# Patient Record
Sex: MALE | Race: WHITE | NOT HISPANIC OR LATINO | Employment: FULL TIME | RURAL
[De-identification: names, ages, dates, MRNs, and addresses within clinical notes are randomized per-mention and may not be internally consistent; named-entity substitution may affect disease eponyms.]

---

## 2018-10-03 NOTE — PROGRESS NOTES
[facility]  Consultation H&P  Kenneth Wen  1950  [phone]    VISIT DATE:  10/04/18    PCP: Susan Powers, APRN  [address]  [place] [facility] [address]    IDENTIFICATION: A 68 y.o. 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 CP  1. ~1990s [facility] Dr. Jhaveri [facility], reportedly normal, ibd  2. ~2011 MPS wnl, idb  3. 2017 GXT wnl, idb  2. Elevated BP  3. Prediabetes  4. GERD  5. Hiatal hernia   6. 3 melanoma  7. Seasonal allergic rhinitis  8. Surgical history:  1. Hemorrhoidectomy  2. Skin cancer excisions ×4  3. Appendectomy  4. Nasal polypectomy      Allergies  No Known Allergies    Current Medications    Current Outpatient Prescriptions:   •  aspirin 81 MG tablet, Take 81 mg by mouth Daily., Disp: , Rfl:   •  fexofenadine-pseudoephedrine (ALLEGRA-D)  MG per 12 hr tablet, Take 1 tablet by mouth 2 (Two) Times a Day., Disp: , Rfl:   •  QNASL 80 MCG/ACT aerosol solution, , Disp: , Rfl: 0  •  RA ALLERGY RELIEF 180 MG tablet, Take 180 mg by mouth Daily., Disp: , Rfl: 0     History of Present Illness   HPI  This is a 67-year-old male w the above-mentioned PMH who presents for consult from Susan Powers for evaluation of CP.    He reports having CP off and on throughout the years and actually had a [facility] w Dr. Jhaveri in the late 1990s that was wnl, data deficient, then most recently an MPS around 2011 that was also wnl, data deficient. He has had benign ED w/us a few time since then for CP. He describes 7/10 aching in his L sternal region that radiates around his back with occasional radiation down his L arm and L jaw. HE denies associated dyspnea, nausea, diaphoresis. This is non exertional, he states it seems to be more related tos stress.IT is not associated with certain foods or mealtimes.      Pt reports that about a year ago he was told he had prediabetes and pre hypertension, so  "he cut out a lot of sugar from his diet and lost about 25 lbs. HE does check BP at home w readings usually 120/70. BGs at home are usually low 100s or less. He is very active at work, moving around on his feet, but does no formal exercise. He quit smoking 30+ years ago.     Pt denies any  dyspnea at rest, dyspnea on exertion, orthopnea, PND, palpitations, lower extremity edema, or claudication. Pt denies history of CHF, DVT, PE, MI, CVA, TIA, or rheumatic fever. Reports 2 half brothers have had MIs <50. He was told \"years ago\" he had a hiatal hernia, he has never been scoped. He takes prevacid prn for heartburn.     ROS  Review of Systems   HENT: Positive for hearing loss and tinnitus.    Cardiovascular: Positive for chest pain.   Gastrointestinal: Positive for heartburn.   All other systems reviewed and are negative.      SOCIAL HX  Social History     Social History   • Marital status:      Spouse name: N/A   • Number of children: N/A   • Years of education: N/A     Occupational History   • Not on file.     Social History Main Topics   • Smoking status: Former Smoker     Types: Cigarettes     Quit date: 10/4/1982   • Smokeless tobacco: Never Used   • Alcohol use No   • Drug use: No   • Sexual activity: Defer     Other Topics Concern   • Not on file     Social History Narrative   • No narrative on file       FAMILY HX  Family History   Problem Relation Age of Onset   • No Known Problems Mother    • Heart attack Father    • No Known Problems Sister    • No Known Problems Brother    • No Known Problems Sister    • No Known Problems Brother        Vitals:    10/04/18 0956   BP: 131/85   BP Location: Left arm   Patient Position: Sitting   Pulse: 61   Weight: 78 kg (172 lb)   Height: 175.3 cm (69\")       PHYSICAL EXAMINATION:  Physical Exam   Constitutional: He is oriented to person, place, and time. He appears well-developed and well-nourished. No distress.   HENT:   Head: Normocephalic and atraumatic.   Right " Ear: External ear normal.   Left Ear: External ear normal.   Nose: Nose normal.   Eyes: Conjunctivae and EOM are normal.   Neck: Neck supple. No hepatojugular reflux and no JVD present. Carotid bruit is not present. No thyromegaly present.   Cardiovascular: Normal rate, regular rhythm, S1 normal, S2 normal, normal heart sounds, intact distal pulses and normal pulses.  Exam reveals no gallop, no distant heart sounds and no midsystolic click.    No murmur heard.  Pulses:       Radial pulses are 2+ on the right side, and 2+ on the left side.        Dorsalis pedis pulses are 2+ on the right side, and 2+ on the left side.        Posterior tibial pulses are 2+ on the right side, and 2+ on the left side.   Pulmonary/Chest: Effort normal and breath sounds normal. No respiratory distress. He has no decreased breath sounds. He has no wheezes. He has no rhonchi. He has no rales.   Abdominal: Soft. Bowel sounds are normal. There is no hepatosplenomegaly. There is no tenderness.   Musculoskeletal: Normal range of motion. He exhibits no edema.   Neurological: He is alert and oriented to person, place, and time.   No focal deficits.   Skin: Skin is warm and dry. No erythema.   Psychiatric: He has a normal mood and affect. Thought content normal.   Nursing note and vitals reviewed.      Diagnostic Data:    ECG 12 Lead  Date/Time: 10/4/2018 10:17 AM  Performed by: ELSIE ACOSTA  Authorized by: ELSIE ACOSTA   Rhythm: sinus rhythm  BPM: 86  Clinical impression: normal ECG        ASSESSMENT:   Diagnosis Plan   1. Precordial pain  Stress Test With Myocardial Perfusion (1 Day)   2. Elevated BP without diagnosis of hypertension     3. Dyslipidemia  Lipid Panel   4. Hyperglycemia       PLAN:  1. Chest pain with risk factors and a 68-year-old male with several years since last ischemic evaluation, we will treat this as an anginal equivalent and risk stratify for ischemia with noninvasive ischemic evaluation.  Nuclear stress test to be  set up in [place] at patient's convenience.  2. BP well controlled today.  Patient counseled to stay off of decongestants.  3. We will check lipids that if stress  4. Patient with control of blood glucose with diet    RTC 9 months    Scribed for Mendoza Piedra MD by Nell Flanagan PA-C. 10/4/2018  10:58 AM  I, Mendoza Piedra MD, personally performed the services described in this documentation as scribed by the above named individual in my presence, and it is both accurate and complete.  10/4/2018  11:12 AM    Mendoza Piedra MD, FACC

## 2018-10-04 ENCOUNTER — CONSULT (OUTPATIENT)
Dept: CARDIOLOGY | Facility: CLINIC | Age: 68
End: 2018-10-04

## 2018-10-04 VITALS
SYSTOLIC BLOOD PRESSURE: 131 MMHG | WEIGHT: 172 LBS | HEART RATE: 61 BPM | DIASTOLIC BLOOD PRESSURE: 85 MMHG | BODY MASS INDEX: 25.48 KG/M2 | HEIGHT: 69 IN

## 2018-10-04 DIAGNOSIS — E78.5 DYSLIPIDEMIA: ICD-10-CM

## 2018-10-04 DIAGNOSIS — R07.2 PRECORDIAL PAIN: Primary | ICD-10-CM

## 2018-10-04 DIAGNOSIS — R73.9 HYPERGLYCEMIA: ICD-10-CM

## 2018-10-04 DIAGNOSIS — R03.0 ELEVATED BP WITHOUT DIAGNOSIS OF HYPERTENSION: ICD-10-CM

## 2018-10-04 PROCEDURE — 93000 ELECTROCARDIOGRAM COMPLETE: CPT | Performed by: INTERNAL MEDICINE

## 2018-10-04 PROCEDURE — 99244 OFF/OP CNSLTJ NEW/EST MOD 40: CPT | Performed by: INTERNAL MEDICINE

## 2018-10-04 RX ORDER — FEXOFENADINE HCL AND PSEUDOEPHEDRINE HCI 60; 120 MG/1; MG/1
1 TABLET, EXTENDED RELEASE ORAL 2 TIMES DAILY
COMMUNITY

## 2018-10-04 RX ORDER — BECLOMETHASONE DIPROPIONATE 80 UG/1
AEROSOL, METERED NASAL
Refills: 0 | COMMUNITY
Start: 2018-10-02

## 2018-10-04 RX ORDER — FEXOFENADINE HCL 180 MG
180 TABLET ORAL DAILY
Refills: 0 | COMMUNITY
Start: 2018-10-01

## 2018-10-24 ENCOUNTER — HOSPITAL ENCOUNTER (OUTPATIENT)
Dept: CARDIOLOGY | Facility: HOSPITAL | Age: 68
Discharge: HOME | End: 2018-10-24

## 2018-10-24 DIAGNOSIS — R07.2 PRECORDIAL PAIN: ICD-10-CM

## 2018-10-24 PROCEDURE — 93017 CV STRESS TEST TRACING ONLY: CPT

## 2018-10-24 PROCEDURE — 78452 HT MUSCLE IMAGE SPECT MULT: CPT

## 2018-10-24 PROCEDURE — A9500 TC99M SESTAMIBI: HCPCS | Performed by: PHYSICIAN ASSISTANT

## 2018-10-24 PROCEDURE — 0 TECHNETIUM SESTAMIBI: Performed by: PHYSICIAN ASSISTANT

## 2018-10-24 PROCEDURE — 78452 HT MUSCLE IMAGE SPECT MULT: CPT | Performed by: INTERNAL MEDICINE

## 2018-10-24 PROCEDURE — 93018 CV STRESS TEST I&R ONLY: CPT | Performed by: INTERNAL MEDICINE

## 2018-10-24 RX ORDER — LANSOPRAZOLE 15 MG/1
15 CAPSULE, DELAYED RELEASE ORAL DAILY
COMMUNITY

## 2018-10-24 RX ADMIN — TECHNETIUM TC 99M SESTAMIBI 1 DOSE: 1 INJECTION INTRAVENOUS at 11:55

## 2018-10-24 RX ADMIN — TECHNETIUM TC 99M SESTAMIBI 1 DOSE: 1 INJECTION INTRAVENOUS at 09:40

## 2018-10-26 LAB
BH CV NUCLEAR PRIOR STUDY: 2
BH CV STRESS BP STAGE 1: NORMAL
BH CV STRESS BP STAGE 2: NORMAL
BH CV STRESS BP STAGE 3: NORMAL
BH CV STRESS DURATION MIN STAGE 1: 3
BH CV STRESS DURATION MIN STAGE 2: 3
BH CV STRESS DURATION MIN STAGE 3: 3
BH CV STRESS DURATION SEC STAGE 1: 0
BH CV STRESS DURATION SEC STAGE 2: 0
BH CV STRESS DURATION SEC STAGE 3: 0
BH CV STRESS GRADE STAGE 1: 10
BH CV STRESS GRADE STAGE 2: 12
BH CV STRESS GRADE STAGE 3: 14
BH CV STRESS HR STAGE 1: 104
BH CV STRESS HR STAGE 2: 129
BH CV STRESS HR STAGE 3: 141
BH CV STRESS METS STAGE 1: 5
BH CV STRESS METS STAGE 2: 7.5
BH CV STRESS METS STAGE 3: 10
BH CV STRESS PROTOCOL 1: NORMAL
BH CV STRESS RECOVERY BP: NORMAL MMHG
BH CV STRESS RECOVERY HR: 107 BPM
BH CV STRESS SPEED STAGE 1: 1.7
BH CV STRESS SPEED STAGE 2: 2.5
BH CV STRESS SPEED STAGE 3: 3.4
BH CV STRESS STAGE 1: 1
BH CV STRESS STAGE 2: 2
BH CV STRESS STAGE 3: 3
LV EF NUC BP: 55 %
MAXIMAL PREDICTED HEART RATE: 153 BPM
PERCENT MAX PREDICTED HR: 92.81 %
STRESS BASELINE BP: NORMAL MMHG
STRESS BASELINE HR: 61 BPM
STRESS PERCENT HR: 109 %
STRESS POST ESTIMATED WORKLOAD: 10.1 METS
STRESS POST EXERCISE DUR MIN: 9 MIN
STRESS POST EXERCISE DUR SEC: 0 SEC
STRESS POST PEAK BP: NORMAL MMHG
STRESS POST PEAK HR: 142 BPM
STRESS TARGET HR: 130 BPM

## 2023-01-01 ENCOUNTER — HOSPITAL ENCOUNTER (INPATIENT)
Facility: HOSPITAL | Age: 73
LOS: 7 days | End: 2023-11-23
Attending: INTERNAL MEDICINE | Admitting: INTERNAL MEDICINE
Payer: COMMERCIAL

## 2023-01-01 PROCEDURE — 25010000002 PHENOBARBITAL PER 120 MG: Performed by: FAMILY MEDICINE

## 2023-01-01 PROCEDURE — 25010000002 HALOPERIDOL LACTATE PER 5 MG: Performed by: NURSE PRACTITIONER

## 2023-01-01 PROCEDURE — 25010000002 PHENOBARBITAL PER 120 MG: Performed by: NURSE PRACTITIONER

## 2023-01-01 PROCEDURE — 25010000002 KETOROLAC TROMETHAMINE PER 15 MG: Performed by: NURSE PRACTITIONER

## 2023-01-01 PROCEDURE — 25010000002 MORPHINE PER 10 MG: Performed by: NURSE PRACTITIONER

## 2023-01-01 PROCEDURE — 25010000002 FUROSEMIDE PER 20 MG: Performed by: NURSE PRACTITIONER

## 2023-01-01 PROCEDURE — 25010000002 MIDAZOLAM PER 1 MG: Performed by: NURSE PRACTITIONER

## 2023-01-01 RX ORDER — GLYCOPYRROLATE 0.2 MG/ML
0.4 INJECTION INTRAMUSCULAR; INTRAVENOUS EVERY 4 HOURS PRN
Status: DISCONTINUED | OUTPATIENT
Start: 2023-01-01 | End: 2023-01-01 | Stop reason: HOSPADM

## 2023-01-01 RX ORDER — MORPHINE SULFATE 2 MG/ML
2 INJECTION, SOLUTION INTRAMUSCULAR; INTRAVENOUS
Status: DISCONTINUED | OUTPATIENT
Start: 2023-01-01 | End: 2023-01-01

## 2023-01-01 RX ORDER — LIDOCAINE 4 G/G
1 PATCH TOPICAL
Status: DISCONTINUED | OUTPATIENT
Start: 2023-01-01 | End: 2023-01-01

## 2023-01-01 RX ORDER — PHENOBARBITAL SODIUM 65 MG/ML
65 INJECTION INTRAMUSCULAR 2 TIMES DAILY
Status: DISCONTINUED | OUTPATIENT
Start: 2023-01-01 | End: 2023-01-01

## 2023-01-01 RX ORDER — ONDANSETRON 2 MG/ML
4 INJECTION INTRAMUSCULAR; INTRAVENOUS EVERY 6 HOURS PRN
Status: DISCONTINUED | OUTPATIENT
Start: 2023-01-01 | End: 2023-01-01 | Stop reason: HOSPADM

## 2023-01-01 RX ORDER — PHENOBARBITAL SODIUM 65 MG/ML
130 INJECTION INTRAMUSCULAR EVERY 6 HOURS PRN
Status: DISCONTINUED | OUTPATIENT
Start: 2023-01-01 | End: 2023-01-01 | Stop reason: HOSPADM

## 2023-01-01 RX ORDER — POLYVINYL ALCOHOL 14 MG/ML
2 SOLUTION/ DROPS OPHTHALMIC EVERY 6 HOURS
Status: DISCONTINUED | OUTPATIENT
Start: 2023-01-01 | End: 2023-01-01 | Stop reason: HOSPADM

## 2023-01-01 RX ORDER — KETOROLAC TROMETHAMINE 15 MG/ML
15 INJECTION, SOLUTION INTRAMUSCULAR; INTRAVENOUS EVERY 6 HOURS PRN
Status: DISCONTINUED | OUTPATIENT
Start: 2023-01-01 | End: 2023-01-01 | Stop reason: HOSPADM

## 2023-01-01 RX ORDER — FUROSEMIDE 10 MG/ML
20 INJECTION INTRAMUSCULAR; INTRAVENOUS EVERY 6 HOURS
Status: DISCONTINUED | OUTPATIENT
Start: 2023-01-01 | End: 2023-01-01

## 2023-01-01 RX ORDER — BISACODYL 10 MG
10 SUPPOSITORY, RECTAL RECTAL DAILY PRN
Status: DISCONTINUED | OUTPATIENT
Start: 2023-01-01 | End: 2023-01-01 | Stop reason: HOSPADM

## 2023-01-01 RX ORDER — FUROSEMIDE 10 MG/ML
20 INJECTION INTRAMUSCULAR; INTRAVENOUS EVERY 6 HOURS PRN
Status: DISCONTINUED | OUTPATIENT
Start: 2023-01-01 | End: 2023-01-01 | Stop reason: HOSPADM

## 2023-01-01 RX ORDER — SCOLOPAMINE TRANSDERMAL SYSTEM 1 MG/1
1 PATCH, EXTENDED RELEASE TRANSDERMAL
Status: DISCONTINUED | OUTPATIENT
Start: 2023-01-01 | End: 2023-01-01 | Stop reason: HOSPADM

## 2023-01-01 RX ORDER — ACETAMINOPHEN 650 MG/1
650 SUPPOSITORY RECTAL EVERY 4 HOURS PRN
Status: DISCONTINUED | OUTPATIENT
Start: 2023-01-01 | End: 2023-01-01 | Stop reason: HOSPADM

## 2023-01-01 RX ORDER — PHENOBARBITAL SODIUM 65 MG/ML
65 INJECTION INTRAMUSCULAR 3 TIMES DAILY
Status: DISCONTINUED | OUTPATIENT
Start: 2023-01-01 | End: 2023-01-01

## 2023-01-01 RX ORDER — GLYCOPYRROLATE 0.2 MG/ML
0.2 INJECTION INTRAMUSCULAR; INTRAVENOUS EVERY 4 HOURS PRN
Status: DISCONTINUED | OUTPATIENT
Start: 2023-01-01 | End: 2023-01-01

## 2023-01-01 RX ORDER — MORPHINE SULFATE 2 MG/ML
2 INJECTION, SOLUTION INTRAMUSCULAR; INTRAVENOUS EVERY 6 HOURS
Status: DISCONTINUED | OUTPATIENT
Start: 2023-01-01 | End: 2023-01-01

## 2023-01-01 RX ORDER — ACETAMINOPHEN 160 MG/5ML
650 SOLUTION ORAL EVERY 4 HOURS PRN
Status: DISCONTINUED | OUTPATIENT
Start: 2023-01-01 | End: 2023-01-01

## 2023-01-01 RX ORDER — POLYVINYL ALCOHOL 14 MG/ML
1 SOLUTION/ DROPS OPHTHALMIC
Status: DISCONTINUED | OUTPATIENT
Start: 2023-01-01 | End: 2023-01-01 | Stop reason: HOSPADM

## 2023-01-01 RX ORDER — SCOLOPAMINE TRANSDERMAL SYSTEM 1 MG/1
1 PATCH, EXTENDED RELEASE TRANSDERMAL ONCE
Status: DISCONTINUED | OUTPATIENT
Start: 2023-01-01 | End: 2023-01-01 | Stop reason: HOSPADM

## 2023-01-01 RX ORDER — MIDAZOLAM HYDROCHLORIDE 1 MG/ML
0.5 INJECTION INTRAMUSCULAR; INTRAVENOUS ONCE
Status: COMPLETED | OUTPATIENT
Start: 2023-01-01 | End: 2023-01-01

## 2023-01-01 RX ORDER — PHENOBARBITAL SODIUM 65 MG/ML
130 INJECTION INTRAMUSCULAR 3 TIMES DAILY
Status: DISCONTINUED | OUTPATIENT
Start: 2023-01-01 | End: 2023-01-01 | Stop reason: HOSPADM

## 2023-01-01 RX ORDER — ACETAMINOPHEN 325 MG/1
650 TABLET ORAL EVERY 4 HOURS PRN
Status: DISCONTINUED | OUTPATIENT
Start: 2023-01-01 | End: 2023-01-01

## 2023-01-01 RX ORDER — HALOPERIDOL 5 MG/ML
2 INJECTION INTRAMUSCULAR EVERY 4 HOURS PRN
Status: DISCONTINUED | OUTPATIENT
Start: 2023-01-01 | End: 2023-01-01 | Stop reason: HOSPADM

## 2023-01-01 RX ORDER — KETOROLAC TROMETHAMINE 15 MG/ML
15 INJECTION, SOLUTION INTRAMUSCULAR; INTRAVENOUS EVERY 6 HOURS PRN
Status: DISPENSED | OUTPATIENT
Start: 2023-01-01 | End: 2023-01-01

## 2023-01-01 RX ADMIN — MORPHINE SULFATE 2 MG: 2 INJECTION, SOLUTION INTRAMUSCULAR; INTRAVENOUS at 20:26

## 2023-01-01 RX ADMIN — SCOPOLAMINE 1 PATCH: 1.5 PATCH, EXTENDED RELEASE TRANSDERMAL at 08:34

## 2023-01-01 RX ADMIN — MORPHINE SULFATE 4 MG: 4 INJECTION, SOLUTION INTRAMUSCULAR; INTRAVENOUS at 15:57

## 2023-01-01 RX ADMIN — KETOROLAC TROMETHAMINE 15 MG: 15 INJECTION, SOLUTION INTRAMUSCULAR; INTRAVENOUS at 10:01

## 2023-01-01 RX ADMIN — MORPHINE SULFATE 2 MG: 2 INJECTION, SOLUTION INTRAMUSCULAR; INTRAVENOUS at 21:12

## 2023-01-01 RX ADMIN — PHENOBARBITAL SODIUM 130 MG: 65 INJECTION INTRAMUSCULAR; INTRAVENOUS at 15:46

## 2023-01-01 RX ADMIN — POLYVINYL ALCOHOL 2 DROP: 14 SOLUTION/ DROPS OPHTHALMIC at 18:00

## 2023-01-01 RX ADMIN — PHENOBARBITAL SODIUM 130 MG: 65 INJECTION INTRAMUSCULAR; INTRAVENOUS at 16:05

## 2023-01-01 RX ADMIN — MINERAL OIL: 1000 LIQUID ORAL at 09:48

## 2023-01-01 RX ADMIN — MORPHINE SULFATE 2 MG: 2 INJECTION, SOLUTION INTRAMUSCULAR; INTRAVENOUS at 16:23

## 2023-01-01 RX ADMIN — MORPHINE SULFATE 2 MG: 2 INJECTION, SOLUTION INTRAMUSCULAR; INTRAVENOUS at 03:58

## 2023-01-01 RX ADMIN — PHENOBARBITAL SODIUM 65 MG: 65 INJECTION INTRAMUSCULAR; INTRAVENOUS at 19:00

## 2023-01-01 RX ADMIN — MORPHINE SULFATE 2 MG: 2 INJECTION, SOLUTION INTRAMUSCULAR; INTRAVENOUS at 22:44

## 2023-01-01 RX ADMIN — MORPHINE SULFATE 2 MG: 2 INJECTION, SOLUTION INTRAMUSCULAR; INTRAVENOUS at 01:45

## 2023-01-01 RX ADMIN — MICONAZOLE NITRATE 1 APPLICATION: 20 CREAM TOPICAL at 22:16

## 2023-01-01 RX ADMIN — HALOPERIDOL LACTATE 2 MG: 5 INJECTION, SOLUTION INTRAMUSCULAR at 01:24

## 2023-01-01 RX ADMIN — PHENOBARBITAL SODIUM 65 MG: 65 INJECTION INTRAMUSCULAR at 08:00

## 2023-01-01 RX ADMIN — DICLOFENAC SODIUM 2 G: 9.3 GEL TOPICAL at 22:16

## 2023-01-01 RX ADMIN — MORPHINE SULFATE 2 MG: 2 INJECTION, SOLUTION INTRAMUSCULAR; INTRAVENOUS at 04:19

## 2023-01-01 RX ADMIN — PHENOBARBITAL SODIUM 65 MG: 65 INJECTION INTRAMUSCULAR; INTRAVENOUS at 22:11

## 2023-01-01 RX ADMIN — MICONAZOLE NITRATE 1 APPLICATION: 20 CREAM TOPICAL at 10:29

## 2023-01-01 RX ADMIN — DICLOFENAC SODIUM 2 G: 9.3 GEL TOPICAL at 22:44

## 2023-01-01 RX ADMIN — MORPHINE SULFATE 2 MG: 2 INJECTION, SOLUTION INTRAMUSCULAR; INTRAVENOUS at 15:46

## 2023-01-01 RX ADMIN — POLYVINYL ALCOHOL 2 DROP: 14 SOLUTION/ DROPS OPHTHALMIC at 07:59

## 2023-01-01 RX ADMIN — MIDAZOLAM HYDROCHLORIDE 0.5 MG: 1 INJECTION, SOLUTION INTRAMUSCULAR; INTRAVENOUS at 14:57

## 2023-01-01 RX ADMIN — PHENOBARBITAL SODIUM 65 MG: 65 INJECTION INTRAMUSCULAR; INTRAVENOUS at 08:00

## 2023-01-01 RX ADMIN — MORPHINE SULFATE 2 MG: 2 INJECTION, SOLUTION INTRAMUSCULAR; INTRAVENOUS at 09:13

## 2023-01-01 RX ADMIN — GLYCOPYRROLATE 0.4 MG: 0.2 INJECTION INTRAMUSCULAR; INTRAVENOUS at 01:24

## 2023-01-01 RX ADMIN — MORPHINE SULFATE 2 MG: 2 INJECTION, SOLUTION INTRAMUSCULAR; INTRAVENOUS at 10:47

## 2023-01-01 RX ADMIN — POLYVINYL ALCOHOL 2 DROP: 14 SOLUTION/ DROPS OPHTHALMIC at 17:42

## 2023-01-01 RX ADMIN — MORPHINE SULFATE 2 MG: 2 INJECTION, SOLUTION INTRAMUSCULAR; INTRAVENOUS at 21:00

## 2023-01-01 RX ADMIN — LIDOCAINE 1 PATCH: 4 PATCH TOPICAL at 08:00

## 2023-01-01 RX ADMIN — PHENOBARBITAL SODIUM 65 MG: 65 INJECTION INTRAMUSCULAR at 22:44

## 2023-01-01 RX ADMIN — MORPHINE SULFATE 2 MG: 2 INJECTION, SOLUTION INTRAMUSCULAR; INTRAVENOUS at 14:11

## 2023-01-01 RX ADMIN — MORPHINE SULFATE 2 MG: 2 INJECTION, SOLUTION INTRAMUSCULAR; INTRAVENOUS at 05:22

## 2023-01-01 RX ADMIN — MORPHINE SULFATE 2 MG: 2 INJECTION, SOLUTION INTRAMUSCULAR; INTRAVENOUS at 16:34

## 2023-01-01 RX ADMIN — MORPHINE SULFATE 2 MG: 2 INJECTION, SOLUTION INTRAMUSCULAR; INTRAVENOUS at 13:42

## 2023-01-01 RX ADMIN — DICLOFENAC SODIUM 2 G: 9.3 GEL TOPICAL at 10:21

## 2023-01-01 RX ADMIN — MORPHINE SULFATE 2 MG: 2 INJECTION, SOLUTION INTRAMUSCULAR; INTRAVENOUS at 16:44

## 2023-01-01 RX ADMIN — MICONAZOLE NITRATE 1 APPLICATION: 20 CREAM TOPICAL at 21:00

## 2023-01-01 RX ADMIN — HALOPERIDOL LACTATE 2 MG: 5 INJECTION, SOLUTION INTRAMUSCULAR at 09:14

## 2023-01-01 RX ADMIN — GLYCOPYRROLATE 0.4 MG: 0.2 INJECTION INTRAMUSCULAR; INTRAVENOUS at 06:12

## 2023-01-01 RX ADMIN — MORPHINE SULFATE 4 MG: 4 INJECTION, SOLUTION INTRAMUSCULAR; INTRAVENOUS at 00:44

## 2023-01-01 RX ADMIN — PHENOBARBITAL SODIUM 130 MG: 65 INJECTION INTRAMUSCULAR; INTRAVENOUS at 15:57

## 2023-01-01 RX ADMIN — MINERAL OIL: 1000 LIQUID ORAL at 00:44

## 2023-01-01 RX ADMIN — KETOROLAC TROMETHAMINE 15 MG: 15 INJECTION, SOLUTION INTRAMUSCULAR; INTRAVENOUS at 09:13

## 2023-01-01 RX ADMIN — GLYCOPYRROLATE 0.4 MG: 0.2 INJECTION INTRAMUSCULAR; INTRAVENOUS at 07:57

## 2023-01-01 RX ADMIN — PHENOBARBITAL SODIUM 130 MG: 65 INJECTION INTRAMUSCULAR; INTRAVENOUS at 20:01

## 2023-01-01 RX ADMIN — GLYCOPYRROLATE 0.4 MG: 0.2 INJECTION INTRAMUSCULAR; INTRAVENOUS at 15:02

## 2023-01-01 RX ADMIN — MICONAZOLE NITRATE 1 APPLICATION: 20 CREAM TOPICAL at 22:45

## 2023-01-01 RX ADMIN — MICONAZOLE NITRATE 1 APPLICATION: 20 CREAM TOPICAL at 08:02

## 2023-01-01 RX ADMIN — HALOPERIDOL LACTATE 2 MG: 5 INJECTION, SOLUTION INTRAMUSCULAR at 07:23

## 2023-01-01 RX ADMIN — PHENOBARBITAL SODIUM 130 MG: 65 INJECTION INTRAMUSCULAR; INTRAVENOUS at 21:43

## 2023-01-01 RX ADMIN — PHENOBARBITAL SODIUM 130 MG: 65 INJECTION INTRAMUSCULAR; INTRAVENOUS at 21:12

## 2023-01-01 RX ADMIN — FUROSEMIDE 20 MG: 10 INJECTION, SOLUTION INTRAMUSCULAR; INTRAVENOUS at 03:08

## 2023-01-01 RX ADMIN — DICLOFENAC SODIUM 2 G: 9.3 GEL TOPICAL at 21:01

## 2023-01-01 RX ADMIN — PHENOBARBITAL SODIUM 130 MG: 65 INJECTION INTRAMUSCULAR; INTRAVENOUS at 07:56

## 2023-01-01 RX ADMIN — MORPHINE SULFATE 4 MG: 4 INJECTION, SOLUTION INTRAMUSCULAR; INTRAVENOUS at 19:59

## 2023-01-01 RX ADMIN — PHENOBARBITAL SODIUM 65 MG: 65 INJECTION INTRAMUSCULAR at 21:26

## 2023-01-01 RX ADMIN — MINERAL OIL: 1000 LIQUID ORAL at 21:13

## 2023-01-01 RX ADMIN — HALOPERIDOL LACTATE 2 MG: 5 INJECTION, SOLUTION INTRAMUSCULAR at 09:49

## 2023-01-01 RX ADMIN — MORPHINE SULFATE 2 MG: 2 INJECTION, SOLUTION INTRAMUSCULAR; INTRAVENOUS at 10:27

## 2023-01-01 RX ADMIN — MINERAL OIL: 1000 LIQUID ORAL at 17:42

## 2023-01-01 RX ADMIN — MICONAZOLE NITRATE 1 APPLICATION: 20 CREAM TOPICAL at 08:35

## 2023-01-01 RX ADMIN — MORPHINE SULFATE 2 MG: 2 INJECTION, SOLUTION INTRAMUSCULAR; INTRAVENOUS at 08:25

## 2023-01-01 RX ADMIN — DICLOFENAC SODIUM 2 G: 9.3 GEL TOPICAL at 21:13

## 2023-01-01 RX ADMIN — MORPHINE SULFATE 2 MG: 2 INJECTION, SOLUTION INTRAMUSCULAR; INTRAVENOUS at 23:59

## 2023-01-01 RX ADMIN — MORPHINE SULFATE 2 MG: 2 INJECTION, SOLUTION INTRAMUSCULAR; INTRAVENOUS at 04:52

## 2023-01-01 RX ADMIN — HALOPERIDOL LACTATE 2 MG: 5 INJECTION, SOLUTION INTRAMUSCULAR at 20:00

## 2023-01-01 RX ADMIN — POLYVINYL ALCOHOL 2 DROP: 14 SOLUTION/ DROPS OPHTHALMIC at 00:47

## 2023-01-01 RX ADMIN — MORPHINE SULFATE 2 MG: 2 INJECTION, SOLUTION INTRAMUSCULAR; INTRAVENOUS at 09:25

## 2023-01-01 RX ADMIN — MORPHINE SULFATE 2 MG: 2 INJECTION, SOLUTION INTRAMUSCULAR; INTRAVENOUS at 07:56

## 2023-01-01 RX ADMIN — MINERAL OIL: 1000 LIQUID ORAL at 12:09

## 2023-01-01 RX ADMIN — MORPHINE SULFATE 2 MG: 2 INJECTION, SOLUTION INTRAMUSCULAR; INTRAVENOUS at 01:24

## 2023-01-01 RX ADMIN — MORPHINE SULFATE 2 MG: 2 INJECTION, SOLUTION INTRAMUSCULAR; INTRAVENOUS at 09:49

## 2023-01-01 RX ADMIN — PHENOBARBITAL SODIUM 130 MG: 65 INJECTION INTRAMUSCULAR; INTRAVENOUS at 14:21

## 2023-01-01 RX ADMIN — PHENOBARBITAL SODIUM 65 MG: 65 INJECTION INTRAMUSCULAR at 09:25

## 2023-01-01 RX ADMIN — PHENOBARBITAL SODIUM 130 MG: 65 INJECTION INTRAMUSCULAR; INTRAVENOUS at 02:34

## 2023-01-01 RX ADMIN — MORPHINE SULFATE 2 MG: 2 INJECTION, SOLUTION INTRAMUSCULAR; INTRAVENOUS at 04:26

## 2023-01-01 RX ADMIN — SCOPOLAMINE 1 PATCH: 1.5 PATCH, EXTENDED RELEASE TRANSDERMAL at 02:23

## 2023-01-01 RX ADMIN — KETOROLAC TROMETHAMINE 15 MG: 15 INJECTION, SOLUTION INTRAMUSCULAR; INTRAVENOUS at 12:59

## 2023-01-01 RX ADMIN — DICLOFENAC SODIUM 2 G: 9.3 GEL TOPICAL at 08:00

## 2023-01-01 RX ADMIN — MORPHINE SULFATE 2 MG: 2 INJECTION, SOLUTION INTRAMUSCULAR; INTRAVENOUS at 03:25

## 2023-01-01 RX ADMIN — MORPHINE SULFATE 2 MG: 2 INJECTION, SOLUTION INTRAMUSCULAR; INTRAVENOUS at 02:19

## 2023-01-01 RX ADMIN — BISACODYL 10 MG: 10 SUPPOSITORY RECTAL at 17:50

## 2023-01-01 RX ADMIN — PHENOBARBITAL SODIUM 130 MG: 65 INJECTION INTRAMUSCULAR; INTRAVENOUS at 01:06

## 2023-01-01 RX ADMIN — MICONAZOLE NITRATE 1 APPLICATION: 20 CREAM TOPICAL at 20:05

## 2023-01-01 RX ADMIN — PHENOBARBITAL SODIUM 130 MG: 65 INJECTION INTRAMUSCULAR; INTRAVENOUS at 08:18

## 2023-01-01 RX ADMIN — DICLOFENAC SODIUM 2 G: 9.3 GEL TOPICAL at 21:00

## 2023-01-01 RX ADMIN — DICLOFENAC SODIUM 2 G: 9.3 GEL TOPICAL at 08:01

## 2023-01-01 RX ADMIN — HALOPERIDOL LACTATE 2 MG: 5 INJECTION, SOLUTION INTRAMUSCULAR at 20:26

## 2023-01-01 RX ADMIN — DICLOFENAC SODIUM 2 G: 9.3 GEL TOPICAL at 08:35

## 2023-01-01 RX ADMIN — MORPHINE SULFATE 2 MG: 2 INJECTION, SOLUTION INTRAMUSCULAR; INTRAVENOUS at 21:27

## 2023-01-01 RX ADMIN — MICONAZOLE NITRATE 1 APPLICATION: 20 CREAM TOPICAL at 08:00

## 2023-01-01 RX ADMIN — MICONAZOLE NITRATE 1 APPLICATION: 20 CREAM TOPICAL at 08:19

## 2023-01-01 RX ADMIN — MORPHINE SULFATE 2 MG: 2 INJECTION, SOLUTION INTRAMUSCULAR; INTRAVENOUS at 22:11

## 2023-01-01 RX ADMIN — POLYVINYL ALCOHOL 2 DROP: 14 SOLUTION/ DROPS OPHTHALMIC at 00:44

## 2023-01-01 RX ADMIN — MORPHINE SULFATE 2 MG: 2 INJECTION, SOLUTION INTRAMUSCULAR; INTRAVENOUS at 10:42

## 2023-01-01 RX ADMIN — MORPHINE SULFATE 2 MG: 2 INJECTION, SOLUTION INTRAMUSCULAR; INTRAVENOUS at 03:14

## 2023-01-01 RX ADMIN — MORPHINE SULFATE 2 MG: 2 INJECTION, SOLUTION INTRAMUSCULAR; INTRAVENOUS at 12:59

## 2023-01-01 RX ADMIN — MORPHINE SULFATE 2 MG: 2 INJECTION, SOLUTION INTRAMUSCULAR; INTRAVENOUS at 19:01

## 2023-01-01 RX ADMIN — PHENOBARBITAL SODIUM 130 MG: 65 INJECTION INTRAMUSCULAR; INTRAVENOUS at 03:25

## 2023-01-01 RX ADMIN — MICONAZOLE NITRATE 1 APPLICATION: 20 CREAM TOPICAL at 20:27

## 2023-01-01 RX ADMIN — KETOROLAC TROMETHAMINE 15 MG: 15 INJECTION, SOLUTION INTRAMUSCULAR; INTRAVENOUS at 05:19

## 2023-01-01 RX ADMIN — MICONAZOLE NITRATE 1 APPLICATION: 20 CREAM TOPICAL at 21:01

## 2023-01-01 RX ADMIN — PHENOBARBITAL SODIUM 130 MG: 65 INJECTION INTRAMUSCULAR; INTRAVENOUS at 08:00

## 2023-01-01 RX ADMIN — DICLOFENAC SODIUM 2 G: 9.3 GEL TOPICAL at 08:18

## 2023-01-01 RX ADMIN — PHENOBARBITAL SODIUM 130 MG: 65 INJECTION INTRAMUSCULAR; INTRAVENOUS at 21:00

## 2023-01-01 RX ADMIN — DICLOFENAC SODIUM 2 G: 9.3 GEL TOPICAL at 20:26

## 2023-01-01 RX ADMIN — MORPHINE SULFATE 4 MG: 4 INJECTION, SOLUTION INTRAMUSCULAR; INTRAVENOUS at 12:09

## 2023-01-01 RX ADMIN — MICONAZOLE NITRATE 1 APPLICATION: 20 CREAM TOPICAL at 21:13

## 2023-01-01 RX ADMIN — PHENOBARBITAL SODIUM 130 MG: 65 INJECTION INTRAMUSCULAR; INTRAVENOUS at 11:09

## 2023-01-01 RX ADMIN — MORPHINE SULFATE 2 MG: 2 INJECTION, SOLUTION INTRAMUSCULAR; INTRAVENOUS at 14:57

## 2023-01-01 RX ADMIN — HALOPERIDOL LACTATE 2 MG: 5 INJECTION, SOLUTION INTRAMUSCULAR at 01:45

## 2023-01-01 RX ADMIN — MINERAL OIL: 1000 LIQUID ORAL at 21:06

## 2023-01-01 RX ADMIN — PHENOBARBITAL SODIUM 65 MG: 65 INJECTION INTRAMUSCULAR; INTRAVENOUS at 10:29

## 2023-01-01 RX ADMIN — HALOPERIDOL LACTATE 2 MG: 5 INJECTION, SOLUTION INTRAMUSCULAR at 13:43

## 2023-01-01 RX ADMIN — PHENOBARBITAL SODIUM 65 MG: 65 INJECTION INTRAMUSCULAR; INTRAVENOUS at 16:33

## 2023-01-01 RX ADMIN — MORPHINE SULFATE 2 MG: 2 INJECTION, SOLUTION INTRAMUSCULAR; INTRAVENOUS at 21:43

## 2023-01-01 RX ADMIN — MORPHINE SULFATE 2 MG: 2 INJECTION, SOLUTION INTRAMUSCULAR; INTRAVENOUS at 06:12

## 2023-01-01 RX ADMIN — MORPHINE SULFATE 2 MG: 2 INJECTION, SOLUTION INTRAMUSCULAR; INTRAVENOUS at 16:05

## 2023-01-01 RX ADMIN — PHENOBARBITAL SODIUM 65 MG: 65 INJECTION INTRAMUSCULAR; INTRAVENOUS at 20:26

## 2023-01-01 RX ADMIN — MORPHINE SULFATE 2 MG: 2 INJECTION, SOLUTION INTRAMUSCULAR; INTRAVENOUS at 08:18

## 2023-01-01 RX ADMIN — KETOROLAC TROMETHAMINE 15 MG: 15 INJECTION, SOLUTION INTRAMUSCULAR; INTRAVENOUS at 17:42

## 2023-01-01 RX ADMIN — KETOROLAC TROMETHAMINE 15 MG: 15 INJECTION, SOLUTION INTRAMUSCULAR; INTRAVENOUS at 20:00

## 2023-01-01 RX ADMIN — MORPHINE SULFATE 2 MG: 2 INJECTION, SOLUTION INTRAMUSCULAR; INTRAVENOUS at 11:09

## 2023-09-15 ENCOUNTER — ANCILLARY PROCEDURE (OUTPATIENT)
Dept: SPEECH THERAPY | Facility: HOSPITAL | Age: 73
End: 2023-09-15
Payer: MEDICARE

## 2023-09-15 ENCOUNTER — APPOINTMENT (OUTPATIENT)
Dept: MRI IMAGING | Facility: HOSPITAL | Age: 73
End: 2023-09-15
Payer: MEDICARE

## 2023-09-15 ENCOUNTER — APPOINTMENT (OUTPATIENT)
Dept: GENERAL RADIOLOGY | Facility: HOSPITAL | Age: 73
End: 2023-09-15
Payer: MEDICARE

## 2023-09-15 ENCOUNTER — APPOINTMENT (OUTPATIENT)
Dept: CT IMAGING | Facility: HOSPITAL | Age: 73
End: 2023-09-15
Payer: MEDICARE

## 2023-09-15 ENCOUNTER — HOSPITAL ENCOUNTER (INPATIENT)
Facility: HOSPITAL | Age: 73
LOS: 62 days | End: 2023-11-16
Attending: INTERNAL MEDICINE | Admitting: INTERNAL MEDICINE
Payer: COMMERCIAL

## 2023-09-15 DIAGNOSIS — R47.01 APHASIA: ICD-10-CM

## 2023-09-15 DIAGNOSIS — I61.9 HEMORRHAGIC CEREBROVASCULAR ACCIDENT (CVA): Primary | ICD-10-CM

## 2023-09-15 DIAGNOSIS — R13.12 OROPHARYNGEAL DYSPHAGIA: ICD-10-CM

## 2023-09-15 DIAGNOSIS — R47.1 DYSARTHRIA: ICD-10-CM

## 2023-09-15 PROBLEM — K21.9 GERD (GASTROESOPHAGEAL REFLUX DISEASE): Chronic | Status: ACTIVE | Noted: 2023-01-01

## 2023-09-15 PROBLEM — I50.30 (HFPEF) HEART FAILURE WITH PRESERVED EJECTION FRACTION: Status: ACTIVE | Noted: 2023-01-01

## 2023-09-15 PROBLEM — E11.9 T2DM (TYPE 2 DIABETES MELLITUS): Chronic | Status: ACTIVE | Noted: 2023-01-01

## 2023-09-15 PROBLEM — I63.9 CVA (CEREBRAL VASCULAR ACCIDENT): Status: ACTIVE | Noted: 2023-01-01

## 2023-09-15 PROBLEM — I10 HTN (HYPERTENSION): Chronic | Status: ACTIVE | Noted: 2023-01-01

## 2023-09-15 PROBLEM — E78.5 DYSLIPIDEMIA: Chronic | Status: ACTIVE | Noted: 2018-10-04

## 2023-09-15 LAB
ALBUMIN SERPL-MCNC: 4.2 G/DL (ref 3.5–5.2)
ALBUMIN/GLOB SERPL: 1.4 G/DL
ALP SERPL-CCNC: 75 U/L (ref 39–117)
ALT SERPL W P-5'-P-CCNC: 27 U/L (ref 1–41)
ANION GAP SERPL CALCULATED.3IONS-SCNC: 14 MMOL/L (ref 5–15)
AST SERPL-CCNC: 34 U/L (ref 1–40)
BACTERIA UR QL AUTO: ABNORMAL /HPF
BILIRUB SERPL-MCNC: 1.2 MG/DL (ref 0–1.2)
BILIRUB UR QL STRIP: NEGATIVE
BUN SERPL-MCNC: 21 MG/DL (ref 8–23)
BUN/CREAT SERPL: 27.6 (ref 7–25)
CALCIUM SPEC-SCNC: 9.1 MG/DL (ref 8.6–10.5)
CHLORIDE SERPL-SCNC: 104 MMOL/L (ref 98–107)
CHOLEST SERPL-MCNC: 150 MG/DL (ref 0–200)
CLARITY UR: ABNORMAL
CO2 SERPL-SCNC: 23 MMOL/L (ref 22–29)
COLOR UR: YELLOW
CREAT SERPL-MCNC: 0.76 MG/DL (ref 0.76–1.27)
D-LACTATE SERPL-SCNC: 1.2 MMOL/L (ref 0.5–2)
EGFRCR SERPLBLD CKD-EPI 2021: 96.1 ML/MIN/1.73
GLOBULIN UR ELPH-MCNC: 3 GM/DL
GLUCOSE BLDC GLUCOMTR-MCNC: 114 MG/DL (ref 70–130)
GLUCOSE BLDC GLUCOMTR-MCNC: 122 MG/DL (ref 70–130)
GLUCOSE BLDC GLUCOMTR-MCNC: 128 MG/DL (ref 70–130)
GLUCOSE BLDC GLUCOMTR-MCNC: 139 MG/DL (ref 70–130)
GLUCOSE BLDC GLUCOMTR-MCNC: 147 MG/DL (ref 70–130)
GLUCOSE SERPL-MCNC: 138 MG/DL (ref 65–99)
GLUCOSE UR STRIP-MCNC: NEGATIVE MG/DL
HBA1C MFR BLD: 6.4 % (ref 4.8–5.6)
HDLC SERPL-MCNC: 36 MG/DL (ref 40–60)
HGB UR QL STRIP.AUTO: ABNORMAL
HYALINE CASTS UR QL AUTO: ABNORMAL /LPF
INR PPP: 1.09 (ref 0.89–1.12)
KETONES UR QL STRIP: ABNORMAL
LDLC SERPL CALC-MCNC: 104 MG/DL (ref 0–100)
LDLC/HDLC SERPL: 2.89 {RATIO}
LEUKOCYTE ESTERASE UR QL STRIP.AUTO: NEGATIVE
MAGNESIUM SERPL-MCNC: 2 MG/DL (ref 1.6–2.4)
NITRITE UR QL STRIP: NEGATIVE
PH UR STRIP.AUTO: <=5 [PH] (ref 5–8)
PHOSPHATE SERPL-MCNC: 2.2 MG/DL (ref 2.5–4.5)
PHOSPHATE SERPL-MCNC: 2.5 MG/DL (ref 2.5–4.5)
POTASSIUM SERPL-SCNC: 3.2 MMOL/L (ref 3.5–5.2)
POTASSIUM SERPL-SCNC: 4.4 MMOL/L (ref 3.5–5.2)
PROCALCITONIN SERPL-MCNC: 0.07 NG/ML (ref 0–0.25)
PROT SERPL-MCNC: 7.2 G/DL (ref 6–8.5)
PROT UR QL STRIP: NEGATIVE
PROTHROMBIN TIME: 14.2 SECONDS (ref 12.2–14.5)
QT INTERVAL: 372 MS
QTC INTERVAL: 455 MS
RBC # UR STRIP: ABNORMAL /HPF
REF LAB TEST METHOD: ABNORMAL
SODIUM SERPL-SCNC: 141 MMOL/L (ref 136–145)
SODIUM SERPL-SCNC: 142 MMOL/L (ref 136–145)
SP GR UR STRIP: 1.02 (ref 1–1.03)
SQUAMOUS #/AREA URNS HPF: ABNORMAL /HPF
T4 FREE SERPL-MCNC: 1.32 NG/DL (ref 0.93–1.7)
TRIGL SERPL-MCNC: 49 MG/DL (ref 0–150)
TSH SERPL DL<=0.05 MIU/L-ACNC: 2 UIU/ML (ref 0.27–4.2)
UROBILINOGEN UR QL STRIP: ABNORMAL
VLDLC SERPL-MCNC: 10 MG/DL (ref 5–40)
WBC # UR STRIP: ABNORMAL /HPF

## 2023-09-15 PROCEDURE — 81001 URINALYSIS AUTO W/SCOPE: CPT

## 2023-09-15 PROCEDURE — 99223 1ST HOSP IP/OBS HIGH 75: CPT

## 2023-09-15 PROCEDURE — 83036 HEMOGLOBIN GLYCOSYLATED A1C: CPT

## 2023-09-15 PROCEDURE — 97162 PT EVAL MOD COMPLEX 30 MIN: CPT

## 2023-09-15 PROCEDURE — 92612 ENDOSCOPY SWALLOW (FEES) VID: CPT | Performed by: SPEECH-LANGUAGE PATHOLOGIST

## 2023-09-15 PROCEDURE — 92523 SPEECH SOUND LANG COMPREHEN: CPT

## 2023-09-15 PROCEDURE — 70450 CT HEAD/BRAIN W/O DYE: CPT

## 2023-09-15 PROCEDURE — 82948 REAGENT STRIP/BLOOD GLUCOSE: CPT

## 2023-09-15 PROCEDURE — 93010 ELECTROCARDIOGRAM REPORT: CPT | Performed by: INTERNAL MEDICINE

## 2023-09-15 PROCEDURE — 99223 1ST HOSP IP/OBS HIGH 75: CPT | Performed by: INTERNAL MEDICINE

## 2023-09-15 PROCEDURE — 92610 EVALUATE SWALLOWING FUNCTION: CPT

## 2023-09-15 PROCEDURE — 74018 RADEX ABDOMEN 1 VIEW: CPT

## 2023-09-15 PROCEDURE — 97110 THERAPEUTIC EXERCISES: CPT

## 2023-09-15 PROCEDURE — 84443 ASSAY THYROID STIM HORMONE: CPT | Performed by: NURSE PRACTITIONER

## 2023-09-15 PROCEDURE — 70551 MRI BRAIN STEM W/O DYE: CPT

## 2023-09-15 PROCEDURE — 84100 ASSAY OF PHOSPHORUS: CPT | Performed by: NURSE PRACTITIONER

## 2023-09-15 PROCEDURE — 83735 ASSAY OF MAGNESIUM: CPT | Performed by: NURSE PRACTITIONER

## 2023-09-15 PROCEDURE — 84439 ASSAY OF FREE THYROXINE: CPT | Performed by: NURSE PRACTITIONER

## 2023-09-15 PROCEDURE — 97535 SELF CARE MNGMENT TRAINING: CPT

## 2023-09-15 PROCEDURE — 84295 ASSAY OF SERUM SODIUM: CPT | Performed by: STUDENT IN AN ORGANIZED HEALTH CARE EDUCATION/TRAINING PROGRAM

## 2023-09-15 PROCEDURE — 71045 X-RAY EXAM CHEST 1 VIEW: CPT

## 2023-09-15 PROCEDURE — 85610 PROTHROMBIN TIME: CPT | Performed by: NURSE PRACTITIONER

## 2023-09-15 PROCEDURE — 80061 LIPID PANEL: CPT

## 2023-09-15 PROCEDURE — 83605 ASSAY OF LACTIC ACID: CPT | Performed by: NURSE PRACTITIONER

## 2023-09-15 PROCEDURE — 80053 COMPREHEN METABOLIC PANEL: CPT | Performed by: NURSE PRACTITIONER

## 2023-09-15 PROCEDURE — 97166 OT EVAL MOD COMPLEX 45 MIN: CPT

## 2023-09-15 PROCEDURE — 84145 PROCALCITONIN (PCT): CPT | Performed by: NURSE PRACTITIONER

## 2023-09-15 PROCEDURE — 93005 ELECTROCARDIOGRAM TRACING: CPT

## 2023-09-15 PROCEDURE — 84132 ASSAY OF SERUM POTASSIUM: CPT | Performed by: NURSE PRACTITIONER

## 2023-09-15 PROCEDURE — 25010000002 ONDANSETRON PER 1 MG

## 2023-09-15 PROCEDURE — 87040 BLOOD CULTURE FOR BACTERIA: CPT | Performed by: NURSE PRACTITIONER

## 2023-09-15 RX ORDER — SODIUM CHLORIDE 9 MG/ML
40 INJECTION, SOLUTION INTRAVENOUS AS NEEDED
Status: DISCONTINUED | OUTPATIENT
Start: 2023-09-15 | End: 2023-09-15

## 2023-09-15 RX ORDER — NICOTINE POLACRILEX 4 MG
15 LOZENGE BUCCAL
Status: DISCONTINUED | OUTPATIENT
Start: 2023-09-15 | End: 2023-09-20

## 2023-09-15 RX ORDER — IBUPROFEN 600 MG/1
1 TABLET ORAL
Status: DISCONTINUED | OUTPATIENT
Start: 2023-09-15 | End: 2023-09-20

## 2023-09-15 RX ORDER — PANTOPRAZOLE SODIUM 40 MG/10ML
40 INJECTION, POWDER, LYOPHILIZED, FOR SOLUTION INTRAVENOUS
Status: DISCONTINUED | OUTPATIENT
Start: 2023-09-15 | End: 2023-09-19

## 2023-09-15 RX ORDER — SODIUM CHLORIDE 0.9 % (FLUSH) 0.9 %
10 SYRINGE (ML) INJECTION EVERY 12 HOURS SCHEDULED
Status: DISCONTINUED | OUTPATIENT
Start: 2023-09-15 | End: 2023-09-19

## 2023-09-15 RX ORDER — POTASSIUM CHLORIDE 1.5 G/1.58G
40 POWDER, FOR SOLUTION ORAL EVERY 4 HOURS
Status: DISCONTINUED | OUTPATIENT
Start: 2023-09-15 | End: 2023-09-15

## 2023-09-15 RX ORDER — SODIUM CHLORIDE 0.9 % (FLUSH) 0.9 %
10 SYRINGE (ML) INJECTION AS NEEDED
Status: DISCONTINUED | OUTPATIENT
Start: 2023-09-15 | End: 2023-10-16

## 2023-09-15 RX ORDER — SODIUM CHLORIDE 3 G/100ML
200 INJECTION, SOLUTION INTRAVENOUS ONCE
Status: COMPLETED | OUTPATIENT
Start: 2023-09-15 | End: 2023-09-15

## 2023-09-15 RX ORDER — ONDANSETRON 2 MG/ML
INJECTION INTRAMUSCULAR; INTRAVENOUS
Status: DISPENSED
Start: 2023-09-15 | End: 2023-09-15

## 2023-09-15 RX ORDER — ACETAMINOPHEN 325 MG/1
650 TABLET ORAL EVERY 6 HOURS PRN
Status: DISCONTINUED | OUTPATIENT
Start: 2023-09-15 | End: 2023-09-17

## 2023-09-15 RX ORDER — SODIUM CHLORIDE 3 G/100ML
150 INJECTION, SOLUTION INTRAVENOUS ONCE
Status: COMPLETED | OUTPATIENT
Start: 2023-09-15 | End: 2023-09-15

## 2023-09-15 RX ORDER — POTASSIUM CHLORIDE 1.5 G/1.58G
40 POWDER, FOR SOLUTION ORAL EVERY 4 HOURS
Status: COMPLETED | OUTPATIENT
Start: 2023-09-15 | End: 2023-09-15

## 2023-09-15 RX ORDER — ONDANSETRON 2 MG/ML
4 INJECTION INTRAMUSCULAR; INTRAVENOUS EVERY 6 HOURS PRN
Status: DISCONTINUED | OUTPATIENT
Start: 2023-09-15 | End: 2023-11-16 | Stop reason: HOSPADM

## 2023-09-15 RX ORDER — ATORVASTATIN CALCIUM 40 MG/1
80 TABLET, FILM COATED ORAL NIGHTLY
Status: DISCONTINUED | OUTPATIENT
Start: 2023-09-15 | End: 2023-09-20

## 2023-09-15 RX ORDER — ACETAMINOPHEN 650 MG/1
650 SUPPOSITORY RECTAL EVERY 4 HOURS PRN
Status: DISCONTINUED | OUTPATIENT
Start: 2023-09-15 | End: 2023-09-17

## 2023-09-15 RX ORDER — DEXTROSE MONOHYDRATE 25 G/50ML
25 INJECTION, SOLUTION INTRAVENOUS
Status: DISCONTINUED | OUTPATIENT
Start: 2023-09-15 | End: 2023-09-20

## 2023-09-15 RX ORDER — ATORVASTATIN CALCIUM 40 MG/1
80 TABLET, FILM COATED ORAL NIGHTLY
Status: DISCONTINUED | OUTPATIENT
Start: 2023-09-15 | End: 2023-09-15

## 2023-09-15 RX ADMIN — ATORVASTATIN CALCIUM 80 MG: 40 TABLET, FILM COATED ORAL at 20:48

## 2023-09-15 RX ADMIN — Medication 10 ML: at 01:25

## 2023-09-15 RX ADMIN — NICARDIPINE HYDROCHLORIDE 5 MG/HR: 25 INJECTION, SOLUTION INTRAVENOUS at 06:28

## 2023-09-15 RX ADMIN — NICARDIPINE HYDROCHLORIDE 12.5 MG/HR: 25 INJECTION, SOLUTION INTRAVENOUS at 20:13

## 2023-09-15 RX ADMIN — METOPROLOL TARTRATE 25 MG: 25 TABLET, FILM COATED ORAL at 20:49

## 2023-09-15 RX ADMIN — NICARDIPINE HYDROCHLORIDE 7.5 MG/HR: 25 INJECTION, SOLUTION INTRAVENOUS at 11:03

## 2023-09-15 RX ADMIN — ACETAMINOPHEN 650 MG: 325 TABLET ORAL at 15:55

## 2023-09-15 RX ADMIN — NICARDIPINE HYDROCHLORIDE 5 MG/HR: 25 INJECTION, SOLUTION INTRAVENOUS at 01:22

## 2023-09-15 RX ADMIN — NICARDIPINE HYDROCHLORIDE 12.5 MG/HR: 25 INJECTION, SOLUTION INTRAVENOUS at 17:43

## 2023-09-15 RX ADMIN — Medication 10 ML: at 20:49

## 2023-09-15 RX ADMIN — NICARDIPINE HYDROCHLORIDE 15 MG/HR: 25 INJECTION, SOLUTION INTRAVENOUS at 14:11

## 2023-09-15 RX ADMIN — POTASSIUM CHLORIDE 40 MEQ: 1.5 POWDER, FOR SOLUTION ORAL at 10:16

## 2023-09-15 RX ADMIN — POTASSIUM & SODIUM PHOSPHATES POWDER PACK 280-160-250 MG 2 PACKET: 280-160-250 PACK at 05:01

## 2023-09-15 RX ADMIN — POTASSIUM CHLORIDE 40 MEQ: 1.5 POWDER, FOR SOLUTION ORAL at 05:02

## 2023-09-15 RX ADMIN — ONDANSETRON 4 MG: 2 INJECTION INTRAMUSCULAR; INTRAVENOUS at 01:14

## 2023-09-15 RX ADMIN — SODIUM CHLORIDE 200 ML: 3 INJECTION, SOLUTION INTRAVENOUS at 19:32

## 2023-09-15 RX ADMIN — PANTOPRAZOLE SODIUM 40 MG: 40 INJECTION, POWDER, LYOPHILIZED, FOR SOLUTION INTRAVENOUS at 05:02

## 2023-09-15 RX ADMIN — NICARDIPINE HYDROCHLORIDE 15 MG/HR: 25 INJECTION, SOLUTION INTRAVENOUS at 16:00

## 2023-09-15 RX ADMIN — SODIUM CHLORIDE 150 ML: 3 INJECTION, SOLUTION INTRAVENOUS at 12:21

## 2023-09-15 RX ADMIN — NICARDIPINE HYDROCHLORIDE 12.5 MG/HR: 25 INJECTION, SOLUTION INTRAVENOUS at 22:14

## 2023-09-15 RX ADMIN — Medication 10 ML: at 10:17

## 2023-09-15 NOTE — CASE MANAGEMENT/SOCIAL WORK
Discharge Planning Assessment  [facility]     Patient Name: Kenneth Wen  MRN: [id number]  Today's Date: 9/15/2023    Admit Date: 9/15/2023    Plan: IRF at [facility]   Discharge Needs Assessment       Row Name 09/15/23 1311       Living Environment    People in Home spouse    Name(s) of People in Home Reny, wife    Current Living Arrangements home    Potentially Unsafe Housing Conditions unable to assess    Primary Care Provided by self    Provides Primary Care For no one    Family Caregiver if Needed spouse    Family Caregiver Names Reny, spouse    Quality of Family Relationships helpful;involved;supportive       Resource/Environmental Concerns    Resource/Environmental Concerns none    Transportation Concerns none       Food Insecurity    Within the past 12 months, you worried that your food would run out before you got the money to buy more. Never true    Within the past 12 months, the food you bought just didn't last and you didn't have money to get more. Never true       Transition Planning    Patient/Family Anticipates Transition to inpatient rehabilitation facility    Patient/Family Anticipated Services at Transition        Discharge Needs Assessment    Equipment Currently Used at Home none    Concerns to be Addressed discharge planning    Discharge Facility/Level of Care Needs rehabilitation facility                   Discharge Plan       Row Name 09/15/23 1312       Plan    Plan IRF at [facility]    Patient/Family in Agreement with Plan yes    Plan Comments Spoke with Mrs. Wen by phone to initiate discharge planning.  Mrs. Wen confirms residence in [facility]; PCP is Susan Powers; primary insurance is [facility] and secondary is Medicare A.  Mrs. Wen states patient was independent with ADLs and mobility and working full-time prior to this admission; no DME or HH.  Mrs. Wen requests referral to [facility] for IRF.  Mrs. Wen states their son lives in [place] and she will stay with  him while Mr. Wen is at rehab.  Referral called to April with [facility].   will continue to follow and assist with discharge needs.    Final Discharge Disposition Code 62 - inpatient rehab facility                  Continued Care and Services - Admitted Since 9/15/2023       Destination       Service Provider Request Status Selected Services Address Phone Fax Patient Preferred    [facility] Pending - No Request Sent N/A [address] [facility] [id number] [phone]                          Expected Discharge Date and Time       Expected Discharge Date Expected Discharge Time    Sep 20, 2023            Demographic Summary       Row Name 09/15/23 1309       General Information    Arrived From hospital    Referral Source admission list    Reason for Consult discharge planning    Preferred Language English       Contact Information    Permission Granted to Share Info With                    Functional Status       Row Name 09/15/23 1310       Functional Status    Usual Activity Tolerance good    Current Activity Tolerance other (see comments)  see therapy eval       Functional Status, IADL    Medications independent    Meal Preparation independent    Housekeeping independent    Laundry independent    Shopping independent                   Psychosocial    No documentation.                  Abuse/Neglect    No documentation.                  Legal    No documentation.                  Substance Abuse    No documentation.                  Patient Forms    No documentation.                     Di Mc RN     Statement Selected

## 2023-09-15 NOTE — CONSULTS
Reviewed chart for diabetes education consult.  Noted history of type 2 diabetes.  Noted A1c of 6.4%.  Noted not taking any medication for diabetes at home.  Spoke to Mr. Wen at the bedside this morning. He was drowsy and not interested in talking with me at this time.  I explained that as part of the stroke pathway, he is eligible for a stroke and diabetes review class that is offered over the phone when he gets home from the hospital.  Explained that I had added him to the schedule for October 4, 2023 at 2:30 pm.  He stated that was fine.  I spoke with his nurse and let her know that I signed him up for class and left the reminder sheet at the bedside.  I called the spouse listed in the chart to see if there were any diabetes education needs at home.  She stated that he has not been checking his blood sugar at home, but he does see general practitioner about 3 times a year.  She states that he has not had to be on any diabetes medication, and they have been trying to care for blood sugar by watching his diet.  She states that he has been really healthy up until now, and he has been working as manager at [employer].  She states that she is coming to hospital today.  She is driving from [place], but she had a heart attack the same day he had stroke, and has not been able to drive until today.  She stated she is meeting family in [place], so she can be closer to him. Encouraged her to be careful on her trip and explained that I would mail our What is Diabetes handout to her and our contact information so that she can call if they have any diabetes education needs.  Thank you for this referral.

## 2023-09-15 NOTE — PLAN OF CARE
Goal Outcome Evaluation:  Plan of Care Reviewed With: patient        Progress: no change  Outcome Evaluation: PT initial eval completed. Pt limited by vision deficit, R weakness, balance deficit, and decreased functional endurance compared to baseline. Pt amb 2ft laterally towards HOB with max Ax2 and manual assist required for RLE advancement. Rec continued skilled PT to increase indep with mobility. d/c rec for IRF.      Anticipated Discharge Disposition (PT): inpatient rehabilitation facility

## 2023-09-15 NOTE — LETTER
[facility] CASE MAN  [address]  [facility] [address]  [phone]        October 23, 2023      Patient: Kenneth Wen  YOB: 1950  Date of Visit: 9/14/2023      Inpatient referral at [facility].    Attending: Lou MARSHALL  Certifying: Dr. Judy Mcmullen  Referring: Dr. Toni Rausch      Thank you,        Liset Manjarrez, RN

## 2023-09-15 NOTE — H&P
INTENSIVIST   INITIAL HOSPITAL VISIT NOTE     Hospital:  LOS: 0 days     Mr. Kenneth Wen, 72 y.o. male is seen for a Chief Complaint of:      Hemorrhagic CVA    Dyslipidemia    Multiple bilateral CVAs    HFpEF    T2DM (type 2 diabetes mellitus)    HTN (hypertension)    GERD (gastroesophageal reflux disease)    ICH (intracerebral hemorrhage)    Subjective   S     Kenneth Wen is a 72 y.o. male who is admitted to [facility] as a transfer from [facility] 09/15/23 for higher level of care and management of hemorrhagic CVA.     Patient has a PMH of former smoker, HTN, T2DM, dyslipidemia, and GERD.     He was initially admitted to OSH on 9/10/23 after presenting to the ED for evaluation of AMS and slurred speech ultimately found to have multiple acute ischemic infarcts of the bilateral cerebral and cerebellar hemispheres as well as the left side of the isaac. 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 He was evaluated by Neurology was not a candidate for thrombolytics (2* being outside the timing window) nor endovascular intervention (no LVO).     Further workup at OSH included TTE with subsequent JOSIAS which were negative for thrombus or PFO, with preserved LVEF and grade I diastolic dysfunction noted. EEG was negative for seizures and LP was benign.     Due to further alteration in mental status with new inability to move right leg CTH was repeated on 9/13 demonstrating evolution of existing CVA with new development of petechial hemorrhage. DAPT was held for 24 hrs per Neurology recommendations and repeat CTH obtained the following evening demonstrated worsening effacement of the right quadrigeminal cistern with upward supratentorial shift. Due to his further worsening with need for Neurosurgery evaluation our Stroke Navigator was contacted & patient was airlifted to [facility] for  further management.     Time spent: 10 minutes  Electronically signed by Karyn Peters DNP, ROLANDO, 09/15/23, 1:31 AM EDT.      PMH: He  has a past medical history of Acid reflux, Cancer, Diabetes mellitus, GERD (gastroesophageal reflux disease) (09/15/2023), HTN (hypertension) (09/15/2023), Kidney disease, and T2DM (type 2 diabetes mellitus) (09/15/2023).   PSxH: He  has a past surgical history that includes Appendectomy; Nasal polyp surgery; and Hemorrhoid surgery.      Medications:  No current facility-administered medications on file prior to encounter.     Current Outpatient Medications on File Prior to Encounter   Medication Sig   • aspirin 81 MG tablet Take 81 mg by mouth Daily.   • fexofenadine-pseudoephedrine (ALLEGRA-D)  MG per 12 hr tablet Take 1 tablet by mouth 2 (Two) Times a Day.   • lansoprazole (PREVACID) 15 MG capsule Take 15 mg by mouth Daily.   • QNASL 80 MCG/ACT aerosol solution    • RA ALLERGY RELIEF 180 MG tablet Take 180 mg by mouth Daily.       Allergies: He is allergic to cefdinir.   FH: His family history includes Heart attack in his father; No Known Problems in his brother, brother, mother, sister, and sister.   SH: He  reports that he quit smoking about 40 years ago. His smoking use included cigarettes. He has never used smokeless tobacco. He reports that he does not drink alcohol and does not use drugs.     The patient's relevant past medical, surgical and social history were reviewed and updated in Epic as appropriate.     ROS (14):   Review of Systems   Unable to perform ROS: Mental status change     Objective   O     Vitals    No data recorded  BP  Min: 139/72  Max: 152/77  Pulse  Min: 89  Max: 97  No data recorded  SpO2  Min: 94 %  Max: 94 % No data recorded     I/O 24 hrs (7:00AM - 6:59 AM)  Intake/Output         09/14/23 0700 - 09/15/23 0659    Intake (ml) 340.3    Output (ml) 150    Net (ml) 190.3    Last Weight 79.2 kg (174 lb 9.7 oz)            Medications  (drips):      Physical Examination    Telemetry: Normal sinus rhythm.   Constitutional:  No acute distress.  Sitting up in bed. NGT in place.    HEENT: Normocephalic and atraumatic.   Neck:  Normal range of motion. Neck supple.   No JVD present.   No thyromegaly.    Cardiovascular: Regular rate and rhythm.  No murmurs, rub or gallop.  No peripheral edema.   Respiratory: Normal respiratory effort.  Diminished.    Abdominal:  Soft. No masses.   Non-tender. No distension.   No hepatosplenomegaly.   MSK: Normal muscle strength and tone.   Extremities: No digital cyanosis or clubbing.   Skin: Skin is warm and dry.  No rashes, lesions or ulcers noted.    Neurological:   Arouses.    Dysarthria. Right sided weakness.    Psychiatric:  Unable to assess.      Interval: baseline  1a. Level of Consciousness: 1-->Not alert, but arousable by minor stimulation to obey, answer, or respond  1b. LOC Questions: 2-->Answers neither question correctly  1c. LOC Commands: 0-->Performs both tasks correctly  2. Best Gaze: 1-->Partial gaze palsy, gaze is abnormal in one or both eyes, but forced deviation or total gaze paresis is not present  3. Visual: 3-->Bilateral hemianopia (blind including cortical blindness)  4. Facial Palsy: 2-->Partial paralysis (total or near-total paralysis of lower face)  5a. Motor Arm, Left: 0-->No drift, limb holds 90 (or 45) degrees for full 10 secs  5b. Motor Arm, Right: 2-->Some effort against gravity, limb cannot get to or maintain (if cued) 90 (or 45) degrees, drifts down to bed, but has some effort against gravity  6a. Motor Leg, Left: 0-->No drift, leg holds 30 degree position for full 5 secs  6b. Motor Leg, Right: 2-->Some effort against gravity, leg falls to bed by 5 secs, but has some effort against gravity  7. Limb Ataxia: 0-->Absent  8. Sensory: 1-->Mild-to-moderate sensory loss, patient feels pinprick is less sharp or is dull on the affected side, or there is a loss of superficial pain with pinprick,  but patient is aware of being touched  9. Best Language: 2-->Severe aphasia, all communication is through fragmentary expression, great need for inference, questioning, and guessing by the listener. Range of information that can be exchanged is limited, listener carries burden of. . . (see row details)  10. Dysarthria: 2-->Severe dysarthria, patients speech is so slurred as to be unintelligible in the absence of or out of proportion to any dysphasia, or is mute/anarthric  11. Extinction and Inattention (formerly Neglect): 0-->No abnormality    Total (NIH Stroke Scale): 18             Results from last 7 days   Lab Units 09/15/23  0212   SODIUM mmol/L 141   POTASSIUM mmol/L 3.2*   CO2 mmol/L 23.0   CREATININE mg/dL 0.76   MAGNESIUM mg/dL 2.0   PHOSPHORUS mg/dL 2.2*     Estimated Creatinine Clearance: 99.9 mL/min (by C-G formula based on SCr of 0.76 mg/dL).  Results from last 7 days   Lab Units 09/15/23  0212   ALK PHOS U/L 75   BILIRUBIN mg/dL 1.2   ALT (SGPT) U/L 27   AST (SGOT) U/L 34               Images:    Imaging Results (Last 24 Hours)       Procedure Component Value Units Date/Time    XR Abdomen KUB [[id number]] Collected: 09/15/23 0201     Updated: 09/15/23 0205    Narrative:      XR ABDOMEN KUB    Date of Exam: 9/15/2023 1:49 AM EDT    Indication: ng placement    Comparison: None available.    Findings:  There is a nasogastric tube just into the stomach with the sideport at the GE junction. The bowel gas pattern is nonspecific.      Impression:      Impression:  NG tube in the stomach with the sideport at the GE junction.      Electronically Signed: Sp Light MD    9/15/2023 2:02 AM EDT    Workstation ID: [id number]    XR Chest 1 View [[id number]] Collected: 09/15/23 0200     Updated: 09/15/23 0204    Narrative:      XR CHEST 1 VW    Date of Exam: 9/15/2023 1:49 AM EDT    Indication: R/O Aspiration    Comparison: Chest radiograph dated February 3, 2011    Findings:  There is a nasogastric tube in the stomach  with the sideport near the GE junction. There are no airspace consolidations. No pleural fluid. No pneumothorax. The pulmonary vasculature appears within normal limits. The cardiac and mediastinal silhouette   appear unremarkable. No acute osseous abnormality identified.      Impression:      Impression:  No active disease      Electronically Signed: Sp Light MD    9/15/2023 2:01 AM EDT    Workstation ID: [id number]    CT Head Without Contrast Stroke Protocol [[id number]] Collected: 09/15/23 0049     Updated: 09/15/23 0055    Narrative:      CT HEAD WO CONTRAST STROKE PROTOCOL    Date of Exam: 9/15/2023 12:43 AM EDT    Indication: Stroke, follow up.    Comparison: None available.    Technique: Axial CT images were obtained of the head without contrast administration.  Reconstructed coronal images were also obtained. Automated exposure control and iterative construction methods were used.    Scan Time: 12:46 a.m.    Findings:  There is low-attenuation in the left basal ganglia, the left aspect of the isaac, the bilateral occipital lobes and throughout the right greater than left aspect of the cerebellum extending into the right cerebellar peduncle. These findings are consistent   with extensive vasogenic edema/infarct.    There is no mass or mass effect. There are no abnormal extra-axial fluid collections or areas of acute hemorrhage. There is bilateral ethmoid sinus disease. The mastoid air cells are clear.      Impression:      Impression:  Multiple areas of low-attenuation as described. Primarily this involves the posterior circulation including the occipital lobes bilaterally and the cerebellum greater on the right than the left as well as the left aspect of the isaac. Findings may be   secondary to infarction from the basilar artery given the multiple left and right vascular territories.        Electronically Signed: Sp Light MD    9/15/2023 12:52 AM EDT    Workstation ID: [id number]          ECG/EMG Results  (last 24 hours)       ** No results found for the last 24 hours. **            I reviewed the patient's new laboratory and imaging results.  I independently reviewed the patient's new images.    Assessment & Plan   A / P     Mr. Wen is a 71yo M with a history of previous tobacco use, HTN, T2DM, HLD and GERD who presented to [facility] on 9/10/23 and was found to have multiple acute ischemic infarcts of the bilateral cerebral and cerebellar hemispheres as well as the left side of the isaac. He was outside the window for thrombolytics.     At the OSH, he underwent both a TTE and JOSIAS which were negative for PFO.     On 9/13/23, he had further decline in his mental status with a new inability to move his right leg and CT head was repeated and revealed evolution of the existing CVA with new development of petechial hemorrhage. DAPT was held for 24 hrs per Neurology recommendations and repeat CTH obtained the following evening demonstrated worsening effacement of the right quadrigeminal cistern with upward supratentorial shift. Due to his further worsening with need for Neurosurgery evaluation our Stroke Navigator was contacted & patient was airlifted to [facility] for further management.      He arrived to the Neuro ICU on the morning of 9/15/23. Current NIHSS is a 18.       Nutrition:   NPO Diet NPO Type: Strict NPO  Advance Directives:   Code Status and Medical Interventions:   Ordered at: 09/15/23 0133     Code Status (Patient has no pulse and is not breathing):    CPR (Attempt to Resuscitate)     Medical Interventions (Patient has pulse or is breathing):    Full Support       Active Hospital Problems    Diagnosis    • **Hemorrhagic CVA    • Multiple bilateral CVAs    • HFpEF    • T2DM (type 2 diabetes mellitus)    • HTN (hypertension)    • GERD (gastroesophageal reflux disease)    • ICH (intracerebral hemorrhage)    • Dyslipidemia        Assessment / Plan:    Admit to Neuro ICU  Stroke orders per  Neurology.   NPO. NGT in place.   Hold DAPT.  MRI Brain  Cardene for blood pressure control.  SSI for diabetes management  AM labs    High risk for decline secondary to CVA with resultant hemorrhage.     Plan of care and goals reviewed during interdisciplinary rounds.  I discussed the patient's findings and my recommendations with patient and nursing staff      Lauren Canada, DO  Pulmonary and Critical Care Medicine

## 2023-09-15 NOTE — SIGNIFICANT NOTE
1821: Patient's Na+ 142; discussed with Dr. Hagan who recommended giving an additional 200mL 3% NS bolus and rechecking Na+ in AM.    1930: Repeat CT head without contrast reveals slight increase in vasogenic edema with further effacement of the paramesencephalic cisterns.   Discussed with Dr. Cadena (neurology on call) who recommends rechecking Na+ in 6 hours and if not at goal will determine if patient needs additional bolus or if he needs a continuous drip.    Amanda Stallings MSN, APRN, AGAP-BC  Stroke Neurology

## 2023-09-15 NOTE — PROGRESS NOTES
Clinical Nutrition     Nutrition Support Assessment  Reason for Visit: Identified at risk by screening criteria      Patient Name: Kenneth Wen  YOB: 1950  MRN: [id number]  Date of Encounter: 09/15/23 19:20 EDT  Admission date: 9/15/2023    Comments: If need tube feeding suggest begin Impact Peptide 15 ml/hr advance w tolerance by 10 ml/hr ev 6 hr to goal 55 ml/hr Water at 30 ml/hr.    Evaluation of criteria r/t malnutrition pending further data.     Nutrition Assessment   Admission Diagnosis:  ICH (intracerebral hemorrhage) [I61.9]      Problem List:    Hemorrhagic CVA    Dyslipidemia    Multiple bilateral CVAs    HFpEF    T2DM (type 2 diabetes mellitus)    HTN (hypertension)    GERD (gastroesophageal reflux disease)    ICH (intracerebral hemorrhage)        PMH:   He  has a past medical history of Acid reflux, Cancer, Diabetes mellitus, GERD (gastroesophageal reflux disease) (09/15/2023), HTN (hypertension) (09/15/2023), Kidney disease, and T2DM (type 2 diabetes mellitus) (09/15/2023).    PSH:  He  has a past surgical history that includes Appendectomy; Nasal polyp surgery; and Hemorrhoid surgery.      Applicable Nutrition Concerns:   Skin:  Oral:  GI:      Applicable Interval History:   9/13 SLP rec NPO w ice chips ok      Reported/Observed/Food/Nutrition Related History:     9/15  Per RN unclear if will start EN or if pt may progress to caden diet at this time.  No wt hx available today.       Labs    Labs Reviewed: Yes     Results from last 7 days   Lab Units 09/15/23  1744 09/15/23  1138 09/15/23  0212   GLUCOSE mg/dL  --   --  138*   BUN mg/dL  --   --  21   CREATININE mg/dL  --   --  0.76   SODIUM mmol/L 142  --  141   CHLORIDE mmol/L  --   --  104   POTASSIUM mmol/L  --  4.4 3.2*   PHOSPHORUS mg/dL  --  2.5 2.2*   MAGNESIUM mg/dL  --   --  2.0   ALT (SGPT) U/L  --   --  27       Results from last 7 days   Lab Units 09/15/23  0212   ALBUMIN g/dL 4.2   CHOLESTEROL mg/dL 150  "  TRIGLYCERIDES mg/dL 49       Results from last 7 days   Lab Units 09/15/23  1744 09/15/23  1129 09/15/23  0523 09/15/23  0234   GLUCOSE mg/dL 147* 128 114 122     Lab Results   Lab Value Date/Time    HGBA1C 6.40 (H) 09/15/2023 0212                 Medications    Medications Reviewed: Yes  Pertinent: insulin, protonix  Infusion: cardene  PRN:       Intake/Ouptut 24 hrs (0701 - 0700)   I&O's Reviewed: Yes     Intake & Output (last day)         09/15 0701 09/16 0700    I.V. (mL/kg) 1151.6 (14.5)    NG/GT     Total Intake(mL/kg) 1151.6 (14.5)    Urine (mL/kg/hr) 600 (0.6)    Emesis/NG output     Stool 0    Total Output 600    Net +551.6         Stool Unmeasured Occurrence 1 x              Anthropometrics     Flowsheet Rows      Flowsheet Row First Filed Value   Admission Height 175.3 cm (69\") Documented at 09/15/2023 0122   Admission Weight 79.2 kg (174 lb 9.7 oz) Documented at 09/15/2023 0122       Height: Height: 175.3 cm (69\")  Last Filed Weight: Weight: 79.2 kg (174 lb 9.7 oz) (09/15/23 0122)  Method: Weight Method: Bed scale  BMI: BMI (Calculated): 25.8  BMI classification: Overweight: 25.0-29.9kg/m2     UBW: Per  lbs on 9/14/23  Note 172 lbs in 2018  Weight change: uncertain at this time    Nutrition Focused Physical Exam     Date: 9/13    Unable to perform exam due to: Pt unable to participate at time of visit      Needs Assessment   Date:    Height used:Height: 175.3 cm (69\")  Weights used: used 79.2 kg 174 lbs      Estimated Calorie needs: ~ 1600 Kcal/day  Method:  Kcals/KG x 20 = 1588  Method:  MSJ = 1544    Estimated Protein needs: ~ 103 g PRO/day  Method: g/Kg 1.3    Estimated Fluid needs: ~ ml/day   Per clinical status    Current Nutrition Prescription     PO: NPO Diet NPO Type: Strict NPO  Oral Nutrition Supplement:   Intake: N/A      Nutrition Diagnosis   Date: 9/15 Updated:   Problem Inadequate oral intake anticipated   Etiology SLP eval   Signs/Symptoms NPO w ice chips   Status:     Goal: "   General: Nutrition support treatment  PO: Advace diet as medically feasible/appropriate  EN/PN: Initiate EN    Nutrition Intervention      Follow treatment progress, Care plan reviewed, EN rec prepared    If need tube feeding suggest begin Impact Peptide 15 ml/hr advance w tolerance by 10 ml/hr ev 6 hr to goal 55 ml/hr Water at 30 ml/hr to supply:  1100 ml for 1650 Kcal 103% est need, 103 g % est need, 0 fiber, 847 ml Water in EN 1447 total ml Free Water.    Monitoring/Evaluation:   Per protocol, I&O, Pertinent labs, Weight, Symptoms, Swallow function      Bernarda Adhikari RD  Time Spent: 35 min

## 2023-09-15 NOTE — PLAN OF CARE
Problem: Restraint, Nonviolent  Goal: Absence of Harm or Injury  Intervention: Implement Least Restrictive Safety Strategies  Recent Flowsheet Documentation  Taken 9/15/2023 0600 by Monica Castillo, RN  Medical Device Protection:   IV pole/bag removed from visual field   tubing secured  Less Restrictive Alternative:   appropriate expression promoted   bed alarm in use   emotional support provided   environment adjusted   positive reinforcement provided   safety enhancements provided   security enhancements provided   sensory stimulation limited   surveillance provided  De-Escalation Techniques:   appropriate behavior reinforced   diversional activity encouraged   increased round frequency   reoriented   stimulation decreased   verbally redirected  Diversional Activities: television  Taken 9/15/2023 0400 by Monica Castillo, RN  Medical Device Protection:   IV pole/bag removed from visual field   tubing secured  Less Restrictive Alternative:   appropriate expression promoted   bed alarm in use   emotional support provided   environment adjusted   positive reinforcement provided   safety enhancements provided   security enhancements provided   sensory stimulation limited   surveillance provided  De-Escalation Techniques:   diversional activity encouraged   increased round frequency   appropriate behavior reinforced   reoriented   stimulation decreased   verbally redirected  Diversional Activities: television  Taken 9/15/2023 0200 by Monica Castillo, RN  Medical Device Protection:   IV pole/bag removed from visual field   tubing secured  Less Restrictive Alternative:   appropriate expression promoted   bed alarm in use   emotional support provided   environment adjusted   positive reinforcement provided   safety enhancements provided   security enhancements provided   sensory stimulation limited   surveillance provided  De-Escalation Techniques:   appropriate behavior reinforced   diversional activity encouraged    increased round frequency   reoriented   stimulation decreased   verbally redirected  Diversional Activities: television  Taken 9/15/2023 0117 by Monica Castillo RN  Medical Device Protection:   IV pole/bag removed from visual field   tubing secured  Less Restrictive Alternative:   appropriate expression promoted   bed alarm in use   emotional support provided   environment adjusted   positive reinforcement provided   safety enhancements provided   security enhancements provided   sensory stimulation limited   surveillance provided  De-Escalation Techniques:   appropriate behavior reinforced   diversional activity encouraged   increased round frequency   reoriented   stimulation decreased   verbally redirected  Diversional Activities: television  Taken 9/15/2023 0100 by Monica Castillo RN  Diversional Activities: television  Intervention: Protect Dignity, Rights, and Personal Wellbeing  Recent Flowsheet Documentation  Taken 9/15/2023 0100 by Monica Castillo RN  Trust Relationship/Rapport:   care explained   choices provided   emotional support provided   empathic listening provided   questions answered   questions encouraged   reassurance provided   thoughts/feelings acknowledged  Intervention: Protect Skin and Joint Integrity  Recent Flowsheet Documentation  Taken 9/15/2023 0600 by Monica Castillo RN  Body Position:   weight shifting   upper extremity elevated   neutral head position   neutral body alignment   legs elevated  Range of Motion: active ROM (range of motion) encouraged  Taken 9/15/2023 0400 by Monica Castillo RN  Body Position:   weight shifting   upper extremity elevated   neutral head position   neutral body alignment   legs elevated  Range of Motion: active ROM (range of motion) encouraged  Taken 9/15/2023 0200 by Monica Castillo RN  Body Position:   weight shifting   upper extremity elevated   neutral head position   neutral body alignment   legs elevated  Range of Motion: active ROM (range of  motion) encouraged  Taken 9/15/2023 0100 by Monica Castillo, RN  Body Position:   turned   weight shifting   upper extremity elevated   neutral head position   neutral body alignment   legs elevated  Range of Motion:   active ROM (range of motion) encouraged   ROM (range of motion) performed   Goal Outcome Evaluation:

## 2023-09-15 NOTE — THERAPY EVALUATION
Acute Care - Speech Language Pathology Initial Evaluation  [facility]  Cognitive-Communication Evaluation   Clinical Swallow Evaluation      Patient Name: Kenneth Wen  : 1950  MRN: [id number]  Today's Date: 9/15/2023               Admit Date: 9/15/2023     Visit Dx:    ICD-10-CM ICD-9-CM   1. Aphasia  R47.01 784.3   2. Dysarthria  R47.1 784.51   3. Dysphagia, unspecified type  R13.10 787.20     Patient Active Problem List   Diagnosis    Precordial pain    Elevated BP without diagnosis of hypertension    Dyslipidemia    Hyperglycemia    Multiple bilateral CVAs    Hemorrhagic CVA    HFpEF    T2DM (type 2 diabetes mellitus)    HTN (hypertension)    GERD (gastroesophageal reflux disease)    ICH (intracerebral hemorrhage)     Past Medical History:   Diagnosis Date    Acid reflux     Cancer     Skin    Diabetes mellitus     Prediabetes    GERD (gastroesophageal reflux disease) 09/15/2023    HTN (hypertension) 09/15/2023    Kidney disease     T2DM (type 2 diabetes mellitus) 09/15/2023     Past Surgical History:   Procedure Laterality Date    APPENDECTOMY      HEMORRHOIDECTOMY      NASAL POLYP SURGERY         SLP Recommendation and Plan  SLP Diagnosis: moderate, aphasia, dysarthria (@ least some mild cog deficits but u/a to fully assess) (09/15/23 1000)           Swallow Criteria for Skilled Therapeutic Interventions Met: demonstrates skilled criteria (09/15/23 1000)  SLC Criteria for Skilled Therapy Interventions Met: yes (09/15/23 1000)  Anticipated Discharge Disposition (SLP): inpatient rehabilitation facility (09/15/23 1000)        Predicted Duration Therapy Intervention (Days): until discharge (09/15/23 1000)  Oral Care Recommendations: Oral Care BID/PRN, Suction toothbrush (09/15/23 1000)                          Plan of Care Reviewed With: patient (09/15/23 1110)      SLP EVALUATION (last 72 hours)       SLP SLC Evaluation       Row Name 09/15/23 1000                   Communication  Assessment/Intervention    Document Type evaluation  -MP        Subjective Information no complaints  -MP        Patient Observations alert;cooperative  -MP        Patient/Family/Caregiver Comments/Observations No family present  -MP        Patient Effort good  -MP           General Information    Patient Profile Reviewed yes  -MP        Pertinent History Of Current Problem Adm 9/15 from [facility] w/ ICH. Initially adm to [facility] 9/10 w/ multi acute ischemic infarcts of B cerebral and cerebellar hemispheres as well as L isaac; had decline in mental status & inability o move R leg. CT w/ worsening effacement of R quadrigeminal cistern. Transferred to [facility] for higher level of care.  -MP        Precautions/Limitations, Vision vision impairment, bilaterally  -MP        Precautions/Limitations, Hearing WFL  -MP        Prior Level of Function-Communication unknown  -MP        Plans/Goals Discussed with patient  -MP        Barriers to Rehab medically complex  -MP        Patient's Goals for Discharge patient did not state  -MP           Pain    Additional Documentation Pain Scale: FACES Pre/Post-Treatment (Group)  -MP           Pain Scale: FACES Pre/Post-Treatment    Pain: FACES Scale, Pretreatment 0-->no hurt  -MP        Posttreatment Pain Rating 0-->no hurt  -MP           Comprehension Assessment/Intervention    Comprehension Assessment/Intervention Auditory Comprehension  -MP           Auditory Comprehension Assessment/Intervention    Auditory Comprehension (Communication) moderate impairment  -MP        Answers Questions (Communication) yes/no;simple;WFL;mulit-unit;moderate impairment  -MP        Able to Follow Commands (Communication) 1-step;WFL;other (see comments)  OM commands  -MP        Narrative Discourse conversational level;moderate impairment  -MP           Expression Assessment/Intervention    Expression Assessment/Intervention verbal expression  -MP           Verbal Expression Assessment/Intervention     Verbal Expression moderate impairment  -MP        Automatic Speech (Communication) response to greeting;WFL  -MP        Repetition phrases;WFL  -MP        Phrase Completion automatic/predictable;moderate impairment  -MP        Responsive Naming simple;moderate impairment  -MP           Oral Motor Structure and Function    Oral Motor Structure and Function WFL  -MP        Dentition Assessment natural, present and adequate  -MP           Oral Musculature and Cranial Nerve Assessment    Oral Motor General Assessment oral labial or buccal impairment;lingual impairment  -MP        Oral Labial or Buccal Impairment, Detail, Cranial Nerve VII (Facial): right labial droop  -MP        Lingual Impairment, Detail. Cranial Nerves IX, XII (Glossopharyngeal and Hypoglossal) reduced lingual ROM;other (see comments)  deviation to R  -MP           Motor Speech Assessment/Intervention    Motor Speech Function moderate impairment  -MP        Characteristics Consistent with Dysarthria decreased articulation;slow rate  -MP           Cognitive Assessment Intervention- SLP    Cognitive Function (Cognition) unable/difficult to assess;other (see comments)  2' speech/language deficits  -MP        Orientation Status (Cognition) awareness of basic personal information;person;WFL;place;time;situation;moderate impairment  -MP           SLP Evaluation Clinical Impressions    SLP Diagnosis moderate;aphasia;dysarthria  @ least some mild cog deficits but u/a to fully assess  -MP        Rehab Potential/Prognosis good  -MP        SLC Criteria for Skilled Therapy Interventions Met yes  -MP        Functional Impact difficulty communicating wants, needs;difficulty communicating in an emergency;difficulty in expressing complex messages;functional impact in ADLs  -MP           Recommendations    Therapy Frequency (SLP SLC) 5 days per week  -MP        Predicted Duration Therapy Intervention (Days) until discharge  -MP        Anticipated Discharge Disposition  (SLP) inpatient rehabilitation facility  -MP                  User Key  (r) = Recorded By, (t) = Taken By, (c) = Cosigned By      Initials Name Effective Dates    MP Aldo Harris, MS CCC-SLP 12/28/21 -                        EDUCATION  The patient has been educated in the following areas:     Cognitive Impairment Communication Impairment.           SLP GOALS       Row Name 09/15/23 1000             Patient will demonstrate functional communication skills for return to discharge environment     [place] with moderate cues  -MP      Time frame by discharge  -MP         Comprehend Questions Goal 1 (SLP)    Improve Ability to Comprehend Questions Goal 1 (SLP) complex yes/no questions;80%;with minimal cues (75-90%)  -MP      Time Frame (Comprehend Questions Goal 1, SLP) short term goal (STG)  -MP         Follow Directions Goal 2 (SLP)    Improve Ability to Follow Directions Goal 1 (SLP) 2 step commands;80%;with minimal cues (75-90%)  -MP      Time Frame (Follow Directions Goal 1, SLP) short term goal (STG)  -MP         Word Retrieval Skills Goal 1 (SLP)    Improve Word Retrieval Skills By Goal 1 (SLP) confrontational naming task;high frequency;responsive naming task;simple;80%;with minimal cues (75-90%)  -MP      Time Frame (Word Retrieval Goal 1, SLP) short term goal (STG)  -MP         Articulation Goal 1 (SLP)    Improve Articulation Goal 1 (SLP) by over-articulating at phrase level;80%;with moderate cues (50-74%)  -MP      Time Frame (Articulation Goal 1, SLP) short term goal (STG)  -MP         Orientation Goal 1 (SLP)    Improve Orientation Through Goal 1 (SLP) demonstrating orientation to day;demonstrating orientation to month;demonstrating orientation to year;demonstrating orientation to place;demonstrating orientation to disease/impairment;use environmental aids to assist with orientation;80%;with moderate cues (50-74%)  -MP      Time Frame (Orientation Goal 1, SLP) short term goal (STG)  -MP                 User Key  (r) = Recorded By, (t) = Taken By, (c) = Cosigned By      Initials Name Provider Type    Aldo Snowden MS CCC-SLP Speech and Language Pathologist                            Time Calculation:      Time Calculation- SLP       Row Name 09/15/23 1111             Time Calculation- SLP    SLP Start Time 1000  -MP      SLP Received On 09/15/23  -MP         Untimed Charges    38757-CA Eval Speech and Production w/ Language Minutes 25  -MP      39020-MM Eval Oral Pharyng Swallow Minutes 25  -MP         Total Minutes    Untimed Charges Total Minutes 50  -MP       Total Minutes 50  -MP                User Key  (r) = Recorded By, (t) = Taken By, (c) = Cosigned By      Initials Name Provider Type    Aldo Snowden MS CCC-SLP Speech and Language Pathologist                    Therapy Charges for Today       Code Description Service Date Service Provider Modifiers Qty    01753237624 HC ST EVAL ORAL PHARYNG SWALLOW 2 9/15/2023 Aldo Harris MS CCC-SLP GN 1    [id number] HC ST EVAL SPEECH AND PROD W LANG  2 9/15/2023 Aldo Harris MS CCC-SLP GN 1                       MS SRUTHI Hood  9/15/2023   and Acute Care - Speech Language Pathology   Swallow Initial Evaluation [facility]     Patient Name: Kenneth Wen  : 1950  MRN: [id number]  Today's Date: 9/15/2023               Admit Date: 9/15/2023    Visit Dx:     ICD-10-CM ICD-9-CM   1. Aphasia  R47.01 784.3   2. Dysarthria  R47.1 784.51   3. Dysphagia, unspecified type  R13.10 787.20     Patient Active Problem List   Diagnosis    Precordial pain    Elevated BP without diagnosis of hypertension    Dyslipidemia    Hyperglycemia    Multiple bilateral CVAs    Hemorrhagic CVA    HFpEF    T2DM (type 2 diabetes mellitus)    HTN (hypertension)    GERD (gastroesophageal reflux disease)    ICH (intracerebral hemorrhage)     Past Medical History:   Diagnosis Date    Acid reflux     Cancer     Skin     Diabetes mellitus     Prediabetes    GERD (gastroesophageal reflux disease) 09/15/2023    HTN (hypertension) 09/15/2023    Kidney disease     T2DM (type 2 diabetes mellitus) 09/15/2023     Past Surgical History:   Procedure Laterality Date    APPENDECTOMY      HEMORRHOIDECTOMY      NASAL POLYP SURGERY         SLP Recommendation and Plan  SLP Swallowing Diagnosis: oral dysphagia, suspected pharyngeal dysphagia (09/15/23 1000)  SLP Diet Recommendation: NPO, temporary alternate methods of nutrition/hydration (09/15/23 1000)  Recommended Precautions and Strategies: general aspiration precautions (09/15/23 1000)  SLP Rec. for Method of Medication Administration: meds via alternate route (09/15/23 1000)     Monitor for Signs of Aspiration: notify SLP if any concerns (09/15/23 1000)  Recommended Diagnostics: FEES (09/15/23 1000)  Swallow Criteria for Skilled Therapeutic Interventions Met: demonstrates skilled criteria (09/15/23 1000)  Anticipated Discharge Disposition (SLP): inpatient rehabilitation facility (09/15/23 1000)  Rehab Potential/Prognosis, Swallowing: good, to achieve stated therapy goals (09/15/23 1000)     Predicted Duration Therapy Intervention (Days): until discharge (09/15/23 1000)  Oral Care Recommendations: Oral Care BID/PRN, Suction toothbrush (09/15/23 1000)                                      Oral Care Recommendations: Oral Care BID/PRN, Suction toothbrush (09/15/23 1000)    Plan of Care Reviewed With: patient      SWALLOW EVALUATION (last 72 hours)       SLP Adult Swallow Evaluation       Row Name 09/15/23 1000                   General Information    Current Method of Nutrition NPO;large-bore;other (see comments)  NG placed but no feeding started yet  -MP        Prior Level of Function-Communication unknown  -MP        Prior Level of Function-Swallowing unknown  -MP        Patient's Goals for Discharge patient did not state  -MP           Oral Motor Structure and Function    Secretion Management  dried secretions in oral cavity  -MP        Mucosal Quality sticky  -MP           General Eating/Swallowing Observations    Respiratory Support Currently in Use nasal cannula  -MP        Eating/Swallowing Skills fed by SLP  -MP        Positioning During Eating upright in bed  -MP        Utensils Used spoon;cup;straw  -MP        Consistencies Trialed thin liquids;pureed;ice chips  -MP           Clinical Swallow Eval    Oral Prep Phase impaired  -MP        Pharyngeal Phase suspected pharyngeal impairment  -MP        Clinical Swallow Evaluation Summary Delayed throat clearing following ice/thin via cup, cough w/ thin via straw. No s/sxs w/ puree. Rec continue NPO/NG until FEES.  -MP           Oral Prep Concerns    Oral Prep Concerns incomplete or weak lip closure around spoon;poor rotary chew  -MP        Poor Rotary Chew other (see comments)  ice  -MP        Incomplete or Weak Lip Closure Around Spoon thin;pudding  -MP           Pharyngeal Phase Concerns    Pharyngeal Phase Concerns throat clear;cough  -MP        Cough thin  -MP        Throat Clear thin  -MP           SLP Evaluation Clinical Impression    SLP Swallowing Diagnosis oral dysphagia;suspected pharyngeal dysphagia  -MP        Functional Impact risk of aspiration/pneumonia  -MP        Rehab Potential/Prognosis, Swallowing good, to achieve stated therapy goals  -MP        Swallow Criteria for Skilled Therapeutic Interventions Met demonstrates skilled criteria  -MP           Recommendations    SLP Diet Recommendation NPO;temporary alternate methods of nutrition/hydration  -MP        Recommended Diagnostics FEES  -MP        Recommended Precautions and Strategies general aspiration precautions  -MP        Oral Care Recommendations Oral Care BID/PRN;Suction toothbrush  -MP        SLP Rec. for Method of Medication Administration meds via alternate route  -MP        Monitor for Signs of Aspiration notify SLP if any concerns  -                  User Key  (r) =  Recorded By, (t) = Taken By, (c) = Cosigned By      Initials Name Effective Dates    MP Aldo Harris, MS CCC-SLP 12/28/21 -                     EDUCATION  The patient has been educated in the following areas:   Dysphagia (Swallowing Impairment).        SLP GOALS       Row Name 09/15/23 1000             Patient will demonstrate functional communication skills for return to discharge environment     [place] with moderate cues  -MP      Time frame by discharge  -MP         Comprehend Questions Goal 1 (SLP)    Improve Ability to Comprehend Questions Goal 1 (SLP) complex yes/no questions;80%;with minimal cues (75-90%)  -MP      Time Frame (Comprehend Questions Goal 1, SLP) short term goal (STG)  -MP         Follow Directions Goal 2 (SLP)    Improve Ability to Follow Directions Goal 1 (SLP) 2 step commands;80%;with minimal cues (75-90%)  -MP      Time Frame (Follow Directions Goal 1, SLP) short term goal (STG)  -MP         Word Retrieval Skills Goal 1 (SLP)    Improve Word Retrieval Skills By Goal 1 (SLP) confrontational naming task;high frequency;responsive naming task;simple;80%;with minimal cues (75-90%)  -MP      Time Frame (Word Retrieval Goal 1, SLP) short term goal (STG)  -MP         Articulation Goal 1 (SLP)    Improve Articulation Goal 1 (SLP) by over-articulating at phrase level;80%;with moderate cues (50-74%)  -MP      Time Frame (Articulation Goal 1, SLP) short term goal (STG)  -MP         Orientation Goal 1 (SLP)    Improve Orientation Through Goal 1 (SLP) demonstrating orientation to day;demonstrating orientation to month;demonstrating orientation to year;demonstrating orientation to place;demonstrating orientation to disease/impairment;use environmental aids to assist with orientation;80%;with moderate cues (50-74%)  -MP      Time Frame (Orientation Goal 1, SLP) short term goal (STG)  -MP                User Key  (r) = Recorded By, (t) = Taken By, (c) = Cosigned By      Initials Name Provider  Type    MP Aldo Harris MS CCC-SLP Speech and Language Pathologist                       Time Calculation:    Time Calculation- SLP       Row Name 09/15/23 1111             Time Calculation- SLP    SLP Start Time 1000  -MP      SLP Received On 09/15/23  -MP         Untimed Charges    87449-ZD Eval Speech and Production w/ Language Minutes 25  -MP      12005-FR Eval Oral Pharyng Swallow Minutes 25  -MP         Total Minutes    Untimed Charges Total Minutes 50  -MP       Total Minutes 50  -MP                User Key  (r) = Recorded By, (t) = Taken By, (c) = Cosigned By      Initials Name Provider Type    Aldo Snowden MS CCC-SLP Speech and Language Pathologist                    Therapy Charges for Today       Code Description Service Date Service Provider Modifiers Qty    64444651555 HC ST EVAL ORAL PHARYNG SWALLOW 2 9/15/2023 Aldo Harris MS CCC-SLP GN 1    [id number] HC ST EVAL SPEECH AND PROD W LANG  2 9/15/2023 Aldo Harris MS CCC-SLP GN 1                 Aldo Wong MS CCC-NIELS  9/15/2023

## 2023-09-15 NOTE — THERAPY EVALUATION
Patient Name: Kenneth Wen  : 1950    MRN: [id number]                              Today's Date: 9/15/2023       Admit Date: 9/15/2023    Visit Dx:     ICD-10-CM ICD-9-CM   1. Aphasia  R47.01 784.3   2. Dysarthria  R47.1 784.51   3. Dysphagia, unspecified type  R13.10 787.20     Patient Active Problem List   Diagnosis    Precordial pain    Elevated BP without diagnosis of hypertension    Dyslipidemia    Hyperglycemia    Multiple bilateral CVAs    Hemorrhagic CVA    HFpEF    T2DM (type 2 diabetes mellitus)    HTN (hypertension)    GERD (gastroesophageal reflux disease)    ICH (intracerebral hemorrhage)     Past Medical History:   Diagnosis Date    Acid reflux     Cancer     Skin    Diabetes mellitus     Prediabetes    GERD (gastroesophageal reflux disease) 09/15/2023    HTN (hypertension) 09/15/2023    Kidney disease     T2DM (type 2 diabetes mellitus) 09/15/2023     Past Surgical History:   Procedure Laterality Date    APPENDECTOMY      HEMORRHOIDECTOMY      NASAL POLYP SURGERY        General Information       Row Name 09/15/23 1239          Physical Therapy Time and Intention    Document Type evaluation  -AY     Mode of Treatment physical therapy  -AY       Row Name 09/15/23 1239          General Information    Patient Profile Reviewed yes  -AY     Prior Level of Function independent:;all household mobility;community mobility;gait;transfer;ADL's;driving;using stairs  -AY     Existing Precautions/Restrictions fall;oxygen therapy device and L/min;other (see comments)  NG; R weakness; vision deficit  -AY     Barriers to Rehab medically complex;cognitive status;visual deficit  -AY       Row Name 09/15/23 1239          Living Environment    People in Home sibling(s)  -AY       Row Name 09/15/23 1239          Stairs Within Home, Primary    Stairs, Within Home, Primary pt unable to report at this time  -AY       Row Name 09/15/23 1239          Cognition    Orientation Status (Cognition) oriented  to;person;disoriented to;place;time;verbal cues/prompts needed for orientation  -AY       Row Name 09/15/23 1239          Safety Issues, Functional Mobility    Safety Issues Affecting Function (Mobility) insight into deficits/self-awareness;sequencing abilities;awareness of need for assistance;safety precaution awareness  -AY     Impairments Affecting Function (Mobility) balance;endurance/activity tolerance;cognition;coordination;postural/trunk control;sensation/sensory awareness;shortness of breath;strength;visual/perceptual;muscle tone abnormal  -AY               User Key  (r) = Recorded By, (t) = Taken By, (c) = Cosigned By      Initials Name Provider Type    AY Aleshia Armstrong, PT Physical Therapist                   Mobility       Row Name 09/15/23 1242          Bed Mobility    Bed Mobility sit-supine;scooting/bridging  -AY     Scooting/Bridging [place] (Bed Mobility) dependent (less than 25% patient effort);2 person assist;verbal cues;nonverbal cues (demo/gesture)  -AY     Sit-Supine [place] (Bed Mobility) maximum assist (25% patient effort);2 person assist;verbal cues;nonverbal cues (demo/gesture)  -AY     Assistive Device (Bed Mobility) bed rails;draw sheet;head of bed elevated  -AY     Comment, (Bed Mobility) cueing for sequencing and hand placement.  -AY       Row Name 09/15/23 1242          Sit-Stand Transfer    Sit-Stand [place] (Transfers) maximum assist (25% patient effort);2 person assist;verbal cues;nonverbal cues (demo/gesture)  -AY     Assistive Device (Sit-Stand Transfers) other (see comments)  BUE support  -AY     Comment, (Sit-Stand Transfer) performed x2 reps.  -AY       Row Name 09/15/23 1242          Gait/Stairs (Locomotion)    [place] Level (Gait) maximum assist (25% patient effort);2 person assist;verbal cues  -AY     Assistive Device (Gait) other (see comments)  BUE support  -AY     Distance in Feet (Gait) 2  -AY     Deviations/Abnormal Patterns (Gait) tor  decreased;festinating/shuffling;gait speed decreased;stride length decreased;weight shifting decreased;base of support, narrow;bilateral deviations  -AY     Bilateral Gait Deviations heel strike decreased;forward flexed posture  -AY     Comment, (Gait/Stairs) pt took lateral steps towards HOB with cueing for weight shifting, posture, and sequencing. manual assist required for RLE advancement and sequencing. Moderate R lateral lean noted. Gait distance limited by fatigue.  -AY               User Key  (r) = Recorded By, (t) = Taken By, (c) = Cosigned By      Initials Name Provider Type    AY Aleshia Armstrong, PT Physical Therapist                   Obj/Interventions       Row Name 09/15/23 1246          Range of Motion Comprehensive    General Range of Motion bilateral lower extremity ROM WFL  -AY       Row Name 09/15/23 1246          Strength Comprehensive (MMT)    General Manual Muscle Testing (MMT) Assessment lower extremity strength deficits identified  -AY     Comment, General Manual Muscle Testing (MMT) Assessment LLE grossly 4/5. RLE grossly 3-/5.  -AY       Row Name 09/15/23 1246          Balance    Balance Assessment sitting static balance;sitting dynamic balance;sit to stand dynamic balance;standing static balance;standing dynamic balance  -AY     Static Sitting Balance minimal assist  -AY     Dynamic Sitting Balance moderate assist  -AY     Position, Sitting Balance unsupported;sitting edge of bed  -AY     Sit to Stand Dynamic Balance maximum assist  -AY     Static Standing Balance maximum assist  -AY     Dynamic Standing Balance maximum assist  -AY     Position/Device Used, Standing Balance supported  -AY     Comment, Balance L pushing causing R lean. progresse to brief periods of min A.  -AY       Row Name 09/15/23 1246          Sensory Assessment (Somatosensory)    Sensory Assessment (Somatosensory) LE sensation intact  -AY               User Key  (r) = Recorded By, (t) = Taken By, (c) = Cosigned By       Initials Name Provider Type    Aleshia Mendes, PIPER Physical Therapist                   Goals/Plan       Row Name 09/15/23 1253          Bed Mobility Goal 1 (PT)    Activity/Assistive Device (Bed Mobility Goal 1, PT) sit to supine/supine to sit  -AY     [place] Level/Cues Needed (Bed Mobility Goal 1, PT) minimum assist (75% or more patient effort)  -AY     Time Frame (Bed Mobility Goal 1, PT) long term goal (LTG);10 days  -AY     Progress/Outcomes (Bed Mobility Goal 1, PT) goal ongoing  -AY       Row Name 09/15/23 1253          Transfer Goal 1 (PT)    Activity/Assistive Device (Transfer Goal 1, PT) sit-to-stand/stand-to-sit;bed-to-chair/chair-to-bed;walker, rolling  -AY     [place] Level/Cues Needed (Transfer Goal 1, PT) minimum assist (75% or more patient effort)  -AY     Time Frame (Transfer Goal 1, PT) long term goal (LTG);10 days  -AY     Progress/Outcome (Transfer Goal 1, PT) goal ongoing  -AY       Row Name 09/15/23 1253          Gait Training Goal 1 (PT)    Activity/Assistive Device (Gait Training Goal 1, PT) gait (walking locomotion);assistive device use;walker, rolling  -AY     [place] Level (Gait Training Goal 1, PT) moderate assist (50-74% patient effort)  -AY     Distance (Gait Training Goal 1, PT) 20  -AY     Time Frame (Gait Training Goal 1, PT) long term goal (LTG);10 days  -AY     Progress/Outcome (Gait Training Goal 1, PT) goal ongoing  -AY       Row Name 09/15/23 1253          Therapy Assessment/Plan (PT)    Planned Therapy Interventions (PT) balance training;bed mobility training;home exercise program;gait training;postural re-education;transfer training;patient/family education;strengthening;neuromuscular re-education  -AY               User Key  (r) = Recorded By, (t) = Taken By, (c) = Cosigned By      Initials Name Provider Type    Aleshia Mendes PT Physical Therapist                   Clinical Impression       Row Name 09/15/23 1249          Pain    Additional Documentation  Pain Scale: FACES Pre/Post-Treatment (Group)  -AY       Row Name 09/15/23 1249          Pain Scale: FACES Pre/Post-Treatment    Pain: FACES Scale, Pretreatment 0-->no hurt  -AY     Posttreatment Pain Rating 0-->no hurt  -AY       Row Name 09/15/23 1249          Plan of Care Review    Plan of Care Reviewed With patient  -AY     Progress no change  -AY     Outcome Evaluation PT initial eval completed. Pt limited by vision deficit, R weakness, balance deficit, and decreased functional endurance compared to baseline. Pt amb 2ft laterally towards HOB with max Ax2 and manual assist required for RLE advancement. Rec continued skilled PT to increase indep with mobility. d/c rec for IRF.  -AY       Row Name 09/15/23 1249          Therapy Assessment/Plan (PT)    Rehab Potential (PT) fair, will monitor progress closely  -AY     Criteria for Skilled Interventions Met (PT) yes;meets criteria;skilled treatment is necessary  -AY     Therapy Frequency (PT) daily  -AY       Row Name 09/15/23 1249          Vital Signs    Pre Systolic BP Rehab 142  -AY     Pre Treatment Diastolic BP 77  -AY     Post Systolic BP Rehab 145  -AY     Post Treatment Diastolic BP 79  -AY     Pretreatment Heart Rate (beats/min) 85  -AY     Posttreatment Heart Rate (beats/min) 97  -AY     Pre SpO2 (%) 96  -AY     O2 Delivery Pre Treatment room air  -AY     O2 Delivery Intra Treatment room air  -AY     Post SpO2 (%) 95  -AY     O2 Delivery Post Treatment room air  -AY     Pre Patient Position Sitting  -AY     Intra Patient Position Standing  -AY     Post Patient Position Supine  -AY       Row Name 09/15/23 1249          Positioning and Restraints    Pre-Treatment Position in bed  -AY     Post Treatment Position bed  -AY     In Bed notified nsg;supine;call light within reach;encouraged to call for assist;exit alarm on;side rails up x2;patient within staff view;RUE elevated;LUE elevated;legs elevated  -AY               User Key  (r) = Recorded By, (t) = Taken  By, (c) = Cosigned By      Initials Name Provider Type    Aleshia Mendes PT Physical Therapist                   Outcome Measures       Row Name 09/15/23 1253          How much help from another person do you currently need...    Turning from your back to your side while in flat bed without using bedrails? 3  -AY     Moving from lying on back to sitting on the side of a flat bed without bedrails? 2  -AY     Moving to and from a bed to a chair (including a wheelchair)? 2  -AY     Standing up from a chair using your arms (e.g., wheelchair, bedside chair)? 2  -AY     Climbing 3-5 steps with a railing? 1  -AY     To walk in hospital room? 2  -AY     AM-PAC 6 Clicks Score (PT) 12  -AY     Highest level of mobility 4 --> Transferred to chair/commode  -AY       Row Name 09/15/23 1253          Modified Rae Scale    Pre-Stroke Modified [place] Scale 6 - Unable to determine (UTD) from the medical record documentation  -AY     Modified [place] Scale 4 - Moderately severe disability.  Unable to walk without assistance, and unable to attend to own bodily needs without assistance.  -AY       Row Name 09/15/23 1253          Functional Assessment    Outcome Measure Options AM-PAC 6 Clicks Basic Mobility (PT);Modified Rae  -AY               User Key  (r) = Recorded By, (t) = Taken By, (c) = Cosigned By      Initials Name Provider Type    Aleshia Mendes PT Physical Therapist                                 Physical Therapy Education       Title: PT OT SLP Therapies (In Progress)       Topic: Physical Therapy (In Progress)       Point: Mobility training (In Progress)       Learning Progress Summary             Patient Acceptance, E,TB, NR by AY at 9/15/2023 1254                         Point: Home exercise program (Not Started)       Learner Progress:  Not documented in this visit.              Point: Body mechanics (In Progress)       Learning Progress Summary             Patient Acceptance, E,TB, NR by AY at 9/15/2023 1251                          Point: Precautions (In Progress)       Learning Progress Summary             Patient Acceptance, E,TB, NR by AY at 9/15/2023 1254                                         User Key       Initials Effective Dates Name Provider Type Discipline    AY 11/10/20 -  Aleshia Armstrong PT Physical Therapist PT                  PT Recommendation and Plan  Planned Therapy Interventions (PT): balance training, bed mobility training, home exercise program, gait training, postural re-education, transfer training, patient/family education, strengthening, neuromuscular re-education  Plan of Care Reviewed With: patient  Progress: no change  Outcome Evaluation: PT initial eval completed. Pt limited by vision deficit, R weakness, balance deficit, and decreased functional endurance compared to baseline. Pt amb 2ft laterally towards HOB with max Ax2 and manual assist required for RLE advancement. Rec continued skilled PT to increase indep with mobility. d/c rec for IRF.     Time Calculation:   PT Evaluation Complexity  History, PT Evaluation Complexity: 1-2 personal factors and/or comorbidities  Examination of Body Systems (PT Eval Complexity): total of 3 or more elements  Clinical Presentation (PT Evaluation Complexity): evolving  Clinical Decision Making (PT Evaluation Complexity): moderate complexity  Overall Complexity (PT Evaluation Complexity): moderate complexity     PT Charges       Row Name 09/15/23 1255             Time Calculation    Start Time 1105  -AY      PT Received On 09/15/23  -AY      PT Goal Re-Cert Due Date 09/25/23  -AY         Untimed Charges    PT Eval/Re-eval Minutes 48  -AY         Total Minutes    Untimed Charges Total Minutes 48  -AY       Total Minutes 48  -AY                User Key  (r) = Recorded By, (t) = Taken By, (c) = Cosigned By      Initials Name Provider Type    AY Aleshia Armstrong PT Physical Therapist                  Therapy Charges for Today       Code Description Service Date  Service Provider Modifiers Qty    28040489108 HC PT EVAL MOD COMPLEXITY 4 9/15/2023 Aleshia Armstrong, PT GP 1    59895317255 HC PT EVAL MOD COMPLEXITY 4 9/15/2023 Aleshia Armstrong, PT GP 1            PT G-Codes  Outcome Measure Options: AM-PAC 6 Clicks Basic Mobility (PT), Modified [place]  AM-PAC 6 Clicks Score (PT): 12  Modified Rae Scale: 4 - Moderately severe disability.  Unable to walk without assistance, and unable to attend to own bodily needs without assistance.  PT Discharge Summary  Anticipated Discharge Disposition (PT): inpatient rehabilitation facility    Aleshia Armstrong PT  9/15/2023

## 2023-09-15 NOTE — CONSULTS
Stroke Consult Note    Patient Name: Kenneth Wen   MRN: [id number]  Age: 72 y.o.  Sex: male  : 1950    Primary Care Physician: Susan Powers APRN  Referring Physician: [facility] ER physician    TIME STROKE TEAM CALLED:  EST     TIME PATIENT SEEN: 29 EST    Handedness: Right  Race: White     Chief Complaint/Reason for Consultation: Right-sided weakness, speech difficulty    HPI:  is a 72-year-old male with PMH of GERD and diabetes.  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  Patient was initially brought to [facility] ED for altered mental status, shortness of breath and projectile vomiting.  Vital signs were stable and lactic acid was elevated at 3.49.  CTA showed left vertebral artery occlusion.  MRI showed numerous acute ischemic infarcts of the bilateral cerebral and cerebellar hemispheres and left side of the isaac.  Repeat CTh results evolving bilateral occipital lobe and left ophthalmic infarcts concerning for petechial hemorrhage.     Initial NIH 18.  Blood pressure 139/72.  On exam patient is unable to answer questions appropriately or follow one-step commands.  Decrease sensitivity to right side of body.  Patient does not blink to threat.  EOMs intact.  Right facial droop with expressive/receptive aphasia and severe dysarthria.  Neuro exam limited due to aphasia and acuity of condition.  Strength in right upper extremity and right lower extremity 3/5.  Former smoker and no EtOH use.    Last Known Normal Date/Time: 9/10/2023 EST     Review of Systems   Unable to perform ROS: Acuity of condition    Past Medical History:   Diagnosis Date    Acid reflux     Cancer     Skin    Diabetes mellitus     Prediabetes    Kidney disease      Past Surgical History:   Procedure Laterality Date    APPENDECTOMY      HEMORRHOIDECTOMY      NASAL POLYP SURGERY       Family History   Problem Relation Age of Onset    No Known Problems Mother      Heart attack Father     No Known Problems Sister     No Known Problems Brother     No Known Problems Sister     No Known Problems Brother      Social History     Socioeconomic History    Marital status:    Tobacco Use    Smoking status: Former     Types: Cigarettes     Quit date: 10/4/1982     Years since quittin.9    Smokeless tobacco: Never   Substance and Sexual Activity    Alcohol use: No    Drug use: No    Sexual activity: Defer     No Known Allergies  Prior to Admission medications    Medication Sig Start Date End Date Taking? Authorizing Provider   aspirin 81 MG tablet Take 81 mg by mouth Daily.    Jim Alvarado MD   fexofenadine-pseudoephedrine (ALLEGRA-D)  MG per 12 hr tablet Take 1 tablet by mouth 2 (Two) Times a Day.    Jim Alvarado MD   lansoprazole (PREVACID) 15 MG capsule Take 15 mg by mouth Daily.    Jim lAvarado MD   QNASL 80 MCG/ACT aerosol solution  10/2/18   Jim Alvarado MD   RA ALLERGY RELIEF 180 MG tablet Take 180 mg by mouth Daily. 10/1/18   Jim Alvarado MD            Neurological Exam  Mental Status  Alert. Oriented only to person. Severe dysarthria present. Expressive aphasia and receptive aphasia present.    Cranial Nerves  CN II: Does not blink to threat bilaterally.  He is able to see shadows..  CN III, IV, VI: Extraocular movements intact bilaterally. Pupils equal round and reactive to light bilaterally.  CN VII:  Right: There is central facial weakness.  CN VIII: Hearing intact.  CN XI:  Right: Sternocleidomastoid strength is weak. Trapezius strength is weak.  Left: Sternocleidomastoid strength is normal. Trapezius strength is normal.  Neuro exam limited due to to aphasia and acuity of condition..    Motor  Normal muscle bulk throughout. Normal muscle tone.  Strength in right upper extremity and right lower extremity 3/5..    Sensory  Light touch abnormality: Sensation: Decrease sensitivity right side of body.      Coordination    Unable to assess.    Gait    Unable to assess.    Physical Exam  Constitutional:       Appearance: He is ill-appearing.   HENT:      Head: Normocephalic and atraumatic.      Nose: Nose normal.      Mouth/Throat:      Mouth: Mucous membranes are dry.   Eyes:      Extraocular Movements: Extraocular movements intact.      Pupils: Pupils are equal, round, and reactive to light.   Cardiovascular:      Rate and Rhythm: Rhythm irregular.   Pulmonary:      Effort: Pulmonary effort is normal. No respiratory distress.   Abdominal:      General: Abdomen is flat.   Musculoskeletal:         General: Normal range of motion.      Cervical back: Normal range of motion.   Skin:     General: Skin is warm and dry.   Neurological:      Mental Status: He is alert. He is disoriented.      Cranial Nerves: Cranial nerve deficit and dysarthria present.      Sensory: Sensory deficit present.      Motor: Weakness present.   Psychiatric:         Attention and Perception: He is inattentive.         Speech: Speech is slurred.         Cognition and Memory: Cognition is impaired.       Acute Stroke Data    Thrombolytic Inclusion / Exclusion Criteria    Time: 01:08 EDT  Person Administering Scale: ROLANDO De La Rosa    Inclusion Criteria  [x]   18 years of age or greater   []   Onset of symptoms < 4.5 hours before beginning treatment (stroke onset = time patient was last seen well or without symptoms).   []   Diagnosis of acute ischemic stroke causing measurable disabling deficit (Complete Hemianopia, Any Aphasia, Visual or Sensory Extinction, Any weakness limiting sustained effort against gravity)   []   Any remaining deficit considered potentially disabling in view of patient and practitioner   Exclusion criteria (Do not proceed with Alteplase if any are checked under exclusion criteria)  [x]   Onset unknown or GREATER than 4.5 hours   [x]   ICH on CT/MRI   []   CT demonstrates hypodensity representing acute or subacute  infarct   []   Significant head trauma or prior stroke in the previous 3 months   []   Symptoms suggestive of subarachnoid hemorrhage   []   History of un-ruptured intracranial aneurysm GREATER than 10 mm   []   Recent intracranial or intraspinal surgery within the last 3 months   []   Arterial puncture at a non-compressible site in the previous 7 days   []   Active internal bleeding   []   Acute bleeding tendency   []   Platelet count LESS than 100,000 for known hematological diseases such as leukemia, thrombocytopenia or chronic cirrhosis   []   Current use of anticoagulant with INR GREATER than 1.7 or PT GREATER than 15 seconds, aPTT GREATER than 40 seconds   []   Heparin received within 48 hours, resulting in abnormally elevated aPTT GREATER than upper limit of normal   []   Current use of direct thrombin inhibitors or direct factor Xa inhibitors in the past 48 hours   []   Elevated blood pressure refractory to treatment (systolic GREATER than 185 mm/Hg or diastolic  GREATER than 110 mm/Hg   []   Suspected infective endocarditis and aortic arch dissection   []   Current use of therapeutic treatment dose of low-molecular-weight heparin (LMWH) within the previous 24 hours   []   Structural GI malignancy or bleed   Relative exclusion for all patients  []   Only minor non-disabling symptoms   []   Pregnancy   []   Seizure at onset with postictal residual neurological impairments   []   Major surgery or previous trauma within past 14 days   []   History of previous spontaneous ICH, intracranial neoplasm, or AV malformation   []   Postpartum (within previous 14 days)   []   Recent GI or urinary tract hemorrhage (within previous 21 days)   []   Recent acute MI (within previous 3 months)   []   History of un-ruptured intracranial aneurysm LESS than 10 mm   []   History of ruptured intracranial aneurysm   []   Blood glucose LESS than 50 mg/dL (2.7 mmol/L)   []   Dural puncture within the last 7 days   []   Known GREATER  than 10 cerebral microbleeds   Additional exclusions for patients with symptoms onset between 3 and 4.5 hours.  []   Age > 80.   []   On any anticoagulants regardless of INR  >>> Warfarin (Coumadin), Heparin, Enoxaparin (Lovenox), fondaparinux (Arixtra), bivalirudin (Angiomax), Argatroban, dabigatran (Pradaxa), rivaroxaban (Xarelto), or apixaban (Eliquis)   []   Severe stroke (NIHSS > 25).   []   History of BOTH diabetes and previous ischemic stroke.   []   The risks and benefits have been discussed with the patient or family related to the administration of IV thrombolytic therapy for stroke symptoms.   []   I have discussed and reviewed the patient's case and imaging with the attending prior to IV thrombolytic therapy.   NA Time IV thrombolytic administered       Hospital Meds:  Scheduled- ondansetron, , ,   sodium chloride, 10 mL, Intravenous, Q12H      Infusions- niCARdipine, 5-15 mg/hr       PRNs-   ondansetron    sodium chloride    sodium chloride    Functional Status Prior to Current Stroke/Calhoun Score:   MODIFIED EHSAN SCALE (to be assessed for each patient having history of stroke) []Stroke history but not assessed  [x]0: No symptoms at all  []1: No significant disability despite symptoms  []2: Slight disability  []3: Moderate disability  []4: Moderately severe disability  []5: Severe disability  []6: Death        NIH Stroke Scale  Time: 01:08 EDT  Person Administering Scale: ROLANDO De La Rosa  Interval: baseline  1a. Level of Consciousness: 1-->Not alert, but arousable by minor stimulation to obey, answer, or respond  1b. LOC Questions: 2-->Answers neither question correctly  1c. LOC Commands: 0-->Performs both tasks correctly  2. Best Gaze: 1-->Partial gaze palsy, gaze is abnormal in one or both eyes, but forced deviation or total gaze paresis is not present  3. Visual: 3-->Bilateral hemianopia (blind including cortical blindness)  4. Facial Palsy: 2-->Partial paralysis (total or near-total paralysis  of lower face)  5a. Motor Arm, Left: 0-->No drift, limb holds 90 (or 45) degrees for full 10 secs  5b. Motor Arm, Right: 2-->Some effort against gravity, limb cannot get to or maintain (if cued) 90 (or 45) degrees, drifts down to bed, but has some effort against gravity  6a. Motor Leg, Left: 0-->No drift, leg holds 30 degree position for full 5 secs  6b. Motor Leg, Right: 2-->Some effort against gravity, leg falls to bed by 5 secs, but has some effort against gravity  7. Limb Ataxia: 0-->Absent  8. Sensory: 1-->Mild-to-moderate sensory loss, patient feels pinprick is less sharp or is dull on the affected side, or there is a loss of superficial pain with pinprick, but patient is aware of being touched  9. Best Language: 2-->Severe aphasia, all communication is through fragmentary expression, great need for inference, questioning, and guessing by the listener. Range of information that can be exchanged is limited, listener carries burden of. . . (see row details)  10. Dysarthria: 2-->Severe dysarthria, patients speech is so slurred as to be unintelligible in the absence of or out of proportion to any dysphasia, or is mute/anarthric  11. Extinction and Inattention (formerly Neglect): 0-->No abnormality    Total (NIH Stroke Scale): 18       Results Reviewed:  I have personally reviewed current lab, radiology, and data and agree with results.  TTE-EF 60-65%, LA normal size    EEG-mild generalized slowing is consistent with mild diffuse cortical dysfunction and mild nonspecific encephalopathy.  There was no epileptiform discharge seen.     CT Head Without Contrast    Result Date: 9/14/2023       1.   Redemonstration of acute infarctions involving the left thalamus, bilateral PCA territories, right superior cerebellar artery, bilateral PICA territory distributions. Left bhavesh isaac infarction extending into the middle cerebellar peduncle, slightly more prominent compared to prior CT.      2.   Local mass effect in the  posterior fossa with mild effacement of the quadrigeminal cistern on the right side, mildly increased reflecting mild upward transtentorial pressure. No midline shift. Recommend continued follow-up imaging.  3. No new acute large territorial infarction, or hemorrhagic transformation.       Created by: Khris Myrick MD  Signed by: Khris Myrick MD  Signed on: 9/14/2023 4:02 PM  Location: [id number]            CT Head Without Contrast    Result Date: 9/13/2023       1.   Evolving, bilateral occipital lobe and left thalamic infarcts. Subtle increased attenuation within the occipital lobe infarcts concerning for petechial hemorrhage. No julia parenchymal hemorrhage.      2.   Moderate-sized, bilateral cerebellar infarcts, right greater than left.       Created by: Mac Lerma MD  Signed by: Mac Lerma MD  Signed on: 9/13/2023 9:57 AM  Location: [id number]            XR Chest 1 View    Result Date: 9/15/2023  Impression: No active disease Electronically Signed: Sp Light MD  9/15/2023 2:01 AM EDT  Workstation ID: [id number]    CT Head Without Contrast Stroke Protocol    Result Date: 9/15/2023  Impression: Multiple areas of low-attenuation as described. Primarily this involves the posterior circulation including the occipital lobes bilaterally and the cerebellum greater on the right than the left as well as the left aspect of the isaac. Findings may be secondary to infarction from the basilar artery given the multiple left and right vascular territories. Electronically Signed: Sp Light MD  9/15/2023 12:52 AM EDT  Workstation ID: [id number]      Assessment/Plan:   is a 72-year-old male with PMH of GERD and diabetes.  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  [facility] was [facility] brought to [facility] ED for altered mental status, shortness of breath and projectile vomiting.  Vital signs were stable and lactic acid was elevated at 3.49.  CTA  showed left vertebral artery occlusion.  MRI showed numerous acute ischemic infarcts of the bilateral cerebral and cerebellar hemispheres and left side of the isaac.  Repeat CTh results evolving bilateral occipital lobe and left ophthalmic infarcts concerning for petechial hemorrhage. Patient is not a candidate for IV thrombolytic therapy due to ICH on CT scans.  Patient is not a candidate for endovascular therapy due to risks outweighing benefits.    Antiplatelet PTA: None  Anticoagulant PTA: None        Bilateral occipital lobe and left thalamic infarcts  Initiate hemorrhagic order set  N.P.o. until bedside dysphagia screening complete by RN  Activity as tolerated  CT head stat  MRI brain without contrast  A1C, lipid panel routine  TTE routine  Blood pressure goals, SBP less than 140  Cardene drip as needed for blood pressure management  Diabetes educator to see if appropriate  PT/OT/SLP eval and treat  Case management to follow      Plan of care discussed with ICU team and bedside RN.  Stroke neurology will continue to follow.  Thank you for this consult.  Call with any questions or concerns.    Garry Little, APRN  September 15, 2023  01:08 EDT

## 2023-09-15 NOTE — PLAN OF CARE
Goal Outcome Evaluation:  Plan of Care Reviewed With: patient        Progress:  (initial eval)     SLP evaluation completed. Will continue to address dysphagia. FEES completed, recommend continue NPO w/ NG. Okay for 2-3 ice chips per hour. Please see note for further details and recommendations.

## 2023-09-15 NOTE — MBS/VFSS/FEES
Acute Care - Speech Language Pathology   Swallow Initial Evaluation [facility]  Fiberoptic Endoscopic Evaluation of Swallowing (FEES)       Patient Name: Kenneth Wen  : 1950  MRN: [id number]  Today's Date: 9/15/2023               Admit Date: 9/15/2023    Visit Dx:     ICD-10-CM ICD-9-CM   1. Aphasia  R47.01 784.3   2. Dysarthria  R47.1 784.51   3. Oropharyngeal dysphagia  R13.12 787.22     Patient Active Problem List   Diagnosis    Precordial pain    Elevated BP without diagnosis of hypertension    Dyslipidemia    Hyperglycemia    Multiple bilateral CVAs    Hemorrhagic CVA    HFpEF    T2DM (type 2 diabetes mellitus)    HTN (hypertension)    GERD (gastroesophageal reflux disease)    ICH (intracerebral hemorrhage)     Past Medical History:   Diagnosis Date    Acid reflux     Cancer     Skin    Diabetes mellitus     Prediabetes    GERD (gastroesophageal reflux disease) 09/15/2023    HTN (hypertension) 09/15/2023    Kidney disease     T2DM (type 2 diabetes mellitus) 09/15/2023     Past Surgical History:   Procedure Laterality Date    APPENDECTOMY      HEMORRHOIDECTOMY      NASAL POLYP SURGERY         SLP Recommendation and Plan  SLP Swallowing Diagnosis: mod-severe, oral dysphagia, severe, pharyngeal dysphagia (09/15/23 1331)  SLP Diet Recommendation: NPO, temporary alternate methods of nutrition/hydration, other (see comments) (okay for 2-3 ice chips per hour as tolerated) (09/15/23 1331)  Recommended Precautions and Strategies: general aspiration precautions (09/15/23 1331)  SLP Rec. for Method of Medication Administration: meds via alternate route (09/15/23 1331)     Monitor for Signs of Aspiration: notify SLP if any concerns (09/15/23 1331)     Swallow Criteria for Skilled Therapeutic Interventions Met: demonstrates skilled criteria (09/15/23 1331)  Anticipated Discharge Disposition (SLP): inpatient rehabilitation facility (09/15/23 1331)  Rehab Potential/Prognosis, Swallowing: good, to achieve stated  therapy goals (09/15/23 1331)  Therapy Frequency (Swallow): 5 days per week (09/15/23 1331)  Predicted Duration Therapy Intervention (Days): until discharge (09/15/23 1331)  Oral Care Recommendations: Oral Care BID/PRN, Suction toothbrush (09/15/23 1331)                    Oral Care Recommendations: Oral Care BID/PRN, Suction toothbrush (09/15/23 1331)    Plan of Care Reviewed With: patient  Progress:  (initial eval)      SWALLOW EVALUATION (last 72 hours)       SLP Adult Swallow Evaluation       Row Name 09/15/23 1331       Rehab Evaluation    Document Type evaluation  -CJ    Subjective Information no complaints  -CJ    Patient Observations alert;cooperative  -CJ    Patient/Family/Caregiver Comments/Observations no family present  -CJ    Patient Effort good  -CJ    Symptoms Noted During/After Treatment none  -CJ    Oral Care suction provided  -       General Information    Patient Profile Reviewed yes  -CJ    Pertinent History Of Current Problem see initial eval; referred for FEES  -CJ    Current Method of Nutrition NPO;large-bore;other (see comments)  -CJ    Precautions/Limitations, Vision vision impairment, bilaterally  -CJ    Precautions/Limitations, Hearing WFL  -CJ    Prior Level of Function-Communication unknown  -CJ    Prior Level of Function-Swallowing unknown  -    Plans/Goals Discussed with patient;agreed upon  -    Barriers to Rehab medically complex  -    Patient's Goals for Discharge patient did not state  -       Pain    Additional Documentation Pain Scale: FACES Pre/Post-Treatment (Group)  -       Pain Scale: FACES Pre/Post-Treatment    Pain: FACES Scale, Pretreatment 0-->no hurt  -CJ    Posttreatment Pain Rating 0-->no hurt  -CJ       Oral Motor Structure and Function    Secretion Management --    Mucosal Quality --       General Eating/Swallowing Observations    Respiratory Support Currently in Use --    Eating/Swallowing Skills --    Positioning During Eating --    Utensils Used --     Consistencies Trialed --       Clinical Swallow Eval    Oral Prep Phase --    Pharyngeal Phase --    Clinical Swallow Evaluation Summary --       Oral Prep Concerns    Oral Prep Concerns --    Poor Rotary Chew --    Incomplete or Weak Lip Closure Around Spoon --       Pharyngeal Phase Concerns    Pharyngeal Phase Concerns --    Cough --    Throat Clear --       Fiberoptic Endoscopic Evaluation of Swallowing (FEES)    Risks/Benefits Reviewed risks/benefits explained;patient;agreed to eval  -CJ    Nasal Entry right:  -CJ    Special Considerations NG in place  -CJ       Anatomy and Physiology    Anatomic Considerations no anatomic structural deviation  -CJ    Velopharyngeal WFL  -CJ    Base of Tongue symmetrical;range reduced  -CJ    Epiglottis WFL  -CJ    Laryngeal Function Breathing symmetrical  -CJ    Laryngeal Function Phonation symmetrical  -CJ    Laryngeal Function to Breath Hold CNA  -CJ    Secretion Rating Scale (Mitch et alSarah 1996) 3- secretions inside the laryngeal vestibule/aspiration, not cleared  -CJ    Secretion Description thin;clear;continuous  -CJ    Ice Chips elicited swallow;partially cleared secretions  -CJ    Spontaneous Swallow frequency reduced;did not clear secretions  -CJ    Sensory sensed scope  -CJ    Utensils Used Spoon  -CJ    Consistencies Trialed ice chips;thin liquids;honey-thick liquids;pudding/puree;spoon  -CJ       FEES Interpretation    Oral Phase solids not tested;anterior loss;reduced lingual control;prespill of liquids into pharynx;oral residue present  -CJ       Initiation of Pharyngeal Swallow    Initiation of Pharyngeal Swallow bolus in pyriform sinuses  -CJ    Pharyngeal Phase impaired pharyngeal phase of swallowing  -CJ    Penetration Before the Swallow thin liquids;secondary to reduced back of tongue control;secondary to delayed swallow initiation or mistiming  -CJ    Aspiration During the Swallow thin liquids;secondary to delayed swallow initiation or mistiming;secondary to  reduced laryngeal elevation;secondary to reduced vestibular closure  -CJ    Penetration After the Swallow thin liquids;honey-thick liquids;pudding/puree;secondary to residue;in pyriform sinuses  -CJ    Aspiration After the Swallow thin liquids;honey-thick liquids;pudding/puree;secondary to residue;in pyriform sinuses  -CJ    Depth of Penetration deep  -CJ    Response to Penetration No  -CJ    No spontaneous response to penetration and non-effective laryngeal clearance with cue (see comments)  -CJ    Response to Aspiration No  -CJ    No spontaneous response to aspiration with non-effective subglottic clearance with cue (see comments);other (see comments)  partially effective w/ max cues  -CJ    Rosenbek's Scale thin:;honey:;pudding/puree:;8-->Level 8  -CJ    Residue thin liquids;honey-thick liquids;pudding/puree;pyriform sinuses;secondary to reduced base of tongue retraction;secondary to reduced posterior pharyngeal wall stripping;secondary to reduced laryngeal elevation;secondary to reduced hyolaryngeal excursion  -CJ    Response to Residue unable to clear residue  -CJ    Attempted Compensatory Maneuvers bolus size;additional subsequent swallow;multiple swallows;throat clear after swallow  -CJ    Response to Attempted Compensatory Maneuvers did not prevent penetration;did not prevent aspiration;did not reduce residue  -CJ    Successful Compensatory Maneuver Competency patient unable to adequately demonstrate/teach back compensations  -CJ    FEES Summary Moderate-severe oral, severe pharyngeal dysphagia. Incomplete lip closure around utensil. Pt noted to bite spoon when presented. Delayed transit. Oral residue w/ all consistencies. Penetration w/ thin liquids before the swallow w/ subsequent silent aspiration during the swallow. Moderate residue w/ all consistencies that mixed w/ pooled secretions spilling over the posterior commissure. This resulted in silent aspiration of all consistencies after the swallow. Cued  cough not effective to fully clear aspirated material. Further po presentations deferred 2/2 severity of dysphagia/secretions. Recommend safest at this time is NPO w/ NG for alternative method of nutrition/hydration. Okay for 2-3 ice chips per hour as tolerated. Will f/u for dysphagia tx  -       SLP Evaluation Clinical Impression    SLP Swallowing Diagnosis mod-severe;oral dysphagia;severe;pharyngeal dysphagia  -    Functional Impact risk of malnutrition;risk of aspiration/pneumonia;risk of dehydration  -    Rehab Potential/Prognosis, Swallowing good, to achieve stated therapy goals  -    Swallow Criteria for Skilled Therapeutic Interventions Met demonstrates skilled criteria  -       Recommendations    Therapy Frequency (Swallow) 5 days per week  -    Predicted Duration Therapy Intervention (Days) until discharge  -    SLP Diet Recommendation NPO;temporary alternate methods of nutrition/hydration;other (see comments)  okay for 2-3 ice chips per hour as tolerated  -    Recommended Diagnostics --    Recommended Precautions and Strategies general aspiration precautions  -    Oral Care Recommendations Oral Care BID/PRN;Suction toothbrush  -    SLP Rec. for Method of Medication Administration meds via alternate route  -    Monitor for Signs of Aspiration notify SLP if any concerns  -    Anticipated Discharge Disposition (SLP) inpatient rehabilitation facility  -              User Key  (r) = Recorded By, (t) = Taken By, (c) = Cosigned By      Initials Name Effective Dates    Tonya Guzman, MS CCC-SLP 07/11/23 -     Aldo Snowden MS CCC-SLP 12/28/21 -                     EDUCATION  The patient has been educated in the following areas:   Dysphagia (Swallowing Impairment) Oral Care/Hydration Modified Diet Instruction.        SLP GOALS       Row Name 09/15/23 1331       (LTG) Patient will demonstrate functional swallow for    Diet Texture (Demonstrate functional swallow) soft to  chew (chopped) textures  -CJ    Liquid viscosity (Demonstrate functional swallow) nectar/ mildly thick liquids  -CJ    [place] (Demonstrate functional swallow) with minimal cues (75-90% accuracy)  -CJ    Time Frame (Demonstrate functional swallow) by discharge  -CJ    Progress/Outcomes (Demonstrate functional swallow) new goal  -CJ       (STG) Patient will tolerate therapeutic trials of    Consistencies Trialed (Tolerate therapeutic trials) pureed textures;thin liquids  -CJ    Desired Outcome (Tolerate therapeutic trials) without signs/symptoms of aspiration;without signs of distress  -CJ    [place] (Tolerate therapeutic trials) with minimal cues (75-90% accuracy)  -CJ    Time Frame (Tolerate therapeutic trials) by discharge  -CJ    Progress/Outcomes (Tolerate therapeutic trials) new goal  -CJ       (STG) Labial Strengthening Goal 1 (SLP)    Activity (Labial Strengthening Goal 1, SLP) increase labial tone  -CJ    Increase Labial Tone labial resistance exercises;swallow trials  -CJ    [place]/Accuracy (Labial Strengthening Goal 1, SLP) with moderate cues (50-74% accuracy)  -CJ    Time Frame (Labial Strengthening Goal 1, SLP) short term goal (STG)  -CJ    Progress/Outcomes (Labial Strengthening Goal 1, SLP) new goal  -CJ       (STG) Lingual Strengthening Goal 1 (SLP)    Activity (Lingual Strengthening Goal 1, SLP) increase lingual tone/sensation/control/coordination/movement;increase tongue back strength  -CJ    Increase Lingual Tone/Sensation/Control/Coordination/Movement swallow trials;lingual resistance exercises  -CJ    Increase Tongue Back Strength lingual resistance exercises  -CJ    [place]/Accuracy (Lingual Strengthening Goal 1, SLP) with moderate cues (50-74% accuracy)  -CJ    Time Frame (Lingual Strengthening Goal 1, SLP) short term goal (STG)  -CJ    Progress/Outcomes (Lingual Strengthening Goal 1, SLP) new goal  -CJ       (STG) Pharyngeal Strengthening Exercise Goal 1 (SLP)    Activity  (Pharyngeal Strengthening Goal 1, SLP) increase timing;increase superior movement of the hyolaryngeal complex;increase anterior movement of the hyolaryngeal complex;increase closure at entrance to airway/closure of airway at glottis;increase squeeze/positive pressure generation  -CJ    Increase Timing prepping - 3 second prep or suck swallow or 3-step swallow  -CJ    Increase Superior Movement of the Hyolaryngeal Complex falsetto  -CJ    Increase Anterior Movement of the Hyolaryngeal Complex chin tuck against resistance (CTAR)  -CJ    Increase Closure at Entrance to Airway/Closure of Airway at Glottis breath hold exercises;supraglottic swallow  -CJ    Increase Squeeze/Positive Pressure Generation hard effortful swallow  -CJ    [place]/Accuracy (Pharyngeal Strengthening Goal 1, SLP) with moderate cues (50-74% accuracy)  -CJ    Time Frame (Pharyngeal Strengthening Goal 1, SLP) short term goal (STG)  -CJ    Progress/Outcomes (Pharyngeal Strengthening Goal 1, SLP) new goal  -CJ       Patient will demonstrate functional communication skills for return to discharge environment     [place] --    Time frame --       Comprehend Questions Goal 1 (SLP)    Improve Ability to Comprehend Questions Goal 1 (SLP) --    Time Frame (Comprehend Questions Goal 1, SLP) --       Follow Directions Goal 2 (SLP)    Improve Ability to Follow Directions Goal 1 (SLP) --    Time Frame (Follow Directions Goal 1, SLP) --       Word Retrieval Skills Goal 1 (SLP)    Improve Word Retrieval Skills By Goal 1 (SLP) --    Time Frame (Word Retrieval Goal 1, SLP) --       Articulation Goal 1 (SLP)    Improve Articulation Goal 1 (SLP) --    Time Frame (Articulation Goal 1, SLP) --       Orientation Goal 1 (SLP)    Improve Orientation Through Goal 1 (SLP) --    Time Frame (Orientation Goal 1, SLP) --              User Key  (r) = Recorded By, (t) = Taken By, (c) = Cosigned By      Initials Name Provider Type    Tonya Guzman MS CCC-SLP Speech  and Language Pathologist    Aldo Snowden, MS CCC-SLP Speech and Language Pathologist                       Time Calculation:    Time Calculation- SLP       Row Name 09/15/23 1451 09/15/23 1111          Time Calculation- SLP    SLP Start Time 1331  - 1000  -MP     SLP Received On 09/15/23  -CJ 09/15/23  -MP        Untimed Charges    [id number] Eval Speech and Production w/ Language Minutes -- 25  -MP     53733-AO Eval Oral Pharyng Swallow Minutes -- 25  -MP     [id number] Fiberoptic Endo Eval Swallow Minutes 84  -CJ --        Total Minutes    Untimed Charges Total Minutes 84  -CJ 50  -MP      Total Minutes 84  -CJ 50  -MP               User Key  (r) = Recorded By, (t) = Taken By, (c) = Cosigned By      Initials Name Provider Type    Tonya Guzman, MS CCC-SLP Speech and Language Pathologist    Aldo Snowden, MS CCC-SLP Speech and Language Pathologist                    Therapy Charges for Today       Code Description Service Date Service Provider Modifiers Qty    [id number]  ST FIBEROPTIC ENDO EVAL SWALL 6 9/15/2023 Tonya Jhaveri MS CCC-SLP GN 1                 Tonya Jhaveri MS CCC-SLP  9/15/2023

## 2023-09-15 NOTE — THERAPY EVALUATION
Patient Name: Kenneth Wen  : 1950    MRN: [id number]                              Today's Date: 9/15/2023       Admit Date: 9/15/2023    Visit Dx:     ICD-10-CM ICD-9-CM   1. Aphasia  R47.01 784.3   2. Dysarthria  R47.1 784.51   3. Oropharyngeal dysphagia  R13.12 787.22     Patient Active Problem List   Diagnosis    Precordial pain    Elevated BP without diagnosis of hypertension    Dyslipidemia    Hyperglycemia    Multiple bilateral CVAs    Hemorrhagic CVA    HFpEF    T2DM (type 2 diabetes mellitus)    HTN (hypertension)    GERD (gastroesophageal reflux disease)    ICH (intracerebral hemorrhage)     Past Medical History:   Diagnosis Date    Acid reflux     Cancer     Skin    Diabetes mellitus     Prediabetes    GERD (gastroesophageal reflux disease) 09/15/2023    HTN (hypertension) 09/15/2023    Kidney disease     T2DM (type 2 diabetes mellitus) 09/15/2023     Past Surgical History:   Procedure Laterality Date    APPENDECTOMY      HEMORRHOIDECTOMY      NASAL POLYP SURGERY        General Information       Row Name 09/15/23 133          OT Time and Intention    Document Type evaluation  -     Mode of Treatment occupational therapy  -       Row Name 09/15/23 133          General Information    Patient Profile Reviewed yes  -     Prior Level of Function independent:;bed mobility;transfer;all household mobility;community mobility;ADL's;work;driving  Pt works as a manager at [employer]  -     Existing Precautions/Restrictions fall;oxygen therapy device and L/min;other (see comments)  NG, R side weakness/increased tone, visual deficits  -     Barriers to Rehab medically complex;cognitive status;visual deficit  -       Row Name 09/15/23 133          Living Environment    People in Home spouse  -       Row Name 09/15/23 133          Home Main Entrance    Number of Stairs, Main Entrance --  Pt limited historian, PETER further  -       Row Name 09/15/23 133          Cognition    Orientation Status  (Cognition) oriented to;person;unable/difficult to assess  -       Row Name 09/15/23 1330          Safety Issues, Functional Mobility    Safety Issues Affecting Function (Mobility) awareness of need for assistance;insight into deficits/self-awareness;safety precaution awareness;safety precautions follow-through/compliance;sequencing abilities  -     Impairments Affecting Function (Mobility) balance;endurance/activity tolerance;cognition;coordination;postural/trunk control;sensation/sensory awareness;shortness of breath;strength;visual/perceptual;muscle tone abnormal  -     Cognitive Impairments, Mobility Safety/Performance awareness, need for assistance;insight into deficits/self-awareness;safety precaution awareness;safety precaution follow-through;sequencing abilities;attention  -     Comment, Safety Issues/Impairments (Mobility) Pt provided w/ R pre-phi resting hand splint to prevent contractures of R wrist/hand d/t increased tone. Q2 skin check completed and noted red area on pt's thumb, thumb strap removed and RN notified.  -               User Key  (r) = Recorded By, (t) = Taken By, (c) = Cosigned By      Initials Name Provider Type     Jenny Rivera OT Occupational Therapist                     Mobility/ADL's       Row Name 09/15/23 1523          Bed Mobility    Bed Mobility supine-sit  -     Supine-Sit [place] (Bed Mobility) maximum assist (25% patient effort);2 person assist;verbal cues  -     Bed Mobility, Safety Issues cognitive deficits limit understanding;decreased use of arms for pushing/pulling;decreased use of legs for bridging/pushing;impaired trunk control for bed mobility  -     Assistive Device (Bed Mobility) bed rails;draw sheet;head of bed elevated  -     Comment, (Bed Mobility) Pt w/ significant R lateral lean once sitting EOB initially req max A to correct, progressed to short periods of min A  -       Row Name 09/15/23 1523          Transfers    Transfers  "sit-stand transfer;stand-sit transfer  -[facility] Name 09/15/23 1523          Sit-Stand Transfer    Sit-Stand [place] (Transfers) 2 person assist;maximum assist (25% patient effort);verbal cues  -     Assistive Device (Sit-Stand Transfers) other (see comments)  BUE support  -[facility] Name 09/15/23 1523          Stand-Sit Transfer    Stand-Sit [place] (Transfers) maximum assist (25% patient effort);2 person assist;verbal cues  -     Assistive Device (Stand-Sit Transfers) other (see comments)  -[facility] Name 09/15/23 1523          Activities of Daily Living    BADL Assessment/Intervention lower body dressing;upper body dressing  -[facility] Name 09/15/23 1523          Lower Body Dressing Assessment/Training    [place] Level (Lower Body Dressing) don;socks;dependent (less than 25% patient effort)  -     Position (Lower Body Dressing) supine  -MC       Row Name 09/15/23 1523          Upper Body Dressing Assessment/Training    [place] Level (Upper Body Dressing) don;doff;other (see comments);maximum assist (25% patient effort);verbal cues  hospital gown  -     Position (Upper Body Dressing) edge of bed sitting  -               User Key  (r) = Recorded By, (t) = Taken By, (c) = Cosigned By      Initials Name Provider Type     Jenny Rivera OT Occupational Therapist                   Obj/Interventions       [facility] Name 09/15/23 1524          Sensory Assessment (Somatosensory)    Sensory Assessment (Somatosensory) unable/difficult to assess  Pt reports no numbness or tingling, however, when touching pt with eyes closed pt responding \"no\" to all touch, ARI further  [facility] Name 09/15/23 1524          Vision Assessment/Intervention    Visual Impairment/Limitations other (see comments)  Pt keeping eyes closed most of session and formal assesment limited d/t cognitive status. [facility]     Vision Assessment Comment Pt reports ability to see OT hold up 2 fingers, and " able to identify that fingers were moving when asked, however, further assessment limited d/t pt cognitive status.  -[facility] Name 09/15/23 1524          Range of Motion Comprehensive    General Range of Motion bilateral upper extremity ROM WFL  -[facility] 09/15/23 1524          Strength Comprehensive (MMT)    General Manual Muscle Testing (MMT) Assessment upper extremity strength deficits identified  -     Comment, General Manual Muscle Testing (MMT) Assessment RUE grossly 2-/5, LUE grossly 4/5  -MC       Row Name 09/15/23 1524          Shoulder (Therapeutic Exercise)    Shoulder (Therapeutic Exercise) PROM (passive range of motion)  -     Shoulder PROM (Therapeutic Exercise) right;flexion;extension;aBduction;aDduction;10 repetitions;supine  -MC       Row Name 09/15/23 1524          Elbow/Forearm (Therapeutic Exercise)    Elbow/Forearm (Therapeutic Exercise) PROM (passive range of motion)  -     Elbow/Forearm PROM (Therapeutic Exercise) right;flexion;extension;10 repetitions;supine  -MC       Row Name 09/15/23 1524          Wrist (Therapeutic Exercise)    Wrist (Therapeutic Exercise) PROM (passive range of motion)  -     Wrist PROM (Therapeutic Exercise) right;flexion;extension;10 repetitions  -MC       Row Name 09/15/23 1524          Hand (Therapeutic Exercise)    Hand (Therapeutic Exercise) PROM (passive range of motion)  -     Hand PROM (Therapeutic Exercise) right;finger flexion;finger extension;10 repetitions  -MC       Row Name 09/15/23 1524          Motor Skills    Therapeutic Exercise shoulder;elbow/forearm;wrist;hand  -[facility] 09/15/23 1524          Balance    Balance Assessment sitting static balance;sit to stand dynamic balance;standing static balance  -     Static Sitting Balance moderate assist;verbal cues  -     Position, Sitting Balance supported;sitting edge of bed  -     Sit to Stand Dynamic Balance maximum assist;2-person assist;verbal cues  -     Static  Standing Balance maximum assist;2-person assist;verbal cues  -     Position/Device Used, Standing Balance supported  -     Balance Interventions sitting;sit to stand;occupation based/functional task  -               User Key  (r) = Recorded By, (t) = Taken By, (c) = Cosigned By      Initials Name Provider Type    Jenny Black OT Occupational Therapist                   Goals/Plan       Row Name 09/15/23 1529          Bed Mobility Goal 1 (OT)    Activity/Assistive Device (Bed Mobility Goal 1, OT) sit to supine;supine to sit  -     [place] Level/Cues Needed (Bed Mobility Goal 1, OT) moderate assist (50-74% patient effort);verbal cues required  -     Time Frame (Bed Mobility Goal 1, OT) long term goal (LTG);10 days  -     Progress/Outcomes (Bed Mobility Goal 1, OT) goal ongoing  -       Row Name 09/15/23 1529          Transfer Goal 1 (OT)    Activity/Assistive Device (Transfer Goal 1, OT) sit-to-stand/stand-to-sit;bed-to-chair/chair-to-bed;commode, bedside without drop arms  -     [place] Level/Cues Needed (Transfer Goal 1, OT) moderate assist (50-74% patient effort);verbal cues required  -     Time Frame (Transfer Goal 1, OT) long term goal (LTG);10 days  -     Progress/Outcome (Transfer Goal 1, OT) goal ongoing  -       Row Name 09/15/23 1529          Dressing Goal 1 (OT)    Activity/Device (Dressing Goal 1, OT) lower body dressing  don/doff socks w/ AAD  -     [place]/Cues Needed (Dressing Goal 1, OT) moderate assist (50-74% patient effort);verbal cues required  -     Time Frame (Dressing Goal 1, OT) long term goal (LTG);10 days  -     Progress/Outcome (Dressing Goal 1, OT) goal ongoing  -       Row Name 09/15/23 1529          Grooming Goal 1 (OT)    Activity/Device (Grooming Goal 1, OT) hair care;oral care;wash face, hands  unsupported sitting  -     [place] (Grooming Goal 1, OT) moderate assist (50-74% patient effort);verbal cues required  -     Time  Frame (Grooming Goal 1, OT) long term goal (LTG);10 days  -     Progress/Outcome (Grooming Goal 1, OT) goal ongoing  -[facility] Name 09/15/23 1529          Therapy Assessment/Plan (OT)    Planned Therapy Interventions (OT) activity tolerance training;adaptive equipment training;BADL retraining;cognitive/visual perception retraining;edema control/reduction;functional balance retraining;IADL retraining;neuromuscular control/coordination retraining;occupation/activity based interventions;passive ROM/stretching;patient/caregiver education/training;strengthening exercise;ROM/therapeutic exercise;transfer/mobility retraining;orthotic fabrication/fitting/training  -               User Key  (r) = Recorded By, (t) = Taken By, (c) = Cosigned By      Initials Name Provider Type     Jenny Rivera OT Occupational Therapist                   Clinical Impression       [facility] Name 09/15/23 1527          Pain Assessment    Pretreatment Pain Rating 0/10 - no pain  -     Posttreatment Pain Rating 0/10 - no pain  -MC       Row Name 09/15/23 1527          Plan of Care Review    Plan of Care Reviewed With patient  -     Outcome Evaluation Pt presents w/ R side weakness/increased tone, decreased functional endurance, balance and postural control deficits, and visual deficits limiting his ADL independence. Pt would benefit from continued skilled IPOT services to address current functional deficits. Rec IRF at d/c.  -[facility] Name 09/15/23 1527          Therapy Assessment/Plan (OT)    Rehab Potential (OT) good, to achieve stated therapy goals  -     Criteria for Skilled Therapeutic Interventions Met (OT) yes;skilled treatment is necessary  -     Therapy Frequency (OT) daily  -[facility] Name 09/15/23 1527          Therapy Plan Review/Discharge Plan (OT)    Anticipated Discharge Disposition (OT) inpatient rehabilitation facility  -[facility] Name 09/15/23 1527          Vital Signs    Pre Systolic BP Rehab 142  -      Pre Treatment Diastolic BP 77  -MC     Post Systolic BP Rehab 145  -MC     Post Treatment Diastolic BP 70  -MC     Pretreatment Heart Rate (beats/min) 85  -MC     Posttreatment Heart Rate (beats/min) 104  -MC     Pre SpO2 (%) 96  -MC     O2 Delivery Pre Treatment room air  -MC     O2 Delivery Intra Treatment room air  -MC     Post SpO2 (%) 94  -MC     O2 Delivery Post Treatment room air  -MC     Pre Patient Position Supine  -MC     Intra Patient Position Standing  -MC     Post Patient Position Sitting  -       Row Name 09/15/23 1527          Positioning and Restraints    Pre-Treatment Position in bed  -MC     Post Treatment Position bed  -MC     In Bed sitting EOB;with PT  -MC               User Key  (r) = Recorded By, (t) = Taken By, (c) = Cosigned By      Initials Name Provider Type    Jenny Black, LATIA Occupational Therapist                   Outcome Measures       Row Name 09/15/23 1530          How much help from another is currently needed...    Putting on and taking off regular lower body clothing? 1  -MC     Bathing (including washing, rinsing, and drying) 2  -MC     Toileting (which includes using toilet bed pan or urinal) 1  -MC     Putting on and taking off regular upper body clothing 2  -MC     Taking care of personal grooming (such as brushing teeth) 1  -MC     Eating meals 1  -MC     AM-PAC 6 Clicks Score (OT) 8  -MC       Row Name 09/15/23 1253          How much help from another person do you currently need...    Turning from your back to your side while in flat bed without using bedrails? 3  -AY     Moving from lying on back to sitting on the side of a flat bed without bedrails? 2  -AY     Moving to and from a bed to a chair (including a wheelchair)? 2  -AY     Standing up from a chair using your arms (e.g., wheelchair, bedside chair)? 2  -AY     Climbing 3-5 steps with a railing? 1  -AY     To walk in hospital room? 2  -AY     AM-PAC 6 Clicks Score (PT) 12  -AY     Highest level of  mobility 4 --> Transferred to chair/commode  -AY       Row Name 09/15/23 1530 09/15/23 1253       Modified [place] Scale    Pre-Stroke Modified [place] Scale 6 - Unable to determine (UTD) from the medical record documentation  - 6 - Unable to determine (UTD) from the medical record documentation  -AY    Modified [place] Scale 4 - Moderately severe disability.  Unable to walk without assistance, and unable to attend to own bodily needs without assistance.  - 4 - Moderately severe disability.  Unable to walk without assistance, and unable to attend to own bodily needs without assistance.  -AY      Row Name 09/15/23 1530 09/15/23 1253       Functional Assessment    Outcome Measure Options AM-PAC 6 Clicks Daily Activity (OT);Modified [place]lins  - AM-PAC 6 Clicks Basic Mobility (PT);Modified [place]  -              User Key  (r) = Recorded By, (t) = Taken By, (c) = Cosigned By      Initials Name Provider Type    Aleshia Mendes, PT Physical Therapist    Jenny Black, OT Occupational Therapist                    Occupational Therapy Education       Title: PT OT SLP Therapies (In Progress)       Topic: Occupational Therapy (In Progress)       Point: ADL training (In Progress)       Description:   Instruct learner(s) on proper safety adaptation and remediation techniques during self care or transfers.   Instruct in proper use of assistive devices.                  Learning Progress Summary             Patient Acceptance, E, NR by  at 9/15/2023 1531                         Point: Home exercise program (In Progress)       Description:   Instruct learner(s) on appropriate technique for monitoring, assisting and/or progressing therapeutic exercises/activities.                  Learning Progress Summary             Patient Acceptance, E, NR by  at 9/15/2023 1531                         Point: Precautions (In Progress)       Description:   Instruct learner(s) on prescribed precautions during self-care and  functional transfers.                  Learning Progress Summary             Patient Acceptance, E, NR by  at 9/15/2023 1531                         Point: Body mechanics (In Progress)       Description:   Instruct learner(s) on proper positioning and spine alignment during self-care, functional mobility activities and/or exercises.                  Learning Progress Summary             Patient Acceptance, E, NR by  at 9/15/2023 1531                                         User Key       Initials Effective Dates Name Provider Type Discipline     10/14/22 -  Jenny Rivera, LATIA Occupational Therapist OT                  OT Recommendation and Plan  Planned Therapy Interventions (OT): activity tolerance training, adaptive equipment training, BADL retraining, cognitive/visual perception retraining, edema control/reduction, functional balance retraining, IADL retraining, neuromuscular control/coordination retraining, occupation/activity based interventions, passive ROM/stretching, patient/caregiver education/training, strengthening exercise, ROM/therapeutic exercise, transfer/mobility retraining, orthotic fabrication/fitting/training  Therapy Frequency (OT): daily  Plan of Care Review  Plan of Care Reviewed With: patient  Outcome Evaluation: Pt presents w/ R side weakness/increased tone, decreased functional endurance, balance and postural control deficits, and visual deficits limiting his ADL independence. Pt would benefit from continued skilled IPOT services to address current functional deficits. Rec IRF at d/c.     Time Calculation:   Evaluation Complexity (OT)  Review Occupational Profile/Medical/Therapy History Complexity: expanded/moderate complexity  Assessment, Occupational Performance/Identification of Deficit Complexity: 3-5 performance deficits  Clinical Decision Making Complexity (OT): detailed assessment/moderate complexity  Overall Complexity of Evaluation (OT): moderate complexity     Time  Calculation- OT       Row Name 09/15/23 1533             Time Calculation- OT    OT Start Time 1049  -MC      OT Received On 09/15/23  -      OT Goal Re-Cert Due Date 09/25/23  -         Timed Charges    66655 - OT Therapeutic Exercise Minutes 12  -      15350 - OT Therapeutic Activity Minutes 5  -      74500 - OT Self Care/Mgmt Minutes 6  -         Untimed Charges    OT Eval/Re-eval Minutes 33  -         Total Minutes    Timed Charges Total Minutes 23  -      Untimed Charges Total Minutes 33  -       Total Minutes 56  -MC                User Key  (r) = Recorded By, (t) = Taken By, (c) = Cosigned By      Initials Name Provider Type     Jenny Rivera, OT Occupational Therapist                  Therapy Charges for Today       Code Description Service Date Service Provider Modifiers Qty    40355590952 HC OT THER PROC EA 15 MIN 9/15/2023 Jenny Rivera, OT GO 1    26597729702 HC OT SELF CARE/MGMT/TRAIN EA 15 MIN 9/15/2023 Jenny Rivera OT GO 1    71384958273 HC OT EVAL MOD COMPLEXITY 3 9/15/2023 Jenny Rivera OT GO 1                 Jenny Rivera OT  9/15/2023

## 2023-09-15 NOTE — PLAN OF CARE
Problem: Restraint, Nonviolent  Goal: Absence of Harm or Injury  Outcome: Ongoing, Not Progressing  Intervention: Implement Least Restrictive Safety Strategies  Recent Flowsheet Documentation  Taken 9/15/2023 0600 by Monica Castillo RN  Medical Device Protection:   IV pole/bag removed from visual field   tubing secured  Less Restrictive Alternative:   appropriate expression promoted   bed alarm in use   emotional support provided   environment adjusted   positive reinforcement provided   safety enhancements provided   security enhancements provided   sensory stimulation limited   surveillance provided  De-Escalation Techniques:   appropriate behavior reinforced   diversional activity encouraged   increased round frequency   reoriented   stimulation decreased   verbally redirected  Diversional Activities: television  Taken 9/15/2023 0400 by Monica Castillo RN  Medical Device Protection:   IV pole/bag removed from visual field   tubing secured  Less Restrictive Alternative:   appropriate expression promoted   bed alarm in use   emotional support provided   environment adjusted   positive reinforcement provided   safety enhancements provided   security enhancements provided   sensory stimulation limited   surveillance provided  De-Escalation Techniques:   diversional activity encouraged   increased round frequency   appropriate behavior reinforced   reoriented   stimulation decreased   verbally redirected  Diversional Activities: television  Taken 9/15/2023 0200 by Monica Castillo, RN  Medical Device Protection:   IV pole/bag removed from visual field   tubing secured  Less Restrictive Alternative:   appropriate expression promoted   bed alarm in use   emotional support provided   environment adjusted   positive reinforcement provided   safety enhancements provided   security enhancements provided   sensory stimulation limited   surveillance provided  De-Escalation Techniques:   appropriate behavior reinforced    diversional activity encouraged   increased round frequency   reoriented   stimulation decreased   verbally redirected  Diversional Activities: television  Taken 9/15/2023 0117 by Monica Castillo RN  Medical Device Protection:   IV pole/bag removed from visual field   tubing secured  Less Restrictive Alternative:   appropriate expression promoted   bed alarm in use   emotional support provided   environment adjusted   positive reinforcement provided   safety enhancements provided   security enhancements provided   sensory stimulation limited   surveillance provided  De-Escalation Techniques:   appropriate behavior reinforced   diversional activity encouraged   increased round frequency   reoriented   stimulation decreased   verbally redirected  Diversional Activities: television  Taken 9/15/2023 0100 by Monica Castillo RN  Diversional Activities: television  Intervention: Protect Dignity, Rights, and Personal Wellbeing  Recent Flowsheet Documentation  Taken 9/15/2023 0100 by Monica Castillo RN  Trust Relationship/Rapport:   care explained   choices provided   emotional support provided   empathic listening provided   questions answered   questions encouraged   reassurance provided   thoughts/feelings acknowledged  Intervention: Protect Skin and Joint Integrity  Recent Flowsheet Documentation  Taken 9/15/2023 0600 by Monica Castillo RN  Body Position:   weight shifting   upper extremity elevated   neutral head position   neutral body alignment   legs elevated  Range of Motion: active ROM (range of motion) encouraged  Taken 9/15/2023 0400 by Monica Castillo RN  Body Position:   weight shifting   upper extremity elevated   neutral head position   neutral body alignment   legs elevated  Range of Motion: active ROM (range of motion) encouraged  Taken 9/15/2023 0200 by Monica Castillo RN  Body Position:   weight shifting   upper extremity elevated   neutral head position   neutral body alignment   legs  elevated  Range of Motion: active ROM (range of motion) encouraged  Taken 9/15/2023 0100 by Monica Castillo, RN  Body Position:   turned   weight shifting   upper extremity elevated   neutral head position   neutral body alignment   legs elevated  Range of Motion:   active ROM (range of motion) encouraged   ROM (range of motion) performed   Goal Outcome Evaluation:  Plan of Care Reviewed With: patient        Progress: no change

## 2023-09-16 ENCOUNTER — APPOINTMENT (OUTPATIENT)
Dept: CT IMAGING | Facility: HOSPITAL | Age: 73
End: 2023-09-16
Payer: MEDICARE

## 2023-09-16 ENCOUNTER — APPOINTMENT (OUTPATIENT)
Dept: GENERAL RADIOLOGY | Facility: HOSPITAL | Age: 73
End: 2023-09-16
Payer: MEDICARE

## 2023-09-16 LAB
ANION GAP SERPL CALCULATED.3IONS-SCNC: 10 MMOL/L (ref 5–15)
BUN SERPL-MCNC: 27 MG/DL (ref 8–23)
BUN/CREAT SERPL: 38 (ref 7–25)
CALCIUM SPEC-SCNC: 8.5 MG/DL (ref 8.6–10.5)
CHLORIDE SERPL-SCNC: 112 MMOL/L (ref 98–107)
CO2 SERPL-SCNC: 19 MMOL/L (ref 22–29)
CREAT SERPL-MCNC: 0.71 MG/DL (ref 0.76–1.27)
DEPRECATED RDW RBC AUTO: 42.7 FL (ref 37–54)
EGFRCR SERPLBLD CKD-EPI 2021: 98.1 ML/MIN/1.73
ERYTHROCYTE [DISTWIDTH] IN BLOOD BY AUTOMATED COUNT: 12.7 % (ref 12.3–15.4)
GLUCOSE BLDC GLUCOMTR-MCNC: 108 MG/DL (ref 70–130)
GLUCOSE BLDC GLUCOMTR-MCNC: 111 MG/DL (ref 70–130)
GLUCOSE BLDC GLUCOMTR-MCNC: 120 MG/DL (ref 70–130)
GLUCOSE BLDC GLUCOMTR-MCNC: 135 MG/DL (ref 70–130)
GLUCOSE SERPL-MCNC: 141 MG/DL (ref 65–99)
HCT VFR BLD AUTO: 37.6 % (ref 37.5–51)
HGB BLD-MCNC: 12.9 G/DL (ref 13–17.7)
MAGNESIUM SERPL-MCNC: 2.3 MG/DL (ref 1.6–2.4)
MCH RBC QN AUTO: 31.6 PG (ref 26.6–33)
MCHC RBC AUTO-ENTMCNC: 34.3 G/DL (ref 31.5–35.7)
MCV RBC AUTO: 92.2 FL (ref 79–97)
PLATELET # BLD AUTO: 254 10*3/MM3 (ref 140–450)
PMV BLD AUTO: 10.4 FL (ref 6–12)
POTASSIUM SERPL-SCNC: 3.6 MMOL/L (ref 3.5–5.2)
POTASSIUM SERPL-SCNC: 3.7 MMOL/L (ref 3.5–5.2)
POTASSIUM SERPL-SCNC: 3.8 MMOL/L (ref 3.5–5.2)
RBC # BLD AUTO: 4.08 10*6/MM3 (ref 4.14–5.8)
SODIUM SERPL-SCNC: 141 MMOL/L (ref 136–145)
SODIUM SERPL-SCNC: 143 MMOL/L (ref 136–145)
SODIUM SERPL-SCNC: 143 MMOL/L (ref 136–145)
SODIUM SERPL-SCNC: 145 MMOL/L (ref 136–145)
WBC NRBC COR # BLD: 9.25 10*3/MM3 (ref 3.4–10.8)

## 2023-09-16 PROCEDURE — 85027 COMPLETE CBC AUTOMATED: CPT | Performed by: INTERNAL MEDICINE

## 2023-09-16 PROCEDURE — 25010000002 PIPERACILLIN SOD-TAZOBACTAM PER 1 G: Performed by: NURSE PRACTITIONER

## 2023-09-16 PROCEDURE — 83735 ASSAY OF MAGNESIUM: CPT | Performed by: INTERNAL MEDICINE

## 2023-09-16 PROCEDURE — C1894 INTRO/SHEATH, NON-LASER: HCPCS

## 2023-09-16 PROCEDURE — 82948 REAGENT STRIP/BLOOD GLUCOSE: CPT

## 2023-09-16 PROCEDURE — 80048 BASIC METABOLIC PNL TOTAL CA: CPT | Performed by: INTERNAL MEDICINE

## 2023-09-16 PROCEDURE — 02HV33Z INSERTION OF INFUSION DEVICE INTO SUPERIOR VENA CAVA, PERCUTANEOUS APPROACH: ICD-10-PCS | Performed by: NURSE PRACTITIONER

## 2023-09-16 PROCEDURE — 84132 ASSAY OF SERUM POTASSIUM: CPT | Performed by: NURSE PRACTITIONER

## 2023-09-16 PROCEDURE — C1751 CATH, INF, PER/CENT/MIDLINE: HCPCS

## 2023-09-16 PROCEDURE — 25010000002 HYDRALAZINE PER 20 MG

## 2023-09-16 PROCEDURE — 99291 CRITICAL CARE FIRST HOUR: CPT | Performed by: INTERNAL MEDICINE

## 2023-09-16 PROCEDURE — 84295 ASSAY OF SERUM SODIUM: CPT

## 2023-09-16 PROCEDURE — 70450 CT HEAD/BRAIN W/O DYE: CPT

## 2023-09-16 PROCEDURE — 94761 N-INVAS EAR/PLS OXIMETRY MLT: CPT

## 2023-09-16 PROCEDURE — 71045 X-RAY EXAM CHEST 1 VIEW: CPT

## 2023-09-16 PROCEDURE — 99232 SBSQ HOSP IP/OBS MODERATE 35: CPT | Performed by: STUDENT IN AN ORGANIZED HEALTH CARE EDUCATION/TRAINING PROGRAM

## 2023-09-16 RX ORDER — SODIUM CHLORIDE 0.9 % (FLUSH) 0.9 %
10 SYRINGE (ML) INJECTION EVERY 12 HOURS SCHEDULED
Status: DISCONTINUED | OUTPATIENT
Start: 2023-09-16 | End: 2023-10-19

## 2023-09-16 RX ORDER — METOPROLOL TARTRATE 50 MG/1
50 TABLET, FILM COATED ORAL EVERY 12 HOURS SCHEDULED
Status: DISCONTINUED | OUTPATIENT
Start: 2023-09-16 | End: 2023-09-20

## 2023-09-16 RX ORDER — 3% SODIUM CHLORIDE 3 G/100ML
40 INJECTION, SOLUTION INTRAVENOUS CONTINUOUS
Status: DISPENSED | OUTPATIENT
Start: 2023-09-16 | End: 2023-09-17

## 2023-09-16 RX ORDER — SODIUM CHLORIDE 0.9 % (FLUSH) 0.9 %
10 SYRINGE (ML) INJECTION AS NEEDED
Status: DISCONTINUED | OUTPATIENT
Start: 2023-09-16 | End: 2023-09-19

## 2023-09-16 RX ORDER — POTASSIUM CHLORIDE 1.5 G/1.58G
40 POWDER, FOR SOLUTION ORAL EVERY 4 HOURS
Status: COMPLETED | OUTPATIENT
Start: 2023-09-16 | End: 2023-09-16

## 2023-09-16 RX ORDER — ASPIRIN 81 MG/1
81 TABLET ORAL DAILY
Status: DISCONTINUED | OUTPATIENT
Start: 2023-09-16 | End: 2023-09-19

## 2023-09-16 RX ORDER — HYDRALAZINE HYDROCHLORIDE 20 MG/ML
INJECTION INTRAMUSCULAR; INTRAVENOUS
Status: COMPLETED
Start: 2023-09-16 | End: 2023-09-16

## 2023-09-16 RX ORDER — DEXTROSE MONOHYDRATE 50 MG/ML
6 INJECTION, SOLUTION INTRAVENOUS CONTINUOUS PRN
Status: DISCONTINUED | OUTPATIENT
Start: 2023-09-16 | End: 2023-09-20

## 2023-09-16 RX ORDER — IPRATROPIUM BROMIDE AND ALBUTEROL SULFATE 2.5; .5 MG/3ML; MG/3ML
3 SOLUTION RESPIRATORY (INHALATION)
Status: DISPENSED | OUTPATIENT
Start: 2023-09-16 | End: 2023-09-18

## 2023-09-16 RX ORDER — HYDRALAZINE HYDROCHLORIDE 20 MG/ML
10 INJECTION INTRAMUSCULAR; INTRAVENOUS ONCE
Status: COMPLETED | OUTPATIENT
Start: 2023-09-16 | End: 2023-09-16

## 2023-09-16 RX ADMIN — METOPROLOL TARTRATE 25 MG: 25 TABLET, FILM COATED ORAL at 09:41

## 2023-09-16 RX ADMIN — HYDRALAZINE HYDROCHLORIDE 10 MG: 20 INJECTION INTRAMUSCULAR; INTRAVENOUS at 17:45

## 2023-09-16 RX ADMIN — NICARDIPINE HYDROCHLORIDE 12.5 MG/HR: 25 INJECTION, SOLUTION INTRAVENOUS at 00:24

## 2023-09-16 RX ADMIN — ACETAMINOPHEN 650 MG: 325 TABLET ORAL at 23:52

## 2023-09-16 RX ADMIN — POTASSIUM CHLORIDE 40 MEQ: 1.5 POWDER, FOR SOLUTION ORAL at 23:52

## 2023-09-16 RX ADMIN — METOPROLOL TARTRATE 50 MG: 50 TABLET ORAL at 21:37

## 2023-09-16 RX ADMIN — METOPROLOL TARTRATE 25 MG: 25 TABLET, FILM COATED ORAL at 08:25

## 2023-09-16 RX ADMIN — PIPERACILLIN SODIUM AND TAZOBACTAM SODIUM 3.38 G: 3; .375 INJECTION, SOLUTION INTRAVENOUS at 23:52

## 2023-09-16 RX ADMIN — NICARDIPINE HYDROCHLORIDE 10 MG/HR: 25 INJECTION, SOLUTION INTRAVENOUS at 08:30

## 2023-09-16 RX ADMIN — PANTOPRAZOLE SODIUM 40 MG: 40 INJECTION, POWDER, LYOPHILIZED, FOR SOLUTION INTRAVENOUS at 06:17

## 2023-09-16 RX ADMIN — ASPIRIN 81 MG: 81 TABLET, COATED ORAL at 12:00

## 2023-09-16 RX ADMIN — NICARDIPINE HYDROCHLORIDE 12.5 MG/HR: 25 INJECTION, SOLUTION INTRAVENOUS at 06:42

## 2023-09-16 RX ADMIN — ACETAMINOPHEN 650 MG: 325 TABLET ORAL at 08:25

## 2023-09-16 RX ADMIN — SODIUM CHLORIDE 30 ML/HR: 3 INJECTION, SOLUTION INTRAVENOUS at 01:32

## 2023-09-16 RX ADMIN — Medication 10 ML: at 23:53

## 2023-09-16 RX ADMIN — POTASSIUM CHLORIDE 40 MEQ: 1.5 POWDER, FOR SOLUTION ORAL at 21:37

## 2023-09-16 RX ADMIN — NICARDIPINE HYDROCHLORIDE 12.5 MG/HR: 25 INJECTION, SOLUTION INTRAVENOUS at 02:17

## 2023-09-16 RX ADMIN — Medication 10 ML: at 23:52

## 2023-09-16 RX ADMIN — ATORVASTATIN CALCIUM 80 MG: 40 TABLET, FILM COATED ORAL at 21:37

## 2023-09-16 RX ADMIN — ACETAMINOPHEN 650 MG: 325 TABLET ORAL at 16:00

## 2023-09-16 RX ADMIN — NICARDIPINE HYDROCHLORIDE 12.5 MG/HR: 25 INJECTION, SOLUTION INTRAVENOUS at 04:24

## 2023-09-16 NOTE — PLAN OF CARE
Goal Outcome Evaluation:    NIHSS 19. Neuro status remains the same.     PICC line placed. IV's removed per protocol.     BP medications added to keep SBP < 140.     Temperature 102.0. Tylenol administered twice during shift.

## 2023-09-16 NOTE — PROGRESS NOTES
"INTENSIVIST   PROGRESS NOTE     Hospital:  LOS: 1 day     Chief Complaint: CVA    Subjective   S     Interval History: No acute issues overnight; however, patient intermittently febrile over the last 24 hours (Tmax 101.6).     The patient's relevant past medical, surgical and social history were reviewed and updated in Epic as appropriate.      ROS: Difficult to obtain due to encephalopathy    Objective   O     Intake/Ouptut 24 hrs (7:00AM - 6:59 AM)  Intake & Output (last 3 days)         09/13 0701 09/14 0700 09/14 0701  09/15 0700 09/15 0701 09/16 0700 09/16 0701 09/17 0700    P.O.   0     I.V. (mL/kg)  190.3 (2.4) 2959.8 (37.4) 326 (4.1)    NG/GT  150 150 100    Total Intake(mL/kg)  340.3 (4.3) 3109.8 (39.3) 426 (5.4)    Urine (mL/kg/hr)  0 1500 (0.8) 200 (1.2)    Emesis/NG output  150      Stool  0 0     Total Output  150 1500 200    Net  +190.3 +1609.8 +226            Urine Unmeasured Occurrence  1 x 2 x     Stool Unmeasured Occurrence  1 x 2 x     Emesis Unmeasured Occurrence  1 x            Medications (drips):  dextrose  niCARdipine, Last Rate: 10 mg/hr (09/16/23 0830)  sodium chloride, Last Rate: 40 mL/hr (09/16/23 0753)    Respiratory Support: Nasal cannula    Physical Examination:  Vital Signs: Blood pressure 115/61, pulse 73, temperature (!) 101.6 °F (38.7 °C), temperature source Axillary, resp. rate 18, height 175.3 cm (69\"), weight 79.2 kg (174 lb 9.7 oz), SpO2 93 %.    General: The patient appears in no acute distress. Alert, more interactive than prior assessment  ENT/Neck: Trachea midline, No masses. MMM.   Chest: Clear to auscultation bilaterally, No wheezing, rhonchi, or rales. Normal work of breathing. Equal chest rise.  On supplemental oxygen with appropriate saturations  Cardiac: Regular rhythm, normal rate, S1S2 auscultated. No murmurs, rubs or gallops.   Extremities: No lower extremity edema. No clubbing or cyanosis.  Skin: No rashes, open wounds, or bruising. Warm, dry, " well-perfused.  Neuro: Speech understandable, talking in broken sentences.  Follows commands.  Moves bilateral lower extremities with global weakness; worse on right side.  Strength 2+/5 in left upper extremity.  Does not move right upper extremity.    Lines, Drains & Airways       Active LDAs       Name Placement date Placement time Site Days    Peripheral IV 09/15/23 0040 Anterior;Distal;Right;Upper Arm 09/15/23  0040  Arm  1    Peripheral IV 09/15/23 1000 Posterior;Right Hand 09/15/23  1000  Hand  less than 1    NG/OG Tube Nasogastric Right nostril 09/15/23  0030  Right nostril  1    External Urinary Catheter 09/15/23  0100  --  1        Results from last 7 days   Lab Units 09/16/23  0607   WBC 10*3/mm3 9.25   HEMOGLOBIN g/dL 12.9*   MCV fL 92.2   PLATELETS 10*3/mm3 254     Results from last 7 days   Lab Units 09/16/23  0607 09/16/23  0007 09/15/23  1744 09/15/23  1138 09/15/23  0212   SODIUM mmol/L 141 143 142  --  141   POTASSIUM mmol/L 3.7  --   --  4.4 3.2*   CO2 mmol/L 19.0*  --   --   --  23.0   CREATININE mg/dL 0.71*  --   --   --  0.76   GLUCOSE mg/dL 141*  --   --   --  138*   MAGNESIUM mg/dL 2.3  --   --   --  2.0   PHOSPHORUS mg/dL  --   --   --  2.5 2.2*     Estimated Creatinine Clearance: 106.9 mL/min (A) (by C-G formula based on SCr of 0.71 mg/dL (L)).  Results from last 7 days   Lab Units 09/15/23  0212   ALK PHOS U/L 75   BILIRUBIN mg/dL 1.2   ALT (SGPT) U/L 27   AST (SGOT) U/L 34     Radiology (9/16/2023):   Radiology Impression:   1.Stable appearance of multifocal areas of evolving infarct in the posterior circulation with stable areas of petechial and parenchymal hemorrhage. No new hemorrhage.  2.Mild mass effect due to cerebral edema. No herniation or midline shift.    Results: Reviewed.  - I reviewed the patient's new laboratory and imaging results.  - I independently reviewed the patient's new images.    Medications: Reviewed.    Assessment & Plan    A / P     Active Hospital Problems     Diagnosis    • **Hemorrhagic CVA    • Multiple bilateral CVAs    • HFpEF    • T2DM (type 2 diabetes mellitus)    • HTN (hypertension)    • GERD (gastroesophageal reflux disease)    • ICH (intracerebral hemorrhage)    • Dyslipidemia      Patient Behzad is a 72M with a history of previous tobacco use, HTN, T2DM, HLD and GERD who presented to [facility] on 9/10/23 and was found to have multiple acute ischemic infarcts of the bilateral cerebral and cerebellar hemispheres as well as the left side of the isaac. He was outside the window for thrombolytics.      At the OSH, he underwent both a TTE and JOSIAS which were negative for PFO.      On 9/13/23, he had further decline in his mental status with a new inability to move his right leg and CT head was repeated and revealed evolution of the existing CVA with new development of petechial hemorrhage. DAPT was held for 24 hrs per Neurology recommendations and repeat CTH obtained the following evening demonstrated worsening effacement of the right quadrigeminal cistern with upward supratentorial shift. Due to his further worsening with need for Neurosurgery evaluation our Stroke Navigator was contacted & patient was airlifted to [facility] for further management.       He arrived to the Neuro ICU on the morning of 9/15/23.    - Patient with bilateral, occipital lobe and left thalamic infarcts upon arrival.  Most recent imaging (9/16) with stable appearing/evolving strokes mild mass effect due to cerebral edema but no herniation or midline shift.  We will keep patient in the ICU and continue neuro checks per stroke protocol  - Initiated hypertonic saline overnight (9/15-16) chiefly for ongoing cerebral edema; Goals per stroke/neurology  - Continue holding DAPT  - Currently requiring Cardene for blood pressure control; Goal systolic less than 140. Uptitrated metoprolol on 9/16 (50 mg twice daily)  - ECHO performed at OSH prior to arrival; No PFO as above  - Imaging per  neurology/stroke; pending CT on 9/17 AM  - PT/OT/speech following  - Patient has continued to be intermittently febrile over the last 24 hours (Tmax 101.6).  Favoring noninfectious etiology of fever at this time; however, cultures obtained and pending with low threshold to initiate antibiotics in the setting of suspected infection.  Blood cultures no growth to date.  UA performed on 9/15 not consistent with UTI.  No secretions, increased oxygen requirement, cough suggestive of pneumonia    F-Tube feeds per dietary recs  A-NA  S-NA  T-SCDs  H-Head of bed greater than 30 degrees  U-PPI  G-FSBS per unit protocol, correction dose insulin  S-SBT  B-Will monitor daily and provide regimen if indicated  I-PIV  D-NA    Advance Directives:   Code Status and Medical Interventions:   Ordered at: 09/15/23 0133     Code Status (Patient has no pulse and is not breathing):    CPR (Attempt to Resuscitate)     Medical Interventions (Patient has pulse or is breathing):    Full Support     High level of risk due to severe exacerbation of chronic illness and illness with threat to life or bodily function.    I conducted multidisciplinary rounds in the plan of care was discussed with the multidisciplinary team at that time. In attendance at multidisciplinary rounds was clinical pharmacist, dietitian, nursing staff and case management.    I discussed the patient's findings and my recommendations with patient, nursing staff, and consulting provider    Critical Care Time: Critical Care time spent in direct patient care: 30 minutes (excluding procedure time, if applicable) including high complexity decision making to assess, manipulate, and support vital organ system failure in this individual who has impairment of one or more vital organ systems such that there is a high probability of imminent or life threatening deterioration in the patient’s condition.     -- Durga Davis MD  Pulmonary/Critical Care

## 2023-09-16 NOTE — PROGRESS NOTES
Stroke Progress Note       Chief Complaint: Bilateral occipital and bilateral parietal infarct with hemorrhagic conversion    Subjective     Subjective:  Kenneth Wen was seen and examined at bedside alone.  The patient is alert and follows commands.  Oriented to person, place, and time today.  He tended to reach his wife yesterday but there was no answer.  Patient updated on plan of care and denied questions.      Objective      Temp:  [97.2 °F (36.2 °C)-101.6 °F (38.7 °C)] 101.6 °F (38.7 °C)  Heart Rate:  [] 72  Resp:  [16-20] 18  BP: (110-147)/(58-83) 110/61            Objective    Physical Exam:  General Appearance: Alert  Eyes: Anicteric sclera  HEENT: no scleral injection.  NG tube in place.  Lungs: respirations appear comfortable, no obvious increased work of breathing  Extremities: No cyanosis or fingernail clubbing   Skin: No rashes in exposed skin areas     Neurological Examination:   Mental status: Alert and oriented to person, place, and time.  Hypophonic speech with moderate dysarthria.  Able to name and repeat.   Cranial Nerves: Visual fields intact.  Left gaze deviation but able to cross midline.  No right blink to threat.  Facial sensation intact.  Right facial droop.  Hearing grossly intact. Palate movement is symmetric.  No right shoulder shrug.  Tongue protrudes midline.   Sensory: Sensory exam to noxious stimuli in right upper and lower extremity.  Motor: Normal tone throughout.   Left upper extremity: 5/5 deltoid, tricep, bicep, interosseous, hand .   Right upper extremity: 1/5 with noxious stimuli  Left lower extremity: 5/5 iliopsoas, knee extension/flexion, foot dorsi/plantarflexion.  Right lower extremity: 1/5 with noxious stimuli        Results Review:    CT Head Without Contrast    Result Date: 9/16/2023  Impression: 1.Stable appearance of multifocal areas of evolving infarct in the posterior circulation with stable areas of petechial and parenchymal hemorrhage. No new  hemorrhage. 2.Mild mass effect due to cerebral edema. No herniation or midline shift. Electronically Signed: Vincent Hinton MD  9/16/2023 4:49 AM EDT  Workstation ID: [id number]    CT Head Without Contrast    Result Date: 9/15/2023  Impression: 1. Further evolution of extensive bilateral posterior circulation ischemic changes with areas of hemorrhagic transformation. No new areas of hemorrhage identified. There has been a slight increase in local mass effect, with further effacement of the paramesencephalic cisterns. Electronically Signed: Sp Cordon MD  9/15/2023 6:18 PM EDT  Workstation ID: [id number]    CT Head Without Contrast    Result Date: 9/14/2023       1.   Redemonstration of acute infarctions involving the left thalamus, bilateral PCA territories, right superior cerebellar artery, bilateral PICA territory distributions. Left bhavesh isaac infarction extending into the middle cerebellar peduncle, slightly more prominent compared to prior CT.      2.   Local mass effect in the posterior fossa with mild effacement of the quadrigeminal cistern on the right side, mildly increased reflecting mild upward transtentorial pressure. No midline shift. Recommend continued follow-up imaging.  3. No new acute large territorial infarction, or hemorrhagic transformation.       Created by: Khris Myrick MD  Signed by: Khris Myrick MD  Signed on: 9/14/2023 4:02 PM  Location: [id number]            MRI Brain Without Contrast    Result Date: 9/15/2023  Impression: Extensive posterior circulation infarcts with hemorrhagic conversion as described. Electronically Signed: Sp Light MD  9/15/2023 6:29 AM EDT  Workstation ID: [id number]    XR Chest 1 View    Result Date: 9/15/2023  Impression: No active disease Electronically Signed: Sp Light MD  9/15/2023 2:01 AM EDT  Workstation ID: [id number]    CT Head Without Contrast Stroke Protocol    Result Date: 9/15/2023  Impression: Multiple areas of low-attenuation as described. Primarily  this involves the posterior circulation including the occipital lobes bilaterally and the cerebellum greater on the right than the left as well as the left aspect of the isaac. Findings may be secondary to infarction from the basilar artery given the multiple left and right vascular territories. Electronically Signed: Sp Light MD  9/15/2023 12:52 AM EDT  Workstation ID: [id number]    XR Abdomen KUB    Result Date: 9/15/2023  Impression: NG tube tip in the gastric fundus. Electronically Signed: Jackie Olson MD  9/15/2023 9:46 AM EDT  Workstation ID: [id number]    XR Abdomen KUB    Result Date: 9/15/2023  Impression: NG tube in the stomach with the sideport at the GE junction. Electronically Signed: Sp Light MD  9/15/2023 2:02 AM EDT  Workstation ID: [id number]       Labs:  Lab Results   Component Value Date    HGBA1C 6.40 (H) 09/15/2023      Lab Results   Component Value Date    CHOL 150 09/15/2023    TRIG 49 09/15/2023    HDL 36 (L) 09/15/2023     (H) 09/15/2023     LIVER FUNCTION TESTS:      Lab 09/15/23  0212   TOTAL PROTEIN 7.2   ALBUMIN 4.2   GLOBULIN 3.0   ALT (SGPT) 27   AST (SGOT) 34   BILIRUBIN 1.2   ALK PHOS 75                 Assessment/Plan     Assessment:    # Bilateral occipital and bilateral cerebellar infarct with hemorrhagic conversion  # Malignant cerebral edema     Plan:  -Repeat CT head 9/17/2023 at 1800 to monitor cerebral edema  -SBP goal 130-160  -Continue aspirin 81 mg daily  -Serum sodium 141  - 3% normal saline rate 40 cc per hour  -Goal serum sodium: 145-155  -Serum sodium twice daily  -Please call neurology with any change in neurologic status        Discharge Planning: Stroke neurology will follow    Jenny Hagan MD  [facility] STROKE NEURO  09/16/23  10:19 EDT

## 2023-09-17 ENCOUNTER — APPOINTMENT (OUTPATIENT)
Dept: CT IMAGING | Facility: HOSPITAL | Age: 73
End: 2023-09-17
Payer: MEDICARE

## 2023-09-17 ENCOUNTER — APPOINTMENT (OUTPATIENT)
Dept: GENERAL RADIOLOGY | Facility: HOSPITAL | Age: 73
End: 2023-09-17
Payer: MEDICARE

## 2023-09-17 LAB
ALBUMIN SERPL-MCNC: 3.8 G/DL (ref 3.5–5.2)
ALBUMIN/GLOB SERPL: 1.3 G/DL
ALP SERPL-CCNC: 71 U/L (ref 39–117)
ALT SERPL W P-5'-P-CCNC: 38 U/L (ref 1–41)
ANION GAP SERPL CALCULATED.3IONS-SCNC: 11 MMOL/L (ref 5–15)
ANION GAP SERPL CALCULATED.3IONS-SCNC: 13 MMOL/L (ref 5–15)
AST SERPL-CCNC: 58 U/L (ref 1–40)
BILIRUB SERPL-MCNC: 0.7 MG/DL (ref 0–1.2)
BUN SERPL-MCNC: 20 MG/DL (ref 8–23)
BUN SERPL-MCNC: 20 MG/DL (ref 8–23)
BUN/CREAT SERPL: 27.8 (ref 7–25)
BUN/CREAT SERPL: 32.3 (ref 7–25)
CALCIUM SPEC-SCNC: 8.8 MG/DL (ref 8.6–10.5)
CALCIUM SPEC-SCNC: 8.9 MG/DL (ref 8.6–10.5)
CHLORIDE SERPL-SCNC: 114 MMOL/L (ref 98–107)
CHLORIDE SERPL-SCNC: 116 MMOL/L (ref 98–107)
CHOLEST SERPL-MCNC: 102 MG/DL (ref 0–200)
CO2 SERPL-SCNC: 20 MMOL/L (ref 22–29)
CO2 SERPL-SCNC: 22 MMOL/L (ref 22–29)
CREAT SERPL-MCNC: 0.62 MG/DL (ref 0.76–1.27)
CREAT SERPL-MCNC: 0.72 MG/DL (ref 0.76–1.27)
DEPRECATED RDW RBC AUTO: 44.6 FL (ref 37–54)
EGFRCR SERPLBLD CKD-EPI 2021: 102.2 ML/MIN/1.73
EGFRCR SERPLBLD CKD-EPI 2021: 97.7 ML/MIN/1.73
ERYTHROCYTE [DISTWIDTH] IN BLOOD BY AUTOMATED COUNT: 13 % (ref 12.3–15.4)
GLOBULIN UR ELPH-MCNC: 3 GM/DL
GLUCOSE BLDC GLUCOMTR-MCNC: 115 MG/DL (ref 70–130)
GLUCOSE BLDC GLUCOMTR-MCNC: 120 MG/DL (ref 70–130)
GLUCOSE BLDC GLUCOMTR-MCNC: 131 MG/DL (ref 70–130)
GLUCOSE BLDC GLUCOMTR-MCNC: 144 MG/DL (ref 70–130)
GLUCOSE SERPL-MCNC: 129 MG/DL (ref 65–99)
GLUCOSE SERPL-MCNC: 145 MG/DL (ref 65–99)
HCT VFR BLD AUTO: 38.6 % (ref 37.5–51)
HGB BLD-MCNC: 13.2 G/DL (ref 13–17.7)
MAGNESIUM SERPL-MCNC: 2.2 MG/DL (ref 1.6–2.4)
MCH RBC QN AUTO: 32 PG (ref 26.6–33)
MCHC RBC AUTO-ENTMCNC: 34.2 G/DL (ref 31.5–35.7)
MCV RBC AUTO: 93.5 FL (ref 79–97)
PHOSPHATE SERPL-MCNC: 1.9 MG/DL (ref 2.5–4.5)
PHOSPHATE SERPL-MCNC: 2 MG/DL (ref 2.5–4.5)
PHOSPHATE SERPL-MCNC: 2.1 MG/DL (ref 2.5–4.5)
PLATELET # BLD AUTO: 263 10*3/MM3 (ref 140–450)
PMV BLD AUTO: 10.3 FL (ref 6–12)
POTASSIUM SERPL-SCNC: 3.5 MMOL/L (ref 3.5–5.2)
POTASSIUM SERPL-SCNC: 3.5 MMOL/L (ref 3.5–5.2)
POTASSIUM SERPL-SCNC: 3.7 MMOL/L (ref 3.5–5.2)
POTASSIUM SERPL-SCNC: 3.8 MMOL/L (ref 3.5–5.2)
POTASSIUM SERPL-SCNC: 4.3 MMOL/L (ref 3.5–5.2)
PROT SERPL-MCNC: 6.8 G/DL (ref 6–8.5)
RBC # BLD AUTO: 4.13 10*6/MM3 (ref 4.14–5.8)
SODIUM SERPL-SCNC: 146 MMOL/L (ref 136–145)
SODIUM SERPL-SCNC: 147 MMOL/L (ref 136–145)
SODIUM SERPL-SCNC: 148 MMOL/L (ref 136–145)
SODIUM SERPL-SCNC: 149 MMOL/L (ref 136–145)
SODIUM SERPL-SCNC: 149 MMOL/L (ref 136–145)
TRIGL SERPL-MCNC: 60 MG/DL (ref 0–150)
WBC NRBC COR # BLD: 10.1 10*3/MM3 (ref 3.4–10.8)

## 2023-09-17 PROCEDURE — 94799 UNLISTED PULMONARY SVC/PX: CPT

## 2023-09-17 PROCEDURE — 99232 SBSQ HOSP IP/OBS MODERATE 35: CPT | Performed by: STUDENT IN AN ORGANIZED HEALTH CARE EDUCATION/TRAINING PROGRAM

## 2023-09-17 PROCEDURE — 84100 ASSAY OF PHOSPHORUS: CPT

## 2023-09-17 PROCEDURE — 94640 AIRWAY INHALATION TREATMENT: CPT

## 2023-09-17 PROCEDURE — 25010000002 HYDRALAZINE PER 20 MG

## 2023-09-17 PROCEDURE — 82948 REAGENT STRIP/BLOOD GLUCOSE: CPT

## 2023-09-17 PROCEDURE — 83735 ASSAY OF MAGNESIUM: CPT

## 2023-09-17 PROCEDURE — 70450 CT HEAD/BRAIN W/O DYE: CPT

## 2023-09-17 PROCEDURE — 84132 ASSAY OF SERUM POTASSIUM: CPT | Performed by: NURSE PRACTITIONER

## 2023-09-17 PROCEDURE — 84295 ASSAY OF SERUM SODIUM: CPT

## 2023-09-17 PROCEDURE — 82465 ASSAY BLD/SERUM CHOLESTEROL: CPT

## 2023-09-17 PROCEDURE — 99291 CRITICAL CARE FIRST HOUR: CPT | Performed by: INTERNAL MEDICINE

## 2023-09-17 PROCEDURE — 25010000002 PIPERACILLIN SOD-TAZOBACTAM PER 1 G: Performed by: NURSE PRACTITIONER

## 2023-09-17 PROCEDURE — 84478 ASSAY OF TRIGLYCERIDES: CPT

## 2023-09-17 PROCEDURE — 84100 ASSAY OF PHOSPHORUS: CPT | Performed by: INTERNAL MEDICINE

## 2023-09-17 PROCEDURE — 25010000002 HYDRALAZINE PER 20 MG: Performed by: NURSE PRACTITIONER

## 2023-09-17 PROCEDURE — 80053 COMPREHEN METABOLIC PANEL: CPT

## 2023-09-17 PROCEDURE — 94664 DEMO&/EVAL PT USE INHALER: CPT

## 2023-09-17 PROCEDURE — 94761 N-INVAS EAR/PLS OXIMETRY MLT: CPT

## 2023-09-17 RX ORDER — HYDRALAZINE HYDROCHLORIDE 20 MG/ML
20 INJECTION INTRAMUSCULAR; INTRAVENOUS ONCE
Status: COMPLETED | OUTPATIENT
Start: 2023-09-17 | End: 2023-09-17

## 2023-09-17 RX ORDER — ACETAMINOPHEN 325 MG/1
650 TABLET ORAL EVERY 6 HOURS PRN
Status: DISCONTINUED | OUTPATIENT
Start: 2023-09-17 | End: 2023-09-20

## 2023-09-17 RX ORDER — POTASSIUM CHLORIDE 1.5 G/1.58G
40 POWDER, FOR SOLUTION ORAL EVERY 4 HOURS
Status: COMPLETED | OUTPATIENT
Start: 2023-09-17 | End: 2023-09-18

## 2023-09-17 RX ORDER — AMLODIPINE BESYLATE 5 MG/1
5 TABLET ORAL
Status: DISCONTINUED | OUTPATIENT
Start: 2023-09-17 | End: 2023-09-18

## 2023-09-17 RX ORDER — CLOPIDOGREL BISULFATE 75 MG/1
75 TABLET ORAL DAILY
Status: DISCONTINUED | OUTPATIENT
Start: 2023-09-17 | End: 2023-09-20

## 2023-09-17 RX ORDER — ACETAMINOPHEN 650 MG/1
650 SUPPOSITORY RECTAL EVERY 4 HOURS PRN
Status: DISCONTINUED | OUTPATIENT
Start: 2023-09-17 | End: 2023-09-20

## 2023-09-17 RX ORDER — HYDRALAZINE HYDROCHLORIDE 20 MG/ML
10 INJECTION INTRAMUSCULAR; INTRAVENOUS EVERY 6 HOURS PRN
Status: DISCONTINUED | OUTPATIENT
Start: 2023-09-17 | End: 2023-09-17

## 2023-09-17 RX ORDER — 3% SODIUM CHLORIDE 3 G/100ML
35 INJECTION, SOLUTION INTRAVENOUS CONTINUOUS
Status: DISPENSED | OUTPATIENT
Start: 2023-09-17 | End: 2023-09-18

## 2023-09-17 RX ADMIN — AMLODIPINE BESYLATE 5 MG: 5 TABLET ORAL at 14:55

## 2023-09-17 RX ADMIN — ACETAMINOPHEN 650 MG: 325 TABLET ORAL at 20:59

## 2023-09-17 RX ADMIN — METOPROLOL TARTRATE 50 MG: 50 TABLET ORAL at 20:59

## 2023-09-17 RX ADMIN — SODIUM CHLORIDE 40 ML/HR: 3 INJECTION, SOLUTION INTRAVENOUS at 03:02

## 2023-09-17 RX ADMIN — IPRATROPIUM BROMIDE AND ALBUTEROL SULFATE 3 ML: 2.5; .5 SOLUTION RESPIRATORY (INHALATION) at 07:36

## 2023-09-17 RX ADMIN — METOPROLOL TARTRATE 50 MG: 50 TABLET ORAL at 08:05

## 2023-09-17 RX ADMIN — PIPERACILLIN SODIUM AND TAZOBACTAM SODIUM 3.38 G: 3; .375 INJECTION, SOLUTION INTRAVENOUS at 13:35

## 2023-09-17 RX ADMIN — HYDRALAZINE HYDROCHLORIDE 10 MG: 20 INJECTION INTRAMUSCULAR; INTRAVENOUS at 10:07

## 2023-09-17 RX ADMIN — POTASSIUM & SODIUM PHOSPHATES POWDER PACK 280-160-250 MG 2 PACKET: 280-160-250 PACK at 19:16

## 2023-09-17 RX ADMIN — PIPERACILLIN SODIUM AND TAZOBACTAM SODIUM 3.38 G: 3; .375 INJECTION, SOLUTION INTRAVENOUS at 21:00

## 2023-09-17 RX ADMIN — IPRATROPIUM BROMIDE AND ALBUTEROL SULFATE 3 ML: 2.5; .5 SOLUTION RESPIRATORY (INHALATION) at 16:51

## 2023-09-17 RX ADMIN — HYDRALAZINE HYDROCHLORIDE 20 MG: 20 INJECTION INTRAMUSCULAR; INTRAVENOUS at 06:41

## 2023-09-17 RX ADMIN — POTASSIUM CHLORIDE 40 MEQ: 1.5 POWDER, FOR SOLUTION ORAL at 19:16

## 2023-09-17 RX ADMIN — NICARDIPINE HYDROCHLORIDE 5 MG/HR: 25 INJECTION, SOLUTION INTRAVENOUS at 21:38

## 2023-09-17 RX ADMIN — Medication 10 ML: at 08:05

## 2023-09-17 RX ADMIN — NICARDIPINE HYDROCHLORIDE 5 MG/HR: 25 INJECTION, SOLUTION INTRAVENOUS at 16:16

## 2023-09-17 RX ADMIN — IPRATROPIUM BROMIDE AND ALBUTEROL SULFATE 3 ML: 2.5; .5 SOLUTION RESPIRATORY (INHALATION) at 13:16

## 2023-09-17 RX ADMIN — CLOPIDOGREL BISULFATE 75 MG: 75 TABLET ORAL at 13:35

## 2023-09-17 RX ADMIN — IPRATROPIUM BROMIDE AND ALBUTEROL SULFATE 3 ML: 2.5; .5 SOLUTION RESPIRATORY (INHALATION) at 18:59

## 2023-09-17 RX ADMIN — POTASSIUM & SODIUM PHOSPHATES POWDER PACK 280-160-250 MG 2 PACKET: 280-160-250 PACK at 09:59

## 2023-09-17 RX ADMIN — ASPIRIN 81 MG: 81 TABLET, COATED ORAL at 08:05

## 2023-09-17 RX ADMIN — PIPERACILLIN SODIUM AND TAZOBACTAM SODIUM 3.38 G: 3; .375 INJECTION, SOLUTION INTRAVENOUS at 05:36

## 2023-09-17 RX ADMIN — PANTOPRAZOLE SODIUM 40 MG: 40 INJECTION, POWDER, LYOPHILIZED, FOR SOLUTION INTRAVENOUS at 05:35

## 2023-09-17 RX ADMIN — ATORVASTATIN CALCIUM 80 MG: 40 TABLET, FILM COATED ORAL at 20:59

## 2023-09-17 NOTE — PROGRESS NOTES
"INTENSIVIST   PROGRESS NOTE     Hospital:  LOS: 2 days     Chief Complaint: CVA    Subjective   S     Interval History: Patient intermittently febrile with increasing, productive sputum.  Have initiated Zosyn since prior documentation with improvement in temperature curve.  Mental status waxing/waning.  No family at bedside.  Bedside nursing notes that Cardene was successfully discontinued yesterday evening and he required a single dose of hydralazine to meet blood pressure parameters.    The patient's relevant past medical, surgical and social history were reviewed and updated in Epic as appropriate.      ROS: Difficult to obtain due to encephalopathy    Objective   O     Intake/Ouptut 24 hrs (7:00AM - 6:59 AM)  Intake & Output (last 3 days)         09/14 0701  09/15 0700 09/15 0701 09/16 0700 09/16 0701 09/17 0700 09/17 0701 09/18 0700    P.O.  0      I.V. (mL/kg) 190.3 (2.4) 2959.8 (37.4) 1509.4 (18.3)     NG/ 150 700     Total Intake(mL/kg) 340.3 (4.3) 3109.8 (39.3) 2209.4 (26.8)     Urine (mL/kg/hr) 0 1500 (0.8) 3775 (1.9)     Emesis/NG output 150  0     Stool 0 0 0     Total Output 150 1500 3775     Net +190.3 +1609.8 -1565.6             Urine Unmeasured Occurrence 1 x 2 x 2 x     Stool Unmeasured Occurrence 1 x 2 x 2 x     Emesis Unmeasured Occurrence 1 x             Medications (drips):  dextrose  niCARdipine, Last Rate: Stopped (09/16/23 1045)  sodium chloride, Last Rate: 40 mL/hr (09/17/23 0302)    Respiratory Support: Nasal cannula    Physical Examination:  Vital Signs: Blood pressure 150/84, pulse 113, temperature 97.8 °F (36.6 °C), temperature source Oral, resp. rate 24, height 175.3 cm (69\"), weight 82.5 kg (181 lb 14.1 oz), SpO2 98 %.    General: The patient appears in no acute distress. Alert, more interactive than prior assessment  ENT/Neck: Trachea midline, No masses. MMM.   Chest: Clear to auscultation bilaterally, No wheezing, rhonchi, or rales. Normal work of breathing. Equal chest " rise.  On supplemental oxygen with appropriate saturations  Cardiac: Tachycardic. S1S2 auscultated. No murmurs, rubs or gallops.   Extremities: No lower extremity edema. No clubbing or cyanosis.  Skin: No rashes, open wounds, or bruising. Warm, dry, well-perfused.  Neuro: Speech less understandable than prior exam though still answering questions with single words.  Continues to follow commands.  Moves left lower extremity with global weakness.  Does not move right lower extremity this morning on request.  Strength 2+/5 in left upper extremity.  Again, does not move right upper extremity.    Lines, Drains & Airways       Active LDAs       Name Placement date Placement time Site Days    Peripheral IV 09/15/23 0040 Anterior;Distal;Right;Upper Arm 09/15/23  0040  Arm  1    Peripheral IV 09/15/23 1000 Posterior;Right Hand 09/15/23  1000  Hand  less than 1    NG/OG Tube Nasogastric Right nostril 09/15/23  0030  Right nostril  1    External Urinary Catheter 09/15/23  0100  --  1        Results from last 7 days   Lab Units 09/16/23  2354 09/16/23  0607   WBC 10*3/mm3 10.10 9.25   HEMOGLOBIN g/dL 13.2 12.9*   MCV fL 93.5 92.2   PLATELETS 10*3/mm3 263 254     Results from last 7 days   Lab Units 09/17/23  0536 09/16/23  2354 09/16/23  1746 09/16/23  1157 09/16/23  0607 09/15/23  1744 09/15/23  1138 09/15/23  0212   SODIUM mmol/L 146* 147* 145   < > 141   < >  --  141   POTASSIUM mmol/L 3.8 4.3 3.6   < > 3.7  --  4.4 3.2*   CO2 mmol/L  --  20.0*  --   --  19.0*  --   --  23.0   CREATININE mg/dL  --  0.62*  --   --  0.71*  --   --  0.76   GLUCOSE mg/dL  --  129*  --   --  141*  --   --  138*   MAGNESIUM mg/dL  --  2.2  --   --  2.3  --   --  2.0   PHOSPHORUS mg/dL  --   --   --   --   --   --  2.5 2.2*    < > = values in this interval not displayed.     Estimated Creatinine Clearance: 127.5 mL/min (A) (by C-G formula based on SCr of 0.62 mg/dL (L)).  Results from last 7 days   Lab Units 09/15/23  0212   ALK PHOS U/L 75    BILIRUBIN mg/dL 1.2   ALT (SGPT) U/L 27   AST (SGOT) U/L 34     Radiology (9/16/2023):   Radiology Impression:   1.Stable appearance of multifocal areas of evolving infarct in the posterior circulation with stable areas of petechial and parenchymal hemorrhage. No new hemorrhage.  2.Mild mass effect due to cerebral edema. No herniation or midline shift.    Results: Reviewed.  - I reviewed the patient's new laboratory and imaging results.  - I independently reviewed the patient's new images.    Medications: Reviewed.    Assessment & Plan    A / P     Active Hospital Problems    Diagnosis     **Hemorrhagic CVA     Multiple bilateral CVAs     HFpEF     T2DM (type 2 diabetes mellitus)     HTN (hypertension)     GERD (gastroesophageal reflux disease)     ICH (intracerebral hemorrhage)     Dyslipidemia      Patient Behzad is a 72M with a history of previous tobacco use, HTN, T2DM, HLD and GERD who presented to [facility] on 9/10/23 and was found to have multiple acute ischemic infarcts of the bilateral cerebral and cerebellar hemispheres as well as the left side of the isaac. He was outside the window for thrombolytics.      At the OSH, he underwent both a TTE and JOSIAS which were negative for PFO.      On 9/13/23, he had further decline in his mental status with a new inability to move his right leg and CT head was repeated and revealed evolution of the existing CVA with new development of petechial hemorrhage. DAPT was held for 24 hrs per Neurology recommendations and repeat CTH obtained the following evening demonstrated worsening effacement of the right quadrigeminal cistern with upward supratentorial shift. Due to his further worsening with need for Neurosurgery evaluation our Stroke Navigator was contacted & patient was airlifted to [facility] for further management.       He arrived to the Neuro ICU on the morning of 9/15/23.    - Patient with bilateral, occipital lobe and left thalamic infarcts upon  arrival.  Most recent imaging (9/16) with stable appearing/evolving strokes mild mass effect due to cerebral edema but no herniation or midline shift.  We will keep patient in the ICU and continue neuro checks per stroke protocol  - Initiated hypertonic saline overnight (9/15-16) chiefly for ongoing cerebral edema; Goals per stroke/neurology  - Continue holding DAPT  - Off Cardene for blood pressure control as of 9/16 PM; Goal systolic less than 140. Uptitrated metoprolol on 9/16 (50 mg twice daily)  - ECHO performed at OSH prior to arrival; No PFO as above  - Imaging per neurology/stroke; CT on 9/17 AM performed but not read yet   - PT/OT/speech following  - Patient has continued to be intermittently febrile over the last 24 hours (Tmax 101.6).  Initially favored noninfectious etiology and blood cultures obtained and no growth to date. UA performed on 9/15 not consistent with UTI.  Increased, purulent secretions, labored breathing and in setting of ongoing fever initiated Zosyn overnight on 9/16 PM     F-Tube feeds per dietary recs  A-NA  S-NA  T-SCDs  H-Head of bed greater than 30 degrees  U-PPI  G-FSBS per unit protocol, correction dose insulin  S-NA  B-Will monitor daily and provide regimen if indicated  I-PIV  D-Zosyn     Advance Directives:   Code Status and Medical Interventions:   Ordered at: 09/15/23 0133     Code Status (Patient has no pulse and is not breathing):    CPR (Attempt to Resuscitate)     Medical Interventions (Patient has pulse or is breathing):    Full Support     High level of risk due to severe exacerbation of chronic illness and illness with threat to life or bodily function.    I conducted multidisciplinary rounds in the plan of care was discussed with the multidisciplinary team at that time. In attendance at multidisciplinary rounds was clinical pharmacist, dietitian, nursing staff and case management.    I discussed the patient's findings and my recommendations with patient, nursing  staff, and consulting provider    Critical Care Time: Critical Care time spent in direct patient care: 30 minutes (excluding procedure time, if applicable) including high complexity decision making to assess, manipulate, and support vital organ system failure in this individual who has impairment of one or more vital organ systems such that there is a high probability of imminent or life threatening deterioration in the patient’s condition.     -- Durga Davis MD  Pulmonary/Critical Care

## 2023-09-17 NOTE — PROGRESS NOTES
Brief EN Review Note    Patient Name: Kenneth Wen  Date of Encounter: 09/17/23 09:02 EDT  MRN: [id number]  Admission date: 9/15/2023    Reason for visit: EN review . RD to continue to follow per protocol.     EMR reviewed   Medication reviewed  Labs reviewed Sodium 146    Additional Information Obtained: RN called RD requesting EN orders per intensivist. RD placed order, recommendations below. Pt receiving hypertonic NS infusion. Electrolytes ordered.     Current diet: NPO Diet NPO Type: Strict NPO      Intervention:  Follow treatment plan  Care plan reviewed    Initiate continuous EN regimen of Impact Peptide 15 ml/hr advance w tolerance by 10 ml/hr ev 6 hr to goal 55 ml/hr Water at 15 ml/hr.     At goal regimen provides: 1100 ml for 1650 Kcal 103% est need, 103 g % est need, 0 fiber, 847 ml Water in EN 1147 total ml Free Water     Follow up:   Per protocol      Heidi Betancourt RD, [facility]  09:02 EDT  Time: 15min

## 2023-09-17 NOTE — PROGRESS NOTES
"Stroke Progress Note       Chief Complaint: Bilateral occipital and bilateral parietal infarct with hemorrhagic conversion    Subjective     Subjective:  Kenneth Wen was seen and examined at bedside alone.  The patient is alert and follows commands.  Oriented to person only today.  The patient denied headaches this morning.  He did not have any questions.  I updated the patient's wife, son, and brother in the neuro ICU conference room 9/16/2023 where we reviewed all images and discussed plan of care.  They reported that the patient's onset of symptoms was 9/10/2023 indicating that the patient is near the end of his swell window.  All questions and concerns were answered.      Objective      Temp:  [97.8 °F (36.6 °C)-102 °F (38.9 °C)] 99 °F (37.2 °C)  Heart Rate:  [] 102  Resp:  [18-28] 20  BP: (115-168)/() 143/78            Objective    Physical Exam:  General Appearance: Alert  Eyes: Anicteric sclera  HEENT: no scleral injection.  NG tube in place.  Lungs: respirations appear comfortable, no obvious increased work of breathing  Extremities: No cyanosis or fingernail clubbing   Skin: No rashes in exposed skin areas     Neurological Examination:   Mental status: Alert and oriented to person only.  Not oriented to place, time, or situation.  Hypophonic speech with moderate dysarthria.  Able to name and repeat.   Cranial Nerves: Visual fields intact.  Left gaze deviation but able to cross midline.  No right blink to threat.  Facial sensation intact.  Right facial droop.  Hearing grossly intact. Palate movement is symmetric.  No right shoulder shrug.  Tongue protrudes midline.   Sensory: Sensory exam grimaces and says \"ouch\" to noxious stimuli in right upper and lower extremity.  Motor: Normal tone throughout.   Left upper extremity: 5/5 deltoid, tricep, bicep, interosseous, hand .   Right upper extremity: 1/5 with noxious stimuli  Left lower extremity: 5/5 iliopsoas, knee extension/flexion, foot " dorsi/plantarflexion.  Right lower extremity: 1/5 with noxious stimuli        Results Review:      CT head 9/10/2023.  Impression: No CT evidence of acute intracranial process; specifically, no mass/mass effect, hemorrhage, or acute infarction by CT. Mild chronic small vessel ischemic changes noted. CT offers limited sensitivity to the detection of acute thrombotic infarct, and if clinically indicated, MRI may be beneficial.    CT angiogram head and neck 9/10/2023.  Impression: 1. No hemodynamically significant intracranial arterial stenosis. 2. 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 Please see above comments for full details.     MRI brain without contrast 9/11/2023.  Impression:  Numerous acute ischemic infarcts of the bilateral cerebral and cerebellar hemispheres and left side of the isaac. Mild chronic ischemic deep cerebral white matter disease.     CT head 9/13/2023.  Impression:   1. Evolving, bilateral occipital lobe and left thalamic infarcts. Subtle increased attenuation within the occipital lobe infarcts concerning for petechial hemorrhage. No julia parenchymal hemorrhage.  2. Moderate-sized, bilateral cerebellar infarcts, right greater than left.     EEG 9/13/2023.  Impression: This is an abnormal EEG.  The presence of mild generalized slowing is consistent with mild diffuse cortical dysfunction and mild nonspecific encephalopathy.  There were no epileptiform discharges seen.  No seizures were seen.      CT head without contrast 9/14/2023.  Impression: 1. Redemonstration of acute infarctions involving the left thalamus, bilateral PCA territories, right superior cerebellar artery, bilateral PICA territory distributions. Left bhavesh isaac infarction extending into the middle cerebellar peduncle, slightly more prominent compared to prior CT. 2. Local mass effect in the posterior fossa with mild effacement of the quadrigeminal cistern on the right side, mildly increased  reflecting mild upward transtentorial pressure. No midline shift. Recommend continued follow-up imaging. 3. No new acute large territorial infarction, or hemorrhagic transformation.     CT head 9/15/2023.  Impression: Multiple areas of low-attenuation as described. Primarily this involves the posterior circulation including the occipital lobes bilaterally and the cerebellum greater on the right than the left as well as the left aspect of the isaac. Findings may be secondary to infarction from the basilar artery given the multiple left and right vascular territories.    MRI brain 9/15/2023.  Impression: Extensive posterior circulation infarcts with hemorrhagic conversion as described.    CT head 9/16/2023.  Impression: 1. Stable appearance of multifocal areas of evolving infarct in the posterior circulation with stable areas of petechial and parenchymal hemorrhage. No new hemorrhage. 2. Mild mass effect due to cerebral edema. No herniation or midline shift.    CT head 9/17/2023.  Impression: Stable appearance of evolving multifocal areas of infarct in the posterior circulation distribution more accentuated on the right. Stable areas of evolving parenchymal hemorrhage. No new areas of hemorrhage. Stable mild diffuse mass effect due to cerebral edema. No midline shift or herniation.    Transthoracic echocardiogram 9/11/2023 left ventricular ejection fraction 60-65%, grade 1 diastolic dysfunction, normal left atrial size, no thrombus mentioned, no PFO mentioned.    EKG 9/15/2023 sinus rhythm with frequent premature ventricular complexes.      Labs:  Lab Results   Component Value Date    HGBA1C 6.40 (H) 09/15/2023      Lab Results   Component Value Date    CHOL 150 09/15/2023    TRIG 49 09/15/2023    HDL 36 (L) 09/15/2023     (H) 09/15/2023     LIVER FUNCTION TESTS:      Lab 09/15/23  0212   TOTAL PROTEIN 7.2   ALBUMIN 4.2   GLOBULIN 3.0   ALT (SGPT) 27   AST (SGOT) 34   BILIRUBIN 1.2   ALK PHOS 75                  Assessment/Plan     Assessment:    Kenneth Wen is a 72-year-old male with a past medical history of hypertension, prediabetes (hemoglobin A1c 6.4%), and hyperlipidemia who presented to [facility] on 9/10/2023 for encephalopathy and dysarthria.  CT head 9/10/2023 normal.  CT angiogram head and neck 9/10/2023 showed left vertebral artery occlusion.  MRI brain 9/11/2023 showed bilateral cerebral, bilateral cerebellar, and left pontine infarcts.  On 9/13/2023, the patient's encephalopathy worsened and repeat CT head 9/14/2023 showed local mass effect with mild effacement of the quadrangle cistern on the right side reflecting transtentorial pressure.  EEG 9/13/2023 was abnormal due to slowing but no seizure activity.  On 9/15/2023 the patient was transferred to [facility] for higher level of care.  Repeat MRI 9/15/2023 showed hemorrhagic conversion of bilateral occipital infarcts.  Dual antiplatelet therapy was held.  CT head 9/16/2023 showed stable petechial hemorrhage but mass effect due to cerebral edema.  The patient received 2 boluses of 3% normal saline but his sodium was not at goal.  Therefore, 3% normal saline drip was started 9/16/2023.  Aspirin 81 mg daily was restarted 9/16/2023 due to stable petechial hemorrhage.  CT head 9/17/2023 showed stable petechial hemorrhage and stable cerebral edema.  Clopidogrel 75 mg daily was restarted 9/17/2023.  Per report, at outside hospital TTE and JOSIAS negative for PFO (I was unable to personally see the results of the JOSIAS).    #Extensive posterior circulation strokes-bilateral occipital, bilateral cerebellum, and left isaac  Suspect atheroembolic etiology but cannot rule out cardioembolic in the setting of stroke in multiple vascular territories.  #Hemorrhagic conversion of bilateral occipital infarcts per 9/15/2023 MRI  #Malignant cerebral edema per 9/16/2023 CT head  #Intracranial atherosclerotic disease     -Speech therapy recommendation:  Medications via alternative route  -I suspect patient will need a PEG tube which per his family would be in line with his goals  -Continue aspirin 81 mg daily indefinitely.  Route: NG tube, restarted 9/16/2023  -Continue clopidogrel 75 mg daily for intracranial atherosclerotic disease x3 months (start 9/17/2023 end 12/17/2023).  Route: NG tube  -Continue atorvastatin 80 mg daily ()  -Monitor for increase petechial hemorrhage  -Please call stroke neurology for any change in neurologic status or severe headache  -3% normal saline for malignant cerebral edema  -Goal serum sodium: 145-155  -Serum sodium every 6 hours  -Repeat CT head 9/19/2023 determine duration of 3% normal saline  -Blood pressure parameters: -160 post petechial hemorrhage  -Neurochecks every 2 hours-monitor for atrial fibrillation on telemetry  -Holter monitor prior to discharge to monitor for atrial fibrillation  -Family updated 9/16/2023 (patient wife, son, and brother).  MRI and plan of care reviewed.  Patient would want full care and PEG tube in line with his wishes.        Discharge Planning: Stroke neurology will follow    Jenny Hagan MD  [facility] STROKE NEURO  09/17/23  12:07 EDT

## 2023-09-17 NOTE — PLAN OF CARE
Goal Outcome Evaluation:    NIHSS remains 19. No change on shift. Patient can be restless at times.    Tube feed initiated. Increase by 10 ml/hr every 6 hours starting at 0000. Goal 55.      Cardene currently infusing to keep SBP < 160.     Phosphorous 2.1. Replaced. Redraw pending.

## 2023-09-18 PROBLEM — R13.12 OROPHARYNGEAL DYSPHAGIA: Status: ACTIVE | Noted: 2023-01-01

## 2023-09-18 LAB
ANION GAP SERPL CALCULATED.3IONS-SCNC: 12 MMOL/L (ref 5–15)
BUN SERPL-MCNC: 24 MG/DL (ref 8–23)
BUN/CREAT SERPL: 31.6 (ref 7–25)
CALCIUM SPEC-SCNC: 8.7 MG/DL (ref 8.6–10.5)
CHLORIDE SERPL-SCNC: 120 MMOL/L (ref 98–107)
CO2 SERPL-SCNC: 19 MMOL/L (ref 22–29)
CREAT SERPL-MCNC: 0.76 MG/DL (ref 0.76–1.27)
DEPRECATED RDW RBC AUTO: 46 FL (ref 37–54)
EGFRCR SERPLBLD CKD-EPI 2021: 96.1 ML/MIN/1.73
ERYTHROCYTE [DISTWIDTH] IN BLOOD BY AUTOMATED COUNT: 13.3 % (ref 12.3–15.4)
GLUCOSE BLDC GLUCOMTR-MCNC: 134 MG/DL (ref 70–130)
GLUCOSE BLDC GLUCOMTR-MCNC: 134 MG/DL (ref 70–130)
GLUCOSE BLDC GLUCOMTR-MCNC: 135 MG/DL (ref 70–130)
GLUCOSE BLDC GLUCOMTR-MCNC: 140 MG/DL (ref 70–130)
GLUCOSE SERPL-MCNC: 157 MG/DL (ref 65–99)
HCT VFR BLD AUTO: 41.3 % (ref 37.5–51)
HGB BLD-MCNC: 13.9 G/DL (ref 13–17.7)
MAGNESIUM SERPL-MCNC: 2.4 MG/DL (ref 1.6–2.4)
MCH RBC QN AUTO: 31.4 PG (ref 26.6–33)
MCHC RBC AUTO-ENTMCNC: 33.7 G/DL (ref 31.5–35.7)
MCV RBC AUTO: 93.4 FL (ref 79–97)
PHOSPHATE SERPL-MCNC: 1.9 MG/DL (ref 2.5–4.5)
PHOSPHATE SERPL-MCNC: 1.9 MG/DL (ref 2.5–4.5)
PHOSPHATE SERPL-MCNC: 2.9 MG/DL (ref 2.5–4.5)
PLATELET # BLD AUTO: 277 10*3/MM3 (ref 140–450)
PMV BLD AUTO: 10.3 FL (ref 6–12)
POTASSIUM SERPL-SCNC: 3.4 MMOL/L (ref 3.5–5.2)
POTASSIUM SERPL-SCNC: 3.8 MMOL/L (ref 3.5–5.2)
POTASSIUM SERPL-SCNC: 3.8 MMOL/L (ref 3.5–5.2)
POTASSIUM SERPL-SCNC: 4.2 MMOL/L (ref 3.5–5.2)
POTASSIUM SERPL-SCNC: 4.3 MMOL/L (ref 3.5–5.2)
RBC # BLD AUTO: 4.42 10*6/MM3 (ref 4.14–5.8)
SODIUM SERPL-SCNC: 149 MMOL/L (ref 136–145)
SODIUM SERPL-SCNC: 151 MMOL/L (ref 136–145)
SODIUM SERPL-SCNC: 152 MMOL/L (ref 136–145)
SODIUM SERPL-SCNC: 152 MMOL/L (ref 136–145)
WBC NRBC COR # BLD: 11.07 10*3/MM3 (ref 3.4–10.8)

## 2023-09-18 PROCEDURE — 92507 TX SP LANG VOICE COMM INDIV: CPT

## 2023-09-18 PROCEDURE — 84132 ASSAY OF SERUM POTASSIUM: CPT | Performed by: NURSE PRACTITIONER

## 2023-09-18 PROCEDURE — 80048 BASIC METABOLIC PNL TOTAL CA: CPT | Performed by: INTERNAL MEDICINE

## 2023-09-18 PROCEDURE — 97535 SELF CARE MNGMENT TRAINING: CPT

## 2023-09-18 PROCEDURE — 94799 UNLISTED PULMONARY SVC/PX: CPT

## 2023-09-18 PROCEDURE — 85027 COMPLETE CBC AUTOMATED: CPT | Performed by: INTERNAL MEDICINE

## 2023-09-18 PROCEDURE — 82948 REAGENT STRIP/BLOOD GLUCOSE: CPT

## 2023-09-18 PROCEDURE — 97530 THERAPEUTIC ACTIVITIES: CPT

## 2023-09-18 PROCEDURE — 84295 ASSAY OF SERUM SODIUM: CPT

## 2023-09-18 PROCEDURE — 94761 N-INVAS EAR/PLS OXIMETRY MLT: CPT

## 2023-09-18 PROCEDURE — 84100 ASSAY OF PHOSPHORUS: CPT | Performed by: INTERNAL MEDICINE

## 2023-09-18 PROCEDURE — 97110 THERAPEUTIC EXERCISES: CPT

## 2023-09-18 PROCEDURE — 84100 ASSAY OF PHOSPHORUS: CPT | Performed by: NURSE PRACTITIONER

## 2023-09-18 PROCEDURE — 25010000002 PIPERACILLIN SOD-TAZOBACTAM PER 1 G: Performed by: NURSE PRACTITIONER

## 2023-09-18 PROCEDURE — 99291 CRITICAL CARE FIRST HOUR: CPT | Performed by: INTERNAL MEDICINE

## 2023-09-18 PROCEDURE — 99232 SBSQ HOSP IP/OBS MODERATE 35: CPT | Performed by: STUDENT IN AN ORGANIZED HEALTH CARE EDUCATION/TRAINING PROGRAM

## 2023-09-18 PROCEDURE — 84132 ASSAY OF SERUM POTASSIUM: CPT | Performed by: INTERNAL MEDICINE

## 2023-09-18 PROCEDURE — 92526 ORAL FUNCTION THERAPY: CPT

## 2023-09-18 PROCEDURE — 94664 DEMO&/EVAL PT USE INHALER: CPT

## 2023-09-18 PROCEDURE — 83735 ASSAY OF MAGNESIUM: CPT | Performed by: INTERNAL MEDICINE

## 2023-09-18 RX ORDER — AMLODIPINE BESYLATE 5 MG/1
5 TABLET ORAL
Status: DISCONTINUED | OUTPATIENT
Start: 2023-09-18 | End: 2023-09-20

## 2023-09-18 RX ORDER — 3% SODIUM CHLORIDE 3 G/100ML
15 INJECTION, SOLUTION INTRAVENOUS CONTINUOUS
Status: DISCONTINUED | OUTPATIENT
Start: 2023-09-18 | End: 2023-09-19 | Stop reason: SDUPTHER

## 2023-09-18 RX ORDER — POTASSIUM CHLORIDE 1.5 G/1.58G
40 POWDER, FOR SOLUTION ORAL EVERY 4 HOURS
Status: COMPLETED | OUTPATIENT
Start: 2023-09-18 | End: 2023-09-18

## 2023-09-18 RX ADMIN — NICARDIPINE HYDROCHLORIDE 5 MG/HR: 25 INJECTION, SOLUTION INTRAVENOUS at 02:38

## 2023-09-18 RX ADMIN — POTASSIUM CHLORIDE 40 MEQ: 1.5 POWDER, FOR SOLUTION ORAL at 12:10

## 2023-09-18 RX ADMIN — AMLODIPINE BESYLATE 5 MG: 5 TABLET ORAL at 09:27

## 2023-09-18 RX ADMIN — METOPROLOL TARTRATE 50 MG: 50 TABLET ORAL at 09:27

## 2023-09-18 RX ADMIN — Medication 10 ML: at 09:20

## 2023-09-18 RX ADMIN — ATORVASTATIN CALCIUM 80 MG: 40 TABLET, FILM COATED ORAL at 20:11

## 2023-09-18 RX ADMIN — Medication 10 ML: at 09:21

## 2023-09-18 RX ADMIN — CLOPIDOGREL BISULFATE 75 MG: 75 TABLET ORAL at 09:27

## 2023-09-18 RX ADMIN — PIPERACILLIN SODIUM AND TAZOBACTAM SODIUM 3.38 G: 3; .375 INJECTION, SOLUTION INTRAVENOUS at 22:03

## 2023-09-18 RX ADMIN — METOPROLOL TARTRATE 50 MG: 50 TABLET ORAL at 20:11

## 2023-09-18 RX ADMIN — POTASSIUM CHLORIDE 40 MEQ: 1.5 POWDER, FOR SOLUTION ORAL at 00:07

## 2023-09-18 RX ADMIN — Medication 10 ML: at 20:12

## 2023-09-18 RX ADMIN — IPRATROPIUM BROMIDE AND ALBUTEROL SULFATE 3 ML: 2.5; .5 SOLUTION RESPIRATORY (INHALATION) at 07:38

## 2023-09-18 RX ADMIN — IPRATROPIUM BROMIDE AND ALBUTEROL SULFATE 3 ML: 2.5; .5 SOLUTION RESPIRATORY (INHALATION) at 11:47

## 2023-09-18 RX ADMIN — Medication 10 ML: at 20:19

## 2023-09-18 RX ADMIN — PIPERACILLIN SODIUM AND TAZOBACTAM SODIUM 3.38 G: 3; .375 INJECTION, SOLUTION INTRAVENOUS at 05:22

## 2023-09-18 RX ADMIN — SODIUM CHLORIDE 30 ML/HR: 3 INJECTION, SOLUTION INTRAVENOUS at 09:18

## 2023-09-18 RX ADMIN — IPRATROPIUM BROMIDE AND ALBUTEROL SULFATE 3 ML: 2.5; .5 SOLUTION RESPIRATORY (INHALATION) at 16:06

## 2023-09-18 RX ADMIN — PANTOPRAZOLE SODIUM 40 MG: 40 INJECTION, POWDER, LYOPHILIZED, FOR SOLUTION INTRAVENOUS at 05:22

## 2023-09-18 RX ADMIN — POTASSIUM CHLORIDE 40 MEQ: 1.5 POWDER, FOR SOLUTION ORAL at 07:09

## 2023-09-18 RX ADMIN — ASPIRIN 81 MG: 81 TABLET, COATED ORAL at 09:27

## 2023-09-18 RX ADMIN — POTASSIUM PHOSPHATE, MONOBASIC POTASSIUM PHOSPHATE, DIBASIC 15 MMOL: 224; 236 INJECTION, SOLUTION, CONCENTRATE INTRAVENOUS at 05:19

## 2023-09-18 RX ADMIN — PIPERACILLIN SODIUM AND TAZOBACTAM SODIUM 3.38 G: 3; .375 INJECTION, SOLUTION INTRAVENOUS at 15:20

## 2023-09-18 NOTE — THERAPY TREATMENT NOTE
Patient Name: Kenneth Wen  : 1950    MRN: [id number]                              Today's Date: 2023       Admit Date: 9/15/2023    Visit Dx:     ICD-10-CM ICD-9-CM   1. Aphasia  R47.01 784.3   2. Dysarthria  R47.1 784.51   3. Oropharyngeal dysphagia  R13.12 787.22     Patient Active Problem List   Diagnosis    Precordial pain    Elevated BP without diagnosis of hypertension    Dyslipidemia    Hyperglycemia    Multiple bilateral CVAs    Hemorrhagic CVA    HFpEF    T2DM (type 2 diabetes mellitus)    HTN (hypertension)    GERD (gastroesophageal reflux disease)    ICH (intracerebral hemorrhage)    Oropharyngeal dysphagia     Past Medical History:   Diagnosis Date    Acid reflux     Cancer     Skin    Diabetes mellitus     Prediabetes    GERD (gastroesophageal reflux disease) 09/15/2023    HTN (hypertension) 09/15/2023    Kidney disease     T2DM (type 2 diabetes mellitus) 09/15/2023     Past Surgical History:   Procedure Laterality Date    APPENDECTOMY      HEMORRHOIDECTOMY      NASAL POLYP SURGERY        General Information       Row Name 23 1607          Physical Therapy Time and Intention    Document Type therapy note (daily note)  -KG     Mode of Treatment physical therapy  -KG       Row Name 23 1607          General Information    Existing Precautions/Restrictions fall;oxygen therapy device and L/min;other (see comments)  NG; R sided weakness/increased tone; visual deficits; confusion; poor command following  -KG     Barriers to Rehab medically complex;cognitive status;visual deficit  -KG       Row Name 23 1607          Cognition    Orientation Status (Cognition) oriented to;person  -KG       Row Name 23 1607          Safety Issues, Functional Mobility    Safety Issues Affecting Function (Mobility) awareness of need for assistance;insight into deficits/self-awareness;safety precaution awareness;safety precautions follow-through/compliance;sequencing abilities  -KG      Impairments Affecting Function (Mobility) balance;cognition;coordination;endurance/activity tolerance;muscle tone abnormal;postural/trunk control;range of motion (ROM);sensation/sensory awareness;strength;visual/perceptual  -KG     Cognitive Impairments, Mobility Safety/Performance attention;awareness, need for assistance;insight into deficits/self-awareness;safety precaution awareness;safety precaution follow-through;sequencing abilities  -KG               User Key  (r) = Recorded By, (t) = Taken By, (c) = Cosigned By      Initials Name Provider Type    Mackenzie Martinez PT Physical Therapist                   Mobility       Row Name 09/18/23 1608          Bed Mobility    Comment, (Bed Mobility) Pt seated EOB upon arrival; UIC at end of treatment  -KG       Row Name 09/18/23 1608          Transfers    Comment, (Transfers) STS x2 during treatment; 1x from EOB; 1x from chair. SPT from bed to chair with maxA x2 and blocking of malou knees. Upon standing from chair, pt required physical assistance to weight shift L to R to take steps forward. Pt unable to weight shift to the R to advance L LE.  -KG       Row Name 09/18/23 1608          Bed-Chair Transfer    Bed-Chair [place] (Transfers) maximum assist (25% patient effort);2 person assist;verbal cues  -KG     Assistive Device (Bed-Chair Transfers) other (see comments)  B UE support  -KG       Row Name 09/18/23 1608          Sit-Stand Transfer    Sit-Stand [place] (Transfers) maximum assist (25% patient effort);2 person assist;verbal cues  -KG     Assistive Device (Sit-Stand Transfers) other (see comments)  B UE support  -KG       Row Name 09/18/23 1608          Gait/Stairs (Locomotion)    [place] Level (Gait) unable to assess  -KG     Comment, (Gait/Stairs) Ambulation deferred. Not appropriate to assess.  -KG               User Key  (r) = Recorded By, (t) = Taken By, (c) = Cosigned By      Initials Name Provider Type    Mackenzie Martinez PT  Physical Therapist                   Obj/Interventions       Row Name 09/18/23 1610          Motor Skills    Therapeutic Exercise hip;knee;ankle  -KG       Row Name 09/18/23 1610          Hip (Therapeutic Exercise)    Hip (Therapeutic Exercise) strengthening exercise  -KG     Hip Strengthening (Therapeutic Exercise) bilateral;aBduction;aDduction;heel slides;sitting;5 repetitions  -KG       Row Name 09/18/23 1610          Knee (Therapeutic Exercise)    Knee (Therapeutic Exercise) strengthening exercise  -KG     Knee Strengthening (Therapeutic Exercise) bilateral;SLR (straight leg raise);SAQ (short arc quad);sitting;5 repetitions  -KG       Row Name 09/18/23 1610          Ankle (Therapeutic Exercise)    Ankle (Therapeutic Exercise) strengthening exercise  -KG     Ankle Strengthening (Therapeutic Exercise) bilateral;dorsiflexion;plantarflexion;sitting;5 repetitions  -KG       Row Name 09/18/23 1610          Balance    Balance Assessment sitting static balance;standing static balance;standing dynamic balance  -KG     Static Sitting Balance minimal assist  -KG     Position, Sitting Balance supported;sitting edge of bed  -KG     Static Standing Balance maximum assist;2-person assist  -KG     Dynamic Standing Balance maximum assist;2-person assist  -KG     Position/Device Used, Standing Balance supported  -KG               User Key  (r) = Recorded By, (t) = Taken By, (c) = Cosigned By      Initials Name Provider Type    KG Mackenzie Mckay, PT Physical Therapist                   Goals/Plan    No documentation.                  Clinical Impression       Row Name 09/18/23 1610          Pain    Pretreatment Pain Rating 0/10 - no pain  -KG     Posttreatment Pain Rating 0/10 - no pain  -KG       Row Name 09/18/23 1610          Plan of Care Review    Plan of Care Reviewed With patient  -KG     Progress no change  -KG     Outcome Evaluation Pt performed STS x2 and SPT from bed to chair with maxA x2 and B UE support. Pt  required physical assistance to weight shift L to R, unable to weight shift to advance L LE. Pt tolerated B LE ther ex with very minimal command following to participate. Continue to recommend PT skilled care and D/C to IP rehab facility.  -KG       Row Name 09/18/23 1610          Vital Signs    Pre Systolic BP Rehab 153  -KG     Pre Treatment Diastolic BP 83  -KG     Post Systolic BP Rehab 141  -KG     Post Treatment Diastolic BP 89  -KG     Pretreatment Heart Rate (beats/min) 81  -KG     Posttreatment Heart Rate (beats/min) 80  -KG     Pre SpO2 (%) 97  -KG     O2 Delivery Pre Treatment supplemental O2  -KG     Post SpO2 (%) 97  -KG     O2 Delivery Post Treatment supplemental O2  -KG     Pre Patient Position Sitting  -KG     Intra Patient Position Standing  -KG     Post Patient Position Sitting  -KG       Row Name 09/18/23 1610          Positioning and Restraints    Pre-Treatment Position in bed  -KG     Post Treatment Position chair  -KG     In Chair notified nsg;reclined;call light within reach;encouraged to call for assist;exit alarm on;with family/caregiver;RUE elevated;LUE elevated;waffle cushion;on mechanical lift sling;legs elevated  -KG               User Key  (r) = Recorded By, (t) = Taken By, (c) = Cosigned By      Initials Name Provider Type    KG Mackenzie Mckay, PT Physical Therapist                   Outcome Measures       Row Name 09/18/23 1612 09/18/23 0800       How much help from another person do you currently need...    Turning from your back to your side while in flat bed without using bedrails? 2  -KG 2  -WB    Moving from lying on back to sitting on the side of a flat bed without bedrails? 2  -KG 2  -WB    Moving to and from a bed to a chair (including a wheelchair)? 2  -KG 1  -WB    Standing up from a chair using your arms (e.g., wheelchair, bedside chair)? 2  -KG 1  -WB    Climbing 3-5 steps with a railing? 1  -KG 1  -WB    To walk in hospital room? 1  -KG 1  -WB    AM-PAC 6 Clicks  Score (PT) 10  -KG 8  -WB    Highest level of mobility 4 --> Transferred to chair/commode  -KG 3 --> Sat at edge of bed  -WB      Row Name 09/18/23 1612 09/18/23 1532       Functional Assessment    Outcome Measure Options AM-PAC 6 Clicks Basic Mobility (PT)  -KG AM-PAC 6 Clicks Daily Activity (OT)  -              User Key  (r) = Recorded By, (t) = Taken By, (c) = Cosigned By      Initials Name Provider Type    WB Cele Nieves RN Registered Nurse    KG Mackenzie Mckay, PT Physical Therapist    Jenny Black OT Occupational Therapist                                 Physical Therapy Education       Title: PT OT SLP Therapies (In Progress)       Topic: Physical Therapy (In Progress)       Point: Mobility training (In Progress)       Learning Progress Summary             Patient Acceptance, E, NR by KG at 9/18/2023 1403    Acceptance, E,TB, NR by AY at 9/15/2023 1254                         Point: Home exercise program (In Progress)       Learning Progress Summary             Patient Acceptance, E, NR by KG at 9/18/2023 1403                         Point: Body mechanics (In Progress)       Learning Progress Summary             Patient Acceptance, E, NR by KG at 9/18/2023 1403    Acceptance, E,TB, NR by AY at 9/15/2023 1254                         Point: Precautions (In Progress)       Learning Progress Summary             Patient Acceptance, E, NR by KG at 9/18/2023 1403    Acceptance, E,TB, NR by AY at 9/15/2023 1254                                         User Key       Initials Effective Dates Name Provider Type Discipline    KG 05/22/20 -  Mackenzie Mckay, PT Physical Therapist PT    SCOOBY 11/10/20 -  Aleshia Armstrong PT Physical Therapist PT                  PT Recommendation and Plan     Plan of Care Reviewed With: patient  Progress: no change  Outcome Evaluation: Pt performed STS x2 and SPT from bed to chair with maxA x2 and B UE support. Pt required physical assistance to weight shift L to R,  unable to weight shift to advance L LE. Pt tolerated B LE ther ex with very minimal command following to participate. Continue to recommend PT skilled care and D/C to IP rehab facility.     Time Calculation:         PT Charges       Row Name 09/18/23 1403             Time Calculation    Start Time 1403  -KG      PT Received On 09/18/23  -KG      PT Goal Re-Cert Due Date 09/25/23  -KG         Time Calculation- PT    Total Timed Code Minutes- PT 23 minute(s)  -KG         Timed Charges    75850 - PT Therapeutic Exercise Minutes 5  -KG      60164 - PT Therapeutic Activity Minutes 18  -KG         Total Minutes    Timed Charges Total Minutes 23  -KG       Total Minutes 23  -KG                User Key  (r) = Recorded By, (t) = Taken By, (c) = Cosigned By      Initials Name Provider Type    KG Mackenzie Mckay, PT Physical Therapist                  Therapy Charges for Today       Code Description Service Date Service Provider Modifiers Qty    74277549012 HC PT THER PROC EA 15 MIN 9/18/2023 Mackenzie Mckay, PT GP 1    62374820715 HC PT THERAPEUTIC ACT EA 15 MIN 9/18/2023 Mackenzie Mckay, PT GP 1            PT G-Codes  Outcome Measure Options: AM-PAC 6 Clicks Basic Mobility (PT)  AM-PAC 6 Clicks Score (PT): 10  AM-PAC 6 Clicks Score (OT): 8  Modified [place] Scale: 4 - Moderately severe disability.  Unable to walk without assistance, and unable to attend to own bodily needs without assistance.       Elizabeth Mckay PT  9/18/2023

## 2023-09-18 NOTE — PROGRESS NOTES
"Stroke Progress Note       Chief Complaint: Bilateral occipital and bilateral parietal infarct with hemorrhagic conversion    Subjective     Subjective:  Kenneth Wen was seen and examined at bedside alone.  The patient is alert and follows commands.  Oriented to person, place, and time today.  The patient denied headaches this morning.  He did not have any questions.  The patient denied questions this morning.    Objective      Temp:  [98.2 °F (36.8 °C)-99 °F (37.2 °C)] 98.2 °F (36.8 °C)  Heart Rate:  [] 92  Resp:  [18-24] 20  BP: ()/(60-97) 143/84            Objective    Physical Exam:  General Appearance: Alert  Eyes: Anicteric sclera  HEENT: no scleral injection.  NG tube in place.  Lungs: respirations appear comfortable, no obvious increased work of breathing  Extremities: No cyanosis or fingernail clubbing   Skin: No rashes in exposed skin areas     Neurological Examination:   Mental status: Alert and oriented to person, place, and time. Hypophonic speech with moderate dysarthria.  Able to name and repeat.   Cranial Nerves: Visual fields intact.  Left gaze deviation but able to cross midline.  No right blink to threat.  Facial sensation intact.  Right facial droop.  Hearing grossly intact. Palate movement is symmetric.  No right shoulder shrug.  Tongue protrudes midline.   Sensory: Sensory exam grimaces and says \"ouch\" to noxious stimuli in right upper and lower extremity.  Motor: Normal tone throughout.   Left upper extremity: 5/5 deltoid, tricep, bicep, interosseous, hand .   Right upper extremity: 1/5 with noxious stimuli  Left lower extremity: 5/5 iliopsoas, knee extension/flexion, foot dorsi/plantarflexion.  Right lower extremity: 1/5 with noxious stimuli        Results Review:      CT head 9/10/2023.  Impression: No CT evidence of acute intracranial process; specifically, no mass/mass effect, hemorrhage, or acute infarction by CT. Mild chronic small vessel ischemic changes noted. CT " offers limited sensitivity to the detection of acute thrombotic infarct, and if clinically indicated, MRI may be beneficial.    CT angiogram head and neck 9/10/2023.  Impression: 1. No hemodynamically significant intracranial arterial stenosis. 2. 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 Please see above comments for full details.     MRI brain without contrast 9/11/2023.  Impression:  Numerous acute ischemic infarcts of the bilateral cerebral and cerebellar hemispheres and left side of the isaac. Mild chronic ischemic deep cerebral white matter disease.     CT head 9/13/2023.  Impression:   1. Evolving, bilateral occipital lobe and left thalamic infarcts. Subtle increased attenuation within the occipital lobe infarcts concerning for petechial hemorrhage. No julia parenchymal hemorrhage.  2. Moderate-sized, bilateral cerebellar infarcts, right greater than left.     EEG 9/13/2023.  Impression: This is an abnormal EEG.  The presence of mild generalized slowing is consistent with mild diffuse cortical dysfunction and mild nonspecific encephalopathy.  There were no epileptiform discharges seen.  No seizures were seen.      CT head without contrast 9/14/2023.  Impression: 1. Redemonstration of acute infarctions involving the left thalamus, bilateral PCA territories, right superior cerebellar artery, bilateral PICA territory distributions. Left bhavesh isaac infarction extending into the middle cerebellar peduncle, slightly more prominent compared to prior CT. 2. Local mass effect in the posterior fossa with mild effacement of the quadrigeminal cistern on the right side, mildly increased reflecting mild upward transtentorial pressure. No midline shift. Recommend continued follow-up imaging. 3. No new acute large territorial infarction, or hemorrhagic transformation.     CT head 9/15/2023.  Impression: Multiple areas of low-attenuation as described. Primarily this involves the posterior  circulation including the occipital lobes bilaterally and the cerebellum greater on the right than the left as well as the left aspect of the isaac. Findings may be secondary to infarction from the basilar artery given the multiple left and right vascular territories.    MRI brain 9/15/2023.  Impression: Extensive posterior circulation infarcts with hemorrhagic conversion as described.    CT head 9/16/2023.  Impression: 1. Stable appearance of multifocal areas of evolving infarct in the posterior circulation with stable areas of petechial and parenchymal hemorrhage. No new hemorrhage. 2. Mild mass effect due to cerebral edema. No herniation or midline shift.    CT head 9/17/2023.  Impression: Stable appearance of evolving multifocal areas of infarct in the posterior circulation distribution more accentuated on the right. Stable areas of evolving parenchymal hemorrhage. No new areas of hemorrhage. Stable mild diffuse mass effect due to cerebral edema. No midline shift or herniation.    Transthoracic echocardiogram 9/11/2023 left ventricular ejection fraction 60-65%, grade 1 diastolic dysfunction, normal left atrial size, no thrombus mentioned, no PFO mentioned.    EKG 9/15/2023 sinus rhythm with frequent premature ventricular complexes.      Labs:  Lab Results   Component Value Date    HGBA1C 6.40 (H) 09/15/2023      Lab Results   Component Value Date    CHOL 102 09/17/2023    TRIG 60 09/17/2023    HDL 36 (L) 09/15/2023     (H) 09/15/2023     LIVER FUNCTION TESTS:      Lab 09/17/23  1759 09/15/23  0212   TOTAL PROTEIN 6.8 7.2   ALBUMIN 3.8 4.2   GLOBULIN 3.0 3.0   ALT (SGPT) 38 27   AST (SGOT) 58* 34   BILIRUBIN 0.7 1.2   ALK PHOS 71 75                 Assessment/Plan     Assessment:    Kenneth Wen is a 72-year-old male with a past medical history of hypertension, prediabetes (hemoglobin A1c 6.4%), and hyperlipidemia who presented to [facility] on 9/10/2023 for encephalopathy and dysarthria.  CT  head 9/10/2023 normal.  CT angiogram head and neck 9/10/2023 showed left vertebral artery occlusion.  MRI brain 9/11/2023 showed bilateral cerebral, bilateral cerebellar, and left pontine infarcts.  On 9/13/2023, the patient's encephalopathy worsened and repeat CT head 9/14/2023 showed local mass effect with mild effacement of the quadrangle cistern on the right side reflecting transtentorial pressure.  EEG 9/13/2023 was abnormal due to slowing but no seizure activity.  On 9/15/2023 the patient was transferred to [facility] for higher level of care.  Repeat MRI 9/15/2023 showed hemorrhagic conversion of bilateral occipital infarcts.  Dual antiplatelet therapy was held.  CT head 9/16/2023 showed stable petechial hemorrhage but mass effect due to cerebral edema.  The patient received 2 boluses of 3% normal saline but his sodium was not at goal.  Therefore, 3% normal saline drip was started 9/16/2023.  Aspirin 81 mg daily was restarted 9/16/2023 due to stable petechial hemorrhage.  CT head 9/17/2023 showed stable petechial hemorrhage and stable cerebral edema.  Clopidogrel 75 mg daily was restarted 9/17/2023.  Per report, at outside hospital TTE and JOSIAS negative for PFO (I was unable to personally see the results of the JOSIAS).    #Extensive posterior circulation strokes-bilateral occipital, bilateral cerebellum, and left isaac  Suspect atheroembolic etiology but cannot rule out cardioembolic in the setting of stroke in multiple vascular territories.  #Hemorrhagic conversion of bilateral occipital infarcts per 9/15/2023 MRI  #Malignant cerebral edema per 9/16/2023 CT head  #Intracranial atherosclerotic disease     -Speech therapy recommendation: Medications via alternative route  -I suspect patient will need a PEG tube which per his family would be in line with his goals  -Continue aspirin 81 mg daily indefinitely.  Route: NG tube, restarted 9/16/2023  -Continue clopidogrel 75 mg daily for intracranial  atherosclerotic disease x3 months (start 9/17/2023 end 12/17/2023).  Route: NG tube  -Continue atorvastatin 80 mg daily ()  -Repeat CT head stat for any change in mental status or neurologic change  -Please call stroke neurology for any change in neurologic status or severe headache  -3% normal saline for malignant cerebral edema (started 9/16/2023)  -Goal serum sodium: 145-155  -Serum sodium every 6 hours  -Repeat CT head 9/19/2023 determine duration of 3% normal saline  -Blood pressure parameters: -160 post petechial hemorrhage  -Neurochecks every 2 hours-monitor for atrial fibrillation on telemetry  -Holter monitor prior to discharge to monitor for atrial fibrillation        Discharge Planning: Stroke neurology will follow    Jenny Hagan MD  [facility] [place] [facility]  09/18/23  11:37 EDT

## 2023-09-18 NOTE — THERAPY TREATMENT NOTE
Patient Name: Kenneth Wen  : 1950    MRN: [id number]                              Today's Date: 2023       Admit Date: 9/15/2023    Visit Dx:     ICD-10-CM ICD-9-CM   1. Aphasia  R47.01 784.3   2. Dysarthria  R47.1 784.51   3. Oropharyngeal dysphagia  R13.12 787.22     Patient Active Problem List   Diagnosis    Precordial pain    Elevated BP without diagnosis of hypertension    Dyslipidemia    Hyperglycemia    Multiple bilateral CVAs    Hemorrhagic CVA    HFpEF    T2DM (type 2 diabetes mellitus)    HTN (hypertension)    GERD (gastroesophageal reflux disease)    ICH (intracerebral hemorrhage)    Oropharyngeal dysphagia     Past Medical History:   Diagnosis Date    Acid reflux     Cancer     Skin    Diabetes mellitus     Prediabetes    GERD (gastroesophageal reflux disease) 09/15/2023    HTN (hypertension) 09/15/2023    Kidney disease     T2DM (type 2 diabetes mellitus) 09/15/2023     Past Surgical History:   Procedure Laterality Date    APPENDECTOMY      HEMORRHOIDECTOMY      NASAL POLYP SURGERY        General Information       [facility] Name 23 1528          OT Time and Intention    Document Type therapy note (daily note)  -     Mode of Treatment occupational therapy  -       Row Name 23 1528          General Information    Patient Profile Reviewed yes  -     Existing Precautions/Restrictions fall;oxygen therapy device and L/min;other (see comments)  NG, R side weakness/increased tone, visual deficits, R resting hand splint in room  -     Barriers to Rehab medically complex;cognitive status;visual deficit  -       Row Name 23 1528          Cognition    Orientation Status (Cognition) oriented to;person;verbal cues/prompts needed for orientation;place;time  -       Row Name 23 1528          Safety Issues, Functional Mobility    Safety Issues Affecting Function (Mobility) awareness of need for assistance;insight into deficits/self-awareness;safety precaution  awareness;safety precautions follow-through/compliance;sequencing abilities  -     Impairments Affecting Function (Mobility) balance;endurance/activity tolerance;cognition;coordination;postural/trunk control;sensation/sensory awareness;shortness of breath;strength;visual/perceptual;muscle tone abnormal  -     Cognitive Impairments, Mobility Safety/Performance awareness, need for assistance;insight into deficits/self-awareness;safety precaution awareness;safety precaution follow-through;sequencing abilities;attention  -               User Key  (r) = Recorded By, (t) = Taken By, (c) = Cosigned By      Initials Name Provider Type     Jenny Rivera OT Occupational Therapist                     Mobility/ADL's       Row Name 09/18/23 1529          Bed Mobility    Bed Mobility supine-sit  -     Supine-Sit [place] (Bed Mobility) moderate assist (50% patient effort);2 person assist;verbal cues  -     Bed Mobility, Safety Issues cognitive deficits limit understanding;decreased use of arms for pushing/pulling;decreased use of legs for bridging/pushing;impaired trunk control for bed mobility  -     Assistive Device (Bed Mobility) bed rails;draw sheet;head of bed elevated  -       Row Name 09/18/23 1529          Transfers    Transfers sit-stand transfer;stand-sit transfer  -       Row Name 09/18/23 1529          Sit-Stand Transfer    Sit-Stand [place] (Transfers) maximum assist (25% patient effort);2 person assist;verbal cues  -     Assistive Device (Sit-Stand Transfers) other (see comments)  BUE support  -       Row Name 09/18/23 1529          Stand-Sit Transfer    Stand-Sit [place] (Transfers) maximum assist (25% patient effort);2 person assist;verbal cues  -     Assistive Device (Stand-Sit Transfers) other (see comments)  -       Row Name 09/18/23 1529          Activities of Daily Living    BADL Assessment/Intervention lower body dressing;toileting  -       Row Name 09/18/23  1529          Lower Body Dressing Assessment/Training    [place] Level (Lower Body Dressing) don;socks;dependent (less than 25% patient effort)  -     Position (Lower Body Dressing) supine  -[facility] Name 09/18/23 1529          Toileting Assessment/Training    [place] Level (Toileting) adjust/manage clothing;perform perineal hygiene;change pad/brief;dependent (less than 25% patient effort);verbal cues  -     Position (Toileting) supine  -               User Key  (r) = Recorded By, (t) = Taken By, (c) = Cosigned By      Initials Name Provider Type     Jenny Rivera OT Occupational Therapist                   Obj/Interventions       [facility] Name 09/18/23 1530          Shoulder (Therapeutic Exercise)    Shoulder (Therapeutic Exercise) PROM (passive range of motion)  -     Shoulder PROM (Therapeutic Exercise) right;flexion;extension;aBduction;aDduction;10 repetitions;supine  -[facility] Name 09/18/23 1530          Elbow/Forearm (Therapeutic Exercise)    Elbow/Forearm (Therapeutic Exercise) PROM (passive range of motion)  -     Elbow/Forearm PROM (Therapeutic Exercise) right;flexion;extension;10 repetitions;supine  -[facility] Name 09/18/23 1530          Wrist (Therapeutic Exercise)    Wrist (Therapeutic Exercise) PROM (passive range of motion)  -     Wrist PROM (Therapeutic Exercise) right;flexion;extension;10 repetitions  -[facility] Name 09/18/23 1530          Hand (Therapeutic Exercise)    Hand (Therapeutic Exercise) PROM (passive range of motion)  -     Hand PROM (Therapeutic Exercise) right;finger flexion;finger extension;10 repetitions  -[facility] Name 09/18/23 1530          Motor Skills    Therapeutic Exercise shoulder;elbow/forearm;wrist;hand  -[facility] Name 09/18/23 1530          Balance    Balance Assessment sitting static balance;sit to stand dynamic balance;standing static balance  -     Static Sitting Balance minimal assist;verbal cues  -     Position,  Sitting Balance supported;sitting edge of bed  -     Sit to Stand Dynamic Balance maximum assist;2-person assist;verbal cues  -     Static Standing Balance maximum assist;2-person assist;verbal cues  -     Position/Device Used, Standing Balance supported  -     Balance Interventions sitting;sit to stand;occupation based/functional task  -               User Key  (r) = Recorded By, (t) = Taken By, (c) = Cosigned By      Initials Name Provider Type     Jenny Rivera, LATIA Occupational Therapist                   Goals/Plan    No documentation.                  Clinical Impression       [facility] Name 09/18/23 1530          Pain Assessment    Pretreatment Pain Rating 0/10 - no pain  -     Posttreatment Pain Rating 0/10 - no pain  -MC       Row Name 09/18/23 1530          Plan of Care Review    Plan of Care Reviewed With patient;family  -     Progress no change  -     Outcome Evaluation Pt presents w/ R side weakness/increased tone, visual deficits and R inattention, balance and postural control deficits, and decreased functional endurance limiting his ADL independence. Will continue to progress pt as tolerated per OT POC. Rec IRF at d/c.  -[facility] Name 09/18/23 1530          Therapy Assessment/Plan (OT)    Rehab Potential (OT) good, to achieve stated therapy goals  -     Criteria for Skilled Therapeutic Interventions Met (OT) yes;skilled treatment is necessary  -     Therapy Frequency (OT) daily  -[facility] Name 09/18/23 1530          Therapy Plan Review/Discharge Plan (OT)    Anticipated Discharge Disposition (OT) inpatient rehabilitation facility  -[facility] Name 09/18/23 1530          Vital Signs    Pre Systolic BP Rehab 145  -MC     Pre Treatment Diastolic BP 82  -MC     Post Systolic BP Rehab 141  -MC     Post Treatment Diastolic BP 89  -MC     O2 Delivery Pre Treatment nasal cannula  -     O2 Delivery Intra Treatment nasal cannula  -     O2 Delivery Post Treatment nasal cannula   -     Pre Patient Position Supine  -     Intra Patient Position Standing  -     Post Patient Position Sitting  -       Row Name 09/18/23 1530          Positioning and Restraints    Pre-Treatment Position in bed  -     Post Treatment Position bed  -MC     In Bed sitting EOB;with PT  -     Restraints released:;reapplied:;notified nsg:;soft limb;mitten  -               User Key  (r) = Recorded By, (t) = Taken By, (c) = Cosigned By      Initials Name Provider Type    Jenny Black OT Occupational Therapist                   Outcome Measures       Row Name 09/18/23 1532          How much help from another is currently needed...    Putting on and taking off regular lower body clothing? 1  -MC     Bathing (including washing, rinsing, and drying) 2  -MC     Toileting (which includes using toilet bed pan or urinal) 1  -MC     Putting on and taking off regular upper body clothing 2  -MC     Taking care of personal grooming (such as brushing teeth) 1  -MC     Eating meals 1  -     AM-[facility] 6 Clicks Score (OT) 8  -       Row Name 09/18/23 0800          How much help from another person do you currently need...    Turning from your back to your side while in flat bed without using bedrails? 2  -WB     Moving from lying on back to sitting on the side of a flat bed without bedrails? 2  -WB     Moving to and from a bed to a chair (including a wheelchair)? 1  -WB     Standing up from a chair using your arms (e.g., wheelchair, bedside chair)? 1  -WB     Climbing 3-5 steps with a railing? 1  -WB     To walk in hospital room? 1  -WB     AM-[facility] 6 Clicks Score (PT) 8  -WB     Highest level of mobility 3 --> Sat at edge of bed  -       Row Name 09/18/23 1532          Functional Assessment    Outcome Measure Options AM-PAC 6 Clicks Daily Activity (OT)  -               User Key  (r) = Recorded By, (t) = Taken By, (c) = Cosigned By      Initials Name Provider Type    Cele Young RN Registered Nurse      Jenny Rivera OT Occupational Therapist                    Occupational Therapy Education       Title: PT OT SLP Therapies (In Progress)       Topic: Occupational Therapy (In Progress)       Point: ADL training (In Progress)       Description:   Instruct learner(s) on proper safety adaptation and remediation techniques during self care or transfers.   Instruct in proper use of assistive devices.                  Learning Progress Summary             Patient Acceptance, E, NR by  at 9/18/2023 1532    Acceptance, E, NR by  at 9/15/2023 1531                         Point: Home exercise program (In Progress)       Description:   Instruct learner(s) on appropriate technique for monitoring, assisting and/or progressing therapeutic exercises/activities.                  Learning Progress Summary             Patient Acceptance, E, NR by  at 9/18/2023 1532    Acceptance, E, NR by  at 9/15/2023 1531                         Point: Precautions (In Progress)       Description:   Instruct learner(s) on prescribed precautions during self-care and functional transfers.                  Learning Progress Summary             Patient Acceptance, E, NR by  at 9/18/2023 1532    Acceptance, E, NR by  at 9/15/2023 1531                         Point: Body mechanics (In Progress)       Description:   Instruct learner(s) on proper positioning and spine alignment during self-care, functional mobility activities and/or exercises.                  Learning Progress Summary             Patient Acceptance, E, NR by  at 9/18/2023 1532    Acceptance, E, NR by  at 9/15/2023 1531                                         User Key       Initials Effective Dates Name Provider Type Discipline     10/14/22 -  Jenny Rivera OT Occupational Therapist OT                  OT Recommendation and Plan  Planned Therapy Interventions (OT): activity tolerance training, adaptive equipment training, BADL retraining, cognitive/visual  perception retraining, edema control/reduction, functional balance retraining, IADL retraining, neuromuscular control/coordination retraining, occupation/activity based interventions, passive ROM/stretching, patient/caregiver education/training, strengthening exercise, ROM/therapeutic exercise, transfer/mobility retraining, orthotic fabrication/fitting/training  Therapy Frequency (OT): daily  Plan of Care Review  Plan of Care Reviewed With: patient, family  Progress: no change  Outcome Evaluation: Pt presents w/ R side weakness/increased tone, visual deficits and R inattention, balance and postural control deficits, and decreased functional endurance limiting his ADL independence. Will continue to progress pt as tolerated per OT POC. Rec IRF at d/c.     Time Calculation:   Evaluation Complexity (OT)  Review Occupational Profile/Medical/Therapy History Complexity: expanded/moderate complexity  Assessment, Occupational Performance/Identification of Deficit Complexity: 3-5 performance deficits  Clinical Decision Making Complexity (OT): detailed assessment/moderate complexity  Overall Complexity of Evaluation (OT): moderate complexity     Time Calculation- OT       Row Name 09/18/23 1533             Time Calculation- OT    OT Start Time 1339  -MC      OT Received On 09/18/23  -      OT Goal Re-Cert Due Date 09/25/23  -         Timed Charges    64909 - OT Therapeutic Exercise Minutes 7  -MC      02825 - OT Therapeutic Activity Minutes 6  -      11219 - OT Self Care/Mgmt Minutes 10  -MC         Total Minutes    Timed Charges Total Minutes 23  -MC       Total Minutes 23  -MC                User Key  (r) = Recorded By, (t) = Taken By, (c) = Cosigned By      Initials Name Provider Type     Jenny Rivera OT Occupational Therapist                  Therapy Charges for Today       Code Description Service Date Service Provider Modifiers Qty    [id number]  OT THER PROC EA 15 MIN 9/18/2023 Jenny Rivera OT  GO 1    [id number] HC OT SELF CARE/MGMT/TRAIN EA 15 MIN 9/18/2023 Jenny Rivera OT GO 1                 Jenny Rivera OT  9/18/2023

## 2023-09-18 NOTE — PLAN OF CARE
Goal Outcome Evaluation:  Plan of Care Reviewed With: patient, spouse        Progress: no change  Outcome Evaluation: AM NIHSS a 20. Oriented to self in AM- pt can answer questions but confused when having conversation. Up in chair in afternoon. 3% NS infusing in AM- held in late afternoon per Dr. Hagan due to increased Na level. Adequate UOP. TF at goal with pt tolerating. Spouse at bedside with questions answered and plan of care discussed. VSS- WCTM      Problem: Restraint, Nonviolent  Goal: Absence of Harm or Injury  Outcome: Ongoing, Progressing  Intervention: Implement Least Restrictive Safety Strategies  Intervention: Protect Dignity, Rights, and Personal Wellbeing  Intervention: Protect Skin and Joint Integrity     Problem: Skin Injury Risk Increased  Goal: Skin Health and Integrity  Outcome: Ongoing, Progressing  Intervention: Optimize Skin Protection     Problem: Adult Inpatient Plan of Care  Goal: Plan of Care Review  Outcome: Ongoing, Progressing  Goal: Patient-Specific Goal (Individualized)  Outcome: Ongoing, Progressing  Goal: Absence of Hospital-Acquired Illness or Injury  Outcome: Ongoing, Progressing  Intervention: Identify and Manage Fall Risk  Intervention: Prevent Skin Injury  Intervention: Prevent and Manage VTE (Venous Thromboembolism) Risk  Intervention: Prevent Infection  Goal: Optimal Comfort and Wellbeing  Outcome: Ongoing, Progressing  Intervention: Monitor Pain and Promote Comfort  Intervention: Provide Person-Centered Care  Goal: Readiness for Transition of Care  Outcome: Ongoing, Progressing     Problem: Pain Acute  Goal: Acceptable Pain Control and Functional Ability  Outcome: Ongoing, Progressing  Intervention: Prevent or Manage Pain  Intervention: Develop Pain Management Plan  Intervention: Optimize Psychosocial Wellbeing     Problem: Fall Injury Risk  Goal: Absence of Fall and Fall-Related Injury  Outcome: Ongoing, Progressing  Intervention: Identify and Manage  Contributors  Intervention: Promote Injury-Free Environment     Problem: Adjustment to Illness (Stroke, Hemorrhagic)  Goal: Optimal Coping  Outcome: Ongoing, Progressing  Intervention: Support Psychosocial Response to Stroke     Problem: Bowel Elimination Impaired (Stroke, Hemorrhagic)  Goal: Effective Bowel Elimination  Outcome: Ongoing, Progressing     Problem: Cerebral Tissue Perfusion (Stroke, Hemorrhagic)  Goal: Optimal Cerebral Tissue Perfusion  Outcome: Ongoing, Progressing  Intervention: Protect and Optimize Cerebral Perfusion     Problem: Cognitive Impairment (Stroke, Hemorrhagic)  Goal: Optimal Cognitive Function  Outcome: Ongoing, Progressing  Intervention: Optimize Cognitive Function     Problem: Communication Impairment (Stroke, Hemorrhagic)  Goal: Effective Communication Skills  Outcome: Ongoing, Progressing  Intervention: Optimize Communication Skills     Problem: Functional Ability Impaired (Stroke, Hemorrhagic)  Goal: Optimal Functional Ability  Outcome: Ongoing, Progressing  Intervention: Optimize Functional Ability     Problem: Pain (Stroke, Hemorrhagic)  Goal: Acceptable Pain Control  Outcome: Ongoing, Progressing  Intervention: Monitor and Manage Pain     Problem: Respiratory Compromise (Stroke, Hemorrhagic)  Goal: Effective Oxygenation and Ventilation  Outcome: Ongoing, Progressing  Intervention: Optimize Oxygenation and Ventilation     Problem: Sensorimotor Impairment (Stroke, Hemorrhagic)  Goal: Improved Sensorimotor Function  Outcome: Ongoing, Progressing  Intervention: Optimize Range of Motion, Motor Control and Function  Intervention: Optimize Sensory and Perceptual Ability     Problem: Swallowing Impairment (Stroke, Hemorrhagic)  Goal: Optimal Eating and Swallowing Without Aspiration  Outcome: Ongoing, Progressing  Intervention: Optimize Eating and Swallowing     Problem: Urinary Elimination Impaired (Stroke, Hemorrhagic)  Goal: Effective Urinary Elimination  Outcome: Ongoing,  Progressing  Intervention: Promote Effective Bladder Elimination     Problem: Diabetes Comorbidity  Goal: Blood Glucose Level Within Targeted Range  Outcome: Ongoing, Progressing  Intervention: Monitor and Manage Glycemia     Problem: Heart Failure Comorbidity  Goal: Maintenance of Heart Failure Symptom Control  Outcome: Ongoing, Progressing  Intervention: Maintain Heart Failure-Management     Problem: Hypertension Comorbidity  Goal: Blood Pressure in Desired Range  Outcome: Ongoing, Progressing  Intervention: Maintain Blood Pressure Management     Problem: Adjustment to Illness (Sepsis/Septic Shock)  Goal: Optimal Coping  Outcome: Ongoing, Progressing  Intervention: Optimize Psychosocial Adjustment to Illness     Problem: Bleeding (Sepsis/Septic Shock)  Goal: Absence of Bleeding  Outcome: Ongoing, Progressing  Intervention: Monitor and Manage Bleeding     Problem: Glycemic Control Impaired (Sepsis/Septic Shock)  Goal: Blood Glucose Level Within Desired Range  Outcome: Ongoing, Progressing  Intervention: Optimize Glycemic Control     Problem: Infection Progression (Sepsis/Septic Shock)  Goal: Absence of Infection Signs and Symptoms  Outcome: Ongoing, Progressing  Intervention: Initiate Sepsis Management  Intervention: Promote Recovery  Intervention: Promote Stabilization     Problem: Nutrition Impaired (Sepsis/Septic Shock)  Goal: Optimal Nutrition Intake  Outcome: Ongoing, Progressing

## 2023-09-18 NOTE — PLAN OF CARE
Goal Outcome Evaluation:  Plan of Care Reviewed With: patient, family        Progress: no change  Outcome Evaluation: Pt presents w/ R side weakness/increased tone, visual deficits and R inattention, balance and postural control deficits, and decreased functional endurance limiting his ADL independence. Will continue to progress pt as tolerated per OT POC. Rec IRF at d/c.      Anticipated Discharge Disposition (OT): inpatient rehabilitation facility

## 2023-09-18 NOTE — CASE MANAGEMENT/SOCIAL WORK
Continued Stay Note  [facility]     Patient Name: Kenneth Wen  MRN: [id number]  Today's Date: 9/18/2023    Admit Date: 9/15/2023    Plan: [facility] acute rehab   Discharge Plan       Row Name 09/18/23 1238       Plan    Plan [facility] acute rehab    Plan Comments Discussed patient in MDR.  Patient currently has NGT and tube feed infusing.  Patient's plan is acute rehab at [facility], April is following.   will continue to follow.                   Discharge Codes    No documentation.                       Di Mc RN     Mo Altman PGY2

## 2023-09-18 NOTE — THERAPY TREATMENT NOTE
Acute Care - Speech Language Pathology Treatment Note  [facility]     Patient Name: Kenneth Wen  : 1950  MRN: [id number]  Today's Date: 2023               Admit Date: 9/15/2023     Visit Dx:    ICD-10-CM ICD-9-CM   1. Aphasia  R47.01 784.3   2. Dysarthria  R47.1 784.51   3. Oropharyngeal dysphagia  R13.12 787.22     Patient Active Problem List   Diagnosis    Precordial pain    Elevated BP without diagnosis of hypertension    Dyslipidemia    Hyperglycemia    Multiple bilateral CVAs    Hemorrhagic CVA    HFpEF    T2DM (type 2 diabetes mellitus)    HTN (hypertension)    GERD (gastroesophageal reflux disease)    ICH (intracerebral hemorrhage)    Oropharyngeal dysphagia     Past Medical History:   Diagnosis Date    Acid reflux     Cancer     Skin    Diabetes mellitus     Prediabetes    GERD (gastroesophageal reflux disease) 09/15/2023    HTN (hypertension) 09/15/2023    Kidney disease     T2DM (type 2 diabetes mellitus) 09/15/2023     Past Surgical History:   Procedure Laterality Date    APPENDECTOMY      HEMORRHOIDECTOMY      NASAL POLYP SURGERY         SLP Recommendation and Plan                 Anticipated Discharge Disposition (SLP): inpatient rehabilitation facility (23 154)     Therapy Frequency (Swallow): 5 days per week (23 154)  Predicted Duration Therapy Intervention (Days): until discharge (23 154)  Oral Care Recommendations: Oral Care BID/PRN, Suction toothbrush (23 154)     Daily Summary of Progress (SLP): progress towards functional goals is fair (23 154)           Treatment Assessment (SLP): continued, oral dysphagia, pharyngeal dysphagia, dysarthria, aphasia (23 154)  Treatment Assessment Comments (SLP): Improved ability to complly with directions for overarticulation as well as directions for oral movement. Improved accuracy of response, however dysarthria impact intelligibility of speech.  Tearful with outbursts of crying throughout the  therapy session. (09/18/23 2099)  Plan for Continued Treatment (SLP): continue treatment per plan of care (09/18/23 0995)  Outcome Evaluation: SLP following for speech/communication/swallowing.  Tearful outbursts are interfering with treatment. (09/18/23 4976)      SLP EVALUATION (last 72 hours)       SLP SLC Evaluation       Row Name 09/18/23 0917                   Communication Assessment/Intervention    Document Type therapy note (daily note)  -ML        Subjective Information --  Tearful/emotional outbreaks intermittently during tx  -ML        Patient Observations cooperative;agree to therapy  -ML        Patient/Family/Caregiver Comments/Observations Wife present  -ML        Patient Effort good  -ML           General Information    Patient Profile Reviewed yes  -ML        Pertinent History Of Current Problem See previously documented SLP hx  -ML           Pain Scale: Numbers Pre/Post-Treatment    Pretreatment Pain Rating 0/10 - no pain  -ML        Posttreatment Pain Rating 0/10 - no pain  -ML           SLP Treatment Clinical Impressions    Treatment Assessment (SLP) continued;oral dysphagia;pharyngeal dysphagia;dysarthria;aphasia  -ML        Treatment Assessment Comments (SLP) Improved ability to complly with directions for overarticulation as well as directions for oral movement. Improved accuracy of response, however dysarthria impact intelligibility of speech.  Tearful with outbursts of crying throughout the therapy session.  -ML        Daily Summary of Progress (SLP) progress towards functional goals is fair  -ML        Barriers to Overall Progress (SLP) Medically complex;Other (see comments)  Emotional outbursts  -ML        Plan for Continued Treatment (SLP) continue treatment per plan of care  -ML        Care Plan Review care plan/treatment goals reviewed;patient/other agree to care plan  -ML        Care Plan Review, Other Participant(s) spouse  -ML           Recommendations    Therapy Frequency (SLP SLC)  5 days per week  -ML        Predicted Duration Therapy Intervention (Days) until discharge  -ML        Anticipated Discharge Disposition (SLP) inpatient rehabilitation facility  -ML                  User Key  (r) = Recorded By, (t) = Taken By, (c) = Cosigned By      Initials Name Effective Dates    ML Meera Abrams, CCC-SLP 06/16/21 -                        EDUCATION  The patient has been educated in the following areas:     Cognitive Impairment Communication Impairment.           SLP GOALS       Row Name 09/18/23 1545             (LTG) Patient will demonstrate functional swallow for    Diet Texture (Demonstrate functional swallow) soft to chew (chopped) textures  -ML      Liquid viscosity (Demonstrate functional swallow) nectar/ mildly thick liquids  -ML      [place] (Demonstrate functional swallow) with minimal cues (75-90% accuracy)  -ML      Time Frame (Demonstrate functional swallow) by discharge  -ML      Progress/Outcomes (Demonstrate functional swallow) goal ongoing  -ML      Comment (Demonstrate functional swallow) Today pt is able to accept, maintain, and manipulate ice chips.  Pharyngeal signs 1/10x.  -ML         (STG) Patient will tolerate therapeutic trials of    Consistencies Trialed (Tolerate therapeutic trials) pureed textures;thin liquids  -ML      Desired Outcome (Tolerate therapeutic trials) without signs/symptoms of aspiration;without signs of distress  -ML      [place] (Tolerate therapeutic trials) with minimal cues (75-90% accuracy)  -ML      Time Frame (Tolerate therapeutic trials) by discharge  -ML      Progress/Outcomes (Tolerate therapeutic trials) goal ongoing  -ML         (STG) Labial Strengthening Goal 1 (SLP)    Activity (Labial Strengthening Goal 1, SLP) increase labial tone  -ML      Increase Labial Tone labial resistance exercises;swallow trials  -ML      [place]/Accuracy (Labial Strengthening Goal 1, SLP) with moderate cues (50-74% accuracy)  -ML      Time  Frame (Labial Strengthening Goal 1, SLP) short term goal (STG)  -ML      Progress/Outcomes (Labial Strengthening Goal 1, SLP) progress slower than expected  -ML      Comment (Labial Strengthening Goal 1, SLP) Able to inconsistently achieve labial closure with seal.  Unable to smack lips.  Protrusion limited. Minimal labial retraction on right but good range on left.  No anterior loss wtih ice/water.  -ML         (STG) Lingual Strengthening Goal 1 (SLP)    Activity (Lingual Strengthening Goal 1, SLP) increase lingual tone/sensation/control/coordination/movement;increase tongue back strength  -ML      Increase Lingual Tone/Sensation/Control/Coordination/Movement swallow trials;lingual resistance exercises  -ML      Increase Tongue Back Strength lingual resistance exercises  -ML      [place]/Accuracy (Lingual Strengthening Goal 1, SLP) with moderate cues (50-74% accuracy)  -ML      Time Frame (Lingual Strengthening Goal 1, SLP) short term goal (STG)  -ML      Progress/Outcomes (Lingual Strengthening Goal 1, SLP) progress slower than expected  -ML      Comment (Lingual Strengthening Goal 1, SLP) Difficulty complying with directions for lingual movement/decreased coordination.  Improved lingual lateralization to labial corners with practice.  Lingual lateralization greater on left than right to cheek. Improved strength with tongue to cheek movement with practice.  -ML         (STG) Pharyngeal Strengthening Exercise Goal 1 (SLP)    Activity (Pharyngeal Strengthening Goal 1, SLP) increase timing;increase superior movement of the hyolaryngeal complex;increase anterior movement of the hyolaryngeal complex;increase closure at entrance to airway/closure of airway at glottis;increase squeeze/positive pressure generation  -ML      Increase Timing prepping - 3 second prep or suck swallow or 3-step swallow  -ML      Increase Superior Movement of the Hyolaryngeal Complex falsetto  -ML      Increase Anterior Movement of the  Hyolaryngeal Complex chin tuck against resistance (CTAR)  -ML      Increase Closure at Entrance to Airway/Closure of Airway at Glottis breath hold exercises;supraglottic swallow  -ML      Increase Squeeze/Positive Pressure Generation hard effortful swallow  -ML      [place]/Accuracy (Pharyngeal Strengthening Goal 1, SLP) with moderate cues (50-74% accuracy)  -ML      Time Frame (Pharyngeal Strengthening Goal 1, SLP) short term goal (STG)  -ML      Progress/Outcomes (Pharyngeal Strengthening Goal 1, SLP) progress slower than expected  -ML      Comment (Pharyngeal Strengthening Goal 1, SLP) Achieves a swallow with delay.  Unable to perform/falsetto, breath hold exercises, or effortful swallow.  -ML         Patient will demonstrate functional communication skills for return to discharge environment     [place] with moderate cues  -ML      Time frame by discharge  -ML      Progress/Outcomes continuing progress toward goal  -ML      Comments Motivated and tries to do what he can.  -ML         Comprehend Questions Goal 1 (SLP)    Improve Ability to Comprehend Questions Goal 1 (SLP) complex yes/no questions;80%;with minimal cues (75-90%)  -ML      Time Frame (Comprehend Questions Goal 1, SLP) short term goal (STG)  -ML      Progress/Outcomes (Comprehend Questions Goal 1, SLP) goal ongoing  -ML         Follow Directions Goal 2 (SLP)    Improve Ability to Follow Directions Goal 1 (SLP) 2 step commands;80%;with minimal cues (75-90%)  -ML      Time Frame (Follow Directions Goal 1, SLP) short term goal (STG)  -ML      Progress (Ability to Follow Directions Goal 1, SLP) 60%;with moderate cues (50-74%)  -ML      Progress/Outcomes (Follow Directions Goal 1, SLP) progress slower than expected  -ML         Word Retrieval Skills Goal 1 (SLP)    Improve Word Retrieval Skills By Goal 1 (SLP) confrontational naming task;high frequency;responsive naming task;simple;80%;with minimal cues (75-90%)  -ML      Time Frame (Word  Retrieval Goal 1, SLP) short term goal (STG)  -ML      Progress (Word Retrieval Skills Goal 1, SLP) 80%  -ML      Progress/Outcomes (Word Retrieval Goal 1, SLP) continuing progress toward goal  -ML      Comment (Word Retrieval Goal 1, SLP) 80% with responsive naming and 20% with wh questions with one word response.  -ML         Articulation Goal 1 (SLP)    Improve Articulation Goal 1 (SLP) by over-articulating at phrase level;80%;with moderate cues (50-74%)  -ML      Time Frame (Articulation Goal 1, SLP) short term goal (STG)  -ML      Progress (Articulation Goal 1, SLP) 60%;with moderate cues (50-74%)  -ML      Progress/Outcomes (Articulation Goal 1, SLP) continuing progress toward goal  -ML      Comment (Articulation Goal 1, SLP) Improves with model and cues, however no carryover to spontaneous speech/language tasks not targeting motor speech.  -ML         Orientation Goal 1 (SLP)    Improve Orientation Through Goal 1 (SLP) demonstrating orientation to day;demonstrating orientation to month;demonstrating orientation to year;demonstrating orientation to place;demonstrating orientation to disease/impairment;use environmental aids to assist with orientation;80%;with moderate cues (50-74%)  -ML      Time Frame (Orientation Goal 1, SLP) short term goal (STG)  -ML      Progress (Orientation Goal 1, SLP) 30%;with minimal cues (75-90%)  -ML      Progress/Outcomes (Orientation Goal 1, SLP) progress slower than expected  -ML      Comment (Orientation Goal 1, SLP) Oriented to self and wife/not location/time.  Reviewed with increased accuracy.  -ML                User Key  (r) = Recorded By, (t) = Taken By, (c) = Cosigned By      Initials Name Provider Type    ML Meera Abrams CCC-SLP Speech and Language Pathologist                            Time Calculation:      Time Calculation- SLP       Row Name 09/18/23 4339             Time Calculation- SLP    SLP Start Time 1545  -ML      SLP Received On 09/18/23  -ML                 User Key  (r) = Recorded By, (t) = Taken By, (c) = Cosigned By      Initials Name Provider Type     Meera Abrams CCC-SLP Speech and Language Pathologist                    Therapy Charges for Today       Code Description Service Date Service Provider Modifiers Qty    12133358225 HC ST TREATMENT SWALLOW 2 2023 Meera Abrams CCC-SLP GN 1    [id number] HC ST TREATMENT SPEECH 2 2023 Meera Abrams CCC-NIELS GN 1                       SRUTHI Shah  2023   and Acute [facility] - Speech Language Pathology   Swallow Treatment Note  [place]     Patient Name: Kenneth Wen  : 1950  MRN: [id number]  Today's Date: 2023               Admit Date: 9/15/2023    Visit Dx:     ICD-10-CM ICD-9-CM   1. Aphasia  R47.01 784.3   2. Dysarthria  R47.1 784.51   3. Oropharyngeal dysphagia  R13.12 787.22     Patient Active Problem List   Diagnosis    Precordial pain    Elevated BP without diagnosis of hypertension    Dyslipidemia    Hyperglycemia    Multiple bilateral CVAs    Hemorrhagic CVA    HFpEF    T2DM (type 2 diabetes mellitus)    HTN (hypertension)    GERD (gastroesophageal reflux disease)    ICH (intracerebral hemorrhage)    Oropharyngeal dysphagia     Past Medical History:   Diagnosis Date    Acid reflux     Cancer     Skin    Diabetes mellitus     Prediabetes    GERD (gastroesophageal reflux disease) 09/15/2023    HTN (hypertension) 09/15/2023    Kidney disease     T2DM (type 2 diabetes mellitus) 09/15/2023     Past Surgical History:   Procedure Laterality Date    APPENDECTOMY      HEMORRHOIDECTOMY      NASAL POLYP SURGERY         SLP Recommendation and Plan     SLP Diet Recommendation: NPO, temporary alternate methods of nutrition/hydration, other (see comments) (23 5838)  Recommended Precautions and Strategies: general aspiration precautions (23 7178)  SLP Rec. for Method of Medication Administration: meds via alternate route (23  1545)     Monitor for Signs of Aspiration: notify SLP if any concerns (09/18/23 1545)  Recommended Diagnostics: FEES (09/18/23 1545)     Anticipated Discharge Disposition (SLP): inpatient rehabilitation facility (09/18/23 1545)     Therapy Frequency (Swallow): 5 days per week (09/18/23 1545)  Predicted Duration Therapy Intervention (Days): until discharge (09/18/23 1545)  Oral Care Recommendations: Oral Care BID/PRN, Suction toothbrush (09/18/23 1545)        Daily Summary of Progress (SLP): progress towards functional goals is fair (09/18/23 1545)               Treatment Assessment (SLP): continued, oral dysphagia, pharyngeal dysphagia, dysarthria, aphasia (09/18/23 1545)  Treatment Assessment Comments (SLP): Improved ability to complly with directions for overarticulation as well as directions for oral movement. Improved accuracy of response, however dysarthria impact intelligibility of speech.  Tearful with outbursts of crying throughout the therapy session. (09/18/23 1545)  Plan for Continued Treatment (SLP): continue treatment per plan of care (09/18/23 1545)       Oral Care Recommendations: Oral Care BID/PRN, Suction toothbrush (09/18/23 1545)    Outcome Evaluation: SLP following for speech/communication/swallowing.  Tearful outbursts are interfering with treatment.      SWALLOW EVALUATION (last 72 hours)       SLP Adult Swallow Evaluation       Row Name 09/18/23 1545                   Recommendations    Therapy Frequency (Swallow) 5 days per week  -ML        SLP Diet Recommendation NPO;temporary alternate methods of nutrition/hydration;other (see comments)  -ML        Recommended Diagnostics FEES  -ML        Recommended Precautions and Strategies general aspiration precautions  -ML        Oral Care Recommendations Oral Care BID/PRN;Suction toothbrush  -ML        SLP Rec. for Method of Medication Administration meds via alternate route  -ML        Monitor for Signs of Aspiration notify SLP if any concerns  -ML                   User Key  (r) = Recorded By, (t) = Taken By, (c) = Cosigned By      Initials Name Effective Dates    ML Meera Abrams, CCC-SLP 06/16/21 -                     EDUCATION  The patient has been educated in the following areas:   Dysphagia (Swallowing Impairment).        SLP GOALS       Row Name 09/18/23 1545             (LTG) Patient will demonstrate functional swallow for    Diet Texture (Demonstrate functional swallow) soft to chew (chopped) textures  -ML      Liquid viscosity (Demonstrate functional swallow) nectar/ mildly thick liquids  -ML      [place] (Demonstrate functional swallow) with minimal cues (75-90% accuracy)  -ML      Time Frame (Demonstrate functional swallow) by discharge  -ML      Progress/Outcomes (Demonstrate functional swallow) goal ongoing  -ML      Comment (Demonstrate functional swallow) Today pt is able to accept, maintain, and manipulate ice chips.  Pharyngeal signs 1/10x.  -ML         (STG) Patient will tolerate therapeutic trials of    Consistencies Trialed (Tolerate therapeutic trials) pureed textures;thin liquids  -ML      Desired Outcome (Tolerate therapeutic trials) without signs/symptoms of aspiration;without signs of distress  -ML      [place] (Tolerate therapeutic trials) with minimal cues (75-90% accuracy)  -ML      Time Frame (Tolerate therapeutic trials) by discharge  -ML      Progress/Outcomes (Tolerate therapeutic trials) goal ongoing  -ML         (STG) Labial Strengthening Goal 1 (SLP)    Activity (Labial Strengthening Goal 1, SLP) increase labial tone  -ML      Increase Labial Tone labial resistance exercises;swallow trials  -ML      [place]/Accuracy (Labial Strengthening Goal 1, SLP) with moderate cues (50-74% accuracy)  -ML      Time Frame (Labial Strengthening Goal 1, SLP) short term goal (STG)  -ML      Progress/Outcomes (Labial Strengthening Goal 1, SLP) progress slower than expected  -ML      Comment (Labial Strengthening Goal 1,  SLP) Able to inconsistently achieve labial closure with seal.  Unable to smack lips.  Protrusion limited. Minimal labial retraction on right but good range on left.  No anterior loss wtih ice/water.  -ML         (STG) Lingual Strengthening Goal 1 (SLP)    Activity (Lingual Strengthening Goal 1, SLP) increase lingual tone/sensation/control/coordination/movement;increase tongue back strength  -ML      Increase Lingual Tone/Sensation/Control/Coordination/Movement swallow trials;lingual resistance exercises  -ML      Increase Tongue Back Strength lingual resistance exercises  -ML      [place]/Accuracy (Lingual Strengthening Goal 1, SLP) with moderate cues (50-74% accuracy)  -ML      Time Frame (Lingual Strengthening Goal 1, SLP) short term goal (STG)  -ML      Progress/Outcomes (Lingual Strengthening Goal 1, SLP) progress slower than expected  -ML      Comment (Lingual Strengthening Goal 1, SLP) Difficulty complying with directions for lingual movement/decreased coordination.  Improved lingual lateralization to labial corners with practice.  Lingual lateralization greater on left than right to cheek. Improved strength with tongue to cheek movement with practice.  -ML         (STG) Pharyngeal Strengthening Exercise Goal 1 (SLP)    Activity (Pharyngeal Strengthening Goal 1, SLP) increase timing;increase superior movement of the hyolaryngeal complex;increase anterior movement of the hyolaryngeal complex;increase closure at entrance to airway/closure of airway at glottis;increase squeeze/positive pressure generation  -ML      Increase Timing prepping - 3 second prep or suck swallow or 3-step swallow  -ML      Increase Superior Movement of the Hyolaryngeal Complex falsetto  -ML      Increase Anterior Movement of the Hyolaryngeal Complex chin tuck against resistance (CTAR)  -ML      Increase Closure at Entrance to Airway/Closure of Airway at Glottis breath hold exercises;supraglottic swallow  -ML      Increase  Squeeze/Positive Pressure Generation hard effortful swallow  -ML      [place]/Accuracy (Pharyngeal Strengthening Goal 1, SLP) with moderate cues (50-74% accuracy)  -ML      Time Frame (Pharyngeal Strengthening Goal 1, SLP) short term goal (STG)  -ML      Progress/Outcomes (Pharyngeal Strengthening Goal 1, SLP) progress slower than expected  -ML      Comment (Pharyngeal Strengthening Goal 1, SLP) Achieves a swallow with delay.  Unable to perform/falsetto, breath hold exercises, or effortful swallow.  -ML         Patient will demonstrate functional communication skills for return to discharge environment     [place] with moderate cues  -ML      Time frame by discharge  -ML      Progress/Outcomes continuing progress toward goal  -ML      Comments Motivated and tries to do what he can.  -ML         Comprehend Questions Goal 1 (SLP)    Improve Ability to Comprehend Questions Goal 1 (SLP) complex yes/no questions;80%;with minimal cues (75-90%)  -ML      Time Frame (Comprehend Questions Goal 1, SLP) short term goal (STG)  -ML      Progress/Outcomes (Comprehend Questions Goal 1, SLP) goal ongoing  -ML         Follow Directions Goal 2 (SLP)    Improve Ability to Follow Directions Goal 1 (SLP) 2 step commands;80%;with minimal cues (75-90%)  -ML      Time Frame (Follow Directions Goal 1, SLP) short term goal (STG)  -ML      Progress (Ability to Follow Directions Goal 1, SLP) 60%;with moderate cues (50-74%)  -ML      Progress/Outcomes (Follow Directions Goal 1, SLP) progress slower than expected  -ML         Word Retrieval Skills Goal 1 (SLP)    Improve Word Retrieval Skills By Goal 1 (SLP) confrontational naming task;high frequency;responsive naming task;simple;80%;with minimal cues (75-90%)  -ML      Time Frame (Word Retrieval Goal 1, SLP) short term goal (STG)  -ML      Progress (Word Retrieval Skills Goal 1, SLP) 80%  -ML      Progress/Outcomes (Word Retrieval Goal 1, SLP) continuing progress toward goal  -ML       Comment (Word Retrieval Goal 1, SLP) 80% with responsive naming and 20% with wh questions with one word response.  -ML         Articulation Goal 1 (SLP)    Improve Articulation Goal 1 (SLP) by over-articulating at phrase level;80%;with moderate cues (50-74%)  -ML      Time Frame (Articulation Goal 1, SLP) short term goal (STG)  -ML      Progress (Articulation Goal 1, SLP) 60%;with moderate cues (50-74%)  -ML      Progress/Outcomes (Articulation Goal 1, SLP) continuing progress toward goal  -ML      Comment (Articulation Goal 1, SLP) Improves with model and cues, however no carryover to spontaneous speech/language tasks not targeting motor speech.  -ML         Orientation Goal 1 (SLP)    Improve Orientation Through Goal 1 (SLP) demonstrating orientation to day;demonstrating orientation to month;demonstrating orientation to year;demonstrating orientation to place;demonstrating orientation to disease/impairment;use environmental aids to assist with orientation;80%;with moderate cues (50-74%)  -ML      Time Frame (Orientation Goal 1, SLP) short term goal (STG)  -ML      Progress (Orientation Goal 1, SLP) 30%;with minimal cues (75-90%)  -ML      Progress/Outcomes (Orientation Goal 1, SLP) progress slower than expected  -ML      Comment (Orientation Goal 1, SLP) Oriented to self and wife/not location/time.  Reviewed with increased accuracy.  -ML                User Key  (r) = Recorded By, (t) = Taken By, (c) = Cosigned By      Initials Name Provider Type    Meera Ricci CCC-SLP Speech and Language Pathologist                       Time Calculation:    Time Calculation- SLP       Row Name 09/18/23 1715             Time Calculation- SLP    SLP Start Time 1545  -ML      SLP Received On 09/18/23  -ML                User Key  (r) = Recorded By, (t) = Taken By, (c) = Cosigned By      Initials Name Provider Type    Meera Ricci CCC-SLP Speech and Language Pathologist                    Therapy Charges for  Today       Code Description Service Date Service Provider Modifiers Qty    20443733051 HC ST TREATMENT SWALLOW 2 9/18/2023 Meera Abrams, CCC-SLP GN 1    [id number] HC ST TREATMENT SPEECH 2 9/18/2023 Meera Abrams, KAMARI-SLP GN 1                 SRUTHI Shah  9/18/2023

## 2023-09-18 NOTE — PLAN OF CARE
Goal Outcome Evaluation:  Plan of Care Reviewed With: patient        Progress: no change  Outcome Evaluation: Pt performed STS x2 and SPT from bed to chair with maxA x2 and B UE support. Pt required physical assistance to weight shift L to R, unable to weight shift to advance L LE. Pt tolerated B LE ther ex with very minimal command following to participate. Continue to recommend PT skilled care and D/C to IP rehab facility.

## 2023-09-18 NOTE — PROGRESS NOTES
Pulmonary/Critical Care Follow-up     LOS: 3 days   Patient Care Team:  Susan Powers APRN as PCP - General (Family Medicine)    Chief Complaint: Stroke      Subjective     History reviewed and updated in EMR as indicated.    Interval History:     Patient is confused and dysarthric and unable to relay much history.  Right side is weak.  Family is at the bedside.  Patient is on 3% saline drip.  Phosphorus is getting replaced.    History taken from: Chart, staff    PMH/FH/Social History were reviewed and updated appropriately in the electronic medical record.     Review of Systems:    Review of 14 systems was completed with positives and pertinent negatives noted in the subjective section.  All other systems reviewed and are negative.   Exceptions are noted below:    Unable to obtain secondary to speech difficulty.      Objective     Vital Signs  Temp:  [98.2 °F (36.8 °C)-99.9 °F (37.7 °C)] 99.1 °F (37.3 °C)  Heart Rate:  [] 77  Resp:  [18-24] 20  BP: ()/(60-97) 146/85  09/17 0701 - 09/18 0700  In: 3197.8 [I.V.:1857.8]  Out: 3100 [Urine:3100]  Body mass index is 26.86 kg/m².     IV drips:  dextrose  niCARdipine, Last Rate: Stopped (09/18/23 0536)  sodium chloride, Last Rate: 30 mL/hr (09/18/23 0918)       Physical Exam:     Constitutional:   Awake and confused, mildly agitated at times.   Head:   Normocephalic, atraumatic   Eyes:           Lids and lashes normal, conjunctivae and sclerae normal.  PER   ENMT:  Ears appear intact with no abnormalities noted     Lips normal.     Neck:  Trachea midline, no JVD   Lungs/Resp:    Normal effort, symmetric chest rise, no crepitus, clear to      auscultation bilaterally.               Heart/CV:   Regular rhythm and normal rate, no murmur   Abdomen/GI:    Soft, nontender, nondistended   :    Deferred   Extremities/MSK:  No clubbing or cyanosis.  No edema.     Pulses:  Pulses palpable and equal bilaterally   Skin:  No bleeding, bruising or rash   Heme/Lymph:  No  cervical or supraclavicular adenopathy.   Neurologic:    Psychiatric:    Right hemiparetic.  Follows some commands.  Right facial droop.  Right neglect.  Dysarthria present.    Mildly agitated at times.    The above physical exam findings were reviewed and reflect my exam findings as of today's exam.   Electronically signed by:  Vivek Proctor MD  09/18/23  14:35 EDT      Results Review:     I reviewed the patient's new clinical results.   Results from last 7 days   Lab Units 09/18/23  1208 09/18/23  0525 09/18/23  0312 09/18/23  0018 09/17/23  1759 09/17/23  0536 09/16/23  2354 09/15/23  1138 09/15/23  0212   SODIUM mmol/L 152* 149*  --  151* 149*  149*   < > 147*   < > 141   POTASSIUM mmol/L 4.3 3.4* 3.8 3.8 3.5  3.5   < > 4.3   < > 3.2*   CHLORIDE mmol/L  --   --   --  120* 114*  --  116*   < > 104   CO2 mmol/L  --   --   --  19.0* 22.0  --  20.0*   < > 23.0   BUN mg/dL  --   --   --  24* 20  --  20   < > 21   CREATININE mg/dL  --   --   --  0.76 0.72*  --  0.62*   < > 0.76   CALCIUM mg/dL  --   --   --  8.7 8.9  --  8.8   < > 9.1   BILIRUBIN mg/dL  --   --   --   --  0.7  --   --   --  1.2   ALK PHOS U/L  --   --   --   --  71  --   --   --  75   ALT (SGPT) U/L  --   --   --   --  38  --   --   --  27   AST (SGOT) U/L  --   --   --   --  58*  --   --   --  34   GLUCOSE mg/dL  --   --   --  157* 145*  --  129*   < > 138*    < > = values in this interval not displayed.     Results from last 7 days   Lab Units 09/18/23  0018 09/16/23 2354 09/16/23  0607   WBC 10*3/mm3 11.07* 10.10 9.25   HEMOGLOBIN g/dL 13.9 13.2 12.9*   HEMATOCRIT % 41.3 38.6 37.6   PLATELETS 10*3/mm3 277 263 254         Results from last 7 days   Lab Units 09/18/23  1208 09/18/23  0525 09/18/23  0312 09/18/23  0018 09/17/23  1759 09/17/23  1153 09/17/23  0536   MAGNESIUM mg/dL  --   --   --  2.4 2.2  --  2.2   PHOSPHORUS mg/dL 2.9 1.9* 1.9*  --  1.9*   < > 2.1*    < > = values in this interval not displayed.       I reviewed the patient's  new imaging including images and reports.    CT head from 9/17/2023 was reviewed.  I reviewed both the images and the radiologist report.  Stable multifocal infarcts.  Radiologist impression follows:    Impression:  Stable appearance of evolving multifocal areas of infarct in the posterior circulation distribution more accentuated on the right. Stable areas of evolving parenchymal hemorrhage. No new areas of hemorrhage     Stable mild diffuse mass effect due to cerebral edema. No midline shift or herniation.    Medication Review:   amLODIPine, 5 mg, Nasogastric, Q24H  aspirin, 81 mg, Nasogastric, Daily  atorvastatin, 80 mg, Nasogastric, Nightly  clopidogrel, 75 mg, Nasogastric, Daily  insulin regular, 2-9 Units, Subcutaneous, Q6H  ipratropium-albuterol, 3 mL, Nebulization, 4x Daily - RT  metoprolol tartrate, 50 mg, Nasogastric, Q12H  pantoprazole, 40 mg, Intravenous, Q AM  piperacillin-tazobactam, 3.375 g, Intravenous, Q8H  sodium chloride, 10 mL, Intravenous, Q12H  sodium chloride, 10 mL, Intravenous, Q12H      dextrose, 6 mL/kg (Ideal)  niCARdipine, 5-15 mg/hr, Last Rate: Stopped (09/18/23 0536)  sodium chloride, 30 mL/hr, Last Rate: 30 mL/hr (09/18/23 0918)        Assessment & Plan         Hemorrhagic CVA    Dyslipidemia    Multiple bilateral CVAs    HFpEF    T2DM (type 2 diabetes mellitus)    HTN (hypertension)    GERD (gastroesophageal reflux disease)    ICH (intracerebral hemorrhage)    Oropharyngeal dysphagia    72 y.o. male former smoker with history of HTN, HLD, T2DM, GERD, who presented to [facility] on 9/10/2023 with multiple acute ischemic infarcts of the bilateral cerebral and cerebellar hemispheres as well as the left side of the isaac.  He was outside window for thrombolytics.    At OSH, he had both a TTE and JOSIAS which were negative for PFO.    On 9/13/2023 he had further decline in mental status with new inability to move his right leg and head CT showed evolution of the existing CVA  with new development of petechial hemorrhage.  DAPT was held for 24 hours per neurology recommendations and repeat CTh obtained the following evening showed worsening effacement of the right quadrigeminal cistern with upward supratentorial shift.  Due to further worsening need for neurosurgery evaluation he was transferred to [facility] for further evaluation and management.    Patient arrived to the neurological ICU on the morning of 9/15/2023.    Patient was started on hypertonic saline overnight on 9/15-16/2023 for cerebral edema.    Echocardiogram at OSH showed no evidence of PFO.    Patient has had some fevers with negative cultures to date and no evidence of UTI.  He did however develop worsening secretions and labored breathing and was started on Zosyn on 9/16/2023.  No evidence of infiltrate on chest x-ray.  Procalcitonin on 9/15/2023 was 0.07.    Plan:  1.  For multiple bilateral posterior circulation strokes/hemorrhagic conversion: Suspected by neurology to be atheroembolic but cannot rule out cardioembolic given multiple vascular territories: Neurology following.  Plavix 75 mg daily started 9/17/2023 for 3 months per neurology.  Aspirin 81 mg daily indefinitely per neurology started 9/16/2023.  Continue high-dose statin.  Neurology plans Holter monitor on discharge.  2.  For cerebral edema: Continue 3% saline (started 9/16/2023).  Goal sodium 145-155.  Neurology plans repeat CT scan 9/19/2023.  3.  For hypertension: Goal -160.  Currently metoprolol 50 mg every 12 hours.  4.  For GERD: Protonix.  5.  For dysphagia: N.p.o. per speech.  Start tube feeding.  Discussed with nutrition.  6.  DVT prophylaxis: Mechanical.    Patient is critically ill secondary to stroke with cerebral edema with risk of herniation and has high risk of life-threatening decompensation in condition.   As such, the patient requires continuous monitoring and frequent reassessment for consideration of adjustment in management to minimize  this risk.  I personally reassessed the patient multiple times today.    Critical care time : 45 minutes spent by me personally and independently.(This excludes time spent performing separately reportable procedures and services).  Critical care time includes high complexity decision making to assess, manipulate, and support vital organ system failure in this individual who has impairment of one or more vital organ systems such that there is a high probability of imminent or life threatening deterioration in the patient’s condition.    Electronically signed by:    Vivek Proctor MD  09/18/23  14:35 EDT      *. Please note that portions of this note were completed with [email or web address] - a voice recognition program.

## 2023-09-18 NOTE — PROGRESS NOTES
Clinical Nutrition     Nutrition Support Assessment  Reason for Visit: Identified at risk by screening criteria      Patient Name: Kenneth Wen  YOB: 1950  MRN: [id number]  Date of Encounter: 09/18/23 14:45 EDT  Admission date: 9/15/2023    Comments: Pt tolerating EN support of Impact Peptide 1.5 at 40 ml/hr, water flushes have been reduced to w/ medication only d/t presence of malignant cerebral edema and hyperosmolar therapy; pt is on 3% hypertonic saline at 15 ml/hr w/ current Na+ 152 mmol/L. Pt continues to advance to goal of 55 ml/hr, daily goal volume of 1100 mL.  Nutrition Assessment   Admission Diagnosis:  ICH (intracerebral hemorrhage) [I61.9]      Problem List:    Hemorrhagic CVA    Dyslipidemia    Multiple bilateral CVAs    HFpEF    T2DM (type 2 diabetes mellitus)    HTN (hypertension)    GERD (gastroesophageal reflux disease)    ICH (intracerebral hemorrhage)    Oropharyngeal dysphagia        PMH:  He  has a past medical history of Acid reflux, Cancer, Diabetes mellitus, GERD (gastroesophageal reflux disease) (09/15/2023), HTN (hypertension) (09/15/2023), Kidney disease, and T2DM (type 2 diabetes mellitus) (09/15/2023).    PSH: He  has a past surgical history that includes Appendectomy; Nasal polyp surgery; and Hemorrhoid surgery.      Applicable Nutrition Concerns:   Skin:  Oral:  GI:      Applicable Interval History:   9/16 3% Hypertonic saline initiated  9/15 FEES:  SLP Swallowing Diagnosis: mod-severe, oral dysphagia, severe, pharyngeal dysphagia (09/15/23 1331)  SLP Diet Recommendation: NPO, temporary alternate methods of nutrition/hydration, other (see comments) (okay for 2-3 ice chips per hour as tolerated)     Reported/Observed/Food/Nutrition Related History:   9/18 RN reports pt on 3% saline therapy Na+ goal 145-155, Na+ w/I range, pt from OSH sent here d/t hemorrhagic conversion continues to have R weakness, confusion, tolerating TF. Uncertain if pt should  have water flushes, these were dc'd after discussion w/ MD.       9/15  Per RN unclear if will start EN or if pt may progress to caden diet at this time.  No wt hx available today.       Labs    Labs Reviewed: Yes , Na+ goal 145-155 mmol/L    Results from last 7 days   Lab Units 09/18/23  1208 09/18/23  0525 09/18/23  0312 09/18/23  0018 09/17/23  1759 09/17/23  1153 09/17/23  0536 09/16/23  2354 09/15/23  1138 09/15/23  0212   GLUCOSE mg/dL  --   --   --  157* 145*  --   --  129*   < > 138*   BUN mg/dL  --   --   --  24* 20  --   --  20   < > 21   CREATININE mg/dL  --   --   --  0.76 0.72*  --   --  0.62*   < > 0.76   SODIUM mmol/L 152* 149*  --  151* 149*  149*   < > 146* 147*   < > 141   CHLORIDE mmol/L  --   --   --  120* 114*  --   --  116*   < > 104   POTASSIUM mmol/L 4.3 3.4* 3.8 3.8 3.5  3.5   < > 3.8 4.3   < > 3.2*   PHOSPHORUS mg/dL 2.9 1.9* 1.9*  --  1.9*   < > 2.1*  --    < > 2.2*   MAGNESIUM mg/dL  --   --   --  2.4 2.2  --  2.2 2.2   < > 2.0   ALT (SGPT) U/L  --   --   --   --  38  --   --   --   --  27    < > = values in this interval not displayed.         Results from last 7 days   Lab Units 09/17/23  1759 09/15/23  0212   ALBUMIN g/dL 3.8 4.2   CHOLESTEROL mg/dL 102 150   TRIGLYCERIDES mg/dL 60 49         Results from last 7 days   Lab Units 09/18/23  1135 09/18/23  0542 09/17/23  2352 09/17/23  1726 09/17/23  1141 09/17/23  0540   GLUCOSE mg/dL 134* 134* 144* 115 120 131*       Lab Results   Lab Value Date/Time    HGBA1C 6.40 (H) 09/15/2023 0212             Medications    Medications Reviewed: Yes  Pertinent: insulin, protonix, abx  Infusion: 3% hypertonic saline at 15 mL  PRN:       Intake/Ouptut 24 hrs (0701 - 0700)   I&O's Reviewed: Yes     Intake & Output (last day)         09/17 0701 09/18 0700 09/18 0701 09/19 0700    I.V. (mL/kg) 1857.8 (22.5) 207.4 (2.5)    Other 417 50    NG/ 302    IV Piggyback 100     Total Intake(mL/kg) 3197.8 (38.8) 559.4 (6.8)    Urine (mL/kg/hr) 3100 (1.6)  "850 (1.3)    Emesis/NG output      Stool 0 0    Total Output 3100 850    Net +97.8 -290.6          Urine Unmeasured Occurrence  1 x    Stool Unmeasured Occurrence 2 x 1 x          24 hr Net I&O: negative 1565 mL    Anthropometrics       Admission Height 175.3 cm (69\") Documented at 09/15/2023 0122   Admission Weight 79.2 kg (174 lb 9.7 oz) Documented at 09/15/2023 0122       Height: Height: 175.3 cm (69\")  Last Filed Weight: Weight: 82.5 kg (181 lb 14.1 oz) (09/17/23 0531)  Method: Weight Method: Bed scale  BMI: BMI (Calculated): 26.8  BMI classification: Overweight: 25.0-29.9kg/m2      9/15/2023 9/17/2023   Weight     Weight (kg) 79.2 kg  82.5 kg    Weight (lbs) 174 lb 9.7 oz  181 lb 14.1 oz    Weight Method Bed scale  Bed scale        UBW: Per  lbs on 9/14/23  Note 172 lbs in 2018  Weight change: uncertain at this time    Nutrition Focused Physical Exam     Date: 9/13    Unable to perform exam due to: Pt unable to participate at time of visit      Needs Assessment   Date:9/13    Height used: 175.3 cm  Weights used: 82.5 kg    Estimated Calorie needs: ~ 1650 Kcal/day  Method:  Kcals/KG 12-25 kcal/kg w/ critical illness = 990 - 2063 kcal  20 kcal/kg w/ R weakness = 1650 kcal  Method:  MSJ = 1571 * 1.25 for critical illness= 1963    Estimated Protein needs: ~ 107 g PRO/day  Method: 1.3 g/Kg     Estimated Fluid needs: ~ ml/day   To achieve Na+ goal of 145-155 mmol/L while on hypertonic saline regimen    Current Nutrition Prescription     PO: NPO Diet NPO Type: Strict NPO  Oral Nutrition Supplement:   Intake: N/A    EN: Impact Peptide 1.5  Goal Rate: 55 ml/hr  Water Flushes:  30 ml/hr  Modular: None  Route: NG  Tube: Large bore    At goal over: 20Hrs/day    Rx will supply:   Goal Volume 1100  mL/day     Flush Volume 600 mL/day     Energy 1650 Kcal/day 100 % Est Need   Protein 103 g/day 96 % Est Need   Fiber 0 g/day     Water in   mL     Total Water 1447 mL     Meet DRI Yes    "     --------------------------------------------------------------------------  Product/Rate verified at bedside: Yes  Infusing Rate at time of visit: 40 ml/hr    Average Delivery from [facility]:  Volume 417 mL/day 38  % Goal Vol.   Flush Volume 493 mL/day     Energy  Kcal/day  % Est Need   Protein  g/day  % Est Need   Fiber  g/day     Water in  EN  mL     Total Water  mL     Meet DRI No             Nutrition Diagnosis   Date: 9/15 Updated:   Problem Inadequate oral intake anticipated   Etiology stroke   Signs/Symptoms NPO w ice chips per SLP, confusion   Status: ongoing    Date:   Updated:  Problem Swallowing difficulty/chewing difficulty   Etiology Bilateral strokes   Signs/Symptoms Oral-pharyngeal dysphagia per SLP FEES eval   Status: ongoing  Goal:   General: Nutrition support treatment  PO: N/A  EN/PN: Establish EN tolerance, Tolerate EN at goal, Adjust EN, Deliver estimated needs    Nutrition Intervention      Follow treatment progress, Care plan reviewed, Nutrition support order placed    Water flushes dc'd w/ hypertonic saline administration, water to be provided w/ medication administration. Continue to advance EN to goal.  Monitoring/Evaluation:   Per protocol, I&O, Pertinent labs, Weight, Symptoms, Swallow function      Natalie López, MS,RD,LD  Time Spent: 40 min

## 2023-09-18 NOTE — PLAN OF CARE
Goal Outcome Evaluation:              Outcome Evaluation: SLP following for speech/communication/swallowing.  Tearful outbursts are interfering with treatment.

## 2023-09-19 ENCOUNTER — APPOINTMENT (OUTPATIENT)
Dept: GENERAL RADIOLOGY | Facility: HOSPITAL | Age: 73
End: 2023-09-19
Payer: MEDICARE

## 2023-09-19 ENCOUNTER — APPOINTMENT (OUTPATIENT)
Dept: CT IMAGING | Facility: HOSPITAL | Age: 73
End: 2023-09-19
Payer: MEDICARE

## 2023-09-19 LAB
ANION GAP SERPL CALCULATED.3IONS-SCNC: 10 MMOL/L (ref 5–15)
ANION GAP SERPL CALCULATED.3IONS-SCNC: 15 MMOL/L (ref 5–15)
BASOPHILS # BLD AUTO: 0.08 10*3/MM3 (ref 0–0.2)
BASOPHILS NFR BLD AUTO: 0.6 % (ref 0–1.5)
BUN SERPL-MCNC: 32 MG/DL (ref 8–23)
BUN SERPL-MCNC: 34 MG/DL (ref 8–23)
BUN/CREAT SERPL: 42.7 (ref 7–25)
BUN/CREAT SERPL: 50.7 (ref 7–25)
CALCIUM SPEC-SCNC: 9.2 MG/DL (ref 8.6–10.5)
CALCIUM SPEC-SCNC: 9.4 MG/DL (ref 8.6–10.5)
CHLORIDE SERPL-SCNC: 117 MMOL/L (ref 98–107)
CHLORIDE SERPL-SCNC: 120 MMOL/L (ref 98–107)
CO2 SERPL-SCNC: 19 MMOL/L (ref 22–29)
CO2 SERPL-SCNC: 23 MMOL/L (ref 22–29)
CREAT SERPL-MCNC: 0.67 MG/DL (ref 0.76–1.27)
CREAT SERPL-MCNC: 0.75 MG/DL (ref 0.76–1.27)
DEPRECATED RDW RBC AUTO: 45.6 FL (ref 37–54)
EGFRCR SERPLBLD CKD-EPI 2021: 96.5 ML/MIN/1.73
EGFRCR SERPLBLD CKD-EPI 2021: 99.8 ML/MIN/1.73
EOSINOPHIL # BLD AUTO: 0.22 10*3/MM3 (ref 0–0.4)
EOSINOPHIL NFR BLD AUTO: 1.6 % (ref 0.3–6.2)
ERYTHROCYTE [DISTWIDTH] IN BLOOD BY AUTOMATED COUNT: 13.3 % (ref 12.3–15.4)
GLUCOSE BLDC GLUCOMTR-MCNC: 116 MG/DL (ref 70–130)
GLUCOSE BLDC GLUCOMTR-MCNC: 138 MG/DL (ref 70–130)
GLUCOSE BLDC GLUCOMTR-MCNC: 139 MG/DL (ref 70–130)
GLUCOSE BLDC GLUCOMTR-MCNC: 141 MG/DL (ref 70–130)
GLUCOSE SERPL-MCNC: 161 MG/DL (ref 65–99)
GLUCOSE SERPL-MCNC: 166 MG/DL (ref 65–99)
HCT VFR BLD AUTO: 42.6 % (ref 37.5–51)
HGB BLD-MCNC: 14.3 G/DL (ref 13–17.7)
IMM GRANULOCYTES # BLD AUTO: 0.07 10*3/MM3 (ref 0–0.05)
IMM GRANULOCYTES NFR BLD AUTO: 0.5 % (ref 0–0.5)
LYMPHOCYTES # BLD AUTO: 1.39 10*3/MM3 (ref 0.7–3.1)
LYMPHOCYTES NFR BLD AUTO: 10.3 % (ref 19.6–45.3)
MCH RBC QN AUTO: 31.4 PG (ref 26.6–33)
MCHC RBC AUTO-ENTMCNC: 33.6 G/DL (ref 31.5–35.7)
MCV RBC AUTO: 93.6 FL (ref 79–97)
MONOCYTES # BLD AUTO: 1.04 10*3/MM3 (ref 0.1–0.9)
MONOCYTES NFR BLD AUTO: 7.7 % (ref 5–12)
NEUTROPHILS NFR BLD AUTO: 10.63 10*3/MM3 (ref 1.7–7)
NEUTROPHILS NFR BLD AUTO: 79.3 % (ref 42.7–76)
NRBC BLD AUTO-RTO: 0 /100 WBC (ref 0–0.2)
PLATELET # BLD AUTO: 336 10*3/MM3 (ref 140–450)
PMV BLD AUTO: 10.6 FL (ref 6–12)
POTASSIUM SERPL-SCNC: 3.7 MMOL/L (ref 3.5–5.2)
POTASSIUM SERPL-SCNC: 3.8 MMOL/L (ref 3.5–5.2)
POTASSIUM SERPL-SCNC: 4 MMOL/L (ref 3.5–5.2)
POTASSIUM SERPL-SCNC: 4 MMOL/L (ref 3.5–5.2)
POTASSIUM SERPL-SCNC: 4.1 MMOL/L (ref 3.5–5.2)
PROCALCITONIN SERPL-MCNC: 0.06 NG/ML (ref 0–0.25)
RBC # BLD AUTO: 4.55 10*6/MM3 (ref 4.14–5.8)
SODIUM SERPL-SCNC: 147 MMOL/L (ref 136–145)
SODIUM SERPL-SCNC: 150 MMOL/L (ref 136–145)
SODIUM SERPL-SCNC: 151 MMOL/L (ref 136–145)
SODIUM SERPL-SCNC: 152 MMOL/L (ref 136–145)
SODIUM SERPL-SCNC: 154 MMOL/L (ref 136–145)
WBC NRBC COR # BLD: 13.43 10*3/MM3 (ref 3.4–10.8)

## 2023-09-19 PROCEDURE — 85025 COMPLETE CBC W/AUTO DIFF WBC: CPT | Performed by: INTERNAL MEDICINE

## 2023-09-19 PROCEDURE — 84145 PROCALCITONIN (PCT): CPT

## 2023-09-19 PROCEDURE — 84295 ASSAY OF SERUM SODIUM: CPT

## 2023-09-19 PROCEDURE — 80048 BASIC METABOLIC PNL TOTAL CA: CPT | Performed by: INTERNAL MEDICINE

## 2023-09-19 PROCEDURE — 84132 ASSAY OF SERUM POTASSIUM: CPT | Performed by: NURSE PRACTITIONER

## 2023-09-19 PROCEDURE — 25010000002 PIPERACILLIN SOD-TAZOBACTAM PER 1 G: Performed by: NURSE PRACTITIONER

## 2023-09-19 PROCEDURE — 99232 SBSQ HOSP IP/OBS MODERATE 35: CPT | Performed by: STUDENT IN AN ORGANIZED HEALTH CARE EDUCATION/TRAINING PROGRAM

## 2023-09-19 PROCEDURE — 70450 CT HEAD/BRAIN W/O DYE: CPT

## 2023-09-19 PROCEDURE — 99291 CRITICAL CARE FIRST HOUR: CPT | Performed by: INTERNAL MEDICINE

## 2023-09-19 PROCEDURE — 71045 X-RAY EXAM CHEST 1 VIEW: CPT

## 2023-09-19 PROCEDURE — 82948 REAGENT STRIP/BLOOD GLUCOSE: CPT

## 2023-09-19 RX ORDER — ASPIRIN 81 MG/1
81 TABLET, CHEWABLE ORAL DAILY
Status: DISCONTINUED | OUTPATIENT
Start: 2023-09-19 | End: 2023-09-20

## 2023-09-19 RX ORDER — 3% SODIUM CHLORIDE 3 G/100ML
15 INJECTION, SOLUTION INTRAVENOUS CONTINUOUS
Status: DISCONTINUED | OUTPATIENT
Start: 2023-09-19 | End: 2023-09-20

## 2023-09-19 RX ORDER — 3% SODIUM CHLORIDE 3 G/100ML
15 INJECTION, SOLUTION INTRAVENOUS CONTINUOUS
Status: DISCONTINUED | OUTPATIENT
Start: 2023-09-19 | End: 2023-09-19

## 2023-09-19 RX ORDER — QUETIAPINE FUMARATE 25 MG/1
25 TABLET, FILM COATED ORAL EVERY 12 HOURS SCHEDULED
Status: DISCONTINUED | OUTPATIENT
Start: 2023-09-19 | End: 2023-09-20

## 2023-09-19 RX ORDER — QUETIAPINE FUMARATE 25 MG/1
25 TABLET, FILM COATED ORAL EVERY 12 HOURS SCHEDULED
Status: DISCONTINUED | OUTPATIENT
Start: 2023-09-19 | End: 2023-09-19

## 2023-09-19 RX ADMIN — METOPROLOL TARTRATE 50 MG: 50 TABLET ORAL at 20:04

## 2023-09-19 RX ADMIN — ASPIRIN 81 MG CHEWABLE TABLET 81 MG: 81 TABLET CHEWABLE at 09:04

## 2023-09-19 RX ADMIN — AMLODIPINE BESYLATE 5 MG: 5 TABLET ORAL at 09:04

## 2023-09-19 RX ADMIN — METOPROLOL TARTRATE 50 MG: 50 TABLET ORAL at 09:04

## 2023-09-19 RX ADMIN — ACETAMINOPHEN 650 MG: 325 TABLET ORAL at 09:04

## 2023-09-19 RX ADMIN — PIPERACILLIN SODIUM AND TAZOBACTAM SODIUM 3.38 G: 3; .375 INJECTION, SOLUTION INTRAVENOUS at 05:46

## 2023-09-19 RX ADMIN — PANTOPRAZOLE SODIUM 40 MG: 40 INJECTION, POWDER, LYOPHILIZED, FOR SOLUTION INTRAVENOUS at 05:46

## 2023-09-19 RX ADMIN — Medication 10 ML: at 09:04

## 2023-09-19 RX ADMIN — PIPERACILLIN SODIUM AND TAZOBACTAM SODIUM 3.38 G: 3; .375 INJECTION, SOLUTION INTRAVENOUS at 14:40

## 2023-09-19 RX ADMIN — ATORVASTATIN CALCIUM 80 MG: 40 TABLET, FILM COATED ORAL at 20:04

## 2023-09-19 RX ADMIN — CLOPIDOGREL BISULFATE 75 MG: 75 TABLET ORAL at 09:04

## 2023-09-19 RX ADMIN — PIPERACILLIN SODIUM AND TAZOBACTAM SODIUM 3.38 G: 3; .375 INJECTION, SOLUTION INTRAVENOUS at 22:02

## 2023-09-19 NOTE — NURSING NOTE
"Pt has been restless off and on throughout the day. He has been more restless when he has a bowel movement and will calm down afterwards. Neurological assessment has been the same throughout the day. Follows commands and sings off and on as well as talking  off and on. Several bowel movements throughout the day. Spoke with Dr. Hagan this morning about pt being more restless than normal. Dr. Hagan states \"pt looks the same as yesterday but she is ok with adding seroquel if Dr. Proctor is ok with it.\" Dr. Proctor states he is fine with this and put in an order for Seroquel 25 mg bid. Wife was at bedside when due and requested that we not give this dose due to the way it affected his mother when she was in the hospital. Dose was held.   "

## 2023-09-19 NOTE — PROGRESS NOTES
Pulmonary/Critical Care Follow-up     LOS: 4 days   Patient Care Team:  Susan Powers APRN as PCP - General (Family Medicine)    Chief Complaint: Stroke      Subjective     History reviewed and updated in EMR as indicated.    Interval History:     Patient is intermittently and moderately agitated this morning.  Remains weak on the right.  He was restless.  He complained of some left hip pain.  He had a large bowel movement.  Remains on 3% saline drip.      History taken from: Chart, staff    PMH/FH/Social History were reviewed and updated appropriately in the electronic medical record.     Review of Systems:    Review of 14 systems was completed with positives and pertinent negatives noted in the subjective section.  All other systems reviewed and are negative.   Exceptions are noted below:    Unable to obtain secondary to speech difficulty.      Objective     Vital Signs  Temp:  [98.5 °F (36.9 °C)-100.1 °F (37.8 °C)] 98.5 °F (36.9 °C)  Heart Rate:  [] 81  Resp:  [16-24] 20  BP: (128-161)/() 137/77  09/18 0701 - 09/19 0700  In: 1875.7 [I.V.:494.7]  Out: 2700 [Urine:2700]  Body mass index is 23.86 kg/m².     IV drips:  dextrose  niCARdipine, Last Rate: Stopped (09/18/23 0536)  sodium chloride, Last Rate: 15 mL/hr (09/19/23 0105)       Physical Exam:     Constitutional:   Awake and confused, mildly to moderately agitated at times.   Head:   Normocephalic, atraumatic   Eyes:           Lids and lashes normal, conjunctivae and sclerae normal.  PER   ENMT:  Ears appear intact with no abnormalities noted     Lips normal.     Neck:  Trachea midline, no JVD   Lungs/Resp:    Normal effort, symmetric chest rise, no crepitus, clear to      auscultation bilaterally.               Heart/CV:   Regular rhythm and normal rate, no murmur   Abdomen/GI:    Soft, nontender, nondistended   :    Deferred   Extremities/MSK:  No clubbing or cyanosis.  No edema.     Pulses:  Pulses palpable and equal bilaterally   Skin:  No  bleeding, bruising or rash   Heme/Lymph:  No cervical or supraclavicular adenopathy.   Neurologic:      Psychiatric:    Right hemiparetic but moves right a bit.  Follows some commands.  Right facial droop.  Right neglect.  Dysarthria present.    Mildly to moderately agitated at times.    The above physical exam findings were reviewed and reflect my exam findings as of today's exam.   Electronically signed by:  Vivek Proctor MD  09/19/23  11:01 EDT      Results Review:     I reviewed the patient's new clinical results.   Results from last 7 days   Lab Units 09/19/23  0614 09/18/23  2336 09/18/23  1851 09/18/23  0312 09/18/23  0018 09/17/23  1759 09/15/23  1138 09/15/23  0212   SODIUM mmol/L 150* 147* 152*   < > 151* 149*  149*   < > 141   POTASSIUM mmol/L 4.0 4.0 4.2   < > 3.8 3.5  3.5   < > 3.2*   CHLORIDE mmol/L 117*  --   --   --  120* 114*   < > 104   CO2 mmol/L 23.0  --   --   --  19.0* 22.0   < > 23.0   BUN mg/dL 32*  --   --   --  24* 20   < > 21   CREATININE mg/dL 0.75*  --   --   --  0.76 0.72*   < > 0.76   CALCIUM mg/dL 9.4  --   --   --  8.7 8.9   < > 9.1   BILIRUBIN mg/dL  --   --   --   --   --  0.7  --  1.2   ALK PHOS U/L  --   --   --   --   --  71  --  75   ALT (SGPT) U/L  --   --   --   --   --  38  --  27   AST (SGOT) U/L  --   --   --   --   --  58*  --  34   GLUCOSE mg/dL 161*  --   --   --  157* 145*   < > 138*    < > = values in this interval not displayed.     Results from last 7 days   Lab Units 09/19/23  0604 09/18/23  0018 09/16/23  2354   WBC 10*3/mm3 13.43* 11.07* 10.10   HEMOGLOBIN g/dL 14.3 13.9 13.2   HEMATOCRIT % 42.6 41.3 38.6   PLATELETS 10*3/mm3 336 277 263         Results from last 7 days   Lab Units 09/18/23  1208 09/18/23  0525 09/18/23  0312 09/18/23  0018 09/17/23  1759 09/17/23  1153 09/17/23  0536   MAGNESIUM mg/dL  --   --   --  2.4 2.2  --  2.2   PHOSPHORUS mg/dL 2.9 1.9* 1.9*  --  1.9*   < > 2.1*    < > = values in this interval not displayed.       I reviewed the  patient's new imaging including images and reports.    CT head f 9/19/2023: Appears stable.  Radiologist's impression follows:    Impression:  1.Stable subacute infarcts in the right greater than left cerebellum, left isaac and bilateral occipital lobes and right temporal lobe and right hippocampus.  2.Stable parenchymal hemorrhages in the occipital lobes.  3.Stable mild mass effect without herniation or midline shift.    Medication Review:   amLODIPine, 5 mg, Nasogastric, Q24H  aspirin, 81 mg, Nasogastric, Daily  atorvastatin, 80 mg, Nasogastric, Nightly  clopidogrel, 75 mg, Nasogastric, Daily  insulin regular, 2-9 Units, Subcutaneous, Q6H  metoprolol tartrate, 50 mg, Nasogastric, Q12H  pantoprazole, 40 mg, Intravenous, Q AM  piperacillin-tazobactam, 3.375 g, Intravenous, Q8H  sodium chloride, 10 mL, Intravenous, Q12H      dextrose, 6 mL/kg (Ideal)  niCARdipine, 5-15 mg/hr, Last Rate: Stopped (09/18/23 0536)  sodium chloride, 15 mL/hr, Last Rate: 15 mL/hr (09/19/23 0105)        Assessment & Plan         Hemorrhagic CVA    Dyslipidemia    Multiple bilateral CVAs    HFpEF    T2DM (type 2 diabetes mellitus)    HTN (hypertension)    GERD (gastroesophageal reflux disease)    ICH (intracerebral hemorrhage)    Oropharyngeal dysphagia    72 y.o. male former smoker with history of HTN, HLD, T2DM, GERD, who presented to [facility] on 9/10/2023 with multiple acute ischemic infarcts of the bilateral cerebral and cerebellar hemispheres as well as the left side of the isaac.  He was outside window for thrombolytics.    At OSH, he had both a TTE and JOSIAS which were negative for PFO.    On 9/13/2023 he had further decline in mental status with new inability to move his right leg and head CT showed evolution of the existing CVA with new development of petechial hemorrhage.  DAPT was held for 24 hours per neurology recommendations and repeat CTh obtained the following evening showed worsening effacement of the right  quadrigeminal cistern with upward supratentorial shift.  Due to further worsening need for neurosurgery evaluation he was transferred to [facility] for further evaluation and management.    Patient arrived to the neurological ICU on the morning of 9/15/2023.    Patient was started on hypertonic saline overnight on 9/15-16/2023 for cerebral edema.  Remains on 3% saline.  Neurology plans to discontinue this tomorrow if patient doing well.  Imaging 9/19/2023 showed stability.    Echocardiogram at OSH showed no evidence of PFO.    Patient has had some fevers with negative cultures to date and no evidence of UTI.  He did however develop worsening secretions and labored breathing and was started on Zosyn on 9/16/2023.  No evidence of infiltrate on chest x-ray.  Procalcitonin on 9/15/2023 was 0.07.  Repeat procalcitonin in 9/19/2023 was 0.06.  Repeat x-ray and if no significant infiltrate I will discontinue antibiotics.    Plan:  1.  For multiple bilateral posterior circulation strokes/hemorrhagic conversion: Suspected by neurology to be atheroembolic but cannot rule out cardioembolic given multiple vascular territories: Neurology following.  Plavix 75 mg daily started 9/17/2023 for 3 months per neurology.  Aspirin 81 mg daily indefinitely per neurology started 9/16/2023.  Continue high-dose statin.  Neurology plans Holter monitor on discharge.  2.  For cerebral edema: Continue 3% saline (started 9/16/2023).  Goal sodium 145-155.  Neurology plans to stop 3% saline tomorrow.  3.  For hypertension: Goal -160.  Currently metoprolol 50 mg every 12 hours.  4.  For GERD: Protonix.  5.  For dysphagia: N.p.o. per speech.  Start tube feeding.  Discussed with nutrition.  6.  For cough: Procalcitonin low x2.  Repeat chest x-ray and discontinue antibiotics if no infiltrate.  7.  DVT prophylaxis: Mechanical.  8.  For restlessness/agitation.  Start low-dose Seroquel.    Patient is critically ill secondary to stroke with cerebral edema  with risk of herniation and has high risk of life-threatening decompensation in condition.   As such, the patient requires continuous monitoring and frequent reassessment for consideration of adjustment in management to minimize this risk.  I personally reassessed the patient multiple times today.    Critical care time : 35 minutes spent by me personally and independently.(This excludes time spent performing separately reportable procedures and services).  Critical care time includes high complexity decision making to assess, manipulate, and support vital organ system failure in this individual who has impairment of one or more vital organ systems such that there is a high probability of imminent or life threatening deterioration in the patient’s condition.    Electronically signed by:    Vivek Proctor MD  09/19/23  11:01 EDT      *. Please note that portions of this note were completed with Mix[employer] - a voice recognition program.    19-Dec-2021 16:00

## 2023-09-19 NOTE — PROGRESS NOTES
"Clinical Nutrition Service      Patient Name: Kenneth Wen  YOB: 1950  MRN: [id number]  Admission date: 9/15/2023      Multidisciplinary Rounds/EN Support Monitor    Additional information obtained during MDR:  RN reports pt is restless, had a good BM, tolerating TFs, trying to move R side follows commands, Na+ 1 150 MD wants 1 more day of 3% saline ine infusion.    Current diet: NPO Diet NPO Type: Strict NPO    EN Support:  Product: Impact Peptide 1.5  Goal rate:  55 ml/hr  Goal volume (0701-0700): 1100 mL  Based on a feeding duration of 20 hrs/d  Modulars: None  Water flushes: none, w/ medication administration only while on hypertonic saline  Tube/Route:  NGT  Verified at bedside: yes    Pertinent medical data reviewed:  yes  Last filed wt: Weight: 73.3 kg (161 lb 9.6 oz) (09/19/23 0400)  Weight Method: Bed scale    BMI: BMI (Calculated): 23.9    Nutrition risk identified on nursing screen; MST score \"0\"    Labs Reviewed: yes  Pertinent: Na+ 150 w/i goal    Medications Reviewed: yes  Pertinent: 3% hypertonic saline at 15 ml/hr      24 hr I & O documentation (0701-0700):  1211 mL EN  ( 110 % daily goal volume) 50 ml water flush, stool occurrence x 4    Estimated/Assessed Needs (9/18/23 ):      Energy: 1650 kcal  Protein: 107 gm Pro  Fluids: to achieve Na+ goal    Intervention:  Continue current EN support  Plan of care and goals of care reviewed    Monitor:  Per protocol, I&O, Pertinent labs, EN delivery/tolerance, Weight, Symptoms, POC/GOC, Swallow function      Natalie López, MS,RD,LD  09/19/23 11:06 EDT  Time: 20  mins     "

## 2023-09-19 NOTE — PROGRESS NOTES
"Stroke Progress Note       Chief Complaint: Bilateral occipital and bilateral parietal infarct with hemorrhagic conversion    Subjective     Subjective:  Kenneth Wen was seen and examined at bedside alone.  The patient is alert and follows commands.  Oriented to person, place, and time today.  The patient denied headaches this morning.  He did not have any questions.      Objective      Temp:  [98.5 °F (36.9 °C)-100.1 °F (37.8 °C)] 98.5 °F (36.9 °C)  Heart Rate:  [] 81  Resp:  [16-24] 20  BP: (128-161)/() 137/77            Objective    Physical Exam:  General Appearance: Alert  Eyes: Anicteric sclera  HEENT: no scleral injection.  NG tube in place.  Lungs: respirations appear comfortable, no obvious increased work of breathing  Extremities: No cyanosis or fingernail clubbing   Skin: No rashes in exposed skin areas     Neurological Examination:   Mental status: Alert and oriented to person, place, and time. Hypophonic speech with moderate dysarthria.  Able to name and repeat.   Cranial Nerves: Visual fields intact.  Left gaze deviation but able to cross midline.  No right blink to threat.  Facial sensation intact.  Right facial droop.  Hearing grossly intact. Palate movement is symmetric.  No right shoulder shrug.  Tongue protrudes midline.   Sensory: Sensory exam grimaces and says \"ouch\" to noxious stimuli in right upper and lower extremity.  Motor: Normal tone throughout.   Left upper extremity: 5/5 deltoid, tricep, bicep, interosseous, hand .   Right upper extremity: 1/5 with noxious stimuli  Left lower extremity: 5/5 iliopsoas, knee extension/flexion, foot dorsi/plantarflexion.  Right lower extremity: 1/5 with noxious stimuli        Results Review:      CT head 9/10/2023.  Impression: No CT evidence of acute intracranial process; specifically, no mass/mass effect, hemorrhage, or acute infarction by CT. Mild chronic small vessel ischemic changes noted. CT offers limited sensitivity to the " detection of acute thrombotic infarct, and if clinically indicated, MRI may be beneficial.    CT angiogram head and neck 9/10/2023.  Impression: 1. No hemodynamically significant intracranial arterial stenosis. 2. 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 Please see above comments for full details.     MRI brain without contrast 9/11/2023.  Impression:  Numerous acute ischemic infarcts of the bilateral cerebral and cerebellar hemispheres and left side of the isaac. Mild chronic ischemic deep cerebral white matter disease.     CT head 9/13/2023.  Impression:   1. Evolving, bilateral occipital lobe and left thalamic infarcts. Subtle increased attenuation within the occipital lobe infarcts concerning for petechial hemorrhage. No julia parenchymal hemorrhage.  2. Moderate-sized, bilateral cerebellar infarcts, right greater than left.     EEG 9/13/2023.  Impression: This is an abnormal EEG.  The presence of mild generalized slowing is consistent with mild diffuse cortical dysfunction and mild nonspecific encephalopathy.  There were no epileptiform discharges seen.  No seizures were seen.      CT head without contrast 9/14/2023.  Impression: 1. Redemonstration of acute infarctions involving the left thalamus, bilateral PCA territories, right superior cerebellar artery, bilateral PICA territory distributions. Left bhavesh isaac infarction extending into the middle cerebellar peduncle, slightly more prominent compared to prior CT. 2. Local mass effect in the posterior fossa with mild effacement of the quadrigeminal cistern on the right side, mildly increased reflecting mild upward transtentorial pressure. No midline shift. Recommend continued follow-up imaging. 3. No new acute large territorial infarction, or hemorrhagic transformation.     CT head 9/15/2023.  Impression: Multiple areas of low-attenuation as described. Primarily this involves the posterior circulation including the occipital lobes  bilaterally and the cerebellum greater on the right than the left as well as the left aspect of the isaac. Findings may be secondary to infarction from the basilar artery given the multiple left and right vascular territories.    MRI brain 9/15/2023.  Impression: Extensive posterior circulation infarcts with hemorrhagic conversion as described.    CT head 9/16/2023.  Impression: 1. Stable appearance of multifocal areas of evolving infarct in the posterior circulation with stable areas of petechial and parenchymal hemorrhage. No new hemorrhage. 2. Mild mass effect due to cerebral edema. No herniation or midline shift.    CT head 9/17/2023.  Impression: Stable appearance of evolving multifocal areas of infarct in the posterior circulation distribution more accentuated on the right. Stable areas of evolving parenchymal hemorrhage. No new areas of hemorrhage. Stable mild diffuse mass effect due to cerebral edema. No midline shift or herniation.    Transthoracic echocardiogram 9/11/2023 left ventricular ejection fraction 60-65%, grade 1 diastolic dysfunction, normal left atrial size, no thrombus mentioned, no PFO mentioned.    EKG 9/15/2023 sinus rhythm with frequent premature ventricular complexes.    CT head 9/19/2023.  Impression: 1. Stable subacute infarcts in the right greater than left cerebellum, left isaac and bilateral occipital lobes and right temporal lobe and right hippocampus. 2. Stable parenchymal hemorrhages in the occipital lobes. 3. Stable mild mass effect without herniation or midline shift.    Labs:  Lab Results   Component Value Date    HGBA1C 6.40 (H) 09/15/2023      Lab Results   Component Value Date    CHOL 102 09/17/2023    TRIG 60 09/17/2023    HDL 36 (L) 09/15/2023     (H) 09/15/2023     LIVER FUNCTION TESTS:      Lab 09/17/23  1759 09/15/23  0212   TOTAL PROTEIN 6.8 7.2   ALBUMIN 3.8 4.2   GLOBULIN 3.0 3.0   ALT (SGPT) 38 27   AST (SGOT) 58* 34   BILIRUBIN 0.7 1.2   ALK PHOS 71 75                  Assessment/Plan     Assessment:    Kenneth Wen is a 72-year-old male with a past medical history of hypertension, prediabetes (hemoglobin A1c 6.4%), and hyperlipidemia who presented to [facility] on 9/10/2023 for encephalopathy and dysarthria.  CT head 9/10/2023 normal.  CT angiogram head and neck 9/10/2023 showed left vertebral artery occlusion.  MRI brain 9/11/2023 showed bilateral cerebral, bilateral cerebellar, and left pontine infarcts.  On 9/13/2023, the patient's encephalopathy worsened and repeat CT head 9/14/2023 showed local mass effect with mild effacement of the quadrangle cistern on the right side reflecting transtentorial pressure.  EEG 9/13/2023 was abnormal due to slowing but no seizure activity.  On 9/15/2023 the patient was transferred to [facility] for higher level of care.  Repeat MRI 9/15/2023 showed hemorrhagic conversion of bilateral occipital infarcts.  Dual antiplatelet therapy was held.  CT head 9/16/2023 showed stable petechial hemorrhage but mass effect due to cerebral edema.  The patient received 2 boluses of 3% normal saline but his sodium was not at goal.  Therefore, 3% normal saline drip was started 9/16/2023.  Aspirin 81 mg daily was restarted 9/16/2023 due to stable petechial hemorrhage.  CT head 9/17/2023 showed stable petechial hemorrhage and stable cerebral edema.  Clopidogrel 75 mg daily was restarted 9/17/2023.  Per report, at outside hospital TTE and JOSIAS negative for PFO (I was unable to personally see the results of the JOSIAS).  CT head 9/19/2023 stable.    #Extensive posterior circulation strokes-bilateral occipital, bilateral cerebellum, and left isaac  Suspect atheroembolic etiology but cannot rule out cardioembolic in the setting of stroke in multiple vascular territories.  #Hemorrhagic conversion of bilateral occipital infarcts per 9/15/2023 MRI  #Malignant cerebral edema per 9/16/2023 CT head  #Intracranial atherosclerotic disease      -Speech therapy recommendation: Medications via alternative route, FEES  -I suspect patient will need a PEG tube which per his family would be in line with his goals  -Continue aspirin 81 mg daily indefinitely.  Route: NG tube, restarted 9/16/2023  -Continue clopidogrel 75 mg daily for intracranial atherosclerotic disease x3 months (start 9/17/2023 end 12/17/2023).  Route: NG tube  -Continue atorvastatin 80 mg daily ()  -Repeat CT head stat for any change in mental status or neurologic change  -Please call stroke neurology for any change in neurologic status or severe headache  -3% normal saline for malignant cerebral edema (started 9/16/2023 end 9/20/2023)  -Goal serum sodium: 145-155  -Serum sodium every 6 hours  -Blood pressure parameters: -160 post petechial hemorrhage  -Neurochecks every 2 hours-monitor for atrial fibrillation on telemetry  -Holter monitor prior to discharge to monitor for atrial fibrillation        Discharge Planning: Stroke neurology will follow    Jenny Hagan MD  [facility] [place] STROKE NEURO  09/19/23  12:14 EDT

## 2023-09-20 ENCOUNTER — APPOINTMENT (OUTPATIENT)
Dept: MRI IMAGING | Facility: HOSPITAL | Age: 73
End: 2023-09-20
Payer: MEDICARE

## 2023-09-20 ENCOUNTER — ANCILLARY PROCEDURE (OUTPATIENT)
Dept: SPEECH THERAPY | Facility: HOSPITAL | Age: 73
End: 2023-09-20
Payer: MEDICARE

## 2023-09-20 LAB
ANION GAP SERPL CALCULATED.3IONS-SCNC: 11 MMOL/L (ref 5–15)
B PARAPERT DNA SPEC QL NAA+PROBE: NOT DETECTED
B PERT DNA SPEC QL NAA+PROBE: NOT DETECTED
BACTERIA SPEC AEROBE CULT: NORMAL
BACTERIA SPEC AEROBE CULT: NORMAL
BASOPHILS # BLD AUTO: 0.1 10*3/MM3 (ref 0–0.2)
BASOPHILS NFR BLD AUTO: 0.8 % (ref 0–1.5)
BUN SERPL-MCNC: 39 MG/DL (ref 8–23)
BUN/CREAT SERPL: 52 (ref 7–25)
C PNEUM DNA NPH QL NAA+NON-PROBE: NOT DETECTED
CALCIUM SPEC-SCNC: 9 MG/DL (ref 8.6–10.5)
CHLORIDE SERPL-SCNC: 121 MMOL/L (ref 98–107)
CO2 SERPL-SCNC: 20 MMOL/L (ref 22–29)
CREAT SERPL-MCNC: 0.75 MG/DL (ref 0.76–1.27)
DEPRECATED RDW RBC AUTO: 45.1 FL (ref 37–54)
EGFRCR SERPLBLD CKD-EPI 2021: 96.5 ML/MIN/1.73
EOSINOPHIL # BLD AUTO: 0.35 10*3/MM3 (ref 0–0.4)
EOSINOPHIL NFR BLD AUTO: 2.7 % (ref 0.3–6.2)
ERYTHROCYTE [DISTWIDTH] IN BLOOD BY AUTOMATED COUNT: 13.1 % (ref 12.3–15.4)
FLUAV SUBTYP SPEC NAA+PROBE: NOT DETECTED
FLUBV RNA ISLT QL NAA+PROBE: NOT DETECTED
GLUCOSE BLDC GLUCOMTR-MCNC: 111 MG/DL (ref 70–130)
GLUCOSE BLDC GLUCOMTR-MCNC: 123 MG/DL (ref 70–130)
GLUCOSE BLDC GLUCOMTR-MCNC: 132 MG/DL (ref 70–130)
GLUCOSE BLDC GLUCOMTR-MCNC: 150 MG/DL (ref 70–130)
GLUCOSE SERPL-MCNC: 161 MG/DL (ref 65–99)
HADV DNA SPEC NAA+PROBE: NOT DETECTED
HCOV 229E RNA SPEC QL NAA+PROBE: NOT DETECTED
HCOV HKU1 RNA SPEC QL NAA+PROBE: NOT DETECTED
HCOV NL63 RNA SPEC QL NAA+PROBE: NOT DETECTED
HCOV OC43 RNA SPEC QL NAA+PROBE: NOT DETECTED
HCT VFR BLD AUTO: 40.8 % (ref 37.5–51)
HGB BLD-MCNC: 13.5 G/DL (ref 13–17.7)
HMPV RNA NPH QL NAA+NON-PROBE: NOT DETECTED
HPIV1 RNA ISLT QL NAA+PROBE: NOT DETECTED
HPIV2 RNA SPEC QL NAA+PROBE: NOT DETECTED
HPIV3 RNA NPH QL NAA+PROBE: NOT DETECTED
HPIV4 P GENE NPH QL NAA+PROBE: NOT DETECTED
IMM GRANULOCYTES # BLD AUTO: 0.07 10*3/MM3 (ref 0–0.05)
IMM GRANULOCYTES NFR BLD AUTO: 0.5 % (ref 0–0.5)
LYMPHOCYTES # BLD AUTO: 1.74 10*3/MM3 (ref 0.7–3.1)
LYMPHOCYTES NFR BLD AUTO: 13.2 % (ref 19.6–45.3)
M PNEUMO IGG SER IA-ACNC: NOT DETECTED
MCH RBC QN AUTO: 31.3 PG (ref 26.6–33)
MCHC RBC AUTO-ENTMCNC: 33.1 G/DL (ref 31.5–35.7)
MCV RBC AUTO: 94.4 FL (ref 79–97)
MONOCYTES # BLD AUTO: 1.11 10*3/MM3 (ref 0.1–0.9)
MONOCYTES NFR BLD AUTO: 8.4 % (ref 5–12)
NEUTROPHILS NFR BLD AUTO: 74.4 % (ref 42.7–76)
NEUTROPHILS NFR BLD AUTO: 9.79 10*3/MM3 (ref 1.7–7)
NRBC BLD AUTO-RTO: 0 /100 WBC (ref 0–0.2)
PA ADP PRP-ACNC: 143 PRU
PLATELET # BLD AUTO: 335 10*3/MM3 (ref 140–450)
PMV BLD AUTO: 10.9 FL (ref 6–12)
POTASSIUM SERPL-SCNC: 3.8 MMOL/L (ref 3.5–5.2)
POTASSIUM SERPL-SCNC: 3.9 MMOL/L (ref 3.5–5.2)
POTASSIUM SERPL-SCNC: 3.9 MMOL/L (ref 3.5–5.2)
POTASSIUM SERPL-SCNC: 4.1 MMOL/L (ref 3.5–5.2)
RBC # BLD AUTO: 4.32 10*6/MM3 (ref 4.14–5.8)
RHINOVIRUS RNA SPEC NAA+PROBE: NOT DETECTED
RSV RNA NPH QL NAA+NON-PROBE: NOT DETECTED
SARS-COV-2 RNA NPH QL NAA+NON-PROBE: DETECTED
SODIUM SERPL-SCNC: 152 MMOL/L (ref 136–145)
SODIUM SERPL-SCNC: 157 MMOL/L (ref 136–145)
WBC NRBC COR # BLD: 13.16 10*3/MM3 (ref 3.4–10.8)

## 2023-09-20 PROCEDURE — 70547 MR ANGIOGRAPHY NECK W/O DYE: CPT

## 2023-09-20 PROCEDURE — 25010000002 LORAZEPAM PER 2 MG: Performed by: PSYCHIATRY & NEUROLOGY

## 2023-09-20 PROCEDURE — 84132 ASSAY OF SERUM POTASSIUM: CPT | Performed by: NURSE PRACTITIONER

## 2023-09-20 PROCEDURE — 0202U NFCT DS 22 TRGT SARS-COV-2: CPT

## 2023-09-20 PROCEDURE — 82948 REAGENT STRIP/BLOOD GLUCOSE: CPT

## 2023-09-20 PROCEDURE — 92526 ORAL FUNCTION THERAPY: CPT

## 2023-09-20 PROCEDURE — 84132 ASSAY OF SERUM POTASSIUM: CPT

## 2023-09-20 PROCEDURE — 92612 ENDOSCOPY SWALLOW (FEES) VID: CPT

## 2023-09-20 PROCEDURE — 70544 MR ANGIOGRAPHY HEAD W/O DYE: CPT

## 2023-09-20 PROCEDURE — 97530 THERAPEUTIC ACTIVITIES: CPT

## 2023-09-20 PROCEDURE — 85576 BLOOD PLATELET AGGREGATION: CPT

## 2023-09-20 PROCEDURE — 99232 SBSQ HOSP IP/OBS MODERATE 35: CPT

## 2023-09-20 PROCEDURE — 97110 THERAPEUTIC EXERCISES: CPT

## 2023-09-20 PROCEDURE — 80048 BASIC METABOLIC PNL TOTAL CA: CPT | Performed by: INTERNAL MEDICINE

## 2023-09-20 PROCEDURE — 63710000001 INSULIN LISPRO (HUMAN) PER 5 UNITS: Performed by: INTERNAL MEDICINE

## 2023-09-20 PROCEDURE — 99291 CRITICAL CARE FIRST HOUR: CPT | Performed by: INTERNAL MEDICINE

## 2023-09-20 PROCEDURE — 25010000002 PIPERACILLIN SOD-TAZOBACTAM PER 1 G: Performed by: NURSE PRACTITIONER

## 2023-09-20 PROCEDURE — 84295 ASSAY OF SERUM SODIUM: CPT

## 2023-09-20 PROCEDURE — 85025 COMPLETE CBC W/AUTO DIFF WBC: CPT | Performed by: INTERNAL MEDICINE

## 2023-09-20 RX ORDER — ESCITALOPRAM OXALATE 10 MG/1
10 TABLET ORAL DAILY
Status: DISCONTINUED | OUTPATIENT
Start: 2023-09-21 | End: 2023-09-20

## 2023-09-20 RX ORDER — NICOTINE POLACRILEX 4 MG
15 LOZENGE BUCCAL
Status: DISCONTINUED | OUTPATIENT
Start: 2023-09-20 | End: 2023-09-21

## 2023-09-20 RX ORDER — ATORVASTATIN CALCIUM 40 MG/1
80 TABLET, FILM COATED ORAL NIGHTLY
Status: DISCONTINUED | OUTPATIENT
Start: 2023-09-20 | End: 2023-09-21

## 2023-09-20 RX ORDER — PANTOPRAZOLE SODIUM 40 MG/1
40 TABLET, DELAYED RELEASE ORAL
Status: DISCONTINUED | OUTPATIENT
Start: 2023-09-21 | End: 2023-09-21 | Stop reason: ALTCHOICE

## 2023-09-20 RX ORDER — ACETAMINOPHEN 650 MG/1
650 SUPPOSITORY RECTAL EVERY 4 HOURS PRN
Status: DISCONTINUED | OUTPATIENT
Start: 2023-09-20 | End: 2023-09-21

## 2023-09-20 RX ORDER — INSULIN LISPRO 100 [IU]/ML
2-9 INJECTION, SOLUTION INTRAVENOUS; SUBCUTANEOUS
Status: DISCONTINUED | OUTPATIENT
Start: 2023-09-20 | End: 2023-09-21

## 2023-09-20 RX ORDER — ESCITALOPRAM OXALATE 10 MG/1
10 TABLET ORAL DAILY
Status: DISCONTINUED | OUTPATIENT
Start: 2023-09-20 | End: 2023-09-21

## 2023-09-20 RX ORDER — ASPIRIN 81 MG/1
81 TABLET, CHEWABLE ORAL DAILY
Status: DISCONTINUED | OUTPATIENT
Start: 2023-09-21 | End: 2023-09-21

## 2023-09-20 RX ORDER — ESCITALOPRAM OXALATE 10 MG/1
10 TABLET ORAL DAILY
Status: DISCONTINUED | OUTPATIENT
Start: 2023-09-20 | End: 2023-09-20

## 2023-09-20 RX ORDER — CLOPIDOGREL BISULFATE 75 MG/1
75 TABLET ORAL DAILY
Status: DISCONTINUED | OUTPATIENT
Start: 2023-09-21 | End: 2023-09-21

## 2023-09-20 RX ORDER — DEXTROSE MONOHYDRATE 25 G/50ML
25 INJECTION, SOLUTION INTRAVENOUS
Status: DISCONTINUED | OUTPATIENT
Start: 2023-09-20 | End: 2023-09-21

## 2023-09-20 RX ORDER — QUETIAPINE FUMARATE 25 MG/1
25 TABLET, FILM COATED ORAL EVERY 12 HOURS SCHEDULED
Status: DISCONTINUED | OUTPATIENT
Start: 2023-09-20 | End: 2023-09-21

## 2023-09-20 RX ORDER — ACETAMINOPHEN 325 MG/1
650 TABLET ORAL EVERY 6 HOURS PRN
Status: DISCONTINUED | OUTPATIENT
Start: 2023-09-20 | End: 2023-09-21

## 2023-09-20 RX ORDER — IBUPROFEN 600 MG/1
1 TABLET ORAL
Status: DISCONTINUED | OUTPATIENT
Start: 2023-09-20 | End: 2023-09-21

## 2023-09-20 RX ORDER — LORAZEPAM 2 MG/ML
1 INJECTION INTRAMUSCULAR ONCE
Status: COMPLETED | OUTPATIENT
Start: 2023-09-21 | End: 2023-09-20

## 2023-09-20 RX ADMIN — Medication 10 ML: at 08:33

## 2023-09-20 RX ADMIN — LORAZEPAM 1 MG: 2 INJECTION INTRAMUSCULAR; INTRAVENOUS at 23:25

## 2023-09-20 RX ADMIN — Medication 10 ML: at 20:19

## 2023-09-20 RX ADMIN — QUETIAPINE FUMARATE 25 MG: 25 TABLET ORAL at 00:00

## 2023-09-20 RX ADMIN — QUETIAPINE FUMARATE 25 MG: 25 TABLET ORAL at 08:12

## 2023-09-20 RX ADMIN — INSULIN LISPRO 2 UNITS: 100 INJECTION, SOLUTION INTRAVENOUS; SUBCUTANEOUS at 18:17

## 2023-09-20 RX ADMIN — SODIUM CHLORIDE 15 ML/HR: 3 INJECTION, SOLUTION INTRAVENOUS at 00:17

## 2023-09-20 RX ADMIN — ESCITALOPRAM OXALATE 10 MG: 10 TABLET ORAL at 13:10

## 2023-09-20 RX ADMIN — ACETAMINOPHEN 650 MG: 325 TABLET ORAL at 00:00

## 2023-09-20 RX ADMIN — LANSOPRAZOLE 15 MG: 15 TABLET, ORALLY DISINTEGRATING ORAL at 05:52

## 2023-09-20 RX ADMIN — PIPERACILLIN SODIUM AND TAZOBACTAM SODIUM 3.38 G: 3; .375 INJECTION, SOLUTION INTRAVENOUS at 05:52

## 2023-09-20 RX ADMIN — ASPIRIN 81 MG CHEWABLE TABLET 81 MG: 81 TABLET CHEWABLE at 08:13

## 2023-09-20 RX ADMIN — ATORVASTATIN CALCIUM 80 MG: 40 TABLET, FILM COATED ORAL at 20:19

## 2023-09-20 RX ADMIN — QUETIAPINE FUMARATE 25 MG: 25 TABLET ORAL at 20:19

## 2023-09-20 RX ADMIN — CLOPIDOGREL BISULFATE 75 MG: 75 TABLET ORAL at 08:12

## 2023-09-20 RX ADMIN — METOPROLOL TARTRATE 25 MG: 25 TABLET, FILM COATED ORAL at 20:19

## 2023-09-20 NOTE — PROGRESS NOTES
Stroke Progress Note       Chief Complaint: Bilateral strokes    Subjective    Subjective     Subjective:  Patient continues to have severe right sided weakness and dysarthria.  He is very unhappy this morning and reluctant to participate with parts of my exam.  He is currently in a soft wrist restraint and hand mitt restraint (left) for pulling at tubes/lines.  He is receiving enteral nutrition through his NG tube.        Objective    Objective      Temp:  [97.8 °F (36.6 °C)-98.9 °F (37.2 °C)] 97.8 °F (36.6 °C)  Heart Rate:  [] 92  Resp:  [18-20] 20  BP: (108-158)/() 123/72        Neurological Exam  Mental Status  Alert. Oriented only to person. Orientation: Patient reluctant to answer my orientation questions. Severe dysarthria present. Language is fluent with no aphasia.    Cranial Nerves  CN II: Vision test: ? Left homonymous hemianopia. Left homonymous hemianopsia.  CN III, IV, VI: Extraocular movements intact bilaterally. Pupils equal round and reactive to light bilaterally.  CN V: Facial sensation is normal.  CN VII:  Right: There is central facial weakness.  CN VIII: Hearing appears to be intact.    Motor  Normal muscle bulk throughout. No fasciculations present. Decreased muscle tone.  LUE with no drift, 5/5 strength  LLE with no drift, 5/5 strength  RUE with 0/5 strength  RLE with 1/5 strength.    Sensory  Sensation: Appears to be intact bilaterally; patient will not cooperate with exam.     Coordination  Right: Unable to assess secondary to hemiplegia. Unable to assess secondary to weakness.  No obvious dysmetria in LUE.    Gait    Not observed.    Physical Exam  Vitals and nursing note reviewed.   Constitutional:       Appearance: He is ill-appearing.   HENT:      Head: Normocephalic.      Nose:      Comments: R nare NGT in place     Mouth/Throat:      Mouth: Mucous membranes are dry.   Eyes:      Extraocular Movements: Extraocular movements intact.      Pupils: Pupils are equal, round, and  reactive to light.      Comments: ? Left homonymous hemianopia   Cardiovascular:      Rate and Rhythm: Normal rate and regular rhythm.   Pulmonary:      Effort: Pulmonary effort is normal. No respiratory distress.      Comments: On room air  Musculoskeletal:      Right lower leg: No edema.      Left lower leg: No edema.   Skin:     General: Skin is warm and dry.   Neurological:      Mental Status: He is alert. He is disoriented.      Cranial Nerves: Cranial nerve deficit and dysarthria present.      Sensory: No sensory deficit.      Motor: Weakness present.      Coordination: Coordination normal.   Psychiatric:         Attention and Perception: He is inattentive.         Mood and Affect: Affect is flat and angry.         Speech: Speech is slurred.         Behavior: Behavior is uncooperative and agitated.         Cognition and Memory: Cognition normal. Memory is impaired.         Judgment: Judgment is impulsive.       Results Review:    I reviewed the patient's new clinical results.    CT head without contrast (9/19) remains stable; subacute infarcts within bilateral cerebellum (right>left), left iasac, right temporal lobe, right hippocampus, and bilateral occipital lobes.  Stable appearance of hemorrhage in bilateral occipital lobes and right temporal lobe.  No evidence of new hemorrhage.    JOSIAS (OSH) concentric hypertrophy noted, EF 60-65%, no evidence of PFO, LA cavity normal size.    EEG (OSH) with slowing but no evidence of ongoing seizure activity.    Glucose 161  Creatinine 0.75, BUN 39  Sodium 152  WBC 13.16, improving  H/H13.5/40.8  Platelets 335  A1c 6.40            Assessment/Plan     Assessment/Plan:    This is a 72-year-old male with known medical diagnoses of essential hypertension, diabetes mellitus type 2, CKD, hyperlipidemia, and remote tobacco abuse who was a transfer from [facility] on 9/15/2023 for high-level care and further stroke work-up.  Patient initially presented to OSH ED  for altered mental status, shortness of breath and projectile vomiting.  Vital signs were stable however lactic acid was elevated at 3.49.  CTA was obtained and revealed left vertebral artery occlusion.  MRI showed numerous acute ischemic infarcts of the bilateral cerebral and cerebellar hemispheres and left side of the isaac.      Repeat CTH results evolving bilateral occipital lobe and left cerebellar infarcts with concern for petechial hemorrhage. Patient was  not a candidate for IV thrombolytic therapy due to ICH on CT scans and was not a candidate for endovascular therapy given no evidence of LVO on CTA head/neck.     Antiplatelet PTA: None  Anticoagulant PTA: None      Multifocal, multi-territorial acute ischemic strokes with asymptomatic hemorrhagic conversion, etiology cardioembolic verses athromboembolic        Malignant cerebral edema        Left vertebral artery occlusion  -TIA/CVA order set is currently in place  -Will discontinue 3% NS today; continue Q6 hour Na+ for the next 24 hours (Na+ this morning 152)  -Continue DAPT x 90 days (12/15/2023), then stop Plavix and continue with ASA monotherapy (325mg)  -PT/OT continue to work with patient, activity as tolerated  -Recommend to continue Seroquel 25mg BID for agitation; patient has received 2 doses, can increase if needed  -Will start low dose Lexapro 10mg for depression/agitation  -Will repeat MRA head/neck dissection protocol to evaluate for possible left vertebral artery dissection/thrombus  -Repeat CT in AM  -Stroke clinic follow-up in 8 weeks    Essential hypertension  -OK for normal blood pressure parameters, goal 120-140/80-90  -Avoid severe hypotension given intracranial atherosclerotic disease  -Management per hospitalist    Hyperlipidemia  -, goal <70  -Continue atorvastatin 80mg    Diabetes mellitus type 2  -A1c 6.40, goal <7  -Maintain euglycemia, goal <140  -Management per Intensivist    Dysphagia  -SLP continues to follow; patient  failed FEES earlier in week  -NG tube in place for medication administration and enteral feeding; will likely need PEG. Discussed with patient who states he is agreeable.  -Addendum 1218: Per SLP patient passed FEES today and is on a modified diet; mechanical soft, honey thick liquids    Plan of care was discussed with patient and primary team.  Stroke neurology will continue to follow.  Please call with any questions or concerns.    ROLANDO Lau  09/20/23  06:58 EDT

## 2023-09-20 NOTE — PROGRESS NOTES
"Clinical Nutrition Service      Patient Name: Kenneth Wen  YOB: 1950  MRN: [id number]  Admission date: 9/15/2023      Multidisciplinary Rounds/EN Support Monitor    Additional information obtained during MDR:  RN reports pt to have FEES today, MD notes pt will likely need PEG tube. Neurology has dc'd hypertonic therapy, water flushes added, MD wants very slow correction of sodium.  Later advised by RN pt passed FEES wiill need HTL, she also advises he must be agitated to wake to eat. Discussed maintain TF today until oral intake is established.    Current diet: NPO Diet NPO Type: Strict NPO    EN Support:  Product: Impact Peptide 1.5  Goal rate:  55 ml/hr  Goal volume (6857-1094): 1100 mL  Based on a feeding duration of 20 hrs/d  Modulars: None  Water flushes: none, w/ medication administration only while on hypertonic saline  Tube/Route:  NGT  Verified at bedside: yes, at goal    Pertinent medical data reviewed:  yes  Last filed wt: Weight: 73 kg (160 lb 15 oz) (09/20/23 0400)  Weight Method: Bed scale    BMI: BMI (Calculated): 23.8    Nutrition risk identified on nursing screen; MST score \"0\"    Labs Reviewed: yes  Pertinent: Na+ 152 w/i goal    Medications Reviewed: yes  Pertinent: 3% hypertonic saline discontinued      24 hr I & O documentation (0796-2043):  877 mL EN  (79% daily goal volume) , stool occurrence x 4    Estimated/Assessed Needs (9/18/23 ):      Energy: 1650 kcal  Protein: 107 gm Pro  Fluids: 1650 ml    Intervention:  Continue current EN support  Addition of water flushes at 10 ml/hr, slow correction of sodium  Await FEES results - establish oral intake prior to dc of EN  Plan of care and goals of care reviewed    Monitor:  Per protocol, I&O, Pertinent labs, EN delivery/tolerance, Weight, Symptoms, POC/GOC, Swallow function      Natalie López MS,RD,LD  09/20/23 11:06 EDT  Time: 20  mins     "

## 2023-09-20 NOTE — CASE MANAGEMENT/SOCIAL WORK
Continued Stay Note  [facility]     Patient Name: Kenneth Wen  MRN: [id number]  Today's Date: 9/20/2023    Admit Date: 9/15/2023    Plan: [facility]   Discharge Plan       Row Name 09/20/23 1129       Plan    Plan [facility]    Patient/Family in Agreement with Plan other (see comments)    Plan Comments Pt was discussed in [facility] today. Pt continues to required NT and Tube feed. Pt is scheduled for a FEES today with Speech. April/[facility] continues to follow for D/C plan.  will continue to follow.    Final Discharge Disposition Code 62 - inpatient rehab facility                   Discharge Codes    No documentation.                 Expected Discharge Date and Time       Expected Discharge Date Expected Discharge Time    Sep 25, 2023               Jenny Martinez RN

## 2023-09-20 NOTE — MBS/VFSS/FEES
Acute Care - Speech Language Pathology   Swallow Re-Evaluation [facility]  Fiberoptic Endoscopic Evaluation of Swallowing (FEES)       Patient Name: Kenneth Wen  : 1950  MRN: [id number]  Today's Date: 2023               Admit Date: 9/15/2023    Visit Dx:     ICD-10-CM ICD-9-CM   1. Aphasia  R47.01 784.3   2. Dysarthria  R47.1 784.51   3. Oropharyngeal dysphagia  R13.12 787.22     Patient Active Problem List   Diagnosis    Precordial pain    Elevated BP without diagnosis of hypertension    Dyslipidemia    Hyperglycemia    Multiple bilateral CVAs    Hemorrhagic CVA    HFpEF    T2DM (type 2 diabetes mellitus)    HTN (hypertension)    GERD (gastroesophageal reflux disease)    ICH (intracerebral hemorrhage)    Oropharyngeal dysphagia     Past Medical History:   Diagnosis Date    Acid reflux     Cancer     Skin    Diabetes mellitus     Prediabetes    GERD (gastroesophageal reflux disease) 09/15/2023    HTN (hypertension) 09/15/2023    Kidney disease     T2DM (type 2 diabetes mellitus) 09/15/2023     Past Surgical History:   Procedure Laterality Date    APPENDECTOMY      HEMORRHOIDECTOMY      NASAL POLYP SURGERY         SLP Recommendation and Plan  SLP Swallowing Diagnosis: moderate, oral dysphagia, pharyngeal dysphagia (23 1000)  SLP Diet Recommendation: mechanical ground textures, honey thick liquids (23 1000)  Recommended Precautions and Strategies: general aspiration precautions, small bites of food and sips of liquid, upright posture during/after eating (23 1000)  SLP Rec. for Method of Medication Administration: meds whole, as tolerated, with puree (23 1000)     Monitor for Signs of Aspiration: yes, notify SLP if any concerns (23 1000)  Recommended Diagnostics: FEES (23 1000)  Swallow Criteria for Skilled Therapeutic Interventions Met: demonstrates skilled criteria (23 1000)  Anticipated Discharge Disposition (SLP): inpatient rehabilitation facility  (09/20/23 1000)  Rehab Potential/Prognosis, Swallowing: good, to achieve stated therapy goals (09/20/23 1000)  Therapy Frequency (Swallow): 5 days per week (09/20/23 1000)  Predicted Duration Therapy Intervention (Days): until discharge (09/20/23 1000)  Oral Care Recommendations: Oral Care BID/PRN (09/20/23 1000)                                      Oral Care Recommendations: Oral Care BID/PRN (09/20/23 1000)    Plan of Care Reviewed With: patient  Progress: improving      SWALLOW EVALUATION (last 72 hours)       SLP Adult Swallow Evaluation       Row Name 09/20/23 1000 09/18/23 1541                Rehab Evaluation    Document Type re-evaluation  -AV --       Patient Observations alert;cooperative  -AV --       Patient/Family/Caregiver Comments/Observations none  -AV --       Patient Effort adequate  -AV --          Pain    Additional Documentation Pain Scale: FACES Pre/Post-Treatment (Group)  -AV --          Pain Scale: FACES Pre/Post-Treatment    Pain: FACES Scale, Pretreatment 0-->no hurt  -AV --       Posttreatment Pain Rating 0-->no hurt  -AV --          Fiberoptic Endoscopic Evaluation of Swallowing (FEES)    Risks/Benefits Reviewed risks/benefits explained;patient;agreed to eval  -AV --       Nasal Entry right:  -AV --       Scope serial number/identification [id number]  -AV --       Special Considerations NG in place  -AV --          Anatomy and Physiology    Anatomic Considerations erythema;vocal folds  -AV --       Velopharyngeal WFL  -AV --       Base of Tongue range reduced  -AV --       Epiglottis WFL  -AV --       Laryngeal Function Breathing symmetrical  -AV --       Laryngeal Function Phonation symmetrical  -AV --       Laryngeal Function to Breath Hold TVF contact  -AV --       Secretion Rating Scale (Mitch et al. 1996) 1- secretions present around the laryngeal vestibule  -AV --       Ice Chips DNA  -AV --       Spontaneous Swallow frequency reduced  partially cleared secretions  -AV --       Sensory  sensed scope  -AV --       Consistencies Trialed thin liquids;nectar-thick liquids;honey-thick liquids;pudding/puree;regular textures  -AV --          FEES Interpretation    Oral Phase --  tonic bite  -AV --          Initiation of Pharyngeal Swallow    Initiation of Pharyngeal Swallow bolus in pyriform sinuses  -AV --       Pharyngeal Phase impaired pharyngeal phase of swallowing  -AV --       Penetration Before the Swallow thin liquids;nectar-thick liquids  -AV --       Aspiration Before the Swallow thin liquids;nectar-thick liquids  -AV --       Aspiration After the Swallow thin liquids;nectar-thick liquids;secondary to residue  -AV --       Depth of Penetration deep  -AV --       Response to Penetration Yes  -AV --       Responsed to penetration with inconsistent  -AV --       No spontaneous response to penetration and non-effective laryngeal clearance with cue (see comments)  -AV --       Response to Aspiration Yes  -AV --       Responsed to aspiration with inconsistent  -AV --       No spontaneous response to aspiration with non-effective subglottic clearance with cue (see comments);other (see comments)  -AV --       Rosenbek's Scale thin:;nectar:;6-->Level 6  -AV --       Residue thin liquids;nectar-thick liquids  -AV --       Response to Residue partial residue clearance  -AV --       Attempted Compensatory Maneuvers bolus size;bolus presentation style  -AV --       FEES Summary Improved.  pen with aspiration before with thin and nectar thick liquids.  no pen/asp with honey thick pudding and solids. Rec mech soft and honey thick liquids. Patient with tonic bite.  -AV --          SLP Evaluation Clinical Impression    SLP Swallowing Diagnosis moderate;oral dysphagia;pharyngeal dysphagia  -AV --       Functional Impact risk of malnutrition;risk of aspiration/pneumonia;risk of dehydration  -AV --       Rehab Potential/Prognosis, Swallowing good, to achieve stated therapy goals  -AV --       Swallow Criteria for  Skilled Therapeutic Interventions Met demonstrates skilled criteria  -AV --          Recommendations    Therapy Frequency (Swallow) 5 days per week  -AV 5 days per week  -ML       Predicted Duration Therapy Intervention (Days) until discharge  -AV --       SLP Diet Recommendation mechanical ground textures;honey thick liquids  -AV NPO;temporary alternate methods of nutrition/hydration;other (see comments)  -ML       Recommended Diagnostics FEES  -AV FEES  -ML       Recommended Precautions and Strategies general aspiration precautions;small bites of food and sips of liquid;upright posture during/after eating  -AV general aspiration precautions  -ML       Oral Care Recommendations Oral Care BID/PRN  -AV Oral Care BID/PRN;Suction toothbrush  -ML       SLP Rec. for Method of Medication Administration meds whole;as tolerated;with puree  -AV meds via alternate route  -ML       Monitor for Signs of Aspiration yes;notify SLP if any concerns  -AV notify SLP if any concerns  -ML       Anticipated Discharge Disposition (SLP) inpatient rehabilitation facility  -AV --                 User Key  (r) = Recorded By, (t) = Taken By, (c) = Cosigned By      Initials Name Effective Dates    AV Gina Lopez MS CCC-SLP 06/16/21 -     ML Meera Abrams CCC-SLP 06/16/21 -                     EDUCATION  The patient has been educated in the following areas:   Dysphagia (Swallowing Impairment) Oral Care/Hydration.        SLP GOALS       Row Name 09/20/23 1000 09/18/23 1545          (LTG) Patient will demonstrate functional swallow for    Diet Texture (Demonstrate functional swallow) soft to chew (chopped) textures  -AV soft to chew (chopped) textures  -ML     Liquid viscosity (Demonstrate functional swallow) nectar/ mildly thick liquids  -AV nectar/ mildly thick liquids  -ML     [place] (Demonstrate functional swallow) with minimal cues (75-90% accuracy)  -AV with minimal cues (75-90% accuracy)  -ML     Time Frame  (Demonstrate functional swallow) by discharge  -AV by discharge  -ML     Progress/Outcomes (Demonstrate functional swallow) continuing progress toward goal  -AV goal ongoing  -ML     Comment (Demonstrate functional swallow) cough with thins, no sS with puree  -AV Today pt is able to accept, maintain, and manipulate ice chips.  Pharyngeal signs 1/10x.  -ML        (STG) Patient will tolerate therapeutic trials of    Consistencies Trialed (Tolerate therapeutic trials) pureed textures;thin liquids  -AV pureed textures;thin liquids  -ML     Desired Outcome (Tolerate therapeutic trials) without signs/symptoms of aspiration;without signs of distress  -AV without signs/symptoms of aspiration;without signs of distress  -ML     [place] (Tolerate therapeutic trials) with minimal cues (75-90% accuracy)  -AV with minimal cues (75-90% accuracy)  -ML     Time Frame (Tolerate therapeutic trials) by discharge  -AV by discharge  -ML     Progress/Outcomes (Tolerate therapeutic trials) continuing progress toward goal  -AV goal ongoing  -ML        (STG) Labial Strengthening Goal 1 (SLP)    Activity (Labial Strengthening Goal 1, SLP) -- increase labial tone  -ML     Increase Labial Tone -- labial resistance exercises;swallow trials  -ML     [place]/Accuracy (Labial Strengthening Goal 1, SLP) -- with moderate cues (50-74% accuracy)  -ML     Time Frame (Labial Strengthening Goal 1, SLP) -- short term goal (STG)  -ML     Progress/Outcomes (Labial Strengthening Goal 1, SLP) -- progress slower than expected  -ML     Comment (Labial Strengthening Goal 1, SLP) -- Able to inconsistently achieve labial closure with seal.  Unable to smack lips.  Protrusion limited. Minimal labial retraction on right but good range on left.  No anterior loss wtih ice/water.  -ML        (STG) Lingual Strengthening Goal 1 (SLP)    Activity (Lingual Strengthening Goal 1, SLP) -- increase lingual tone/sensation/control/coordination/movement;increase tongue  back strength  -ML     Increase Lingual Tone/Sensation/Control/Coordination/Movement -- swallow trials;lingual resistance exercises  -ML     Increase Tongue Back Strength -- lingual resistance exercises  -ML     [place]/Accuracy (Lingual Strengthening Goal 1, SLP) -- with moderate cues (50-74% accuracy)  -ML     Time Frame (Lingual Strengthening Goal 1, SLP) -- short term goal (STG)  -ML     Progress/Outcomes (Lingual Strengthening Goal 1, SLP) -- progress slower than expected  -ML     Comment (Lingual Strengthening Goal 1, SLP) -- Difficulty complying with directions for lingual movement/decreased coordination.  Improved lingual lateralization to labial corners with practice.  Lingual lateralization greater on left than right to cheek. Improved strength with tongue to cheek movement with practice.  -ML        (STG) Pharyngeal Strengthening Exercise Goal 1 (SLP)    Activity (Pharyngeal Strengthening Goal 1, SLP) -- increase timing;increase superior movement of the hyolaryngeal complex;increase anterior movement of the hyolaryngeal complex;increase closure at entrance to airway/closure of airway at glottis;increase squeeze/positive pressure generation  -ML     Increase Timing -- prepping - 3 second prep or suck swallow or 3-step swallow  -ML     Increase Superior Movement of the Hyolaryngeal Complex -- falsetto  -ML     Increase Anterior Movement of the Hyolaryngeal Complex -- chin tuck against resistance (CTAR)  -ML     Increase Closure at Entrance to Airway/Closure of Airway at Glottis -- breath hold exercises;supraglottic swallow  -ML     Increase Squeeze/Positive Pressure Generation -- hard effortful swallow  -ML     [place]/Accuracy (Pharyngeal Strengthening Goal 1, SLP) -- with moderate cues (50-74% accuracy)  -ML     Time Frame (Pharyngeal Strengthening Goal 1, SLP) -- short term goal (STG)  -ML     Progress/Outcomes (Pharyngeal Strengthening Goal 1, SLP) -- progress slower than expected  -ML      Comment (Pharyngeal Strengthening Goal 1, SLP) -- Achieves a swallow with delay.  Unable to perform/falsetto, breath hold exercises, or effortful swallow.  -ML        Patient will demonstrate functional communication skills for return to discharge environment     [place] -- with moderate cues  -ML     Time frame -- by discharge  -ML     Progress/Outcomes -- continuing progress toward goal  -ML     Comments -- Motivated and tries to do what he can.  -ML        Comprehend Questions Goal 1 (SLP)    Improve Ability to Comprehend Questions Goal 1 (SLP) -- complex yes/no questions;80%;with minimal cues (75-90%)  -ML     Time Frame (Comprehend Questions Goal 1, SLP) -- short term goal (STG)  -ML     Progress/Outcomes (Comprehend Questions Goal 1, SLP) -- goal ongoing  -ML        Follow Directions Goal 2 (SLP)    Improve Ability to Follow Directions Goal 1 (SLP) -- 2 step commands;80%;with minimal cues (75-90%)  -ML     Time Frame (Follow Directions Goal 1, SLP) -- short term goal (STG)  -ML     Progress (Ability to Follow Directions Goal 1, SLP) -- 60%;with moderate cues (50-74%)  -ML     Progress/Outcomes (Follow Directions Goal 1, SLP) -- progress slower than expected  -ML        Word Retrieval Skills Goal 1 (SLP)    Improve Word Retrieval Skills By Goal 1 (SLP) -- confrontational naming task;high frequency;responsive naming task;simple;80%;with minimal cues (75-90%)  -ML     Time Frame (Word Retrieval Goal 1, SLP) -- short term goal (STG)  -ML     Progress (Word Retrieval Skills Goal 1, SLP) -- 80%  -ML     Progress/Outcomes (Word Retrieval Goal 1, SLP) -- continuing progress toward goal  -ML     Comment (Word Retrieval Goal 1, SLP) -- 80% with responsive naming and 20% with wh questions with one word response.  -ML        Articulation Goal 1 (SLP)    Improve Articulation Goal 1 (SLP) -- by over-articulating at phrase level;80%;with moderate cues (50-74%)  -ML     Time Frame (Articulation Goal 1, SLP) -- short term  goal (STG)  -ML     Progress (Articulation Goal 1, SLP) -- 60%;with moderate cues (50-74%)  -ML     Progress/Outcomes (Articulation Goal 1, SLP) -- continuing progress toward goal  -ML     Comment (Articulation Goal 1, SLP) -- Improves with model and cues, however no carryover to spontaneous speech/language tasks not targeting motor speech.  -ML        Orientation Goal 1 (SLP)    Improve Orientation Through Goal 1 (SLP) -- demonstrating orientation to day;demonstrating orientation to month;demonstrating orientation to year;demonstrating orientation to place;demonstrating orientation to disease/impairment;use environmental aids to assist with orientation;80%;with moderate cues (50-74%)  -ML     Time Frame (Orientation Goal 1, SLP) -- short term goal (STG)  -ML     Progress (Orientation Goal 1, SLP) -- 30%;with minimal cues (75-90%)  -ML     Progress/Outcomes (Orientation Goal 1, SLP) -- progress slower than expected  -ML     Comment (Orientation Goal 1, SLP) -- Oriented to self and wife/not location/time.  Reviewed with increased accuracy.  -ML               User Key  (r) = Recorded By, (t) = Taken By, (c) = Cosigned By      Initials Name Provider Type    AV Gina Lopez MS CCC-SLP Speech and Language Pathologist    Meera Ricci, CCC-SLP Speech and Language Pathologist                       Time Calculation:    Time Calculation- SLP       Row Name 09/20/23 1139             Time Calculation- SLP    SLP Start Time 1000  -AV      SLP Received On 09/20/23  -AV         Untimed Charges    34777-PP Fiberoptic Endo Eval Swallow Minutes 60  -AV      63704-QR Treatment Swallow Minutes 30  -AV         Total Minutes    Untimed Charges Total Minutes 90  -AV       Total Minutes 90  -AV                User Key  (r) = Recorded By, (t) = Taken By, (c) = Cosigned By      Initials Name Provider Type    Gina Lagunas MS CCC-SLP Speech and Language Pathologist                    Therapy Charges for  Today       Code Description Service Date Service Provider Modifiers Qty    [id number] HC ST FIBEROPTIC ENDO EVAL SWALL 4 9/20/2023 Gina Lopez, MS CCC-SLP GN 1    [id number] HC ST TREATMENT SWALLOW 2 9/20/2023 Gina Lopez, MS CCC-SLP GN 1                 Gina Dias, MS KAMARI-SLP  9/20/2023

## 2023-09-20 NOTE — PROGRESS NOTES
Pulmonary/Critical Care Follow-up     LOS: 5 days   Patient Care Team:  Susan Powers APRN as PCP - General (Family Medicine)    Chief Complaint: Stroke      Subjective     History reviewed and updated in EMR as indicated.    Interval History:     Patient is somewhat confused this morning but overall more appropriate.  Past FEES this morning with recommendation of honey thick liquids and mechanical ground textures.  No fevers or chills.  No nausea or vomiting.     History taken from: Chart, staff    PMH/FH/Social History were reviewed and updated appropriately in the electronic medical record.     Review of Systems:    Review of 14 systems was completed with positives and pertinent negatives noted in the subjective section.  All other systems reviewed and are negative.   Exceptions are noted below:    Unable to obtain secondary to speech difficulty.      Objective     Vital Signs  Temp:  [97.8 °F (36.6 °C)-99.2 °F (37.3 °C)] 99.2 °F (37.3 °C)  Heart Rate:  [] 88  Resp:  [18-20] 20  BP: (108-158)/() 120/72  09/19 0701 - 09/20 0700  In: 1627 [I.V.:342]  Out: 1100 [Urine:1100]  Body mass index is 23.77 kg/m².     IV drips:        Physical Exam:     Constitutional:   Awake and mildly confused, not currently agitated.   Head:   Normocephalic, atraumatic   Eyes:           Lids and lashes normal, conjunctivae and sclerae normal.  PER   ENMT:  Ears appear intact with no abnormalities noted     Lips normal.     Neck:  Trachea midline, no JVD   Lungs/Resp:    Normal effort, symmetric chest rise, no crepitus, clear to      auscultation bilaterally.               Heart/CV:   Regular rhythm and normal rate, no murmur   Abdomen/GI:    Soft, nontender, nondistended   :    Deferred   Extremities/MSK:  No clubbing or cyanosis.  No edema.     Pulses:  Pulses palpable and equal bilaterally   Skin:  No bleeding, bruising or rash   Heme/Lymph:  No cervical or supraclavicular adenopathy.   Neurologic:      Psychiatric:     Right hemiparetic but moves right a bit.  Follows some commands.  Right facial droop.  Right neglect.  Dysarthria present.    Nonagitated.    The above physical exam findings were reviewed and reflect my exam findings as of today's exam.   Electronically signed by:  Vivek Proctor MD  09/20/23  14:24 EDT      Results Review:     I reviewed the patient's new clinical results.   Results from last 7 days   Lab Units 09/20/23  1253 09/20/23  0600 09/20/23  0012 09/19/23  1823 09/19/23  1251 09/19/23  0614 09/18/23  0018 09/17/23  1759 09/15/23  1138 09/15/23  0212   SODIUM mmol/L 152* 152* 152* 154*  151*   < > 150*   < > 149*  149*   < > 141   POTASSIUM mmol/L 3.8 3.9 4.1 3.8  3.7   < > 4.0   < > 3.5  3.5   < > 3.2*   CHLORIDE mmol/L  --  121*  --  120*  --  117*   < > 114*   < > 104   CO2 mmol/L  --  20.0*  --  19.0*  --  23.0   < > 22.0   < > 23.0   BUN mg/dL  --  39*  --  34*  --  32*   < > 20   < > 21   CREATININE mg/dL  --  0.75*  --  0.67*  --  0.75*   < > 0.72*   < > 0.76   CALCIUM mg/dL  --  9.0  --  9.2  --  9.4   < > 8.9   < > 9.1   BILIRUBIN mg/dL  --   --   --   --   --   --   --  0.7  --  1.2   ALK PHOS U/L  --   --   --   --   --   --   --  71  --  75   ALT (SGPT) U/L  --   --   --   --   --   --   --  38  --  27   AST (SGOT) U/L  --   --   --   --   --   --   --  58*  --  34   GLUCOSE mg/dL  --  161*  --  166*  --  161*   < > 145*   < > 138*    < > = values in this interval not displayed.     Results from last 7 days   Lab Units 09/20/23  0600 09/19/23  0604 09/18/23  0018   WBC 10*3/mm3 13.16* 13.43* 11.07*   HEMOGLOBIN g/dL 13.5 14.3 13.9   HEMATOCRIT % 40.8 42.6 41.3   PLATELETS 10*3/mm3 335 336 277         Results from last 7 days   Lab Units 09/18/23  1208 09/18/23  0525 09/18/23  0312 09/18/23  0018 09/17/23  1759 09/17/23  1153 09/17/23  0536   MAGNESIUM mg/dL  --   --   --  2.4 2.2  --  2.2   PHOSPHORUS mg/dL 2.9 1.9* 1.9*  --  1.9*   < > 2.1*    < > = values in this interval not  displayed.       I reviewed the patient's new imaging including images and reports.    Chest x-ray 9/19/2023 shows no infiltrate.    CT head f 9/19/2023: Appears stable.  Radiologist's impression follows:    Impression:  1.Stable subacute infarcts in the right greater than left cerebellum, left isaac and bilateral occipital lobes and right temporal lobe and right hippocampus.  2.Stable parenchymal hemorrhages in the occipital lobes.  3.Stable mild mass effect without herniation or midline shift.    Medication Review:   [START ON 9/21/2023] aspirin, 81 mg, Oral, Daily  atorvastatin, 80 mg, Oral, Nightly  [START ON 9/21/2023] clopidogrel, 75 mg, Oral, Daily  escitalopram, 10 mg, Oral, Daily  insulin lispro, 2-9 Units, Subcutaneous, 4x Daily AC & at Bedtime  metoprolol tartrate, 25 mg, Oral, Q12H  [START ON 9/21/2023] pantoprazole, 40 mg, Oral, Q AM  QUEtiapine, 25 mg, Oral, Q12H  sodium chloride, 10 mL, Intravenous, Q12H             Assessment & Plan         Hemorrhagic CVA    Dyslipidemia    Multiple bilateral CVAs    HFpEF    T2DM (type 2 diabetes mellitus)    HTN (hypertension)    GERD (gastroesophageal reflux disease)    ICH (intracerebral hemorrhage)    Oropharyngeal dysphagia    72 y.o. male former smoker with history of HTN, HLD, T2DM, GERD, who presented to [facility] on 9/10/2023 with multiple acute ischemic infarcts of the bilateral cerebral and cerebellar hemispheres as well as the left side of the isaac.  He was outside window for thrombolytics.    At OSH, he had both a TTE and JOSIAS which were negative for PFO.    On 9/13/2023 he had further decline in mental status with new inability to move his right leg and head CT showed evolution of the existing CVA with new development of petechial hemorrhage.  DAPT was held for 24 hours per neurology recommendations and repeat CTh obtained the following evening showed worsening effacement of the right quadrigeminal cistern with upward supratentorial  shift.  Due to further worsening need for neurosurgery evaluation he was transferred to [facility] for further evaluation and management.    Patient arrived to the neurological ICU on the morning of 9/15/2023.    Patient was started on hypertonic saline overnight on 9/15-16/2023 for cerebral edema.  3% saline discontinued 9/20/2023.  Imaging 9/19/2023 showed stability.    Echocardiogram at OSH showed no evidence of PFO.    Patient has had some fevers with negative cultures to date and no evidence of UTI.  He did however develop worsening secretions and labored breathing and was started on Zosyn on 9/16/2023.  No evidence of infiltrate on chest x-ray.  Procalcitonin on 9/15/2023 was 0.07.  Repeat procalcitonin in 9/19/2023 was 0.06.  Repeat x-ray again showed no infiltrate.  I will discontinue antibiotics.    Past FEES for honey thick mechanical soft.    Plan:  1.  For multiple bilateral posterior circulation strokes/hemorrhagic conversion: Suspected by neurology to be atheroembolic but cannot rule out cardioembolic given multiple vascular territories: Neurology following.  Plavix 75 mg daily started 9/17/2023 for 3 months per neurology.  Aspirin 81 mg daily indefinitely per neurology started 9/16/2023.  Continue high-dose statin.  Neurology plans Holter monitor on discharge.  MRA head and neck to evaluate for vertebral artery dissection per neurology.  2.  For cerebral edema: Continue 3% saline (started 9/16/2023, discontinued 9/20/2023).  Monitor symptomatically.  Imaging per neurology.  3.  For hypertension: Goal -160.  Currently metoprolol 25 mg every 12 hours.  4.  For GERD: Protonix.  5.  For dysphagia: N.p.o. per speech.  Start tube feeding.  Discussed with nutrition.  6.  For cough: Procalcitonin low x2.  Discontinue antibiotics as there is no infiltrate on follow-up chest x-ray.  7.  DVT prophylaxis: Mechanical.  8.  For restlessness/agitation.  Start low-dose Seroquel.    Patient is critically ill secondary  to stroke with cerebral edema with risk of herniation and has high risk of life-threatening decompensation in condition.   As such, the patient requires continuous monitoring and frequent reassessment for consideration of adjustment in management to minimize this risk.  I personally reassessed the patient multiple times today.    Critical care time : 33 minutes spent by me personally and independently.(This excludes time spent performing separately reportable procedures and services).  Critical care time includes high complexity decision making to assess, manipulate, and support vital organ system failure in this individual who has impairment of one or more vital organ systems such that there is a high probability of imminent or life threatening deterioration in the patient’s condition.    Electronically signed by:    Vivek Proctor MD  09/20/23  14:24 EDT      *. Please note that portions of this note were completed with [employer] - a voice recognition program.

## 2023-09-20 NOTE — PLAN OF CARE
Goal Outcome Evaluation:  Plan of Care Reviewed With: patient        Progress: no change  Outcome Evaluation: Pt limited this date by increased fatigue and required frequent cueing for attention to task. Pt required maxA x2 for functional mobility with R knee buckling noted in WB. Will continue to progress as able. PT rec IRF at d/c.      Anticipated Discharge Disposition (PT): inpatient rehabilitation facility

## 2023-09-20 NOTE — THERAPY TREATMENT NOTE
Patient Name: Kenneth Wen  : 1950    MRN: [id number]                              Today's Date: 2023       Admit Date: 9/15/2023    Visit Dx:     ICD-10-CM ICD-9-CM   1. Aphasia  R47.01 784.3   2. Dysarthria  R47.1 784.51   3. Oropharyngeal dysphagia  R13.12 787.22     Patient Active Problem List   Diagnosis    Precordial pain    Elevated BP without diagnosis of hypertension    Dyslipidemia    Hyperglycemia    Multiple bilateral CVAs    Hemorrhagic CVA    HFpEF    T2DM (type 2 diabetes mellitus)    HTN (hypertension)    GERD (gastroesophageal reflux disease)    ICH (intracerebral hemorrhage)    Oropharyngeal dysphagia     Past Medical History:   Diagnosis Date    Acid reflux     Cancer     Skin    Diabetes mellitus     Prediabetes    GERD (gastroesophageal reflux disease) 09/15/2023    HTN (hypertension) 09/15/2023    Kidney disease     T2DM (type 2 diabetes mellitus) 09/15/2023     Past Surgical History:   Procedure Laterality Date    APPENDECTOMY      HEMORRHOIDECTOMY      NASAL POLYP SURGERY        General Information       Row Name 23 1509          Physical Therapy Time and Intention    Document Type therapy note (daily note)  -ES     Mode of Treatment physical therapy  -ES       Row Name 23 1509          General Information    Patient Profile Reviewed yes  -ES     Existing Precautions/Restrictions fall;oxygen therapy device and L/min;other (see comments)  NG, R sided weakness/tone, no visual threat response bilaterally  -ES     Barriers to Rehab medically complex;cognitive status;visual deficit  -ES       Row Name 23 1509          Cognition    Orientation Status (Cognition) oriented to;person;verbal cues/prompts needed for orientation;place;time;situation  -ES       Row Name 23 1509          Safety Issues, Functional Mobility    Safety Issues Affecting Function (Mobility) ability to follow commands;awareness of need for assistance;insight into deficits/self-awareness   -ES     Impairments Affecting Function (Mobility) balance;cognition;coordination;endurance/activity tolerance;muscle tone abnormal;postural/trunk control;range of motion (ROM);sensation/sensory awareness;strength;visual/perceptual  -ES     Cognitive Impairments, Mobility Safety/Performance attention;awareness, need for assistance;insight into deficits/self-awareness;problem-solving/reasoning;safety precaution awareness;safety precaution follow-through;sequencing abilities  -ES     Comment, Safety Issues/Impairments (Mobility) Pt with increased fatigue this date, limiting all participation  -ES               User Key  (r) = Recorded By, (t) = Taken By, (c) = Cosigned By      Initials Name Provider Type    Lara Booker, PT Physical Therapist                   Mobility       Row Name 09/20/23 1510          Bed Mobility    Bed Mobility supine-sit;sit-supine  -ES     Supine-Sit [place] (Bed Mobility) maximum assist (25% patient effort);verbal cues  -ES     Sit-Supine [place] (Bed Mobility) maximum assist (25% patient effort);verbal cues;2 person assist  -ES     Assistive Device (Bed Mobility) bed rails;draw sheet;head of bed elevated  -ES     Comment, (Bed Mobility) v/c for sequencing, required frequent cueing for attention to task  -ES       Row Name 09/20/23 1510          Sit-Stand Transfer    Sit-Stand [place] (Transfers) maximum assist (25% patient effort);2 person assist;verbal cues  -ES     Assistive Device (Sit-Stand Transfers) other (see comments)  BUE support  -ES     Comment, (Sit-Stand Transfer) STS from EOB. Performed 10x B weight shifting with maxA x2. Pt with increased fatigue standing EOB, unable to safely attempt further mobility.  -ES       Row Name 09/20/23 1510          Gait/Stairs (Locomotion)    [place] Level (Gait) unable to assess  -ES               User Key  (r) = Recorded By, (t) = Taken By, (c) = Cosigned By      Initials Name Provider Type    Lara Booker, PT  Physical Therapist                   Obj/Interventions       Row Name 09/20/23 1514          Balance    Balance Assessment sitting static balance;sitting dynamic balance;sit to stand dynamic balance;standing static balance;standing dynamic balance  -ES     Static Sitting Balance moderate assist  -ES     Dynamic Sitting Balance maximum assist;verbal cues;non-verbal cues (demo/gesture)  -ES     Position, Sitting Balance supported;sitting edge of bed  -ES     Sit to Stand Dynamic Balance maximum assist;2-person assist;verbal cues;non-verbal cues (demo/gesture)  -ES     Static Standing Balance maximum assist;2-person assist  -ES     Dynamic Standing Balance maximum assist;2-person assist;verbal cues;non-verbal cues (demo/gesture)  -ES     Position/Device Used, Standing Balance supported;other (see comments)  BUE support  -ES     Balance Interventions sitting;standing;sit to stand;supported;static;dynamic;occupation based/functional task;weight shifting activity  -ES     Comment, Balance Demo'd R lateral lean sitting EOB and standing. No overt LOB  -ES               User Key  (r) = Recorded By, (t) = Taken By, (c) = Cosigned By      Initials Name Provider Type    ES Lara Tijerina, PT Physical Therapist                   Goals/Plan    No documentation.                  Clinical Impression       Row Name 09/20/23 1515          Pain    Pain Intervention(s) Repositioned;Ambulation/increased activity  -ES     Additional Documentation Pain Scale: FACES Pre/Post-Treatment (Group)  -ES       Row Name 09/20/23 1515          Pain Scale: FACES Pre/Post-Treatment    Pain: FACES Scale, Pretreatment 0-->no hurt  -ES     Posttreatment Pain Rating 0-->no hurt  -ES     Pre/Posttreatment Pain Comment tolerated  -ES       Row Name 09/20/23 1515          Plan of Care Review    Plan of Care Reviewed With patient  -ES     Progress no change  -ES     Outcome Evaluation Pt limited this date by increased fatigue and required frequent cueing  for attention to task. Pt required maxA x2 for functional mobility with R knee buckling noted in WB. Will continue to progress as able. PT rec IRF at d/c.  -ES       Row Name 09/20/23 1515          Therapy Assessment/Plan (PT)    Rehab Potential (PT) fair, will monitor progress closely  -ES     Criteria for Skilled Interventions Met (PT) yes;meets criteria;skilled treatment is necessary  -ES     Therapy Frequency (PT) daily  -ES       Row Name 09/20/23 1515          Vital Signs    Pre Systolic BP Rehab 127  -ES     Pre Treatment Diastolic BP 83  -ES     Post Systolic BP Rehab 129  -ES     Post Treatment Diastolic BP 79  -ES     Pretreatment Heart Rate (beats/min) 96  -ES     Posttreatment Heart Rate (beats/min) 96  -ES     Pre SpO2 (%) 95  -ES     O2 Delivery Pre Treatment nasal cannula  -ES     O2 Delivery Intra Treatment nasal cannula  -ES     Post SpO2 (%) 95  -ES     O2 Delivery Post Treatment nasal cannula  -ES     Pre Patient Position Supine  -ES     Intra Patient Position Standing  -ES     Post Patient Position Supine  -ES       Row Name 09/20/23 1515          Positioning and Restraints    Pre-Treatment Position in bed  -ES     Post Treatment Position bed  -ES     In Bed notified nsg;fowlers;call light within reach;encouraged to call for assist;exit alarm on;RUE elevated;LUE elevated  -ES     Restraints released:;reapplied:;notified nsg:;soft limb;mitten  -ES               User Key  (r) = Recorded By, (t) = Taken By, (c) = Cosigned By      Initials Name Provider Type    ES Lara Tijerina, PT Physical Therapist                   Outcome Measures       Row Name 09/20/23 1518 09/20/23 0800       How much help from another person do you currently need...    Turning from your back to your side while in flat bed without using bedrails? 2  -ES 2  -QH    Moving from lying on back to sitting on the side of a flat bed without bedrails? 2  -ES 2  -QH    Moving to and from a bed to a chair (including a wheelchair)? 2   -ES 2  -QH    Standing up from a chair using your arms (e.g., wheelchair, bedside chair)? 2  -ES 2  -QH    Climbing 3-5 steps with a railing? 1  -ES 1  -QH    To walk in hospital room? 1  -ES 1  -QH    AM-PAC 6 Clicks Score (PT) 10  -ES 10  -QH    Highest level of mobility 4 --> Transferred to chair/commode  -ES 4 --> Transferred to chair/commode  -QH      Row Name 09/20/23 1419          Modified Rae Scale    Modified Guy Scale 4 - Moderately severe disability.  Unable to walk without assistance, and unable to attend to own bodily needs without assistance.  -CS       Row Name 09/20/23 1518 09/20/23 1419       Functional Assessment    Outcome Measure Options AM-PAC 6 Clicks Basic Mobility (PT)  -ES AM-PAC 6 Clicks Daily Activity (OT)  -CS              User Key  (r) = Recorded By, (t) = Taken By, (c) = Cosigned By      Initials Name Provider Type    CS Carmen Carr OT Occupational Therapist    Lara Booker, PT Physical Therapist     Palma Kaiser, RN Registered Nurse                                 Physical Therapy Education       Title: PT OT SLP Therapies (In Progress)       Topic: Physical Therapy (In Progress)       Point: Mobility training (In Progress)       Learning Progress Summary             Patient Acceptance, E, NR by KG at 9/18/2023 1403    Acceptance, E,TB, NR by AY at 9/15/2023 1254                         Point: Home exercise program (In Progress)       Learning Progress Summary             Patient Acceptance, E, NR by KG at 9/18/2023 1403                         Point: Body mechanics (In Progress)       Learning Progress Summary             Patient Acceptance, E, NR by KG at 9/18/2023 1403    Acceptance, E,TB, NR by AY at 9/15/2023 1254                         Point: Precautions (In Progress)       Learning Progress Summary             Patient Acceptance, E, NR by KG at 9/18/2023 1403    Acceptance, E,TB, NR by AY at 9/15/2023 1254                                         User Key        Initials Effective Dates Name Provider Type Discipline    KG 05/22/20 -  Mackenzie Mckay PT Physical Therapist PT    AY 11/10/20 -  Aleshia Armstrong PT Physical Therapist PT                  PT Recommendation and Plan     Plan of Care Reviewed With: patient  Progress: no change  Outcome Evaluation: Pt limited this date by increased fatigue and required frequent cueing for attention to task. Pt required maxA x2 for functional mobility with R knee buckling noted in WB. Will continue to progress as able. PT rec IRF at d/c.     Time Calculation:         PT Charges       Row Name 09/20/23 1519             Time Calculation    Start Time 1442  -ES      PT Received On 09/20/23  -ES      PT Goal Re-Cert Due Date 09/25/23  -ES         Time Calculation- PT    Total Timed Code Minutes- PT 23 minute(s)  -ES         Timed Charges    66734 - PT Therapeutic Activity Minutes 23  -ES         Total Minutes    Timed Charges Total Minutes 23  -ES       Total Minutes 23  -ES                User Key  (r) = Recorded By, (t) = Taken By, (c) = Cosigned By      Initials Name Provider Type    ES Lara Tijerina PT Physical Therapist                  Therapy Charges for Today       Code Description Service Date Service Provider Modifiers Qty    [id number] HC PT THERAPEUTIC ACT EA 15 MIN 9/20/2023 Lara Tijerina PT GP 2            PT G-Codes  Outcome Measure Options: AM-PAC 6 Clicks Basic Mobility (PT)  AM-PAC 6 Clicks Score (PT): 10  AM-PAC 6 Clicks Score (OT): 9  Modified [place] Scale: 4 - Moderately severe disability.  Unable to walk without assistance, and unable to attend to own bodily needs without assistance.  PT Discharge Summary  Anticipated Discharge Disposition (PT): inpatient rehabilitation facility    Lara Tijerina PT  9/20/2023

## 2023-09-20 NOTE — THERAPY TREATMENT NOTE
Patient Name: Kenneth Wen  : 1950    MRN: [id number]                              Today's Date: 2023       Admit Date: 9/15/2023    Visit Dx:     ICD-10-CM ICD-9-CM   1. Aphasia  R47.01 784.3   2. Dysarthria  R47.1 784.51   3. Oropharyngeal dysphagia  R13.12 787.22     Patient Active Problem List   Diagnosis    Precordial pain    Elevated BP without diagnosis of hypertension    Dyslipidemia    Hyperglycemia    Multiple bilateral CVAs    Hemorrhagic CVA    HFpEF    T2DM (type 2 diabetes mellitus)    HTN (hypertension)    GERD (gastroesophageal reflux disease)    ICH (intracerebral hemorrhage)    Oropharyngeal dysphagia     Past Medical History:   Diagnosis Date    Acid reflux     Cancer     Skin    Diabetes mellitus     Prediabetes    GERD (gastroesophageal reflux disease) 09/15/2023    HTN (hypertension) 09/15/2023    Kidney disease     T2DM (type 2 diabetes mellitus) 09/15/2023     Past Surgical History:   Procedure Laterality Date    APPENDECTOMY      HEMORRHOIDECTOMY      NASAL POLYP SURGERY        General Information       Row Name 23 1414          OT Time and Intention    Document Type therapy note (daily note)  -CS     Mode of Treatment occupational therapy  -CS       Row Name 23 1414          General Information    Existing Precautions/Restrictions fall;oxygen therapy device and L/min;other (see comments)  NG, R sided weakness/tone, no visual threat response bilaterally  -CS     Barriers to Rehab medically complex;cognitive status;visual deficit  -CS       Row Name 23 1414          Cognition    Orientation Status (Cognition) oriented to;person;verbal cues/prompts needed for orientation;place;time;situation  -CS       Row Name 23 1414          Safety Issues, Functional Mobility    Safety Issues Affecting Function (Mobility) ability to follow commands  -CS     Impairments Affecting Function (Mobility) balance;cognition;coordination;endurance/activity tolerance;muscle  tone abnormal;postural/trunk control;range of motion (ROM);sensation/sensory awareness;strength;visual/perceptual  -CS     Cognitive Impairments, Mobility Safety/Performance attention;insight into deficits/self-awareness;sequencing abilities;impulsivity;awareness, need for assistance;safety precaution awareness;safety precaution follow-through;judgment;problem-solving/reasoning  -CS               User Key  (r) = Recorded By, (t) = Taken By, (c) = Cosigned By      Initials Name Provider Type    CS Carmen Carr OT Occupational Therapist                     Mobility/ADL's       Row Name 09/20/23 1416          Bed Mobility    Bed Mobility rolling left;rolling right;supine-sit;sit-supine  -CS     Rolling Left [place] (Bed Mobility) maximum assist (25% patient effort);verbal cues  -CS     Rolling Right [place] (Bed Mobility) moderate assist (50% patient effort);verbal cues  -CS     Scooting/Bridging [place] (Bed Mobility) maximum assist (25% patient effort);2 person assist;verbal cues  -CS     Supine-Sit [place] (Bed Mobility) maximum assist (25% patient effort);verbal cues  -CS     Sit-Supine [place] (Bed Mobility) maximum assist (25% patient effort);verbal cues  -CS     Assistive Device (Bed Mobility) bed rails;draw sheet;head of bed elevated  -CS       Row Name 09/20/23 1416          Activities of Daily Living    BADL Assessment/Intervention lower body dressing;toileting  -       Row Name 09/20/23 1416          Lower Body Dressing Assessment/Training    [place] Level (Lower Body Dressing) don;socks;dependent (less than 25% patient effort)  -CS     Position (Lower Body Dressing) supine  -       Row Name 09/20/23 1416          Toileting Assessment/Training    [place] Level (Toileting) adjust/manage clothing;change pad/brief;perform perineal hygiene;dependent (less than 25% patient effort)  -CS     Position (Toileting) supine  -CS               User Key  (r) = Recorded By, (t) =  Taken By, (c) = Cosigned By      Initials Name Provider Type    CS Carmen Carr OT Occupational Therapist                   Obj/Interventions       Row Name 09/20/23 1417          Shoulder (Therapeutic Exercise)    Shoulder PROM (Therapeutic Exercise) right;flexion;10 repetitions  -CS       Row Name 09/20/23 1417          Elbow/Forearm (Therapeutic Exercise)    Elbow/Forearm PROM (Therapeutic Exercise) right;flexion;extension;supination;pronation;10 repetitions  -CS       Row Name 09/20/23 1417          Wrist (Therapeutic Exercise)    Wrist PROM (Therapeutic Exercise) right;flexion;extension;10 repetitions  -CS       Row Name 09/20/23 1417          Hand (Therapeutic Exercise)    Hand (Therapeutic Exercise) PROM (passive range of motion)  -     Hand PROM (Therapeutic Exercise) right;finger extension;finger aBduction;10 repetitions  -CS       Row Name 09/20/23 1417          Balance    Balance Assessment sitting static balance;sitting dynamic balance  -CS     Static Sitting Balance moderate assist  -CS     Dynamic Sitting Balance moderate assist  -CS     Position, Sitting Balance sitting edge of bed  -CS     Balance Interventions sitting;static;dynamic;dynamic reaching;weight shifting activity;occupation based/functional task  -CS     Comment, Balance Initial posterior and R lateral lean, progressed to brief moments of CGA  -CS               User Key  (r) = Recorded By, (t) = Taken By, (c) = Cosigned By      Initials Name Provider Type    Carmen Godfrey OT Occupational Therapist                   Goals/Plan    No documentation.                  Clinical Impression       Row Name 09/20/23 1417          Pain Scale: FACES Pre/Post-Treatment    Pain: FACES Scale, Pretreatment 0-->no hurt  -CS     Posttreatment Pain Rating 0-->no hurt  -CS       Row Name 09/20/23 1417          Plan of Care Review    Plan of Care Reviewed With patient  -CS     Progress improving  -CS     Outcome Evaluation Pt progressed static  sitting balance to moments of CGA this date however conts to be limited d/t cognitive and visual deficits. Recommend cont skilled IPOT POC to promote return to baseline. Recommend pt DC to IP rehab.  -CS       Row Name 09/20/23 1417          Therapy Plan Review/Discharge Plan (OT)    Anticipated Discharge Disposition (OT) inpatient rehabilitation facility  -CS       Row Name 09/20/23 1417          Vital Signs    Pre Systolic BP Rehab 136  -CS     Pre Treatment Diastolic BP 82  -CS     Post Systolic BP Rehab 132  -CS     Post Treatment Diastolic BP 81  -CS     Pretreatment Heart Rate (beats/min) 91  -CS     Posttreatment Heart Rate (beats/min) 92  -CS     Pre SpO2 (%) 96  -CS     O2 Delivery Pre Treatment nasal cannula  -CS     Post SpO2 (%) 95  -CS     O2 Delivery Post Treatment nasal cannula  -CS     Pre Patient Position Supine  -CS     Intra Patient Position Sitting  -CS     Post Patient Position Supine  -CS       Row Name 09/20/23 1417          Positioning and Restraints    Pre-Treatment Position in bed  -CS     Post Treatment Position bed  -CS     In Bed notified nsg;fowlers;call light within reach;encouraged to call for assist;exit alarm on;RUE elevated;LUE elevated;legs elevated;heels elevated;SCD pump applied  -CS     Restraints released:;reapplied:;notified nsg:;soft limb;mitten  -CS               User Key  (r) = Recorded By, (t) = Taken By, (c) = Cosigned By      Initials Name Provider Type    CS Carmen Carr, OT Occupational Therapist                   Outcome Measures       Row Name 09/20/23 1419          How much help from another is currently needed...    Putting on and taking off regular lower body clothing? 1  -CS     Bathing (including washing, rinsing, and drying) 2  -CS     Toileting (which includes using toilet bed pan or urinal) 1  -CS     Putting on and taking off regular upper body clothing 2  -CS     Taking care of personal grooming (such as brushing teeth) 2  -CS     Eating meals 1  -CS      AM-[facility] 6 Clicks Score (OT) 9  -       Row Name 09/20/23 0800          How much help from another person do you currently need...    Turning from your back to your side while in flat bed without using bedrails? 2  -QH     Moving from lying on back to sitting on the side of a flat bed without bedrails? 2  -QH     Moving to and from a bed to a chair (including a wheelchair)? 2  -QH     Standing up from a chair using your arms (e.g., wheelchair, bedside chair)? 2  -QH     Climbing 3-5 steps with a railing? 1  -QH     To walk in hospital room? 1  -QH     AM-PAC 6 Clicks Score (PT) 10  -QH     Highest level of mobility 4 --> Transferred to chair/commode  -       Row Name 09/20/23 1419          Modified [place] Scale    Modified [place] Scale 4 - Moderately severe disability.  Unable to walk without assistance, and unable to attend to own bodily needs without assistance.  -       Row Name 09/20/23 1419          Functional Assessment    Outcome Measure Options AM-[facility] 6 Clicks Daily Activity (OT)  -               User Key  (r) = Recorded By, (t) = Taken By, (c) = Cosigned By      Initials Name Provider Type     Carmen Carr OT Occupational Therapist     Palma Kaiser, RN Registered Nurse                    Occupational Therapy Education       Title: PT OT SLP Therapies (In Progress)       Topic: Occupational Therapy (In Progress)       Point: ADL training (In Progress)       Description:   Instruct learner(s) on proper safety adaptation and remediation techniques during self care or transfers.   Instruct in proper use of assistive devices.                  Learning Progress Summary             Patient Acceptance, E, NR by  at 9/20/2023 1420    Acceptance, E, NR by  at 9/18/2023 1532    Acceptance, E, NR by  at 9/15/2023 1531                         Point: Home exercise program (In Progress)       Description:   Instruct learner(s) on appropriate technique for monitoring, assisting and/or progressing  therapeutic exercises/activities.                  Learning Progress Summary             Patient Acceptance, E, NR by  at 9/20/2023 1420    Acceptance, E, NR by  at 9/18/2023 1532    Acceptance, E, NR by  at 9/15/2023 1531                         Point: Precautions (In Progress)       Description:   Instruct learner(s) on prescribed precautions during self-care and functional transfers.                  Learning Progress Summary             Patient Acceptance, E, NR by  at 9/20/2023 1420    Acceptance, E, NR by  at 9/18/2023 1532    Acceptance, E, NR by  at 9/15/2023 1531                         Point: Body mechanics (In Progress)       Description:   Instruct learner(s) on proper positioning and spine alignment during self-care, functional mobility activities and/or exercises.                  Learning Progress Summary             Patient Acceptance, E, NR by  at 9/20/2023 1420    Acceptance, E, NR by  at 9/18/2023 1532    Acceptance, E, NR by  at 9/15/2023 1531                                         User Key       Initials Effective Dates Name Provider Type Discipline     09/02/21 -  Carmen Carr, OT Occupational Therapist OT     10/14/22 -  Jenny Rivera OT Occupational Therapist OT                  OT Recommendation and Plan     Plan of Care Review  Plan of Care Reviewed With: patient  Progress: improving  Outcome Evaluation: Pt progressed static sitting balance to moments of CGA this date however conts to be limited d/t cognitive and visual deficits. Recommend cont skilled IPOT POC to promote return to baseline. Recommend pt DC to IP rehab.     Time Calculation:         Time Calculation- OT       Row Name 09/20/23 1420             Time Calculation- OT    OT Start Time 0813  -      OT Received On 09/20/23  -      OT Goal Re-Cert Due Date 09/25/23  -         Timed Charges    47764 - OT Therapeutic Exercise Minutes 10  -      52673 - OT Therapeutic Activity Minutes 10  -CS       44887 - OT Self Care/Mgmt Minutes 10  -CS         Total Minutes    Timed Charges Total Minutes 30  -CS       Total Minutes 30  -CS                User Key  (r) = Recorded By, (t) = Taken By, (c) = Cosigned By      Initials Name Provider Type    CS Carmen Carr OT Occupational Therapist                  Therapy Charges for Today       Code Description Service Date Service Provider Modifiers Qty    00599449813  OT THER PROC EA 15 MIN 9/20/2023 Carmen Carr OT GO 1    [id number]  OT THERAPEUTIC ACT EA 15 MIN 9/20/2023 Carmen Carr OT GO 1                 Carmen Carr OT  9/20/2023

## 2023-09-20 NOTE — PLAN OF CARE
Goal Outcome Evaluation:  Plan of Care Reviewed With: patient        Progress: improving  Outcome Evaluation: Pt progressed static sitting balance to moments of CGA this date however conts to be limited d/t cognitive and visual deficits. Recommend cont skilled IPOT POC to promote return to baseline. Recommend pt DC to IP rehab.      Anticipated Discharge Disposition (OT): inpatient rehabilitation facility

## 2023-09-20 NOTE — PLAN OF CARE
Goal Outcome Evaluation:  Plan of Care Reviewed With: patient        Progress: improving          SLP re-evaluation completed. Will continue to address dysphagia. Please see note for further details and recommendations.

## 2023-09-21 ENCOUNTER — APPOINTMENT (OUTPATIENT)
Dept: CT IMAGING | Facility: HOSPITAL | Age: 73
End: 2023-09-21
Payer: MEDICARE

## 2023-09-21 LAB
ANION GAP SERPL CALCULATED.3IONS-SCNC: 9 MMOL/L (ref 5–15)
BASOPHILS # BLD AUTO: 0.09 10*3/MM3 (ref 0–0.2)
BASOPHILS NFR BLD AUTO: 0.6 % (ref 0–1.5)
BUN SERPL-MCNC: 41 MG/DL (ref 8–23)
BUN/CREAT SERPL: 50.6 (ref 7–25)
CALCIUM SPEC-SCNC: 9.3 MG/DL (ref 8.6–10.5)
CHLORIDE SERPL-SCNC: 121 MMOL/L (ref 98–107)
CO2 SERPL-SCNC: 27 MMOL/L (ref 22–29)
CREAT SERPL-MCNC: 0.81 MG/DL (ref 0.76–1.27)
DEPRECATED RDW RBC AUTO: 47.2 FL (ref 37–54)
EGFRCR SERPLBLD CKD-EPI 2021: 94.3 ML/MIN/1.73
EOSINOPHIL # BLD AUTO: 0.39 10*3/MM3 (ref 0–0.4)
EOSINOPHIL NFR BLD AUTO: 2.5 % (ref 0.3–6.2)
ERYTHROCYTE [DISTWIDTH] IN BLOOD BY AUTOMATED COUNT: 13.2 % (ref 12.3–15.4)
GLUCOSE BLDC GLUCOMTR-MCNC: 137 MG/DL (ref 70–130)
GLUCOSE BLDC GLUCOMTR-MCNC: 139 MG/DL (ref 70–130)
GLUCOSE BLDC GLUCOMTR-MCNC: 158 MG/DL (ref 70–130)
GLUCOSE BLDC GLUCOMTR-MCNC: 167 MG/DL (ref 70–130)
GLUCOSE SERPL-MCNC: 163 MG/DL (ref 65–99)
HCT VFR BLD AUTO: 41.4 % (ref 37.5–51)
HGB BLD-MCNC: 13.2 G/DL (ref 13–17.7)
IMM GRANULOCYTES # BLD AUTO: 0.06 10*3/MM3 (ref 0–0.05)
IMM GRANULOCYTES NFR BLD AUTO: 0.4 % (ref 0–0.5)
LYMPHOCYTES # BLD AUTO: 1.94 10*3/MM3 (ref 0.7–3.1)
LYMPHOCYTES NFR BLD AUTO: 12.6 % (ref 19.6–45.3)
MCH RBC QN AUTO: 30.8 PG (ref 26.6–33)
MCHC RBC AUTO-ENTMCNC: 31.9 G/DL (ref 31.5–35.7)
MCV RBC AUTO: 96.5 FL (ref 79–97)
MONOCYTES # BLD AUTO: 1.27 10*3/MM3 (ref 0.1–0.9)
MONOCYTES NFR BLD AUTO: 8.2 % (ref 5–12)
NEUTROPHILS NFR BLD AUTO: 11.65 10*3/MM3 (ref 1.7–7)
NEUTROPHILS NFR BLD AUTO: 75.7 % (ref 42.7–76)
NRBC BLD AUTO-RTO: 0 /100 WBC (ref 0–0.2)
PLATELET # BLD AUTO: 318 10*3/MM3 (ref 140–450)
PMV BLD AUTO: 10.9 FL (ref 6–12)
POTASSIUM SERPL-SCNC: 4.2 MMOL/L (ref 3.5–5.2)
RBC # BLD AUTO: 4.29 10*6/MM3 (ref 4.14–5.8)
SODIUM SERPL-SCNC: 153 MMOL/L (ref 136–145)
SODIUM SERPL-SCNC: 154 MMOL/L (ref 136–145)
SODIUM SERPL-SCNC: 155 MMOL/L (ref 136–145)
SODIUM SERPL-SCNC: 157 MMOL/L (ref 136–145)
WBC NRBC COR # BLD: 15.4 10*3/MM3 (ref 3.4–10.8)

## 2023-09-21 PROCEDURE — 63710000001 INSULIN LISPRO (HUMAN) PER 5 UNITS: Performed by: INTERNAL MEDICINE

## 2023-09-21 PROCEDURE — 85025 COMPLETE CBC W/AUTO DIFF WBC: CPT | Performed by: INTERNAL MEDICINE

## 2023-09-21 PROCEDURE — 99232 SBSQ HOSP IP/OBS MODERATE 35: CPT | Performed by: NURSE PRACTITIONER

## 2023-09-21 PROCEDURE — 84295 ASSAY OF SERUM SODIUM: CPT | Performed by: INTERNAL MEDICINE

## 2023-09-21 PROCEDURE — 82948 REAGENT STRIP/BLOOD GLUCOSE: CPT

## 2023-09-21 PROCEDURE — 63710000001 INSULIN REGULAR HUMAN PER 5 UNITS: Performed by: INTERNAL MEDICINE

## 2023-09-21 PROCEDURE — 94761 N-INVAS EAR/PLS OXIMETRY MLT: CPT

## 2023-09-21 PROCEDURE — 84295 ASSAY OF SERUM SODIUM: CPT

## 2023-09-21 PROCEDURE — 80048 BASIC METABOLIC PNL TOTAL CA: CPT | Performed by: INTERNAL MEDICINE

## 2023-09-21 PROCEDURE — 99232 SBSQ HOSP IP/OBS MODERATE 35: CPT | Performed by: INTERNAL MEDICINE

## 2023-09-21 PROCEDURE — 70450 CT HEAD/BRAIN W/O DYE: CPT

## 2023-09-21 RX ORDER — ESCITALOPRAM OXALATE 10 MG/1
10 TABLET ORAL DAILY
Status: DISCONTINUED | OUTPATIENT
Start: 2023-09-22 | End: 2023-09-26

## 2023-09-21 RX ORDER — ATORVASTATIN CALCIUM 40 MG/1
80 TABLET, FILM COATED ORAL NIGHTLY
Status: DISCONTINUED | OUTPATIENT
Start: 2023-09-21 | End: 2023-09-26

## 2023-09-21 RX ORDER — QUETIAPINE FUMARATE 25 MG/1
25 TABLET, FILM COATED ORAL EVERY 12 HOURS SCHEDULED
Status: DISCONTINUED | OUTPATIENT
Start: 2023-09-21 | End: 2023-09-22

## 2023-09-21 RX ORDER — PANTOPRAZOLE SODIUM 40 MG/10ML
40 INJECTION, POWDER, LYOPHILIZED, FOR SOLUTION INTRAVENOUS
Status: DISCONTINUED | OUTPATIENT
Start: 2023-09-21 | End: 2023-09-21

## 2023-09-21 RX ORDER — IBUPROFEN 600 MG/1
1 TABLET ORAL
Status: DISCONTINUED | OUTPATIENT
Start: 2023-09-21 | End: 2023-11-10

## 2023-09-21 RX ORDER — CLOPIDOGREL BISULFATE 75 MG/1
75 TABLET ORAL DAILY
Status: DISCONTINUED | OUTPATIENT
Start: 2023-09-22 | End: 2023-09-26

## 2023-09-21 RX ORDER — ACETAMINOPHEN 325 MG/1
650 TABLET ORAL EVERY 6 HOURS PRN
Status: DISCONTINUED | OUTPATIENT
Start: 2023-09-21 | End: 2023-09-26

## 2023-09-21 RX ORDER — ASPIRIN 81 MG/1
81 TABLET, CHEWABLE ORAL DAILY
Status: DISCONTINUED | OUTPATIENT
Start: 2023-09-22 | End: 2023-09-22

## 2023-09-21 RX ORDER — ACETAMINOPHEN 650 MG/1
650 SUPPOSITORY RECTAL EVERY 4 HOURS PRN
Status: DISCONTINUED | OUTPATIENT
Start: 2023-09-21 | End: 2023-09-26

## 2023-09-21 RX ORDER — DEXTROSE MONOHYDRATE 25 G/50ML
25 INJECTION, SOLUTION INTRAVENOUS
Status: DISCONTINUED | OUTPATIENT
Start: 2023-09-21 | End: 2023-10-21

## 2023-09-21 RX ADMIN — ATORVASTATIN CALCIUM 80 MG: 40 TABLET, FILM COATED ORAL at 21:13

## 2023-09-21 RX ADMIN — ACETAMINOPHEN 650 MG: 325 TABLET ORAL at 23:22

## 2023-09-21 RX ADMIN — ESCITALOPRAM OXALATE 10 MG: 10 TABLET ORAL at 09:18

## 2023-09-21 RX ADMIN — Medication 10 ML: at 21:14

## 2023-09-21 RX ADMIN — PANTOPRAZOLE SODIUM 40 MG: 40 INJECTION, POWDER, LYOPHILIZED, FOR SOLUTION INTRAVENOUS at 06:13

## 2023-09-21 RX ADMIN — CLOPIDOGREL BISULFATE 75 MG: 75 TABLET ORAL at 09:18

## 2023-09-21 RX ADMIN — ASPIRIN 81 MG: 81 TABLET, CHEWABLE ORAL at 09:18

## 2023-09-21 RX ADMIN — METOPROLOL TARTRATE 25 MG: 25 TABLET, FILM COATED ORAL at 09:18

## 2023-09-21 RX ADMIN — INSULIN LISPRO 2 UNITS: 100 INJECTION, SOLUTION INTRAVENOUS; SUBCUTANEOUS at 11:57

## 2023-09-21 RX ADMIN — METOPROLOL TARTRATE 25 MG: 25 TABLET, FILM COATED ORAL at 21:13

## 2023-09-21 RX ADMIN — INSULIN HUMAN 2 UNITS: 100 INJECTION, SOLUTION PARENTERAL at 23:22

## 2023-09-21 RX ADMIN — Medication 10 ML: at 09:19

## 2023-09-21 RX ADMIN — QUETIAPINE FUMARATE 25 MG: 25 TABLET ORAL at 09:18

## 2023-09-21 RX ADMIN — QUETIAPINE FUMARATE 25 MG: 25 TABLET ORAL at 21:13

## 2023-09-21 NOTE — CASE MANAGEMENT/SOCIAL WORK
Continued Stay Note  [facility]     Patient Name: Kenneth Wen  MRN: [id number]  Today's Date: 9/21/2023    Admit Date: 9/15/2023    Plan: [facility]   Discharge Plan       Row Name 09/21/23 1330       Plan    Plan [facility]    Patient/Family in Agreement with Plan yes    Plan Comments Patient tested positive for Covid on 9/20. Waxing/waning. Continues to have NG and tube feedings. CM left message to update April with [facility]. His plan is [facility]. CM will continue to follow and asssit.    Final Discharge Disposition Code 62 - inpatient rehab facility                   Discharge Codes    No documentation.                 Expected Discharge Date and Time       Expected Discharge Date Expected Discharge Time    Sep 25, 2023               Raissa Bryan RN

## 2023-09-21 NOTE — PROGRESS NOTES
Stroke Progress Note       Chief Complaint: Bilateral strokes    Subjective    Subjective     Subjective:  He is resting in bed. Arouses easily and will follow simple one step commands. He is currently in a soft wrist restraint and hand mitt restraint (left) for pulling at tubes/lines.  He is receiving enteral nutrition through his NG tube.       Objective    Objective      Temp:  [98.2 °F (36.8 °C)-99.3 °F (37.4 °C)] 98.2 °F (36.8 °C)  Heart Rate:  [] 98  Resp:  [20-22] 20  BP: (109-150)/() 144/72      Neurological Exam  Mental Status  Drowsy. Oriented only to person. Severe dysarthria present. Language is fluent with no aphasia.    Cranial Nerves  CN II: Vision test: ? Left homonymous hemianopia. Left homonymous hemianopsia.  CN III, IV, VI: Extraocular movements intact bilaterally. Pupils equal round and reactive to light bilaterally.  CN V: Facial sensation is normal.  CN VII:  Right: There is central facial weakness.  CN VIII: Hearing appears to be intact.    Motor  Normal muscle bulk throughout. No fasciculations present. Decreased muscle tone.  LUE with no drift, 5/5 strength  LLE with no drift, 5/5 strength  RUE with 0/5 strength  RLE with 1/5 strength.    Sensory  Sensation: Appears to be intact bilaterally; patient will not cooperate with exam.     Coordination  Right: Unable to assess secondary to hemiplegia. Unable to assess secondary to weakness.  No obvious dysmetria in LUE.    Gait    Not observed.    Physical Exam  Vitals and nursing note reviewed.   Constitutional:       Appearance: He is ill-appearing.   HENT:      Head: Normocephalic.      Nose:      Comments: R nare NGT in place     Mouth/Throat:      Mouth: Mucous membranes are dry.   Eyes:      Extraocular Movements: Extraocular movements intact.      Pupils: Pupils are equal, round, and reactive to light.      Comments: ? Left homonymous hemianopia   Cardiovascular:      Rate and Rhythm: Normal rate and regular rhythm.   Pulmonary:       Effort: Pulmonary effort is normal. No respiratory distress.      Comments: On room air  Musculoskeletal:      Right lower leg: No edema.      Left lower leg: No edema.   Skin:     General: Skin is warm and dry.   Neurological:      Mental Status: He is disoriented.      Cranial Nerves: Cranial nerve deficit and dysarthria present.      Sensory: No sensory deficit.      Motor: Weakness present.      Coordination: Coordination normal.   Psychiatric:         Attention and Perception: He is inattentive.         Mood and Affect: Affect is flat and angry.         Speech: Speech is slurred.         Behavior: Behavior is uncooperative and agitated.         Cognition and Memory: Cognition normal. Memory is impaired.         Judgment: Judgment is impulsive.       Results Review:    I reviewed the patient's new clinical results.    CT head without contrast (9/19) remains stable; subacute infarcts within bilateral cerebellum (right>left), left isaac, right temporal lobe, right hippocampus, and bilateral occipital lobes.  Stable appearance of hemorrhage in bilateral occipital lobes and right temporal lobe.  No evidence of new hemorrhage.    JOSIAS (OSH) concentric hypertrophy noted, EF 60-65%, no evidence of PFO, LA cavity normal size.    EEG (OSH) with slowing but no evidence of ongoing seizure activity.    Glucose 161  Creatinine 0.75, BUN 39  Sodium 152  WBC 13.16, improving  H/H13.5/40.8  Platelets 335  A1c 6.40      MRI ANGIOGRAM HEAD WO CONTRAST     Date of Exam: 9/20/2023 10:28 PM EDT     Indication: evaluate left vertebral artery for dissection/thrombus.     Comparison: Correlation with CT head 9/19/2023     Technique:  Routine 3-D time-of-flight gradient echo imaging was obtained of the head without contrast administration.        Findings:  Exam is limited by motion particularly at the upper aspect. Both distal cervical internal carotid arteries and intracranial ICA segments appear patent. There is a saccular  outpouching at the supraophthalmic right ICA measuring 3 mm consistent with a   small aneurysm. Ophthalmic arteries appear patent. The A1 and M1 segments and visualized MIKAL and MCA branches do appear patent. There is a patent A-Comm without clear definition of the P-comm on either side. The visualized V4 segments, basilar artery,   superior cerebellar and posterior cerebral arteries appear patent.     IMPRESSION:  Impression:  1.Exam is limited by motion. 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  2.No large vessel occlusion or hemodynamically significant stenosis.           Electronically Signed: Vincent Hinton MD    9/21/2023 2:43 AM EDT    Workstation ID: [id number]    MRI ANGIOGRAM NECK WO CONTRAST     Date of Exam: 9/20/2023 10:27 PM EDT     Indication: evaluate left vertebral artery for dissection/thrombus.     Comparison: None available.     Technique:  Routine 3-D time-of-flight gradient echo imaging was obtained of the neck without contrast administration.        Findings:  The exam is severely degraded by motion. The carotid and vertebral arteries appear grossly patent throughout the neck. The distal V3 and proximal V4 segments are not assessed on this exam or the MRI of the brain and vertebral dissection cannot be   excluded.     IMPRESSION:  Impression:  Motion limited study demonstrates no gross abnormality. A distal vertebral artery occlusion or dissection cannot be excluded.           Electronically Signed: Vincent Hinton MD    9/21/2023 2:44 AM EDT    Workstation ID: [id number]    CT HEAD WO CONTRAST     Date of Exam: 9/21/2023 4:19 AM EDT     Indication: eval stability of strokes.     Comparison: 9/19/2023     Technique: Axial CT images were obtained of the head without contrast administration.  Automated exposure control and iterative construction methods were used.        Findings:  There is continued evolution of bilateral hemorrhagic occipital lobe infarcts, right  larger than left. Stable appearance of bilateral cerebellar hemisphere and left pontine infarcts. There are faint hypodensities in the anterior inferior temporal lobes   consistent with evolving infarcts better seen on MRI. No new hypoattenuating lesions in the brain. No new hemorrhage. No abnormal extra-axial collection. There is mild mass effect on the occipital lobes and cerebellum without midline shift or herniation.   The calvarium remains intact. There is a small left mastoid effusion and there is residual paranasal sinus mucosal disease status post sinus surgery. Stable fluid levels in the right frontal and sphenoid sinuses.     IMPRESSION:  Impression:  1.Continued evolution of bilateral hemorrhagic occipital lobe infarcts, right larger than left.  2.Stable appearance of bilateral cerebellar hemisphere and left pontine infarcts.  3.No new hypoattenuating lesions in the brain. No new hemorrhage.           Electronically Signed: Vincent Hinton MD    9/21/2023 4:35 AM EDT    Workstation ID: [id number]            Assessment/Plan     Assessment/Plan:    This is a 72-year-old male with known medical diagnoses of essential hypertension, diabetes mellitus type 2, CKD, hyperlipidemia, and remote tobacco abuse who was a transfer from [facility] on 9/15/2023 for high-level care and further stroke work-up.  Patient initially presented to OSH ED for altered mental status, shortness of breath and projectile vomiting.  Vital signs were stable however lactic acid was elevated at 3.49.  CTA was obtained and revealed left vertebral artery occlusion.  MRI showed numerous acute ischemic infarcts of the bilateral cerebral and cerebellar hemispheres and left side of the isaac.      Repeat CTH results evolving bilateral occipital lobe and left cerebellar infarcts with concern for petechial hemorrhage. Patient was  not a candidate for IV thrombolytic therapy due to ICH on CT scans and was not a candidate for endovascular  therapy given no evidence of LVO on CTA head/neck.     Antiplatelet PTA: None  Anticoagulant PTA: None      Multifocal, multi-territorial acute ischemic strokes with asymptomatic hemorrhagic conversion, etiology cardioembolic verses athromboembolic        Malignant cerebral edema        Left vertebral artery occlusion    -TIA/CVA order set is currently in place  -NIH this morning 20 (stable)  -3% NS DC yesterday; Na+ this morning 154, we can DC serial checks   -Continue DAPT x 90 days (12/15/2023), then stop Plavix and continue with ASA monotherapy (325mg)  -PT/OT continue to work with patient, activity as tolerated; will need IPR  -Recommend to continue Seroquel 25mg BID for agitation; can increase as needed  -Lexapro 10mg for depression/agitation  -Repeat CT this morning reveals evolution of bilateral hemorrhagic occipital lobe infarcts, R>L  -NIH and neuro exam is stable at this time, continue current medication regimen   -Ok for transfer to telemetry from a neurologic standpoint  -Stroke clinic follow-up in 8 weeks    Essential hypertension  -OK for normal blood pressure parameters, goal 120-150/80-90  -Avoid hypotension given intracranial atherosclerotic disease  -Avoid hypertension given petechial hemorrhage  -Management per ICU medicine team     Hyperlipidemia  -, goal <70  -Continue atorvastatin 80mg    Diabetes mellitus type 2  -A1c 6.40, goal <7  -Maintain euglycemia, goal <140  -Management per Intensivist    Dysphagia  -SLP continues to follow; FEES passed with diet modifications   -NG in place  -SLP to continue to monitor for further needs    Plan of care was discussed with patient and primary team.  Stroke neurology will continue to follow.  Please call with any questions or concerns.    Tamiko Grullon, APRN  09/21/23  08:41 EDT

## 2023-09-21 NOTE — PROGRESS NOTES
Pulmonary/Critical Care Follow-up     LOS: 6 days   Patient Care Team:  Susan Powers APRN as PCP - General (Family Medicine)    Chief Complaint: Stroke      Subjective     History reviewed and updated in EMR as indicated.    Interval History:     COVID test sent yesterday and was positive.  Apparently the patient's sister had COVID.  No fevers or chills.  He remains a bit confused.  Agitated at times.  Somnolent at times.  Waxing/waning.  Did not eat much breakfast.  Still on tube feeding.      History taken from: Chart, staff    PMH/FH/Social History were reviewed and updated appropriately in the electronic medical record.     Review of Systems:    Review of 14 systems was completed with positives and pertinent negatives noted in the subjective section.  All other systems reviewed and are negative.   Exceptions are noted below:    Unable to obtain secondary to confusion      Objective     Vital Signs  Temp:  [98.2 °F (36.8 °C)-99.3 °F (37.4 °C)] 98.2 °F (36.8 °C)  Heart Rate:  [] 71  Resp:  [20-22] 20  BP: (106-150)/() 106/70  09/20 0701 - 09/21 0700  In: 1613 [P.O.:200; I.V.:132]  Out: 1150 [Urine:1150]  Body mass index is 23.7 kg/m².     IV drips:        Physical Exam:     Constitutional:   Awake and mildly confused, not currently agitated.   Head:   Normocephalic, atraumatic   Eyes:           Lids and lashes normal, conjunctivae and sclerae normal.  PER   ENMT:  Ears appear intact with no abnormalities noted     Lips normal.     Neck:  Trachea midline, no JVD   Lungs/Resp:    Normal effort, symmetric chest rise, no crepitus, clear to      auscultation bilaterally.               Heart/CV:   Regular rhythm and normal rate, no murmur   Abdomen/GI:    Soft, nontender, nondistended   :    Deferred   Extremities/MSK:  No clubbing or cyanosis.  No edema.     Pulses:  Pulses palpable and equal bilaterally   Skin:  No bleeding, bruising or rash   Heme/Lymph:  No cervical or supraclavicular adenopathy.    Neurologic:      Psychiatric:    Right hemiparetic but moves right a bit more today.  Follows commands bilaterally.  Right facial droop.  Right neglect.  Dysarthria present.    Nonagitated.    The above physical exam findings were reviewed and reflect my exam findings as of today's exam.   Electronically signed by:  Vivek Proctor MD  09/21/23  12:38 EDT      Results Review:     I reviewed the patient's new clinical results.   Results from last 7 days   Lab Units 09/21/23  1150 09/21/23  0543 09/21/23  0110 09/20/23  1948 09/20/23  1253 09/20/23  0600 09/20/23  0012 09/19/23  1823 09/18/23  0018 09/17/23  1759 09/15/23  1138 09/15/23  0212   SODIUM mmol/L 153* 154* 157* 157* 152* 152*   < > 154*  151*   < > 149*  149*   < > 141   POTASSIUM mmol/L  --   --  4.2 3.9 3.8 3.9   < > 3.8  3.7   < > 3.5  3.5   < > 3.2*   CHLORIDE mmol/L  --   --  121*  --   --  121*  --  120*   < > 114*   < > 104   CO2 mmol/L  --   --  27.0  --   --  20.0*  --  19.0*   < > 22.0   < > 23.0   BUN mg/dL  --   --  41*  --   --  39*  --  34*   < > 20   < > 21   CREATININE mg/dL  --   --  0.81  --   --  0.75*  --  0.67*   < > 0.72*   < > 0.76   CALCIUM mg/dL  --   --  9.3  --   --  9.0  --  9.2   < > 8.9   < > 9.1   BILIRUBIN mg/dL  --   --   --   --   --   --   --   --   --  0.7  --  1.2   ALK PHOS U/L  --   --   --   --   --   --   --   --   --  71  --  75   ALT (SGPT) U/L  --   --   --   --   --   --   --   --   --  38  --  27   AST (SGOT) U/L  --   --   --   --   --   --   --   --   --  58*  --  34   GLUCOSE mg/dL  --   --  163*  --   --  161*  --  166*   < > 145*   < > 138*    < > = values in this interval not displayed.     Results from last 7 days   Lab Units 09/21/23  0110 09/20/23  0600 09/19/23  0604   WBC 10*3/mm3 15.40* 13.16* 13.43*   HEMOGLOBIN g/dL 13.2 13.5 14.3   HEMATOCRIT % 41.4 40.8 42.6   PLATELETS 10*3/mm3 318 335 336         Results from last 7 days   Lab Units 09/18/23  1208 09/18/23  0525 09/18/23  0312  09/18/23  0018 09/17/23  1759 09/17/23  1153 09/17/23  0536   MAGNESIUM mg/dL  --   --   --  2.4 2.2  --  2.2   PHOSPHORUS mg/dL 2.9 1.9* 1.9*  --  1.9*   < > 2.1*    < > = values in this interval not displayed.       I reviewed the patient's new imaging including images and reports.    CT head 9/21/2023: Evolving hemorrhagic strokes.  Radiologist's impression follows:    Impression:  1.Continued evolution of bilateral hemorrhagic occipital lobe infarcts, right larger than left.  2.Stable appearance of bilateral cerebellar hemisphere and left pontine infarcts.  3.No new hypoattenuating lesions in the brain. No new hemorrhage.    MRA neck 9/21/2023:    Impression:  Motion limited study demonstrates no gross abnormality. A distal vertebral artery occlusion or dissection cannot be excluded.    MRA head 9/21/2023:  Impression:  1.Exam is limited by motion. 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  2.No large vessel occlusion or hemodynamically significant stenosis.    Chest x-ray 9/19/2023 shows no infiltrate.    CT head f 9/19/2023: Appears stable.  Radiologist's impression follows:    Impression:  1.Stable subacute infarcts in the right greater than left cerebellum, left isaac and bilateral occipital lobes and right temporal lobe and right hippocampus.  2.Stable parenchymal hemorrhages in the occipital lobes.  3.Stable mild mass effect without herniation or midline shift.    Medication Review:   aspirin, 81 mg, Oral, Daily  atorvastatin, 80 mg, Oral, Nightly  clopidogrel, 75 mg, Oral, Daily  escitalopram, 10 mg, Oral, Daily  insulin lispro, 2-9 Units, Subcutaneous, 4x Daily AC & at Bedtime  metoprolol tartrate, 25 mg, Oral, Q12H  pantoprazole, 40 mg, Intravenous, Q AM  QUEtiapine, 25 mg, Oral, Q12H  sodium chloride, 10 mL, Intravenous, Q12H             Assessment & Plan         Hemorrhagic CVA    Dyslipidemia    Multiple bilateral CVAs    HFpEF    T2DM (type 2 diabetes mellitus)     HTN (hypertension)    GERD (gastroesophageal reflux disease)    ICH (intracerebral hemorrhage)    Oropharyngeal dysphagia    72 y.o. male former smoker with history of HTN, HLD, T2DM, GERD, who presented to [facility] on 9/10/2023 with multiple acute ischemic infarcts of the bilateral cerebral and cerebellar hemispheres as well as the left side of the isaac.  He was outside window for thrombolytics.    At OSH, he had both a TTE and JOSIAS which were negative for PFO.    On 9/13/2023 he had further decline in mental status with new inability to move his right leg and head CT showed evolution of the existing CVA with new development of petechial hemorrhage.  DAPT was held for 24 hours per neurology recommendations and repeat CTh obtained the following evening showed worsening effacement of the right quadrigeminal cistern with upward supratentorial shift.  Due to further worsening need for neurosurgery evaluation he was transferred to [facility] for further evaluation and management.    Patient arrived to the neurological ICU on the morning of 9/15/2023.    Patient was started on hypertonic saline overnight on 9/15-16/2023 for cerebral edema.  3% saline discontinued 9/20/2023.  Imaging 9/19/2023 showed stability.    Echocardiogram at OSH showed no evidence of PFO.    Patient has had some fevers with negative cultures to date and no evidence of UTI.  He did however develop worsening secretions and labored breathing and was started on Zosyn on 9/16/2023.  No evidence of infiltrate on chest x-ray.  Procalcitonin on 9/15/2023 was 0.07.  Repeat procalcitonin in 9/19/2023 was 0.06.  Repeat x-ray again showed no infiltrate.  I will discontinue antibiotics.    Past FEES for honey thick mechanical soft.    Plan:  1.  For multiple bilateral posterior circulation strokes/hemorrhagic conversion: Suspected by neurology to be atheroembolic but cannot rule out cardioembolic given multiple vascular territories: Neurology  following.  Plavix 75 mg daily started 9/17/2023 for 3 months per neurology.  Aspirin 81 mg daily indefinitely per neurology started 9/16/2023.  Continue high-dose statin.  Neurology plans Holter monitor on discharge.  MRA head and neck showed small distal right ICA aneurysm.  Defer to the neurology team for further evaluation and recommendations.  Okay to telemetry per discussion with stroke team (ROLANDO Grullon).  2.  For cerebral edema: Status post 3% saline (started 9/16/2023, discontinued 9/20/2023).  Monitor symptomatically.  Imaging per neurology.  Monitor sodium which I expect will trend down.  3.  For hypertension: Goal -160.  Currently metoprolol 25 mg every 12 hours.  4.  For GERD: Protonix.  5.  For dysphagia: N.p.o. per speech.  Start tube feeding.  Discussed with nutrition.  6.  For cough/COVID-19 positivity: Procalcitonin was low x2.  Antibiotics previously discontinued as there is no infiltrate on follow-up chest x-ray.  Patient tested COVID-positive.  Given lack of pneumonia I will not treat with antivirals.  Patient tolerating room air.  7.  DVT prophylaxis: Mechanical.  8.  For restlessness/agitation.  Improved.  Continue low-dose Seroquel.    Patient's wife updated by phone.    Electronically signed by:    Vivek Proctor MD  09/21/23  12:38 EDT      *. Please note that portions of this note were completed with [employer] - a voice recognition program.

## 2023-09-21 NOTE — NURSING NOTE
"                             Wound, Ostomy and Continence (WOC) Note    Reason for WOC Consultation: \"Skin-see associated wound\"     Patient flowsheet reviewed.  Nursing documenting moisture associated skin damage (MASD) at the scrotum and anterior thighs.    Per [facility] protocol, MASD does not require WOC consult.      For management of MASD, recommend cleansing skin with pH foam cleanser and barrier wipes.  If moisture breakdown noted in skin folds, insert moisture wicking fabric.  Zinc oxide barrier cream should be applied to affected areas.  Keep skin clean and dry.  Pressure injury prevention protocol during hospital stay. If concern for yeast dermatitis, contact MD for topical antifungal. Utilize external catheter if there are issues with urinary incontinence.       Pressure Injury Prevention Measures (initiate for a Timur Scale Score of <18):     Most recent Timur Scale score:  Sensory Perception: 3-->slightly limited  Moisture: 3-->occasionally moist  Activity: 1-->bedfast  Mobility: 2-->very limited  Nutrition: 2-->probably inadequate  Friction and Shear: 2-->potential problem  Timur Score: 13 (09/20/23 2000)     -Turn q 2 hr. using an offloading foam wedge, keep heels elevated and offloaded with offloading heel boots.    -Raise knee-gatch before elevating HOB to reduce shearing   -Follow C.A.R.E protocol if medical devices (Bipap, coates, Ng tube, etc) are being used.  -Apply moisture barrier cream to bottom BID & PRN, if incontinent.  -Clean skin gently with no-rinse PH-balanced foam cleanser and a soft, disposable cloth (barrier wipes-blue pack).   -Reduce layers under patient (one sheet as drawsheet and two incontinence pads)    Thank you for consulting the WOC Nurse.  WOC Team will sign off.  Please re consult if the wound(s) worsens.     Heber Sol RN, BSN, CCRN, CWOCN  Wound, Ostomy and Continence (WOC) Department  [facility]          "

## 2023-09-21 NOTE — PROGRESS NOTES
"Clinical Nutrition Service      Patient Name: Kenneth Wen  YOB: 1950  MRN: [id number]  Admission date: 9/15/2023      Multidisciplinary Rounds/EN Support Monitor    Additional information obtained during MDR:  RN reports pt requires total fdg assistance, taking only a few bites. NAJosselyn Dasilva notes pt pocketed grd sausage eggs, pt was given only 1 bites of each.  He is tolerating TF, hypernatremia needing correct to sodium goal of 145-155 mmoL, hypertonic saline dc'd. RN reports water flushes were at 55 ml/hr,  now at 30 ml/hr. Plan to treat w/ free water, MD wants to vcontinue w/ current tube feeds.  NP advises pt's wife has tested positive for COVID, pt is also positive.    Current diet: Diet: Cardiac Diets, Diabetic Diets; Healthy Heart (2-3 Na+); Consistent Carbohydrate; Texture: Mechanical Ground (NDD 2); Fluid Consistency: Honey Thick    EN Support:  Product: Impact Peptide 1.5  Goal rate:  55 ml/hr  Goal volume (0701-0700): 1100 mL  Based on a feeding duration of 20 hrs/d  Modulars: None  Water flushes: none, w/ medication administration only while on hypertonic saline  Tube/Route:  NGT  Verified at bedside: yes, at goal now viewed via window    Pertinent medical data reviewed:  yes  Last filed wt: Weight: 72.8 kg (160 lb 7.9 oz) (09/21/23 0500)  Weight Method: Bed scale    BMI: BMI (Calculated): 23.7    Nutrition risk identified on nursing screen; MST score \"0\"    Labs Reviewed: yes  Pertinent: Na+ 154 w/i goal    Medications Reviewed: yes  Pertinent: seroquel, protonix, insulin      24 hr I & O documentation (0701-0700):  945 mL EN  (86 % daily goal volume) , stool occurrence x 5    Estimated/Assessed Needs (9/18/23 ):      Energy: 1650 kcal  Protein: 107 gm Pro  Fluids: 1650 ml    Intervention:  Continue current EN support, water flushes at 30 ml/hr  Monitor sodiums and adjust water flushes as indicated goal 145-155  Continue dysphagia diet; pt requiring total feeding assistance, minimal " intake reported  Plan of care and goals of care reviewed    Monitor:  Per protocol, I&O, Pertinent labs, EN delivery/tolerance, Weight, Symptoms, POC/GOC, Swallow function      Natalie López MS,RD,LD  09/21/23 11:06 EDT  Time: 20  mins

## 2023-09-21 NOTE — PROGRESS NOTES
Received a call from stroke navigator, Garry Little, about sodium treatment for 157.  Goal is 145-155.  Garry stated that Dr. Medina would like the Intensivist to manage the sodium.  Spoke with Dr. Conti.  Will increase FW via NGT.  Patient can have thickened liquids, but RN reported poor participation with oral intake.     Avoid D5W infusions.    Electronically signed by ROLANDO Hale, 09/21/23, 5:58 AM EDT.

## 2023-09-22 ENCOUNTER — APPOINTMENT (OUTPATIENT)
Dept: GENERAL RADIOLOGY | Facility: HOSPITAL | Age: 73
End: 2023-09-22
Payer: MEDICARE

## 2023-09-22 LAB
ANION GAP SERPL CALCULATED.3IONS-SCNC: 9 MMOL/L (ref 5–15)
BASOPHILS # BLD AUTO: 0.09 10*3/MM3 (ref 0–0.2)
BASOPHILS NFR BLD AUTO: 0.6 % (ref 0–1.5)
BUN SERPL-MCNC: 30 MG/DL (ref 8–23)
BUN/CREAT SERPL: 39 (ref 7–25)
CALCIUM SPEC-SCNC: 9.1 MG/DL (ref 8.6–10.5)
CHLORIDE SERPL-SCNC: 116 MMOL/L (ref 98–107)
CO2 SERPL-SCNC: 27 MMOL/L (ref 22–29)
CREAT SERPL-MCNC: 0.77 MG/DL (ref 0.76–1.27)
DEPRECATED RDW RBC AUTO: 45.7 FL (ref 37–54)
EGFRCR SERPLBLD CKD-EPI 2021: 95.7 ML/MIN/1.73
EOSINOPHIL # BLD AUTO: 0.58 10*3/MM3 (ref 0–0.4)
EOSINOPHIL NFR BLD AUTO: 4 % (ref 0.3–6.2)
ERYTHROCYTE [DISTWIDTH] IN BLOOD BY AUTOMATED COUNT: 12.8 % (ref 12.3–15.4)
GLUCOSE BLDC GLUCOMTR-MCNC: 125 MG/DL (ref 70–130)
GLUCOSE BLDC GLUCOMTR-MCNC: 135 MG/DL (ref 70–130)
GLUCOSE BLDC GLUCOMTR-MCNC: 161 MG/DL (ref 70–130)
GLUCOSE BLDC GLUCOMTR-MCNC: 164 MG/DL (ref 70–130)
GLUCOSE SERPL-MCNC: 159 MG/DL (ref 65–99)
HCT VFR BLD AUTO: 39.2 % (ref 37.5–51)
HGB BLD-MCNC: 12.8 G/DL (ref 13–17.7)
IMM GRANULOCYTES # BLD AUTO: 0.09 10*3/MM3 (ref 0–0.05)
IMM GRANULOCYTES NFR BLD AUTO: 0.6 % (ref 0–0.5)
LYMPHOCYTES # BLD AUTO: 1.79 10*3/MM3 (ref 0.7–3.1)
LYMPHOCYTES NFR BLD AUTO: 12.3 % (ref 19.6–45.3)
MCH RBC QN AUTO: 31.5 PG (ref 26.6–33)
MCHC RBC AUTO-ENTMCNC: 32.7 G/DL (ref 31.5–35.7)
MCV RBC AUTO: 96.6 FL (ref 79–97)
MONOCYTES # BLD AUTO: 1.12 10*3/MM3 (ref 0.1–0.9)
MONOCYTES NFR BLD AUTO: 7.7 % (ref 5–12)
NEUTROPHILS NFR BLD AUTO: 10.92 10*3/MM3 (ref 1.7–7)
NEUTROPHILS NFR BLD AUTO: 74.8 % (ref 42.7–76)
NRBC BLD AUTO-RTO: 0 /100 WBC (ref 0–0.2)
PLATELET # BLD AUTO: 336 10*3/MM3 (ref 140–450)
PMV BLD AUTO: 11.3 FL (ref 6–12)
POTASSIUM SERPL-SCNC: 3.9 MMOL/L (ref 3.5–5.2)
RBC # BLD AUTO: 4.06 10*6/MM3 (ref 4.14–5.8)
SODIUM SERPL-SCNC: 152 MMOL/L (ref 136–145)
SODIUM SERPL-SCNC: 156 MMOL/L (ref 136–145)
WBC NRBC COR # BLD: 14.59 10*3/MM3 (ref 3.4–10.8)

## 2023-09-22 PROCEDURE — 82948 REAGENT STRIP/BLOOD GLUCOSE: CPT

## 2023-09-22 PROCEDURE — 99233 SBSQ HOSP IP/OBS HIGH 50: CPT | Performed by: NURSE PRACTITIONER

## 2023-09-22 PROCEDURE — 63710000001 INSULIN REGULAR HUMAN PER 5 UNITS: Performed by: INTERNAL MEDICINE

## 2023-09-22 PROCEDURE — 85025 COMPLETE CBC W/AUTO DIFF WBC: CPT | Performed by: INTERNAL MEDICINE

## 2023-09-22 PROCEDURE — 99233 SBSQ HOSP IP/OBS HIGH 50: CPT | Performed by: INTERNAL MEDICINE

## 2023-09-22 PROCEDURE — 74018 RADEX ABDOMEN 1 VIEW: CPT

## 2023-09-22 PROCEDURE — 80048 BASIC METABOLIC PNL TOTAL CA: CPT | Performed by: INTERNAL MEDICINE

## 2023-09-22 RX ORDER — QUETIAPINE FUMARATE 25 MG/1
50 TABLET, FILM COATED ORAL NIGHTLY
Status: DISCONTINUED | OUTPATIENT
Start: 2023-09-22 | End: 2023-09-26

## 2023-09-22 RX ORDER — QUETIAPINE FUMARATE 25 MG/1
25 TABLET, FILM COATED ORAL EVERY MORNING
Status: DISCONTINUED | OUTPATIENT
Start: 2023-09-23 | End: 2023-09-26

## 2023-09-22 RX ADMIN — LANSOPRAZOLE 15 MG: 15 TABLET, ORALLY DISINTEGRATING ORAL at 05:11

## 2023-09-22 RX ADMIN — ESCITALOPRAM OXALATE 10 MG: 10 TABLET ORAL at 10:06

## 2023-09-22 RX ADMIN — INSULIN HUMAN 2 UNITS: 100 INJECTION, SOLUTION PARENTERAL at 07:00

## 2023-09-22 RX ADMIN — IBUPROFEN 400 MG: 100 SUSPENSION ORAL at 02:23

## 2023-09-22 RX ADMIN — INSULIN HUMAN 2 UNITS: 100 INJECTION, SOLUTION PARENTERAL at 13:16

## 2023-09-22 RX ADMIN — METOPROLOL TARTRATE 25 MG: 25 TABLET, FILM COATED ORAL at 22:41

## 2023-09-22 RX ADMIN — ATORVASTATIN CALCIUM 80 MG: 40 TABLET, FILM COATED ORAL at 22:41

## 2023-09-22 RX ADMIN — ASPIRIN 81 MG: 81 TABLET, CHEWABLE ORAL at 10:06

## 2023-09-22 RX ADMIN — CLOPIDOGREL BISULFATE 75 MG: 75 TABLET ORAL at 10:06

## 2023-09-22 RX ADMIN — Medication 10 ML: at 10:07

## 2023-09-22 RX ADMIN — QUETIAPINE FUMARATE 50 MG: 25 TABLET ORAL at 22:41

## 2023-09-22 RX ADMIN — METOPROLOL TARTRATE 25 MG: 25 TABLET, FILM COATED ORAL at 10:06

## 2023-09-22 RX ADMIN — QUETIAPINE FUMARATE 25 MG: 25 TABLET ORAL at 10:06

## 2023-09-22 NOTE — CASE MANAGEMENT/SOCIAL WORK
Continued Stay Note  [facility]     Patient Name: Kenneth Wen  MRN: [id number]  Today's Date: 9/22/2023    Admit Date: 9/15/2023    Plan: [facility]   Discharge Plan       Row Name 09/22/23 1506       Plan    Plan [facility]    Patient/Family in Agreement with Plan yes    Plan Comments April with [facility] is following for medical readiness for rehab.  will continue to follow.    Final Discharge Disposition Code 62 - inpatient rehab facility                   Discharge Codes    No documentation.                 Expected Discharge Date and Time       Expected Discharge Date Expected Discharge Time    Sep 25, 2023               Madelyn Beasley RN

## 2023-09-22 NOTE — PROGRESS NOTES
Stroke Progress Note       Chief Complaint: Bilateral strokes    Subjective    Subjective     Subjective:  He is resting in bed. Resting in bed comfortably. He is alert and following simple one step commands. He currently is out of a wrist restraint with only a mitt, however he has pulled his NG tube. He is receiving enteral nutrition through his NG tube intermittently to support nutrition.       Objective    Objective      Temp:  [97.8 °F (36.6 °C)-98.6 °F (37 °C)] 98.2 °F (36.8 °C)  Heart Rate:  [71-96] 82  Resp:  [18] 18  BP: (106-158)/(66-95) 158/95      Neurological Exam  Mental Status  Awake and alert. Oriented only to person. Moderate dysarthria present. Expressive aphasia present.    Cranial Nerves  CN II: Vision test: ? Left homonymous hemianopia. Left homonymous hemianopsia.  CN III, IV, VI: Extraocular movements intact bilaterally. Pupils equal round and reactive to light bilaterally. He is able to track me on both sides of the bed .  CN V: Facial sensation is normal.  CN VII:  Right: There is central facial weakness.  CN VIII: Hearing appears to be intact.    Motor  Normal muscle bulk throughout. No fasciculations present. Decreased muscle tone.  LUE with no drift, 5/5 strength  LLE with no drift, 5/5 strength  RUE with 1/5 strength  RLE with 2/5 strength.    Sensory  Sensation: Appears to be intact bilaterally; patient will not cooperate with exam  Pain response in all 4 extremities.     Coordination  Right: Unable to assess secondary to hemiplegia. Unable to assess secondary to weakness.  No obvious dysmetria in LUE.    Gait    Not observed.    Physical Exam  Vitals and nursing note reviewed.   Constitutional:       General: He is awake.   HENT:      Head: Normocephalic.      Nose:      Comments: R nare NGT in place     Mouth/Throat:      Mouth: Mucous membranes are dry.   Eyes:      Extraocular Movements: Extraocular movements intact.      Pupils: Pupils are equal, round, and reactive to light.       Comments: ? Left homonymous hemianopia   Cardiovascular:      Rate and Rhythm: Normal rate and regular rhythm.   Pulmonary:      Effort: Pulmonary effort is normal. No respiratory distress.      Comments: On room air  Musculoskeletal:      Right lower leg: No edema.      Left lower leg: No edema.   Skin:     General: Skin is warm and dry.   Neurological:      Mental Status: He is alert. He is disoriented.      Cranial Nerves: Cranial nerve deficit and dysarthria present.      Sensory: No sensory deficit.      Motor: Weakness present.      Coordination: Coordination normal.   Psychiatric:         Attention and Perception: He is inattentive.         Mood and Affect: Affect is flat.         Speech: Speech is slurred.         Behavior: Behavior is uncooperative and agitated.         Cognition and Memory: Cognition normal. Memory is impaired.         Judgment: Judgment is impulsive.       Results Review:    I reviewed the patient's new clinical results.    CT head without contrast (9/19) remains stable; subacute infarcts within bilateral cerebellum (right>left), left isaac, right temporal lobe, right hippocampus, and bilateral occipital lobes.  Stable appearance of hemorrhage in bilateral occipital lobes and right temporal lobe.  No evidence of new hemorrhage.    JOSIAS (OSH) concentric hypertrophy noted, EF 60-65%, no evidence of PFO, LA cavity normal size.    EEG (OSH) with slowing but no evidence of ongoing seizure activity.    Glucose 161  Creatinine 0.75, BUN 39  Sodium 152  WBC 13.16, improving  H/H13.5/40.8  Platelets 335  A1c 6.40      MRI ANGIOGRAM HEAD WO CONTRAST     Date of Exam: 9/20/2023 10:28 PM EDT     Indication: evaluate left vertebral artery for dissection/thrombus.     Comparison: Correlation with CT head 9/19/2023     Technique:  Routine 3-D time-of-flight gradient echo imaging was obtained of the head without contrast administration.        Findings:  Exam is limited by motion particularly at the  upper aspect. Both distal cervical internal carotid arteries and intracranial ICA segments appear patent. There is a saccular outpouching at the supraophthalmic right ICA measuring 3 mm consistent with a   small aneurysm. Ophthalmic arteries appear patent. The A1 and M1 segments and visualized MIKAL and MCA branches do appear patent. There is a patent A-Comm without clear definition of the P-comm on either side. The visualized V4 segments, basilar artery,   superior cerebellar and posterior cerebral arteries appear patent.     IMPRESSION:  Impression:  1.Exam is limited by motion. 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  2.No large vessel occlusion or hemodynamically significant stenosis.           Electronically Signed: Vincent Hinton MD    9/21/2023 2:43 AM EDT    Workstation ID: [id number]    MRI ANGIOGRAM NECK WO CONTRAST     Date of Exam: 9/20/2023 10:27 PM EDT     Indication: evaluate left vertebral artery for dissection/thrombus.     Comparison: None available.     Technique:  Routine 3-D time-of-flight gradient echo imaging was obtained of the neck without contrast administration.        Findings:  The exam is severely degraded by motion. The carotid and vertebral arteries appear grossly patent throughout the neck. The distal V3 and proximal V4 segments are not assessed on this exam or the MRI of the brain and vertebral dissection cannot be   excluded.     IMPRESSION:  Impression:  Motion limited study demonstrates no gross abnormality. A distal vertebral artery occlusion or dissection cannot be excluded.           Electronically Signed: Vincent Hinton MD    9/21/2023 2:44 AM EDT    Workstation ID: [id number]    CT HEAD WO CONTRAST     Date of Exam: 9/21/2023 4:19 AM EDT     Indication: eval stability of strokes.     Comparison: 9/19/2023     Technique: Axial CT images were obtained of the head without contrast administration.  Automated exposure control and iterative  construction methods were used.        Findings:  There is continued evolution of bilateral hemorrhagic occipital lobe infarcts, right larger than left. Stable appearance of bilateral cerebellar hemisphere and left pontine infarcts. There are faint hypodensities in the anterior inferior temporal lobes   consistent with evolving infarcts better seen on MRI. No new hypoattenuating lesions in the brain. No new hemorrhage. No abnormal extra-axial collection. There is mild mass effect on the occipital lobes and cerebellum without midline shift or herniation.   The calvarium remains intact. There is a small left mastoid effusion and there is residual paranasal sinus mucosal disease status post sinus surgery. Stable fluid levels in the right frontal and sphenoid sinuses.     IMPRESSION:  Impression:  1.Continued evolution of bilateral hemorrhagic occipital lobe infarcts, right larger than left.  2.Stable appearance of bilateral cerebellar hemisphere and left pontine infarcts.  3.No new hypoattenuating lesions in the brain. No new hemorrhage.           Electronically Signed: Vincent Hinton MD    9/21/2023 4:35 AM EDT    Workstation ID: [id number]            Assessment/Plan     Assessment/Plan:    This is a 72-year-old male with known medical diagnoses of essential hypertension, diabetes mellitus type 2, CKD, hyperlipidemia, and remote tobacco abuse who was a transfer from [facility] on 9/15/2023 for high-level care and further stroke work-up.  Patient initially presented to OSH ED for altered mental status, shortness of breath and projectile vomiting.  Vital signs were stable however lactic acid was elevated at 3.49.  CTA was obtained and revealed left vertebral artery occlusion.  MRI showed numerous acute ischemic infarcts of the bilateral cerebral and cerebellar hemispheres and left side of the isaac.      Repeat CTH results evolving bilateral occipital lobe and left cerebellar infarcts with concern for petechial  hemorrhage. Patient was  not a candidate for IV thrombolytic therapy due to ICH on CT scans and was not a candidate for endovascular therapy given no evidence of LVO on CTA head/neck.     Antiplatelet PTA: None  Anticoagulant PTA: None      Multifocal, multi-territorial acute ischemic strokes with asymptomatic hemorrhagic conversion, etiology cardioembolic verses athromboembolic        Malignant cerebral edema        Left vertebral artery occlusion  -TIA/CVA order set is currently in place  -NIH this morning 22 (stable)  -Repeat CTH, CTA head/neck today   -3% NS DC yesterday; Na+ this morning 154, we can DC serial checks   -Plavix monotherapy, D/C ASA  -PT/OT continue to work with patient, activity as tolerated; will need IPR  -Continue Seroquel 25mg in the morning and increase to 50mg at night for agitation  -Lexapro 10mg for depression/agitation  -Repeat CT yesterday, stable  -NIH and neuro exam is stable at this time, continue current medication regimen   -Stroke clinic follow-up in 8 weeks  -KUB for NG tube placement    Essential hypertension  -OK for normal blood pressure parameters, goal 120-150/80-90  -Avoid hypotension given intracranial atherosclerotic disease  -Avoid hypertension given petechial hemorrhage  -Management per Hospital Medicine Team     Hyperlipidemia  -, goal <70  -Continue atorvastatin 80mg    Diabetes mellitus type 2  -A1c 6.40, goal <7  -Maintain euglycemia, goal <140  -Management per Intensivist    Dysphagia  -SLP continues to follow; FEES passed with diet modifications   -NG in place for nutrition support  -SLP to continue to monitor for further needs    Stroke Neurology will continue to follow. Plan of care discussed with Dr. Lenz. Please call with any further questions or concerns.     Tamiko Grullon, APRN  09/22/23  16:24 EDT

## 2023-09-22 NOTE — SIGNIFICANT NOTE
09/22/23 5435   SLP Deferred Reason   SLP Deferred Reason   (Recieved message from dietitian re: nursing concern that pt pocketing food. Will plan to re-eval swallowing. Notified RN that pt may benefit from downgrade to pureed textures until SLP re-eval if continued concerns w/ current mechanical ground texture.)

## 2023-09-22 NOTE — PROGRESS NOTES
Clinical Nutrition     Nutrition Support Assessment  Reason for Visit: Follow-up protocol, EN      Patient Name: Kenneth Wen  YOB: 1950  MRN: [id number]  Date of Encounter: 09/22/23 11:44 EDT  Admission date: 9/15/2023    Comments:     Pt ate some of breakfast this am, RN notes pt pocketing food. Also discussed w/SLP.     Adjust EN for change in feeding window and pt w/po diet. Will trial nocturnal feeds to encourage po intake.   Nocturnal regimen x 14 hrs (6p-8a): Isosource 1.5 @ 25ml/hr advance by 15ml q 6 hrs to goal @ 70ml/hr. Water flush @ 20ml/hr. Please provide additional water bolus of 200ml BID.     =980ml, 1470kcals (80% est needs), 66g pro (78% est needs) 15g fiber, 745ml water from formula, +280ml water from flushes, 1425ml TFW including water bolus BID.     Nutrition Assessment   Admission Diagnosis:  ICH (intracerebral hemorrhage) [I61.9]      Problem List:    Hemorrhagic CVA    Dyslipidemia    Multiple bilateral CVAs    HFpEF    T2DM (type 2 diabetes mellitus)    HTN (hypertension)    GERD (gastroesophageal reflux disease)    ICH (intracerebral hemorrhage)    Oropharyngeal dysphagia        PMH:  He  has a past medical history of Acid reflux, Cancer, Diabetes mellitus, GERD (gastroesophageal reflux disease) (09/15/2023), HTN (hypertension) (09/15/2023), Kidney disease, and T2DM (type 2 diabetes mellitus) (09/15/2023).    PSH: He  has a past surgical history that includes Appendectomy; Nasal polyp surgery; and Hemorrhoid surgery.      Applicable Nutrition Concerns:   Skin:  Oral:  GI:      Applicable Interval History:   9/16 3% Hypertonic saline initiated  9/15 FEES:  SLP Swallowing Diagnosis: mod-severe, oral dysphagia, severe, pharyngeal dysphagia (09/15/23 1331)  SLP Diet Recommendation: NPO, temporary alternate methods of nutrition/hydration, other (see comments) (okay for 2-3 ice chips per hour as tolerated)     Reported/Observed/Food/Nutrition Related History:    9/22  Pt on diet though ate only sausage and some juice this am. RN states pt pocketing food still, had been happening in ICU per previous RD note. Discussed w/SLP.     9/18 RN reports pt on 3% saline therapy Na+ goal 145-155, Na+ w/I range, pt from OSH sent here d/t hemorrhagic conversion continues to have R weakness, confusion, tolerating TF. Uncertain if pt should have water flushes, these were dc'd after discussion w/ MD.       9/15  Per RN unclear if will start EN or if pt may progress to caden diet at this time.  No wt hx available today.       Labs    Labs Reviewed: Yes , Na+ goal 145-155 mmol/L    Results from last 7 days   Lab Units 09/22/23  0625 09/21/23  2353 09/21/23  1827 09/21/23  0543 09/21/23  0110 09/20/23  1948 09/20/23  1253 09/20/23  0600 09/18/23  1851 09/18/23  1208 09/18/23  0525 09/18/23  0312 09/18/23  0018 09/17/23  1759 09/17/23  1153 09/17/23  0536   GLUCOSE mg/dL 159*  --   --   --  163*  --   --  161*   < >  --   --   --  157* 145*  --   --    BUN mg/dL 30*  --   --   --  41*  --   --  39*   < >  --   --   --  24* 20  --   --    CREATININE mg/dL 0.77  --   --   --  0.81  --   --  0.75*   < >  --   --   --  0.76 0.72*  --   --    SODIUM mmol/L 152* 156* 155*   < > 157* 157*   < > 152*   < > 152* 149*  --  151* 149*  149*   < > 146*   CHLORIDE mmol/L 116*  --   --   --  121*  --   --  121*   < >  --   --   --  120* 114*  --   --    POTASSIUM mmol/L 3.9  --   --   --  4.2 3.9   < > 3.9   < > 4.3 3.4* 3.8 3.8 3.5  3.5   < > 3.8   PHOSPHORUS mg/dL  --   --   --   --   --   --   --   --   --  2.9 1.9* 1.9*  --  1.9*   < > 2.1*   MAGNESIUM mg/dL  --   --   --   --   --   --   --   --   --   --   --   --  2.4 2.2  --  2.2   ALT (SGPT) U/L  --   --   --   --   --   --   --   --   --   --   --   --   --  38  --   --     < > = values in this interval not displayed.         Results from last 7 days   Lab Units 09/17/23  1759   ALBUMIN g/dL 3.8   CHOLESTEROL mg/dL 102   TRIGLYCERIDES mg/dL 60  "        Results from last 7 days   Lab Units 09/22/23  0524 09/21/23  2307 09/21/23  1653 09/21/23  1144 09/21/23  0729 09/20/23 2004   GLUCOSE mg/dL 161* 167* 139* 158* 137* 111       Lab Results   Lab Value Date/Time    HGBA1C 6.40 (H) 09/15/2023 0212             Medications    Medications Reviewed: Yes  Pertinent: insulin, protonix, abx  Infusion:   PRN:       Intake/Ouptut 24 hrs (0701 - 0700)   I&O's Reviewed: Yes     Intake & Output (last day)         09/21 0701 09/22 0700 09/22 0701 09/23 0700    P.O. 40     I.V. (mL/kg)      Other 215     NG/     Total Intake(mL/kg) 697 (9.6)     Urine (mL/kg/hr) 400 (0.2)     Stool 0     Total Output 400     Net +297           Urine Unmeasured Occurrence 3 x     Stool Unmeasured Occurrence 2 x             Anthropometrics       Admission Height 175.3 cm (69\") Documented at 09/15/2023 0122   Admission Weight 79.2 kg (174 lb 9.7 oz) Documented at 09/15/2023 0122       Height: Height: 175.3 cm (69\")  Last Filed Weight: Weight: 72.8 kg (160 lb 7.9 oz) (09/21/23 0500)  Method: Weight Method: Bed scale  BMI: BMI (Calculated): 23.7  BMI classification: Overweight: 25.0-29.9kg/m2      9/15/2023 9/17/2023   Weight     Weight (kg) 79.2 kg  82.5 kg    Weight (lbs) 174 lb 9.7 oz  181 lb 14.1 oz    Weight Method Bed scale  Bed scale        UBW: Per  lbs on 9/14/23  Note 172 lbs in 2018  Weight change: uncertain at this time    Nutrition Focused Physical Exam     Date: 9/13    Unable to perform exam due to: COVID Isolation     Needs Assessment   Date:9/22    Height used: 175.3 cm  Weights used: 72.8 kg    Estimated Calorie needs: ~ 1800 Kcal/day  Method:  Kcals/KG 25 kcal/kg  Method:  MSJ = 1571 * 1.2    Estimated Protein needs: ~ 87 g PRO/day  Method: 1. g/Kg     Estimated Fluid needs: ~ ml/day   To achieve Na+ goal of 145-155 mmol/L while on hypertonic saline regimen    Current Nutrition Prescription     PO: Diet: Cardiac Diets, Diabetic Diets; Healthy Heart (2-3 Na+); " Consistent Carbohydrate; Texture: Mechanical Ground (NDD 2); Fluid Consistency: Honey Thick  Oral Nutrition Supplement:   Intake: N/A    EN: Impact Peptide 1.5  Goal Rate: 55 ml/hr  Water Flushes:  30 ml/hr  Modular: None  Route: NG  Tube: Large bore    At goal over: 22Hrs/day    Rx will supply:   Goal Volume 1210  mL/day     Flush Volume 660 mL/day     Energy 1815 Kcal/day 100 % Est Need   Protein 114 g/day 96 % Est Need   Fiber 0 g/day     Water in   mL     Total Water 1592 mL     Meet DRI Yes        --------------------------------------------------------------------------  Product/Rate verified at bedside: Yes  Infusing Rate at time of visit: 55 ml/hr, flush @ 30 per RN    Average Delivery from [facility]:  Volume 975 mL/day 81  % Goal Vol.   Flush Volume 306 mL/day     Energy  Kcal/day  % Est Need   Protein  g/day  % Est Need   Fiber  g/day     Water in  EN  mL     Total Water  mL     Meet DRI No             Nutrition Diagnosis   Date: 9/15 Updated:   Problem Inadequate oral intake    Etiology stroke   Signs/Symptoms NPO w ice chips per SLP, confusion, +EN   Status: ongoing    Date:   Updated:  Problem Swallowing difficulty/chewing difficulty   Etiology Bilateral strokes   Signs/Symptoms Oral-pharyngeal dysphagia per SLP FEES eval   Status: ongoing  Goal:   General: Nutrition support treatment  PO: Increase intake, Advace diet as medically feasible/appropriate  EN/PN: Establish EN tolerance, Tolerate EN at goal, Adjust EN, Deliver estimated needs    Nutrition Intervention      Follow treatment progress, Care plan reviewed, Nutrition support order placed    Adjust EN for change in feeding window and pt w/po diet. Will trial nocturnal feeds to encourage po intake.   Nocturnal regimen x 14 hrs (6p-8a): Isosource 1.5 @ 25ml/hr advance by 15ml q 6 hrs to goal @ 70ml/hr. Water flush @ 20ml/hr. Please provide additional water bolus of 200ml BID.     =980ml, 1470kcals (80% est needs), 66g pro (78%  est needs) 15g fiber, 745ml water from formula, +280ml water from flushes, 1425ml TFW including water bolus BID.    Monitoring/Evaluation:   Per protocol, I&O, Pertinent labs, Weight, Symptoms, Swallow function      Liset Webster, MS,RD,LD  Time Spent: 40 min

## 2023-09-22 NOTE — PROGRESS NOTES
[facility] Medicine Services  PROGRESS NOTE    Patient Name: Kenneth Wen  : 1950  MRN: [id number]    Date of Admission: 9/15/2023  Primary Care Physician: Susan Pwoers APRN    Subjective   Subjective     CC:  Multiple CVA's    HPI:  No events.  Transfer out of ICU.  Patient confused.  Nurse notes was able to take out of restraints.      Objective   Objective     Vital Signs:   Temp:  [97.8 °F (36.6 °C)-98.6 °F (37 °C)] 98 °F (36.7 °C)  Heart Rate:  [73-82] 81  Resp:  [18] 18  BP: (113-158)/(66-95) 148/81     Physical Exam:  Constitutional: No acute distress, awake, alert  HENT: NCAT, mucous membranes moist, DHT with TF's  Respiratory: Clear to auscultation bilaterally, respiratory effort normal   Cardiovascular: RRR, no murmurs, rubs, or gallops  Gastrointestinal: Positive bowel sounds, soft, nontender, nondistended  Musculoskeletal: No bilateral ankle edema  Psychiatric: Appropriate affect, cooperative  Neurologic: confused Oriented x 2,   Skin: No rashes      Results Reviewed:  LAB RESULTS:      Lab 23  0625 23  0110 23  0600 23  0614 23  0604 23  0018   WBC 14.59* 15.40* 13.16*  --  13.43* 11.07*   HEMOGLOBIN 12.8* 13.2 13.5  --  14.3 13.9   HEMATOCRIT 39.2 41.4 40.8  --  42.6 41.3   PLATELETS 336 318 335  --  336 277   NEUTROS ABS 10.92* 11.65* 9.79*  --  10.63*  --    IMMATURE GRANS (ABS) 0.09* 0.06* 0.07*  --  0.07*  --    LYMPHS ABS 1.79 1.94 1.74  --  1.39  --    MONOS ABS 1.12* 1.27* 1.11*  --  1.04*  --    EOS ABS 0.58* 0.39 0.35  --  0.22  --    MCV 96.6 96.5 94.4  --  93.6 93.4   PROCALCITONIN  --   --   --  0.06  --   --          Lab 23  0625 23  2353 23  1827 23  1150 23  0543 23  0110 23  1948 23  1253 23  0600 23  0012 23  1823 23  1251 23  0614 23  1851 23  1208 23  0525 23  0312 23  0018 23  1759  09/17/23  1153 09/17/23  0536 09/16/23  2354 09/16/23  2354 09/16/23  1157 09/16/23  0607   SODIUM 152* 156* 155* 153* 154* 157* 157* 152* 152*   < > 154*  151*   < > 150*   < > 152* 149*  --  151* 149*  149* 148* 146*  --  147*   < > 141   POTASSIUM 3.9  --   --   --   --  4.2 3.9 3.8 3.9   < > 3.8  3.7   < > 4.0   < > 4.3 3.4* 3.8 3.8 3.5  3.5 3.7 3.8  --  4.3   < > 3.7   CHLORIDE 116*  --   --   --   --  121*  --   --  121*  --  120*  --  117*  --   --   --   --  120* 114*  --   --   --  116*  --  112*   CO2 27.0  --   --   --   --  27.0  --   --  20.0*  --  19.0*  --  23.0  --   --   --   --  19.0* 22.0  --   --   --  20.0*  --  19.0*   ANION GAP 9.0  --   --   --   --  9.0  --   --  11.0  --  15.0  --  10.0  --   --   --   --  12.0 13.0  --   --   --  11.0  --  10.0   BUN 30*  --   --   --   --  41*  --   --  39*  --  34*  --  32*  --   --   --   --  24* 20  --   --   --  20  --  27*   CREATININE 0.77  --   --   --   --  0.81  --   --  0.75*  --  0.67*  --  0.75*  --   --   --   --  0.76 0.72*  --   --   --  0.62*  --  0.71*   EGFR 95.7  --   --   --   --  94.3  --   --  96.5  --  99.8  --  96.5  --   --   --   --  96.1 97.7  --   --   --  102.2  --  98.1   GLUCOSE 159*  --   --   --   --  163*  --   --  161*  --  166*  --  161*  --   --   --   --  157* 145*  --   --   --  129*  --  141*   CALCIUM 9.1  --   --   --   --  9.3  --   --  9.0  --  9.2  --  9.4  --   --   --   --  8.7 8.9  --   --   --  8.8  --  8.5*   MAGNESIUM  --   --   --   --   --   --   --   --   --   --   --   --   --   --   --   --   --  2.4 2.2  --  2.2  --  2.2  --  2.3   PHOSPHORUS  --   --   --   --   --   --   --   --   --   --   --   --   --   --  2.9 1.9* 1.9*  --  1.9* 2.0* 2.1*   < >  --   --   --     < > = values in this interval not displayed.         Lab 09/17/23 1759   TOTAL PROTEIN 6.8   ALBUMIN 3.8   GLOBULIN 3.0   ALT (SGPT) 38   AST (SGOT) 58*   BILIRUBIN 0.7   ALK PHOS 71             Lab 09/17/23 1759   CHOLESTEROL  102   TRIGLYCERIDES 60             Brief Urine Lab Results  (Last result in the past 365 days)        Color   Clarity   Blood   Leuk Est   Nitrite   Protein   CREAT   Urine HCG        09/15/23 1818 Yellow   Cloudy   Trace   Negative   Negative   Negative                   Microbiology Results Abnormal       Procedure Component Value - Date/Time    Blood Culture - Blood, Arm, Right [[id number]]  (Normal) Collected: 09/15/23 0212    Lab Status: Final result Specimen: Blood from Arm, Right Updated: 09/20/23 0230     Blood Culture No growth at 5 days    Blood Culture - Blood, Arm, Left [[id number]]  (Normal) Collected: 09/15/23 0212    Lab Status: Final result Specimen: Blood from Arm, Left Updated: 09/20/23 0230     Blood Culture No growth at 5 days            CT Head Without Contrast    Result Date: 9/21/2023  CT HEAD WO CONTRAST Date of Exam: 9/21/2023 4:19 AM EDT Indication: eval stability of strokes. Comparison: 9/19/2023 Technique: Axial CT images were obtained of the head without contrast administration.  Automated exposure control and iterative construction methods were used. Findings: There is continued evolution of bilateral hemorrhagic occipital lobe infarcts, right larger than left. Stable appearance of bilateral cerebellar hemisphere and left pontine infarcts. There are faint hypodensities in the anterior inferior temporal lobes consistent with evolving infarcts better seen on MRI. No new hypoattenuating lesions in the brain. No new hemorrhage. No abnormal extra-axial collection. There is mild mass effect on the occipital lobes and cerebellum without midline shift or herniation.  The calvarium remains intact. There is a small left mastoid effusion and there is residual paranasal sinus mucosal disease status post sinus surgery. Stable fluid levels in the right frontal and sphenoid sinuses.     Impression: Impression: 1.Continued evolution of bilateral hemorrhagic occipital lobe infarcts, right larger than  left. 2.Stable appearance of bilateral cerebellar hemisphere and left pontine infarcts. 3.No new hypoattenuating lesions in the brain. No new hemorrhage. Electronically Signed: Vincent Hinton MD  9/21/2023 4:35 AM EDT  Workstation ID: [id number]    MRI Angiogram Head Without Contrast    Result Date: 9/21/2023  MRI ANGIOGRAM HEAD WO CONTRAST Date of Exam: 9/20/2023 10:28 PM EDT Indication: evaluate left vertebral artery for dissection/thrombus.  Comparison: Correlation with CT head 9/19/2023 Technique:  Routine 3-D time-of-flight gradient echo imaging was obtained of the head without contrast administration. Findings: Exam is limited by motion particularly at the upper aspect. Both distal cervical internal carotid arteries and intracranial ICA segments appear patent. There is a saccular outpouching at the supraophthalmic right ICA measuring 3 mm consistent with a small aneurysm. Ophthalmic arteries appear patent. The A1 and M1 segments and visualized MIKAL and MCA branches do appear patent. There is a patent A-Comm without clear definition of the P-comm on either side. The visualized V4 segments, basilar artery, superior cerebellar and posterior cerebral arteries appear patent.     Impression: Impression: 1.Exam is limited by motion. 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 2.No large vessel occlusion or hemodynamically significant stenosis. Electronically Signed: Vincent Hinton MD  9/21/2023 2:43 AM EDT  Workstation ID: [id number]    MRI Angiogram Neck Without Contrast    Result Date: 9/21/2023  MRI ANGIOGRAM NECK WO CONTRAST Date of Exam: 9/20/2023 10:27 PM EDT Indication: evaluate left vertebral artery for dissection/thrombus.  Comparison: None available. Technique:  Routine 3-D time-of-flight gradient echo imaging was obtained of the neck without contrast administration. Findings: The exam is severely degraded by motion. The carotid and vertebral arteries appear grossly patent  throughout the neck. The distal V3 and proximal V4 segments are not assessed on this exam or the MRI of the brain and vertebral dissection cannot be excluded.     Impression: Impression: Motion limited study demonstrates no gross abnormality. A distal vertebral artery occlusion or dissection cannot be excluded. Electronically Signed: Vincent Hinton MD  9/21/2023 2:44 AM EDT  Workstation ID: [id number]         Current medications:  Scheduled Meds:aspirin, 81 mg, Nasogastric, Daily  atorvastatin, 80 mg, Nasogastric, Nightly  clopidogrel, 75 mg, Nasogastric, Daily  escitalopram, 10 mg, Nasogastric, Daily  insulin regular, 2-9 Units, Subcutaneous, Q6H  lansoprazole, 15 mg, Oral, Q AM  metoprolol tartrate, 25 mg, Nasogastric, Q12H  QUEtiapine, 25 mg, Nasogastric, Q12H  sodium chloride, 10 mL, Intravenous, Q12H      Continuous Infusions:   PRN Meds:.  acetaminophen **OR** acetaminophen    Calcium Replacement - Follow Nurse / BPA Driven Protocol    dextrose    glucagon (human recombinant)    ibuprofen    Magnesium Standard Dose Replacement - Follow Nurse / BPA Driven Protocol    ondansetron    Phosphorus Replacement - Follow Nurse / BPA Driven Protocol    Potassium Replacement - Follow Nurse / BPA Driven Protocol    sodium chloride    Assessment & Plan   Assessment & Plan     Active Hospital Problems    Diagnosis  POA    **Hemorrhagic CVA [I61.9]  Yes    Oropharyngeal dysphagia [R13.12]  Yes    Multiple bilateral CVAs [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        Brief Hospital Course to date:  Kenneth Wen is a 72 y.o. male with h/o HTN, HL, T2DM, GERD who presented to OSH with multiple acute ischemic infarcts of the bilateral cerebral and  cerebellar hemispheres as well as left isaac.  TTE/JOSIAS was negative PFO at OSH.  On 9/13/23 he had worsening in mental status and new inability to move RLE, head CT showed evolution of the existing CVA with new development of petechial hemorrhage.  DAPT was held for 24 hours per neuro recs and repeat CT head that evening showed worsening effacement of the right quadrigeminal cistern with upward supratentorial. He was then transferred to [facility] for NS eval on 9/15/23.  He was started on hypertonic saline 9/15/23  through 9/20/23 for cerebral edema.  He had fevers with negative cultures but had worsening secretions and labored breathing so was started on zosyn on 9/16/23.  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  Hopefully can encourage increase PO intake as patient improves and then may change to TF's qhs only to get to eat more PO to hopefully try to avoid PEG    HTN  --goal -160.  Continue metoprolol 25mg BID    GERD  --continue protonix    COVID +  --was on zosyn but no infiltrate and procal low so pulm stopped  --on RA.  No indication for antivirals    Agitation  --seroquel  --better, out of restraints today    Hypernatremia  --s/p hot salts as above.  Now that off should start trending down.  Increase FW to 225mL with TF's        Expected Discharge Location and Transportation:   Expected Discharge   Expected Discharge Date: 9/25/2023; Expected Discharge Time:      DVT prophylaxis:  Mechanical DVT  prophylaxis orders are present.     AM-PAC 6 Clicks Score (PT): 6 (09/21/23 2000)    CODE STATUS:   Code Status and Medical Interventions:   Ordered at: 09/15/23 0133     Code Status (Patient has no pulse and is not breathing):    CPR (Attempt to Resuscitate)     Medical Interventions (Patient has pulse or is breathing):    Full Support       Osmel Lee MD  09/22/23

## 2023-09-23 ENCOUNTER — APPOINTMENT (OUTPATIENT)
Dept: CT IMAGING | Facility: HOSPITAL | Age: 73
End: 2023-09-23
Payer: MEDICARE

## 2023-09-23 LAB
ANION GAP SERPL CALCULATED.3IONS-SCNC: 12 MMOL/L (ref 5–15)
BASOPHILS # BLD AUTO: 0.12 10*3/MM3 (ref 0–0.2)
BASOPHILS NFR BLD AUTO: 0.9 % (ref 0–1.5)
BUN SERPL-MCNC: 27 MG/DL (ref 8–23)
BUN/CREAT SERPL: 38.6 (ref 7–25)
CA-I SERPL ISE-MCNC: 1.33 MMOL/L (ref 1.12–1.32)
CALCIUM SPEC-SCNC: 9.1 MG/DL (ref 8.6–10.5)
CHLORIDE SERPL-SCNC: 112 MMOL/L (ref 98–107)
CO2 SERPL-SCNC: 20 MMOL/L (ref 22–29)
CREAT SERPL-MCNC: 0.7 MG/DL (ref 0.76–1.27)
DEPRECATED RDW RBC AUTO: 45.1 FL (ref 37–54)
EGFRCR SERPLBLD CKD-EPI 2021: 98.5 ML/MIN/1.73
EOSINOPHIL # BLD AUTO: 0.55 10*3/MM3 (ref 0–0.4)
EOSINOPHIL NFR BLD AUTO: 4.1 % (ref 0.3–6.2)
ERYTHROCYTE [DISTWIDTH] IN BLOOD BY AUTOMATED COUNT: 12.6 % (ref 12.3–15.4)
GLUCOSE BLDC GLUCOMTR-MCNC: 107 MG/DL (ref 70–130)
GLUCOSE BLDC GLUCOMTR-MCNC: 113 MG/DL (ref 70–130)
GLUCOSE BLDC GLUCOMTR-MCNC: 124 MG/DL (ref 70–130)
GLUCOSE BLDC GLUCOMTR-MCNC: 148 MG/DL (ref 70–130)
GLUCOSE SERPL-MCNC: 123 MG/DL (ref 65–99)
HCT VFR BLD AUTO: 44.8 % (ref 37.5–51)
HGB BLD-MCNC: 14.5 G/DL (ref 13–17.7)
IMM GRANULOCYTES # BLD AUTO: 0.09 10*3/MM3 (ref 0–0.05)
IMM GRANULOCYTES NFR BLD AUTO: 0.7 % (ref 0–0.5)
LYMPHOCYTES # BLD AUTO: 2.27 10*3/MM3 (ref 0.7–3.1)
LYMPHOCYTES NFR BLD AUTO: 16.9 % (ref 19.6–45.3)
MAGNESIUM SERPL-MCNC: 2.3 MG/DL (ref 1.6–2.4)
MCH RBC QN AUTO: 31.3 PG (ref 26.6–33)
MCHC RBC AUTO-ENTMCNC: 32.4 G/DL (ref 31.5–35.7)
MCV RBC AUTO: 96.6 FL (ref 79–97)
MONOCYTES # BLD AUTO: 1.24 10*3/MM3 (ref 0.1–0.9)
MONOCYTES NFR BLD AUTO: 9.2 % (ref 5–12)
NEUTROPHILS NFR BLD AUTO: 68.2 % (ref 42.7–76)
NEUTROPHILS NFR BLD AUTO: 9.16 10*3/MM3 (ref 1.7–7)
NRBC BLD AUTO-RTO: 0 /100 WBC (ref 0–0.2)
PHOSPHATE SERPL-MCNC: 3.6 MG/DL (ref 2.5–4.5)
PLATELET # BLD AUTO: 313 10*3/MM3 (ref 140–450)
PMV BLD AUTO: 11.3 FL (ref 6–12)
POTASSIUM SERPL-SCNC: 4.6 MMOL/L (ref 3.5–5.2)
RBC # BLD AUTO: 4.64 10*6/MM3 (ref 4.14–5.8)
SODIUM SERPL-SCNC: 144 MMOL/L (ref 136–145)
WBC NRBC COR # BLD: 13.43 10*3/MM3 (ref 3.4–10.8)

## 2023-09-23 PROCEDURE — 70450 CT HEAD/BRAIN W/O DYE: CPT

## 2023-09-23 PROCEDURE — 82330 ASSAY OF CALCIUM: CPT | Performed by: INTERNAL MEDICINE

## 2023-09-23 PROCEDURE — 25510000001 IOPAMIDOL PER 1 ML: Performed by: INTERNAL MEDICINE

## 2023-09-23 PROCEDURE — 83735 ASSAY OF MAGNESIUM: CPT | Performed by: INTERNAL MEDICINE

## 2023-09-23 PROCEDURE — 82948 REAGENT STRIP/BLOOD GLUCOSE: CPT

## 2023-09-23 PROCEDURE — 84100 ASSAY OF PHOSPHORUS: CPT | Performed by: INTERNAL MEDICINE

## 2023-09-23 PROCEDURE — 92507 TX SP LANG VOICE COMM INDIV: CPT

## 2023-09-23 PROCEDURE — 70498 CT ANGIOGRAPHY NECK: CPT

## 2023-09-23 PROCEDURE — 85025 COMPLETE CBC W/AUTO DIFF WBC: CPT | Performed by: INTERNAL MEDICINE

## 2023-09-23 PROCEDURE — 92610 EVALUATE SWALLOWING FUNCTION: CPT

## 2023-09-23 PROCEDURE — 99232 SBSQ HOSP IP/OBS MODERATE 35: CPT | Performed by: INTERNAL MEDICINE

## 2023-09-23 PROCEDURE — 80048 BASIC METABOLIC PNL TOTAL CA: CPT | Performed by: INTERNAL MEDICINE

## 2023-09-23 PROCEDURE — 70496 CT ANGIOGRAPHY HEAD: CPT

## 2023-09-23 PROCEDURE — 99232 SBSQ HOSP IP/OBS MODERATE 35: CPT | Performed by: NURSE PRACTITIONER

## 2023-09-23 RX ORDER — NICOTINE POLACRILEX 4 MG
15 LOZENGE BUCCAL
Status: DISCONTINUED | OUTPATIENT
Start: 2023-09-23 | End: 2023-10-02

## 2023-09-23 RX ORDER — DEXTROSE MONOHYDRATE 25 G/50ML
25 INJECTION, SOLUTION INTRAVENOUS
Status: DISCONTINUED | OUTPATIENT
Start: 2023-09-23 | End: 2023-11-10

## 2023-09-23 RX ORDER — INSULIN LISPRO 100 [IU]/ML
2-7 INJECTION, SOLUTION INTRAVENOUS; SUBCUTANEOUS
Status: DISCONTINUED | OUTPATIENT
Start: 2023-09-23 | End: 2023-09-29

## 2023-09-23 RX ADMIN — CLOPIDOGREL BISULFATE 75 MG: 75 TABLET ORAL at 09:10

## 2023-09-23 RX ADMIN — QUETIAPINE FUMARATE 25 MG: 25 TABLET ORAL at 09:10

## 2023-09-23 RX ADMIN — METOPROLOL TARTRATE 25 MG: 25 TABLET, FILM COATED ORAL at 09:10

## 2023-09-23 RX ADMIN — QUETIAPINE FUMARATE 50 MG: 25 TABLET ORAL at 20:32

## 2023-09-23 RX ADMIN — IOPAMIDOL 75 ML: 755 INJECTION, SOLUTION INTRAVENOUS at 01:31

## 2023-09-23 RX ADMIN — ACETAMINOPHEN 650 MG: 325 TABLET ORAL at 21:53

## 2023-09-23 RX ADMIN — Medication 10 ML: at 20:33

## 2023-09-23 RX ADMIN — LANSOPRAZOLE 15 MG: 15 TABLET, ORALLY DISINTEGRATING ORAL at 06:30

## 2023-09-23 RX ADMIN — ATORVASTATIN CALCIUM 80 MG: 40 TABLET, FILM COATED ORAL at 20:33

## 2023-09-23 RX ADMIN — Medication 10 ML: at 09:10

## 2023-09-23 RX ADMIN — ESCITALOPRAM OXALATE 10 MG: 10 TABLET ORAL at 09:10

## 2023-09-23 RX ADMIN — METOPROLOL TARTRATE 25 MG: 25 TABLET, FILM COATED ORAL at 20:33

## 2023-09-23 NOTE — PLAN OF CARE
Problem: Restraint, Nonviolent  Goal: Absence of Harm or Injury  Intervention: Implement Least Restrictive Safety Strategies  Recent Flowsheet Documentation  Taken 9/23/2023 0400 by Azalea Rodgers RN  Medical Device Protection:   torso covered   tubing secured  Less Restrictive Alternative:   bed alarm in use   calming techniques promoted  De-Escalation Techniques:   reoriented   increased round frequency  Taken 9/23/2023 0200 by Azalea Rodgers, RN  Less Restrictive Alternative: calming techniques promoted  De-Escalation Techniques:   reoriented   increased round frequency  Taken 9/23/2023 0000 by Azalea Rodgers, RN  Medical Device Protection: tubing secured  Less Restrictive Alternative: bed alarm in use  De-Escalation Techniques:   reoriented   increased round frequency  Taken 9/22/2023 2200 by Azalea Rodgers, RN  Medical Device Protection:   torso covered   tubing secured  Less Restrictive Alternative: bed alarm in use  De-Escalation Techniques:   reoriented   stimulation decreased   medication administered  Taken 9/22/2023 2000 by Azalea Rodgers RN  De-Escalation Techniques:   reoriented   increased round frequency  Diversional Activities: television   Goal Outcome Evaluation:

## 2023-09-23 NOTE — PROGRESS NOTES
[facility] Medicine Services  PROGRESS NOTE    Patient Name: Kenneth Wen  : 1950  MRN: [id number]    Date of Admission: 9/15/2023  Primary Care Physician: Susan Powers APRN    Subjective   Subjective     CC:  Multiple CVA's    HPI:  Still weak on right side.  Has been trying to eat.      Objective   Objective     Vital Signs:   Temp:  [97.9 °F (36.6 °C)-98.5 °F (36.9 °C)] 98.3 °F (36.8 °C)  Heart Rate:  [72-84] 75  Resp:  [16-18] 16  BP: (130-146)/(73-90) 133/84     Physical Exam:  Constitutional - non toxic, in bed  HEENT-NCAT, mucous membranes moist  CV-RRR  Resp-grossly clear bilaterally  Abd-soft, nontender  Ext-No lower extremity cyanosis, clubbing or edema bilaterally  Neuro-awake, some confusions, speech understandable, right sided weakness  Psych-slightly flat affect   Skin- No rash on exposed UE or LE bilaterally        Results Reviewed:  LAB RESULTS:      Lab 23  0647 23  0625 23  0110 23  0600 23  0614 23  0604   WBC 13.43* 14.59* 15.40* 13.16*  --  13.43*   HEMOGLOBIN 14.5 12.8* 13.2 13.5  --  14.3   HEMATOCRIT 44.8 39.2 41.4 40.8  --  42.6   PLATELETS 313 336 318 335  --  336   NEUTROS ABS 9.16* 10.92* 11.65* 9.79*  --  10.63*   IMMATURE GRANS (ABS) 0.09* 0.09* 0.06* 0.07*  --  0.07*   LYMPHS ABS 2.27 1.79 1.94 1.74  --  1.39   MONOS ABS 1.24* 1.12* 1.27* 1.11*  --  1.04*   EOS ABS 0.55* 0.58* 0.39 0.35  --  0.22   MCV 96.6 96.6 96.5 94.4  --  93.6   PROCALCITONIN  --   --   --   --  0.06  --          Lab 23  0647 23  0625 23  2353 23  1827 23  1150 23  0543 23  0110 23  1948 23  1253 23  0600 23  0012 23  1823 23  1851 23  1208 23  0525 23  0312 23  0018 23  1759 23  1153 23  0536 23  2354   SODIUM 144 152* 156* 155* 153*   < > 157* 157* 152* 152*   < > 154*  151*   < > 152* 149*  --  151* 149*  149*    < > 146* 147*   POTASSIUM 4.6 3.9  --   --   --   --  4.2 3.9 3.8 3.9   < > 3.8  3.7   < > 4.3 3.4* 3.8 3.8 3.5  3.5   < > 3.8 4.3   CHLORIDE 112* 116*  --   --   --   --  121*  --   --  121*  --  120*   < >  --   --   --  120* 114*  --   --  116*   CO2 20.0* 27.0  --   --   --   --  27.0  --   --  20.0*  --  19.0*   < >  --   --   --  19.0* 22.0  --   --  20.0*   ANION GAP 12.0 9.0  --   --   --   --  9.0  --   --  11.0  --  15.0   < >  --   --   --  12.0 13.0  --   --  11.0   BUN 27* 30*  --   --   --   --  41*  --   --  39*  --  34*   < >  --   --   --  24* 20  --   --  20   CREATININE 0.70* 0.77  --   --   --   --  0.81  --   --  0.75*  --  0.67*   < >  --   --   --  0.76 0.72*  --   --  0.62*   EGFR 98.5 95.7  --   --   --   --  94.3  --   --  96.5  --  99.8   < >  --   --   --  96.1 97.7  --   --  102.2   GLUCOSE 123* 159*  --   --   --   --  163*  --   --  161*  --  166*   < >  --   --   --  157* 145*  --   --  129*   CALCIUM 9.1 9.1  --   --   --   --  9.3  --   --  9.0  --  9.2   < >  --   --   --  8.7 8.9  --   --  8.8   IONIZED CALCIUM 1.33*  --   --   --   --   --   --   --   --   --   --   --   --   --   --   --   --   --   --   --   --    MAGNESIUM 2.3  --   --   --   --   --   --   --   --   --   --   --   --   --   --   --  2.4 2.2  --  2.2 2.2   PHOSPHORUS 3.6  --   --   --   --   --   --   --   --   --   --   --   --  2.9 1.9* 1.9*  --  1.9*   < > 2.1*  --     < > = values in this interval not displayed.         Lab 09/17/23  1759   TOTAL PROTEIN 6.8   ALBUMIN 3.8   GLOBULIN 3.0   ALT (SGPT) 38   AST (SGOT) 58*   BILIRUBIN 0.7   ALK PHOS 71             Lab 09/17/23  1759   CHOLESTEROL 102   TRIGLYCERIDES 60             Brief Urine Lab Results  (Last result in the past 365 days)        Color   Clarity   Blood   Leuk Est   Nitrite   Protein   CREAT   Urine HCG        09/15/23 1818 Yellow   Cloudy   Trace   Negative   Negative   Negative                   Microbiology Results Abnormal        Procedure Component Value - Date/Time    Blood Culture - Blood, Arm, Right [[id number]]  (Normal) Collected: 09/15/23 0212    Lab Status: Final result Specimen: Blood from Arm, Right Updated: 09/20/23 0230     Blood Culture No growth at 5 days    Blood Culture - Blood, Arm, Left [[id number]]  (Normal) Collected: 09/15/23 0212    Lab Status: Final result Specimen: Blood from Arm, Left Updated: 09/20/23 0230     Blood Culture No growth at 5 days            CT Head Without Contrast    Result Date: 9/23/2023  CT HEAD WO CONTRAST Date of Exam: 9/23/2023 1:24 AM EDT Indication: Stroke, follow up. Comparison: September 21, 2023 Technique: Axial CT images were obtained of the head without contrast administration.  Automated exposure control and iterative construction methods were used. Findings: No acute intracranial hemorrhage or extra-axial collection is identified. The ventricles are stable in caliber, with no midline shift. The basal cisterns appear patent. Evolving subacute infarcts centered within the bilateral occipital lobes, cerebellum,  and isaac are redemonstrated. The calvarium appears intact. There is mild paranasal sinus mucosal disease. The mastoid air cells are well-aerated.     Impression: Impression: 1.Evolving subacute infarcts centered within bilateral occipital lobes, cerebellum, and isaac. 2.No new acute intracranial process identified. Electronically Signed: Donaldo Seals MD  9/23/2023 8:16 AM EDT  Workstation ID: [id number]    CT Angiogram Neck    Result Date: 9/23/2023  CT ANGIOGRAM NECK, CT ANGIOGRAM HEAD Date of Exam: 9/23/2023 1:24 AM EDT Indication: Stroke, follow up. Comparison: MR angiography head and neck from September 20, 2023 Technique: CTA of the head and neck was performed before and after the uneventful intravenous administration of 75 mL Isovue-370. Reconstructed coronal and sagittal images were also obtained. In addition, a 3-D volume rendered image was created for interpretation.  Automated exposure control and iterative reconstruction methods were used. Findings: There is a three-vessel aortic arch. The right common carotid artery is widely patent. The right carotid bifurcation is widely patent. The right internal carotid artery is widely patent. A 3 mm saccular aneurysm along the supraophthalmic right ICA is noted. The left common carotid artery is widely patent. The left carotid bifurcation is widely patent. The left internal carotid artery is widely patent. The right vertebral artery is widely patent. There is moderate stenosis along the origin of the left  vertebral artery, and distally it is widely patent. The vertebral arteries are codominant. The bilateral middle cerebral, bilateral anterior cerebral, and anterior communicating arteries are widely patent. The basilar and bilateral posterior cerebral arteries are widely patent. No definite posterior communicating artery is identified on either  side. The visualized portions of the bilateral superior cerebellar, anteroinferior cerebral, and posterior inferior cerebellar arteries are unremarkable.     Impression: Impression: 1.Major intracranial arterial vasculature is widely patent. 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 3.Moderate stenosis along origin of left vertebral artery, and distally the left vertebral artery is widely patent. 4.Bilateral carotid bifurcations are widely patent. Electronically Signed: Donaldo Seals MD  9/23/2023 8:45 AM EDT  Workstation ID: [id number]    CT Angiogram Head    Result Date: 9/23/2023  CT ANGIOGRAM NECK, CT ANGIOGRAM HEAD Date of Exam: 9/23/2023 1:24 AM EDT Indication: Stroke, follow up. Comparison: MR angiography head and neck from September 20, 2023 Technique: CTA of the head and neck was performed before and after the uneventful intravenous administration of 75 mL Isovue-370. Reconstructed coronal and sagittal images were also obtained. In addition, a 3-D volume rendered image was  created for interpretation. Automated exposure control and iterative reconstruction methods were used. Findings: There is a three-vessel aortic arch. The right common carotid artery is widely patent. The right carotid bifurcation is widely patent. The right internal carotid artery is widely patent. A 3 mm saccular aneurysm along the supraophthalmic right ICA is noted. The left common carotid artery is widely patent. The left carotid bifurcation is widely patent. The left internal carotid artery is widely patent. The right vertebral artery is widely patent. There is moderate stenosis along the origin of the left  vertebral artery, and distally it is widely patent. The vertebral arteries are codominant. The bilateral middle cerebral, bilateral anterior cerebral, and anterior communicating arteries are widely patent. The basilar and bilateral posterior cerebral arteries are widely patent. No definite posterior communicating artery is identified on either  side. The visualized portions of the bilateral superior cerebellar, anteroinferior cerebral, and posterior inferior cerebellar arteries are unremarkable.     Impression: Impression: 1.Major intracranial arterial vasculature is widely patent. 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 3.Moderate stenosis along origin of left vertebral artery, and distally the left vertebral artery is widely patent. 4.Bilateral carotid bifurcations are widely patent. Electronically Signed: Donaldo Seals MD  9/23/2023 8:45 AM EDT  Workstation ID: [id number]    XR Abdomen KUB    Result Date: 9/22/2023  XR ABDOMEN KUB Date of Exam: 9/22/2023 4:34 PM EDT Indication: NG placement Comparison: 9/15/2023 Findings: There is a nasogastric tube in place the tip projecting over the left upper quadrant of the abdomen in the expected location of the fundus of the stomach. The abdominal bowel gas pattern is within normal limits. No abnormal calcifications overlie the abdomen.    Impression:  Impression: 1. Nasogastric tube in place the tip projecting over the expected location of the fundus of the stomach. Electronically Signed: Mike Seth MD  9/22/2023 4:51 PM EDT  Workstation ID: [id number]         Current medications:  Scheduled Meds:atorvastatin, 80 mg, Nasogastric, Nightly  clopidogrel, 75 mg, Nasogastric, Daily  escitalopram, 10 mg, Nasogastric, Daily  insulin regular, 2-9 Units, Subcutaneous, Q6H  lansoprazole, 15 mg, Oral, Q AM  metoprolol tartrate, 25 mg, Nasogastric, Q12H  QUEtiapine, 25 mg, Nasogastric, QAM  QUEtiapine, 50 mg, Nasogastric, Nightly  sodium chloride, 10 mL, Intravenous, Q12H      Continuous Infusions:   PRN Meds:.  acetaminophen **OR** acetaminophen    Calcium Replacement - Follow Nurse / BPA Driven Protocol    dextrose    glucagon (human recombinant)    ibuprofen    Magnesium Standard Dose Replacement - Follow Nurse / BPA Driven Protocol    ondansetron    Phosphorus Replacement - Follow Nurse / BPA Driven Protocol    Potassium Replacement - Follow Nurse / BPA Driven Protocol    sodium chloride    Assessment & Plan   Assessment & Plan     Active Hospital Problems    Diagnosis  POA    **Hemorrhagic CVA [I61.9]  Yes    Oropharyngeal dysphagia [R13.12]  Yes    Multiple bilateral CVAs [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        Brief Hospital Course to date:  Kenneth Wen is a 72 y.o. male with h/o HTN, HL, T2DM, GERD who presented to OSH with multiple acute ischemic infarcts of the bilateral cerebral and cerebellar hemispheres as well as left isaac.  TTE/JOSIAS was negative PFO at OSH.  On 9/13/23 he had worsening in mental status and new inability to move RLE, head CT showed evolution of the existing CVA  with new development of petechial hemorrhage.  DAPT was held for 24 hours per neuro recs and repeat CT head that evening showed worsening effacement of the right quadrigeminal cistern with upward supratentorial. He was then transferred to [facility] for NS eval on 9/15/23.  He was started on hypertonic saline 9/15/23  through 9/20/23 for cerebral edema.  He had fevers with negative cultures but had worsening secretions and labored breathing so was started on zosyn on 9/16/23.  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  Continue metoprolol 25mg BID    GERD  --continue protonix    COVID +  --was on zosyn but no infiltrate and procal low so pulm stopped  --on RA.  No indication for antivirals    Agitation  --seroquel  --better, out of restraints today    Hypernatremia  --sodium 144  - bmp am    Leukocytosis  - afebrile  - check cxr  - cbc and procalcitonin am            Expected Discharge Location and Transportation:   Expected Discharge   Expected Discharge Date: 9/25/2023; Expected Discharge Time:      DVT prophylaxis:  Mechanical DVT prophylaxis orders are present.     AM-PAC 6 Clicks Score (PT): 6 (09/22/23 2000)    CODE STATUS:   Code Status and Medical Interventions:   Ordered at: 09/15/23 0133     Code Status (Patient has no pulse and is not breathing):    CPR (Attempt to Resuscitate)     Medical Interventions (Patient has pulse or is breathing):    Full Support       Kee Lovelace MD  09/23/23

## 2023-09-23 NOTE — PROGRESS NOTES
Stroke Progress Note       Chief Complaint: Bilateral strokes    Subjective    Subjective     Subjective:  He is resting in bed. No complaints this morning. He is more interactive with conversation and following commands this morning. NG removed overnight. Eating breakfast with nursing assistance to feed this morning. He is asking about his wife this morning and tells me that he has not seen her in a few days. Assistance provided with bathing and repositioning this morning, he was appreciative of this.       Objective    Objective      Temp:  [97.9 °F (36.6 °C)-98.5 °F (36.9 °C)] 98.5 °F (36.9 °C)  Heart Rate:  [72-84] 76  Resp:  [16-18] 16  BP: (130-148)/(73-90) 146/89      Neurological Exam  Mental Status  Awake and alert. Oriented only to person. Moderate dysarthria present.    Cranial Nerves  CN II: Vision test: ? Left homonymous hemianopia. Left homonymous hemianopsia. Able to count fingers correctly this morning without covering eyes.  CN III, IV, VI: Extraocular movements intact bilaterally. Pupils equal round and reactive to light bilaterally. He is able to track me on both sides of the bed .  CN V: Facial sensation is normal.  CN VII:  Right: There is central facial weakness.  CN VIII: Hearing appears to be intact.    Motor  Normal muscle bulk throughout. No fasciculations present. Decreased muscle tone.  LUE with no drift, 5/5 strength  LLE with no drift, 5/5 strength  RUE with 2/5 strength  RLE with 2/5 strength    Stiff with PROM.    Sensory  Sensation: Reports feeling light touch in all 4 extremities.     Coordination  Right: Unable to assess secondary to hemiplegia. Unable to assess secondary to weakness.  No obvious dysmetria in LUE.    Gait    Not observed.    Physical Exam  Vitals and nursing note reviewed.   Constitutional:       General: He is awake. He is not in acute distress.     Appearance: He is normal weight.   HENT:      Head: Normocephalic.      Nose:      Comments: DIAMOND glez NGT in place      Mouth/Throat:      Pharynx: Oropharynx is clear.   Eyes:      Extraocular Movements: Extraocular movements intact.      Pupils: Pupils are equal, round, and reactive to light.      Comments: ? Left homonymous hemianopia   Cardiovascular:      Rate and Rhythm: Normal rate and regular rhythm.   Pulmonary:      Effort: Pulmonary effort is normal. No respiratory distress.      Comments: On room air  Musculoskeletal:      Right lower leg: No edema.      Left lower leg: No edema.   Skin:     General: Skin is warm and dry.   Neurological:      Mental Status: He is alert. He is disoriented.      Cranial Nerves: Cranial nerve deficit and dysarthria present.      Sensory: No sensory deficit.      Motor: Weakness present.      Coordination: Coordination normal.   Psychiatric:         Attention and Perception: He is inattentive.         Mood and Affect: Mood normal. Affect is flat.         Speech: Speech is slurred.         Behavior: Behavior is uncooperative and agitated.         Cognition and Memory: Cognition normal. Memory is impaired.         Judgment: Judgment is impulsive.       Results Review:    I reviewed the patient's new clinical results.    CT ANGIOGRAM NECK, CT ANGIOGRAM HEAD     Date of Exam: 9/23/2023 1:24 AM EDT     Indication: Stroke, follow up.     Comparison: MR angiography head and neck from September 20, 2023     Technique: CTA of the head and neck was performed before and after the uneventful intravenous administration of 75 mL Isovue-370. Reconstructed coronal and sagittal images were also obtained. In addition, a 3-D volume rendered image was created for   interpretation. Automated exposure control and iterative reconstruction methods were used.     Findings:  There is a three-vessel aortic arch. The right common carotid artery is widely patent. The right carotid bifurcation is widely patent. The right internal carotid artery is widely patent. A 3 mm saccular aneurysm along the supraophthalmic right ICA  is   noted. The left common carotid artery is widely patent. The left carotid bifurcation is widely patent. The left internal carotid artery is widely patent. The right vertebral artery is widely patent. There is moderate stenosis along the origin of the left   vertebral artery, and distally it is widely patent. The vertebral arteries are codominant.     The bilateral middle cerebral, bilateral anterior cerebral, and anterior communicating arteries are widely patent. The basilar and bilateral posterior cerebral arteries are widely patent. No definite posterior communicating artery is identified on either   side. The visualized portions of the bilateral superior cerebellar, anteroinferior cerebral, and posterior inferior cerebellar arteries are unremarkable.     IMPRESSION:  Impression:  1.Major intracranial arterial vasculature is widely patent.  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  3.Moderate stenosis along origin of left vertebral artery, and distally the left vertebral artery is widely patent.  4.Bilateral carotid bifurcations are widely patent.        Electronically Signed: Donaldo Seals MD    9/23/2023 8:45 AM EDT    Workstation ID: [id number]    CT HEAD WO CONTRAST     Date of Exam: 9/23/2023 1:24 AM EDT     Indication: Stroke, follow up.     Comparison: September 21, 2023     Technique: Axial CT images were obtained of the head without contrast administration.  Automated exposure control and iterative construction methods were used.        Findings:  No acute intracranial hemorrhage or extra-axial collection is identified. The ventricles are stable in caliber, with no midline shift. The basal cisterns appear patent. Evolving subacute infarcts centered within the bilateral occipital lobes, cerebellum,   and isaac are redemonstrated.     The calvarium appears intact. There is mild paranasal sinus mucosal disease. The mastoid air cells are well-aerated.     IMPRESSION:  Impression:  1.Evolving  subacute infarcts centered within bilateral occipital lobes, cerebellum, and isaac.  2.No new acute intracranial process identified.           Electronically Signed: Donaldo Seals MD    9/23/2023 8:16 AM EDT    Workstation ID: [id number]    CT head without contrast (9/19) remains stable; subacute infarcts within bilateral cerebellum (right>left), left isaac, right temporal lobe, right hippocampus, and bilateral occipital lobes.  Stable appearance of hemorrhage in bilateral occipital lobes and right temporal lobe.  No evidence of new hemorrhage.    JOSIAS (OSH) concentric hypertrophy noted, EF 60-65%, no evidence of PFO, LA cavity normal size.    EEG (OSH) with slowing but no evidence of ongoing seizure activity.    Glucose 161  Creatinine 0.75, BUN 39  Sodium 152  WBC 13.16, improving  H/H13.5/40.8  Platelets 335  A1c 6.40      MRI ANGIOGRAM HEAD WO CONTRAST     Date of Exam: 9/20/2023 10:28 PM EDT     Indication: evaluate left vertebral artery for dissection/thrombus.     Comparison: Correlation with CT head 9/19/2023     Technique:  Routine 3-D time-of-flight gradient echo imaging was obtained of the head without contrast administration.        Findings:  Exam is limited by motion particularly at the upper aspect. Both distal cervical internal carotid arteries and intracranial ICA segments appear patent. There is a saccular outpouching at the supraophthalmic right ICA measuring 3 mm consistent with a   small aneurysm. Ophthalmic arteries appear patent. The A1 and M1 segments and visualized MIKAL and MCA branches do appear patent. There is a patent A-Comm without clear definition of the P-comm on either side. The visualized V4 segments, basilar artery,   superior cerebellar and posterior cerebral arteries appear patent.     IMPRESSION:  Impression:  1.Exam is limited by motion. 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  2.No large vessel occlusion or hemodynamically significant  stenosis.           Electronically Signed: Vincent Hinton MD    9/21/2023 2:43 AM EDT    Workstation ID: [id number]    MRI ANGIOGRAM NECK WO CONTRAST     Date of Exam: 9/20/2023 10:27 PM EDT     Indication: evaluate left vertebral artery for dissection/thrombus.     Comparison: None available.     Technique:  Routine 3-D time-of-flight gradient echo imaging was obtained of the neck without contrast administration.        Findings:  The exam is severely degraded by motion. The carotid and vertebral arteries appear grossly patent throughout the neck. The distal V3 and proximal V4 segments are not assessed on this exam or the MRI of the brain and vertebral dissection cannot be   excluded.     IMPRESSION:  Impression:  Motion limited study demonstrates no gross abnormality. A distal vertebral artery occlusion or dissection cannot be excluded.           Electronically Signed: Vincent Hinton MD    9/21/2023 2:44 AM EDT    Workstation ID: [id number]    CT HEAD WO CONTRAST     Date of Exam: 9/21/2023 4:19 AM EDT     Indication: eval stability of strokes.     Comparison: 9/19/2023     Technique: Axial CT images were obtained of the head without contrast administration.  Automated exposure control and iterative construction methods were used.        Findings:  There is continued evolution of bilateral hemorrhagic occipital lobe infarcts, right larger than left. Stable appearance of bilateral cerebellar hemisphere and left pontine infarcts. There are faint hypodensities in the anterior inferior temporal lobes   consistent with evolving infarcts better seen on MRI. No new hypoattenuating lesions in the brain. No new hemorrhage. No abnormal extra-axial collection. There is mild mass effect on the occipital lobes and cerebellum without midline shift or herniation.   The calvarium remains intact. There is a small left mastoid effusion and there is residual paranasal sinus mucosal disease status post sinus surgery. Stable fluid levels  in the right frontal and sphenoid sinuses.     IMPRESSION:  Impression:  1.Continued evolution of bilateral hemorrhagic occipital lobe infarcts, right larger than left.  2.Stable appearance of bilateral cerebellar hemisphere and left pontine infarcts.  3.No new hypoattenuating lesions in the brain. No new hemorrhage.           Electronically Signed: Vincent Hinton MD    9/21/2023 4:35 AM EDT    Workstation ID: [id number]            Assessment/Plan     Assessment/Plan:    This is a 72-year-old male with known medical diagnoses of essential hypertension, diabetes mellitus type 2, CKD, hyperlipidemia, and remote tobacco abuse who was a transfer from [facility] on 9/15/2023 for high-level care and further stroke work-up.  Patient initially presented to [facility]SH ED for altered mental status, shortness of breath and projectile vomiting.  Vital signs were stable however lactic acid was elevated at 3.49.  CTA was obtained and revealed left vertebral artery occlusion.  MRI showed numerous acute ischemic infarcts of the bilateral cerebral and cerebellar hemispheres and left side of the isaac.      Repeat CTH results evolving bilateral occipital lobe and left cerebellar infarcts with concern for petechial hemorrhage. Patient was  not a candidate for IV thrombolytic therapy due to ICH on CT scans and was not a candidate for endovascular therapy given no evidence of LVO on CTA head/neck.     Antiplatelet PTA: None  Anticoagulant PTA: None      Multifocal, multi-territorial acute ischemic strokes with asymptomatic hemorrhagic conversion, etiology cardioembolic verses athromboembolic        Malignant cerebral edema        Left vertebral artery occlusion  -TIA/CVA order set is currently in place  -NIH appears improved this morning on my examination  -Repeat CTH, CTA head/neck stable  -Plavix monotherapy, D/C ASA  -PT/OT continue to work with patient, activity as tolerated; will need IPR at DC  -Continue Seroquel 25mg in the  morning and 50mg at night for agitation  -Lexapro 10mg for depression/agitation  -NIH and neuro exam is stable at this time, continue current medication regimen   -Stroke clinic follow-up in 8 weeks    Essential hypertension  -OK for normal blood pressure parameters  -Avoid hypotension given intracranial atherosclerotic disease  -Avoid hypertension given petechial hemorrhage  -Management per Hospital Medicine Team     Hyperlipidemia  -, goal <70  -Continue atorvastatin 80mg    Diabetes mellitus type 2  -A1c 6.40, goal <7  -Maintain euglycemia, goal <140  -Management per Intensivist    Dysphagia  -SLP continues to follow; FEES passed with diet modifications   -NG in place for nutrition support  -SLP to continue to monitor for further needs    Stroke Neurology will s/o at this time. Available as needed for questions. Plan discussed with Dr. Lenz. Please call with any questions.     Tamiko Grullon, APRN  09/23/23  07:56 EDT

## 2023-09-23 NOTE — NURSING NOTE
Pt oriented only to self, VSS, Room air, NSR. Pt ate a few bites of breakfast, did not eat lunch, but ate 100% of his dinner today. No further concerns at this time.

## 2023-09-23 NOTE — PLAN OF CARE
Problem: Adult Inpatient Plan of Care  Goal: Plan of Care Review  Outcome: Ongoing, Progressing  Flowsheets  Taken 9/23/2023 1153 by Aleshia Reid MS CCC-SLP  Progress: declining  Taken 9/20/2023 1515 by Lara Tijerina PT  Plan of Care Reviewed With: patient   Goal Outcome Evaluation:           Progress: declining          SLP re-evaluation and treatment completed. Will continue to address dysphagia and communication. Please see note for further details and recommendations.

## 2023-09-23 NOTE — THERAPY RE-EVALUATION
Acute Care - Speech Language Pathology Treatment Note  [facility]     Patient Name: Kenneth Wen  : 1950  MRN: [id number]  Today's Date: 2023               Admit Date: 9/15/2023     Visit Dx:    ICD-10-CM ICD-9-CM   1. Aphasia  R47.01 784.3   2. Dysarthria  R47.1 784.51   3. Oropharyngeal dysphagia  R13.12 787.22     Patient Active Problem List   Diagnosis    Precordial pain    Elevated BP without diagnosis of hypertension    Dyslipidemia    Hyperglycemia    Multiple bilateral CVAs    Hemorrhagic CVA    HFpEF    T2DM (type 2 diabetes mellitus)    HTN (hypertension)    GERD (gastroesophageal reflux disease)    ICH (intracerebral hemorrhage)    Oropharyngeal dysphagia     Past Medical History:   Diagnosis Date    Acid reflux     Cancer     Skin    Diabetes mellitus     Prediabetes    GERD (gastroesophageal reflux disease) 09/15/2023    HTN (hypertension) 09/15/2023    Kidney disease     T2DM (type 2 diabetes mellitus) 09/15/2023     Past Surgical History:   Procedure Laterality Date    APPENDECTOMY      HEMORRHOIDECTOMY      NASAL POLYP SURGERY         SLP Recommendation and Plan              Swallow Criteria for Skilled Therapeutic Interventions Met: demonstrates skilled criteria (23 1100)  Anticipated Discharge Disposition (SLP): skilled nursing facility (23 1100)     Therapy Frequency (Swallow): 5 days per week (23 1100)     Oral Care Recommendations: Oral Care BID/PRN (23 1100)                          Progress: declining (23 1153)      SLP EVALUATION (last 72 hours)       SLP SLC Evaluation       Row Name 23 1100                   Communication Assessment/Intervention    Subjective Information no complaints  -AW        Patient Observations lethargic;alert  -AW        Patient/Family/Caregiver Comments/Observations no family  -AW        Patient Effort poor  -AW        Symptoms Noted During/After Treatment none  -AW        Oral Care teeth brushed - regular  toothbrush  -AW           General Information    Patient Profile Reviewed yes  -AW        Pertinent History Of Current Problem see prior, re-eval 2' report of pocketing yesterday  -AW        Precautions/Limitations, Vision WFL with corrective lenses  -AW        Precautions/Limitations, Hearing WFL  -AW        Prior Level of Function-Communication unknown  -AW        Plans/Goals Discussed with patient  -AW        Barriers to Rehab medically complex;cognitive status  -AW        Patient's Goals for Discharge patient could not state  -AW           Pain    Additional Documentation Pain Scale: FACES Pre/Post-Treatment (Group)  -AW           Pain Scale: FACES Pre/Post-Treatment    Pain: FACES Scale, Pretreatment 0-->no hurt  -AW        Posttreatment Pain Rating 0-->no hurt  -AW                  User Key  (r) = Recorded By, (t) = Taken By, (c) = Cosigned By      Initials Name Effective Dates    Aleshia Stephens MS CCC-SLP 23 -                        EDUCATION  The patient has been educated in the following areas:     Communication Impairment.                      Time Calculation:      Time Calculation- SLP       Row Name 23 1154             Time Calculation- SLP    SLP Start Time 1100  -AW      SLP Stop Time 1154  -AW      SLP Time Calculation (min) 54 min  -AW      SLP Received On 23  -AW                User Key  (r) = Recorded By, (t) = Taken By, (c) = Cosigned By      Initials Name Provider Type    Aleshia Stephens MS CCC-SLP Speech and Language Pathologist                                   Aleshia Reid MS CCC-SLP  2023   and Acute Care - Speech Language Pathology   Swallow Re-Evaluation [facility]     Patient Name: Kenneth Wen  : 1950  MRN: [id number]  Today's Date: 2023               Admit Date: 9/15/2023    Visit Dx:     ICD-10-CM ICD-9-CM   1. Aphasia  R47.01 784.3   2. Dysarthria  R47.1 784.51   3. Oropharyngeal dysphagia  R13.12 787.22     Patient Active  Problem List   Diagnosis    Precordial pain    Elevated BP without diagnosis of hypertension    Dyslipidemia    Hyperglycemia    Multiple bilateral CVAs    Hemorrhagic CVA    HFpEF    T2DM (type 2 diabetes mellitus)    HTN (hypertension)    GERD (gastroesophageal reflux disease)    ICH (intracerebral hemorrhage)    Oropharyngeal dysphagia     Past Medical History:   Diagnosis Date    Acid reflux     Cancer     Skin    Diabetes mellitus     Prediabetes    GERD (gastroesophageal reflux disease) 09/15/2023    HTN (hypertension) 09/15/2023    Kidney disease     T2DM (type 2 diabetes mellitus) 09/15/2023     Past Surgical History:   Procedure Laterality Date    APPENDECTOMY      HEMORRHOIDECTOMY      NASAL POLYP SURGERY         SLP Recommendation and Plan  SLP Swallowing Diagnosis: mod-severe, oral dysphagia, pharyngeal dysphagia (09/23/23 1100)  SLP Diet Recommendation: puree, honey thick liquids (09/23/23 1100)  Recommended Precautions and Strategies: 1:1 supervision, upright posture during/after eating (09/23/23 1100)  SLP Rec. for Method of Medication Administration: meds crushed, with puree (09/23/23 1100)     Monitor for Signs of Aspiration: notify SLP if any concerns (09/23/23 1100)     Swallow Criteria for Skilled Therapeutic Interventions Met: demonstrates skilled criteria (09/23/23 1100)  Anticipated Discharge Disposition (SLP): skilled nursing facility (09/23/23 1100)  Rehab Potential/Prognosis, Swallowing: re-evaluate goals as necessary (09/23/23 1100)  Therapy Frequency (Swallow): 5 days per week (09/23/23 1100)     Oral Care Recommendations: Oral Care BID/PRN (09/23/23 1100)                                      Oral Care Recommendations: Oral Care BID/PRN (09/23/23 1100)    Progress: declining      SWALLOW EVALUATION (last 72 hours)       SLP Adult Swallow Evaluation       Row Name 09/23/23 1100                   Rehab Evaluation    Document Type therapy note (daily note);re-evaluation  -AW            General Information    Current Method of Nutrition honey-thick liquids;mechanical ground textures  -AW        Prior Level of Function-Communication unknown  -AW        Prior Level of Function-Swallowing unknown  -AW        Patient's Goals for Discharge patient could not state  -AW           Oral Motor Structure and Function    Dentition Assessment natural, present and adequate  -AW        Secretion Management WNL/WFL  -AW        Mucosal Quality moist, healthy  -AW        Volitional Swallow delayed  -AW        Volitional Cough unable to elicit  -AW           Oral Musculature and Cranial Nerve Assessment    Oral Motor General Assessment oral labial or buccal impairment  -AW           General Eating/Swallowing Observations    Respiratory Support Currently in Use room air  -AW        Eating/Swallowing Skills fed by SLP  -AW        Positioning During Eating upright in bed  -AW        Utensils Used spoon;straw  -AW        Consistencies Trialed pureed;honey-thick liquids  -AW           Respiratory    Respiratory Status room air  -AW           Clinical Swallow Eval    Oral Prep Phase impaired  -AW        Oral Transit impaired  -AW        Oral Residue impaired  -AW        Pharyngeal Phase suspected pharyngeal impairment  -AW        Esophageal Phase unremarkable  -AW        Clinical Swallow Evaluation Summary Re-evaluation given reports of pocking yesterday. Pt with majority of breakfast tray uneaten. Trialed tsp of puree and honey and pt noted with tonic bite on spoon. Did swallow honey and puree without s/s aspiration. No attempt to clear lips when food was on them. Trialed bite of soft solid but pt could/would not open mouth. Pt needs downgrade to puree and cont honey. Will cont to monitor.  -AW           Oral Prep Concerns    Oral Prep Concerns incomplete or weak lip closure around spoon;anterior loss  -AW        Poor Rotary Chew all consistencies  -AW        Incomplete or Weak Lip Closure Around Spoon all consistencies   -AW        Anterior Loss all consistencies  -AW           Oral Transit Concerns    Oral Transit Concerns delayed initiation of bolus transit  -AW        Delayed Intiation of Bolus Transit all consistencies  -AW           Oral Residue Concerns    Oral Residue Concerns other (see comments)  unable to tell given tonic bite  -AW        Oral Residue Concerns, Comment did swallow puree and honey  -AW           Pharyngeal Phase Concerns    Pharyngeal Phase Concerns, Comment unable to do further trials, pt not appropriate  -AW           SLP Evaluation Clinical Impression    SLP Swallowing Diagnosis mod-severe;oral dysphagia;pharyngeal dysphagia  -AW        Functional Impact risk of aspiration/pneumonia;risk of malnutrition;risk of dehydration  -AW        Rehab Potential/Prognosis, Swallowing re-evaluate goals as necessary  -AW        Swallow Criteria for Skilled Therapeutic Interventions Met demonstrates skilled criteria  -AW           Recommendations    Therapy Frequency (Swallow) 5 days per week  -AW        SLP Diet Recommendation puree;honey thick liquids  -AW        Recommended Precautions and Strategies 1:1 supervision;upright posture during/after eating  -AW        Oral Care Recommendations Oral Care BID/PRN  -AW        SLP Rec. for Method of Medication Administration meds crushed;with puree  -AW        Monitor for Signs of Aspiration notify SLP if any concerns  -AW        Anticipated Discharge Disposition (SLP) skilled nursing facility  -AW                  User Key  (r) = Recorded By, (t) = Taken By, (c) = Cosigned By      Initials Name Effective Dates    AW Aleshia Reid MS CCC-SLP 02/03/23 -                     EDUCATION  The patient has been educated in the following areas:   Dysphagia (Swallowing Impairment).              Time Calculation:    Time Calculation- SLP       Row Name 09/23/23 1154             Time Calculation- SLP    SLP Start Time 1100  -AW      SLP Stop Time 1154  -AW      SLP Time Calculation  (min) 54 min  -AW      SLP Received On 09/23/23  -VIRGIL                User Key  (r) = Recorded By, (t) = Taken By, (c) = Cosigned By      Initials Name Provider Type    Aleshia Stephens, MS CCC-SLP Speech and Language Pathologist                             Aleshia Reid, MS CCC-SLP  9/23/2023

## 2023-09-24 ENCOUNTER — APPOINTMENT (OUTPATIENT)
Dept: GENERAL RADIOLOGY | Facility: HOSPITAL | Age: 73
End: 2023-09-24
Payer: MEDICARE

## 2023-09-24 LAB
ANION GAP SERPL CALCULATED.3IONS-SCNC: 7 MMOL/L (ref 5–15)
BASOPHILS # BLD AUTO: 0.09 10*3/MM3 (ref 0–0.2)
BASOPHILS NFR BLD AUTO: 0.8 % (ref 0–1.5)
BUN SERPL-MCNC: 35 MG/DL (ref 8–23)
BUN/CREAT SERPL: 40.7 (ref 7–25)
CALCIUM SPEC-SCNC: 9.1 MG/DL (ref 8.6–10.5)
CHLORIDE SERPL-SCNC: 112 MMOL/L (ref 98–107)
CO2 SERPL-SCNC: 26 MMOL/L (ref 22–29)
CREAT SERPL-MCNC: 0.86 MG/DL (ref 0.76–1.27)
DEPRECATED RDW RBC AUTO: 43.4 FL (ref 37–54)
EGFRCR SERPLBLD CKD-EPI 2021: 92.6 ML/MIN/1.73
EOSINOPHIL # BLD AUTO: 0.28 10*3/MM3 (ref 0–0.4)
EOSINOPHIL NFR BLD AUTO: 2.4 % (ref 0.3–6.2)
ERYTHROCYTE [DISTWIDTH] IN BLOOD BY AUTOMATED COUNT: 12.5 % (ref 12.3–15.4)
GLUCOSE BLDC GLUCOMTR-MCNC: 101 MG/DL (ref 70–130)
GLUCOSE BLDC GLUCOMTR-MCNC: 134 MG/DL (ref 70–130)
GLUCOSE BLDC GLUCOMTR-MCNC: 138 MG/DL (ref 70–130)
GLUCOSE BLDC GLUCOMTR-MCNC: 141 MG/DL (ref 70–130)
GLUCOSE SERPL-MCNC: 153 MG/DL (ref 65–99)
HCT VFR BLD AUTO: 41.1 % (ref 37.5–51)
HGB BLD-MCNC: 13.5 G/DL (ref 13–17.7)
IMM GRANULOCYTES # BLD AUTO: 0.08 10*3/MM3 (ref 0–0.05)
IMM GRANULOCYTES NFR BLD AUTO: 0.7 % (ref 0–0.5)
LYMPHOCYTES # BLD AUTO: 1.43 10*3/MM3 (ref 0.7–3.1)
LYMPHOCYTES NFR BLD AUTO: 12.2 % (ref 19.6–45.3)
MCH RBC QN AUTO: 31 PG (ref 26.6–33)
MCHC RBC AUTO-ENTMCNC: 32.8 G/DL (ref 31.5–35.7)
MCV RBC AUTO: 94.3 FL (ref 79–97)
MONOCYTES # BLD AUTO: 0.86 10*3/MM3 (ref 0.1–0.9)
MONOCYTES NFR BLD AUTO: 7.3 % (ref 5–12)
NEUTROPHILS NFR BLD AUTO: 76.6 % (ref 42.7–76)
NEUTROPHILS NFR BLD AUTO: 9.02 10*3/MM3 (ref 1.7–7)
NRBC BLD AUTO-RTO: 0 /100 WBC (ref 0–0.2)
PLATELET # BLD AUTO: 365 10*3/MM3 (ref 140–450)
PMV BLD AUTO: 11.4 FL (ref 6–12)
POTASSIUM SERPL-SCNC: 4.3 MMOL/L (ref 3.5–5.2)
PROCALCITONIN SERPL-MCNC: 0.05 NG/ML (ref 0–0.25)
RBC # BLD AUTO: 4.36 10*6/MM3 (ref 4.14–5.8)
SODIUM SERPL-SCNC: 145 MMOL/L (ref 136–145)
WBC NRBC COR # BLD: 11.76 10*3/MM3 (ref 3.4–10.8)

## 2023-09-24 PROCEDURE — 85025 COMPLETE CBC W/AUTO DIFF WBC: CPT | Performed by: INTERNAL MEDICINE

## 2023-09-24 PROCEDURE — 82948 REAGENT STRIP/BLOOD GLUCOSE: CPT

## 2023-09-24 PROCEDURE — 84145 PROCALCITONIN (PCT): CPT | Performed by: INTERNAL MEDICINE

## 2023-09-24 PROCEDURE — 71045 X-RAY EXAM CHEST 1 VIEW: CPT

## 2023-09-24 PROCEDURE — 80048 BASIC METABOLIC PNL TOTAL CA: CPT | Performed by: INTERNAL MEDICINE

## 2023-09-24 PROCEDURE — 99232 SBSQ HOSP IP/OBS MODERATE 35: CPT | Performed by: INTERNAL MEDICINE

## 2023-09-24 RX ADMIN — Medication 10 ML: at 21:59

## 2023-09-24 RX ADMIN — CLOPIDOGREL BISULFATE 75 MG: 75 TABLET ORAL at 08:44

## 2023-09-24 RX ADMIN — Medication 10 ML: at 08:44

## 2023-09-24 RX ADMIN — QUETIAPINE FUMARATE 25 MG: 25 TABLET ORAL at 08:44

## 2023-09-24 RX ADMIN — LANSOPRAZOLE 15 MG: 15 TABLET, ORALLY DISINTEGRATING ORAL at 05:56

## 2023-09-24 RX ADMIN — METOPROLOL TARTRATE 25 MG: 25 TABLET, FILM COATED ORAL at 21:58

## 2023-09-24 RX ADMIN — ESCITALOPRAM OXALATE 10 MG: 10 TABLET ORAL at 08:44

## 2023-09-24 RX ADMIN — METOPROLOL TARTRATE 25 MG: 25 TABLET, FILM COATED ORAL at 08:44

## 2023-09-24 RX ADMIN — ATORVASTATIN CALCIUM 80 MG: 40 TABLET, FILM COATED ORAL at 21:58

## 2023-09-24 RX ADMIN — QUETIAPINE FUMARATE 50 MG: 25 TABLET ORAL at 21:59

## 2023-09-24 NOTE — PROGRESS NOTES
[facility] Medicine Services  PROGRESS NOTE    Patient Name: Kenneth Wen  : 1950  MRN: [id number]    Date of Admission: 9/15/2023  Primary Care Physician: Suasn Powers APRN    Subjective   Subjective     CC:  Multiple CVA's    HPI:  States he didn't eat much for breakfast. (Nursing says he ate all of his breakfast and 50% of his lunch tray).    ROS:  Difficult to obtain      Objective   Objective     Vital Signs:   Temp:  [98 °F (36.7 °C)-99.3 °F (37.4 °C)] 98.2 °F (36.8 °C)  Heart Rate:  [65-85] 65  Resp:  [16-18] 18  BP: (121-152)/() 143/81     Physical Exam:    LINES: PICC    Constitutional - no acute distress, in bed  HEENT-NCAT, mucous membranes moist  CV-RRR  Resp-grossly clear bilaterally  Abd-soft, nontender  Ext-No lower extremity cyanosis, clubbing or edema bilaterally  Neuro-awake with some confusion, right hemiparesis  Psych-flat affect   Skin- No rash on exposed UE or LE bilaterally          Results Reviewed:  LAB RESULTS:      Lab 23  0725 23  0647 23  0625 23  0110 23  0600 23  0614   WBC 11.76* 13.43* 14.59* 15.40* 13.16*  --    HEMOGLOBIN 13.5 14.5 12.8* 13.2 13.5  --    HEMATOCRIT 41.1 44.8 39.2 41.4 40.8  --    PLATELETS 365 313 336 318 335  --    NEUTROS ABS 9.02* 9.16* 10.92* 11.65* 9.79*  --    IMMATURE GRANS (ABS) 0.08* 0.09* 0.09* 0.06* 0.07*  --    LYMPHS ABS 1.43 2.27 1.79 1.94 1.74  --    MONOS ABS 0.86 1.24* 1.12* 1.27* 1.11*  --    EOS ABS 0.28 0.55* 0.58* 0.39 0.35  --    MCV 94.3 96.6 96.6 96.5 94.4  --    PROCALCITONIN 0.05  --   --   --   --  0.06         Lab 23  0725 23  0647 23  0625 23  2353 23  1827 23  0543 23  0110 23  1948 23  1253 23  0600 23  1851 23  1208 23  0525 23  0312 23  0018 23  1759   SODIUM 145 144 152* 156* 155*   < > 157* 157*   < > 152*   < > 152* 149*  --  151* 149*  149*   POTASSIUM  4.3 4.6 3.9  --   --   --  4.2 3.9   < > 3.9   < > 4.3 3.4* 3.8 3.8 3.5  3.5   CHLORIDE 112* 112* 116*  --   --   --  121*  --   --  121*   < >  --   --   --  120* 114*   CO2 26.0 20.0* 27.0  --   --   --  27.0  --   --  20.0*   < >  --   --   --  19.0* 22.0   ANION GAP 7.0 12.0 9.0  --   --   --  9.0  --   --  11.0   < >  --   --   --  12.0 13.0   BUN 35* 27* 30*  --   --   --  41*  --   --  39*   < >  --   --   --  24* 20   CREATININE 0.86 0.70* 0.77  --   --   --  0.81  --   --  0.75*   < >  --   --   --  0.76 0.72*   EGFR 92.6 98.5 95.7  --   --   --  94.3  --   --  96.5   < >  --   --   --  96.1 97.7   GLUCOSE 153* 123* 159*  --   --   --  163*  --   --  161*   < >  --   --   --  157* 145*   CALCIUM 9.1 9.1 9.1  --   --   --  9.3  --   --  9.0   < >  --   --   --  8.7 8.9   IONIZED CALCIUM  --  1.33*  --   --   --   --   --   --   --   --   --   --   --   --   --   --    MAGNESIUM  --  2.3  --   --   --   --   --   --   --   --   --   --   --   --  2.4 2.2   PHOSPHORUS  --  3.6  --   --   --   --   --   --   --   --   --  2.9 1.9* 1.9*  --  1.9*    < > = values in this interval not displayed.         Lab 09/17/23  1914   TOTAL PROTEIN 6.8   ALBUMIN 3.8   GLOBULIN 3.0   ALT (SGPT) 38   AST (SGOT) 58*   BILIRUBIN 0.7   ALK PHOS 71             Lab 09/17/23  1759   CHOLESTEROL 102   TRIGLYCERIDES 60             Brief Urine Lab Results  (Last result in the past 365 days)        Color   Clarity   Blood   Leuk Est   Nitrite   Protein   CREAT   Urine HCG        09/15/23 1818 Yellow   Cloudy   Trace   Negative   Negative   Negative                   Microbiology Results Abnormal       Procedure Component Value - Date/Time    Blood Culture - Blood, Arm, Right [[id number]]  (Normal) Collected: 09/15/23 0212    Lab Status: Final result Specimen: Blood from Arm, Right Updated: 09/20/23 0230     Blood Culture No growth at 5 days    Blood Culture - Blood, Arm, Left [[id number]]  (Normal) Collected: 09/15/23 0212    Lab Status:  Final result Specimen: Blood from Arm, Left Updated: 09/20/23 0230     Blood Culture No growth at 5 days            CT Head Without Contrast    Result Date: 9/23/2023  CT HEAD WO CONTRAST Date of Exam: 9/23/2023 1:24 AM EDT Indication: Stroke, follow up. Comparison: September 21, 2023 Technique: Axial CT images were obtained of the head without contrast administration.  Automated exposure control and iterative construction methods were used. Findings: No acute intracranial hemorrhage or extra-axial collection is identified. The ventricles are stable in caliber, with no midline shift. The basal cisterns appear patent. Evolving subacute infarcts centered within the bilateral occipital lobes, cerebellum,  and isaac are redemonstrated. The calvarium appears intact. There is mild paranasal sinus mucosal disease. The mastoid air cells are well-aerated.     Impression: Impression: 1.Evolving subacute infarcts centered within bilateral occipital lobes, cerebellum, and isaac. 2.No new acute intracranial process identified. Electronically Signed: Donaldo Seals MD  9/23/2023 8:16 AM EDT  Workstation ID: [id number]    CT Angiogram Neck    Result Date: 9/23/2023  CT ANGIOGRAM NECK, CT ANGIOGRAM HEAD Date of Exam: 9/23/2023 1:24 AM EDT Indication: Stroke, follow up. Comparison: MR angiography head and neck from September 20, 2023 Technique: CTA of the head and neck was performed before and after the uneventful intravenous administration of 75 mL Isovue-370. Reconstructed coronal and sagittal images were also obtained. In addition, a 3-D volume rendered image was created for interpretation. Automated exposure control and iterative reconstruction methods were used. Findings: There is a three-vessel aortic arch. The right common carotid artery is widely patent. The right carotid bifurcation is widely patent. The right internal carotid artery is widely patent. A 3 mm saccular aneurysm along the supraophthalmic right ICA is noted. The  left common carotid artery is widely patent. The left carotid bifurcation is widely patent. The left internal carotid artery is widely patent. The right vertebral artery is widely patent. There is moderate stenosis along the origin of the left  vertebral artery, and distally it is widely patent. The vertebral arteries are codominant. The bilateral middle cerebral, bilateral anterior cerebral, and anterior communicating arteries are widely patent. The basilar and bilateral posterior cerebral arteries are widely patent. No definite posterior communicating artery is identified on either  side. The visualized portions of the bilateral superior cerebellar, anteroinferior cerebral, and posterior inferior cerebellar arteries are unremarkable.     Impression: Impression: 1.Major intracranial arterial vasculature is widely patent. 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 3.Moderate stenosis along origin of left vertebral artery, and distally the left vertebral artery is widely patent. 4.Bilateral carotid bifurcations are widely patent. Electronically Signed: Donaldo Seals MD  9/23/2023 8:45 AM EDT  Workstation ID: [id number]    XR Chest 1 View    Result Date: 9/24/2023  XR CHEST 1 VW Date of Exam: 9/24/2023 3:34 AM EDT Indication: leukocytosis Comparison: 9/19/2023 Findings: There are no airspace consolidations. No pleural fluid. No pneumothorax. The pulmonary vasculature appears within normal limits. The cardiac and mediastinal silhouette appear unremarkable. No acute osseous abnormality identified. No change in position of  a right-sided PICC line with the tip in the atriocaval junction     Impression: Impression: No acute pulmonary process Electronically Signed: Faisal Peters MD  9/24/2023 8:35 AM EDT  Workstation ID: [id number]    CT Angiogram Head    Result Date: 9/23/2023  CT ANGIOGRAM NECK, CT ANGIOGRAM HEAD Date of Exam: 9/23/2023 1:24 AM EDT Indication: Stroke, follow up. Comparison: MR angiography head  and neck from September 20, 2023 Technique: CTA of the head and neck was performed before and after the uneventful intravenous administration of 75 mL Isovue-370. Reconstructed coronal and sagittal images were also obtained. In addition, a 3-D volume rendered image was created for interpretation. Automated exposure control and iterative reconstruction methods were used. Findings: There is a three-vessel aortic arch. The right common carotid artery is widely patent. The right carotid bifurcation is widely patent. The right internal carotid artery is widely patent. A 3 mm saccular aneurysm along the supraophthalmic right ICA is noted. The left common carotid artery is widely patent. The left carotid bifurcation is widely patent. The left internal carotid artery is widely patent. The right vertebral artery is widely patent. There is moderate stenosis along the origin of the left  vertebral artery, and distally it is widely patent. The vertebral arteries are codominant. The bilateral middle cerebral, bilateral anterior cerebral, and anterior communicating arteries are widely patent. The basilar and bilateral posterior cerebral arteries are widely patent. No definite posterior communicating artery is identified on either  side. The visualized portions of the bilateral superior cerebellar, anteroinferior cerebral, and posterior inferior cerebellar arteries are unremarkable.     Impression: Impression: 1.Major intracranial arterial vasculature is widely patent. 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 3.Moderate stenosis along origin of left vertebral artery, and distally the left vertebral artery is widely patent. 4.Bilateral carotid bifurcations are widely patent. Electronically Signed: Donaldo Seals MD  9/23/2023 8:45 AM EDT  Workstation ID: [id number]    XR Abdomen KUB    Result Date: 9/22/2023  XR ABDOMEN KUB Date of Exam: 9/22/2023 4:34 PM EDT Indication: NG placement Comparison: 9/15/2023 Findings: There  is a nasogastric tube in place the tip projecting over the left upper quadrant of the abdomen in the expected location of the fundus of the stomach. The abdominal bowel gas pattern is within normal limits. No abnormal calcifications overlie the abdomen.    Impression: Impression: 1. Nasogastric tube in place the tip projecting over the expected location of the fundus of the stomach. Electronically Signed: Mike Seth MD  9/22/2023 4:51 PM EDT  Workstation ID: [id number]         Current medications:  Scheduled Meds:atorvastatin, 80 mg, Nasogastric, Nightly  clopidogrel, 75 mg, Nasogastric, Daily  escitalopram, 10 mg, Nasogastric, Daily  insulin lispro, 2-7 Units, Subcutaneous, 4x Daily AC & at Bedtime  lansoprazole, 15 mg, Oral, Q AM  metoprolol tartrate, 25 mg, Nasogastric, Q12H  QUEtiapine, 25 mg, Nasogastric, QAM  QUEtiapine, 50 mg, Nasogastric, Nightly  sodium chloride, 10 mL, Intravenous, Q12H      Continuous Infusions:   PRN Meds:.  acetaminophen **OR** acetaminophen    Calcium Replacement - Follow Nurse / BPA Driven Protocol    dextrose    dextrose    dextrose    glucagon (human recombinant)    ibuprofen    Magnesium Standard Dose Replacement - Follow Nurse / BPA Driven Protocol    ondansetron    Phosphorus Replacement - Follow Nurse / BPA Driven Protocol    Potassium Replacement - Follow Nurse / BPA Driven Protocol    sodium chloride    Assessment & Plan   Assessment & Plan     Active Hospital Problems    Diagnosis  POA    **Hemorrhagic CVA [I61.9]  Yes    Oropharyngeal dysphagia [R13.12]  Yes    Multiple bilateral CVAs [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        Brief Hospital Course to date:  Kenneth Wen is a 72 y.o. male with  h/o HTN, HL, T2DM, GERD who presented to OSH with multiple acute ischemic infarcts of the bilateral cerebral and cerebellar hemispheres as well as left isaac.  TTE/JOSIAS was negative PFO at OSH.  On 9/13/23 he had worsening in mental status and new inability to move RLE, head CT showed evolution of the existing CVA with new development of petechial hemorrhage.  DAPT was held for 24 hours per neuro recs and repeat CT head that evening showed worsening effacement of the right quadrigeminal cistern with upward supratentorial. He was then transferred to [facility] for NS eval on 9/15/23.  He was started on hypertonic saline 9/15/23  through 9/20/23 for cerebral edema.  He had fevers with negative cultures but had worsening secretions and labored breathing so was started on zosyn on 9/16/23.  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  Continue metoprolol 25mg BID    GERD  --continue protonix    COVID +  --was on zosyn but no infiltrate and procal low so pulm stopped  --on RA.  No indication for antivirals    Agitation  --seroquel  --better, out of restraints today    Hypernatremia  --sodium 145  - bmp am    Leukocytosis  - afebrile  - cxr today no infiltrate  - WBC improved to 11.7  - procalcitionin low at 0.05    Called and updated spouse    Expected Discharge Location and Transportation:   Expected Discharge   Expected Discharge Date: 9/25/2023; Expected Discharge Time:      DVT prophylaxis:  Mechanical DVT prophylaxis orders are present.     AM-PAC 6 Clicks Score (PT): 6 (09/22/23  2000)    CODE STATUS:   Code Status and Medical Interventions:   Ordered at: 09/15/23 0133     Code Status (Patient has no pulse and is not breathing):    CPR (Attempt to Resuscitate)     Medical Interventions (Patient has pulse or is breathing):    Full Support       Kee Lovelace MD  09/24/23

## 2023-09-25 LAB
ALBUMIN SERPL-MCNC: 4.2 G/DL (ref 3.5–5.2)
ALBUMIN/GLOB SERPL: 1.4 G/DL
ALP SERPL-CCNC: 91 U/L (ref 39–117)
ALT SERPL W P-5'-P-CCNC: 59 U/L (ref 1–41)
ANION GAP SERPL CALCULATED.3IONS-SCNC: 12 MMOL/L (ref 5–15)
AST SERPL-CCNC: 46 U/L (ref 1–40)
BASOPHILS # BLD AUTO: 0.1 10*3/MM3 (ref 0–0.2)
BASOPHILS NFR BLD AUTO: 0.8 % (ref 0–1.5)
BILIRUB SERPL-MCNC: 0.8 MG/DL (ref 0–1.2)
BUN SERPL-MCNC: 32 MG/DL (ref 8–23)
BUN/CREAT SERPL: 39 (ref 7–25)
CALCIUM SPEC-SCNC: 9.7 MG/DL (ref 8.6–10.5)
CHLORIDE SERPL-SCNC: 111 MMOL/L (ref 98–107)
CHOLEST SERPL-MCNC: 109 MG/DL (ref 0–200)
CO2 SERPL-SCNC: 26 MMOL/L (ref 22–29)
CREAT SERPL-MCNC: 0.82 MG/DL (ref 0.76–1.27)
DEPRECATED RDW RBC AUTO: 42.6 FL (ref 37–54)
EGFRCR SERPLBLD CKD-EPI 2021: 93.9 ML/MIN/1.73
EOSINOPHIL # BLD AUTO: 0.22 10*3/MM3 (ref 0–0.4)
EOSINOPHIL NFR BLD AUTO: 1.9 % (ref 0.3–6.2)
ERYTHROCYTE [DISTWIDTH] IN BLOOD BY AUTOMATED COUNT: 12.4 % (ref 12.3–15.4)
GLOBULIN UR ELPH-MCNC: 2.9 GM/DL
GLUCOSE BLDC GLUCOMTR-MCNC: 118 MG/DL (ref 70–130)
GLUCOSE BLDC GLUCOMTR-MCNC: 118 MG/DL (ref 70–130)
GLUCOSE BLDC GLUCOMTR-MCNC: 146 MG/DL (ref 70–130)
GLUCOSE BLDC GLUCOMTR-MCNC: 242 MG/DL (ref 70–130)
GLUCOSE SERPL-MCNC: 149 MG/DL (ref 65–99)
HCT VFR BLD AUTO: 45.3 % (ref 37.5–51)
HGB BLD-MCNC: 15.1 G/DL (ref 13–17.7)
IMM GRANULOCYTES # BLD AUTO: 0.07 10*3/MM3 (ref 0–0.05)
IMM GRANULOCYTES NFR BLD AUTO: 0.6 % (ref 0–0.5)
LYMPHOCYTES # BLD AUTO: 1.85 10*3/MM3 (ref 0.7–3.1)
LYMPHOCYTES NFR BLD AUTO: 15.7 % (ref 19.6–45.3)
MAGNESIUM SERPL-MCNC: 2.6 MG/DL (ref 1.6–2.4)
MCH RBC QN AUTO: 31.2 PG (ref 26.6–33)
MCHC RBC AUTO-ENTMCNC: 33.3 G/DL (ref 31.5–35.7)
MCV RBC AUTO: 93.6 FL (ref 79–97)
MONOCYTES # BLD AUTO: 0.85 10*3/MM3 (ref 0.1–0.9)
MONOCYTES NFR BLD AUTO: 7.2 % (ref 5–12)
NEUTROPHILS NFR BLD AUTO: 73.8 % (ref 42.7–76)
NEUTROPHILS NFR BLD AUTO: 8.71 10*3/MM3 (ref 1.7–7)
NRBC BLD AUTO-RTO: 0 /100 WBC (ref 0–0.2)
PHOSPHATE SERPL-MCNC: 3.2 MG/DL (ref 2.5–4.5)
PLATELET # BLD AUTO: 428 10*3/MM3 (ref 140–450)
PMV BLD AUTO: 11.6 FL (ref 6–12)
POTASSIUM SERPL-SCNC: 4.7 MMOL/L (ref 3.5–5.2)
PROT SERPL-MCNC: 7.1 G/DL (ref 6–8.5)
RBC # BLD AUTO: 4.84 10*6/MM3 (ref 4.14–5.8)
SODIUM SERPL-SCNC: 149 MMOL/L (ref 136–145)
TRIGL SERPL-MCNC: 65 MG/DL (ref 0–150)
WBC NRBC COR # BLD: 11.8 10*3/MM3 (ref 3.4–10.8)

## 2023-09-25 PROCEDURE — 63710000001 INSULIN LISPRO (HUMAN) PER 5 UNITS: Performed by: INTERNAL MEDICINE

## 2023-09-25 PROCEDURE — 97530 THERAPEUTIC ACTIVITIES: CPT

## 2023-09-25 PROCEDURE — 84478 ASSAY OF TRIGLYCERIDES: CPT | Performed by: INTERNAL MEDICINE

## 2023-09-25 PROCEDURE — 92507 TX SP LANG VOICE COMM INDIV: CPT

## 2023-09-25 PROCEDURE — 83735 ASSAY OF MAGNESIUM: CPT | Performed by: INTERNAL MEDICINE

## 2023-09-25 PROCEDURE — 84100 ASSAY OF PHOSPHORUS: CPT | Performed by: INTERNAL MEDICINE

## 2023-09-25 PROCEDURE — 97110 THERAPEUTIC EXERCISES: CPT

## 2023-09-25 PROCEDURE — 80053 COMPREHEN METABOLIC PANEL: CPT | Performed by: INTERNAL MEDICINE

## 2023-09-25 PROCEDURE — 92526 ORAL FUNCTION THERAPY: CPT

## 2023-09-25 PROCEDURE — 82948 REAGENT STRIP/BLOOD GLUCOSE: CPT

## 2023-09-25 PROCEDURE — 85025 COMPLETE CBC W/AUTO DIFF WBC: CPT | Performed by: INTERNAL MEDICINE

## 2023-09-25 PROCEDURE — 99232 SBSQ HOSP IP/OBS MODERATE 35: CPT | Performed by: INTERNAL MEDICINE

## 2023-09-25 PROCEDURE — 82465 ASSAY BLD/SERUM CHOLESTEROL: CPT | Performed by: INTERNAL MEDICINE

## 2023-09-25 RX ADMIN — ATORVASTATIN CALCIUM 80 MG: 40 TABLET, FILM COATED ORAL at 22:33

## 2023-09-25 RX ADMIN — METOPROLOL TARTRATE 25 MG: 25 TABLET, FILM COATED ORAL at 08:42

## 2023-09-25 RX ADMIN — CLOPIDOGREL BISULFATE 75 MG: 75 TABLET ORAL at 08:42

## 2023-09-25 RX ADMIN — Medication 10 ML: at 08:42

## 2023-09-25 RX ADMIN — LANSOPRAZOLE 15 MG: 15 TABLET, ORALLY DISINTEGRATING ORAL at 05:58

## 2023-09-25 RX ADMIN — ESCITALOPRAM OXALATE 10 MG: 10 TABLET ORAL at 08:42

## 2023-09-25 RX ADMIN — QUETIAPINE FUMARATE 25 MG: 25 TABLET ORAL at 08:42

## 2023-09-25 RX ADMIN — INSULIN LISPRO 3 UNITS: 100 INJECTION, SOLUTION INTRAVENOUS; SUBCUTANEOUS at 22:40

## 2023-09-25 RX ADMIN — METOPROLOL TARTRATE 25 MG: 25 TABLET, FILM COATED ORAL at 22:34

## 2023-09-25 RX ADMIN — Medication 10 ML: at 22:34

## 2023-09-25 RX ADMIN — INSULIN LISPRO 3 UNITS: 100 INJECTION, SOLUTION INTRAVENOUS; SUBCUTANEOUS at 17:51

## 2023-09-25 RX ADMIN — QUETIAPINE FUMARATE 50 MG: 25 TABLET ORAL at 22:34

## 2023-09-25 NOTE — THERAPY PROGRESS REPORT/RE-CERT
Patient Name: Kenneth Wen  : 1950    MRN: [id number]                              Today's Date: 2023       Admit Date: 9/15/2023    Visit Dx:     ICD-10-CM ICD-9-CM   1. Aphasia  R47.01 784.3   2. Dysarthria  R47.1 784.51   3. Oropharyngeal dysphagia  R13.12 787.22     Patient Active Problem List   Diagnosis    Precordial pain    Elevated BP without diagnosis of hypertension    Dyslipidemia    Hyperglycemia    Multiple bilateral CVAs    Hemorrhagic CVA    HFpEF    T2DM (type 2 diabetes mellitus)    HTN (hypertension)    GERD (gastroesophageal reflux disease)    ICH (intracerebral hemorrhage)    Oropharyngeal dysphagia     Past Medical History:   Diagnosis Date    Acid reflux     Cancer     Skin    Diabetes mellitus     Prediabetes    GERD (gastroesophageal reflux disease) 09/15/2023    HTN (hypertension) 09/15/2023    Kidney disease     T2DM (type 2 diabetes mellitus) 09/15/2023     Past Surgical History:   Procedure Laterality Date    APPENDECTOMY      HEMORRHOIDECTOMY      NASAL POLYP SURGERY        General Information       Row Name 23 1037          Physical Therapy Time and Intention    Document Type progress note/recertification  -KR     Mode of Treatment individual therapy;physical therapy  -KR       Row Name 23 1037          General Information    Patient Profile Reviewed yes  -KR     Existing Precautions/Restrictions fall;other (see comments)  R sided weakness/tone, no response to visual threat bilaterally  -KR     Barriers to Rehab medically complex;cognitive status;visual deficit  -KR       Row Name 23 1037          Cognition    Orientation Status (Cognition) oriented to;person;other (see comments)  Pt able to state he was in a hospital, reported he was at [facility]. Not oriented to time this date  -KR       Row Name 23 1037          Safety Issues, Functional Mobility    Safety Issues Affecting Function (Mobility) awareness of need for assistance;insight into  deficits/self-awareness;judgment;problem-solving;safety precaution awareness;sequencing abilities;safety precautions follow-through/compliance  -KR     Impairments Affecting Function (Mobility) balance;cognition;coordination;endurance/activity tolerance;grasp;motor control;motor planning;muscle tone abnormal;visual/perceptual;strength;sensation/sensory awareness;range of motion (ROM);postural/trunk control  -KR     Cognitive Impairments, Mobility Safety/Performance attention;awareness, need for assistance;insight into deficits/self-awareness;judgment;problem-solving/reasoning;sequencing abilities;safety precaution follow-through;safety precaution awareness  -KR     Comment, Safety Issues/Impairments (Mobility) L inattention  -KR               User Key  (r) = Recorded By, (t) = Taken By, (c) = Cosigned By      Initials Name Provider Type    Raissa Viramontes, PT Physical Therapist                   Mobility       Row Name 09/25/23 1311          Bed Mobility    Bed Mobility supine-sit  -KR     Supine-Sit [place] (Bed Mobility) maximum assist (25% patient effort);verbal cues  -KR     Assistive Device (Bed Mobility) bed rails;draw sheet;head of bed elevated  -KR     Comment, (Bed Mobility) cues for sequencing. Pt with R lateral lean in sitting. Cues for LUE support on bedrail to maintain midline. Visual cues for midline orientation.  -KR       Row Name 09/25/23 1311          Bed-Chair Transfer    Bed-Chair [place] (Transfers) maximum assist (25% patient effort);2 person assist;verbal cues  -KR     Assistive Device (Bed-Chair Transfers) other (see comments)  LUE support  -KR     Comment, (Bed-Chair Transfer) stand pivot towards L  -KR       Row Name 09/25/23 1311          Sit-Stand Transfer    Sit-Stand [place] (Transfers) maximum assist (25% patient effort);2 person assist;verbal cues  -KR     Assistive Device (Sit-Stand Transfers) other (see comments)  BUE support  -KR     Comment, (Sit-Stand Transfer)  1x from bed. Cues for anterior weight shift. Pt able to maintain RLE in extension in standing.  -KR       Row Name 09/25/23 1311          Gait/Stairs (Locomotion)    [place] Level (Gait) unable to assess  -KR               User Key  (r) = Recorded By, (t) = Taken By, (c) = Cosigned By      Initials Name Provider Type    KR Raissa Soria, PIPER Physical Therapist                   Obj/Interventions       Row Name 09/25/23 1320          Motor Skills    Therapeutic Exercise hip;knee;ankle  -       Row Name 09/25/23 1320          Hip (Therapeutic Exercise)    Hip (Therapeutic Exercise) AAROM (active assistive range of motion)  -KR     Hip AAROM (Therapeutic Exercise) 10 repetitions;right;aBduction;aDduction  -       Row Name 09/25/23 1320          Knee (Therapeutic Exercise)    Knee (Therapeutic Exercise) strengthening exercise  -KR     Knee Strengthening (Therapeutic Exercise) 10 repetitions;right;LAQ (long arc quad)  -KR       Row Name 09/25/23 1320          Ankle (Therapeutic Exercise)    Ankle (Therapeutic Exercise) strengthening exercise;PROM (passive range of motion)  -KR     Ankle PROM (Therapeutic Exercise) 5 repetitions;right;dorsiflexion;5 second hold  -KR     Ankle Strengthening (Therapeutic Exercise) 10 repetitions;right;dorsiflexion;plantarflexion  -KR       Row Name 09/25/23 1320          Balance    Balance Assessment sitting static balance;standing static balance;standing dynamic balance  -KR     Static Sitting Balance moderate assist;verbal cues;non-verbal cues (demo/gesture)  -KR     Position, Sitting Balance supported;sitting edge of bed  -KR     Static Standing Balance maximum assist;2-person assist;verbal cues;non-verbal cues (demo/gesture)  -KR     Dynamic Standing Balance 2-person assist;verbal cues;non-verbal cues (demo/gesture);maximum assist  -KR     Position/Device Used, Standing Balance supported  -KR     Balance Interventions sitting;sit to stand;standing;supported;static;dynamic  -KR                User Key  (r) = Recorded By, (t) = Taken By, (c) = Cosigned By      Initials Name Provider Type    KR Raissa Soria, PT Physical Therapist                   Goals/Plan       Row Name 09/25/23 1323          Bed Mobility Goal 1 (PT)    Activity/Assistive Device (Bed Mobility Goal 1, PT) sit to supine/supine to sit  -KR     [place] Level/Cues Needed (Bed Mobility Goal 1, PT) minimum assist (75% or more patient effort)  -KR     Time Frame (Bed Mobility Goal 1, PT) long term goal (LTG);10 days  -KR     Progress/Outcomes (Bed Mobility Goal 1, PT) goal ongoing;progress slower than expected  -KR       Row Name 09/25/23 1323          Transfer Goal 1 (PT)    Activity/Assistive Device (Transfer Goal 1, PT) sit-to-stand/stand-to-sit;bed-to-chair/chair-to-bed;walker, rolling  -KR     [place] Level/Cues Needed (Transfer Goal 1, PT) moderate assist (50-74% patient effort)  -KR     Time Frame (Transfer Goal 1, PT) long term goal (LTG);10 days  -KR     Progress/Outcome (Transfer Goal 1, PT) goal revised this date  -KR       Row Name 09/25/23 1323          Gait Training Goal 1 (PT)    Activity/Assistive Device (Gait Training Goal 1, PT) gait (walking locomotion);assistive device use;walker, rolling  -KR     [place] Level (Gait Training Goal 1, PT) maximum assist (25-49% patient effort)  -KR     Distance (Gait Training Goal 1, PT) 10  -KR     Time Frame (Gait Training Goal 1, PT) long term goal (LTG);10 days  -KR     Progress/Outcome (Gait Training Goal 1, PT) goal revised this date  -KR       Row Name 09/25/23 1323          Problem Specific Goal 1 (PT)    Problem Specific Goal 1 (PT) Pt will sit unsupported at EOB/EOC for 5 minutes with SBA for improved trunk control to facilitate independence with transfers.  -KR     Time Frame (Problem Specific Goal 1, PT) long-term goal (LTG);2 weeks  -KR     Progress/Outcome (Problem Specific Goal 1, PT) new goal  -KR       Row Name 09/25/23 1323          Therapy  Assessment/Plan (PT)    Planned Therapy Interventions (PT) bed mobility training;gait training;home exercise program;postural re-education;manual therapy techniques;patient/family education;neuromuscular re-education;ROM (range of motion);stair training;strengthening;stretching;transfer training;motor coordination training;balance training  -KR               User Key  (r) = Recorded By, (t) = Taken By, (c) = Cosigned By      Initials Name Provider Type    KR Raissa Soria, PT Physical Therapist                   Clinical Impression       Row Name 09/25/23 1321          Pain    Pain Intervention(s) Repositioned;Ambulation/increased activity  -KR       Row Name 09/25/23 1321          Pain Scale: FACES Pre/Post-Treatment    Pain: FACES Scale, Pretreatment 2-->hurts little bit  -KR     Posttreatment Pain Rating 2-->hurts little bit  -KR     Pain Location generalized  -KR       Row Name 09/25/23 1321          Plan of Care Review    Plan of Care Reviewed With patient  -KR     Progress improving  -KR     Outcome Evaluation Pt with improvements in static sitting balance at EOB/EOC this date, as well as increased activation noted in R quad. No R knee buckling in standing noted. Pt will continue to benefit from PT to address ongoing strength, balance, and endurnace deficits and return to PLOF. PT rec IRF upon dc.  -KR       Row Name 09/25/23 1321          Therapy Assessment/Plan (PT)    Rehab Potential (PT) good, to achieve stated therapy goals  -KR     Criteria for Skilled Interventions Met (PT) yes;meets criteria;skilled treatment is necessary  -KR     Therapy Frequency (PT) daily  -KR       Row Name 09/25/23 1321          Vital Signs    Pre Systolic BP Rehab 145  -KR     Pre Treatment Diastolic BP 84  -KR     Post Systolic BP Rehab 132  -KR     Post Treatment Diastolic BP 81  -KR     Posttreatment Heart Rate (beats/min) 67  -KR     Post SpO2 (%) 95  -KR     O2 Delivery Post Treatment room air  -KR     Pre Patient  Position Supine  -KR     Intra Patient Position Standing  -KR     Post Patient Position Sitting  -KR       Row Name 09/25/23 1321          Positioning and Restraints    Pre-Treatment Position in bed  -KR     Post Treatment Position chair  -KR     In Chair notified nsg;reclined;call light within reach;encouraged to call for assist;exit alarm on;waffle cushion;on mechanical lift sling;legs elevated;with OT;RUE elevated;heels elevated  -KR               User Key  (r) = Recorded By, (t) = Taken By, (c) = Cosigned By      Initials Name Provider Type    Raissa Viramontes, PIPER Physical Therapist                   Outcome Measures       Row Name 09/25/23 1324          How much help from another person do you currently need...    Turning from your back to your side while in flat bed without using bedrails? 2  -KR     Moving from lying on back to sitting on the side of a flat bed without bedrails? 2  -KR     Moving to and from a bed to a chair (including a wheelchair)? 2  -KR     Standing up from a chair using your arms (e.g., wheelchair, bedside chair)? 2  -KR     Climbing 3-5 steps with a railing? 1  -KR     To walk in hospital room? 1  -KR     AM-PAC 6 Clicks Score (PT) 10  -KR     Highest level of mobility 4 --> Transferred to chair/commode  -KR       Row Name 09/25/23 1135          Modified [place] Scale    Modified [place] Scale 4 - Moderately severe disability.  Unable to walk without assistance, and unable to attend to own bodily needs without assistance.  -       Row Name 09/25/23 1135          Functional Assessment    Outcome Measure Options AM-PAC 6 Clicks Daily Activity (OT);Modified [place]  -JR               User Key  (r) = Recorded By, (t) = Taken By, (c) = Cosigned By      Initials Name Provider Type    Jess Medley OT Occupational Therapist    Raissa Viramontes PT Physical Therapist                                 Physical Therapy Education       Title: PT OT SLP Therapies (In Progress)       Topic:  Physical Therapy (In Progress)       Point: Mobility training (In Progress)       Learning Progress Summary             Patient Acceptance, E, NR by KR at 9/25/2023 1324    Acceptance, E, NR by KG at 9/18/2023 1403    Acceptance, E,TB, NR by AY at 9/15/2023 1254                         Point: Home exercise program (In Progress)       Learning Progress Summary             Patient Acceptance, E, NR by KG at 9/18/2023 1403                         Point: Body mechanics (In Progress)       Learning Progress Summary             Patient Acceptance, E, NR by KR at 9/25/2023 1324    Acceptance, E, NR by KG at 9/18/2023 1403    Acceptance, E,TB, NR by AY at 9/15/2023 1254                         Point: Precautions (In Progress)       Learning Progress Summary             Patient Acceptance, E, NR by KR at 9/25/2023 1324    Acceptance, E, NR by KG at 9/18/2023 1403    Acceptance, E,TB, NR by AY at 9/15/2023 1254                                         User Key       Initials Effective Dates Name Provider Type Discipline    KG 05/22/20 -  Mackenzie Mckay, PT Physical Therapist PT    AY 11/10/20 -  Aleshia Armstrong PT Physical Therapist PT    KR 12/30/22 -  Raissa Soria, PT Physical Therapist PT                  PT Recommendation and Plan  Planned Therapy Interventions (PT): bed mobility training, gait training, home exercise program, postural re-education, manual therapy techniques, patient/family education, neuromuscular re-education, ROM (range of motion), stair training, strengthening, stretching, transfer training, motor coordination training, balance training  Plan of Care Reviewed With: patient  Progress: improving  Outcome Evaluation: Pt with improvements in static sitting balance at EOB/EOC this date, as well as increased activation noted in R quad. No R knee buckling in standing noted. Pt will continue to benefit from PT to address ongoing strength, balance, and endurnace deficits and return to PLOF. PT rec IRF  upon dc.     Time Calculation:         PT Charges       Row Name 09/25/23 1324             Time Calculation    Start Time 1037  -KR      PT Received On 09/25/23  -KR      PT Goal Re-Cert Due Date 10/05/23  -KR         Timed Charges    52704 - PT Therapeutic Exercise Minutes 10  -KR      64769 - PT Therapeutic Activity Minutes 13  -KR         Total Minutes    Timed Charges Total Minutes 23  -KR       Total Minutes 23  -KR                User Key  (r) = Recorded By, (t) = Taken By, (c) = Cosigned By      Initials Name Provider Type    Raissa Viramontes PT Physical Therapist                  Therapy Charges for Today       Code Description Service Date Service Provider Modifiers Qty    [id number]  PT THER PROC EA 15 MIN 9/25/2023 Raissa Soria, PT GP 1    [id number] HC PT THERAPEUTIC ACT EA 15 MIN 9/25/2023 Raissa Soria, PT GP 1            PT G-Codes  Outcome Measure Options: AM-PAC 6 Clicks Daily Activity (OT), Modified Rae  AM-PAC 6 Clicks Score (PT): 10  AM-PAC 6 Clicks Score (OT): 8  Modified Rae Scale: 4 - Moderately severe disability.  Unable to walk without assistance, and unable to attend to own bodily needs without assistance.  PT Discharge Summary  Anticipated Discharge Disposition (PT): inpatient rehabilitation facility    Raissa Soria PT  9/25/2023

## 2023-09-25 NOTE — PROGRESS NOTES
Clinical Nutrition     Nutrition Support Assessment  Reason for Visit: Follow-up protocol      Patient Name: Kenneth Wen  YOB: 1950  MRN: [id number]  Date of Encounter: 09/25/23 09:02 EDT  Admission date: 9/15/2023    Comments:     Nsg reports pt eating >/=75% of trays. Will provide magic cup daily. Encourage fluids.     Nutrition Assessment   Admission Diagnosis:  ICH (intracerebral hemorrhage) [I61.9]      Problem List:    Hemorrhagic CVA    Dyslipidemia    Multiple bilateral CVAs    HFpEF    T2DM (type 2 diabetes mellitus)    HTN (hypertension)    GERD (gastroesophageal reflux disease)    ICH (intracerebral hemorrhage)    Oropharyngeal dysphagia        PMH:  He  has a past medical history of Acid reflux, Cancer, Diabetes mellitus, GERD (gastroesophageal reflux disease) (09/15/2023), HTN (hypertension) (09/15/2023), Kidney disease, and T2DM (type 2 diabetes mellitus) (09/15/2023).    PSH: He  has a past surgical history that includes Appendectomy; Nasal polyp surgery; and Hemorrhoid surgery.      Applicable Nutrition Concerns:   Skin:  Oral:  GI:      Applicable Interval History:   9/16 3% Hypertonic saline initiated  9/15 FEES:  SLP Swallowing Diagnosis: mod-severe, oral dysphagia, severe, pharyngeal dysphagia (09/15/23 1331)  SLP Diet Recommendation: NPO, temporary alternate methods of nutrition/hydration, other (see comments) (okay for 2-3 ice chips per hour as tolerated)   9/23-SLP Diet Recommendation: puree, honey thick liquids   Reported/Observed/Food/Nutrition Related History:     9/25  Spoke w/RN who reports pt pulled out NGT Friday evening. RN states pt eating >/=75% of trays. Textures downgraded 9/23 to pureed.     9/22  Pt on diet though ate only sausage and some juice this am. RN states pt pocketing food still, had been happening in ICU per previous RD note. Discussed w/SLP.     9/18 RN reports pt on 3% saline therapy Na+ goal 145-155, Na+ w/I range, pt from OSH  "sent here d/t hemorrhagic conversion continues to have R weakness, confusion, tolerating TF. Uncertain if pt should have water flushes, these were dc'd after discussion w/ MD.     9/15  Per RN unclear if will start EN or if pt may progress to caden diet at this time.  No wt hx available today.     Anthropometrics       Admission Height 175.3 cm (69\") Documented at 09/15/2023 0122   Admission Weight 79.2 kg (174 lb 9.7 oz) Documented at 09/15/2023 0122       Height: Height: 175.3 cm (69\")  Last Filed Weight: Weight: 72.8 kg (160 lb 7.9 oz) (09/21/23 0500)  Method: Weight Method: Bed scale  BMI: BMI (Calculated): 23.7  BMI classification: Overweight: 25.0-29.9kg/m2      9/15/2023 9/17/2023   Weight     Weight (kg) 79.2 kg  82.5 kg    Weight (lbs) 174 lb 9.7 oz  181 lb 14.1 oz    Weight Method Bed scale  Bed scale        UBW: Per  lbs on 9/14/23  Note 172 lbs in 2018  Weight change: uncertain at this time    Nutrition Focused Physical Exam     Date: 9/13    Unable to perform exam due to: COVID Isolation     Nutrition Diagnosis   Date: 9/15 Updated: 9/18  Problem Inadequate oral intake    Etiology stroke   Signs/Symptoms NPO w ice chips per SLP, confusion, +EN   Status: resolved    Date:  9/18 Updated:  Problem Swallowing difficulty/chewing difficulty   Etiology Bilateral strokes   Signs/Symptoms Oral-pharyngeal dysphagia per SLP FEES eval   Status: ongoing  Goal:   General: Nutrition support treatment  PO: Increase intake, Advace diet as medically feasible/appropriate  EN/PN: Establish EN tolerance, Tolerate EN at goal, Adjust EN, Deliver estimated needs    Nutrition Intervention      Follow treatment progress, Care plan reviewed, Nutrition support order placed    Magic Cup daily w/lunch, monitor for use/acceptance.     Monitoring/Evaluation:   Per protocol, I&O, Pertinent labs, Weight, Symptoms, Swallow function      Liset Webster, MS,RD,LD  Time Spent: 15 min    "

## 2023-09-25 NOTE — NURSING NOTE
Pt VSS, NSR, Room air, no complaints of pain. Pt sat in chair for 4 hrs today and tolerated well.  Pt ate 75% breakfast, 25% lunch, and 0% dinner. Dinner was offered to him multiple times, however pt declined. No further concerns at this time.    Name band;

## 2023-09-25 NOTE — PLAN OF CARE
Goal Outcome Evaluation:  Plan of Care Reviewed With: patient        Progress: improving  Outcome Evaluation: Pt with improvements in static sitting balance at EOB/EOC this date, as well as increased activation noted in R quad. No R knee buckling in standing noted. Pt will continue to benefit from PT to address ongoing strength, balance, and endurnace deficits and return to PLOF. PT rec IRF upon dc.      Anticipated Discharge Disposition (PT): inpatient rehabilitation facility

## 2023-09-25 NOTE — THERAPY TREATMENT NOTE
Acute Care - Speech Language Pathology   Treatment Note  [facility]     Patient Name: Kenneth Wen  : 1950  MRN: [id number]  Today's Date: 2023               Admit Date: 9/15/2023     Visit Dx:    ICD-10-CM ICD-9-CM   1. Aphasia  R47.01 784.3   2. Dysarthria  R47.1 784.51   3. Oropharyngeal dysphagia  R13.12 787.22     Patient Active Problem List   Diagnosis    Precordial pain    Elevated BP without diagnosis of hypertension    Dyslipidemia    Hyperglycemia    Multiple bilateral CVAs    Hemorrhagic CVA    HFpEF    T2DM (type 2 diabetes mellitus)    HTN (hypertension)    GERD (gastroesophageal reflux disease)    ICH (intracerebral hemorrhage)    Oropharyngeal dysphagia     Past Medical History:   Diagnosis Date    Acid reflux     Cancer     Skin    Diabetes mellitus     Prediabetes    GERD (gastroesophageal reflux disease) 09/15/2023    HTN (hypertension) 09/15/2023    Kidney disease     T2DM (type 2 diabetes mellitus) 09/15/2023     Past Surgical History:   Procedure Laterality Date    APPENDECTOMY      HEMORRHOIDECTOMY      NASAL POLYP SURGERY         SLP Recommendation and Plan  SLP Diagnosis: moderate, aphasia, dysarthria (@ least some mild cog deficits but u/a to fully assess) (23)           Swallow Criteria for Skilled Therapeutic Interventions Met: demonstrates skilled criteria (23)  SLC Criteria for Skilled Therapy Interventions Met: yes (23)  Anticipated Discharge Disposition (SLP): skilled nursing facility (23)     Therapy Frequency (Swallow): 5 days per week (23)  Predicted Duration Therapy Intervention (Days): until discharge (23)  Oral Care Recommendations: Oral Care BID/PRN (23 152)     Daily Summary of Progress (SLP): progress toward functional goals as expected (23)           Treatment Assessment (SLP): continued, dysarthria, aphasia, oral dysphagia, suspected, pharyngeal dysphagia (23)      Plan for Continued Treatment (SLP): continue treatment per plan of care (09/25/23 1525)         SLP EVALUATION (last 72 hours)       SLP SLC Evaluation       Row Name 09/25/23 1525 09/23/23 1100                Communication Assessment/Intervention    Document Type therapy note (daily note)  -CH --       Subjective Information no complaints  -CH no complaints  -AW       Patient Observations alert;cooperative;agree to therapy  -CH lethargic;alert  -AW       Patient/Family/Caregiver Comments/Observations none present  -CH no family  -AW       Patient Effort fair  -CH poor  -AW       Symptoms Noted During/After Treatment none  -CH none  -AW       Oral Care -- teeth brushed - regular toothbrush  -AW          General Information    Patient Profile Reviewed yes  -CH yes  -AW       Pertinent History Of Current Problem -- see prior, re-eval 2' report of pocketing yesterday  -AW       Precautions/Limitations, Vision -- WFL with corrective lenses  -AW       Precautions/Limitations, Hearing -- WFL  -AW       Prior Level of Function-Communication -- unknown  -AW       Plans/Goals Discussed with -- patient  -AW       Barriers to Rehab -- medically complex;cognitive status  -AW       Patient's Goals for Discharge -- patient could not state  -AW          Pain    Additional Documentation Pain Scale: Word Pre/Post-Treatment (Group)  -CH Pain Scale: FACES Pre/Post-Treatment (Group)  -AW          Pain Scale: Word Pre/Post-Treatment    Pain: Word Scale, Pretreatment 0 - no pain  -CH --       Posttreatment Pain Rating 0 - no pain  -CH --          Pain Scale: FACES Pre/Post-Treatment    Pain: FACES Scale, Pretreatment 0-->no hurt  -CH 0-->no hurt  -AW       Posttreatment Pain Rating 0-->no hurt  -CH 0-->no hurt  -AW          SLP Evaluation Clinical Impressions    SLP Diagnosis moderate;aphasia;dysarthria  @ least some mild cog deficits but u/a to fully assess  -CH --       Rehab Potential/Prognosis good  -CH --       SLC Criteria for  Skilled Therapy Interventions Met yes  - --       Functional Impact difficulty communicating wants, needs;difficulty communicating in an emergency;difficulty in expressing complex messages;functional impact in ADLs  -CH --          SLP Treatment Clinical Impressions    Treatment Assessment (SLP) continued;dysarthria;aphasia;oral dysphagia;suspected;pharyngeal dysphagia  -CH --       Daily Summary of Progress (SLP) progress toward functional goals as expected  -CH --       Barriers to Overall Progress (SLP) Medically complex  -CH --       Plan for Continued Treatment (SLP) continue treatment per plan of care  -CH --       Care Plan Review care plan/treatment goals reviewed;risks/benefits reviewed;current/potential barriers reviewed;patient/other agree to care plan  -CH --          Recommendations    Therapy Frequency (SLP SLC) 5 days per week  -CH --       Predicted Duration Therapy Intervention (Days) until discharge  -CH --       Anticipated Discharge Disposition (SLP) skilled nursing [facility]  - --                 User Key  (r) = Recorded By, (t) = Taken By, (c) = Cosigned By      Initials Name Effective Dates    AW Aleshia Reid MS CCC-SLP 02/03/23 -      Vi Diop MS CCC-SLP 06/16/21 -                        EDUCATION  The patient has been educated in the following areas:     Cognitive Impairment Communication Impairment.           SLP GOALS       Row Name 09/25/23 1525             (LTG) Patient will demonstrate functional swallow for    Diet Texture (Demonstrate functional swallow) soft to chew (chopped) textures  -CH      Liquid viscosity (Demonstrate functional swallow) nectar/ mildly thick liquids  -CH      [place] (Demonstrate functional swallow) with minimal cues (75-90% accuracy)  -CH      Time Frame (Demonstrate functional swallow) by discharge  -CH      Progress/Outcomes (Demonstrate functional swallow) continuing progress toward goal  -      Comment (Demonstrate functional  swallow) cough with thin and nectar thick by teaspoon  -CH         (STG) Patient will tolerate therapeutic trials of    Consistencies Trialed (Tolerate therapeutic trials) pureed textures;thin liquids  -CH      Desired Outcome (Tolerate therapeutic trials) without signs/symptoms of aspiration;without signs of distress  -CH      [place] (Tolerate therapeutic trials) with minimal cues (75-90% accuracy)  -CH      Time Frame (Tolerate therapeutic trials) by discharge  -CH      Progress/Outcomes (Tolerate therapeutic trials) continuing progress toward goal  -CH      Comment (Tolerate therapeutic trials) No s/s with pureed trials, cough with thin by teaspoon  -CH         (STG) Labial Strengthening Goal 1 (SLP)    Activity (Labial Strengthening Goal 1, SLP) increase labial tone  -CH      Increase Labial Tone labial resistance exercises;swallow trials  -CH      [place]/Accuracy (Labial Strengthening Goal 1, SLP) with moderate cues (50-74% accuracy)  -CH      Time Frame (Labial Strengthening Goal 1, SLP) short term goal (STG)  -CH      Progress/Outcomes (Labial Strengthening Goal 1, SLP) progress slower than expected  -CH      Comment (Labial Strengthening Goal 1, SLP) swallow trials w anterior loss with HT liquids. Attemtoed labial resistence exercise, pt u/a to complete.  -CH         (STG) Lingual Strengthening Goal 1 (SLP)    Activity (Lingual Strengthening Goal 1, SLP) increase lingual tone/sensation/control/coordination/movement;increase tongue back strength  -CH      Increase Lingual Tone/Sensation/Control/Coordination/Movement swallow trials;lingual resistance exercises  -      Increase Tongue Back Strength lingual resistance exercises  -      [place]/Accuracy (Lingual Strengthening Goal 1, SLP) with moderate cues (50-74% accuracy)  -CH      Time Frame (Lingual Strengthening Goal 1, SLP) short term goal (STG)  -CH      Progress/Outcomes (Lingual Strengthening Goal 1, SLP) progress slower than  expected  -CH      Comment (Lingual Strengthening Goal 1, SLP) completed lingual resistence with mod cues and models x 5 each  -CH         (STG) Pharyngeal Strengthening Exercise Goal 1 (SLP)    Activity (Pharyngeal Strengthening Goal 1, SLP) increase timing;increase superior movement of the hyolaryngeal complex;increase anterior movement of the hyolaryngeal complex;increase closure at entrance to airway/closure of airway at glottis;increase squeeze/positive pressure generation  -CH      Increase Timing prepping - 3 second prep or suck swallow or 3-step swallow  -CH      Increase Superior Movement of the Hyolaryngeal Complex falsetto  -CH      Increase Anterior Movement of the Hyolaryngeal Complex chin tuck against resistance (CTAR)  -CH      Increase Closure at Entrance to Airway/Closure of Airway at Glottis breath hold exercises;supraglottic swallow  -CH      Increase Squeeze/Positive Pressure Generation hard effortful swallow  -CH      [place]/Accuracy (Pharyngeal Strengthening Goal 1, SLP) with moderate cues (50-74% accuracy)  -CH      Time Frame (Pharyngeal Strengthening Goal 1, SLP) short term goal (STG)  -CH      Progress/Outcomes (Pharyngeal Strengthening Goal 1, SLP) progress slower than expected  -CH      Comment (Pharyngeal Strengthening Goal 1, SLP) completed CTAR and 123 prep exercise with mod cues. U/a to complete breath hold, SS, or hard swallow  -CH         Patient will demonstrate functional communication skills for return to discharge environment     [place] with moderate cues  -CH      Time frame by discharge  -CH      Progress/Outcomes continuing progress toward goal  -CH         Comprehend Questions Goal 1 (SLP)    Improve Ability to Comprehend Questions Goal 1 (SLP) complex yes/no questions;80%;with minimal cues (75-90%)  -CH      Time Frame (Comprehend Questions Goal 1, SLP) short term goal (STG)  -CH      Progress (Ability to Comprehend Questions Goal 1, SLP) 20%;with moderate cues  (50-74%)  -      Progress/Outcomes (Comprehend Questions Goal 1, SLP) continuing progress toward goal  -CH         Follow Directions Goal 2 (SLP)    Improve Ability to Follow Directions Goal 1 (SLP) 2 step commands;80%;with minimal cues (75-90%)  -      Time Frame (Follow Directions Goal 1, SLP) short term goal (STG)  -CH      Progress (Ability to Follow Directions Goal 1, SLP) 50%;with moderate cues (50-74%)  -CH      Progress/Outcomes (Follow Directions Goal 1, SLP) progress slower than expected  -         Word Retrieval Skills Goal 1 (SLP)    Improve Word Retrieval Skills By Goal 1 (SLP) confrontational naming task;high frequency;responsive naming task;simple;80%;with minimal cues (75-90%)  -      Time Frame (Word Retrieval Goal 1, SLP) short term goal (STG)  -CH      Progress (Word Retrieval Skills Goal 1, SLP) 40%;with moderate cues (50-74%)  -      Progress/Outcomes (Word Retrieval Goal 1, SLP) continuing progress toward goal  -CH      Comment (Word Retrieval Goal 1, SLP) responsive and confrontational naming  -         Articulation Goal 1 (SLP)    Improve Articulation Goal 1 (SLP) by over-articulating at phrase level;80%;with moderate cues (50-74%)  -      Time Frame (Articulation Goal 1, SLP) short term goal (STG)  -CH      Progress (Articulation Goal 1, SLP) 60%;with maximum cues (25-49%)  -      Progress/Outcomes (Articulation Goal 1, SLP) continuing progress toward goal  -CH         Orientation Goal 1 (SLP)    Improve Orientation Through Goal 1 (SLP) demonstrating orientation to day;demonstrating orientation to month;demonstrating orientation to year;demonstrating orientation to place;demonstrating orientation to disease/impairment;use environmental aids to assist with orientation;80%;with moderate cues (50-74%)  -      Time Frame (Orientation Goal 1, SLP) short term goal (STG)  -CH      Progress (Orientation Goal 1, SLP) 20%;with maximum cues (25-49%)  -      Progress/Outcomes  (Orientation Goal 1, SLP) progress slower than expected  -CH      Comment (Orientation Goal 1, SLP) orianted to name and grossly to place (hospital)  -                User Key  (r) = Recorded By, (t) = Taken By, (c) = Cosigned By      Initials Name Provider Type    Vi Mcdermott MS CCC-SLP Speech and Language Pathologist                            Time Calculation:      Time Calculation- SLP       Row Name 23 1601             Time Calculation- SLP    SLP Start Time 1535  -CH      SLP Received On 23  -         Untimed Charges    33674-FL Treatment/ST Modification Prosth Aug Alter  38  -CH      94576-EF Treatment Swallow Minutes 40  -CH         Total Minutes    Untimed Charges Total Minutes 78  -CH       Total Minutes 78  -CH                User Key  (r) = Recorded By, (t) = Taken By, (c) = Cosigned By      Initials Name Provider Type    Vi Mcdermott MS CCC-SLP Speech and Language Pathologist                    Therapy Charges for Today       Code Description Service Date Service Provider Modifiers Qty    51443590215 HC ST TREATMENT SWALLOW 3 2023 Vi Diop MS CCC-SLP GN 1    [id number] HC ST TREATMENT SPEECH 3 2023 Vi Diop MS CCC-SLP GN 1                       Vi Diop MS CCC-SLP  2023   and Acute Care - Speech Language Pathology   Swallow Treatment Note    [facility]     Patient Name: Kenneth Wen  : 1950  MRN: [id number]  Today's Date: 2023               Admit Date: 9/15/2023    Visit Dx:     ICD-10-CM ICD-9-CM   1. Aphasia  R47.01 784.3   2. Dysarthria  R47.1 784.51   3. Oropharyngeal dysphagia  R13.12 787.22     Patient Active Problem List   Diagnosis    Precordial pain    Elevated BP without diagnosis of hypertension    Dyslipidemia    Hyperglycemia    Multiple bilateral CVAs    Hemorrhagic CVA    HFpEF    T2DM (type 2 diabetes mellitus)    HTN (hypertension)    GERD (gastroesophageal reflux disease)    ICH  (intracerebral hemorrhage)    Oropharyngeal dysphagia     Past Medical History:   Diagnosis Date    Acid reflux     Cancer     Skin    Diabetes mellitus     Prediabetes    GERD (gastroesophageal reflux disease) 09/15/2023    HTN (hypertension) 09/15/2023    Kidney disease     T2DM (type 2 diabetes mellitus) 09/15/2023     Past Surgical History:   Procedure Laterality Date    APPENDECTOMY      HEMORRHOIDECTOMY      NASAL POLYP SURGERY         SLP Recommendation and Plan  SLP Swallowing Diagnosis: mod-severe, oral dysphagia, pharyngeal dysphagia (09/25/23 1525)  SLP Diet Recommendation: puree, honey thick liquids, other (see comments) (by hermelindo) (09/25/23 1525)  Recommended Precautions and Strategies: 1:1 supervision, upright posture during/after eating (09/25/23 1525)  SLP Rec. for Method of Medication Administration: meds crushed, with puree (09/25/23 1525)     Monitor for Signs of Aspiration: notify SLP if any concerns (09/25/23 1525)     Swallow Criteria for Skilled Therapeutic Interventions Met: demonstrates skilled criteria (09/25/23 1525)  Anticipated Discharge Disposition (SLP): skilled nursing facility (09/25/23 1525)  Rehab Potential/Prognosis, Swallowing: re-evaluate goals as necessary (09/25/23 1525)  Therapy Frequency (Swallow): 5 days per week (09/25/23 1525)  Predicted Duration Therapy Intervention (Days): until discharge (09/25/23 1525)  Oral Care Recommendations: Oral Care BID/PRN (09/25/23 1525)        Daily Summary of Progress (SLP): progress toward functional goals as expected (09/25/23 1525)               Treatment Assessment (SLP): continued, dysarthria, aphasia, oral dysphagia, suspected, pharyngeal dysphagia (09/25/23 1525)     Plan for Continued Treatment (SLP): continue treatment per plan of care (09/25/23 1525)       Oral Care Recommendations: Oral Care BID/PRN (09/25/23 1525)           SWALLOW EVALUATION (last 72 hours)       SLP Adult Swallow Evaluation       Row Name 09/25/23 1525  09/23/23 1100                Rehab Evaluation    Document Type -- therapy note (daily note);re-evaluation  -AW          General Information    Current Method of Nutrition -- honey-thick liquids;mechanical ground textures  -AW       Prior Level of Function-Communication -- unknown  -AW       Prior Level of Function-Swallowing -- unknown  -AW       Patient's Goals for Discharge -- patient could not state  -AW          Oral Motor Structure and Function    Dentition Assessment -- natural, present and adequate  -AW       Secretion Management -- WNL/WFL  -AW       Mucosal Quality -- moist, healthy  -AW       Volitional Swallow -- delayed  -AW       Volitional Cough -- unable to elicit  -AW          Oral Musculature and Cranial Nerve Assessment    Oral Motor General Assessment -- oral labial or buccal impairment  -AW          General Eating/Swallowing Observations    Respiratory Support Currently in Use -- room air  -AW       Eating/Swallowing Skills -- fed by SLP  -AW       Positioning During Eating -- upright in bed  -AW       Utensils Used -- spoon;straw  -AW       Consistencies Trialed -- pureed;honey-thick liquids  -AW          Respiratory    Respiratory Status -- room air  -AW          Clinical Swallow Eval    Oral Prep Phase -- impaired  -AW       Oral Transit -- impaired  -AW       Oral Residue -- impaired  -AW       Pharyngeal Phase -- suspected pharyngeal impairment  -AW       Esophageal Phase -- unremarkable  -AW       Clinical Swallow Evaluation Summary -- Re-evaluation given reports of pocking yesterday. Pt with majority of breakfast tray uneaten. Trialed tsp of puree and honey and pt noted with tonic bite on spoon. Did swallow honey and puree without s/s aspiration. No attempt to clear lips when food was on them. Trialed bite of soft solid but pt could/would not open mouth. Pt needs downgrade to puree and cont honey. Will cont to monitor.  -AW          Oral Prep Concerns    Oral Prep Concerns -- incomplete or  weak lip closure around spoon;anterior loss  -AW       Poor Rotary Chew -- all consistencies  -AW       Incomplete or Weak Lip Closure Around Spoon -- all consistencies  -AW       Anterior Loss -- all consistencies  -AW          Oral Transit Concerns    Oral Transit Concerns -- delayed initiation of bolus transit  -AW       Delayed Intiation of Bolus Transit -- all consistencies  -AW          Oral Residue Concerns    Oral Residue Concerns -- other (see comments)  unable to tell given tonic bite  -AW       Oral Residue Concerns, Comment -- did swallow puree and honey  -AW          Pharyngeal Phase Concerns    Pharyngeal Phase Concerns, Comment -- unable to do further trials, pt not appropriate  -AW          SLP Evaluation Clinical Impression    SLP Swallowing Diagnosis mod-severe;oral dysphagia;pharyngeal dysphagia  -CH mod-severe;oral dysphagia;pharyngeal dysphagia  -AW       Functional Impact risk of aspiration/pneumonia;risk of malnutrition;risk of dehydration  -CH risk of aspiration/pneumonia;risk of malnutrition;risk of dehydration  -AW       Rehab Potential/Prognosis, Swallowing re-evaluate goals as necessary  -CH re-evaluate goals as necessary  -AW       Swallow Criteria for Skilled Therapeutic Interventions Met demonstrates skilled criteria  -CH demonstrates skilled criteria  -AW          Recommendations    Therapy Frequency (Swallow) 5 days per week  -CH 5 days per week  -AW       SLP Diet Recommendation puree;honey thick liquids;other (see comments)  by teaspoon  -CH puree;honey thick liquids  -AW       Recommended Precautions and Strategies 1:1 supervision;upright posture during/after eating  -CH 1:1 supervision;upright posture during/after eating  -AW       Oral Care Recommendations Oral Care BID/PRN  -CH Oral Care BID/PRN  -AW       SLP Rec. for Method of Medication Administration meds crushed;with puree  -CH meds crushed;with puree  -AW       Monitor for Signs of Aspiration notify SLP if any concerns   -CH notify SLP if any concerns  -AW       Anticipated Discharge Disposition (SLP) -- skilled nursing facility  -AW                 User Key  (r) = Recorded By, (t) = Taken By, (c) = Cosigned By      Initials Name Effective Dates    AW Aleshia Reid, MS CCC-SLP 02/03/23 -     CH Vi Diop MS CCC-SLP 06/16/21 -                     EDUCATION  The patient has been educated in the following areas:   Home Exercise Program (HEP) Dysphagia (Swallowing Impairment) Oral Care/Hydration Modified Diet Instruction.        SLP GOALS       Row Name 09/25/23 1525             (LTG) Patient will demonstrate functional swallow for    Diet Texture (Demonstrate functional swallow) soft to chew (chopped) textures  -CH      Liquid viscosity (Demonstrate functional swallow) nectar/ mildly thick liquids  -CH      [place] (Demonstrate functional swallow) with minimal cues (75-90% accuracy)  -CH      Time Frame (Demonstrate functional swallow) by discharge  -CH      Progress/Outcomes (Demonstrate functional swallow) continuing progress toward goal  -CH      Comment (Demonstrate functional swallow) cough with thin and nectar thick by teaspoon  -CH         (STG) Patient will tolerate therapeutic trials of    Consistencies Trialed (Tolerate therapeutic trials) pureed textures;thin liquids  -CH      Desired Outcome (Tolerate therapeutic trials) without signs/symptoms of aspiration;without signs of distress  -CH      [place] (Tolerate therapeutic trials) with minimal cues (75-90% accuracy)  -CH      Time Frame (Tolerate therapeutic trials) by discharge  -CH      Progress/Outcomes (Tolerate therapeutic trials) continuing progress toward goal  -CH      Comment (Tolerate therapeutic trials) No s/s with pureed trials, cough with thin by teaspoon  -CH         (STG) Labial Strengthening Goal 1 (SLP)    Activity (Labial Strengthening Goal 1, SLP) increase labial tone  -CH      Increase Labial Tone labial resistance exercises;swallow  trials  -CH      [place]/Accuracy (Labial Strengthening Goal 1, SLP) with moderate cues (50-74% accuracy)  -CH      Time Frame (Labial Strengthening Goal 1, SLP) short term goal (STG)  -CH      Progress/Outcomes (Labial Strengthening Goal 1, SLP) progress slower than expected  -CH      Comment (Labial Strengthening Goal 1, SLP) swallow trials w anterior loss with HT liquids. Attemtoed labial resistence exercise, pt u/a to complete.  -CH         (STG) Lingual Strengthening Goal 1 (SLP)    Activity (Lingual Strengthening Goal 1, SLP) increase lingual tone/sensation/control/coordination/movement;increase tongue back strength  -CH      Increase Lingual Tone/Sensation/Control/Coordination/Movement swallow trials;lingual resistance exercises  -CH      Increase Tongue Back Strength lingual resistance exercises  -CH      [place]/Accuracy (Lingual Strengthening Goal 1, SLP) with moderate cues (50-74% accuracy)  -CH      Time Frame (Lingual Strengthening Goal 1, SLP) short term goal (STG)  -CH      Progress/Outcomes (Lingual Strengthening Goal 1, SLP) progress slower than expected  -CH      Comment (Lingual Strengthening Goal 1, SLP) completed lingual resistence with mod cues and models x 5 each  -CH         (STG) Pharyngeal Strengthening Exercise Goal 1 (SLP)    Activity (Pharyngeal Strengthening Goal 1, SLP) increase timing;increase superior movement of the hyolaryngeal complex;increase anterior movement of the hyolaryngeal complex;increase closure at entrance to airway/closure of airway at glottis;increase squeeze/positive pressure generation  -CH      Increase Timing prepping - 3 second prep or suck swallow or 3-step swallow  -CH      Increase Superior Movement of the Hyolaryngeal Complex falsetto  -CH      Increase Anterior Movement of the Hyolaryngeal Complex chin tuck against resistance (CTAR)  -CH      Increase Closure at Entrance to Airway/Closure of Airway at Glottis breath hold exercises;supraglottic  swallow  -CH      Increase Squeeze/Positive Pressure Generation hard effortful swallow  -CH      [place]/Accuracy (Pharyngeal Strengthening Goal 1, SLP) with moderate cues (50-74% accuracy)  -CH      Time Frame (Pharyngeal Strengthening Goal 1, SLP) short term goal (STG)  -CH      Progress/Outcomes (Pharyngeal Strengthening Goal 1, SLP) progress slower than expected  -CH      Comment (Pharyngeal Strengthening Goal 1, SLP) completed CTAR and 123 prep exercise with mod cues. U/a to complete breath hold, SS, or hard swallow  -CH         Patient will demonstrate functional communication skills for return to discharge environment     [place] with moderate cues  -CH      Time frame by discharge  -CH      Progress/Outcomes continuing progress toward goal  -CH         Comprehend Questions Goal 1 (SLP)    Improve Ability to Comprehend Questions Goal 1 (SLP) complex yes/no questions;80%;with minimal cues (75-90%)  -CH      Time Frame (Comprehend Questions Goal 1, SLP) short term goal (STG)  -CH      Progress (Ability to Comprehend Questions Goal 1, SLP) 20%;with moderate cues (50-74%)  -CH      Progress/Outcomes (Comprehend Questions Goal 1, SLP) continuing progress toward goal  -CH         Follow Directions Goal 2 (SLP)    Improve Ability to Follow Directions Goal 1 (SLP) 2 step commands;80%;with minimal cues (75-90%)  -CH      Time Frame (Follow Directions Goal 1, SLP) short term goal (STG)  -CH      Progress (Ability to Follow Directions Goal 1, SLP) 50%;with moderate cues (50-74%)  -CH      Progress/Outcomes (Follow Directions Goal 1, SLP) progress slower than expected  -CH         Word Retrieval Skills Goal 1 (SLP)    Improve Word Retrieval Skills By Goal 1 (SLP) confrontational naming task;high frequency;responsive naming task;simple;80%;with minimal cues (75-90%)  -CH      Time Frame (Word Retrieval Goal 1, SLP) short term goal (STG)  -CH      Progress (Word Retrieval Skills Goal 1, SLP) 40%;with moderate  cues (50-74%)  -CH      Progress/Outcomes (Word Retrieval Goal 1, SLP) continuing progress toward goal  -CH      Comment (Word Retrieval Goal 1, SLP) responsive and confrontational naming  -CH         Articulation Goal 1 (SLP)    Improve Articulation Goal 1 (SLP) by over-articulating at phrase level;80%;with moderate cues (50-74%)  -CH      Time Frame (Articulation Goal 1, SLP) short term goal (STG)  -CH      Progress (Articulation Goal 1, SLP) 60%;with maximum cues (25-49%)  -CH      Progress/Outcomes (Articulation Goal 1, SLP) continuing progress toward goal  -CH         Orientation Goal 1 (SLP)    Improve Orientation Through Goal 1 (SLP) demonstrating orientation to day;demonstrating orientation to month;demonstrating orientation to year;demonstrating orientation to place;demonstrating orientation to disease/impairment;use environmental aids to assist with orientation;80%;with moderate cues (50-74%)  -CH      Time Frame (Orientation Goal 1, SLP) short term goal (STG)  -CH      Progress (Orientation Goal 1, SLP) 20%;with maximum cues (25-49%)  -CH      Progress/Outcomes (Orientation Goal 1, SLP) progress slower than expected  -CH      Comment (Orientation Goal 1, SLP) orianted to name and grossly to place (hospital)  -CH                User Key  (r) = Recorded By, (t) = Taken By, (c) = Cosigned By      Initials Name Provider Type    Vi Mcdermott MS CCC-SLP Speech and Language Pathologist                       Time Calculation:    Time Calculation- SLP       Row Name 09/25/23 1601             Time Calculation- SLP    SLP Start Time 1535  -CH      SLP Received On 09/25/23  -         Untimed Charges    94002-OY Treatment/ST Modification Prosth Aug Alter  38  -CH      41849-GQ Treatment Swallow Minutes 40  -CH         Total Minutes    Untimed Charges Total Minutes 78  -CH       Total Minutes 78  -CH                User Key  (r) = Recorded By, (t) = Taken By, (c) = Cosigned By      Initials Name Provider  Type    CH Vi Diop, MS CCC-SLP Speech and Language Pathologist                    Therapy Charges for Today       Code Description Service Date Service Provider Modifiers Qty    14309827939 HC ST TREATMENT SWALLOW 3 9/25/2023 Vi Diop, MS CCC-SLP GN 1    [id number]  ST TREATMENT SPEECH 3 9/25/2023 Vi Diop, MS BENITES-SLP GN 1                 Vi Diop MS CCC-NIELS  9/25/2023

## 2023-09-25 NOTE — PROGRESS NOTES
[facility] Medicine Services  PROGRESS NOTE    Patient Name: Kenneth Wen  : 1950  MRN: [id number]    Date of Admission: 9/15/2023  Primary Care Physician: Susan Powers APRN    Subjective   Subjective     CC:  Multiple CVA's    HPI:  Eating well per staff.  Working with PT, up in chair today, extending right leg.    ROS:  Difficult to obtain due to some ongoing confusion      Objective   Objective     Vital Signs:   Temp:  [97.8 °F (36.6 °C)-98.9 °F (37.2 °C)] 98.9 °F (37.2 °C)  Heart Rate:  [63-80] 80  Resp:  [18] 18  BP: (136-151)/(74-89) 145/84     Physical Exam:    LINES: PICC RUE    Constitutional - no acute distress, up in chair  HEENT-NCAT, mucous membranes moist  CV-RRR  Resp-CTAB  Abd-soft, nontender  Ext-No lower extremity cyanosis, clubbing or edema bilaterally  Neuro-awake, confused, speech understandable, cooperative with therapists, right hemiparesis, able to extend right leg   Psych-slightly flat affect   Skin- No rash on exposed UE or LE bilaterally          Results Reviewed:  LAB RESULTS:      Lab 23  0853 23  0725 23  0647 23  0625 23  0110 23  0600 23  0614   WBC 11.80* 11.76* 13.43* 14.59* 15.40*   < >  --    HEMOGLOBIN 15.1 13.5 14.5 12.8* 13.2   < >  --    HEMATOCRIT 45.3 41.1 44.8 39.2 41.4   < >  --    PLATELETS 428 365 313 336 318   < >  --    NEUTROS ABS 8.71* 9.02* 9.16* 10.92* 11.65*   < >  --    IMMATURE GRANS (ABS) 0.07* 0.08* 0.09* 0.09* 0.06*   < >  --    LYMPHS ABS 1.85 1.43 2.27 1.79 1.94   < >  --    MONOS ABS 0.85 0.86 1.24* 1.12* 1.27*   < >  --    EOS ABS 0.22 0.28 0.55* 0.58* 0.39   < >  --    MCV 93.6 94.3 96.6 96.6 96.5   < >  --    PROCALCITONIN  --  0.05  --   --   --   --  0.06    < > = values in this interval not displayed.         Lab 23  0853 23  0725 23  0647 23  0625 23  2353 23  0543 23  0110 23  1851 23  1208   SODIUM 149* 145 144  152* 156*   < > 157*   < > 152*   POTASSIUM 4.7 4.3 4.6 3.9  --   --  4.2   < > 4.3   CHLORIDE 111* 112* 112* 116*  --   --  121*   < >  --    CO2 26.0 26.0 20.0* 27.0  --   --  27.0   < >  --    ANION GAP 12.0 7.0 12.0 9.0  --   --  9.0   < >  --    BUN 32* 35* 27* 30*  --   --  41*   < >  --    CREATININE 0.82 0.86 0.70* 0.77  --   --  0.81   < >  --    EGFR 93.9 92.6 98.5 95.7  --   --  94.3   < >  --    GLUCOSE 149* 153* 123* 159*  --   --  163*   < >  --    CALCIUM 9.7 9.1 9.1 9.1  --   --  9.3   < >  --    IONIZED CALCIUM  --   --  1.33*  --   --   --   --   --   --    MAGNESIUM 2.6*  --  2.3  --   --   --   --   --   --    PHOSPHORUS 3.2  --  3.6  --   --   --   --   --  2.9    < > = values in this interval not displayed.         Lab 09/25/23  0853   TOTAL PROTEIN 7.1   ALBUMIN 4.2   GLOBULIN 2.9   ALT (SGPT) 59*   AST (SGOT) 46*   BILIRUBIN 0.8   ALK PHOS 91             Lab 09/25/23  0853   CHOLESTEROL 109   TRIGLYCERIDES 65             Brief Urine Lab Results  (Last result in the past 365 days)        Color   Clarity   Blood   Leuk Est   Nitrite   Protein   CREAT   Urine HCG        09/15/23 1818 Yellow   Cloudy   Trace   Negative   Negative   Negative                   Microbiology Results Abnormal       Procedure Component Value - Date/Time    Blood Culture - Blood, Arm, Right [[id number]]  (Normal) Collected: 09/15/23 0212    Lab Status: Final result Specimen: Blood from Arm, Right Updated: 09/20/23 0230     Blood Culture No growth at 5 days    Blood Culture - Blood, Arm, Left [[id number]]  (Normal) Collected: 09/15/23 0212    Lab Status: Final result Specimen: Blood from Arm, Left Updated: 09/20/23 0230     Blood Culture No growth at 5 days            XR Chest 1 View    Result Date: 9/24/2023  XR CHEST 1 VW Date of Exam: 9/24/2023 3:34 AM EDT Indication: leukocytosis Comparison: 9/19/2023 Findings: There are no airspace consolidations. No pleural fluid. No pneumothorax. The pulmonary vasculature appears  within normal limits. The cardiac and mediastinal silhouette appear unremarkable. No acute osseous abnormality identified. No change in position of  a right-sided PICC line with the tip in the atriocaval junction     Impression: Impression: No acute pulmonary process Electronically Signed: Faisal Peters MD  9/24/2023 8:35 AM EDT  Workstation ID: [id number]         Current medications:  Scheduled Meds:atorvastatin, 80 mg, Nasogastric, Nightly  clopidogrel, 75 mg, Nasogastric, Daily  escitalopram, 10 mg, Nasogastric, Daily  insulin lispro, 2-7 Units, Subcutaneous, 4x Daily AC & at Bedtime  lansoprazole, 15 mg, Oral, Q AM  metoprolol tartrate, 25 mg, Nasogastric, Q12H  QUEtiapine, 25 mg, Nasogastric, QAM  QUEtiapine, 50 mg, Nasogastric, Nightly  sodium chloride, 10 mL, Intravenous, Q12H      Continuous Infusions:   PRN Meds:.  acetaminophen **OR** acetaminophen    Calcium Replacement - Follow Nurse / BPA Driven Protocol    dextrose    dextrose    dextrose    glucagon (human recombinant)    ibuprofen    Magnesium Standard Dose Replacement - Follow Nurse / BPA Driven Protocol    ondansetron    Phosphorus Replacement - Follow Nurse / BPA Driven Protocol    Potassium Replacement - Follow Nurse / BPA Driven Protocol    sodium chloride    Assessment & Plan   Assessment & Plan     Active Hospital Problems    Diagnosis  POA    **Hemorrhagic CVA [I61.9]  Yes    Oropharyngeal dysphagia [R13.12]  Yes    Multiple bilateral CVAs [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        Brief Hospital Course to date:  Kenneth Wen is a 72 y.o. male with h/o HTN, HL, T2DM, GERD who presented to OSH with multiple acute ischemic infarcts of the bilateral cerebral and  cerebellar hemispheres as well as left isaac.  TTE/JOSIAS was negative PFO at OSH.  On 9/13/23 he had worsening in mental status and new inability to move RLE, head CT showed evolution of the existing CVA with new development of petechial hemorrhage.  DAPT was held for 24 hours per neuro recs and repeat CT head that evening showed worsening effacement of the right quadrigeminal cistern with upward supratentorial. He was then transferred to [facility] for NS eval on 9/15/23.  He was started on hypertonic saline 9/15/23  through 9/20/23 for cerebral edema.  He had fevers with negative cultures but had worsening secretions and labored breathing so was started on zosyn on 9/16/23.  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  Asked staff to encourage PO fluids.  We can thicken any liquid he likes.  Check BMP am    HTN  --goal -160.  Continue metoprolol 25mg BID    GERD  --continue protonix    COVID +  --was on zosyn but no infiltrate and procal low so pulm stopped  --on RA.  No indication for antivirals    Agitation  --seroquel  --better, out of restraints today    Leukocytosis  - remains afebrile  - WBC improved to 11 and stable  - procalcitionin low at 0.05    Elevated LFTs  - possibly secondary to statin  - check hepatitis panel  - CMP am    Expected Discharge Location and Transportation:   Expected Discharge   Expected Discharge Date: 9/27/2023; Expected Discharge Time:      DVT prophylaxis:  Mechanical  DVT prophylaxis orders are present.     AM-PAC 6 Clicks Score (PT): 6 (09/22/23 2000)    CODE STATUS:   Code Status and Medical Interventions:   Ordered at: 09/15/23 0133     Code Status (Patient has no pulse and is not breathing):    CPR (Attempt to Resuscitate)     Medical Interventions (Patient has pulse or is breathing):    Full Support       Kee Lovelace MD  09/25/23

## 2023-09-25 NOTE — PLAN OF CARE
Goal Outcome Evaluation:  Plan of Care Reviewed With: patient        Progress: improving  Outcome Evaluation: Pt improved with static sitting balance with positioning and cues this date. Pt presents with increased R UE tone, decreased R UE strength, decreased vision and decreased balance limiting independence with ADL's and mobility. Pt would benefit from continued skilled OT services and transfer to IRF at d/c to assist in returning pt to his PLOF.      Anticipated Discharge Disposition (OT): inpatient rehabilitation facility

## 2023-09-25 NOTE — PLAN OF CARE
Goal Outcome Evaluation:    SLP treatment completed. Will continue to address cognitive communication and dysphagia. Please see note for further details and recommendations.

## 2023-09-25 NOTE — THERAPY PROGRESS REPORT/RE-CERT
Patient Name: Kenneth Wen  : 1950    MRN: [id number]                              Today's Date: 2023       Admit Date: 9/15/2023    Visit Dx:     ICD-10-CM ICD-9-CM   1. Aphasia  R47.01 784.3   2. Dysarthria  R47.1 784.51   3. Oropharyngeal dysphagia  R13.12 787.22     Patient Active Problem List   Diagnosis    Precordial pain    Elevated BP without diagnosis of hypertension    Dyslipidemia    Hyperglycemia    Multiple bilateral CVAs    Hemorrhagic CVA    HFpEF    T2DM (type 2 diabetes mellitus)    HTN (hypertension)    GERD (gastroesophageal reflux disease)    ICH (intracerebral hemorrhage)    Oropharyngeal dysphagia     Past Medical History:   Diagnosis Date    Acid reflux     Cancer     Skin    Diabetes mellitus     Prediabetes    GERD (gastroesophageal reflux disease) 09/15/2023    HTN (hypertension) 09/15/2023    Kidney disease     T2DM (type 2 diabetes mellitus) 09/15/2023     Past Surgical History:   Procedure Laterality Date    APPENDECTOMY      HEMORRHOIDECTOMY      NASAL POLYP SURGERY        General Information       Row Name 23 1120          OT Time and Intention    Document Type progress note/recertification  -JR     Mode of Treatment occupational therapy  -       Row Name 23 1120          General Information    Patient Profile Reviewed yes  -JR     Existing Precautions/Restrictions fall  R sided weakness/tone, no response to visual threat bilaterally  -JR     Barriers to Rehab medically complex;cognitive status;visual deficit  -       Row Name 23 1120          Cognition    Orientation Status (Cognition) oriented to;person  Pt able to state he was in a hospital, reported he was at [facility]. Not oriented to time this date  -       Row Name 23 1120          Safety Issues, Functional Mobility    Safety Issues Affecting Function (Mobility) ability to follow commands;awareness of need for assistance;insight into deficits/self-awareness;problem-solving;safety  precaution awareness;safety precautions follow-through/compliance;sequencing abilities  -     Impairments Affecting Function (Mobility) balance;cognition;coordination;endurance/activity tolerance;grasp;motor control;motor planning;muscle tone abnormal;visual/perceptual;strength;sensation/sensory awareness;range of motion (ROM);postural/trunk control  -     Cognitive Impairments, Mobility Safety/Performance attention;awareness, need for assistance;insight into deficits/self-awareness;judgment;problem-solving/reasoning;safety precaution awareness;safety precaution follow-through  -               User Key  (r) = Recorded By, (t) = Taken By, (c) = Cosigned By      Initials Name Provider Type     Jess Carrillo, OT Occupational Therapist                     Mobility/ADL's       Row Name 09/25/23 1122          Bed Mobility    Bed Mobility supine-sit  -     Sit-Supine [place] (Bed Mobility) maximum assist (25% patient effort);2 person assist;verbal cues  -     Bed Mobility, Safety Issues cognitive deficits limit understanding;decreased use of arms for pushing/pulling;decreased use of legs for bridging/pushing;impaired trunk control for bed mobility  -     Assistive Device (Bed Mobility) bed rails;draw sheet;head of bed elevated  -     Comment, (Bed Mobility) Pt required verbal cues and tactile cues for sequencing supine to sit on EOB. Pt required assist to bring LE's to EOB, as well as bring trunk into sitting and scoot to EOB. Pt leaning to the R in sitting. Required cues and positioning for sitting balance. Once EOB, pt incontinent of urine.  -       Row Name 09/25/23 1122          Transfers    Transfers bed-chair transfer  -       Row Name 09/25/23 1122          Bed-Chair Transfer    Bed-Chair [place] (Transfers) maximum assist (25% patient effort);2 person assist;verbal cues  -     Assistive Device (Bed-Chair Transfers) other (see comments)  B UE support  -       Row Name 09/25/23  1122          Activities of Daily Living    BADL Assessment/Intervention upper body dressing;toileting;lower body dressing  -       Row Name 09/25/23 1122          Lower Body Dressing Assessment/Training    [place] Level (Lower Body Dressing) don;socks;dependent (less than 25% patient effort)  -     Position (Lower Body Dressing) supine  -       Row Name 09/25/23 1122          Upper Body Dressing Assessment/Training    [place] Level (Upper Body Dressing) don;doff  -     Position (Upper Body Dressing) edge of bed sitting  -     Comment, (Upper Body Dressing) hospital gown after urinary incontinence  -       Row Name 09/25/23 1122          Toileting Assessment/Training    Position (Toileting) edge of bed sitting  -     Comment, (Toileting) Pt incontinent of urine this date, dependent for hygiene  -               User Key  (r) = Recorded By, (t) = Taken By, (c) = Cosigned By      Initials Name Provider Type    Jess Medley OT Occupational Therapist                   Obj/Interventions       Row Name 09/25/23 1125          Vision Assessment/Intervention    Vision Assessment Comment Pt unable to follow for tracking or ID # of fingers this date. Pt unable to grasp objects sitting on bedside tray this date.  -       Row Name 09/25/23 1125          Shoulder (Therapeutic Exercise)    Shoulder (Therapeutic Exercise) PROM (passive range of motion);AAROM (active assistive range of motion)  -     Shoulder AAROM (Therapeutic Exercise) right;flexion;extension;5 repetitions  -     Shoulder PROM (Therapeutic Exercise) right;flexion;extension;aBduction;aDduction;10 repetitions  -       Row Name 09/25/23 1125          Elbow/Forearm (Therapeutic Exercise)    Elbow/Forearm (Therapeutic Exercise) PROM (passive range of motion);AAROM (active assistive range of motion)  -     Elbow/Forearm AAROM (Therapeutic Exercise) flexion;extension;supination;pronation;5 repetitions;right  -      Elbow/Forearm PROM (Therapeutic Exercise) right;flexion;extension;supination;pronation;10 repetitions  -       Row Name 09/25/23 1125          Wrist (Therapeutic Exercise)    Wrist (Therapeutic Exercise) PROM (passive range of motion);AAROM (active assistive range of motion)  -     Wrist AAROM (Therapeutic Exercise) right;flexion;extension;5 repetitions  -     Wrist PROM (Therapeutic Exercise) right;flexion;extension;10 repetitions  -       Row Name 09/25/23 1125          Hand (Therapeutic Exercise)    Hand (Therapeutic Exercise) AROM (active range of motion);PROM (passive range of motion)  -     Hand AROM/AAROM (Therapeutic Exercise) right;AAROM (active assistive range of motion);finger flexion;5 repetitions  -     Hand PROM (Therapeutic Exercise) right;finger flexion;finger extension;10 repetitions  -       Row Name 09/25/23 1125          Motor Skills    Therapeutic Exercise shoulder;elbow/forearm;wrist;hand  -       Row Name 09/25/23 1125          Balance    Balance Assessment sitting static balance  -     Static Sitting Balance moderate assist  Pt leaning to the R in sitting. Required cues and positioning. Once positioned, pt able to maintain static sitting with CGA.  -JR     Dynamic Standing Balance maximum assist;2-person assist;verbal cues  -JR     Position/Device Used, Standing Balance supported  -               User Key  (r) = Recorded By, (t) = Taken By, (c) = Cosigned By      Initials Name Provider Type    Jess Medley, OT Occupational Therapist                   Goals/Plan       Row Name 09/25/23 1136          Bed Mobility Goal 1 (OT)    Activity/Assistive Device (Bed Mobility Goal 1, OT) sit to supine;supine to sit  -     [place] Level/Cues Needed (Bed Mobility Goal 1, OT) moderate assist (50-74% patient effort);verbal cues required  -     Time Frame (Bed Mobility Goal 1, OT) long term goal (LTG);10 days  -JR     Progress/Outcomes (Bed Mobility Goal 1, OT) goal  ongoing;continuing progress toward goal  -JR       Row Name 09/25/23 1134          Transfer Goal 1 (OT)    Activity/Assistive Device (Transfer Goal 1, OT) sit-to-stand/stand-to-sit;bed-to-chair/chair-to-bed;commode, bedside without drop arms  -JR     [place] Level/Cues Needed (Transfer Goal 1, OT) moderate assist (50-74% patient effort);verbal cues required  -JR     Time Frame (Transfer Goal 1, OT) long term goal (LTG);10 days  -JR     Progress/Outcome (Transfer Goal 1, OT) goal ongoing;continuing progress toward goal  -JR       Row Name 09/25/23 1134          Dressing Goal 1 (OT)    Activity/Device (Dressing Goal 1, OT) lower body dressing  don/doff socks w/ AAD  -JR     [place]/Cues Needed (Dressing Goal 1, OT) moderate assist (50-74% patient effort);verbal cues required  -JR     Time Frame (Dressing Goal 1, OT) long term goal (LTG);10 days  -JR     Progress/Outcome (Dressing Goal 1, OT) goal ongoing;continuing progress toward goal  -JR       Row Name 09/25/23 1134          Grooming Goal 1 (OT)    Activity/Device (Grooming Goal 1, OT) hair care;oral care;wash face, hands  unsupported sitting  -JR     [place] (Grooming Goal 1, OT) moderate assist (50-74% patient effort);verbal cues required  -JR     Time Frame (Grooming Goal 1, OT) long term goal (LTG);10 days  -JR     Progress/Outcome (Grooming Goal 1, OT) goal ongoing;continuing progress toward goal  -JR       Row Name 09/25/23 1134          Therapy Assessment/Plan (OT)    Planned Therapy Interventions (OT) activity tolerance training;adaptive equipment training;BADL retraining;functional balance retraining;occupation/activity based interventions;ROM/therapeutic exercise;strengthening exercise;transfer/mobility retraining;patient/caregiver education/training;neuromuscular control/coordination retraining;cognitive/visual perception retraining  -JR               User Key  (r) = Recorded By, (t) = Taken By, (c) = Cosigned By      Initials Name  Provider Type     Jess Carrillo, OT Occupational Therapist                   Clinical Impression       Row Name 09/25/23 1129          Pain Assessment    Additional Documentation Pain Scale: FACES Pre/Post-Treatment (Group)  -       Row Name 09/25/23 1129          Pain Scale: FACES Pre/Post-Treatment    Pain: FACES Scale, Pretreatment 0-->no hurt  -     Posttreatment Pain Rating 0-->no hurt  -JR       Row Name 09/25/23 1129          Plan of Care Review    Plan of Care Reviewed With patient  -     Progress improving  -     Outcome Evaluation Pt improved with static sitting balance with positioning and cues this date. Pt presents with increased R UE tone, decreased R UE strength, decreased vision and decreased balance limiting independence with ADL's and mobility. Pt would benefit from continued skilled OT services and transfer to IRF at d/c to assist in returning pt to his PLOF.  -       Row Name 09/25/23 1129          Therapy Assessment/Plan (OT)    Patient/Family Therapy Goal Statement (OT) pt unable to state goal this date.  -     Rehab Potential (OT) good, to achieve stated therapy goals  -     Criteria for Skilled Therapeutic Interventions Met (OT) yes;meets criteria;skilled treatment is necessary  -     Therapy Frequency (OT) daily  -       Row Name 09/25/23 1129          Therapy Plan Review/Discharge Plan (OT)    Anticipated Discharge Disposition (OT) inpatient rehabilitation facility  -       Row Name 09/25/23 1129          Vital Signs    Pre Systolic BP Rehab 145  -JR     Pre Treatment Diastolic BP 84  -JR     Post Systolic BP Rehab 132  -JR     Post Treatment Diastolic BP 81  -JR     Pretreatment Heart Rate (beats/min) 69  -JR     Posttreatment Heart Rate (beats/min) 66  -JR     Pre SpO2 (%) 98  -JR     O2 Delivery Pre Treatment room air  -JR     Post SpO2 (%) 98  -JR     O2 Delivery Post Treatment room air  -JR     Pre Patient Position Supine  -JR     Intra Patient Position  Standing  -     Post Patient Position Sitting  -JR       Row Name 09/25/23 1129          Positioning and Restraints    Pre-Treatment Position in bed  -JR     Post Treatment Position chair  -JR     In Chair notified nsg;reclined;call light within reach;encouraged to call for assist;exit alarm on;waffle cushion;on mechanical lift sling;RUE elevated  -               User Key  (r) = Recorded By, (t) = Taken By, (c) = Cosigned By      Initials Name Provider Type     Jess Carrillo OT Occupational Therapist                   Outcome Measures       Row Name 09/25/23 1135          How much help from another is currently needed...    Putting on and taking off regular lower body clothing? 1  -JR     Bathing (including washing, rinsing, and drying) 1  -JR     Toileting (which includes using toilet bed pan or urinal) 1  -JR     Putting on and taking off regular upper body clothing 1  -JR     Taking care of personal grooming (such as brushing teeth) 2  -JR     Eating meals 2  -JR     AM-PAC 6 Clicks Score (OT) 8  -       Row Name 09/25/23 1135          Modified [place] Scale    Modified [place] Scale 4 - Moderately severe disability.  Unable to walk without assistance, and unable to attend to own bodily needs without assistance.  -       Row Name 09/25/23 1135          Functional Assessment    Outcome Measure Options AM-PAC 6 Clicks Daily Activity (OT);Modified [place]  -               User Key  (r) = Recorded By, (t) = Taken By, (c) = Cosigned By      Initials Name Provider Type    Jess Medley OT Occupational Therapist                    Occupational Therapy Education       Title: PT OT SLP Therapies (In Progress)       Topic: Occupational Therapy (In Progress)       Point: ADL training (In Progress)       Description:   Instruct learner(s) on proper safety adaptation and remediation techniques during self care or transfers.   Instruct in proper use of assistive devices.                  Learning Progress  Summary             Patient Acceptance, E, NR by  at 9/25/2023 1045    Acceptance, E, NR by  at 9/20/2023 1420    Acceptance, E, NR by  at 9/18/2023 1532    Acceptance, E, NR by  at 9/15/2023 1531                         Point: Home exercise program (In Progress)       Description:   Instruct learner(s) on appropriate technique for monitoring, assisting and/or progressing therapeutic exercises/activities.                  Learning Progress Summary             Patient Acceptance, E, NR by  at 9/25/2023 1045    Acceptance, E, NR by  at 9/20/2023 1420    Acceptance, E, NR by  at 9/18/2023 1532    Acceptance, E, NR by  at 9/15/2023 1531                         Point: Precautions (In Progress)       Description:   Instruct learner(s) on prescribed precautions during self-care and functional transfers.                  Learning Progress Summary             Patient Acceptance, E, NR by  at 9/20/2023 1420    Acceptance, E, NR by  at 9/18/2023 1532    Acceptance, E, NR by  at 9/15/2023 1531                         Point: Body mechanics (In Progress)       Description:   Instruct learner(s) on proper positioning and spine alignment during self-care, functional mobility activities and/or exercises.                  Learning Progress Summary             Patient Acceptance, E, NR by  at 9/20/2023 1420    Acceptance, E, NR by  at 9/18/2023 1532    Acceptance, E, NR by  at 9/15/2023 1531                                         User Key       Initials Effective Dates Name Provider Type Discipline     02/03/23 -  Jess Carrillo OT Occupational Therapist OT     09/02/21 -  Carmen Carr OT Occupational Therapist OT     10/14/22 -  Jenny Rivera OT Occupational Therapist OT                  OT Recommendation and Plan  Planned Therapy Interventions (OT): activity tolerance training, adaptive equipment training, BADL retraining, functional balance retraining, occupation/activity based  interventions, ROM/therapeutic exercise, strengthening exercise, transfer/mobility retraining, patient/caregiver education/training, neuromuscular control/coordination retraining, cognitive/visual perception retraining  Therapy Frequency (OT): daily  Plan of Care Review  Plan of Care Reviewed With: patient  Progress: improving  Outcome Evaluation: Pt improved with static sitting balance with positioning and cues this date. Pt presents with increased R UE tone, decreased R UE strength, decreased vision and decreased balance limiting independence with ADL's and mobility. Pt would benefit from continued skilled OT services and transfer to IRF at d/c to assist in returning pt to his PLOF.     Time Calculation:         Time Calculation- OT       Row Name 09/25/23 1135             Time Calculation- OT    OT Start Time 1045  -JR      OT Received On 09/25/23  -JR      OT Goal Re-Cert Due Date 10/05/23  -JR         Timed Charges    08552 - OT Therapeutic Activity Minutes 15  -JR         Total Minutes    Timed Charges Total Minutes 15  -JR       Total Minutes 15  -JR                User Key  (r) = Recorded By, (t) = Taken By, (c) = Cosigned By      Initials Name Provider Type    Jess Medley OT Occupational Therapist                  Therapy Charges for Today       Code Description Service Date Service Provider Modifiers Qty    [id number]  OT THERAPEUTIC ACT EA 15 MIN 9/25/2023 Jess Carrillo OT GO 1                 Jess Carrillo OT  9/25/2023

## 2023-09-25 NOTE — CASE MANAGEMENT/SOCIAL WORK
Continued Stay Note  [facility]     Patient Name: Kenneth Wen  MRN: [id number]  Today's Date: 9/25/2023    Admit Date: 9/15/2023    Plan: [facility]   Discharge Plan       Row Name 09/25/23 1503       Plan    Plan [facility]    Patient/Family in Agreement with Plan yes    Plan Comments Per MDR, Mr. Wen is medically ready for discharge. Spoke to April with [facility] and gave referral for rehab.  will continue to follow for discharge needs.    Final Discharge Disposition Code 62 - inpatient rehab facility                   Discharge Codes    No documentation.                 Expected Discharge Date and Time       Expected Discharge Date Expected Discharge Time    Sep 27, 2023               Madelyn Beasley RN

## 2023-09-26 LAB
ANION GAP SERPL CALCULATED.3IONS-SCNC: 11 MMOL/L (ref 5–15)
BUN SERPL-MCNC: 34 MG/DL (ref 8–23)
BUN/CREAT SERPL: 42 (ref 7–25)
CALCIUM SPEC-SCNC: 9.4 MG/DL (ref 8.6–10.5)
CHLORIDE SERPL-SCNC: 111 MMOL/L (ref 98–107)
CO2 SERPL-SCNC: 26 MMOL/L (ref 22–29)
CREAT SERPL-MCNC: 0.81 MG/DL (ref 0.76–1.27)
DEPRECATED RDW RBC AUTO: 42.6 FL (ref 37–54)
EGFRCR SERPLBLD CKD-EPI 2021: 94.3 ML/MIN/1.73
ERYTHROCYTE [DISTWIDTH] IN BLOOD BY AUTOMATED COUNT: 12.7 % (ref 12.3–15.4)
GLUCOSE BLDC GLUCOMTR-MCNC: 105 MG/DL (ref 70–130)
GLUCOSE BLDC GLUCOMTR-MCNC: 119 MG/DL (ref 70–130)
GLUCOSE BLDC GLUCOMTR-MCNC: 129 MG/DL (ref 70–130)
GLUCOSE BLDC GLUCOMTR-MCNC: 166 MG/DL (ref 70–130)
GLUCOSE SERPL-MCNC: 128 MG/DL (ref 65–99)
HAV IGM SERPL QL IA: NORMAL
HBV CORE IGM SERPL QL IA: NORMAL
HBV SURFACE AG SERPL QL IA: NORMAL
HCT VFR BLD AUTO: 43 % (ref 37.5–51)
HCV AB SER DONR QL: NORMAL
HGB BLD-MCNC: 14.2 G/DL (ref 13–17.7)
MCH RBC QN AUTO: 30.6 PG (ref 26.6–33)
MCHC RBC AUTO-ENTMCNC: 33 G/DL (ref 31.5–35.7)
MCV RBC AUTO: 92.7 FL (ref 79–97)
PLATELET # BLD AUTO: 425 10*3/MM3 (ref 140–450)
PMV BLD AUTO: 11.4 FL (ref 6–12)
POTASSIUM SERPL-SCNC: 3.9 MMOL/L (ref 3.5–5.2)
RBC # BLD AUTO: 4.64 10*6/MM3 (ref 4.14–5.8)
SODIUM SERPL-SCNC: 148 MMOL/L (ref 136–145)
WBC NRBC COR # BLD: 11.75 10*3/MM3 (ref 3.4–10.8)

## 2023-09-26 PROCEDURE — 99232 SBSQ HOSP IP/OBS MODERATE 35: CPT | Performed by: HOSPITALIST

## 2023-09-26 PROCEDURE — 97110 THERAPEUTIC EXERCISES: CPT

## 2023-09-26 PROCEDURE — 85027 COMPLETE CBC AUTOMATED: CPT | Performed by: INTERNAL MEDICINE

## 2023-09-26 PROCEDURE — 82948 REAGENT STRIP/BLOOD GLUCOSE: CPT

## 2023-09-26 PROCEDURE — 80048 BASIC METABOLIC PNL TOTAL CA: CPT | Performed by: INTERNAL MEDICINE

## 2023-09-26 PROCEDURE — 97530 THERAPEUTIC ACTIVITIES: CPT

## 2023-09-26 PROCEDURE — 63710000001 INSULIN LISPRO (HUMAN) PER 5 UNITS: Performed by: INTERNAL MEDICINE

## 2023-09-26 PROCEDURE — 80074 ACUTE HEPATITIS PANEL: CPT | Performed by: INTERNAL MEDICINE

## 2023-09-26 RX ORDER — ESCITALOPRAM OXALATE 10 MG/1
10 TABLET ORAL DAILY
Status: DISCONTINUED | OUTPATIENT
Start: 2023-09-27 | End: 2023-10-02

## 2023-09-26 RX ORDER — ACETAMINOPHEN 650 MG/1
650 SUPPOSITORY RECTAL EVERY 4 HOURS PRN
Status: DISCONTINUED | OUTPATIENT
Start: 2023-09-26 | End: 2023-10-02

## 2023-09-26 RX ORDER — QUETIAPINE FUMARATE 25 MG/1
25 TABLET, FILM COATED ORAL EVERY MORNING
Status: DISCONTINUED | OUTPATIENT
Start: 2023-09-27 | End: 2023-09-28

## 2023-09-26 RX ORDER — CLOPIDOGREL BISULFATE 75 MG/1
75 TABLET ORAL DAILY
Status: DISCONTINUED | OUTPATIENT
Start: 2023-09-27 | End: 2023-10-02

## 2023-09-26 RX ORDER — QUETIAPINE FUMARATE 25 MG/1
50 TABLET, FILM COATED ORAL NIGHTLY
Status: DISCONTINUED | OUTPATIENT
Start: 2023-09-26 | End: 2023-09-28

## 2023-09-26 RX ORDER — ACETAMINOPHEN 325 MG/1
650 TABLET ORAL EVERY 6 HOURS PRN
Status: DISCONTINUED | OUTPATIENT
Start: 2023-09-26 | End: 2023-10-02

## 2023-09-26 RX ORDER — ATORVASTATIN CALCIUM 40 MG/1
80 TABLET, FILM COATED ORAL NIGHTLY
Status: DISCONTINUED | OUTPATIENT
Start: 2023-09-26 | End: 2023-10-02

## 2023-09-26 RX ADMIN — QUETIAPINE FUMARATE 25 MG: 25 TABLET ORAL at 08:12

## 2023-09-26 RX ADMIN — INSULIN LISPRO 2 UNITS: 100 INJECTION, SOLUTION INTRAVENOUS; SUBCUTANEOUS at 22:33

## 2023-09-26 RX ADMIN — METOPROLOL TARTRATE 25 MG: 25 TABLET, FILM COATED ORAL at 08:12

## 2023-09-26 RX ADMIN — ATORVASTATIN CALCIUM 80 MG: 40 TABLET, FILM COATED ORAL at 20:38

## 2023-09-26 RX ADMIN — ESCITALOPRAM OXALATE 10 MG: 10 TABLET ORAL at 08:12

## 2023-09-26 RX ADMIN — LANSOPRAZOLE 15 MG: 15 TABLET, ORALLY DISINTEGRATING ORAL at 06:33

## 2023-09-26 RX ADMIN — METOPROLOL TARTRATE 25 MG: 25 TABLET, FILM COATED ORAL at 20:38

## 2023-09-26 RX ADMIN — CLOPIDOGREL BISULFATE 75 MG: 75 TABLET ORAL at 08:12

## 2023-09-26 RX ADMIN — QUETIAPINE FUMARATE 50 MG: 25 TABLET ORAL at 20:39

## 2023-09-26 NOTE — PLAN OF CARE
Goal Outcome Evaluation:  Plan of Care Reviewed With: patient        Progress: improving  Outcome Evaluation: Max-A x2 for SPT to chair and max-A x2 for taking steps.  Pt able to take several steps with BUE support, cues for weight shifting to advance legs, only 1 episode of knee buckling on R.  Pt very motivated.      Anticipated Discharge Disposition (PT): inpatient rehabilitation facility

## 2023-09-26 NOTE — THERAPY EVALUATION
Patient Name: Kenneth Wen  : 1950    MRN: [id number]                              Today's Date: 2023       Admit Date: 9/15/2023    Visit Dx:     ICD-10-CM ICD-9-CM   1. Aphasia  R47.01 784.3   2. Dysarthria  R47.1 784.51   3. Oropharyngeal dysphagia  R13.12 787.22     Patient Active Problem List   Diagnosis    Precordial pain    Elevated BP without diagnosis of hypertension    Dyslipidemia    Hyperglycemia    Multiple bilateral CVAs    Hemorrhagic CVA    HFpEF    T2DM (type 2 diabetes mellitus)    HTN (hypertension)    GERD (gastroesophageal reflux disease)    ICH (intracerebral hemorrhage)    Oropharyngeal dysphagia     Past Medical History:   Diagnosis Date    Acid reflux     Cancer     Skin    Diabetes mellitus     Prediabetes    GERD (gastroesophageal reflux disease) 09/15/2023    HTN (hypertension) 09/15/2023    Kidney disease     T2DM (type 2 diabetes mellitus) 09/15/2023     Past Surgical History:   Procedure Laterality Date    APPENDECTOMY      HEMORRHOIDECTOMY      NASAL POLYP SURGERY        General Information       Row Name 23 1310          Physical Therapy Time and Intention    Document Type therapy note (daily note)  -KG     Mode of Treatment physical therapy  -KG       Row Name 23 1310          General Information    Patient Profile Reviewed yes  -KG     Existing Precautions/Restrictions fall  -KG       Row Name 23 1310          Cognition    Orientation Status (Cognition) oriented to;person;other (see comments)  difficulty verbalizing  -KG       Row Name 23 1310          Safety Issues, Functional Mobility    Impairments Affecting Function (Mobility) balance;cognition;coordination;endurance/activity tolerance;grasp;motor control;motor planning;muscle tone abnormal;visual/perceptual;strength;sensation/sensory awareness;range of motion (ROM);postural/trunk control  -KG               User Key  (r) = Recorded By, (t) = Taken By, (c) = Cosigned By      Initials Name  Provider Type    KG Monik Solorio Physical Therapist                   Mobility       Row Name 09/26/23 1316          Bed Mobility    Bed Mobility supine-sit  -KG     Rolling Left [place] (Bed Mobility) maximum assist (25% patient effort);verbal cues  -KG     Rolling Right [place] (Bed Mobility) moderate assist (50% patient effort);verbal cues  -KG     Scooting/Bridging [place] (Bed Mobility) maximum assist (25% patient effort);2 person assist;verbal cues  -KG     Supine-Sit [place] (Bed Mobility) maximum assist (25% patient effort);verbal cues  -KG     Sit-Supine [place] (Bed Mobility) maximum assist (25% patient effort);2 person assist;verbal cues  -KG     Assistive Device (Bed Mobility) bed rails;draw sheet;head of bed elevated  -KG       Row Name 09/26/23 1316          Transfers    Comment, (Transfers) SPT from bed to chair max-A x2 with R knee blocked, BUE support  -KG       Row Name 09/26/23 1316          Bed-Chair Transfer    Bed-Chair [place] (Transfers) maximum assist (25% patient effort);2 person assist;verbal cues  -KG     Assistive Device (Bed-Chair Transfers) other (see comments)  BUE support  -KG       Row Name 09/26/23 1316          Sit-Stand Transfer    Sit-Stand [place] (Transfers) maximum assist (25% patient effort);2 person assist;verbal cues  -KG     Assistive Device (Sit-Stand Transfers) other (see comments)  BUE support  -KG       Row Name 09/26/23 1316          Gait/Stairs (Locomotion)    [place] Level (Gait) maximum assist (25% patient effort);2 person assist  -KG     Assistive Device (Gait) other (see comments)  BUE support  -KG     Distance in Feet (Gait) 3  -KG     Deviations/Abnormal Patterns (Gait) tor decreased;festinating/shuffling;gait speed decreased;stride length decreased;weight shifting decreased;base of support, narrow;bilateral deviations  -KG     Right Sided Gait Deviations weight shift ability decreased  -KG     Comment,  (Gait/Stairs) Pt able to take several steps with BUE support, cues for weight shifting to advance legs, only 1 episode of knee buckling on R  -KG               User Key  (r) = Recorded By, (t) = Taken By, (c) = Cosigned By      Initials Name Provider Type    Monik Knapp Physical Therapist                   Obj/Interventions       Row Name 09/26/23 1321          Motor Skills    Therapeutic Exercise other (see comments)  quad sets, heel slides, ankle pumps x10  -KG       Row Name 09/26/23 1321          Balance    Dynamic Standing Balance maximum assist;2-person assist  -KG     Position/Device Used, Standing Balance supported  -KG               User Key  (r) = Recorded By, (t) = Taken By, (c) = Cosigned By      Initials Name Provider Type    Monik Knapp Physical Therapist                   Goals/Plan    No documentation.                  Clinical Impression       Row Name 09/26/23 1321          Pain    Pretreatment Pain Rating 0/10 - no pain  -KG     Posttreatment Pain Rating 0/10 - no pain  -KG       Row Name 09/26/23 1321          Plan of Care Review    Plan of Care Reviewed With patient  -KG     Progress improving  -KG     Outcome Evaluation Max-A x2 for SPT to chair and max-A x2 for taking steps.  Pt able to take several steps with BUE support, cues for weight shifting to advance legs, only 1 episode of knee buckling on R.  Pt very motivated.  -KG       Row Name 09/26/23 1321          Positioning and Restraints    Pre-Treatment Position in bed  -KG     Post Treatment Position chair  -KG     In Chair notified nsg;call light within reach;encouraged to call for assist;exit alarm on;waffle cushion;on mechanical lift sling;legs elevated  -KG               User Key  (r) = Recorded By, (t) = Taken By, (c) = Cosigned By      Initials Name Provider Type    Monik Knapp Physical Therapist                   Outcome Measures       Row Name 09/26/23 1323 09/26/23 0820       How much help from another  person do you currently need...    Turning from your back to your side while in flat bed without using bedrails? 2  -KG 2  -BB    Moving from lying on back to sitting on the side of a flat bed without bedrails? 2  -KG 2  -BB    Moving to and from a bed to a chair (including a wheelchair)? 2  -KG 1  -BB    Standing up from a chair using your arms (e.g., wheelchair, bedside chair)? 2  -KG 1  -BB    Climbing 3-5 steps with a railing? 1  -KG 1  -BB    To walk in hospital room? 2  -KG 1  -BB    AM-PAC 6 Clicks Score (PT) 11  -KG 8  -BB    Highest level of mobility 4 --> Transferred to chair/commode  -KG 3 --> Sat at edge of bed  -BB      Row Name 09/26/23 1323          Functional Assessment    Outcome Measure Options AM-PAC 6 Clicks Basic Mobility (PT)  -KG               User Key  (r) = Recorded By, (t) = Taken By, (c) = Cosigned By      Initials Name Provider Type    KG Monik Solorio Physical Therapist    BB Funmi Norris, RN Registered Nurse                                 Physical Therapy Education       Title: PT OT SLP Therapies (In Progress)       Topic: Physical Therapy (In Progress)       Point: Mobility training (In Progress)       Learning Progress Summary             Patient Acceptance, E, NR by KR at 9/25/2023 1324    Acceptance, E, NR by KG at 9/18/2023 1403    Acceptance, E,TB, NR by AY at 9/15/2023 1254                         Point: Home exercise program (In Progress)       Learning Progress Summary             Patient Acceptance, E, NR by KG at 9/18/2023 1403                         Point: Body mechanics (In Progress)       Learning Progress Summary             Patient Acceptance, E, NR by KR at 9/25/2023 1324    Acceptance, E, NR by KG at 9/18/2023 1403    Acceptance, E,TB, NR by AY at 9/15/2023 1254                         Point: Precautions (In Progress)       Learning Progress Summary             Patient Acceptance, E, NR by KR at 9/25/2023 1324    Acceptance, E, NR by KG at 9/18/2023 1403     Acceptance, E,TB, NR by AY at 9/15/2023 1254                                         User Key       Initials Effective Dates Name Provider Type Discipline    KG 05/22/20 -  Mackenzie Mckay, PT Physical Therapist PT    AY 11/10/20 -  Aleshia Armstrong, PT Physical Therapist PT    KR 12/30/22 -  Raissa Soria, PT Physical Therapist PT                  PT Recommendation and Plan     Plan of Care Reviewed With: patient  Progress: improving  Outcome Evaluation: Max-A x2 for SPT to chair and max-A x2 for taking steps.  Pt able to take several steps with BUE support, cues for weight shifting to advance legs, only 1 episode of knee buckling on R.  Pt very motivated.     Time Calculation:         PT Charges       Row Name 09/26/23 1324             Time Calculation    Start Time 1135  -KG      PT Received On 09/26/23  -KG      PT Goal Re-Cert Due Date 10/05/23  -KG         Timed Charges    67037 - PT Therapeutic Exercise Minutes 10  -KG      47973 - PT Therapeutic Activity Minutes 15  -KG         Total Minutes    Timed Charges Total Minutes 25  -KG       Total Minutes 25  -KG                User Key  (r) = Recorded By, (t) = Taken By, (c) = Cosigned By      Initials Name Provider Type    KG Osmin Monik Physical Therapist                  Therapy Charges for Today       Code Description Service Date Service Provider Modifiers Qty    24505913576 HC PT THER PROC EA 15 MIN 9/26/2023 Monik Solorio GP 1    01411889911 HC PT THERAPEUTIC ACT EA 15 MIN 9/26/2023 Monik Solorio GP 1            PT G-Codes  Outcome Measure Options: AM-PAC 6 Clicks Basic Mobility (PT)  AM-PAC 6 Clicks Score (PT): 11  AM-PAC 6 Clicks Score (OT): 8  Modified Rae Scale: 4 - Moderately severe disability.  Unable to walk without assistance, and unable to attend to own bodily needs without assistance.  PT Discharge Summary  Anticipated Discharge Disposition (PT): inpatient rehabilitation facility    Monikanabela Solorio  9/26/2023

## 2023-09-26 NOTE — PROGRESS NOTES
[facility] Medicine Services  PROGRESS NOTE    Patient Name: Kenneth Wen  : 1950  MRN: [id number]    Date of Admission: 9/15/2023  Primary Care Physician: Susan Powers APRN    Subjective   Subjective     CC:  F/U CVA    HPI:  Seen this morning. Laying in bed. No complaints. Did not eat much of his breakfast.       Objective   Objective     Vital Signs:   Temp:  [98.1 °F (36.7 °C)-99 °F (37.2 °C)] 98.4 °F (36.9 °C)  Heart Rate:  [68-90] 76  Resp:  [18] 18  BP: (119-152)/(80-88) 119/88     Physical Exam:  Gen-no acute distress  HENT-NCAT, mucous membranes moist  CV-RRR, S1 S2 normal, no m/r/g  Resp-CTAB, no wheezes or rales  Abd-soft, NT, ND, +BS  Ext-no edema  Neuro-awake but confused, right sided weakness  Skin-no rashes  Psych-appropriate mood      Results Reviewed:  LAB RESULTS:      Lab 23  0450 23  0853 23  0725 23  0647 23  0625 23  0110   WBC 11.75* 11.80* 11.76* 13.43* 14.59* 15.40*   HEMOGLOBIN 14.2 15.1 13.5 14.5 12.8* 13.2   HEMATOCRIT 43.0 45.3 41.1 44.8 39.2 41.4   PLATELETS 425 428 365 313 336 318   NEUTROS ABS  --  8.71* 9.02* 9.16* 10.92* 11.65*   IMMATURE GRANS (ABS)  --  0.07* 0.08* 0.09* 0.09* 0.06*   LYMPHS ABS  --  1.85 1.43 2.27 1.79 1.94   MONOS ABS  --  0.85 0.86 1.24* 1.12* 1.27*   EOS ABS  --  0.22 0.28 0.55* 0.58* 0.39   MCV 92.7 93.6 94.3 96.6 96.6 96.5   PROCALCITONIN  --   --  0.05  --   --   --          Lab 23  0450 23  0853 23  0725 23  0647 23  06   SODIUM 148* 149* 145 144 152*   POTASSIUM 3.9 4.7 4.3 4.6 3.9   CHLORIDE 111* 111* 112* 112* 116*   CO2 26.0 26.0 26.0 20.0* 27.0   ANION GAP 11.0 12.0 7.0 12.0 9.0   BUN 34* 32* 35* 27* 30*   CREATININE 0.81 0.82 0.86 0.70* 0.77   EGFR 94.3 93.9 92.6 98.5 95.7   GLUCOSE 128* 149* 153* 123* 159*   CALCIUM 9.4 9.7 9.1 9.1 9.1   IONIZED CALCIUM  --   --   --  1.33*  --    MAGNESIUM  --  2.6*  --  2.3  --    PHOSPHORUS  --  3.2  --   3.6  --          Lab 09/25/23  0853   TOTAL PROTEIN 7.1   ALBUMIN 4.2   GLOBULIN 2.9   ALT (SGPT) 59*   AST (SGOT) 46*   BILIRUBIN 0.8   ALK PHOS 91             Lab 09/25/23  0853   CHOLESTEROL 109   TRIGLYCERIDES 65             Brief Urine Lab Results  (Last result in the past 365 days)        Color   Clarity   Blood   Leuk Est   Nitrite   Protein   CREAT   Urine HCG        09/15/23 1818 Yellow   Cloudy   Trace   Negative   Negative   Negative                   Microbiology Results Abnormal       Procedure Component Value - Date/Time    Blood Culture - Blood, Arm, Right [[id number]]  (Normal) Collected: 09/15/23 0212    Lab Status: Final result Specimen: Blood from Arm, Right Updated: 09/20/23 0230     Blood Culture No growth at 5 days    Blood Culture - Blood, Arm, Left [[id number]]  (Normal) Collected: 09/15/23 0212    Lab Status: Final result Specimen: Blood from Arm, Left Updated: 09/20/23 0230     Blood Culture No growth at 5 days            No radiology results from the last 24 hrs        Current medications:  Scheduled Meds:atorvastatin, 80 mg, Oral, Nightly  [START ON 9/27/2023] clopidogrel, 75 mg, Oral, Daily  [START ON 9/27/2023] escitalopram, 10 mg, Oral, Daily  insulin lispro, 2-7 Units, Subcutaneous, 4x Daily AC & at Bedtime  lansoprazole, 15 mg, Oral, Q AM  metoprolol tartrate, 25 mg, Oral, Q12H  [START ON 9/27/2023] QUEtiapine, 25 mg, Oral, QAM  QUEtiapine, 50 mg, Oral, Nightly  sodium chloride, 10 mL, Intravenous, Q12H      Continuous Infusions:   PRN Meds:.  acetaminophen **OR** acetaminophen    Calcium Replacement - Follow Nurse / BPA Driven Protocol    dextrose    dextrose    dextrose    glucagon (human recombinant)    ibuprofen    Magnesium Standard Dose Replacement - Follow Nurse / BPA Driven Protocol    ondansetron    Phosphorus Replacement - Follow Nurse / BPA Driven Protocol    Potassium Replacement - Follow Nurse / BPA Driven Protocol    sodium chloride    Assessment & Plan   Assessment &  Plan     Active Hospital Problems    Diagnosis  POA    **Hemorrhagic CVA [I61.9]  Yes    Oropharyngeal dysphagia [R13.12]  Yes    Multiple bilateral CVAs [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        Brief Hospital Course to date:  Kenneth Wen is a 72 y.o. male with hx of HTN, HLD, T2DM, and GERD who presented to OSH with multiple acute ischemic infarcts of the bilateral cerebral and cerebellar hemispheres as well as left isaac. TTE/JOSIAS was negative PFO at OSH. On 9/13/23 he had worsening in mental status and new inability to move RLE, head CT showed evolution of the existing CVA with new development of petechial hemorrhage. DAPT was held for 24 hours per Neurology recommendations and repeat CT head that evening showed worsening effacement of the right quadrigeminal cistern with suspected increasing upward supratentorial pressure. He was then transferred to [facility] for Neurosurgery evaluation on 9/15/23. He was started on hypertonic saline 9/15/23 through 9/20/23 for cerebral edema. He had fevers with negative cultures but had worsening secretions and labored breathing so was started on Zosyn on 9/16/23. Transferred to the hospitalist service on 9/22/23.    This patient's problems and plans were partially entered by my partner and updated as appropriate by me 09/26/23. Copied text in this note has been reviewed and is accurate as of today's date.      Multiple bilateral posterior circulation strokes with hemorrhagic conversion  --Neurology has followed, suspect atheroembolic but cannot rule out cardioembolic given multiple vascular territories  --Plavix monotherapy  --High dose statin  --Needs Holter monitor at discharge  --Follow up in stroke  clinic in 8 weeks     Dysphagia  Hypernatremia  --SLP recommends mechanical ground with honey thick liquids   --Patient eating well, but sodium elevated indicating probable poor fluid intake, asked staff to encourage PO fluids  --Na slightly better today, repeat BMP in AM     HTN  --Goal -160  --Continue Metoprolol 25 mg BID     GERD  --Continue Protonix     COVID-19  --Overall asymptomatic and on room air  --No infiltrates seen and low procal  --Did not receive any treatment   --Isolate x 10 days per hospital protocol -- through 9/30/23     Agitation, improved  --Continue Seroquel 25 mg in morning, 50 mg in evening  --Now out of restraints     Leukocytosis  --Remains afebrile  --Procalcitionin low at 0.05  --CXR and UA negative  --Blood cultures negative  --WBC improved to 11 and stable     Elevated LFT's, mild  --Possibly secondary to statin?  --Negative acute hepatitis panel  --Repeat LFT's in AM     Expected Discharge Location and Transportation: rehab  Expected Discharge   Expected Discharge Date: 9/28/2023; Expected Discharge Time:      DVT prophylaxis:  Mechanical DVT prophylaxis orders are present.     AM-PAC 6 Clicks Score (PT): 11 (09/26/23 1323)    CODE STATUS:   Code Status and Medical Interventions:   Ordered at: 09/15/23 0133     Code Status (Patient has no pulse and is not breathing):    CPR (Attempt to Resuscitate)     Medical Interventions (Patient has pulse or is breathing):    Full Support       Falguni Freeman MD  09/26/23

## 2023-09-27 LAB
ALBUMIN SERPL-MCNC: 4 G/DL (ref 3.5–5.2)
ALBUMIN/GLOB SERPL: 1.1 G/DL
ALP SERPL-CCNC: 98 U/L (ref 39–117)
ALT SERPL W P-5'-P-CCNC: 55 U/L (ref 1–41)
ANION GAP SERPL CALCULATED.3IONS-SCNC: 10 MMOL/L (ref 5–15)
ANION GAP SERPL CALCULATED.3IONS-SCNC: 11 MMOL/L (ref 5–15)
ANION GAP SERPL CALCULATED.3IONS-SCNC: 12 MMOL/L (ref 5–15)
AST SERPL-CCNC: 42 U/L (ref 1–40)
BILIRUB SERPL-MCNC: 0.8 MG/DL (ref 0–1.2)
BUN SERPL-MCNC: 32 MG/DL (ref 8–23)
BUN SERPL-MCNC: 34 MG/DL (ref 8–23)
BUN SERPL-MCNC: 35 MG/DL (ref 8–23)
BUN/CREAT SERPL: 36.4 (ref 7–25)
BUN/CREAT SERPL: 41.5 (ref 7–25)
BUN/CREAT SERPL: 42.2 (ref 7–25)
CALCIUM SPEC-SCNC: 9.1 MG/DL (ref 8.6–10.5)
CALCIUM SPEC-SCNC: 9.5 MG/DL (ref 8.6–10.5)
CALCIUM SPEC-SCNC: 9.7 MG/DL (ref 8.6–10.5)
CHLORIDE SERPL-SCNC: 112 MMOL/L (ref 98–107)
CHLORIDE SERPL-SCNC: 115 MMOL/L (ref 98–107)
CHLORIDE SERPL-SCNC: 116 MMOL/L (ref 98–107)
CO2 SERPL-SCNC: 25 MMOL/L (ref 22–29)
CO2 SERPL-SCNC: 26 MMOL/L (ref 22–29)
CO2 SERPL-SCNC: 27 MMOL/L (ref 22–29)
CREAT SERPL-MCNC: 0.82 MG/DL (ref 0.76–1.27)
CREAT SERPL-MCNC: 0.83 MG/DL (ref 0.76–1.27)
CREAT SERPL-MCNC: 0.88 MG/DL (ref 0.76–1.27)
DEPRECATED RDW RBC AUTO: 43.1 FL (ref 37–54)
EGFRCR SERPLBLD CKD-EPI 2021: 91.9 ML/MIN/1.73
EGFRCR SERPLBLD CKD-EPI 2021: 93.6 ML/MIN/1.73
EGFRCR SERPLBLD CKD-EPI 2021: 93.9 ML/MIN/1.73
ERYTHROCYTE [DISTWIDTH] IN BLOOD BY AUTOMATED COUNT: 12.7 % (ref 12.3–15.4)
GLOBULIN UR ELPH-MCNC: 3.6 GM/DL
GLUCOSE BLDC GLUCOMTR-MCNC: 106 MG/DL (ref 70–130)
GLUCOSE BLDC GLUCOMTR-MCNC: 108 MG/DL (ref 70–130)
GLUCOSE BLDC GLUCOMTR-MCNC: 121 MG/DL (ref 70–130)
GLUCOSE BLDC GLUCOMTR-MCNC: 138 MG/DL (ref 70–130)
GLUCOSE SERPL-MCNC: 124 MG/DL (ref 65–99)
GLUCOSE SERPL-MCNC: 142 MG/DL (ref 65–99)
GLUCOSE SERPL-MCNC: 180 MG/DL (ref 65–99)
HCT VFR BLD AUTO: 45.5 % (ref 37.5–51)
HGB BLD-MCNC: 15.2 G/DL (ref 13–17.7)
MCH RBC QN AUTO: 31 PG (ref 26.6–33)
MCHC RBC AUTO-ENTMCNC: 33.4 G/DL (ref 31.5–35.7)
MCV RBC AUTO: 92.9 FL (ref 79–97)
PLATELET # BLD AUTO: 472 10*3/MM3 (ref 140–450)
PMV BLD AUTO: 11.3 FL (ref 6–12)
POTASSIUM SERPL-SCNC: 3.7 MMOL/L (ref 3.5–5.2)
POTASSIUM SERPL-SCNC: 3.9 MMOL/L (ref 3.5–5.2)
POTASSIUM SERPL-SCNC: 4.7 MMOL/L (ref 3.5–5.2)
PROT SERPL-MCNC: 7.6 G/DL (ref 6–8.5)
RBC # BLD AUTO: 4.9 10*6/MM3 (ref 4.14–5.8)
SODIUM SERPL-SCNC: 151 MMOL/L (ref 136–145)
SODIUM SERPL-SCNC: 151 MMOL/L (ref 136–145)
SODIUM SERPL-SCNC: 152 MMOL/L (ref 136–145)
WBC NRBC COR # BLD: 12.07 10*3/MM3 (ref 3.4–10.8)

## 2023-09-27 PROCEDURE — 92526 ORAL FUNCTION THERAPY: CPT

## 2023-09-27 PROCEDURE — 85027 COMPLETE CBC AUTOMATED: CPT | Performed by: HOSPITALIST

## 2023-09-27 PROCEDURE — 92507 TX SP LANG VOICE COMM INDIV: CPT

## 2023-09-27 PROCEDURE — 82948 REAGENT STRIP/BLOOD GLUCOSE: CPT

## 2023-09-27 PROCEDURE — 99232 SBSQ HOSP IP/OBS MODERATE 35: CPT | Performed by: NURSE PRACTITIONER

## 2023-09-27 PROCEDURE — 80053 COMPREHEN METABOLIC PANEL: CPT | Performed by: HOSPITALIST

## 2023-09-27 RX ORDER — DEXTROSE AND SODIUM CHLORIDE 5; .45 G/100ML; G/100ML
100 INJECTION, SOLUTION INTRAVENOUS CONTINUOUS
Status: ACTIVE | OUTPATIENT
Start: 2023-09-27 | End: 2023-09-28

## 2023-09-27 RX ORDER — SODIUM CHLORIDE 450 MG/100ML
50 INJECTION, SOLUTION INTRAVENOUS CONTINUOUS
Status: DISCONTINUED | OUTPATIENT
Start: 2023-09-27 | End: 2023-09-27

## 2023-09-27 RX ORDER — SODIUM CHLORIDE 0.45 G/100ML
50 SOLUTION INTRAVENOUS CONTINUOUS
Status: DISCONTINUED | OUTPATIENT
Start: 2023-09-27 | End: 2023-09-27

## 2023-09-27 RX ADMIN — METOPROLOL TARTRATE 25 MG: 25 TABLET, FILM COATED ORAL at 20:59

## 2023-09-27 RX ADMIN — QUETIAPINE FUMARATE 50 MG: 25 TABLET ORAL at 21:01

## 2023-09-27 RX ADMIN — CLOPIDOGREL BISULFATE 75 MG: 75 TABLET ORAL at 08:17

## 2023-09-27 RX ADMIN — LANSOPRAZOLE 15 MG: 15 TABLET, ORALLY DISINTEGRATING ORAL at 05:41

## 2023-09-27 RX ADMIN — SODIUM CHLORIDE 50 ML/HR: 4.5 INJECTION, SOLUTION INTRAVENOUS at 11:53

## 2023-09-27 RX ADMIN — ATORVASTATIN CALCIUM 80 MG: 40 TABLET, FILM COATED ORAL at 21:01

## 2023-09-27 RX ADMIN — QUETIAPINE FUMARATE 25 MG: 25 TABLET ORAL at 08:15

## 2023-09-27 RX ADMIN — METOPROLOL TARTRATE 25 MG: 25 TABLET, FILM COATED ORAL at 08:15

## 2023-09-27 RX ADMIN — ESCITALOPRAM OXALATE 10 MG: 10 TABLET ORAL at 08:15

## 2023-09-27 RX ADMIN — DEXTROSE AND SODIUM CHLORIDE 100 ML/HR: 5; 450 INJECTION, SOLUTION INTRAVENOUS at 15:55

## 2023-09-27 NOTE — PROGRESS NOTES
[facility] Medicine Services  PROGRESS NOTE    Patient Name: Kenneth Wen  : 1950  MRN: [id number]    Date of Admission: 9/15/2023  Primary Care Physician: Susan Powers APRN    Subjective   Subjective     CC:  F/U CVA    HPI:  Pt resting in bed, confused and mumbling. NA+ this morning was 151. Administered 1/2 NS with recheck still at 151. Will change fluids to D5 1/2 NS at 100ml/hr. Free water deficit estimated at 3L.       Objective   Objective     Vital Signs:   Temp:  [97.6 °F (36.4 °C)-99.1 °F (37.3 °C)] 97.6 °F (36.4 °C)  Heart Rate:  [64-92] 75  Resp:  [16-20] 18  BP: (122-147)/(76-97) 125/84     Physical Exam:  Constitutional: Awake, alert, chronically ill appearing.  HENT: NCAT, mucous membranes moist  Respiratory: Clear to auscultation bilaterally, nonlabored respirations   Cardiovascular: RRR, no murmurs, rubs, or gallops  Gastrointestinal: Positive bowel sounds, soft, nontender, nondistended  Musculoskeletal: No bilateral ankle edema  Psychiatric: Awake, confused, mumbling.   Neurologic: Confused, mumbling, right sided weakness.   Skin: No rashes      Results Reviewed:  LAB RESULTS:      Lab 23  0658 23  0450 23  0853 23  0725 23  0647 23  0625 23  0110   WBC 12.07* 11.75* 11.80* 11.76* 13.43* 14.59* 15.40*   HEMOGLOBIN 15.2 14.2 15.1 13.5 14.5 12.8* 13.2   HEMATOCRIT 45.5 43.0 45.3 41.1 44.8 39.2 41.4   PLATELETS 472* 425 428 365 313 336 318   NEUTROS ABS  --   --  8.71* 9.02* 9.16* 10.92* 11.65*   IMMATURE GRANS (ABS)  --   --  0.07* 0.08* 0.09* 0.09* 0.06*   LYMPHS ABS  --   --  1.85 1.43 2.27 1.79 1.94   MONOS ABS  --   --  0.85 0.86 1.24* 1.12* 1.27*   EOS ABS  --   --  0.22 0.28 0.55* 0.58* 0.39   MCV 92.9 92.7 93.6 94.3 96.6 96.6 96.5   PROCALCITONIN  --   --   --  0.05  --   --   --            Lab 23  1337 23  0658 23  0450 23  0853 23  0725 23  0647   SODIUM 151* 151* 148* 149*  145 144   POTASSIUM 4.7 3.7 3.9 4.7 4.3 4.6   CHLORIDE 116* 112* 111* 111* 112* 112*   CO2 25.0 27.0 26.0 26.0 26.0 20.0*   ANION GAP 10.0 12.0 11.0 12.0 7.0 12.0   BUN 35* 32* 34* 32* 35* 27*   CREATININE 0.83 0.88 0.81 0.82 0.86 0.70*   EGFR 93.6 91.9 94.3 93.9 92.6 98.5   GLUCOSE 124* 142* 128* 149* 153* 123*   CALCIUM 9.7 9.5 9.4 9.7 9.1 9.1   IONIZED CALCIUM  --   --   --   --   --  1.33*   MAGNESIUM  --   --   --  2.6*  --  2.3   PHOSPHORUS  --   --   --  3.2  --  3.6           Lab 09/27/23  0658 09/25/23  0853   TOTAL PROTEIN 7.6 7.1   ALBUMIN 4.0 4.2   GLOBULIN 3.6 2.9   ALT (SGPT) 55* 59*   AST (SGOT) 42* 46*   BILIRUBIN 0.8 0.8   ALK PHOS 98 91               Lab 09/25/23  0853   CHOLESTEROL 109   TRIGLYCERIDES 65               Brief Urine Lab Results  (Last result in the past 365 days)        Color   Clarity   Blood   Leuk Est   Nitrite   Protein   CREAT   Urine HCG        09/15/23 1818 Yellow   Cloudy   Trace   Negative   Negative   Negative                   Microbiology Results Abnormal       Procedure Component Value - Date/Time    Blood Culture - Blood, Arm, Right [[id number]]  (Normal) Collected: 09/15/23 0212    Lab Status: Final result Specimen: Blood from Arm, Right Updated: 09/20/23 0230     Blood Culture No growth at 5 days    Blood Culture - Blood, Arm, Left [[id number]]  (Normal) Collected: 09/15/23 0212    Lab Status: Final result Specimen: Blood from Arm, Left Updated: 09/20/23 0230     Blood Culture No growth at 5 days            No radiology results from the last 24 hrs        Current medications:  Scheduled Meds:atorvastatin, 80 mg, Oral, Nightly  clopidogrel, 75 mg, Oral, Daily  escitalopram, 10 mg, Oral, Daily  insulin lispro, 2-7 Units, Subcutaneous, 4x Daily AC & at Bedtime  lansoprazole, 15 mg, Oral, Q AM  metoprolol tartrate, 25 mg, Oral, Q12H  QUEtiapine, 25 mg, Oral, QAM  QUEtiapine, 50 mg, Oral, Nightly  sodium chloride, 10 mL, Intravenous, Q12H      Continuous Infusions:sodium  chloride, 50 mL/hr, Last Rate: 50 mL/hr (09/27/23 1153)    PRN Meds:.  acetaminophen **OR** acetaminophen    Calcium Replacement - Follow Nurse / BPA Driven Protocol    dextrose    dextrose    dextrose    glucagon (human recombinant)    ibuprofen    Magnesium Standard Dose Replacement - Follow Nurse / BPA Driven Protocol    ondansetron    Phosphorus Replacement - Follow Nurse / BPA Driven Protocol    Potassium Replacement - Follow Nurse / BPA Driven Protocol    sodium chloride    Assessment & Plan   Assessment & Plan     Active Hospital Problems    Diagnosis  POA    **Hemorrhagic CVA [I61.9]  Yes    Oropharyngeal dysphagia [R13.12]  Yes    Multiple bilateral CVAs [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        Brief Hospital Course to date:  Kenneth Wen is a 72 y.o. male with hx of HTN, HLD, T2DM, and GERD who presented to OSH with multiple acute ischemic infarcts of the bilateral cerebral and cerebellar hemispheres as well as left isaac. TTE/JOSIAS was negative PFO at OSH. On 9/13/23 he had worsening in mental status and new inability to move RLE, head CT showed evolution of the existing CVA with new development of petechial hemorrhage. DAPT was held for 24 hours per Neurology recommendations and repeat CT head that evening showed worsening effacement of the right quadrigeminal cistern with suspected increasing upward supratentorial pressure. He was then transferred to [facility] for Neurosurgery evaluation on 9/15/23. He was started on hypertonic saline 9/15/23 through 9/20/23 for cerebral edema. He had fevers with negative cultures but had worsening secretions and labored breathing so was started on Zosyn on 9/16/23. Transferred to the hospitalist  service on 9/22/23.    This patient's problems and plans were partially entered by my partner and updated as appropriate by me 09/27/23.     Copied text in this note has been reviewed and is accurate as of 09/27/23.        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 Started 1/2 NS but sodium stayed the same. Changed fluids to D51/2 NS at 100ml/hr for free fluid deficit of 3L.   --BMP in 6 hours and then in the am.      HTN  --Goal -160  --Continue Metoprolol 25 mg BID     GERD  --Continue Protonix     COVID-19  --Overall asymptomatic and on room air  --No infiltrates seen and low procal  --Did not receive any treatment   --Isolate x 10 days per hospital protocol -- through 9/30/23     Agitation, improved  --Continue Seroquel 25 mg in morning, 50 mg in evening  --Now out of restraints     Leukocytosis  --Remains afebrile  --Procalcitionin low at 0.05  --CXR and UA negative  --Blood cultures negative  --WBC improved and stable     Elevated LFT's, mild  --Possibly secondary to statin?  --Negative acute hepatitis panel  --repeat AST/ALT only slightly improved on 09/27     Expected Discharge Location and Transportation: rehab  Expected Discharge   Expected Discharge Date: 9/29/2023; Expected Discharge Time:      DVT prophylaxis:  Mechanical DVT prophylaxis orders are present.     AM-PAC 6 Clicks Score (PT): 11 (09/27/23 8000)    CODE STATUS:   Code Status and Medical Interventions:   Ordered at: 09/15/23 0133     Code Status (Patient has no  pulse and is not breathing):    CPR (Attempt to Resuscitate)     Medical Interventions (Patient has pulse or is breathing):    Full Support       Marisel Almonte, APRN  09/27/23

## 2023-09-27 NOTE — THERAPY TREATMENT NOTE
Acute Care - Speech Language Pathology Treatment Note  [facility]     Patient Name: Kenneth Wen  : 1950  MRN: [id number]  Today's Date: 2023               Admit Date: 9/15/2023     Visit Dx:    ICD-10-CM ICD-9-CM   1. Aphasia  R47.01 784.3   2. Dysarthria  R47.1 784.51   3. Oropharyngeal dysphagia  R13.12 787.22     Patient Active Problem List   Diagnosis    Precordial pain    Elevated BP without diagnosis of hypertension    Dyslipidemia    Hyperglycemia    Multiple bilateral CVAs    Hemorrhagic CVA    HFpEF    T2DM (type 2 diabetes mellitus)    HTN (hypertension)    GERD (gastroesophageal reflux disease)    ICH (intracerebral hemorrhage)    Oropharyngeal dysphagia     Past Medical History:   Diagnosis Date    Acid reflux     Cancer     Skin    Diabetes mellitus     Prediabetes    GERD (gastroesophageal reflux disease) 09/15/2023    HTN (hypertension) 09/15/2023    Kidney disease     T2DM (type 2 diabetes mellitus) 09/15/2023     Past Surgical History:   Procedure Laterality Date    APPENDECTOMY      HEMORRHOIDECTOMY      NASAL POLYP SURGERY         SLP Recommendation and Plan  SLP Diagnosis: moderate, aphasia, dysarthria (23)           Swallow Criteria for Skilled Therapeutic Interventions Met: demonstrates skilled criteria (23)  SLC Criteria for Skilled Therapy Interventions Met: yes (23)  Anticipated Discharge Disposition (SLP): skilled nursing facility (23)     Therapy Frequency (Swallow): 5 days per week (23)  Predicted Duration Therapy Intervention (Days): until discharge (23)  Oral Care Recommendations: Oral Care BID/PRN (23)     Daily Summary of Progress (SLP): progress toward functional goals as expected (23)           Treatment Assessment (SLP): continued, dysarthria, aphasia, oral dysphagia, suspected, pharyngeal dysphagia (23)     Plan for Continued Treatment (SLP): continue treatment  per plan of care (09/27/23 1315)         SLP EVALUATION (last 72 hours)       SLP SLC Evaluation       Row Name 09/27/23 1315 09/25/23 3189                Communication Assessment/Intervention    Document Type therapy note (daily note)  -CH therapy note (daily note)  -CH       Subjective Information no complaints  -CH no complaints  -CH       Patient Observations alert;cooperative;agree to therapy  -CH alert;cooperative;agree to therapy  -CH       Patient/Family/Caregiver Comments/Observations none present  -CH none present  -CH       Patient Effort fair  -CH fair  -CH       Symptoms Noted During/After Treatment none  -CH none  -CH          General Information    Patient Profile Reviewed yes  -CH yes  -CH          Pain    Additional Documentation Pain Scale: FACES Pre/Post-Treatment (Group)  -CH Pain Scale: Word Pre/Post-Treatment (Group)  -CH          Pain Scale: Word Pre/Post-Treatment    Pain: Word Scale, Pretreatment 0 - no pain  -CH 0 - no pain  -CH       Posttreatment Pain Rating 0 - no pain  -CH 0 - no pain  -CH          Pain Scale: FACES Pre/Post-Treatment    Pain: FACES Scale, Pretreatment 0-->no hurt  -CH 0-->no hurt  -CH       Posttreatment Pain Rating 0-->no hurt  -CH 0-->no hurt  -CH          SLP Evaluation Clinical Impressions    SLP Diagnosis moderate;aphasia;dysarthria  -CH moderate;aphasia;dysarthria  @ least some mild cog deficits but u/a to fully assess  -CH       Rehab Potential/Prognosis good  -CH good  -CH       SLC Criteria for Skilled Therapy Interventions Met yes  -CH yes  -CH       Functional Impact difficulty communicating wants, needs;difficulty communicating in an emergency;difficulty in expressing complex messages;functional impact in ADLs  -CH difficulty communicating wants, needs;difficulty communicating in an emergency;difficulty in expressing complex messages;functional impact in ADLs  -CH          SLP Treatment Clinical Impressions    Treatment Assessment (SLP)  continued;dysarthria;aphasia;oral dysphagia;suspected;pharyngeal dysphagia  -CH continued;dysarthria;aphasia;oral dysphagia;suspected;pharyngeal dysphagia  -       Daily Summary of Progress (SLP) progress toward functional goals as expected  -CH progress toward functional goals as expected  -CH       Barriers to Overall Progress (SLP) Cognitive status;Medically complex  -CH Medically complex  -CH       Plan for Continued Treatment (SLP) continue treatment per plan of care  -CH continue treatment per plan of care  -CH       Care Plan Review care plan/treatment goals reviewed;risks/benefits reviewed  -CH care plan/treatment goals reviewed;risks/benefits reviewed;current/potential barriers reviewed;patient/other agree to care plan  -CH          Recommendations    Therapy Frequency (SLP SLC) 5 days per week  -CH 5 days per week  -CH       Predicted Duration Therapy Intervention (Days) until discharge  -CH until discharge  -CH       Anticipated Discharge Disposition (SLP) skilled nursing facility  - skilled nursing facility  -                 User Key  (r) = Recorded By, (t) = Taken By, (c) = Cosigned By      Initials Name Effective Dates     Vi Diop MS CCC-SLP 06/16/21 -                        EDUCATION  The patient has been educated in the following areas:     Cognitive Impairment Communication Impairment.           SLP GOALS       Row Name 09/27/23 1315 09/25/23 1525          (LTG) Patient will demonstrate functional swallow for    Diet Texture (Demonstrate functional swallow) soft to chew (chopped) textures  -CH soft to chew (chopped) textures  -CH     Liquid viscosity (Demonstrate functional swallow) nectar/ mildly thick liquids  -CH nectar/ mildly thick liquids  -CH     [place] (Demonstrate functional swallow) with minimal cues (75-90% accuracy)  -CH with minimal cues (75-90% accuracy)  -CH     Time Frame (Demonstrate functional swallow) by discharge  -CH by discharge  -      Progress/Outcomes (Demonstrate functional swallow) continuing progress toward goal  -CH continuing progress toward goal  -CH     Comment (Demonstrate functional swallow) cough with thin and nectar thick by teaspoon  -CH cough with thin and nectar thick by teaspoon  -CH        (STG) Patient will tolerate therapeutic trials of    Consistencies Trialed (Tolerate therapeutic trials) pureed textures;thin liquids  -CH pureed textures;thin liquids  -CH     Desired Outcome (Tolerate therapeutic trials) without signs/symptoms of aspiration;without signs of distress  -CH without signs/symptoms of aspiration;without signs of distress  -CH     [place] (Tolerate therapeutic trials) with minimal cues (75-90% accuracy)  -CH with minimal cues (75-90% accuracy)  -CH     Time Frame (Tolerate therapeutic trials) by discharge  -CH by discharge  -CH     Progress/Outcomes (Tolerate therapeutic trials) continuing progress toward goal  -CH continuing progress toward goal  -CH     Comment (Tolerate therapeutic trials) No s/s with pureed trials, cough with thin by teaspoon  -CH No s/s with pureed trials, cough with thin by teaspoon  -CH        (STG) Labial Strengthening Goal 1 (SLP)    Activity (Labial Strengthening Goal 1, SLP) increase labial tone  -CH increase labial tone  -CH     Increase Labial Tone labial resistance exercises;swallow trials  -CH labial resistance exercises;swallow trials  -CH     [place]/Accuracy (Labial Strengthening Goal 1, SLP) with moderate cues (50-74% accuracy)  -CH with moderate cues (50-74% accuracy)  -CH     Time Frame (Labial Strengthening Goal 1, SLP) short term goal (STG)  -CH short term goal (STG)  -CH     Progress/Outcomes (Labial Strengthening Goal 1, SLP) progress slower than expected  -CH progress slower than expected  -CH     Comment (Labial Strengthening Goal 1, SLP) swallow trials w anterior loss with HT liquids.  -CH swallow trials w anterior loss with HT liquids. Attemtoed labial  resistence exercise, pt u/a to complete.  -CH        (STG) Lingual Strengthening Goal 1 (SLP)    Activity (Lingual Strengthening Goal 1, SLP) increase lingual tone/sensation/control/coordination/movement;increase tongue back strength  -CH increase lingual tone/sensation/control/coordination/movement;increase tongue back strength  -CH     Increase Lingual Tone/Sensation/Control/Coordination/Movement swallow trials;lingual resistance exercises  -CH swallow trials;lingual resistance exercises  -CH     Increase Tongue Back Strength lingual resistance exercises  -CH lingual resistance exercises  -CH     [place]/Accuracy (Lingual Strengthening Goal 1, SLP) with moderate cues (50-74% accuracy)  -CH with moderate cues (50-74% accuracy)  -CH     Time Frame (Lingual Strengthening Goal 1, SLP) short term goal (STG)  -CH short term goal (STG)  -CH     Progress/Outcomes (Lingual Strengthening Goal 1, SLP) progress slower than expected  -CH progress slower than expected  -CH     Comment (Lingual Strengthening Goal 1, SLP) completed lingual resistence with mod cues and models x 5 each  -CH completed lingual resistence with mod cues and models x 5 each  -CH        (STG) Pharyngeal Strengthening Exercise Goal 1 (SLP)    Activity (Pharyngeal Strengthening Goal 1, SLP) increase timing;increase superior movement of the hyolaryngeal complex;increase anterior movement of the hyolaryngeal complex;increase closure at entrance to airway/closure of airway at glottis;increase squeeze/positive pressure generation  -CH increase timing;increase superior movement of the hyolaryngeal complex;increase anterior movement of the hyolaryngeal complex;increase closure at entrance to airway/closure of airway at glottis;increase squeeze/positive pressure generation  -CH     Increase Timing prepping - 3 second prep or suck swallow or 3-step swallow  -CH prepping - 3 second prep or suck swallow or 3-step swallow  -CH     Increase Superior Movement of  the Hyolaryngeal Complex falsetto  -CH falsetto  -CH     Increase Anterior Movement of the Hyolaryngeal Complex chin tuck against resistance (CTAR)  -CH chin tuck against resistance (CTAR)  -CH     Increase Closure at Entrance to Airway/Closure of Airway at Glottis breath hold exercises;supraglottic swallow  -CH breath hold exercises;supraglottic swallow  -CH     Increase Squeeze/Positive Pressure Generation hard effortful swallow  -CH hard effortful swallow  -CH     [place]/Accuracy (Pharyngeal Strengthening Goal 1, SLP) with moderate cues (50-74% accuracy)  -CH with moderate cues (50-74% accuracy)  -CH     Time Frame (Pharyngeal Strengthening Goal 1, SLP) short term goal (STG)  -CH short term goal (STG)  -CH     Progress/Outcomes (Pharyngeal Strengthening Goal 1, SLP) progress slower than expected  -CH progress slower than expected  -CH     Comment (Pharyngeal Strengthening Goal 1, SLP) completed CTAR and 123 prep exercise with mod cues. U/a to complete breath hold, SS, or hard swallow  -CH completed CTAR and 123 prep exercise with mod cues. U/a to complete breath hold, SS, or hard swallow  -CH        Patient will demonstrate functional communication skills for return to discharge environment     [place] with moderate cues  -CH with moderate cues  -CH     Time frame by discharge  -CH by discharge  -CH     Progress/Outcomes continuing progress toward goal  -CH continuing progress toward goal  -CH        Comprehend Questions Goal 1 (SLP)    Improve Ability to Comprehend Questions Goal 1 (SLP) complex yes/no questions;80%;with minimal cues (75-90%)  -CH complex yes/no questions;80%;with minimal cues (75-90%)  -CH     Time Frame (Comprehend Questions Goal 1, SLP) short term goal (STG)  -CH short term goal (STG)  -CH     Progress (Ability to Comprehend Questions Goal 1, SLP) 20%;with moderate cues (50-74%)  -CH 20%;with moderate cues (50-74%)  -CH     Progress/Outcomes (Comprehend Questions Goal 1, SLP)  continuing progress toward goal  -CH continuing progress toward goal  -CH        Follow Directions Goal 2 (SLP)    Improve Ability to Follow Directions Goal 1 (SLP) 2 step commands;80%;with minimal cues (75-90%)  -CH 2 step commands;80%;with minimal cues (75-90%)  -CH     Time Frame (Follow Directions Goal 1, SLP) short term goal (STG)  -CH short term goal (STG)  -CH     Progress (Ability to Follow Directions Goal 1, SLP) 40%;with moderate cues (50-74%)  -CH 50%;with moderate cues (50-74%)  -CH     Progress/Outcomes (Follow Directions Goal 1, SLP) progress slower than expected  -CH progress slower than expected  -CH        Word Retrieval Skills Goal 1 (SLP)    Improve Word Retrieval Skills By Goal 1 (SLP) confrontational naming task;high frequency;responsive naming task;simple;80%;with minimal cues (75-90%)  -CH confrontational naming task;high frequency;responsive naming task;simple;80%;with minimal cues (75-90%)  -CH     Time Frame (Word Retrieval Goal 1, SLP) short term goal (STG)  -CH short term goal (STG)  -CH     Progress (Word Retrieval Skills Goal 1, SLP) 40%;with moderate cues (50-74%)  -CH 40%;with moderate cues (50-74%)  -CH     Progress/Outcomes (Word Retrieval Goal 1, SLP) continuing progress toward goal  -CH continuing progress toward goal  -CH     Comment (Word Retrieval Goal 1, SLP) responsive and confrontational naming  -CH responsive and confrontational naming  -CH        Articulation Goal 1 (SLP)    Improve Articulation Goal 1 (SLP) by over-articulating at phrase level;80%;with moderate cues (50-74%)  -CH by over-articulating at phrase level;80%;with moderate cues (50-74%)  -CH     Time Frame (Articulation Goal 1, SLP) short term goal (STG)  -CH short term goal (STG)  -CH     Progress (Articulation Goal 1, SLP) 60%;with maximum cues (25-49%)  -CH 60%;with maximum cues (25-49%)  -CH     Progress/Outcomes (Articulation Goal 1, SLP) goal ongoing  -CH continuing progress toward goal  -CH         Orientation Goal 1 (SLP)    Improve Orientation Through Goal 1 (SLP) demonstrating orientation to day;demonstrating orientation to month;demonstrating orientation to year;demonstrating orientation to place;demonstrating orientation to disease/impairment;use environmental aids to assist with orientation;80%;with moderate cues (50-74%)  -CH demonstrating orientation to day;demonstrating orientation to month;demonstrating orientation to year;demonstrating orientation to place;demonstrating orientation to disease/impairment;use environmental aids to assist with orientation;80%;with moderate cues (50-74%)  -CH     Time Frame (Orientation Goal 1, SLP) short term goal (STG)  -CH short term goal (STG)  -CH     Progress (Orientation Goal 1, SLP) 20%;with maximum cues (25-49%)  -CH 20%;with maximum cues (25-49%)  -CH     Progress/Outcomes (Orientation Goal 1, SLP) progress slower than expected  -CH progress slower than expected  -CH     Comment (Orientation Goal 1, SLP) orianted to name and grossly to place (hospital)  -CH orianted to name and grossly to place (hospital)  -CH               User Key  (r) = Recorded By, (t) = Taken By, (c) = Cosigned By      Initials Name Provider Type    Vi Mcdermott MS CCC-SLP Speech and Language Pathologist                            Time Calculation:      Time Calculation- SLP       Row Name 09/27/23 1559             Time Calculation- SLP    SLP Start Time 1315  -CH      SLP Received On 09/27/23  -CH         Untimed Charges    68140-AS Treatment/ST Modification Prosth Aug Alter  15  -CH      96165-BB Treatment Swallow Minutes 39  -CH         Total Minutes    Untimed Charges Total Minutes 54  -CH       Total Minutes 54  -CH                User Key  (r) = Recorded By, (t) = Taken By, (c) = Cosigned By      Initials Name Provider Type    Vi Mcdermott MS CCC-SLP Speech and Language Pathologist                    Therapy Charges for Today       Code Description Service Date  Service Provider Modifiers Qty    [id number]  ST TREATMENT SWALLOW 3 2023 Vi Diop, MS CCC-SLP GN 1    [id number]  ST TREATMENT SPEECH 1 2023 DiopVi espinoza, MS CCC-SLP GN 1                       Vi Diop, MS CCC-SLP  2023   and Acute Care - Speech Language Pathology   Swallow Treatment Note  [place]     Patient Name: Kenneth Wen  : 1950  MRN: [id number]  Today's Date: 2023               Admit Date: 9/15/2023    Visit Dx:     ICD-10-CM ICD-9-CM   1. Aphasia  R47.01 784.3   2. Dysarthria  R47.1 784.51   3. Oropharyngeal dysphagia  R13.12 787.22     Patient Active Problem List   Diagnosis    Precordial pain    Elevated BP without diagnosis of hypertension    Dyslipidemia    Hyperglycemia    Multiple bilateral CVAs    Hemorrhagic CVA    HFpEF    T2DM (type 2 diabetes mellitus)    HTN (hypertension)    GERD (gastroesophageal reflux disease)    ICH (intracerebral hemorrhage)    Oropharyngeal dysphagia     Past Medical History:   Diagnosis Date    Acid reflux     Cancer     Skin    Diabetes mellitus     Prediabetes    GERD (gastroesophageal reflux disease) 09/15/2023    HTN (hypertension) 09/15/2023    Kidney disease     T2DM (type 2 diabetes mellitus) 09/15/2023     Past Surgical History:   Procedure Laterality Date    APPENDECTOMY      HEMORRHOIDECTOMY      NASAL POLYP SURGERY         SLP Recommendation and Plan  SLP Swallowing Diagnosis: mod-severe, oral dysphagia, pharyngeal dysphagia (23)  SLP Diet Recommendation: puree, honey thick liquids, other (see comments) (by teacasper) (23)  Recommended Precautions and Strategies: 1:1 supervision, upright posture during/after eating, small bites of food and sips of liquid (23)  SLP Rec. for Method of Medication Administration: meds crushed, with puree (23)     Monitor for Signs of Aspiration: notify SLP if any concerns (23)     Swallow Criteria for Skilled  Therapeutic Interventions Met: demonstrates skilled criteria (09/27/23 1315)  Anticipated Discharge Disposition (SLP): skilled nursing facility (09/27/23 1315)  Rehab Potential/Prognosis, Swallowing: re-evaluate goals as necessary (09/27/23 1315)  Therapy Frequency (Swallow): 5 days per week (09/27/23 1315)  Predicted Duration Therapy Intervention (Days): until discharge (09/27/23 1315)  Oral Care Recommendations: Oral Care BID/PRN (09/27/23 1315)        Daily Summary of Progress (SLP): progress toward functional goals as expected (09/27/23 1315)               Treatment Assessment (SLP): continued, dysarthria, aphasia, oral dysphagia, suspected, pharyngeal dysphagia (09/27/23 1315)     Plan for Continued Treatment (SLP): continue treatment per plan of care (09/27/23 1315)       Oral Care Recommendations: Oral Care BID/PRN (09/27/23 1315)           SWALLOW EVALUATION (last 72 hours)       SLP Adult Swallow Evaluation       Row Name 09/27/23 1315 09/25/23 1525                SLP Evaluation Clinical Impression    SLP Swallowing Diagnosis mod-severe;oral dysphagia;pharyngeal dysphagia  - mod-severe;oral dysphagia;pharyngeal dysphagia  -CH       Functional Impact risk of aspiration/pneumonia;risk of malnutrition;risk of dehydration  -CH risk of aspiration/pneumonia;risk of malnutrition;risk of dehydration  -       Rehab Potential/Prognosis, Swallowing re-evaluate goals as necessary  - re-evaluate goals as necessary  -       Swallow Criteria for Skilled Therapeutic Interventions Met demonstrates skilled criteria  -CH demonstrates skilled criteria  -CH          Recommendations    Therapy Frequency (Swallow) 5 days per week  -CH 5 days per week  -CH       SLP Diet Recommendation puree;honey thick liquids;other (see comments)  by teaspoon  - puree;honey thick liquids;other (see comments)  by teaspoon  -       Recommended Precautions and Strategies 1:1 supervision;upright posture during/after eating;small bites of  food and sips of liquid  - 1:1 supervision;upright posture during/after eating  -CH       Oral Care Recommendations Oral Care BID/PRN  -CH Oral Care BID/PRN  -CH       SLP Rec. for Method of Medication Administration meds crushed;with puree  -CH meds crushed;with puree  -CH       Monitor for Signs of Aspiration notify SLP if any concerns  -CH notify SLP if any concerns  -CH                 User Key  (r) = Recorded By, (t) = Taken By, (c) = Cosigned By      Initials Name Effective Dates    CH Vi Diop MS CCC-SLP 06/16/21 -                     EDUCATION  The patient has been educated in the following areas:   Dysphagia (Swallowing Impairment) Oral Care/Hydration Modified Diet Instruction.        SLP GOALS       Row Name 09/27/23 1315 09/25/23 1525          (LTG) Patient will demonstrate functional swallow for    Diet Texture (Demonstrate functional swallow) soft to chew (chopped) textures  -CH soft to chew (chopped) textures  -CH     Liquid viscosity (Demonstrate functional swallow) nectar/ mildly thick liquids  -CH nectar/ mildly thick liquids  -CH     [place] (Demonstrate functional swallow) with minimal cues (75-90% accuracy)  -CH with minimal cues (75-90% accuracy)  -CH     Time Frame (Demonstrate functional swallow) by discharge  -CH by discharge  -CH     Progress/Outcomes (Demonstrate functional swallow) continuing progress toward goal  -CH continuing progress toward goal  -CH     Comment (Demonstrate functional swallow) cough with thin and nectar thick by teaspoon  -CH cough with thin and nectar thick by teaspoon  -CH        (STG) Patient will tolerate therapeutic trials of    Consistencies Trialed (Tolerate therapeutic trials) pureed textures;thin liquids  -CH pureed textures;thin liquids  -CH     Desired Outcome (Tolerate therapeutic trials) without signs/symptoms of aspiration;without signs of distress  -CH without signs/symptoms of aspiration;without signs of distress  -CH     [place]  (Tolerate therapeutic trials) with minimal cues (75-90% accuracy)  -CH with minimal cues (75-90% accuracy)  -CH     Time Frame (Tolerate therapeutic trials) by discharge  -CH by discharge  -CH     Progress/Outcomes (Tolerate therapeutic trials) continuing progress toward goal  -CH continuing progress toward goal  -CH     Comment (Tolerate therapeutic trials) No s/s with pureed trials, cough with thin by teaspoon  -CH No s/s with pureed trials, cough with thin by teaspoon  -CH        (STG) Labial Strengthening Goal 1 (SLP)    Activity (Labial Strengthening Goal 1, SLP) increase labial tone  -CH increase labial tone  -CH     Increase Labial Tone labial resistance exercises;swallow trials  - labial resistance exercises;swallow trials  -CH     [place]/Accuracy (Labial Strengthening Goal 1, SLP) with moderate cues (50-74% accuracy)  -CH with moderate cues (50-74% accuracy)  -CH     Time Frame (Labial Strengthening Goal 1, SLP) short term goal (STG)  -CH short term goal (STG)  -CH     Progress/Outcomes (Labial Strengthening Goal 1, SLP) progress slower than expected  -CH progress slower than expected  -CH     Comment (Labial Strengthening Goal 1, SLP) swallow trials w anterior loss with HT liquids.  -CH swallow trials w anterior loss with HT liquids. Attemtoed labial resistence exercise, pt u/a to complete.  -CH        (STG) Lingual Strengthening Goal 1 (SLP)    Activity (Lingual Strengthening Goal 1, SLP) increase lingual tone/sensation/control/coordination/movement;increase tongue back strength  -CH increase lingual tone/sensation/control/coordination/movement;increase tongue back strength  -CH     Increase Lingual Tone/Sensation/Control/Coordination/Movement swallow trials;lingual resistance exercises  -CH swallow trials;lingual resistance exercises  -     Increase Tongue Back Strength lingual resistance exercises  -CH lingual resistance exercises  -CH     [place]/Accuracy (Lingual Strengthening Goal 1,  SLP) with moderate cues (50-74% accuracy)  -CH with moderate cues (50-74% accuracy)  -CH     Time Frame (Lingual Strengthening Goal 1, SLP) short term goal (STG)  -CH short term goal (STG)  -CH     Progress/Outcomes (Lingual Strengthening Goal 1, SLP) progress slower than expected  -CH progress slower than expected  -CH     Comment (Lingual Strengthening Goal 1, SLP) completed lingual resistence with mod cues and models x 5 each  -CH completed lingual resistence with mod cues and models x 5 each  -CH        (STG) Pharyngeal Strengthening Exercise Goal 1 (SLP)    Activity (Pharyngeal Strengthening Goal 1, SLP) increase timing;increase superior movement of the hyolaryngeal complex;increase anterior movement of the hyolaryngeal complex;increase closure at entrance to airway/closure of airway at glottis;increase squeeze/positive pressure generation  -CH increase timing;increase superior movement of the hyolaryngeal complex;increase anterior movement of the hyolaryngeal complex;increase closure at entrance to airway/closure of airway at glottis;increase squeeze/positive pressure generation  -CH     Increase Timing prepping - 3 second prep or suck swallow or 3-step swallow  -CH prepping - 3 second prep or suck swallow or 3-step swallow  -CH     Increase Superior Movement of the Hyolaryngeal Complex falsetto  -CH falsetto  -CH     Increase Anterior Movement of the Hyolaryngeal Complex chin tuck against resistance (CTAR)  -CH chin tuck against resistance (CTAR)  -CH     Increase Closure at Entrance to Airway/Closure of Airway at Glottis breath hold exercises;supraglottic swallow  -CH breath hold exercises;supraglottic swallow  -CH     Increase Squeeze/Positive Pressure Generation hard effortful swallow  -CH hard effortful swallow  -CH     [place]/Accuracy (Pharyngeal Strengthening Goal 1, SLP) with moderate cues (50-74% accuracy)  -CH with moderate cues (50-74% accuracy)  -CH     Time Frame (Pharyngeal Strengthening  Goal 1, SLP) short term goal (STG)  -CH short term goal (STG)  -CH     Progress/Outcomes (Pharyngeal Strengthening Goal 1, SLP) progress slower than expected  -CH progress slower than expected  -CH     Comment (Pharyngeal Strengthening Goal 1, SLP) completed CTAR and 123 prep exercise with mod cues. U/a to complete breath hold, SS, or hard swallow  -CH completed CTAR and 123 prep exercise with mod cues. U/a to complete breath hold, SS, or hard swallow  -CH        Patient will demonstrate functional communication skills for return to discharge environment     [place] with moderate cues  -CH with moderate cues  -CH     Time frame by discharge  -CH by discharge  -CH     Progress/Outcomes continuing progress toward goal  -CH continuing progress toward goal  -CH        Comprehend Questions Goal 1 (SLP)    Improve Ability to Comprehend Questions Goal 1 (SLP) complex yes/no questions;80%;with minimal cues (75-90%)  -CH complex yes/no questions;80%;with minimal cues (75-90%)  -CH     Time Frame (Comprehend Questions Goal 1, SLP) short term goal (STG)  -CH short term goal (STG)  -CH     Progress (Ability to Comprehend Questions Goal 1, SLP) 20%;with moderate cues (50-74%)  -CH 20%;with moderate cues (50-74%)  -CH     Progress/Outcomes (Comprehend Questions Goal 1, SLP) continuing progress toward goal  -CH continuing progress toward goal  -CH        Follow Directions Goal 2 (SLP)    Improve Ability to Follow Directions Goal 1 (SLP) 2 step commands;80%;with minimal cues (75-90%)  -CH 2 step commands;80%;with minimal cues (75-90%)  -CH     Time Frame (Follow Directions Goal 1, SLP) short term goal (STG)  -CH short term goal (STG)  -CH     Progress (Ability to Follow Directions Goal 1, SLP) 40%;with moderate cues (50-74%)  -CH 50%;with moderate cues (50-74%)  -CH     Progress/Outcomes (Follow Directions Goal 1, SLP) progress slower than expected  -CH progress slower than expected  -CH        Word Retrieval Skills Goal 1  (SLP)    Improve Word Retrieval Skills By Goal 1 (SLP) confrontational naming task;high frequency;responsive naming task;simple;80%;with minimal cues (75-90%)  -CH confrontational naming task;high frequency;responsive naming task;simple;80%;with minimal cues (75-90%)  -CH     Time Frame (Word Retrieval Goal 1, SLP) short term goal (STG)  -CH short term goal (STG)  -CH     Progress (Word Retrieval Skills Goal 1, SLP) 40%;with moderate cues (50-74%)  -CH 40%;with moderate cues (50-74%)  -CH     Progress/Outcomes (Word Retrieval Goal 1, SLP) continuing progress toward goal  -CH continuing progress toward goal  -CH     Comment (Word Retrieval Goal 1, SLP) responsive and confrontational naming  -CH responsive and confrontational naming  -CH        Articulation Goal 1 (SLP)    Improve Articulation Goal 1 (SLP) by over-articulating at phrase level;80%;with moderate cues (50-74%)  -CH by over-articulating at phrase level;80%;with moderate cues (50-74%)  -CH     Time Frame (Articulation Goal 1, SLP) short term goal (STG)  -CH short term goal (STG)  -CH     Progress (Articulation Goal 1, SLP) 60%;with maximum cues (25-49%)  -CH 60%;with maximum cues (25-49%)  -CH     Progress/Outcomes (Articulation Goal 1, SLP) goal ongoing  -CH continuing progress toward goal  -CH        Orientation Goal 1 (SLP)    Improve Orientation Through Goal 1 (SLP) demonstrating orientation to day;demonstrating orientation to month;demonstrating orientation to year;demonstrating orientation to place;demonstrating orientation to disease/impairment;use environmental aids to assist with orientation;80%;with moderate cues (50-74%)  - demonstrating orientation to day;demonstrating orientation to month;demonstrating orientation to year;demonstrating orientation to place;demonstrating orientation to disease/impairment;use environmental aids to assist with orientation;80%;with moderate cues (50-74%)  -CH     Time Frame (Orientation Goal 1, SLP) short term  goal (STG)  -CH short term goal (STG)  -CH     Progress (Orientation Goal 1, SLP) 20%;with maximum cues (25-49%)  -CH 20%;with maximum cues (25-49%)  -CH     Progress/Outcomes (Orientation Goal 1, SLP) progress slower than expected  -CH progress slower than expected  -CH     Comment (Orientation Goal 1, SLP) orianted to name and grossly to place (hospital)  -CH orianted to name and grossly to place (hospital)  -CH               User Key  (r) = Recorded By, (t) = Taken By, (c) = Cosigned By      Initials Name Provider Type    Vi Mcdermott MS CCC-SLP Speech and Language Pathologist                       Time Calculation:    Time Calculation- SLP       Row Name 09/27/23 1559             Time Calculation- SLP    SLP Start Time 1315  -CH      SLP Received On 09/27/23  -CH         Untimed Charges    85889-VM Treatment/ST Modification Prosth Aug Alter  15  -CH      10384-EB Treatment Swallow Minutes 39  -CH         Total Minutes    Untimed Charges Total Minutes 54  -CH       Total Minutes 54  -CH                User Key  (r) = Recorded By, (t) = Taken By, (c) = Cosigned By      Initials Name Provider Type    Vi Mcdermott MS CCC-SLP Speech and Language Pathologist                    Therapy Charges for Today       Code Description Service Date Service Provider Modifiers Qty    72260441765 HC ST TREATMENT SWALLOW 3 9/27/2023 Vi Diop MS CCC-SLP GN 1    02675342819 HC ST TREATMENT SPEECH 1 9/27/2023 Vi Diop MS CCC-SLP GN 1                 Vi Diop MS CCC-NIELS  9/27/2023

## 2023-09-27 NOTE — CASE MANAGEMENT/SOCIAL WORK
Continued Stay Note  [facility]     Patient Name: Kenneth Wen  MRN: [id number]  Today's Date: 9/27/2023    Admit Date: 9/15/2023    Plan: [facility]   Discharge Plan       Row Name 09/27/23 1540       Plan    Plan [facility]    Patient/Family in Agreement with Plan yes    Plan Comments Awaiting insurance approval for [facility].  will continue to follow for discharge needs.    Final Discharge Disposition Code 62 - inpatient rehab facility                   Discharge Codes    No documentation.                 Expected Discharge Date and Time       Expected Discharge Date Expected Discharge Time    Sep 29, 2023               Madelyn Beasley RN

## 2023-09-28 LAB
ANION GAP SERPL CALCULATED.3IONS-SCNC: 10 MMOL/L (ref 5–15)
BUN SERPL-MCNC: 35 MG/DL (ref 8–23)
BUN/CREAT SERPL: 37.6 (ref 7–25)
CALCIUM SPEC-SCNC: 9.6 MG/DL (ref 8.6–10.5)
CHLORIDE SERPL-SCNC: 115 MMOL/L (ref 98–107)
CO2 SERPL-SCNC: 26 MMOL/L (ref 22–29)
CREAT SERPL-MCNC: 0.93 MG/DL (ref 0.76–1.27)
EGFRCR SERPLBLD CKD-EPI 2021: 87.8 ML/MIN/1.73
GLUCOSE BLDC GLUCOMTR-MCNC: 111 MG/DL (ref 70–130)
GLUCOSE BLDC GLUCOMTR-MCNC: 123 MG/DL (ref 70–130)
GLUCOSE BLDC GLUCOMTR-MCNC: 133 MG/DL (ref 70–130)
GLUCOSE BLDC GLUCOMTR-MCNC: 136 MG/DL (ref 70–130)
GLUCOSE SERPL-MCNC: 163 MG/DL (ref 65–99)
POTASSIUM SERPL-SCNC: 4.1 MMOL/L (ref 3.5–5.2)
SODIUM SERPL-SCNC: 151 MMOL/L (ref 136–145)

## 2023-09-28 PROCEDURE — 97530 THERAPEUTIC ACTIVITIES: CPT

## 2023-09-28 PROCEDURE — 92526 ORAL FUNCTION THERAPY: CPT

## 2023-09-28 PROCEDURE — 97535 SELF CARE MNGMENT TRAINING: CPT

## 2023-09-28 PROCEDURE — 99231 SBSQ HOSP IP/OBS SF/LOW 25: CPT | Performed by: NURSE PRACTITIONER

## 2023-09-28 PROCEDURE — 92507 TX SP LANG VOICE COMM INDIV: CPT

## 2023-09-28 PROCEDURE — 82948 REAGENT STRIP/BLOOD GLUCOSE: CPT

## 2023-09-28 PROCEDURE — 80048 BASIC METABOLIC PNL TOTAL CA: CPT | Performed by: HOSPITALIST

## 2023-09-28 RX ORDER — DEXTROSE AND SODIUM CHLORIDE 5; .45 G/100ML; G/100ML
50 INJECTION, SOLUTION INTRAVENOUS CONTINUOUS
Status: DISCONTINUED | OUTPATIENT
Start: 2023-09-28 | End: 2023-09-30

## 2023-09-28 RX ORDER — SODIUM CHLORIDE 0.9 % (FLUSH) 0.9 %
10 SYRINGE (ML) INJECTION AS NEEDED
Status: DISCONTINUED | OUTPATIENT
Start: 2023-09-28 | End: 2023-10-16

## 2023-09-28 RX ORDER — SODIUM CHLORIDE 0.9 % (FLUSH) 0.9 %
10 SYRINGE (ML) INJECTION EVERY 12 HOURS SCHEDULED
Status: DISCONTINUED | OUTPATIENT
Start: 2023-09-28 | End: 2023-10-19

## 2023-09-28 RX ORDER — SODIUM CHLORIDE 0.9 % (FLUSH) 0.9 %
10 SYRINGE (ML) INJECTION AS NEEDED
Status: DISCONTINUED | OUTPATIENT
Start: 2023-09-28 | End: 2023-10-08

## 2023-09-28 RX ORDER — SODIUM CHLORIDE 0.9 % (FLUSH) 0.9 %
10 SYRINGE (ML) INJECTION EVERY 12 HOURS SCHEDULED
Status: DISCONTINUED | OUTPATIENT
Start: 2023-09-28 | End: 2023-10-08

## 2023-09-28 RX ORDER — QUETIAPINE FUMARATE 25 MG/1
75 TABLET, FILM COATED ORAL NIGHTLY
Status: DISCONTINUED | OUTPATIENT
Start: 2023-09-28 | End: 2023-10-02

## 2023-09-28 RX ORDER — QUETIAPINE FUMARATE 25 MG/1
50 TABLET, FILM COATED ORAL DAILY
Status: DISCONTINUED | OUTPATIENT
Start: 2023-09-29 | End: 2023-10-01

## 2023-09-28 RX ADMIN — CLOPIDOGREL BISULFATE 75 MG: 75 TABLET ORAL at 09:15

## 2023-09-28 RX ADMIN — DEXTROSE AND SODIUM CHLORIDE 125 ML/HR: 5; 450 INJECTION, SOLUTION INTRAVENOUS at 20:44

## 2023-09-28 RX ADMIN — METOPROLOL TARTRATE 25 MG: 25 TABLET, FILM COATED ORAL at 20:44

## 2023-09-28 RX ADMIN — Medication 10 ML: at 20:45

## 2023-09-28 RX ADMIN — Medication 10 ML: at 20:46

## 2023-09-28 RX ADMIN — METOPROLOL TARTRATE 25 MG: 25 TABLET, FILM COATED ORAL at 09:15

## 2023-09-28 RX ADMIN — LANSOPRAZOLE 15 MG: 15 TABLET, ORALLY DISINTEGRATING ORAL at 05:18

## 2023-09-28 RX ADMIN — ATORVASTATIN CALCIUM 80 MG: 40 TABLET, FILM COATED ORAL at 20:45

## 2023-09-28 RX ADMIN — QUETIAPINE FUMARATE 25 MG: 25 TABLET ORAL at 09:15

## 2023-09-28 RX ADMIN — ESCITALOPRAM OXALATE 10 MG: 10 TABLET ORAL at 09:15

## 2023-09-28 RX ADMIN — QUETIAPINE FUMARATE 75 MG: 25 TABLET ORAL at 20:45

## 2023-09-28 NOTE — PLAN OF CARE
Goal Outcome Evaluation:    SLP treatment completed. Will continue to address dysphagia and cognitive communication. Please see note for further details and recommendations.

## 2023-09-28 NOTE — PROGRESS NOTES
[facility]  PROGRESS NOTE    Patient Name: Kenneth Wen  : 1950  MRN: [id number]    Date of Admission: 9/15/2023  Primary Care Physician: Susan Powers APRN    Subjective   Subjective     CC:  F/U CVA    HPI:  Pt resting in bed, confused with wife at the bedside. Pt has pulled out many IV lines and NG tube. Sodium remains elevated due to poor oral intake. Wife would like PEG tube placed for nutritional support. Will consult general surgery.       Objective   Objective     Vital Signs:   Temp:  [97.7 °F (36.5 °C)-99.6 °F (37.6 °C)] 98 °F (36.7 °C)  Heart Rate:  [50-81] 81  Resp:  [16-18] 18  BP: (119-144)/(76-92) 120/80     Physical Exam:  Constitutional: Awake, alert, chronically ill appearing  HENT: NCAT, mucous membranes dry  Respiratory: Clear to auscultation bilaterally, nonlabored respirations   Cardiovascular: RRR, no murmurs, rubs, or gallop  Gastrointestinal: Positive bowel sounds, soft, nontender, nondistended  Musculoskeletal: No bilateral ankle edema  Psychiatric: Awake, confused, mumbling, will answer question with yes.   Neurologic: confused, right sided weakness, mumbling.  Skin: No rashes        Results Reviewed:  LAB RESULTS:      Lab 23  0658 23  0450 23  0853 23  0725 23  0647 23  0625   WBC 12.07* 11.75* 11.80* 11.76* 13.43* 14.59*   HEMOGLOBIN 15.2 14.2 15.1 13.5 14.5 12.8*   HEMATOCRIT 45.5 43.0 45.3 41.1 44.8 39.2   PLATELETS 472* 425 428 365 313 336   NEUTROS ABS  --   --  8.71* 9.02* 9.16* 10.92*   IMMATURE GRANS (ABS)  --   --  0.07* 0.08* 0.09* 0.09*   LYMPHS ABS  --   --  1.85 1.43 2.27 1.79   MONOS ABS  --   --  0.85 0.86 1.24* 1.12*   EOS ABS  --   --  0.22 0.28 0.55* 0.58*   MCV 92.9 92.7 93.6 94.3 96.6 96.6   PROCALCITONIN  --   --   --  0.05  --   --            Lab 23  1052 23  2226 23  1337 23  0658 23  0450 23  0853 23  0725 23  0647   SODIUM  151* 152* 151* 151* 148* 149*   < > 144   POTASSIUM 4.1 3.9 4.7 3.7 3.9 4.7   < > 4.6   CHLORIDE 115* 115* 116* 112* 111* 111*   < > 112*   CO2 26.0 26.0 25.0 27.0 26.0 26.0   < > 20.0*   ANION GAP 10.0 11.0 10.0 12.0 11.0 12.0   < > 12.0   BUN 35* 34* 35* 32* 34* 32*   < > 27*   CREATININE 0.93 0.82 0.83 0.88 0.81 0.82   < > 0.70*   EGFR 87.8 93.9 93.6 91.9 94.3 93.9   < > 98.5   GLUCOSE 163* 180* 124* 142* 128* 149*   < > 123*   CALCIUM 9.6 9.1 9.7 9.5 9.4 9.7   < > 9.1   IONIZED CALCIUM  --   --   --   --   --   --   --  1.33*   MAGNESIUM  --   --   --   --   --  2.6*  --  2.3   PHOSPHORUS  --   --   --   --   --  3.2  --  3.6    < > = values in this interval not displayed.           Lab 09/27/23  0658 09/25/23  0853   TOTAL PROTEIN 7.6 7.1   ALBUMIN 4.0 4.2   GLOBULIN 3.6 2.9   ALT (SGPT) 55* 59*   AST (SGOT) 42* 46*   BILIRUBIN 0.8 0.8   ALK PHOS 98 91               Lab 09/25/23  0853   CHOLESTEROL 109   TRIGLYCERIDES 65               Brief Urine Lab Results  (Last result in the past 365 days)        Color   Clarity   Blood   Leuk Est   Nitrite   Protein   CREAT   Urine HCG        09/15/23 1818 Yellow   Cloudy   Trace   Negative   Negative   Negative                   Microbiology Results Abnormal       Procedure Component Value - Date/Time    Blood Culture - Blood, Arm, Right [[id number]]  (Normal) Collected: 09/15/23 0212    Lab Status: Final result Specimen: Blood from Arm, Right Updated: 09/20/23 0230     Blood Culture No growth at 5 days    Blood Culture - Blood, Arm, Left [[id number]]  (Normal) Collected: 09/15/23 0212    Lab Status: Final result Specimen: Blood from Arm, Left Updated: 09/20/23 0230     Blood Culture No growth at 5 days            No radiology results from the last 24 hrs        Current medications:  Scheduled Meds:atorvastatin, 80 mg, Oral, Nightly  clopidogrel, 75 mg, Oral, Daily  escitalopram, 10 mg, Oral, Daily  insulin lispro, 2-7 Units, Subcutaneous, 4x Daily AC & at  Bedtime  lansoprazole, 15 mg, Oral, Q AM  metoprolol tartrate, 25 mg, Oral, Q12H  QUEtiapine, 25 mg, Oral, QAM  QUEtiapine, 50 mg, Oral, Nightly  sodium chloride, 10 mL, Intravenous, Q12H      Continuous Infusions:dextrose 5 % and sodium chloride 0.45 %, 100 mL/hr, Last Rate: 100 mL/hr (09/27/23 1555)    PRN Meds:.  acetaminophen **OR** acetaminophen    Calcium Replacement - Follow Nurse / BPA Driven Protocol    dextrose    dextrose    dextrose    glucagon (human recombinant)    ibuprofen    Magnesium Standard Dose Replacement - Follow Nurse / BPA Driven Protocol    ondansetron    Phosphorus Replacement - Follow Nurse / BPA Driven Protocol    Potassium Replacement - Follow Nurse / BPA Driven Protocol    sodium chloride    Assessment & Plan   Assessment & Plan     Active Hospital Problems    Diagnosis  POA    **Hemorrhagic CVA [I61.9]  Yes    Oropharyngeal dysphagia [R13.12]  Yes    Multiple bilateral CVAs [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        Brief Hospital Course to date:  Kenneth Wen is a 72 y.o. male with hx of HTN, HLD, T2DM, and GERD who presented to OSH with multiple acute ischemic infarcts of the bilateral cerebral and cerebellar hemispheres as well as left isaac. TTE/JOSIAS was negative PFO at OSH. On 9/13/23 he had worsening in mental status and new inability to move RLE, head CT showed evolution of the existing CVA with new development of petechial hemorrhage. DAPT was held for 24 hours per Neurology recommendations and repeat CT head that evening showed worsening effacement of the right quadrigeminal cistern with suspected increasing upward supratentorial pressure. He was then transferred to [facility] for Neurosurgery evaluation on  9/15/23. He was started on hypertonic saline 9/15/23 through 9/20/23 for cerebral edema. He had fevers with negative cultures but had worsening secretions and labored breathing so was started on Zosyn on 9/16/23. Transferred to the hospitalist service on 9/22/23.    This patient's problems and plans were partially entered by my partner and updated as appropriate by me 09/28/23.     Copied text in this note has been reviewed and is accurate as of 09/28/23.        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 09/27 Started 1/2 NS but sodium stayed the same. Changed fluids to D51/2 NS at 100ml/hr for free fluid deficit of 3L. Pt pulled out the IV. Wife wishing for PEG tube for nutritional and fluid support. Consult GS.   --BMP in 6 hours and then in the am.      HTN  --Goal -160  --Continue Metoprolol 25 mg BID     GERD  --Continue Protonix     COVID-19  --Overall asymptomatic and on room air  --No infiltrates seen and low procal  --Did not receive any treatment   --Isolate x 10 days per hospital protocol -- through 9/30/23     Agitation, improved  --Continue Seroquel 25 mg in morning, 50 mg in evening  --Now out of restraints     Leukocytosis  --Remains afebrile  --Procalcitionin low at 0.05  --CXR and UA negative  --Blood cultures negative  --WBC improved and stable     Elevated LFT's, mild  --Possibly secondary to statin?  --Negative acute hepatitis panel  --repeat AST/ALT only slightly improved on 09/27     Expected Discharge Location and  Transportation: rehab  Expected Discharge   Expected Discharge Date: 9/30/2023; Expected Discharge Time:      DVT prophylaxis:  Mechanical DVT prophylaxis orders are present.     AM-PAC 6 Clicks Score (PT): 11 (09/28/23 1000)    CODE STATUS:   Code Status and Medical Interventions:   Ordered at: 09/15/23 0133     Code Status (Patient has no pulse and is not breathing):    CPR (Attempt to Resuscitate)     Medical Interventions (Patient has pulse or is breathing):    Full Support       ROLANDO Massey  09/28/23

## 2023-09-28 NOTE — THERAPY TREATMENT NOTE
Patient Name: Kenneth Wen  : 1950    MRN: [id number]                              Today's Date: 2023       Admit Date: 9/15/2023    Visit Dx:     ICD-10-CM ICD-9-CM   1. Aphasia  R47.01 784.3   2. Dysarthria  R47.1 784.51   3. Oropharyngeal dysphagia  R13.12 787.22     Patient Active Problem List   Diagnosis    Precordial pain    Elevated BP without diagnosis of hypertension    Dyslipidemia    Hyperglycemia    Multiple bilateral CVAs    Hemorrhagic CVA    HFpEF    T2DM (type 2 diabetes mellitus)    HTN (hypertension)    GERD (gastroesophageal reflux disease)    ICH (intracerebral hemorrhage)    Oropharyngeal dysphagia     Past Medical History:   Diagnosis Date    Acid reflux     Cancer     Skin    Diabetes mellitus     Prediabetes    GERD (gastroesophageal reflux disease) 09/15/2023    HTN (hypertension) 09/15/2023    Kidney disease     T2DM (type 2 diabetes mellitus) 09/15/2023     Past Surgical History:   Procedure Laterality Date    APPENDECTOMY      HEMORRHOIDECTOMY      NASAL POLYP SURGERY        General Information       Row Name 23 1543          OT Time and Intention    Document Type therapy note (daily note)  -MR     Mode of Treatment occupational therapy  -MR       Row Name 23 1543          General Information    Patient Profile Reviewed yes  -MR     Existing Precautions/Restrictions fall;other (see comments)  R sided weakness  -MR     Barriers to Rehab medically complex;cognitive status;visual deficit  -MR       Row Name 23 1543          Cognition    Orientation Status (Cognition) oriented to;person;other (see comments)  dysarthria  -MR       Row Name 23 1543          Safety Issues, Functional Mobility    Safety Issues Affecting Function (Mobility) ability to follow commands;awareness of need for assistance;insight into deficits/self-awareness;safety precaution awareness;safety precautions follow-through/compliance;sequencing abilities  -MR     Impairments  Affecting Function (Mobility) balance;cognition;coordination;endurance/activity tolerance;grasp;motor control;motor planning;muscle tone abnormal;visual/perceptual;strength;sensation/sensory awareness;range of motion (ROM);postural/trunk control  -MR     Cognitive Impairments, Mobility Safety/Performance attention;awareness, need for assistance;insight into deficits/self-awareness;judgment;problem-solving/reasoning;safety precaution awareness;safety precaution follow-through  -MR               User Key  (r) = Recorded By, (t) = Taken By, (c) = Cosigned By      Initials Name Provider Type    MR Meera Doyle, OT Occupational Therapist                     Mobility/ADL's       Row Name 09/28/23 1547          Bed Mobility    Bed Mobility rolling left;rolling right  -MR     Rolling Left [place] (Bed Mobility) maximum assist (25% patient effort);verbal cues  -MR     Rolling Right [place] (Bed Mobility) moderate assist (50% patient effort);verbal cues  -MR     Assistive Device (Bed Mobility) draw sheet;bed rails  -MR     Comment, (Bed Mobility) Pt requiring significant assist w/ rolling to the L d/t R sided weakness. Good effort noted w/ assisting to roll to the R.  -MR       Row Name 09/28/23 1547          Transfers    Transfers bed-chair transfer  -MR       Row Name 09/28/23 1547          Bed-Chair Transfer    Bed-Chair [place] (Transfers) verbal cues;dependent (less than 25% patient effort);2 person assist  -MR     Assistive Device (Bed-Chair Transfers) lift device  -MR       Row Name 09/28/23 1547          Activities of Daily Living    BADL Assessment/Intervention lower body dressing;grooming;toileting  -MR       Row Name 09/28/23 1547          Lower Body Dressing Assessment/Training    [place] Level (Lower Body Dressing) don;pants/bottoms;socks;dependent (less than 25% patient effort)  -MR     Position (Lower Body Dressing) supine  -MR       Row Name 09/28/23 1547          Toileting  Assessment/Training    [place] Level (Toileting) adjust/manage clothing;change pad/brief;perform perineal hygiene;dependent (less than 25% patient effort)  -MR     Position (Toileting) supine  -MR     Comment, (Toileting) Episode of incontience w/ bladder. Dep for brief change and hygiene  -MR       Row Name 09/28/23 1547          Grooming Assessment/Training    [place] Level (Grooming) wash face, hands;minimum assist (75% patient effort);oral care regimen;dependent (less than 25% patient effort)  -MR     Assistive Devices (Grooming) suction brush  -MR     Position (Grooming) supported sitting  -MR               User Key  (r) = Recorded By, (t) = Taken By, (c) = Cosigned By      Initials Name Provider Type    Meera Juarez, LATIA Occupational Therapist                   Obj/Interventions       Row Name 09/28/23 8969          Balance    Balance Assessment sitting static balance  -MR     Static Sitting Balance moderate assist;non-verbal cues (demo/gesture)  -MR     Position, Sitting Balance supported;sitting in chair  -MR               User Key  (r) = Recorded By, (t) = Taken By, (c) = Cosigned By      Initials Name Provider Type    Meera Juarez, OT Occupational Therapist                   Goals/Plan    No documentation.                  Clinical Impression       Row Name 09/28/23 7053          Pain Assessment    Pretreatment Pain Rating 0/10 - no pain  -MR     Posttreatment Pain Rating 0/10 - no pain  -MR     Pain Intervention(s) Ambulation/increased activity;Repositioned  -MR       Row Name 09/28/23 1400          Plan of Care Review    Plan of Care Reviewed With patient  -MR     Progress improving  -MR     Outcome Evaluation Pt demonstrating good effort w/ sequencing for bed mobility. Pt continues to require assist for ADLs warranting continuation of skilled IP OT services. Continue to progress per current POC as able.  -MR       Row Name 09/28/23 0322          Therapy Plan Review/Discharge Plan (OT)     Anticipated Discharge Disposition (OT) inpatient rehabilitation facility  -MR       Row Name 09/28/23 1553          Vital Signs    Pre SpO2 (%) 97  -MR     O2 Delivery Pre Treatment room air  -MR     O2 Delivery Intra Treatment room air  -MR     Post SpO2 (%) 97  -MR     O2 Delivery Post Treatment room air  -MR     Pre Patient Position Supine  -MR     Intra Patient Position Side Lying  -MR     Post Patient Position Sitting  -MR       Row Name 09/28/23 1553          Positioning and Restraints    Pre-Treatment Position in bed  -MR     Post Treatment Position chair  -MR     In Chair notified nsg;reclined;sitting;call light within reach;encouraged to call for assist;exit alarm on;waffle cushion;on mechanical lift sling;legs elevated;heels elevated;with family/caregiver  -MR               User Key  (r) = Recorded By, (t) = Taken By, (c) = Cosigned By      Initials Name Provider Type    MR DoyleMeera, OT Occupational Therapist                   Outcome Measures       Row Name 09/28/23 1555          How much help from another is currently needed...    Putting on and taking off regular lower body clothing? 1  -MR     Bathing (including washing, rinsing, and drying) 1  -MR     Toileting (which includes using toilet bed pan or urinal) 1  -MR     Putting on and taking off regular upper body clothing 1  -MR     Taking care of personal grooming (such as brushing teeth) 2  -MR     Eating meals 2  -MR     AM-PAC 6 Clicks Score (OT) 8  -MR       Row Name 09/28/23 1000 09/28/23 0800       How much help from another person do you currently need...    Turning from your back to your side while in flat bed without using bedrails? 2  -ME 2  -ME    Moving from lying on back to sitting on the side of a flat bed without bedrails? 2  -ME 2  -ME    Moving to and from a bed to a chair (including a wheelchair)? 2  -ME 2  -ME    Standing up from a chair using your arms (e.g., wheelchair, bedside chair)? 2  -ME 2  -ME    Climbing 3-5 steps  with a railing? 1  -ME 1  -ME    To walk in hospital room? 2  -ME 2  -ME    AM-PAC 6 Clicks Score (PT) 11  -ME 11  -ME    Highest level of mobility 4 --> Transferred to chair/commode  -ME 4 --> Transferred to chair/commode  -ME      Row Name 09/28/23 1555          Functional Assessment    Outcome Measure Options AM-PAC 6 Clicks Daily Activity (OT)  -MR               User Key  (r) = Recorded By, (t) = Taken By, (c) = Cosigned By      Initials Name Provider Type    Brenda Marcelino, RN Registered Nurse     Meera Doyle, OT Occupational Therapist                    Occupational Therapy Education       Title: PT OT SLP Therapies (In Progress)       Topic: Occupational Therapy (In Progress)       Point: ADL training (In Progress)       Description:   Instruct learner(s) on proper safety adaptation and remediation techniques during self care or transfers.   Instruct in proper use of assistive devices.                  Learning Progress Summary             Patient Acceptance, E, NR by  at 9/28/2023 1556    Acceptance, E, NR by  at 9/25/2023 1045    Acceptance, E, NR by  at 9/20/2023 1420    Acceptance, E, NR by  at 9/18/2023 1532    Acceptance, E, NR by  at 9/15/2023 1531   Family Acceptance, E, NR by  at 9/28/2023 1556                         Point: Home exercise program (In Progress)       Description:   Instruct learner(s) on appropriate technique for monitoring, assisting and/or progressing therapeutic exercises/activities.                  Learning Progress Summary             Patient Acceptance, E, NR by MR at 9/28/2023 1556    Acceptance, E, NR by  at 9/25/2023 1045    Acceptance, E, NR by  at 9/20/2023 1420    Acceptance, E, NR by  at 9/18/2023 1532    Acceptance, E, NR by  at 9/15/2023 1531   Family Acceptance, E, NR by  at 9/28/2023 1556                         Point: Precautions (In Progress)       Description:   Instruct learner(s) on prescribed precautions during self-care and  functional transfers.                  Learning Progress Summary             Patient Acceptance, E, NR by MR at 9/28/2023 1556    Acceptance, E, NR by  at 9/20/2023 1420    Acceptance, E, NR by  at 9/18/2023 1532    Acceptance, E, NR by  at 9/15/2023 1531   Family Acceptance, E, NR by MR at 9/28/2023 1556                         Point: Body mechanics (In Progress)       Description:   Instruct learner(s) on proper positioning and spine alignment during self-care, functional mobility activities and/or exercises.                  Learning Progress Summary             Patient Acceptance, E, NR by MR at 9/28/2023 1556    Acceptance, E, NR by  at 9/20/2023 1420    Acceptance, E, NR by  at 9/18/2023 1532    Acceptance, E, NR by  at 9/15/2023 1531   Family Acceptance, E, NR by MR at 9/28/2023 1556                                         User Key       Initials Effective Dates Name Provider Type Discipline     02/03/23 -  Jess Carrillo OT Occupational Therapist OT     09/02/21 -  Carmen Carr, OT Occupational Therapist OT     10/14/22 -  Jenny Rivera, OT Occupational Therapist OT     09/22/22 -  Meera Doyle, OT Occupational Therapist OT                  OT Recommendation and Plan     Plan of Care Review  Plan of Care Reviewed With: patient  Progress: improving  Outcome Evaluation: Pt demonstrating good effort w/ sequencing for bed mobility. Pt continues to require assist for ADLs warranting continuation of skilled IP OT services. Continue to progress per current POC as able.     Time Calculation:         Time Calculation- OT       Row Name 09/28/23 1556             Time Calculation- OT    OT Start Time 1415  -MR      OT Received On 09/28/23  -MR         Timed Charges    81141 - OT Therapeutic Activity Minutes 27  -MR      36627 - OT Self Care/Mgmt Minutes 20  -MR         Total Minutes    Timed Charges Total Minutes 47  -MR       Total Minutes 47  -MR                User Key  (r) =  Recorded By, (t) = Taken By, (c) = Cosigned By      Initials Name Provider Type     Meera Doyle, OT Occupational Therapist                  Therapy Charges for Today       Code Description Service Date Service Provider Modifiers Qty    09224522167  OT THERAPEUTIC ACT EA 15 MIN 9/28/2023 Meera Doyle OT GO 2    [id number]  OT SELF CARE/MGMT/TRAIN EA 15 MIN 9/28/2023 Meera Doyle OT GO 1                 Meera Doyle OT  9/28/2023

## 2023-09-28 NOTE — CONSULTS
General Surgery Consultation Note    Date of Service: 9/28/2023  Kenneth Wen  [id number]  1950      Referring Provider: Falguni Freeman MD    Location of Consult: Inpatient     Reason for Consultation: Malnutrition and decreased p.o. intake, request for PEG placement       History of Present Illness:  I am seeing, Kenneth Wen, in consultation at request of Falguni Freeman MD regarding request for PEG placement.  He is a 72-year-old gentleman with history of hypertension, hyperlipidemia, diabetes, and GERD who was admitted to [facility] with sequelae of stroke.  He was initially admitted to our facility as a transfer from an outside hospital for CVA and neurosurgery evaluation.  He has been followed by neurosurgery and currently is on Plavix as monotherapy for his CVA.  He has had some difficulty with dysphagia, but has been cleared by speech therapy for mechanical ground diet with honey thick liquids.  His p.o. intake has been very poor however and his sodium is remained quite elevated due to decreased oral intake.  Additionally there has been difficulty with him dislodging support hardware.  He has apparently dislodged an NG tube in the past and is also dislodged multiple IVs.  He also tested positive for COVID, but has been asymptomatic from this and comes out of isolation precautions on the 30th.  Prior abdominal surgical history includes only appendectomy.  Wife is present at the bedside and helps provide history    Problems Addressed this Visit          Gastrointestinal Abdominal     Oropharyngeal dysphagia     Other Visit Diagnoses       Aphasia    -  Primary    Dysarthria              Diagnoses         Codes Comments    Aphasia    -  Primary ICD-10-CM: R47.01  ICD-9-CM: 784.3     Dysarthria     ICD-10-CM: R47.1  ICD-9-CM: 784.51     Oropharyngeal dysphagia     ICD-10-CM: R13.12  ICD-9-CM: 787.22             PMHx:  Past Medical History:   Diagnosis Date    Acid reflux     Cancer      Skin    Diabetes mellitus     Prediabetes    GERD (gastroesophageal reflux disease) 09/15/2023    HTN (hypertension) 09/15/2023    Kidney disease     T2DM (type 2 diabetes mellitus) 09/15/2023       Past Surgical History:  Past Surgical History:    APPENDECTOMY    HEMORRHOIDECTOMY    NASAL POLYP SURGERY       Allergies:  Allergies   Allergen Reactions    Cefdinir Unknown - Low Severity       Medications:  No current facility-administered medications on file prior to encounter.     Current Outpatient Medications on File Prior to Encounter   Medication Sig Dispense Refill    aspirin 81 MG tablet Take 81 mg by mouth Daily.      fexofenadine-pseudoephedrine (ALLEGRA-D)  MG per 12 hr tablet Take 1 tablet by mouth 2 (Two) Times a Day.      lansoprazole (PREVACID) 15 MG capsule Take 15 mg by mouth Daily.      QNASL 80 MCG/ACT aerosol solution   0    RA ALLERGY RELIEF 180 MG tablet Take 180 mg by mouth Daily.  0         Current Facility-Administered Medications:     acetaminophen (TYLENOL) tablet 650 mg, 650 mg, Oral, Q6H PRN **OR** acetaminophen (TYLENOL) suppository 650 mg, 650 mg, Rectal, Q4H PRN, Falguni Freeman MD    atorvastatin (LIPITOR) tablet 80 mg, 80 mg, Oral, Nightly, Falguni Freeman MD, 80 mg at 09/27/23 2101    Calcium Replacement - Follow Nurse / BPA Driven Protocol, , Does not apply, PRN, Vivek Proctor MD    clopidogrel (PLAVIX) tablet 75 mg, 75 mg, Oral, Daily, Falguni Freeman MD, 75 mg at 09/28/23 0915    dextrose (D50W) (25 g/50 mL) IV injection 25 g, 25 g, Intravenous, Q15 Min PRN, Vivek Proctor MD    dextrose (D50W) (25 g/50 mL) IV injection 25 g, 25 g, Intravenous, Q15 Min PRN, Kee Lovelace MD    dextrose (GLUTOSE) oral gel 15 g, 15 g, Oral, Q15 Min PRN, Kee Lovelace MD    dextrose 5 % and sodium chloride 0.45 % infusion, 100 mL/hr, Intravenous, Continuous, Marisel Almonte APRN, Last Rate: 100 mL/hr at 09/27/23 1555, 100 mL/hr at 09/27/23 1555    escitalopram (LEXAPRO)  tablet 10 mg, 10 mg, Oral, Daily, Falguni Freeman MD, 10 mg at 09/28/23 0915    glucagon (GLUCAGEN) injection 1 mg, 1 mg, Intramuscular, Q15 Min PRN, Vivek Proctor MD    ibuprofen (ADVIL,MOTRIN) 100 MG/5ML suspension 400 mg, 400 mg, Oral, Q6H PRN, Falguni Freeman MD    Insulin Lispro (humaLOG) injection 2-7 Units, 2-7 Units, Subcutaneous, 4x Daily AC & at Bedtime, Kee Lovelace MD, 2 Units at 09/26/23 2233    lansoprazole (PREVACID SOLUTAB) disintegrating tablet Tablet Delayed Release Dispersible 15 mg, 15 mg, Oral, Q AM, Vivek Proctor MD, 15 mg at 09/28/23 0518    Magnesium Standard Dose Replacement - Follow Nurse / BPA Driven Protocol, , Does not apply, Esthela CISNEROS Joseph C, MD    metoprolol tartrate (LOPRESSOR) tablet 25 mg, 25 mg, Oral, Q12H, Falguni Freeman MD, 25 mg at 09/28/23 0915    ondansetron (ZOFRAN) injection 4 mg, 4 mg, Intravenous, Q6H PRN, Vivek Proctor MD, 4 mg at 09/15/23 0114    Phosphorus Replacement - Follow Nurse / BPA Driven Protocol, , Does not apply, Esthela CISNEROS Joseph C, MD    Potassium Replacement - Follow Nurse / BPA Driven Protocol, , Does not apply, Esthela CISNEROS Joseph C, MD    QUEtiapine (SEROquel) tablet 25 mg, 25 mg, Oral, QAM, Falguni Freeman MD, 25 mg at 09/28/23 0915    QUEtiapine (SEROquel) tablet 50 mg, 50 mg, Oral, Nightly, Falguni Freeman MD, 50 mg at 09/27/23 2101    sodium chloride 0.9 % flush 10 mL, 10 mL, Intravenous, PRN, Vivek Proctor MD    sodium chloride 0.9 % flush 10 mL, 10 mL, Intravenous, Q12H, Vivek Proctor MD, 10 mL at 09/25/23 2234      Family History:  Family History   Problem Relation Age of Onset    No Known Problems Mother     Heart attack Father     No Known Problems Sister     No Known Problems Brother     No Known Problems Sister     No Known Problems Brother        Social History: Unable to obtain due to the patient's mental status      Review of Systems:   Unable to obtain due to the patient's mental status    BP  "114/74 (BP Location: Left arm, Patient Position: Sitting)   Pulse 77   Temp 97.5 °F (36.4 °C) (Axillary)   Resp 18   Ht 175.3 cm (69\")   Wt 72.8 kg (160 lb 7.9 oz)   SpO2 96%   BMI 23.70 kg/m²   Body mass index is 23.7 kg/m².    Gen: Awake, sitting up in the bedside chair, no obvious distress, disoriented but awake and moving his left side  Head: Normocephalic, atraumatic.   Eyes: Pupils equal, round, react to light and accommodation.   Mouth: Oral mucosa without lesions,   Neck: No masses, lymphadenopathy or carotid bruits bilaterally   CV: Rhythm and rate regular, no murmurs, rubs or gallops  Lungs: Clear to auscultation bilaterally, not labored on room air   Abdomen: Soft, no distress to palpation, scars consistent with prior laparoscopic surgery, not distended  Groin : No obvious hernias bilaterally   Extremities:  No cyanosis, clubbing or edema bilaterally  Lymphatics: No abnormal lymphadenopathy appreciated   Neurologic: No gross deficits, features consistent with history of encephalopathy and recent stroke        CBC  Results from last 7 days   Lab Units 09/27/23  0658   WBC 10*3/mm3 12.07*   HEMOGLOBIN g/dL 15.2   HEMATOCRIT % 45.5   PLATELETS 10*3/mm3 472*       CMP  Results from last 7 days   Lab Units 09/28/23  1052 09/27/23  1337 09/27/23  0658   SODIUM mmol/L 151*   < > 151*   POTASSIUM mmol/L 4.1   < > 3.7   CHLORIDE mmol/L 115*   < > 112*   CO2 mmol/L 26.0   < > 27.0   BUN mg/dL 35*   < > 32*   CREATININE mg/dL 0.93   < > 0.88   CALCIUM mg/dL 9.6   < > 9.5   BILIRUBIN mg/dL  --   --  0.8   ALK PHOS U/L  --   --  98   ALT (SGPT) U/L  --   --  55*   AST (SGOT) U/L  --   --  42*   GLUCOSE mg/dL 163*   < > 142*    < > = values in this interval not displayed.       Radiology  Imaging Results (Last 72 Hours)       ** No results found for the last 72 hours. **                Results Review: I have personally reviewed all of the recent lab and imaging results available at this time.     He is afebrile " and vital signs are stable    Most recent chemistries demonstrate a sodium of 151.  Bicarb is 26.  Creatinine is 0.9    He has no abdominal imaging to review    Assessment:  Mr. Wen is a 72-year-old gentleman with history of hypertension, hyperlipidemia, diabetes, and GERD who was admitted to [facility] with sequelae of stroke who I been asked to evaluate for PEG placement due to malnutrition and limited p.o. intake.  I had a long discussion with his wife at the bedside regarding the risk, benefits, and alternatives to the procedure.  My main concern at this point is that he has had difficulty with encephalopathy and dislodging support equipment including nasogastric tube and IVs.  I discussed with her that dislodgment of PEG tube can potentially be catastrophic, and that all measures will have to be taken afterwards to ensure that he is not able to dislodge this including maintaining the patient in restraints or at least with a 24-hour sitter for possibly several weeks following the procedure.  For now and currently has no IV access and has had difficulty with hyponatremia.  I discussed with his primary team as well as with the bedside nurse that he needs to have an IV placed and started on IV fluids to ensure no worsening of his electrolytes tomorrow and to have access for a procedure tomorrow.  His wife is understanding of all this and wishes to proceed forward with PEG placement tomorrow    Plan:  -Keep n.p.o. and hold tube feeds after midnight  -He needs to have an IV placed and started on IV fluids this afternoon to ensure electrolytes are appropriate and not worsened for sedation for procedure tomorrow and also to have IV access for procedure tomorrow. I discussed with his bedside nurse and primary team. Use restraints if necessary  -High risk for PEG dislodgement and will have to continue with restraints vs 24 hr sitter following procedure  -Tentative plan for PEG placement in Endo tomorrow  morning       I discussed the patient's findings and my recommendations with the patient and/or family, as well as the primary team     Haseeb Carrillo MD  09/28/23  15:48 EDT      Part of this note may be an electronic transcription/translation of spoken language to printed text using the Dragon Dictation System.

## 2023-09-28 NOTE — THERAPY TREATMENT NOTE
Acute Care - Speech Language Pathology Treatment Note  [facility]     Patient Name: Kenneth Wen  : 1950  MRN: [id number]  Today's Date: 2023               Admit Date: 9/15/2023     Visit Dx:    ICD-10-CM ICD-9-CM   1. Aphasia  R47.01 784.3   2. Dysarthria  R47.1 784.51   3. Oropharyngeal dysphagia  R13.12 787.22     Patient Active Problem List   Diagnosis    Precordial pain    Elevated BP without diagnosis of hypertension    Dyslipidemia    Hyperglycemia    Multiple bilateral CVAs    Hemorrhagic CVA    HFpEF    T2DM (type 2 diabetes mellitus)    HTN (hypertension)    GERD (gastroesophageal reflux disease)    ICH (intracerebral hemorrhage)    Oropharyngeal dysphagia     Past Medical History:   Diagnosis Date    Acid reflux     Cancer     Skin    Diabetes mellitus     Prediabetes    GERD (gastroesophageal reflux disease) 09/15/2023    HTN (hypertension) 09/15/2023    Kidney disease     T2DM (type 2 diabetes mellitus) 09/15/2023     Past Surgical History:   Procedure Laterality Date    APPENDECTOMY      HEMORRHOIDECTOMY      NASAL POLYP SURGERY         SLP Recommendation and Plan  SLP Diagnosis: moderate, aphasia, dysarthria (23 1500)           Swallow Criteria for Skilled Therapeutic Interventions Met: demonstrates skilled criteria (23 1500)  SLC Criteria for Skilled Therapy Interventions Met: yes (23 1500)  Anticipated Discharge Disposition (SLP): skilled nursing facility (23 1500)     Therapy Frequency (Swallow): 5 days per week (23 1500)  Predicted Duration Therapy Intervention (Days): until discharge (23 1500)  Oral Care Recommendations: Oral Care BID/PRN (23 1500)     Daily Summary of Progress (SLP): progress toward functional goals as expected (23 1500)           Treatment Assessment (SLP): continued, dysarthria, aphasia, oral dysphagia, suspected, pharyngeal dysphagia (23 1500)     Plan for Continued Treatment (SLP): continue treatment  per plan of care (09/28/23 1500)         SLP EVALUATION (last 72 hours)       SLP SLC Evaluation       Row Name 09/28/23 1500 09/27/23 8278                Communication Assessment/Intervention    Document Type therapy note (daily note)  -CH therapy note (daily note)  -CH       Subjective Information no complaints  -CH no complaints  -CH       Patient Observations lethargic;cooperative;agree to therapy  easily roused and participated in treatment  -CH alert;cooperative;agree to therapy  -CH       Patient/Family/Caregiver Comments/Observations spouse present  -CH none present  -CH       Patient Effort fair  -CH fair  -CH       Symptoms Noted During/After Treatment none  -CH none  -CH          General Information    Patient Profile Reviewed yes  -CH yes  -CH          Pain    Additional Documentation Pain Scale: FACES Pre/Post-Treatment (Group)  -CH Pain Scale: FACES Pre/Post-Treatment (Group)  -CH          Pain Scale: Word Pre/Post-Treatment    Pain: Word Scale, Pretreatment 0 - no pain  -CH 0 - no pain  -CH       Posttreatment Pain Rating 0 - no pain  -CH 0 - no pain  -CH          Pain Scale: FACES Pre/Post-Treatment    Pain: FACES Scale, Pretreatment -- 0-->no hurt  -CH       Posttreatment Pain Rating -- 0-->no hurt  -CH          SLP Evaluation Clinical Impressions    SLP Diagnosis moderate;aphasia;dysarthria  -CH moderate;aphasia;dysarthria  -CH       Rehab Potential/Prognosis good  -CH good  -CH       SLC Criteria for Skilled Therapy Interventions Met yes  -CH yes  -CH       Functional Impact difficulty communicating wants, needs;difficulty communicating in an emergency;difficulty in expressing complex messages;functional impact in ADLs  -CH difficulty communicating wants, needs;difficulty communicating in an emergency;difficulty in expressing complex messages;functional impact in ADLs  -CH          SLP Treatment Clinical Impressions    Treatment Assessment (SLP) continued;dysarthria;aphasia;oral  dysphagia;suspected;pharyngeal dysphagia  -CH continued;dysarthria;aphasia;oral dysphagia;suspected;pharyngeal dysphagia  -CH       Daily Summary of Progress (SLP) progress toward functional goals as expected  -CH progress toward functional goals as expected  -CH       Barriers to Overall Progress (SLP) Cognitive status;Medically complex  -CH Cognitive status;Medically complex  -CH       Plan for Continued Treatment (SLP) continue treatment per plan of care  -CH continue treatment per plan of care  -CH       Care Plan Review care plan/treatment goals reviewed;evaluation/treatment results reviewed  -CH care plan/treatment goals reviewed;risks/benefits reviewed  -CH       Care Plan Review, Other Participant(s) spouse  - --          Recommendations    Therapy Frequency (SLP [facility] [place] [facility]) 5 days per week  -CH 5 days per week  -CH       Predicted Duration Therapy Intervention (Days) until discharge  -CH until discharge  -CH       Anticipated Discharge Disposition (SLP) skilled nursing facility  - skilled nursing facility  -                 User Key  (r) = Recorded By, (t) = Taken By, (c) = Cosigned By      Initials Name Effective Dates    CH Vi Diop MS CCC-SLP 06/16/21 -                        EDUCATION  The patient has been educated in the following areas:     Cognitive Impairment Communication Impairment.           SLP GOALS       Row Name 09/28/23 1500 09/27/23 1315          (LTG) Patient will demonstrate functional swallow for    Diet Texture (Demonstrate functional swallow) soft to chew (chopped) textures  -CH soft to chew (chopped) textures  -CH     Liquid viscosity (Demonstrate functional swallow) nectar/ mildly thick liquids  -CH nectar/ mildly thick liquids  -CH     [place] (Demonstrate functional swallow) with minimal cues (75-90% accuracy)  -CH with minimal cues (75-90% accuracy)  -CH     Time Frame (Demonstrate functional swallow) by discharge  -CH by discharge  -CH     Progress/Outcomes  (Demonstrate functional swallow) continuing progress toward goal  -CH continuing progress toward goal  -CH     Comment (Demonstrate functional swallow) cough with thin and nectar thick by teaspoon  -CH cough with thin and nectar thick by teaspoon  -CH        (STG) Patient will tolerate therapeutic trials of    Consistencies Trialed (Tolerate therapeutic trials) pureed textures;thin liquids  -CH pureed textures;thin liquids  -CH     Desired Outcome (Tolerate therapeutic trials) without signs/symptoms of aspiration;without signs of distress  -CH without signs/symptoms of aspiration;without signs of distress  -CH     [place] (Tolerate therapeutic trials) with minimal cues (75-90% accuracy)  -CH with minimal cues (75-90% accuracy)  -CH     Time Frame (Tolerate therapeutic trials) by discharge  -CH by discharge  -CH     Progress/Outcomes (Tolerate therapeutic trials) continuing progress toward goal  -CH continuing progress toward goal  -CH     Comment (Tolerate therapeutic trials) No s/s with pureed trials, cough with thin by teaspoon  -CH No s/s with pureed trials, cough with thin by teaspoon  -CH        (STG) Labial Strengthening Goal 1 (SLP)    Activity (Labial Strengthening Goal 1, SLP) increase labial tone  -CH increase labial tone  -CH     Increase Labial Tone labial resistance exercises;swallow trials  -CH labial resistance exercises;swallow trials  -CH     [place]/Accuracy (Labial Strengthening Goal 1, SLP) with moderate cues (50-74% accuracy)  -CH with moderate cues (50-74% accuracy)  -CH     Time Frame (Labial Strengthening Goal 1, SLP) short term goal (STG)  -CH short term goal (STG)  -CH     Progress/Outcomes (Labial Strengthening Goal 1, SLP) progress slower than expected  -CH progress slower than expected  -CH     Comment (Labial Strengthening Goal 1, SLP) swallow trials w anterior loss with HT liquids.  -CH swallow trials w anterior loss with HT liquids.  -CH        (STG) Lingual Strengthening  Goal 1 (SLP)    Activity (Lingual Strengthening Goal 1, SLP) increase lingual tone/sensation/control/coordination/movement;increase tongue back strength  -CH increase lingual tone/sensation/control/coordination/movement;increase tongue back strength  -CH     Increase Lingual Tone/Sensation/Control/Coordination/Movement swallow trials;lingual resistance exercises  -CH swallow trials;lingual resistance exercises  -CH     Increase Tongue Back Strength lingual resistance exercises  -CH lingual resistance exercises  -CH     [place]/Accuracy (Lingual Strengthening Goal 1, SLP) with moderate cues (50-74% accuracy)  -CH with moderate cues (50-74% accuracy)  -CH     Time Frame (Lingual Strengthening Goal 1, SLP) short term goal (STG)  -CH short term goal (STG)  -CH     Progress/Outcomes (Lingual Strengthening Goal 1, SLP) progress slower than expected  -CH progress slower than expected  -CH     Comment (Lingual Strengthening Goal 1, SLP) completed lingual resistence with mod cues and models x 5 each  -CH completed lingual resistence with mod cues and models x 5 each  -CH        (STG) Pharyngeal Strengthening Exercise Goal 1 (SLP)    Activity (Pharyngeal Strengthening Goal 1, SLP) increase timing;increase superior movement of the hyolaryngeal complex;increase anterior movement of the hyolaryngeal complex;increase closure at entrance to airway/closure of airway at glottis;increase squeeze/positive pressure generation  -CH increase timing;increase superior movement of the hyolaryngeal complex;increase anterior movement of the hyolaryngeal complex;increase closure at entrance to airway/closure of airway at glottis;increase squeeze/positive pressure generation  -CH     Increase Timing prepping - 3 second prep or suck swallow or 3-step swallow  -CH prepping - 3 second prep or suck swallow or 3-step swallow  -CH     Increase Superior Movement of the Hyolaryngeal Complex falsetto  -CH falsetto  -CH     Increase Anterior  Movement of the Hyolaryngeal Complex chin tuck against resistance (CTAR)  -CH chin tuck against resistance (CTAR)  -CH     Increase Closure at Entrance to Airway/Closure of Airway at Glottis breath hold exercises;supraglottic swallow  -CH breath hold exercises;supraglottic swallow  -CH     Increase Squeeze/Positive Pressure Generation hard effortful swallow  -CH hard effortful swallow  -CH     [place]/Accuracy (Pharyngeal Strengthening Goal 1, SLP) with moderate cues (50-74% accuracy)  -CH with moderate cues (50-74% accuracy)  -CH     Time Frame (Pharyngeal Strengthening Goal 1, SLP) short term goal (STG)  -CH short term goal (STG)  -CH     Progress/Outcomes (Pharyngeal Strengthening Goal 1, SLP) goal ongoing  -CH progress slower than expected  -CH     Comment (Pharyngeal Strengthening Goal 1, SLP) -- completed CTAR and 123 prep exercise with mod cues. U/a to complete breath hold, SS, or hard swallow  -CH        Patient will demonstrate functional communication skills for return to discharge environment     [place] with moderate cues  -CH with moderate cues  -CH     Time frame by discharge  -CH by discharge  -CH     Progress/Outcomes continuing progress toward goal  -CH continuing progress toward goal  -CH        Comprehend Questions Goal 1 (SLP)    Improve Ability to Comprehend Questions Goal 1 (SLP) complex yes/no questions;80%;with minimal cues (75-90%)  -CH complex yes/no questions;80%;with minimal cues (75-90%)  -CH     Time Frame (Comprehend Questions Goal 1, SLP) short term goal (STG)  -CH short term goal (STG)  -CH     Progress (Ability to Comprehend Questions Goal 1, SLP) 20%;with moderate cues (50-74%)  -CH 20%;with moderate cues (50-74%)  -CH     Progress/Outcomes (Comprehend Questions Goal 1, SLP) continuing progress toward goal  -CH continuing progress toward goal  -CH        Follow Directions Goal 2 (SLP)    Improve Ability to Follow Directions Goal 1 (SLP) 2 step commands;80%;with minimal  cues (75-90%)  -CH 2 step commands;80%;with minimal cues (75-90%)  -CH     Time Frame (Follow Directions Goal 1, SLP) short term goal (STG)  -CH short term goal (STG)  -CH     Progress (Ability to Follow Directions Goal 1, SLP) 20%;with maximum cues (25-49%)  -CH 40%;with moderate cues (50-74%)  -CH     Progress/Outcomes (Follow Directions Goal 1, SLP) progress slower than expected  -CH progress slower than expected  -CH        Word Retrieval Skills Goal 1 (SLP)    Improve Word Retrieval Skills By Goal 1 (SLP) confrontational naming task;high frequency;responsive naming task;simple;80%;with minimal cues (75-90%)  -CH confrontational naming task;high frequency;responsive naming task;simple;80%;with minimal cues (75-90%)  -CH     Time Frame (Word Retrieval Goal 1, SLP) short term goal (STG)  -CH short term goal (STG)  -CH     Progress (Word Retrieval Skills Goal 1, SLP) 40%;with moderate cues (50-74%)  -CH 40%;with moderate cues (50-74%)  -CH     Progress/Outcomes (Word Retrieval Goal 1, SLP) continuing progress toward goal  -CH continuing progress toward goal  -CH     Comment (Word Retrieval Goal 1, SLP) -- responsive and confrontational naming  -CH        Articulation Goal 1 (SLP)    Improve Articulation Goal 1 (SLP) by over-articulating at phrase level;80%;with moderate cues (50-74%)  -CH by over-articulating at phrase level;80%;with moderate cues (50-74%)  -CH     Time Frame (Articulation Goal 1, SLP) short term goal (STG)  -CH short term goal (STG)  -CH     Progress (Articulation Goal 1, SLP) 50%;with maximum cues (25-49%)  -CH 60%;with maximum cues (25-49%)  -CH     Progress/Outcomes (Articulation Goal 1, SLP) continuing progress toward goal  -CH goal ongoing  -CH        Orientation Goal 1 (SLP)    Improve Orientation Through Goal 1 (SLP) demonstrating orientation to day;demonstrating orientation to month;demonstrating orientation to year;demonstrating orientation to place;demonstrating orientation to  disease/impairment;use environmental aids to assist with orientation;80%;with moderate cues (50-74%)  -CH demonstrating orientation to day;demonstrating orientation to month;demonstrating orientation to year;demonstrating orientation to place;demonstrating orientation to disease/impairment;use environmental aids to assist with orientation;80%;with moderate cues (50-74%)  -CH     Time Frame (Orientation Goal 1, SLP) short term goal (STG)  -CH short term goal (STG)  -CH     Progress (Orientation Goal 1, SLP) 20%;with maximum cues (25-49%)  -CH 20%;with maximum cues (25-49%)  -CH     Progress/Outcomes (Orientation Goal 1, SLP) progress slower than expected  -CH progress slower than expected  -CH     Comment (Orientation Goal 1, SLP) oriented to name and grossly to place (hospital)  -CH orianted to name and grossly to place (hospital)  -CH               User Key  (r) = Recorded By, (t) = Taken By, (c) = Cosigned By      Initials Name Provider Type     Vi Diop MS CCC-SLP Speech and Language Pathologist                            Time Calculation:      Time Calculation- SLP       Row Name 09/28/23 1555             Time Calculation- SLP    SLP Start Time 1500  -CH      SLP Received On 09/28/23  -         Untimed Charges    98075-KR Treatment/ST Modification Prosth Aug Alter  25  -      50525-BV Treatment Swallow Minutes 40  -CH         Total Minutes    Untimed Charges Total Minutes 65  -CH       Total Minutes 65  -CH                User Key  (r) = Recorded By, (t) = Taken By, (c) = Cosigned By      Initials Name Provider Type     Vi Diop MS CCC-SLP Speech and Language Pathologist                    Therapy Charges for Today       Code Description Service Date Service Provider Modifiers Qty    96673915318 HC ST TREATMENT SWALLOW 3 9/27/2023 Vi Diop MS CCC-SLP GN 1    44677495487 HC ST TREATMENT SPEECH 1 9/27/2023 Vi Diop MS CCC-SLP GN 1    [id number] HC ST TREATMENT  SPEECH 2 2023 DiopVi espinoza, MS CCC-SLP GN 1    [id number]  ST TREATMENT SWALLOW 3 2023 DiopVi espinoza, MS CCC-SLP GN 1                       Vi Jadon, MS CCC-SLP  2023   and Acute Care - Speech Language Pathology   Swallow Treatment Note  [facility]     Patient Name: Kenneth Wen  : 1950  MRN: [id number]  Today's Date: 2023               Admit Date: 9/15/2023    Visit Dx:     ICD-10-CM ICD-9-CM   1. Aphasia  R47.01 784.3   2. Dysarthria  R47.1 784.51   3. Oropharyngeal dysphagia  R13.12 787.22     Patient Active Problem List   Diagnosis    Precordial pain    Elevated BP without diagnosis of hypertension    Dyslipidemia    Hyperglycemia    Multiple bilateral CVAs    Hemorrhagic CVA    HFpEF    T2DM (type 2 diabetes mellitus)    HTN (hypertension)    GERD (gastroesophageal reflux disease)    ICH (intracerebral hemorrhage)    Oropharyngeal dysphagia     Past Medical History:   Diagnosis Date    Acid reflux     Cancer     Skin    Diabetes mellitus     Prediabetes    GERD (gastroesophageal reflux disease) 09/15/2023    HTN (hypertension) 09/15/2023    Kidney disease     T2DM (type 2 diabetes mellitus) 09/15/2023     Past Surgical History:   Procedure Laterality Date    APPENDECTOMY      HEMORRHOIDECTOMY      NASAL POLYP SURGERY         SLP Recommendation and Plan  SLP Swallowing Diagnosis: mod-severe, oral dysphagia, pharyngeal dysphagia (23 1500)  SLP Diet Recommendation: puree, honey thick liquids, other (see comments) (by teaspoon) (23 1500)  Recommended Precautions and Strategies: 1:1 supervision, upright posture during/after eating, small bites of food and sips of liquid (23 1500)  SLP Rec. for Method of Medication Administration: meds crushed, with puree (23 1500)     Monitor for Signs of Aspiration: notify SLP if any concerns (23 1500)     Swallow Criteria for Skilled Therapeutic Interventions Met: demonstrates skilled criteria  (09/28/23 1500)  Anticipated Discharge Disposition (SLP): skilled nursing facility (09/28/23 1500)  Rehab Potential/Prognosis, Swallowing: re-evaluate goals as necessary (09/28/23 1500)  Therapy Frequency (Swallow): 5 days per week (09/28/23 1500)  Predicted Duration Therapy Intervention (Days): until discharge (09/28/23 1500)  Oral Care Recommendations: Oral Care BID/PRN (09/28/23 1500)        Daily Summary of Progress (SLP): progress toward functional goals as expected (09/28/23 1500)               Treatment Assessment (SLP): continued, dysarthria, aphasia, oral dysphagia, suspected, pharyngeal dysphagia (09/28/23 1500)     Plan for Continued Treatment (SLP): continue treatment per plan of care (09/28/23 1500)       Oral Care Recommendations: Oral Care BID/PRN (09/28/23 1500)           SWALLOW EVALUATION (last 72 hours)       SLP Adult Swallow Evaluation       Row Name 09/28/23 1500 09/27/23 7578                SLP Evaluation Clinical Impression    SLP Swallowing Diagnosis mod-severe;oral dysphagia;pharyngeal dysphagia  - mod-severe;oral dysphagia;pharyngeal dysphagia  -       Functional Impact risk of aspiration/pneumonia;risk of malnutrition;risk of dehydration  - risk of aspiration/pneumonia;risk of malnutrition;risk of dehydration  -       Rehab Potential/Prognosis, Swallowing re-evaluate goals as necessary  - re-evaluate goals as necessary  -       Swallow Criteria for Skilled Therapeutic Interventions Met demonstrates skilled criteria  - demonstrates skilled criteria  -          Recommendations    Therapy Frequency (Swallow) 5 days per week  -CH 5 days per week  -CH       SLP Diet Recommendation puree;honey thick liquids;other (see comments)  by hermelindo  - puree;honey thick liquids;other (see comments)  by hermelindo  -       Recommended Precautions and Strategies 1:1 supervision;upright posture during/after eating;small bites of food and sips of liquid  - 1:1 supervision;upright posture  during/after eating;small bites of food and sips of liquid  -CH       Oral Care Recommendations Oral Care BID/PRN  -CH Oral Care BID/PRN  -CH       SLP Rec. for Method of Medication Administration meds crushed;with puree  -CH meds crushed;with puree  -CH       Monitor for Signs of Aspiration notify SLP if any concerns  -CH notify SLP if any concerns  -CH                 User Key  (r) = Recorded By, (t) = Taken By, (c) = Cosigned By      Initials Name Effective Dates    CH Vi Diop MS CCC-SLP 06/16/21 -                     EDUCATION  The patient has been educated in the following areas:   Home Exercise Program (HEP) Dysphagia (Swallowing Impairment) Oral Care/Hydration Modified Diet Instruction.        SLP GOALS       Row Name 09/28/23 1500 09/27/23 1315          (LTG) Patient will demonstrate functional swallow for    Diet Texture (Demonstrate functional swallow) soft to chew (chopped) textures  -CH soft to chew (chopped) textures  -CH     Liquid viscosity (Demonstrate functional swallow) nectar/ mildly thick liquids  -CH nectar/ mildly thick liquids  -CH     [place] (Demonstrate functional swallow) with minimal cues (75-90% accuracy)  -CH with minimal cues (75-90% accuracy)  -CH     Time Frame (Demonstrate functional swallow) by discharge  -CH by discharge  -CH     Progress/Outcomes (Demonstrate functional swallow) continuing progress toward goal  -CH continuing progress toward goal  -CH     Comment (Demonstrate functional swallow) cough with thin and nectar thick by teaspoon  -CH cough with thin and nectar thick by teaspoon  -CH        (STG) Patient will tolerate therapeutic trials of    Consistencies Trialed (Tolerate therapeutic trials) pureed textures;thin liquids  -CH pureed textures;thin liquids  -CH     Desired Outcome (Tolerate therapeutic trials) without signs/symptoms of aspiration;without signs of distress  -CH without signs/symptoms of aspiration;without signs of distress  -CH      [place] (Tolerate therapeutic trials) with minimal cues (75-90% accuracy)  -CH with minimal cues (75-90% accuracy)  -CH     Time Frame (Tolerate therapeutic trials) by discharge  -CH by discharge  -CH     Progress/Outcomes (Tolerate therapeutic trials) continuing progress toward goal  -CH continuing progress toward goal  -CH     Comment (Tolerate therapeutic trials) No s/s with pureed trials, cough with thin by teaspoon  -CH No s/s with pureed trials, cough with thin by teaspoon  -CH        (STG) Labial Strengthening Goal 1 (SLP)    Activity (Labial Strengthening Goal 1, SLP) increase labial tone  -CH increase labial tone  -CH     Increase Labial Tone labial resistance exercises;swallow trials  - labial resistance exercises;swallow trials  -CH     [place]/Accuracy (Labial Strengthening Goal 1, SLP) with moderate cues (50-74% accuracy)  -CH with moderate cues (50-74% accuracy)  -CH     Time Frame (Labial Strengthening Goal 1, SLP) short term goal (STG)  -CH short term goal (STG)  -CH     Progress/Outcomes (Labial Strengthening Goal 1, SLP) progress slower than expected  -CH progress slower than expected  -CH     Comment (Labial Strengthening Goal 1, SLP) swallow trials w anterior loss with HT liquids.  -CH swallow trials w anterior loss with HT liquids.  -CH        (STG) Lingual Strengthening Goal 1 (SLP)    Activity (Lingual Strengthening Goal 1, SLP) increase lingual tone/sensation/control/coordination/movement;increase tongue back strength  -CH increase lingual tone/sensation/control/coordination/movement;increase tongue back strength  -CH     Increase Lingual Tone/Sensation/Control/Coordination/Movement swallow trials;lingual resistance exercises  - swallow trials;lingual resistance exercises  -     Increase Tongue Back Strength lingual resistance exercises  - lingual resistance exercises  -CH     [place]/Accuracy (Lingual Strengthening Goal 1, SLP) with moderate cues (50-74% accuracy)  -  with moderate cues (50-74% accuracy)  -CH     Time Frame (Lingual Strengthening Goal 1, SLP) short term goal (STG)  -CH short term goal (STG)  -CH     Progress/Outcomes (Lingual Strengthening Goal 1, SLP) progress slower than expected  -CH progress slower than expected  -CH     Comment (Lingual Strengthening Goal 1, SLP) completed lingual resistence with mod cues and models x 5 each  -CH completed lingual resistence with mod cues and models x 5 each  -CH        (STG) Pharyngeal Strengthening Exercise Goal 1 (SLP)    Activity (Pharyngeal Strengthening Goal 1, SLP) increase timing;increase superior movement of the hyolaryngeal complex;increase anterior movement of the hyolaryngeal complex;increase closure at entrance to airway/closure of airway at glottis;increase squeeze/positive pressure generation  -CH increase timing;increase superior movement of the hyolaryngeal complex;increase anterior movement of the hyolaryngeal complex;increase closure at entrance to airway/closure of airway at glottis;increase squeeze/positive pressure generation  -CH     Increase Timing prepping - 3 second prep or suck swallow or 3-step swallow  -CH prepping - 3 second prep or suck swallow or 3-step swallow  -CH     Increase Superior Movement of the Hyolaryngeal Complex falsetto  -CH falsetto  -CH     Increase Anterior Movement of the Hyolaryngeal Complex chin tuck against resistance (CTAR)  -CH chin tuck against resistance (CTAR)  -CH     Increase Closure at Entrance to Airway/Closure of Airway at Glottis breath hold exercises;supraglottic swallow  -CH breath hold exercises;supraglottic swallow  -CH     Increase Squeeze/Positive Pressure Generation hard effortful swallow  -CH hard effortful swallow  -CH     [place]/Accuracy (Pharyngeal Strengthening Goal 1, SLP) with moderate cues (50-74% accuracy)  -CH with moderate cues (50-74% accuracy)  -CH     Time Frame (Pharyngeal Strengthening Goal 1, SLP) short term goal (STG)  -CH short  term goal (STG)  -CH     Progress/Outcomes (Pharyngeal Strengthening Goal 1, SLP) goal ongoing  -CH progress slower than expected  -CH     Comment (Pharyngeal Strengthening Goal 1, SLP) -- completed CTAR and 123 prep exercise with mod cues. U/a to complete breath hold, SS, or hard swallow  -CH        Patient will demonstrate functional communication skills for return to discharge environment     Star with moderate cues  -CH with moderate cues  -CH     Time frame by discharge  -CH by discharge  -CH     Progress/Outcomes continuing progress toward goal  -CH continuing progress toward goal  -CH        Comprehend Questions Goal 1 (SLP)    Improve Ability to Comprehend Questions Goal 1 (SLP) complex yes/no questions;80%;with minimal cues (75-90%)  -CH complex yes/no questions;80%;with minimal cues (75-90%)  -CH     Time Frame (Comprehend Questions Goal 1, SLP) short term goal (STG)  -CH short term goal (STG)  -CH     Progress (Ability to Comprehend Questions Goal 1, SLP) 20%;with moderate cues (50-74%)  -CH 20%;with moderate cues (50-74%)  -CH     Progress/Outcomes (Comprehend Questions Goal 1, SLP) continuing progress toward goal  -CH continuing progress toward goal  -CH        Follow Directions Goal 2 (SLP)    Improve Ability to Follow Directions Goal 1 (SLP) 2 step commands;80%;with minimal cues (75-90%)  -CH 2 step commands;80%;with minimal cues (75-90%)  -CH     Time Frame (Follow Directions Goal 1, SLP) short term goal (STG)  -CH short term goal (STG)  -CH     Progress (Ability to Follow Directions Goal 1, SLP) 20%;with maximum cues (25-49%)  -CH 40%;with moderate cues (50-74%)  -CH     Progress/Outcomes (Follow Directions Goal 1, SLP) progress slower than expected  -CH progress slower than expected  -CH        Word Retrieval Skills Goal 1 (SLP)    Improve Word Retrieval Skills By Goal 1 (SLP) confrontational naming task;high frequency;responsive naming task;simple;80%;with minimal cues (75-90%)  -CH  confrontational naming task;high frequency;responsive naming task;simple;80%;with minimal cues (75-90%)  -CH     Time Frame (Word Retrieval Goal 1, SLP) short term goal (STG)  -CH short term goal (STG)  -CH     Progress (Word Retrieval Skills Goal 1, SLP) 40%;with moderate cues (50-74%)  -CH 40%;with moderate cues (50-74%)  -CH     Progress/Outcomes (Word Retrieval Goal 1, SLP) continuing progress toward goal  -CH continuing progress toward goal  -CH     Comment (Word Retrieval Goal 1, SLP) -- responsive and confrontational naming  -CH        Articulation Goal 1 (SLP)    Improve Articulation Goal 1 (SLP) by over-articulating at phrase level;80%;with moderate cues (50-74%)  -CH by over-articulating at phrase level;80%;with moderate cues (50-74%)  -CH     Time Frame (Articulation Goal 1, SLP) short term goal (STG)  -CH short term goal (STG)  -CH     Progress (Articulation Goal 1, SLP) 50%;with maximum cues (25-49%)  -CH 60%;with maximum cues (25-49%)  -CH     Progress/Outcomes (Articulation Goal 1, SLP) continuing progress toward goal  -CH goal ongoing  -CH        Orientation Goal 1 (SLP)    Improve Orientation Through Goal 1 (SLP) demonstrating orientation to day;demonstrating orientation to month;demonstrating orientation to year;demonstrating orientation to place;demonstrating orientation to disease/impairment;use environmental aids to assist with orientation;80%;with moderate cues (50-74%)  - demonstrating orientation to day;demonstrating orientation to month;demonstrating orientation to year;demonstrating orientation to place;demonstrating orientation to disease/impairment;use environmental aids to assist with orientation;80%;with moderate cues (50-74%)  -CH     Time Frame (Orientation Goal 1, SLP) short term goal (STG)  -CH short term goal (STG)  -CH     Progress (Orientation Goal 1, SLP) 20%;with maximum cues (25-49%)  -CH 20%;with maximum cues (25-49%)  -CH     Progress/Outcomes (Orientation Goal 1, SLP)  progress slower than expected  -CH progress slower than expected  -CH     Comment (Orientation Goal 1, SLP) oriented to name and grossly to place (hospital)  -CH orianted to name and grossly to place (hospital)  -CH               User Key  (r) = Recorded By, (t) = Taken By, (c) = Cosigned By      Initials Name Provider Type    Vi Mcdermott MS CCC-SLP Speech and Language Pathologist                       Time Calculation:    Time Calculation- SLP       Row Name 09/28/23 1555             Time Calculation- SLP    SLP Start Time 1500  -CH      SLP Received On 09/28/23  -CH         Untimed Charges    36459-ML Treatment/ST Modification Prosth Aug Alter  25  -CH      59195-IM Treatment Swallow Minutes 40  -CH         Total Minutes    Untimed Charges Total Minutes 65  -CH       Total Minutes 65  -CH                User Key  (r) = Recorded By, (t) = Taken By, (c) = Cosigned By      Initials Name Provider Type    Vi Mcdermott MS CCC-SLP Speech and Language Pathologist                    Therapy Charges for Today       Code Description Service Date Service Provider Modifiers Qty    17112002888 HC ST TREATMENT SWALLOW 3 9/27/2023 Vi Diop MS CCC-SLP GN 1    49672057691 HC ST TREATMENT SPEECH 1 9/27/2023 Vi Diop MS CCC-SLP GN 1    53747380534 HC ST TREATMENT SPEECH 2 9/28/2023 Vi Diop MS CCC-SLP GN 1    [id number] HC ST TREATMENT SWALLOW 3 9/28/2023 Vi Diop MS CCC-SLP GN 1                 Vi Diop MS CCC-NIELS  9/28/2023

## 2023-09-28 NOTE — PLAN OF CARE
Goal Outcome Evaluation:  Plan of Care Reviewed With: patient        Progress: improving  Outcome Evaluation: Pt demonstrating good effort w/ sequencing for bed mobility. Pt continues to require assist for ADLs warranting continuation of skilled IP OT services. Continue to progress per current POC as able.      Anticipated Discharge Disposition (OT): inpatient rehabilitation facility

## 2023-09-29 ENCOUNTER — ANESTHESIA (OUTPATIENT)
Dept: GASTROENTEROLOGY | Facility: HOSPITAL | Age: 73
End: 2023-09-29
Payer: MEDICARE

## 2023-09-29 ENCOUNTER — ANESTHESIA EVENT (OUTPATIENT)
Dept: GASTROENTEROLOGY | Facility: HOSPITAL | Age: 73
End: 2023-09-29
Payer: MEDICARE

## 2023-09-29 LAB
ANION GAP SERPL CALCULATED.3IONS-SCNC: 11 MMOL/L (ref 5–15)
ANION GAP SERPL CALCULATED.3IONS-SCNC: 12 MMOL/L (ref 5–15)
BASOPHILS # BLD AUTO: 0.13 10*3/MM3 (ref 0–0.2)
BASOPHILS NFR BLD AUTO: 1 % (ref 0–1.5)
BUN SERPL-MCNC: 25 MG/DL (ref 8–23)
BUN SERPL-MCNC: 30 MG/DL (ref 8–23)
BUN/CREAT SERPL: 32.1 (ref 7–25)
BUN/CREAT SERPL: 35.7 (ref 7–25)
CALCIUM SPEC-SCNC: 8.9 MG/DL (ref 8.6–10.5)
CALCIUM SPEC-SCNC: 9.2 MG/DL (ref 8.6–10.5)
CHLORIDE SERPL-SCNC: 114 MMOL/L (ref 98–107)
CHLORIDE SERPL-SCNC: 116 MMOL/L (ref 98–107)
CO2 SERPL-SCNC: 23 MMOL/L (ref 22–29)
CO2 SERPL-SCNC: 24 MMOL/L (ref 22–29)
CREAT SERPL-MCNC: 0.78 MG/DL (ref 0.76–1.27)
CREAT SERPL-MCNC: 0.84 MG/DL (ref 0.76–1.27)
DEPRECATED RDW RBC AUTO: 43.8 FL (ref 37–54)
EGFRCR SERPLBLD CKD-EPI 2021: 93.2 ML/MIN/1.73
EGFRCR SERPLBLD CKD-EPI 2021: 95.3 ML/MIN/1.73
EOSINOPHIL # BLD AUTO: 0.24 10*3/MM3 (ref 0–0.4)
EOSINOPHIL NFR BLD AUTO: 1.8 % (ref 0.3–6.2)
ERYTHROCYTE [DISTWIDTH] IN BLOOD BY AUTOMATED COUNT: 12.8 % (ref 12.3–15.4)
GLUCOSE BLDC GLUCOMTR-MCNC: 113 MG/DL (ref 70–130)
GLUCOSE BLDC GLUCOMTR-MCNC: 127 MG/DL (ref 70–130)
GLUCOSE BLDC GLUCOMTR-MCNC: 180 MG/DL (ref 70–130)
GLUCOSE BLDC GLUCOMTR-MCNC: 87 MG/DL (ref 70–130)
GLUCOSE SERPL-MCNC: 123 MG/DL (ref 65–99)
GLUCOSE SERPL-MCNC: 138 MG/DL (ref 65–99)
HCT VFR BLD AUTO: 41.3 % (ref 37.5–51)
HGB BLD-MCNC: 13.7 G/DL (ref 13–17.7)
IMM GRANULOCYTES # BLD AUTO: 0.09 10*3/MM3 (ref 0–0.05)
IMM GRANULOCYTES NFR BLD AUTO: 0.7 % (ref 0–0.5)
LYMPHOCYTES # BLD AUTO: 1.98 10*3/MM3 (ref 0.7–3.1)
LYMPHOCYTES NFR BLD AUTO: 14.9 % (ref 19.6–45.3)
MCH RBC QN AUTO: 31.4 PG (ref 26.6–33)
MCHC RBC AUTO-ENTMCNC: 33.2 G/DL (ref 31.5–35.7)
MCV RBC AUTO: 94.5 FL (ref 79–97)
MONOCYTES # BLD AUTO: 0.89 10*3/MM3 (ref 0.1–0.9)
MONOCYTES NFR BLD AUTO: 6.7 % (ref 5–12)
NEUTROPHILS NFR BLD AUTO: 74.9 % (ref 42.7–76)
NEUTROPHILS NFR BLD AUTO: 9.97 10*3/MM3 (ref 1.7–7)
NRBC BLD AUTO-RTO: 0 /100 WBC (ref 0–0.2)
PLATELET # BLD AUTO: 426 10*3/MM3 (ref 140–450)
PMV BLD AUTO: 11.2 FL (ref 6–12)
POTASSIUM SERPL-SCNC: 3.6 MMOL/L (ref 3.5–5.2)
POTASSIUM SERPL-SCNC: 4.3 MMOL/L (ref 3.5–5.2)
RBC # BLD AUTO: 4.37 10*6/MM3 (ref 4.14–5.8)
SODIUM SERPL-SCNC: 149 MMOL/L (ref 136–145)
SODIUM SERPL-SCNC: 151 MMOL/L (ref 136–145)
WBC NRBC COR # BLD: 13.3 10*3/MM3 (ref 3.4–10.8)

## 2023-09-29 PROCEDURE — 25810000003 LACTATED RINGERS PER 1000 ML: Performed by: NURSE ANESTHETIST, CERTIFIED REGISTERED

## 2023-09-29 PROCEDURE — 85025 COMPLETE CBC W/AUTO DIFF WBC: CPT | Performed by: NURSE PRACTITIONER

## 2023-09-29 PROCEDURE — 25010000002 CEFAZOLIN PER 500 MG: Performed by: NURSE ANESTHETIST, CERTIFIED REGISTERED

## 2023-09-29 PROCEDURE — 82948 REAGENT STRIP/BLOOD GLUCOSE: CPT

## 2023-09-29 PROCEDURE — 63710000001 INSULIN REGULAR HUMAN PER 5 UNITS: Performed by: INTERNAL MEDICINE

## 2023-09-29 PROCEDURE — 80048 BASIC METABOLIC PNL TOTAL CA: CPT | Performed by: NURSE PRACTITIONER

## 2023-09-29 PROCEDURE — 0DH68UZ INSERTION OF FEEDING DEVICE INTO STOMACH, VIA NATURAL OR ARTIFICIAL OPENING ENDOSCOPIC: ICD-10-PCS | Performed by: SURGERY

## 2023-09-29 PROCEDURE — 25010000002 PROPOFOL 10 MG/ML EMULSION: Performed by: NURSE ANESTHETIST, CERTIFIED REGISTERED

## 2023-09-29 PROCEDURE — 99231 SBSQ HOSP IP/OBS SF/LOW 25: CPT | Performed by: NURSE PRACTITIONER

## 2023-09-29 RX ORDER — SODIUM CHLORIDE, SODIUM LACTATE, POTASSIUM CHLORIDE, CALCIUM CHLORIDE 600; 310; 30; 20 MG/100ML; MG/100ML; MG/100ML; MG/100ML
9 INJECTION, SOLUTION INTRAVENOUS CONTINUOUS
Status: CANCELLED | OUTPATIENT
Start: 2023-09-29

## 2023-09-29 RX ORDER — IPRATROPIUM BROMIDE AND ALBUTEROL SULFATE 2.5; .5 MG/3ML; MG/3ML
3 SOLUTION RESPIRATORY (INHALATION) ONCE AS NEEDED
Status: DISCONTINUED | OUTPATIENT
Start: 2023-09-29 | End: 2023-09-29 | Stop reason: HOSPADM

## 2023-09-29 RX ORDER — FAMOTIDINE 10 MG/ML
20 INJECTION, SOLUTION INTRAVENOUS ONCE
Status: CANCELLED | OUTPATIENT
Start: 2023-09-29 | End: 2023-09-29

## 2023-09-29 RX ORDER — LIDOCAINE HYDROCHLORIDE 10 MG/ML
INJECTION, SOLUTION EPIDURAL; INFILTRATION; INTRACAUDAL; PERINEURAL AS NEEDED
Status: DISCONTINUED | OUTPATIENT
Start: 2023-09-29 | End: 2023-10-18

## 2023-09-29 RX ORDER — SODIUM CHLORIDE 0.9 % (FLUSH) 0.9 %
10 SYRINGE (ML) INJECTION EVERY 12 HOURS SCHEDULED
Status: CANCELLED | OUTPATIENT
Start: 2023-09-29

## 2023-09-29 RX ORDER — FAMOTIDINE 20 MG/1
20 TABLET, FILM COATED ORAL ONCE
Status: CANCELLED | OUTPATIENT
Start: 2023-09-29 | End: 2023-09-29

## 2023-09-29 RX ORDER — CEFAZOLIN SODIUM 1 G/3ML
INJECTION, POWDER, FOR SOLUTION INTRAMUSCULAR; INTRAVENOUS AS NEEDED
Status: DISCONTINUED | OUTPATIENT
Start: 2023-09-29 | End: 2023-10-17

## 2023-09-29 RX ORDER — MIDAZOLAM HYDROCHLORIDE 1 MG/ML
0.5 INJECTION INTRAMUSCULAR; INTRAVENOUS
Status: CANCELLED | OUTPATIENT
Start: 2023-09-29

## 2023-09-29 RX ORDER — ONDANSETRON 2 MG/ML
4 INJECTION INTRAMUSCULAR; INTRAVENOUS ONCE AS NEEDED
Status: DISCONTINUED | OUTPATIENT
Start: 2023-09-29 | End: 2023-09-29 | Stop reason: HOSPADM

## 2023-09-29 RX ORDER — SODIUM CHLORIDE, SODIUM LACTATE, POTASSIUM CHLORIDE, CALCIUM CHLORIDE 600; 310; 30; 20 MG/100ML; MG/100ML; MG/100ML; MG/100ML
INJECTION, SOLUTION INTRAVENOUS CONTINUOUS PRN
Status: SHIPPED | OUTPATIENT
Start: 2023-09-29

## 2023-09-29 RX ORDER — BUPIVACAINE HCL/0.9 % NACL/PF 0.125 %
PLASTIC BAG, INJECTION (ML) EPIDURAL AS NEEDED
Status: DISCONTINUED | OUTPATIENT
Start: 2023-09-29 | End: 2023-10-18

## 2023-09-29 RX ORDER — SODIUM CHLORIDE 0.9 % (FLUSH) 0.9 %
10 SYRINGE (ML) INJECTION AS NEEDED
Status: CANCELLED | OUTPATIENT
Start: 2023-09-29

## 2023-09-29 RX ORDER — LIDOCAINE HYDROCHLORIDE 10 MG/ML
0.5 INJECTION, SOLUTION EPIDURAL; INFILTRATION; INTRACAUDAL; PERINEURAL ONCE AS NEEDED
Status: CANCELLED | OUTPATIENT
Start: 2023-09-29

## 2023-09-29 RX ORDER — SODIUM CHLORIDE 9 MG/ML
40 INJECTION, SOLUTION INTRAVENOUS AS NEEDED
Status: CANCELLED | OUTPATIENT
Start: 2023-09-29

## 2023-09-29 RX ORDER — PROPOFOL 10 MG/ML
VIAL (ML) INTRAVENOUS AS NEEDED
Status: DISCONTINUED | OUTPATIENT
Start: 2023-09-29 | End: 2023-10-18

## 2023-09-29 RX ADMIN — Medication 100 MCG: at 14:40

## 2023-09-29 RX ADMIN — QUETIAPINE FUMARATE 50 MG: 25 TABLET ORAL at 08:39

## 2023-09-29 RX ADMIN — Medication 10 ML: at 20:31

## 2023-09-29 RX ADMIN — METOPROLOL TARTRATE 25 MG: 25 TABLET, FILM COATED ORAL at 20:30

## 2023-09-29 RX ADMIN — LANSOPRAZOLE 15 MG: 15 TABLET, ORALLY DISINTEGRATING ORAL at 05:28

## 2023-09-29 RX ADMIN — CEFAZOLIN SODIUM 2 G: 1 INJECTION, POWDER, FOR SOLUTION INTRAMUSCULAR; INTRAVENOUS at 14:39

## 2023-09-29 RX ADMIN — Medication 100 MG: at 14:36

## 2023-09-29 RX ADMIN — DEXTROSE AND SODIUM CHLORIDE 125 ML/HR: 5; 450 INJECTION, SOLUTION INTRAVENOUS at 05:28

## 2023-09-29 RX ADMIN — DEXTROSE AND SODIUM CHLORIDE 125 ML/HR: 5; 450 INJECTION, SOLUTION INTRAVENOUS at 12:33

## 2023-09-29 RX ADMIN — Medication 10 ML: at 08:40

## 2023-09-29 RX ADMIN — INSULIN HUMAN 2 UNITS: 100 INJECTION, SOLUTION PARENTERAL at 12:33

## 2023-09-29 RX ADMIN — Medication 10 ML: at 08:39

## 2023-09-29 RX ADMIN — QUETIAPINE FUMARATE 75 MG: 25 TABLET ORAL at 20:30

## 2023-09-29 RX ADMIN — SODIUM CHLORIDE, SODIUM LACTATE, POTASSIUM CHLORIDE, CALCIUM CHLORIDE: 600; 310; 30; 20 INJECTION, SOLUTION INTRAVENOUS at 14:24

## 2023-09-29 RX ADMIN — ESCITALOPRAM OXALATE 10 MG: 10 TABLET ORAL at 08:39

## 2023-09-29 RX ADMIN — LIDOCAINE HYDROCHLORIDE 50 MG: 10 INJECTION, SOLUTION EPIDURAL; INFILTRATION; INTRACAUDAL; PERINEURAL at 14:36

## 2023-09-29 RX ADMIN — ATORVASTATIN CALCIUM 80 MG: 40 TABLET, FILM COATED ORAL at 20:30

## 2023-09-29 RX ADMIN — METOPROLOL TARTRATE 25 MG: 25 TABLET, FILM COATED ORAL at 08:39

## 2023-09-29 RX ADMIN — CLOPIDOGREL BISULFATE 75 MG: 75 TABLET ORAL at 08:39

## 2023-09-29 NOTE — ANESTHESIA PREPROCEDURE EVALUATION
Anesthesia Evaluation                  Airway   Mallampati: I  TM distance: >3 FB  Neck ROM: full  No difficulty expected  Dental      Pulmonary    Cardiovascular     ECG reviewed    (+) hypertension      Neuro/Psych  (+) CVA  GI/Hepatic/Renal/Endo    (+) GERD, renal disease, diabetes mellitus    Musculoskeletal     Abdominal    Substance History      OB/GYN          Other      history of cancer                    Anesthesia Plan    ASA 3     general     intravenous induction     Anesthetic plan, risks, benefits, and alternatives have been provided, discussed and informed consent has been obtained with: patient.    Plan discussed with CRNA.    CODE STATUS:    Code Status (Patient has no pulse and is not breathing): CPR (Attempt to Resuscitate)  Medical Interventions (Patient has pulse or is breathing): Full Support

## 2023-09-29 NOTE — OP NOTE
"Operative Report    Patient Name:  Kenneth Wen  YOB: 1950  [id number]    9/29/2023        PREOPERATIVE DIAGNOSIS:  Feeding difficulty       POSTOPERATIVE DIAGNOSIS: Same      PROCEDURE PERFORMED: EGD with PEG placement       SURGEON: Haseeb Carrillo MD      ASSISTANT: None       ANESTHESIA: Monitored anesthesia care    ESTIMATED BLOOD LOSS: Less than 10 mL      SPECIMENS: None      FINDINGS: 20 Bahamian PEG tube placed at the 3 cm level      INDICATIONS:       The patient is a 72 y.o. year old male with a history of feeding difficulty who we have been asked to see for long term feeding access. The risks and benefits of EGD with PEG placement were discussed at length with the patient and the patient's family and they agreed to proceed.      DESCRIPTION OF PROCEDURE:     After obtaining informed consent, the patient was taken to the endoscopy suite. They were placed in supine position with the head of bed elevated, and after appropriate sedation as detailed above, a bite block was placed into the patient's mouth. The endoscope was advanced into the mouth and into the esophagus without difficulty. It was advanced into the stomach, and into the duodenum, which was normal . The scope was then returned to the stomach, and the stomach was maximally insufflated. An appropriate site for PEG placement was then determined by one-to-one palpation, transillumination, and the \"safe track\" needle technique. This area was prepped and draped in standard sterile fashion, and after infiltrating the skin with local anesthetic, a 7mm skin incision was made in this location. A needle was advanced through this incision and into the stomach under endoscopic guidance. A wire was then placed through the needle, grasped with the endoscope, and brought out through the mouth. At this point, using the pull-through technique, a 20 Bahamian PEG tube was brought through the abdominal wall in this location. The endoscope was " again advanced through the mouth and esophagus and into the stomach, where the PEG bumper could be seen abutting the anterior gastric wall in standard fashion without bleeding. The endoscope was then used to desufflate the stomach, and removed from the patient's mouth without difficulty. The PEG tube was secured with the bumper at the 3 cm level. It was dressed in the standard sterile fashion, and placed to gravity drainage. The patient tolerated the procedure well. They were then transported to the recovery room in stable condition. All needle, instrument, and sponge counts were correct at the completion of the case. There were no immediate complications. I was present for the entire procedure.       Haseeb Carrillo MD  9/29/2023  14:55 EDT

## 2023-09-29 NOTE — SIGNIFICANT NOTE
09/29/23 1251   SLP Deferred Reason   SLP Deferred Reason   (Pt NPO for possible PEG placement this afternoon. SLP will hold tx and resume as appropriate. Called and spoke with RN.)

## 2023-09-29 NOTE — CASE MANAGEMENT/SOCIAL WORK
Continued Stay Note  [facility]     Patient Name: Kenneth Wen  MRN: [id number]  Today's Date: 9/29/2023    Admit Date: 9/15/2023    Plan: [facility]   Discharge Plan       Row Name 09/29/23 1409       Plan    Plan [facility]    Patient/Family in Agreement with Plan yes    Plan Comments CM following up discharge planning. Spoke to April with [facility]. Awaiting approval for [facility]. CM will continue to follow.    Final Discharge Disposition Code 62 - inpatient rehab facility                   Discharge Codes    No documentation.                 Expected Discharge Date and Time       Expected Discharge Date Expected Discharge Time    Oct 2, 2023               Madelyn Beasley RN

## 2023-09-29 NOTE — PROGRESS NOTES
"Patient Name:  Kenneth Wen  YOB: 1950  [id number]    Surgery Progress Note    Date of visit: 9/29/2023      Subjective: No acute events overnight.  IV was placed.  IV fluids are infusing.  Patient remains disoriented in restraints          Objective:     /83 (BP Location: Right arm, Patient Position: Lying)   Pulse 69   Temp 98.6 °F (37 °C) (Oral)   Resp 16   Ht 175.3 cm (69\")   Wt 72.8 kg (160 lb 7.9 oz)   SpO2 94%   BMI 23.70 kg/m²     Intake/Output Summary (Last 24 hours) at 9/29/2023 0733  Last data filed at 9/28/2023 1100  Gross per 24 hour   Intake 120 ml   Output --   Net 120 ml       GEN:   Resting in bed, disoriented and intermittently writhing in the bed, in restraints  CV:   Regular rate and rhythm  L:  Symmetric expansion, not labored on room air  Abd:  Soft, not tender, not distended   Ext:  No cyanosis, clubbing, or edema    Recent labs that are back at this time have been reviewed.           Assessment/ Plan:    Mr. Wen is a 72-year-old gentleman with history of hypertension, hyperlipidemia, diabetes, and GERD who was admitted to [facility] with sequelae of stroke who I been asked to evaluate for PEG placement due to malnutrition and limited p.o. intake.     #Malnutrition, dysphagia  -IV was placed overnight.  IV fluids are infusing  -Please keep n.p.o.  -Tentative plan for PEG placement in Endo today depending on availability      Haseeb Carrillo MD  9/29/2023  07:33 EDT      "

## 2023-09-29 NOTE — PROGRESS NOTES
[facility] Medicine Services  PROGRESS NOTE    Patient Name: Kenneth Wen  : 1950  MRN: [id number]    Date of Admission: 9/15/2023  Primary Care Physician: Susan Powers APRN    Subjective   Subjective     CC:  F/U CVA    HPI:  Pt calmly resting in bed since increasing Seroquel. He is awake, confused. Spoke with the patient's wife this morning and she is agreeable for PEG tube placement. She is also agreeable to DNR status.     Objective   Objective     Vital Signs:   Temp:  [97.5 °F (36.4 °C)-98.6 °F (37 °C)] 98.2 °F (36.8 °C)  Heart Rate:  [59-93] 69  Resp:  [16-18] 17  BP: (114-132)/(73-83) 123/73     Physical Exam:  Constitutional: Awake, alert, chronically ill appearing  HENT: NCAT, mucous membranes dry  Respiratory: Clear to auscultation bilaterally, nonlabored  Cardiovascular: RRR, no murmurs, rubs, or gallop  Gastrointestinal: Positive bowel sounds, soft, nontender, nondistended  Musculoskeletal: No bilateral ankle edema  Psychiatric: Flat affect, calm today  Neurologic: Awake, confused, mumbles, right sided weakness  Skin: No rashes    Results Reviewed:  LAB RESULTS:      Lab 23  0646 23  0658 23  0450 23  0853 23  0725 23  0647   WBC 13.30* 12.07* 11.75* 11.80* 11.76* 13.43*   HEMOGLOBIN 13.7 15.2 14.2 15.1 13.5 14.5   HEMATOCRIT 41.3 45.5 43.0 45.3 41.1 44.8   PLATELETS 426 472* 425 428 365 313   NEUTROS ABS 9.97*  --   --  8.71* 9.02* 9.16*   IMMATURE GRANS (ABS) 0.09*  --   --  0.07* 0.08* 0.09*   LYMPHS ABS 1.98  --   --  1.85 1.43 2.27   MONOS ABS 0.89  --   --  0.85 0.86 1.24*   EOS ABS 0.24  --   --  0.22 0.28 0.55*   MCV 94.5 92.9 92.7 93.6 94.3 96.6   PROCALCITONIN  --   --   --   --  0.05  --            Lab 23  0646 23  1052 23  2226 23  1337 23  0658 23  0450 23  0853 23  0725 23  0647   SODIUM 151* 151* 152* 151* 151*   < > 149*   < > 144   POTASSIUM 4.3 4.1 3.9 4.7  3.7   < > 4.7   < > 4.6   CHLORIDE 116* 115* 115* 116* 112*   < > 111*   < > 112*   CO2 23.0 26.0 26.0 25.0 27.0   < > 26.0   < > 20.0*   ANION GAP 12.0 10.0 11.0 10.0 12.0   < > 12.0   < > 12.0   BUN 30* 35* 34* 35* 32*   < > 32*   < > 27*   CREATININE 0.84 0.93 0.82 0.83 0.88   < > 0.82   < > 0.70*   EGFR 93.2 87.8 93.9 93.6 91.9   < > 93.9   < > 98.5   GLUCOSE 138* 163* 180* 124* 142*   < > 149*   < > 123*   CALCIUM 9.2 9.6 9.1 9.7 9.5   < > 9.7   < > 9.1   IONIZED CALCIUM  --   --   --   --   --   --   --   --  1.33*   MAGNESIUM  --   --   --   --   --   --  2.6*  --  2.3   PHOSPHORUS  --   --   --   --   --   --  3.2  --  3.6    < > = values in this interval not displayed.           Lab 09/27/23  0658 09/25/23  0853   TOTAL PROTEIN 7.6 7.1   ALBUMIN 4.0 4.2   GLOBULIN 3.6 2.9   ALT (SGPT) 55* 59*   AST (SGOT) 42* 46*   BILIRUBIN 0.8 0.8   ALK PHOS 98 91               Lab 09/25/23  0853   CHOLESTEROL 109   TRIGLYCERIDES 65               Brief Urine Lab Results  (Last result in the past 365 days)        Color   Clarity   Blood   Leuk Est   Nitrite   Protein   CREAT   Urine HCG        09/15/23 1818 Yellow   Cloudy   Trace   Negative   Negative   Negative                   Microbiology Results Abnormal       Procedure Component Value - Date/Time    Blood Culture - Blood, Arm, Right [[id number]]  (Normal) Collected: 09/15/23 0212    Lab Status: Final result Specimen: Blood from Arm, Right Updated: 09/20/23 0230     Blood Culture No growth at 5 days    Blood Culture - Blood, Arm, Left [[id number]]  (Normal) Collected: 09/15/23 0212    Lab Status: Final result Specimen: Blood from Arm, Left Updated: 09/20/23 0230     Blood Culture No growth at 5 days            No radiology results from the last 24 hrs        Current medications:  Scheduled Meds:atorvastatin, 80 mg, Oral, Nightly  clopidogrel, 75 mg, Oral, Daily  escitalopram, 10 mg, Oral, Daily  insulin lispro, 2-7 Units, Subcutaneous, 4x Daily AC & at  Bedtime  lansoprazole, 15 mg, Oral, Q AM  metoprolol tartrate, 25 mg, Oral, Q12H  QUEtiapine, 50 mg, Oral, Daily  QUEtiapine, 75 mg, Oral, Nightly  sodium chloride, 10 mL, Intravenous, Q12H  sodium chloride, 10 mL, Intravenous, Q12H  sodium chloride, 10 mL, Intravenous, Q12H      Continuous Infusions:dextrose 5 % and sodium chloride 0.45 %, 125 mL/hr, Last Rate: 125 mL/hr (09/29/23 0528)    PRN Meds:.  acetaminophen **OR** acetaminophen    Calcium Replacement - Follow Nurse / BPA Driven Protocol    dextrose    dextrose    dextrose    glucagon (human recombinant)    ibuprofen    Magnesium Standard Dose Replacement - Follow Nurse / BPA Driven Protocol    ondansetron    Phosphorus Replacement - Follow Nurse / BPA Driven Protocol    Potassium Replacement - Follow Nurse / BPA Driven Protocol    sodium chloride    sodium chloride    sodium chloride    Assessment & Plan   Assessment & Plan     Active Hospital Problems    Diagnosis  POA    **Hemorrhagic CVA [I61.9]  Yes    Oropharyngeal dysphagia [R13.12]  Yes    Multiple bilateral CVAs [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        Brief Hospital Course to date:  Kenneth Wen is a 72 y.o. male with hx of HTN, HLD, T2DM, and GERD who presented to OSH with multiple acute ischemic infarcts of the bilateral cerebral and cerebellar hemispheres as well as left isaac. TTE/JOSIAS was negative PFO at OSH. On 9/13/23 he had worsening in mental status and new inability to move RLE, head CT showed evolution of the existing CVA with new development of petechial hemorrhage. DAPT was held for 24 hours per Neurology recommendations and repeat CT head that evening showed worsening effacement of the right quadrigeminal  cistern with suspected increasing upward supratentorial pressure. He was then transferred to [facility] for Neurosurgery evaluation on 9/15/23. He was started on hypertonic saline 9/15/23 through 9/20/23 for cerebral edema. He had fevers with negative cultures but had worsening secretions and labored breathing so was started on Zosyn on 9/16/23. Transferred to the hospitalist service on 9/22/23. Pt demonstrated poor oral intake with elevated sodium levels; therefore, PEG tube was recommended.     This patient's problems and plans were partially entered by my partner and updated as appropriate by me 09/29/23.     Copied text in this note has been reviewed and is accurate as of 09/29/23.        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 09/27 Started 1/2 NS but sodium stayed the same. Changed fluids to D51/2 NS at 100ml/hr for free fluid deficit of 3L. Pt pulled out the IV. Wife wishing for PEG tube for nutritional and fluid support. Consult GS, planning for PEG placement  --BMP in 8 hours      HTN  --Goal -160  --Continue Metoprolol 25 mg BID     GERD  --Continue Protonix     COVID-19  --Overall asymptomatic and on room air  --No infiltrates seen and low procal  --Did not receive any treatment   --Isolate x 10 days per hospital protocol -- through 9/30/23     Agitation, improved  --increased Seroquel to 50 mg in morning, 75 mg in evening     Leukocytosis  --Remains afebrile  --Procalcitionin low at 0.05  --CXR and UA negative  --Blood cultures negative  --WBC  improved and stable     Elevated LFT's, mild  --Possibly secondary to statin?  --Negative acute hepatitis panel  --repeat AST/ALT only slightly improved on 09/27     Expected Discharge Location and Transportation: rehab  Expected Discharge   Expected Discharge Date: 10/2/2023; Expected Discharge Time:      DVT prophylaxis:  Mechanical DVT prophylaxis orders are present.     AM-PAC 6 Clicks Score (PT): 11 (09/29/23 0839)    CODE STATUS:   Code Status and Medical Interventions:   Ordered at: 09/29/23 0854     Medical Intervention Limits:    NO intubation (DNI)     Code Status (Patient has no pulse and is not breathing):    No CPR (Do Not Attempt to Resuscitate)     Medical Interventions (Patient has pulse or is breathing):    Limited Support       Marisle Almonte, ROLANDO  09/29/23

## 2023-09-29 NOTE — ANESTHESIA POSTPROCEDURE EVALUATION
Patient: Kenneth Wen    Procedure Summary       Date: 09/29/23 Room / Location:  [facility] /  [facility]    Anesthesia Start: 1426 Anesthesia Stop:     Procedure: ESOPHAGOGASTRODUODENOSCOPY WITH PERCUTANEOUS ENDOSCOPIC GASTROSTOMY TUBE INSERTION (Esophagus) Diagnosis:     Surgeons: Haseeb Carrillo MD Provider: Donaldo Busch MD    Anesthesia Type: general ASA Status: 3            Anesthesia Type: general    Vitals  No vitals data found for the desired time range.          Post Anesthesia Care and Evaluation    Patient location during evaluation: PACU  Patient participation: complete - patient cannot participate  Level of consciousness: sleepy but conscious  Pain management: adequate    Airway patency: patent  Anesthetic complications: No anesthetic complications  PONV Status: none  Cardiovascular status: hemodynamically stable and acceptable  Respiratory status: nonlabored ventilation, acceptable, nasal cannula and oral airway  Hydration status: acceptable

## 2023-09-30 LAB
ANION GAP SERPL CALCULATED.3IONS-SCNC: 10 MMOL/L (ref 5–15)
ANION GAP SERPL CALCULATED.3IONS-SCNC: 7 MMOL/L (ref 5–15)
BUN SERPL-MCNC: 21 MG/DL (ref 8–23)
BUN SERPL-MCNC: 23 MG/DL (ref 8–23)
BUN/CREAT SERPL: 28.4 (ref 7–25)
BUN/CREAT SERPL: 32.3 (ref 7–25)
CALCIUM SPEC-SCNC: 8.6 MG/DL (ref 8.6–10.5)
CALCIUM SPEC-SCNC: 8.8 MG/DL (ref 8.6–10.5)
CHLORIDE SERPL-SCNC: 113 MMOL/L (ref 98–107)
CHLORIDE SERPL-SCNC: 116 MMOL/L (ref 98–107)
CO2 SERPL-SCNC: 25 MMOL/L (ref 22–29)
CO2 SERPL-SCNC: 26 MMOL/L (ref 22–29)
CREAT SERPL-MCNC: 0.65 MG/DL (ref 0.76–1.27)
CREAT SERPL-MCNC: 0.81 MG/DL (ref 0.76–1.27)
DEPRECATED RDW RBC AUTO: 41.6 FL (ref 37–54)
EGFRCR SERPLBLD CKD-EPI 2021: 100.7 ML/MIN/1.73
EGFRCR SERPLBLD CKD-EPI 2021: 94.3 ML/MIN/1.73
ERYTHROCYTE [DISTWIDTH] IN BLOOD BY AUTOMATED COUNT: 12.6 % (ref 12.3–15.4)
GLUCOSE BLDC GLUCOMTR-MCNC: 123 MG/DL (ref 70–130)
GLUCOSE BLDC GLUCOMTR-MCNC: 140 MG/DL (ref 70–130)
GLUCOSE BLDC GLUCOMTR-MCNC: 167 MG/DL (ref 70–130)
GLUCOSE SERPL-MCNC: 134 MG/DL (ref 65–99)
GLUCOSE SERPL-MCNC: 159 MG/DL (ref 65–99)
HCT VFR BLD AUTO: 36.3 % (ref 37.5–51)
HGB BLD-MCNC: 12.3 G/DL (ref 13–17.7)
MAGNESIUM SERPL-MCNC: 1.9 MG/DL (ref 1.6–2.4)
MCH RBC QN AUTO: 31.1 PG (ref 26.6–33)
MCHC RBC AUTO-ENTMCNC: 33.9 G/DL (ref 31.5–35.7)
MCV RBC AUTO: 91.9 FL (ref 79–97)
PHOSPHATE SERPL-MCNC: 2.4 MG/DL (ref 2.5–4.5)
PLATELET # BLD AUTO: 349 10*3/MM3 (ref 140–450)
PMV BLD AUTO: 11.6 FL (ref 6–12)
POTASSIUM SERPL-SCNC: 3.5 MMOL/L (ref 3.5–5.2)
POTASSIUM SERPL-SCNC: 3.8 MMOL/L (ref 3.5–5.2)
POTASSIUM SERPL-SCNC: 4.3 MMOL/L (ref 3.5–5.2)
RBC # BLD AUTO: 3.95 10*6/MM3 (ref 4.14–5.8)
SODIUM SERPL-SCNC: 144 MMOL/L (ref 136–145)
SODIUM SERPL-SCNC: 145 MMOL/L (ref 136–145)
SODIUM SERPL-SCNC: 147 MMOL/L (ref 136–145)
SODIUM SERPL-SCNC: 152 MMOL/L (ref 136–145)
WBC NRBC COR # BLD: 12.17 10*3/MM3 (ref 3.4–10.8)

## 2023-09-30 PROCEDURE — 99232 SBSQ HOSP IP/OBS MODERATE 35: CPT | Performed by: NURSE PRACTITIONER

## 2023-09-30 PROCEDURE — 80048 BASIC METABOLIC PNL TOTAL CA: CPT | Performed by: NURSE PRACTITIONER

## 2023-09-30 PROCEDURE — 83735 ASSAY OF MAGNESIUM: CPT | Performed by: NURSE PRACTITIONER

## 2023-09-30 PROCEDURE — 93005 ELECTROCARDIOGRAM TRACING: CPT | Performed by: NURSE PRACTITIONER

## 2023-09-30 PROCEDURE — 84132 ASSAY OF SERUM POTASSIUM: CPT | Performed by: INTERNAL MEDICINE

## 2023-09-30 PROCEDURE — 84100 ASSAY OF PHOSPHORUS: CPT | Performed by: NURSE PRACTITIONER

## 2023-09-30 PROCEDURE — 84295 ASSAY OF SERUM SODIUM: CPT | Performed by: NURSE PRACTITIONER

## 2023-09-30 PROCEDURE — 93010 ELECTROCARDIOGRAM REPORT: CPT | Performed by: INTERNAL MEDICINE

## 2023-09-30 PROCEDURE — 3E0G76Z INTRODUCTION OF NUTRITIONAL SUBSTANCE INTO UPPER GI, VIA NATURAL OR ARTIFICIAL OPENING: ICD-10-PCS | Performed by: NURSE PRACTITIONER

## 2023-09-30 PROCEDURE — 85027 COMPLETE CBC AUTOMATED: CPT | Performed by: INTERNAL MEDICINE

## 2023-09-30 PROCEDURE — 63710000001 INSULIN REGULAR HUMAN PER 5 UNITS: Performed by: INTERNAL MEDICINE

## 2023-09-30 PROCEDURE — 82948 REAGENT STRIP/BLOOD GLUCOSE: CPT

## 2023-09-30 RX ORDER — QUETIAPINE FUMARATE 25 MG/1
25 TABLET, FILM COATED ORAL NIGHTLY PRN
Status: DISCONTINUED | OUTPATIENT
Start: 2023-09-30 | End: 2023-10-02

## 2023-09-30 RX ORDER — AMINO ACIDS/PROTEIN HYDROLYS 11G-40/45
30 LIQUID IN PACKET (ML) ORAL DAILY
Status: DISCONTINUED | OUTPATIENT
Start: 2023-09-30 | End: 2023-10-03

## 2023-09-30 RX ORDER — SODIUM CHLORIDE 9 MG/ML
50 INJECTION, SOLUTION INTRAVENOUS CONTINUOUS
Status: DISCONTINUED | OUTPATIENT
Start: 2023-09-30 | End: 2023-10-16

## 2023-09-30 RX ORDER — POTASSIUM CHLORIDE 1.5 G/1.58G
40 POWDER, FOR SOLUTION ORAL EVERY 4 HOURS
Status: COMPLETED | OUTPATIENT
Start: 2023-09-30 | End: 2023-09-30

## 2023-09-30 RX ORDER — CASTOR OIL AND BALSAM, PERU 788; 87 MG/G; MG/G
1 OINTMENT TOPICAL EVERY 12 HOURS SCHEDULED
Status: DISCONTINUED | OUTPATIENT
Start: 2023-09-30 | End: 2023-10-06

## 2023-09-30 RX ADMIN — Medication 10 ML: at 10:11

## 2023-09-30 RX ADMIN — POTASSIUM CHLORIDE 40 MEQ: 1.5 POWDER, FOR SOLUTION ORAL at 17:47

## 2023-09-30 RX ADMIN — CLOPIDOGREL BISULFATE 75 MG: 75 TABLET ORAL at 10:09

## 2023-09-30 RX ADMIN — DEXTROSE AND SODIUM CHLORIDE 125 ML/HR: 5; 450 INJECTION, SOLUTION INTRAVENOUS at 10:36

## 2023-09-30 RX ADMIN — Medication 10 ML: at 10:12

## 2023-09-30 RX ADMIN — Medication 10 ML: at 19:54

## 2023-09-30 RX ADMIN — INSULIN HUMAN 2 UNITS: 100 INJECTION, SOLUTION PARENTERAL at 13:14

## 2023-09-30 RX ADMIN — ATORVASTATIN CALCIUM 80 MG: 40 TABLET, FILM COATED ORAL at 19:53

## 2023-09-30 RX ADMIN — QUETIAPINE FUMARATE 50 MG: 25 TABLET ORAL at 10:09

## 2023-09-30 RX ADMIN — METOPROLOL TARTRATE 25 MG: 25 TABLET, FILM COATED ORAL at 10:09

## 2023-09-30 RX ADMIN — CASTOR OIL AND BALSAM, PERU 1 APPLICATION: 788; 87 OINTMENT TOPICAL at 14:12

## 2023-09-30 RX ADMIN — METOPROLOL TARTRATE 25 MG: 25 TABLET, FILM COATED ORAL at 19:53

## 2023-09-30 RX ADMIN — ESCITALOPRAM OXALATE 10 MG: 10 TABLET ORAL at 10:09

## 2023-09-30 RX ADMIN — POTASSIUM CHLORIDE 40 MEQ: 1.5 POWDER, FOR SOLUTION ORAL at 14:12

## 2023-09-30 RX ADMIN — Medication 30 ML: at 17:38

## 2023-09-30 RX ADMIN — QUETIAPINE FUMARATE 75 MG: 25 TABLET ORAL at 19:53

## 2023-09-30 RX ADMIN — LANSOPRAZOLE 15 MG: 15 TABLET, ORALLY DISINTEGRATING ORAL at 05:49

## 2023-09-30 NOTE — PROGRESS NOTES
Clinical Nutrition     Nutrition Support Assessment  Reason for Visit: Follow-up protocol      Patient Name: Kenneth Wen  YOB: 1950  MRN: [id number]  Date of Encounter: 09/30/23 09:42 EDT  Admission date: 9/15/2023    Comments:   - Will start tube feeding via PEG tube.  Isosource 1.5 @ 25ml/hr, advance by 10ml Q6hrs to goal rate 55ml/hr. Prosource daily. Water flushes @ 30ml Q2hrs.    - Will transition to nocturnal once goal rate reached.    - Recommend D/C IVF once EN @ goal rate.        Nutrition Assessment   Admission Diagnosis:  ICH (intracerebral hemorrhage) [I61.9]      Problem List:    Hemorrhagic CVA    Dyslipidemia    Multiple bilateral CVAs    HFpEF    T2DM (type 2 diabetes mellitus)    HTN (hypertension)    GERD (gastroesophageal reflux disease)    ICH (intracerebral hemorrhage)    Oropharyngeal dysphagia        PMH:  He  has a past medical history of Acid reflux, Cancer, Diabetes mellitus, GERD (gastroesophageal reflux disease) (09/15/2023), HTN (hypertension) (09/15/2023), Kidney disease, and T2DM (type 2 diabetes mellitus) (09/15/2023).    PSH: He  has a past surgical history that includes Appendectomy; Nasal polyp surgery; and Hemorrhoid surgery.      Applicable Nutrition Concerns:   Skin:  Oral:  GI:      Applicable Interval History:   9/16 3% Hypertonic saline initiated  9/15 FEES:  SLP Swallowing Diagnosis: mod-severe, oral dysphagia, severe, pharyngeal dysphagia (09/15/23 1331)  SLP Diet Recommendation: NPO, temporary alternate methods of nutrition/hydration, other (see comments) (okay for 2-3 ice chips per hour as tolerated)   9/23-SLP Diet Recommendation: puree, honey thick liquids.  9/29- PEG tube placed. Consult for tube feeding assessment.     Reported/Observed/Food/Nutrition Related History:   9/30  Consult for tube feeding assessment.  Overall issues with sodium levels, fluid intake and confusion. Was getting IVF, however pulled out IV.  Wife asked for  "a PEG tube placement on 9/28.      Patient in COVID isolation, RD not able to visit in person.  No diet this morning (was made NPO for PEG tube placement). Discussed with RN re-order previous order. RD will start tube feeding.       9/25  Spoke w/RN who reports pt pulled out NGT Friday evening. RN states pt eating >/=75% of trays. Textures downgraded 9/23 to pureed.     9/22  Pt on diet though ate only sausage and some juice this am. RN states pt pocketing food still, had been happening in ICU per previous RD note. Discussed w/SLP.     9/18 RN reports pt on 3% saline therapy Na+ goal 145-155, Na+ w/I range, pt from OSH sent here d/t hemorrhagic conversion continues to have R weakness, confusion, tolerating TF. Uncertain if pt should have water flushes, these were dc'd after discussion w/ MD.     9/15  Per RN unclear if will start EN or if pt may progress to caden diet at this time.  No wt hx available today.     Anthropometrics     Admission Height 175.3 cm (69\") Documented at 09/15/2023 0122   Admission Weight 79.2 kg (174 lb 9.7 oz) Documented at 09/15/2023 0122     Height: Height: 175.3 cm (69\")  Last Filed Weight: Weight: 72.8 kg (160 lb 7.9 oz) (09/21/23 0500)  Method: Weight Method: Bed scale  BMI: BMI (Calculated): 23.7  BMI classification: Overweight: 25.0-29.9kg/m2      9/15/2023 9/17/2023   Weight     Weight (kg) 79.2 kg  82.5 kg    Weight (lbs) 174 lb 9.7 oz  181 lb 14.1 oz    Weight Method Bed scale  Bed scale        UBW: Per  lbs on 9/14/23  Note 172 lbs in 2018  Weight change: uncertain at this time    Labs    Labs Reviewed: Yes     Results from last 7 days   Lab Units 09/30/23  0808 09/30/23  0014 09/29/23  2054 09/27/23  1337 09/27/23  0658 09/26/23  0450 09/25/23  0853   GLUCOSE mg/dL 159* 134* 123*   < > 142*   < > 149*   BUN mg/dL 21 23 25*   < > 32*   < > 32*   CREATININE mg/dL 0.65* 0.81 0.78   < > 0.88   < > 0.82   SODIUM mmol/L 145 152* 149*   < > 151*   < > 149*   CHLORIDE mmol/L 113* " "116* 114*   < > 112*   < > 111*   POTASSIUM mmol/L 3.5 3.8 3.6   < > 3.7   < > 4.7   PHOSPHORUS mg/dL  --   --   --   --   --   --  3.2   MAGNESIUM mg/dL  --   --   --   --   --   --  2.6*   ALT (SGPT) U/L  --   --   --   --  55*  --  59*    < > = values in this interval not displayed.       Results from last 7 days   Lab Units 09/27/23  0658 09/25/23  0853   ALBUMIN g/dL 4.0 4.2   CHOLESTEROL mg/dL  --  109   TRIGLYCERIDES mg/dL  --  65       Results from last 7 days   Lab Units 09/30/23  0554 09/29/23  2334 09/29/23  1747 09/29/23  1143 09/29/23  0747 09/28/23 2009   GLUCOSE mg/dL 123 113 87 180* 127 133*     Lab Results   Lab Value Date/Time    HGBA1C 6.40 (H) 09/15/2023 0212               Medications    Medications Reviewed: Yes  Pertinent: insulin, prevacid, seroquel,   Infusion: D5 W, 1/2NS @ 125ml/hr   PRN:     Needs Assessment   Date: 9/30    Height used:Height: 175.3 cm (69\")  Weights used: 160lb/72.7kg      Estimated Calorie needs: ~ 1900 Kcal/day  Method:  Kcals/KG 25= 1818  Method:  MSJ 1479 x 1.3= 1923    Estimated Protein needs: ~ 95g PRO/day  Method: 1.3g/Kg= 95    Estimated Fluid needs: ~ ml/day   Per clinical status    Current Nutrition Prescription      PO: No diet order   Oral Nutrition Supplement: Magic cup daily   Intake: 43% x 78 meals      EN: None   Goal Rate:   Water Flushes:    Modular:   Route: PEG (9/29)  Tube:   At goal over: 22Hrs/day      Intake/Ouptut 24 hrs (0701 - 0700)   I&O's Reviewed: Yes     Last BM 9/30    Nutrition Diagnosis   Date: 9/15 Updated: 9/18, 9/30  Problem Inadequate oral intake    Etiology stroke   Signs/Symptoms NPO w ice chips per SLP, confusion, +EN   Status: PEG tube placed 9/29; consult for tube feeding     Date:  9/18 Updated:  Problem Swallowing difficulty/chewing difficulty   Etiology Bilateral strokes   Signs/Symptoms Oral-pharyngeal dysphagia per SLP FEES eval   Status: ongoing  Goal:   General: Nutrition support treatment  PO: Increase intake, Advace " diet as medically feasible/appropriate  EN/PN: Initiate EN  Post PEG tube placement.     Nutrition Intervention      Follow treatment progress, Care plan reviewed, Nutrition support order placed    - Will start tube feeding via PEG tube.  Isosource 1.5 @ 25ml/hr, advance by 10ml Q6hrs to goal rate 55ml/hr. Prosource daily. Water flushes @ 30ml Q2hrs.  This will provide: 1875kcals, 97g PRO, 18.4g fiber, 920ml water from EN + 330ml water flushes.    - Will transition to nocturnal once goal rate reached.    - Recommend D/C IVF once EN @ goal rate.      Monitoring/Evaluation:   Per protocol, I&O, Pertinent labs, Weight, Symptoms, Swallow function      Khushbu Castañeda, CORTEZ  Time Spent: 35 min

## 2023-09-30 NOTE — PROGRESS NOTES
"Patient Name:  Kenneth Wen  YOB: 1950  [id number]    Surgery Progress Note    Date of visit: 9/30/2023      Subjective: No issues overnight.  Remains in restraints.          Objective:     /81 (BP Location: Right arm, Patient Position: Lying)   Pulse 68   Temp 98 °F (36.7 °C) (Oral)   Resp 18   Ht 175.3 cm (69\")   Wt 72.8 kg (160 lb 7.9 oz)   SpO2 98%   BMI 23.70 kg/m²     Intake/Output Summary (Last 24 hours) at 9/30/2023 0841  Last data filed at 9/29/2023 1448  Gross per 24 hour   Intake 300 ml   Output --   Net 300 ml       GEN:   Awake, writhing in the bed, in restraints, disoriented  CV:   Regular rate and rhythm  L:  Symmetric expansion, not labored on room air  Abd:  Soft, nondistended, no obvious distress to palpation, PEG is in place in the left upper quadrant bolster at 3 cm at the skin  Ext:  No cyanosis, clubbing, or edema    Recent labs that are back at this time have been reviewed.           Assessment/ Plan:    Mr. Wen is a 72-year-old gentleman with history of hypertension, hyperlipidemia, diabetes, and GERD who was admitted to [facility] with sequelae of stroke who I been asked to evaluate for PEG placement due to malnutrition and limited p.o. intake.      #Malnutrition, dysphagia  -Postoperative day 1 after PEG placement. Bolster at 3 cm at the skin  -Ok to use PEG for medications and feeds   -Do not place gauze or pads between the bolster and the skin. Do not tighten the bolster  -Please ensure that PEG is flushed twice daily with 60 mL of water to maintain patency now and continue at discharge  -Ok to remove sutures around the PEG 14 days after the procedure   -I will sign off    ** Patient is high risk for PEG dislodgement due to his encephalopathy and the fact that he has dislodged support hardware on multiple occasions this admission. Please ensure patient has an abdominal binder X2 on at all times and frequent nursing checks to ensure that he " cannot access the PEG site. He should remain in restraints at all times or only have restraints off if he has a sitter for at least the next 2 weeks      Haseeb Carrillo MD  9/30/2023  08:41 EDT

## 2023-09-30 NOTE — PROGRESS NOTES
[facility] Medicine Services  PROGRESS NOTE    Patient Name: Kenneth Wen  : 1950  MRN: [id number]    Date of Admission: 9/15/2023  Primary Care Physician: Susan Powers APRN    Subjective   Subjective     CC:  F/u CVA    HPI:  Patient is resting in bed with family at bedside. He is very restless did not go to sleep until 3 am. Talked with wife by phone.       Objective   Objective     Vital Signs:   Temp:  [97.8 °F (36.6 °C)-98.9 °F (37.2 °C)] 98.9 °F (37.2 °C)  Heart Rate:  [61-82] 76  Resp:  [16-18] 18  BP: ()/(57-94) 122/82  Flow (L/min):  [2] 2     Physical Exam:  Constitutional: No acute distress, awake, alert  HENT: NCAT, mucous membranes dry   Respiratory: Clear to auscultation bilaterally, respiratory effort normal room air 100%  Cardiovascular: RRR, no murmurs, rubs, or gallops  Gastrointestinal: Positive bowel sounds, soft, nontender, nondistended, PEG with abd binder in place   Musculoskeletal: No bilateral ankle edema  Psychiatric: Appropriate affect, cooperative  Neurologic: Oriented x 1, moving ext, malou wrist restraints in place with mitts.  Skin: No rashes      Results Reviewed:  LAB RESULTS:      Lab 23  1023 23  0646 23  0658 23  0450 23  0853 23  0725   WBC 12.17* 13.30* 12.07* 11.75* 11.80* 11.76*   HEMOGLOBIN 12.3* 13.7 15.2 14.2 15.1 13.5   HEMATOCRIT 36.3* 41.3 45.5 43.0 45.3 41.1   PLATELETS 349 426 472* 425 428 365   NEUTROS ABS  --  9.97*  --   --  8.71* 9.02*   IMMATURE GRANS (ABS)  --  0.09*  --   --  0.07* 0.08*   LYMPHS ABS  --  1.98  --   --  1.85 1.43   MONOS ABS  --  0.89  --   --  0.85 0.86   EOS ABS  --  0.24  --   --  0.22 0.28   MCV 91.9 94.5 92.9 92.7 93.6 94.3   PROCALCITONIN  --   --   --   --   --  0.05         Lab 23  0808 23  0014 23  2054 23  0646 23  1052 23  0450 23  0853   SODIUM 145 152* 149* 151* 151*   < > 149*   POTASSIUM 3.5 3.8 3.6 4.3 4.1    < > 4.7   CHLORIDE 113* 116* 114* 116* 115*   < > 111*   CO2 25.0 26.0 24.0 23.0 26.0   < > 26.0   ANION GAP 7.0 10.0 11.0 12.0 10.0   < > 12.0   BUN 21 23 25* 30* 35*   < > 32*   CREATININE 0.65* 0.81 0.78 0.84 0.93   < > 0.82   EGFR 100.7 94.3 95.3 93.2 87.8   < > 93.9   GLUCOSE 159* 134* 123* 138* 163*   < > 149*   CALCIUM 8.6 8.8 8.9 9.2 9.6   < > 9.7   MAGNESIUM 1.9  --   --   --   --   --  2.6*   PHOSPHORUS 2.4*  --   --   --   --   --  3.2    < > = values in this interval not displayed.         Lab 09/27/23  0658 09/25/23  0853   TOTAL PROTEIN 7.6 7.1   ALBUMIN 4.0 4.2   GLOBULIN 3.6 2.9   ALT (SGPT) 55* 59*   AST (SGOT) 42* 46*   BILIRUBIN 0.8 0.8   ALK PHOS 98 91             Lab 09/25/23  0853   CHOLESTEROL 109   TRIGLYCERIDES 65             Brief Urine Lab Results  (Last result in the past 365 days)        Color   Clarity   Blood   Leuk Est   Nitrite   Protein   CREAT   Urine HCG        09/15/23 1818 Yellow   Cloudy   Trace   Negative   Negative   Negative                   Microbiology Results Abnormal       Procedure Component Value - Date/Time    Blood Culture - Blood, Arm, Right [[id number]]  (Normal) Collected: 09/15/23 0212    Lab Status: Final result Specimen: Blood from Arm, Right Updated: 09/20/23 0230     Blood Culture No growth at 5 days    Blood Culture - Blood, Arm, Left [[id number]]  (Normal) Collected: 09/15/23 0212    Lab Status: Final result Specimen: Blood from Arm, Left Updated: 09/20/23 0230     Blood Culture No growth at 5 days            No radiology results from the last 24 hrs        Current medications:  Scheduled Meds:atorvastatin, 80 mg, Oral, Nightly  clopidogrel, 75 mg, Oral, Daily  escitalopram, 10 mg, Oral, Daily  insulin regular, 2-7 Units, Subcutaneous, Q6H  lansoprazole, 15 mg, Oral, Q AM  metoprolol tartrate, 25 mg, Oral, Q12H  ProSource No Carb, 30 mL, Per G Tube, Daily  QUEtiapine, 50 mg, Oral, Daily  QUEtiapine, 75 mg, Oral, Nightly  sodium chloride, 10 mL, Intravenous,  Q12H  sodium chloride, 10 mL, Intravenous, Q12H  sodium chloride, 10 mL, Intravenous, Q12H      Continuous Infusions:dextrose 5 % and sodium chloride 0.45 %, 125 mL/hr, Last Rate: 125 mL/hr (09/30/23 1036)      PRN Meds:.  acetaminophen **OR** acetaminophen    Calcium Replacement - Follow Nurse / BPA Driven Protocol    dextrose    dextrose    dextrose    glucagon (human recombinant)    ibuprofen    Magnesium Standard Dose Replacement - Follow Nurse / BPA Driven Protocol    ondansetron    Phosphorus Replacement - Follow Nurse / BPA Driven Protocol    Potassium Replacement - Follow Nurse / BPA Driven Protocol    sodium chloride    sodium chloride    sodium chloride    Assessment & Plan   Assessment & Plan     Active Hospital Problems    Diagnosis  POA    **Hemorrhagic CVA [I61.9]  Yes    Oropharyngeal dysphagia [R13.12]  Yes    Multiple bilateral CVAs [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        Brief Hospital Course to date:  Kenneth Wen is a 72 y.o. male with hx of HTN, HLD, T2DM, and GERD who presented to OSH with multiple acute ischemic infarcts of the bilateral cerebral and cerebellar hemispheres as well as left isaac. TTE/JOSIAS was negative PFO at OSH. On 9/13/23 he had worsening in mental status and new inability to move RLE, head CT showed evolution of the existing CVA with new development of petechial hemorrhage. DAPT was held for 24 hours per Neurology recommendations and repeat CT head that evening showed worsening effacement of the right quadrigeminal cistern with suspected increasing upward supratentorial pressure. He was then transferred to [facility] for Neurosurgery evaluation on 9/15/23. He was started on hypertonic saline 9/15/23  through 9/20/23 for cerebral edema. He had fevers with negative cultures but had worsening secretions and labored breathing so was started on Zosyn on 9/16/23. Transferred to the hospitalist service on 9/22/23. Pt demonstrated poor oral intake with elevated sodium levels; therefore, PEG tube was recommended.      This patient's problems and plans were partially entered by my partner and updated as appropriate by me 09/30/23.       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 09/27 Started 1/2 NS but sodium stayed the same. Changed fluids to D51/2 NS at 100ml/hr for free fluid deficit of 3L. NA down to 145. Will change IVF's to NS and decrease rate to 50 ml to not drop his NA any further   --NA every 12 hours   -- risk of pulling out peg tube, abd binder to be in place at all times. Surgery recommends to remain in restraints at all times or if off has to have sitter for next 2 weeks      HTN  --Goal -160  --Continue Metoprolol 25 mg BID     GERD  --Continue Protonix     COVID-19  --Overall asymptomatic and on room air  --No infiltrates seen and low procal  --Did not receive any treatment   --Isolate x 10 days per hospital protocol -- through 9/30/23     Agitation, improved  --increased Seroquel to 50 mg in morning, 75 mg in evening  -- check Qtc and if stable will adjust seroquel dose     Leukocytosis  --Remains afebrile  --Procalcitionin low at 0.05  --CXR and UA negative  --Blood cultures negative  --WBC improved and stable     Elevated LFT's,  mild  --Possibly secondary to statin?  --Negative acute hepatitis panel  --repeat AST/ALT only slightly improved on 09/27       Expected Discharge Location and Transportation: rehab   Expected Discharge   Expected Discharge Date: 10/2/2023; Expected Discharge Time:      DVT prophylaxis:  Mechanical DVT prophylaxis orders are present.     AM-PAC 6 Clicks Score (PT): 11 (09/29/23 0839)    CODE STATUS:   Code Status and Medical Interventions:   Ordered at: 09/29/23 0854     Medical Intervention Limits:    NO intubation (DNI)     Code Status (Patient has no pulse and is not breathing):    No CPR (Do Not Attempt to Resuscitate)     Medical Interventions (Patient has pulse or is breathing):    Limited Support       Rabia Stallings, APRN  09/30/23

## 2023-10-01 LAB
GLUCOSE BLDC GLUCOMTR-MCNC: 126 MG/DL (ref 70–130)
GLUCOSE BLDC GLUCOMTR-MCNC: 150 MG/DL (ref 70–130)
GLUCOSE BLDC GLUCOMTR-MCNC: 170 MG/DL (ref 70–130)
GLUCOSE BLDC GLUCOMTR-MCNC: 188 MG/DL (ref 70–130)
GLUCOSE BLDC GLUCOMTR-MCNC: 198 MG/DL (ref 70–130)
QT INTERVAL: 410 MS
QTC INTERVAL: 463 MS

## 2023-10-01 PROCEDURE — 99232 SBSQ HOSP IP/OBS MODERATE 35: CPT | Performed by: NURSE PRACTITIONER

## 2023-10-01 PROCEDURE — 63710000001 INSULIN REGULAR HUMAN PER 5 UNITS: Performed by: SURGERY

## 2023-10-01 PROCEDURE — 82948 REAGENT STRIP/BLOOD GLUCOSE: CPT

## 2023-10-01 RX ORDER — QUETIAPINE FUMARATE 25 MG/1
25 TABLET, FILM COATED ORAL DAILY
Status: DISCONTINUED | OUTPATIENT
Start: 2023-10-02 | End: 2023-10-02

## 2023-10-01 RX ADMIN — INSULIN HUMAN 2 UNITS: 100 INJECTION, SOLUTION PARENTERAL at 18:15

## 2023-10-01 RX ADMIN — METOPROLOL TARTRATE 25 MG: 25 TABLET, FILM COATED ORAL at 22:10

## 2023-10-01 RX ADMIN — QUETIAPINE FUMARATE 50 MG: 25 TABLET ORAL at 09:44

## 2023-10-01 RX ADMIN — LANSOPRAZOLE 15 MG: 15 TABLET, ORALLY DISINTEGRATING ORAL at 06:31

## 2023-10-01 RX ADMIN — Medication 10 ML: at 09:45

## 2023-10-01 RX ADMIN — Medication 30 ML: at 09:43

## 2023-10-01 RX ADMIN — INSULIN HUMAN 2 UNITS: 100 INJECTION, SOLUTION PARENTERAL at 12:22

## 2023-10-01 RX ADMIN — INSULIN HUMAN 2 UNITS: 100 INJECTION, SOLUTION PARENTERAL at 00:36

## 2023-10-01 RX ADMIN — CASTOR OIL AND BALSAM, PERU 1 APPLICATION: 788; 87 OINTMENT TOPICAL at 09:45

## 2023-10-01 RX ADMIN — SODIUM CHLORIDE 50 ML/HR: 9 INJECTION, SOLUTION INTRAVENOUS at 01:57

## 2023-10-01 RX ADMIN — Medication 10 ML: at 22:10

## 2023-10-01 RX ADMIN — METOPROLOL TARTRATE 25 MG: 25 TABLET, FILM COATED ORAL at 09:44

## 2023-10-01 RX ADMIN — Medication 10 ML: at 22:00

## 2023-10-01 RX ADMIN — QUETIAPINE FUMARATE 75 MG: 25 TABLET ORAL at 22:09

## 2023-10-01 RX ADMIN — CLOPIDOGREL BISULFATE 75 MG: 75 TABLET ORAL at 09:44

## 2023-10-01 RX ADMIN — ATORVASTATIN CALCIUM 80 MG: 40 TABLET, FILM COATED ORAL at 22:10

## 2023-10-01 RX ADMIN — ESCITALOPRAM OXALATE 10 MG: 10 TABLET ORAL at 09:44

## 2023-10-01 NOTE — PROGRESS NOTES
[facility] Medicine Services  PROGRESS NOTE    Patient Name: Kenneth Wen  : 1950  MRN: [id number]    Date of Admission: 9/15/2023  Primary Care Physician: Susan Powers APRN    Subjective   Subjective     CC:  F/u CVA    HPI:  Patient is sleeping in bed in NAD. He is more relaxed and calm than yesterday. Not pulling at restraints. Per nurse slept well last pm    Objective   Objective     Vital Signs:   Temp:  [98.6 °F (37 °C)-99 °F (37.2 °C)] 98.9 °F (37.2 °C)  Heart Rate:  [68-81] 75  Resp:  [15-18] 18  BP: (108-132)/(62-78) 123/77     Physical Exam:  Constitutional: No acute distress, awake, alert  HENT: NCAT, mucous membranes dry   Respiratory: Clear to auscultation bilaterally, respiratory effort normal room air 100%  Cardiovascular: RRR, no murmurs, rubs, or gallops  Gastrointestinal: Positive bowel sounds, soft, nontender, nondistended, PEG with abd binder in place   Musculoskeletal: No bilateral ankle edema  Psychiatric: Appropriate affect, cooperative  Neurologic: Oriented x 1, moving ext, malou wrist restraints in place with mitts.  Skin: No rashes      Results Reviewed:  LAB RESULTS:      Lab 23  1023 23  0646 23  0658 23  0450 23  0853   WBC 12.17* 13.30* 12.07* 11.75* 11.80*   HEMOGLOBIN 12.3* 13.7 15.2 14.2 15.1   HEMATOCRIT 36.3* 41.3 45.5 43.0 45.3   PLATELETS 349 426 472* 425 428   NEUTROS ABS  --  9.97*  --   --  8.71*   IMMATURE GRANS (ABS)  --  0.09*  --   --  0.07*   LYMPHS ABS  --  1.98  --   --  1.85   MONOS ABS  --  0.89  --   --  0.85   EOS ABS  --  0.24  --   --  0.22   MCV 91.9 94.5 92.9 92.7 93.6         Lab 23  2112 23  1607 23  0808 23  0014 234 23  0646 23  1052 23  0450 23  0853   SODIUM 144 147* 145 152* 149* 151* 151*   < > 149*   POTASSIUM 4.3  --  3.5 3.8 3.6 4.3 4.1   < > 4.7   CHLORIDE  --   --  113* 116* 114* 116* 115*   < > 111*   CO2  --   --  25.0  26.0 24.0 23.0 26.0   < > 26.0   ANION GAP  --   --  7.0 10.0 11.0 12.0 10.0   < > 12.0   BUN  --   --  21 23 25* 30* 35*   < > 32*   CREATININE  --   --  0.65* 0.81 0.78 0.84 0.93   < > 0.82   EGFR  --   --  100.7 94.3 95.3 93.2 87.8   < > 93.9   GLUCOSE  --   --  159* 134* 123* 138* 163*   < > 149*   CALCIUM  --   --  8.6 8.8 8.9 9.2 9.6   < > 9.7   MAGNESIUM  --   --  1.9  --   --   --   --   --  2.6*   PHOSPHORUS  --   --  2.4*  --   --   --   --   --  3.2    < > = values in this interval not displayed.         Lab 09/27/23  0658 09/25/23  0853   TOTAL PROTEIN 7.6 7.1   ALBUMIN 4.0 4.2   GLOBULIN 3.6 2.9   ALT (SGPT) 55* 59*   AST (SGOT) 42* 46*   BILIRUBIN 0.8 0.8   ALK PHOS 98 91             Lab 09/25/23  0853   CHOLESTEROL 109   TRIGLYCERIDES 65             Brief Urine Lab Results  (Last result in the past 365 days)        Color   Clarity   Blood   Leuk Est   Nitrite   Protein   CREAT   Urine HCG        09/15/23 1818 Yellow   Cloudy   Trace   Negative   Negative   Negative                   Microbiology Results Abnormal       Procedure Component Value - Date/Time    Blood Culture - Blood, Arm, Right [[id number]]  (Normal) Collected: 09/15/23 0212    Lab Status: Final result Specimen: Blood from Arm, Right Updated: 09/20/23 0230     Blood Culture No growth at 5 days    Blood Culture - Blood, Arm, Left [[id number]]  (Normal) Collected: 09/15/23 0212    Lab Status: Final result Specimen: Blood from Arm, Left Updated: 09/20/23 0230     Blood Culture No growth at 5 days            No radiology results from the last 24 hrs        Current medications:  Scheduled Meds:atorvastatin, 80 mg, Oral, Nightly  castor oil-balsam peru, 1 application , Topical, Q12H  clopidogrel, 75 mg, Oral, Daily  escitalopram, 10 mg, Oral, Daily  insulin regular, 2-7 Units, Subcutaneous, Q6H  lansoprazole, 15 mg, Oral, Q AM  metoprolol tartrate, 25 mg, Oral, Q12H  ProSource No Carb, 30 mL, Per G Tube, Daily  QUEtiapine, 50 mg, Oral,  Daily  QUEtiapine, 75 mg, Oral, Nightly  sodium chloride, 10 mL, Intravenous, Q12H  sodium chloride, 10 mL, Intravenous, Q12H  sodium chloride, 10 mL, Intravenous, Q12H      Continuous Infusions:sodium chloride, 50 mL/hr, Last Rate: 50 mL/hr (10/01/23 0157)      PRN Meds:.  acetaminophen **OR** acetaminophen    Calcium Replacement - Follow Nurse / BPA Driven Protocol    dextrose    dextrose    dextrose    glucagon (human recombinant)    ibuprofen    Magnesium Standard Dose Replacement - Follow Nurse / BPA Driven Protocol    ondansetron    Phosphorus Replacement - Follow Nurse / BPA Driven Protocol    Potassium Replacement - Follow Nurse / BPA Driven Protocol    QUEtiapine    sodium chloride    sodium chloride    sodium chloride    Assessment & Plan   Assessment & Plan     Active Hospital Problems    Diagnosis  POA    **Hemorrhagic CVA [I61.9]  Yes    Oropharyngeal dysphagia [R13.12]  Yes    Multiple bilateral CVAs [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        Brief Hospital Course to date:  Kenneth Wen is a 72 y.o. male with hx of HTN, HLD, T2DM, and GERD who presented to OSH with multiple acute ischemic infarcts of the bilateral cerebral and cerebellar hemispheres as well as left isaac. TTE/JOSIAS was negative PFO at OSH. On 9/13/23 he had worsening in mental status and new inability to move RLE, head CT showed evolution of the existing CVA with new development of petechial hemorrhage. DAPT was held for 24 hours per Neurology recommendations and repeat CT head that evening showed worsening effacement of the right quadrigeminal cistern with suspected increasing upward supratentorial pressure. He was then transferred to [facility] for Neurosurgery  evaluation on 9/15/23. He was started on hypertonic saline 9/15/23 through 9/20/23 for cerebral edema. He had fevers with negative cultures but had worsening secretions and labored breathing so was started on Zosyn on 9/16/23. Transferred to the hospitalist service on 9/22/23. Pt demonstrated poor oral intake with elevated sodium levels; therefore, PEG tube was recommended.      This patient's problems and plans were partially entered by my partner and updated as appropriate by me 10/01/23.       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 Pt is s/p D 5 1/2NS and NA has now normalized. Fluids stopped since now received TF and free water    -- risk of pulling out peg tube, abd binder to be in place at all times. Surgery recommends to remain in restraints at all times or if off has to have sitter for next 2 weeks      HTN  --Goal -160  --Continue Metoprolol 25 mg BID     GERD  --Continue Protonix     COVID-19  --Overall asymptomatic and on room air  --No infiltrates seen and low procal  --Did not receive any treatment   --Isolate x 10 days per hospital protocol -- through 9/30/23     Agitation, improved  --Seroquel to 50 mg in morning, 75 mg in evening, prn seroquel 25 mg hs  -- pt seems more sedated during the day today, will decrease morning Seroquel to 25 mg   --  Qtc 463      Leukocytosis  --Remains afebrile  --Procalcitionin low at 0.05  --CXR and UA negative  --Blood cultures negative  --WBC improved and stable     Elevated LFT's, mild  --Possibly secondary to  statin?  --Negative acute hepatitis panel  --repeat AST/ALT only slightly improved on 09/27       Expected Discharge Location and Transportation: rehab   Expected Discharge   Expected Discharge Date: 10/2/2023; Expected Discharge Time:      DVT prophylaxis:  Mechanical DVT prophylaxis orders are present.     AM-PAC 6 Clicks Score (PT): 11 (09/30/23 1953)    CODE STATUS:   Code Status and Medical Interventions:   Ordered at: 09/29/23 0854     Medical Intervention Limits:    NO intubation (DNI)     Code Status (Patient has no pulse and is not breathing):    No CPR (Do Not Attempt to Resuscitate)     Medical Interventions (Patient has pulse or is breathing):    Limited Support       Rabia Stallings, APRN  10/01/23

## 2023-10-01 NOTE — PLAN OF CARE
Goal Outcome Evaluation:              Outcome Evaluation: Pt continues with Isosource 1.5 @55ml/hr with flush @30ml/hr, has tolerated feeding without any concerns noted. Family has been here at bedside during this shift. peg site was cleaned, has sutured in place no s/s of infection or redness around site. Pt has slept most of the day would wake up during care, able to answer questions when asked, denied pain or discomfort, he would be come fidgety with care. Continues with restraints, skin has been checked throughout the day with no redness or breakdown noted. Has alarms on and in place, call light within reach.

## 2023-10-01 NOTE — PROGRESS NOTES
Brief EN Review Note    Patient Name: Kenneth Wen  Date of Encounter: 10/01/23 11:04 EDT  MRN: [id number]  Admission date: 9/15/2023    Reason for visit: EN review . RD to continue to follow per protocol.     Information Obtained:  EN ordered yesterday, started via PEG tube.  @ goal rate this morning and being tolerated. Patient also with order for a diet.  Able to reach patient wife over the phone to get a better sense of patient intake. Patient wife reports she was present for dinner meal yesterday and patient only ate bites of applesauce.  Overall not much intake. Reports patient has been a little sleepy past couple of days and no showing that much interest in eating.  We discussed current EN Rx.  We felt it would be best to keep EN continuous vs nocturnal at this time.  Can switch to nocturnal if the events intake improves.     EMR reviewed:   Medication reviewed: yes   Labs reviewed:  yes     Current diet: Diet: Cardiac Diets, Diabetic Diets; Healthy Heart (2-3 Na+); Consistent Carbohydrate; Texture: Pureed (NDD 1); Fluid Consistency: Honey Thick  Supplement: Magic cup daily with lunch.   Intake: none     EN: IsoSource 1.5  Goal Rate: 55ml/hr  Water Flushes: 30ml Q2hrs   Modular: Prosource -no carb 1/day  Route: PEG  Tube: Unknown    At goal over: 20Hrs/day    Rx will supply:   Goal Volume 1210 mL/day     Flush Volume 330 mL/day     Energy 1875 Kcal/day  % Est Need   Protein 97 g/day  % Est Need   Fiber 18.4 g/day     Water in   mL     Total Water 1250 mL     Meet DRI Yes        --------------------------------------------------------------------------  Product/Rate verified at bedside: No  Infusing Rate at time of visit:    Average Delivery from Charting: Insufficient data    Intervention:  Follow treatment plan  Care plan reviewed  Continue with current EN regiment.       Follow up:   Per protocol      Khushbu Castañeda RD  11:04 EDT  Time: 20min

## 2023-10-01 NOTE — PLAN OF CARE
Goal Outcome Evaluation:              Outcome Evaluation: Pt was started on Isosource 1.5 via tube feeding today has tolerated feeding without any problems noted. Infusing has increased 10ml every hour till its current rate of 55ml/hr with 30ml/hr flush which he is tolerating feeding and flushes. Potassium has been replaced today without any concerns noted. Continues with IV fluids and restraints. Restraints have been checked and removed for skin inspection and ROM without any concerns. Family has been at bedside throughout the day. Call light within reach and alarms on and working.

## 2023-10-02 LAB
ALBUMIN SERPL-MCNC: 2.9 G/DL (ref 3.5–5.2)
ALBUMIN/GLOB SERPL: 1.5 G/DL
ALP SERPL-CCNC: 72 U/L (ref 39–117)
ALT SERPL W P-5'-P-CCNC: 36 U/L (ref 1–41)
ANION GAP SERPL CALCULATED.3IONS-SCNC: 14 MMOL/L (ref 5–15)
AST SERPL-CCNC: 34 U/L (ref 1–40)
BILIRUB SERPL-MCNC: 0.4 MG/DL (ref 0–1.2)
BUN SERPL-MCNC: 13 MG/DL (ref 8–23)
BUN/CREAT SERPL: 23.6 (ref 7–25)
CALCIUM SPEC-SCNC: 8 MG/DL (ref 8.6–10.5)
CHLORIDE SERPL-SCNC: 108 MMOL/L (ref 98–107)
CHOLEST SERPL-MCNC: 58 MG/DL (ref 0–200)
CO2 SERPL-SCNC: 18 MMOL/L (ref 22–29)
CREAT SERPL-MCNC: 0.55 MG/DL (ref 0.76–1.27)
CRP SERPL-MCNC: 1.03 MG/DL (ref 0–0.5)
EGFRCR SERPLBLD CKD-EPI 2021: 106 ML/MIN/1.73
GLOBULIN UR ELPH-MCNC: 1.9 GM/DL
GLUCOSE BLDC GLUCOMTR-MCNC: 105 MG/DL (ref 70–130)
GLUCOSE BLDC GLUCOMTR-MCNC: 110 MG/DL (ref 70–130)
GLUCOSE BLDC GLUCOMTR-MCNC: 137 MG/DL (ref 70–130)
GLUCOSE BLDC GLUCOMTR-MCNC: 149 MG/DL (ref 70–130)
GLUCOSE BLDC GLUCOMTR-MCNC: 159 MG/DL (ref 70–130)
GLUCOSE SERPL-MCNC: 131 MG/DL (ref 65–99)
MAGNESIUM SERPL-MCNC: 1.9 MG/DL (ref 1.6–2.4)
PHOSPHATE SERPL-MCNC: 2.3 MG/DL (ref 2.5–4.5)
POTASSIUM SERPL-SCNC: 4.4 MMOL/L (ref 3.5–5.2)
PREALB SERPL-MCNC: 14 MG/DL (ref 20–40)
PROT SERPL-MCNC: 4.8 G/DL (ref 6–8.5)
SODIUM SERPL-SCNC: 140 MMOL/L (ref 136–145)
TRIGL SERPL-MCNC: 43 MG/DL (ref 0–150)

## 2023-10-02 PROCEDURE — 84134 ASSAY OF PREALBUMIN: CPT | Performed by: NURSE PRACTITIONER

## 2023-10-02 PROCEDURE — 80053 COMPREHEN METABOLIC PANEL: CPT | Performed by: NURSE PRACTITIONER

## 2023-10-02 PROCEDURE — 84478 ASSAY OF TRIGLYCERIDES: CPT | Performed by: NURSE PRACTITIONER

## 2023-10-02 PROCEDURE — 86140 C-REACTIVE PROTEIN: CPT | Performed by: NURSE PRACTITIONER

## 2023-10-02 PROCEDURE — 25810000003 SODIUM CHLORIDE 0.9 % SOLUTION: Performed by: NURSE PRACTITIONER

## 2023-10-02 PROCEDURE — 99232 SBSQ HOSP IP/OBS MODERATE 35: CPT | Performed by: NURSE PRACTITIONER

## 2023-10-02 PROCEDURE — 82465 ASSAY BLD/SERUM CHOLESTEROL: CPT | Performed by: NURSE PRACTITIONER

## 2023-10-02 PROCEDURE — 97110 THERAPEUTIC EXERCISES: CPT

## 2023-10-02 PROCEDURE — 97112 NEUROMUSCULAR REEDUCATION: CPT

## 2023-10-02 PROCEDURE — 82948 REAGENT STRIP/BLOOD GLUCOSE: CPT

## 2023-10-02 PROCEDURE — 84100 ASSAY OF PHOSPHORUS: CPT | Performed by: NURSE PRACTITIONER

## 2023-10-02 PROCEDURE — 83735 ASSAY OF MAGNESIUM: CPT | Performed by: NURSE PRACTITIONER

## 2023-10-02 RX ORDER — QUETIAPINE FUMARATE 25 MG/1
25 TABLET, FILM COATED ORAL DAILY
Status: DISCONTINUED | OUTPATIENT
Start: 2023-10-03 | End: 2023-10-06

## 2023-10-02 RX ORDER — ACETAMINOPHEN 650 MG/1
650 SUPPOSITORY RECTAL EVERY 4 HOURS PRN
Status: DISCONTINUED | OUTPATIENT
Start: 2023-10-02 | End: 2023-10-05

## 2023-10-02 RX ORDER — ATORVASTATIN CALCIUM 40 MG/1
80 TABLET, FILM COATED ORAL NIGHTLY
Status: DISCONTINUED | OUTPATIENT
Start: 2023-10-02 | End: 2023-11-14

## 2023-10-02 RX ORDER — QUETIAPINE FUMARATE 25 MG/1
25 TABLET, FILM COATED ORAL NIGHTLY PRN
Status: DISCONTINUED | OUTPATIENT
Start: 2023-10-02 | End: 2023-10-02

## 2023-10-02 RX ORDER — QUETIAPINE FUMARATE 25 MG/1
25 TABLET, FILM COATED ORAL DAILY PRN
Status: DISCONTINUED | OUTPATIENT
Start: 2023-10-02 | End: 2023-10-13

## 2023-10-02 RX ORDER — CLOPIDOGREL BISULFATE 75 MG/1
75 TABLET ORAL DAILY
Status: DISCONTINUED | OUTPATIENT
Start: 2023-10-03 | End: 2023-11-16 | Stop reason: HOSPADM

## 2023-10-02 RX ORDER — QUETIAPINE FUMARATE 25 MG/1
75 TABLET, FILM COATED ORAL NIGHTLY
Status: DISCONTINUED | OUTPATIENT
Start: 2023-10-02 | End: 2023-10-10

## 2023-10-02 RX ORDER — ACETAMINOPHEN 325 MG/1
650 TABLET ORAL EVERY 6 HOURS PRN
Status: DISCONTINUED | OUTPATIENT
Start: 2023-10-02 | End: 2023-10-05

## 2023-10-02 RX ORDER — ESCITALOPRAM OXALATE 10 MG/1
10 TABLET ORAL DAILY
Status: DISCONTINUED | OUTPATIENT
Start: 2023-10-03 | End: 2023-10-09

## 2023-10-02 RX ORDER — PANTOPRAZOLE SODIUM 40 MG/10ML
40 INJECTION, POWDER, LYOPHILIZED, FOR SOLUTION INTRAVENOUS
Status: DISCONTINUED | OUTPATIENT
Start: 2023-10-03 | End: 2023-10-15

## 2023-10-02 RX ADMIN — ACETAMINOPHEN 650 MG: 325 TABLET ORAL at 00:30

## 2023-10-02 RX ADMIN — CASTOR OIL AND BALSAM, PERU 1 APPLICATION: 788; 87 OINTMENT TOPICAL at 21:26

## 2023-10-02 RX ADMIN — Medication 10 ML: at 16:50

## 2023-10-02 RX ADMIN — Medication 10 ML: at 09:14

## 2023-10-02 RX ADMIN — CASTOR OIL AND BALSAM, PERU 1 APPLICATION: 788; 87 OINTMENT TOPICAL at 11:38

## 2023-10-02 RX ADMIN — ACETAMINOPHEN 650 MG: 325 TABLET ORAL at 17:20

## 2023-10-02 RX ADMIN — QUETIAPINE FUMARATE 25 MG: 25 TABLET ORAL at 09:12

## 2023-10-02 RX ADMIN — QUETIAPINE FUMARATE 75 MG: 25 TABLET ORAL at 21:25

## 2023-10-02 RX ADMIN — SODIUM CHLORIDE 50 ML/HR: 9 INJECTION, SOLUTION INTRAVENOUS at 06:00

## 2023-10-02 RX ADMIN — ESCITALOPRAM OXALATE 10 MG: 10 TABLET ORAL at 09:12

## 2023-10-02 RX ADMIN — METOPROLOL TARTRATE 25 MG: 25 TABLET, FILM COATED ORAL at 21:24

## 2023-10-02 RX ADMIN — Medication 30 ML: at 09:15

## 2023-10-02 RX ADMIN — Medication 10 ML: at 21:26

## 2023-10-02 RX ADMIN — SODIUM CHLORIDE 50 ML/HR: 9 INJECTION, SOLUTION INTRAVENOUS at 20:38

## 2023-10-02 RX ADMIN — ATORVASTATIN CALCIUM 80 MG: 40 TABLET, FILM COATED ORAL at 21:24

## 2023-10-02 RX ADMIN — CLOPIDOGREL BISULFATE 75 MG: 75 TABLET ORAL at 09:15

## 2023-10-02 RX ADMIN — METOPROLOL TARTRATE 25 MG: 25 TABLET, FILM COATED ORAL at 09:12

## 2023-10-02 RX ADMIN — LANSOPRAZOLE 15 MG: 15 TABLET, ORALLY DISINTEGRATING ORAL at 09:12

## 2023-10-02 RX ADMIN — QUETIAPINE FUMARATE 25 MG: 25 TABLET ORAL at 17:20

## 2023-10-02 RX ADMIN — CASTOR OIL AND BALSAM, PERU 1 APPLICATION: 788; 87 OINTMENT TOPICAL at 17:45

## 2023-10-02 NOTE — THERAPY TREATMENT NOTE
Patient Name: Kenneth Wen  : 1950    MRN: [id number]                              Today's Date: 10/2/2023       Admit Date: 9/15/2023    Visit Dx:     ICD-10-CM ICD-9-CM   1. Aphasia  R47.01 784.3   2. Dysarthria  R47.1 784.51   3. Oropharyngeal dysphagia  R13.12 787.22     Patient Active Problem List   Diagnosis    Precordial pain    Elevated BP without diagnosis of hypertension    Dyslipidemia    Hyperglycemia    Multiple bilateral CVAs    Hemorrhagic CVA    HFpEF    T2DM (type 2 diabetes mellitus)    HTN (hypertension)    GERD (gastroesophageal reflux disease)    ICH (intracerebral hemorrhage)    Oropharyngeal dysphagia     Past Medical History:   Diagnosis Date    Acid reflux     Cancer     Skin    Diabetes mellitus     Prediabetes    GERD (gastroesophageal reflux disease) 09/15/2023    HTN (hypertension) 09/15/2023    Kidney disease     T2DM (type 2 diabetes mellitus) 09/15/2023     Past Surgical History:   Procedure Laterality Date    APPENDECTOMY      ENDOSCOPY W/ PEG TUBE PLACEMENT N/A 2023    Procedure: ESOPHAGOGASTRODUODENOSCOPY WITH PERCUTANEOUS ENDOSCOPIC GASTROSTOMY TUBE INSERTION;  Surgeon: Haseeb Carrillo MD;  Location: [facility] ENDOSCOPY;  Service: General;  Laterality: N/A;    HEMORRHOIDECTOMY      NASAL POLYP SURGERY        General Information       Row Name 10/02/23 143          Physical Therapy Time and Intention    Document Type therapy note (daily note)  -CD     Mode of Treatment physical therapy  -CD       Row Name 10/02/23 143          General Information    Patient Profile Reviewed yes  -CD     Existing Precautions/Restrictions fall  R SIDED WEAKNESS, PEG, R VISUAL GAZE PREFERENCE.  -CD     Barriers to Rehab medically complex;cognitive status;visual deficit  -CD       Row Name 10/02/23 143          Cognition    Orientation Status (Cognition) oriented to;person;disoriented to;place  DYSARTHRIA,  -CD       Row Name 10/02/23 1437          Safety Issues, Functional Mobility     Safety Issues Affecting Function (Mobility) ability to follow commands;awareness of need for assistance;insight into deficits/self-awareness;safety precaution awareness;safety precautions follow-through/compliance;sequencing abilities  -CD     Impairments Affecting Function (Mobility) balance;cognition;coordination;endurance/activity tolerance;grasp;motor control;motor planning;muscle tone abnormal;visual/perceptual;sensation/sensory awareness;postural/trunk control  -CD     Cognitive Impairments, Mobility Safety/Performance attention;awareness, need for assistance;insight into deficits/self-awareness;safety precaution awareness;safety precaution follow-through;sequencing abilities;problem-solving/reasoning  -CD     Comment, Safety Issues/Impairments (Mobility) CONTINUES WITH L INATTENTION.  -CD               User Key  (r) = Recorded By, (t) = Taken By, (c) = Cosigned By      Initials Name Provider Type    CD Claudia Duarte, PT Physical Therapist                   Mobility       Row Name 10/02/23 1443          Bed Mobility    Bed Mobility sidelying-sit  -CD     Rolling Left [place] (Bed Mobility) maximum assist (25% patient effort);2 person assist;verbal cues  -CD     Sidelying-Sit [place] (Bed Mobility) maximum assist (25% patient effort);2 person assist  -CD     Assistive Device (Bed Mobility) draw sheet;head of bed elevated  -CD     Comment, (Bed Mobility) ONCE SITTING DEMONSTRATES HEAVY POSTERIOR AND R LEAN BUT CAN IMPROVE WITH MAX VISUAL, VERBAL, AND TACTILE CUES  -CD       Row Name 10/02/23 1443          Transfers    Comment, (Transfers) COMPLETED STAND PIVOT BED TO CHAIR F/B STS TRANSFER FROM CHAIR VIA B UE SUPPORT AND GAIT BELT.  -CD       Row Name 10/02/23 1443          Bed-Chair Transfer    Bed-Chair [place] (Transfers) maximum assist (25% patient effort);2 person assist;verbal cues  -CD     Assistive Device (Bed-Chair Transfers) --  B UE SUPPORT.  -CD       Row Name 10/02/23 1449           Sit-Stand Transfer    Sit-Stand [place] (Transfers) maximum assist (25% patient effort);2 person assist;verbal cues  -CD     Assistive Device (Sit-Stand Transfers) --  VIA B UE SUPPORT.  -CD     Comment, (Sit-Stand Transfer) ONCE STANDING, REQUIRED MAX ASSIST TO ACHIEVE WT OVER COG AND BLOCKING OF R KNEE TO PREVENT BUCKLING.  -CD       Row Name 10/02/23 1443          Gait/Stairs (Locomotion)    Comment, (Gait/Stairs) DEFERRED GAIT TO FOCUS ON PRE-GAIT ACTIVITY OF WT SHIFTING R/L, ANTERIOR FOR WT OVER COG. ONLY MILD BUCKLING ONCE MIDLINE ACHIEVED.  -CD               User Key  (r) = Recorded By, (t) = Taken By, (c) = Cosigned By      Initials Name Provider Type    CD Claudia Duarte, PT Physical Therapist                   Obj/Interventions       Row Name 10/02/23 1448          Motor Skills    Therapeutic Exercise --  COMPLETED SEATED B LE THER EX: P/AAROM ON R WITH MAX CUES, AAROM ON L- HEEL SLIDES LAQ, HIP FLEX, AP'S . PT ABLE TO PUSH AWAY AGAINST RESITANCE ON R WITH HEEL SLIDES. GIVES GOOD EFFORT WITH THER EX WITH MAX CUES.  -CD       Row Name 10/02/23 1448          Balance    Static Sitting Balance moderate assist  PROGRESSED TO INTERMITTENT CGA WITH CUES.  -CD     Dynamic Sitting Balance moderate assist  PROGRESSED TO MIN ASSIST WITH CUES.  -CD     Position, Sitting Balance supported;sitting edge of bed  -CD     Sit to Stand Dynamic Balance maximum assist;2-person assist  -CD     Static Standing Balance 2-person assist;maximum assist  PROGRESSED TO MOD ASSIST OF 2.  -CD     Dynamic Standing Balance maximum assist;2-person assist  -CD     Balance Interventions sit to stand;supported;static;dynamic;sitting;standing;weight shifting activity  -CD     Comment, Balance PT DEMONSTRATES R/POSTERIOR LEAN INITIALLY UPON SITTING BUT IS ABLE TO IMPROVE WITH VERBAL, VISUAL AND TACTILE CUES. WORKED ON MIDLINE ORIENTATION, WT SHIFTING R/L IN SITTING EOB AND STANDING. PT GIVES GOOD EFFORT AND FOLLOWING COMMANDS.  -CD                User Key  (r) = Recorded By, (t) = Taken By, (c) = Cosigned By      Initials Name Provider Type    CD Claudia Duarte, PT Physical Therapist                   Goals/Plan    No documentation.                  Clinical Impression       Row Name 10/02/23 1457          Pain    Pretreatment Pain Rating 0/10 - no pain  -CD     Posttreatment Pain Rating 0/10 - no pain  -CD       Row Name 10/02/23 1457          Plan of Care Review    Plan of Care Reviewed With patient;spouse  -CD     Progress improving  -CD     Outcome Evaluation PT CONTINUES WITH R SIDED WEAKNESS, IMPAIRED BALANCE, L INATTENTION AND IMPAIRED COORDINATION BUT GIVES GOOD EFFORT AND IS FOLLOWING COMMANDS FOR MOBILITY AND THER EX. PT ABLE TO IMPROVE SITTING BALANCE WITH VERBAL, TACTILE AND VISUAL CUES. COMPLETES SIT TO STAND TRANSFER WITH MAX ASSIST OF 2 BUT ONCE STANDING MAINTAINS STATIC STANDING BALANCE WITH MOD ASSIST. RECOMMEND IRF AT D/C.  -CD       Row Name 10/02/23 1457          Therapy Assessment/Plan (PT)    Patient/Family Therapy Goals Statement (PT) TO GO TO REHAB.  -CD     Rehab Potential (PT) good, to achieve stated therapy goals  -CD     Criteria for Skilled Interventions Met (PT) yes;meets criteria;skilled treatment is necessary  -     Therapy Frequency (PT) daily  -CD       Row Name 10/02/23 1457          Vital Signs    Post Systolic BP Rehab 131  -CD     Post Treatment Diastolic BP 81  -CD     Posttreatment Heart Rate (beats/min) 78  -CD     Pre SpO2 (%) 96  -CD     O2 Delivery Pre Treatment room air  -CD     O2 Delivery Intra Treatment room air  -CD     Post SpO2 (%) 97  -CD     O2 Delivery Post Treatment room air  -CD     Pre Patient Position Supine  -CD     Intra Patient Position Standing  -CD     Post Patient Position Sitting  -CD       Row Name 10/02/23 1457          Positioning and Restraints    Pre-Treatment Position in bed  -CD     Post Treatment Position chair  -CD     In Chair reclined;call light within reach;encouraged  to call for assist;exit alarm on;with family/caregiver;legs elevated;RUE elevated;LUE elevated;waffle cushion;on mechanical lift sling;notified nsg  -CD     Restraints soft limb;mitten;released:;reapplied:;notified nsg:  PT IN RESTRAINTS DUE TO PICKING AT PEG SITE.  -CD               User Key  (r) = Recorded By, (t) = Taken By, (c) = Cosigned By      Initials Name Provider Type    Claudia Amaro PT Physical Therapist                   Outcome Measures       Row Name 10/02/23 1501          How much help from another person do you currently need...    Turning from your back to your side while in flat bed without using bedrails? 2  -CD     Moving from lying on back to sitting on the side of a flat bed without bedrails? 2  -CD     Moving to and from a bed to a chair (including a wheelchair)? 2  -CD     Standing up from a chair using your arms (e.g., wheelchair, bedside chair)? 2  -CD     Climbing 3-5 steps with a railing? 1  -CD     To walk in hospital room? 2  -CD     AM-PAC 6 Clicks Score (PT) 11  -CD     Highest level of mobility 4 --> Transferred to chair/commode  -CD       Row Name 10/02/23 1501          Modified [place] Scale    Modified [place] Scale 4 - Moderately severe disability.  Unable to walk without assistance, and unable to attend to own bodily needs without assistance.  -CD       Row Name 10/02/23 1501          Functional Assessment    Outcome Measure Options AM-PAC 6 Clicks Basic Mobility (PT);Modified [place]  -CD               User Key  (r) = Recorded By, (t) = Taken By, (c) = Cosigned By      Initials Name Provider Type    Claudia Amaro PT Physical Therapist                                 Physical Therapy Education       Title: PT OT SLP Therapies (In Progress)       Topic: Physical Therapy (Done)       Point: Mobility training (Done)       Learning Progress Summary             Patient Acceptance, E, VU by KRISTIAN at 10/2/2023 1502    Comment: SEE FLOWSHEET    Acceptance, E, NR by NORY at 9/25/2023  1324    Acceptance, E, NR by KG at 9/18/2023 1403    Acceptance, E,TB, NR by AY at 9/15/2023 1254                         Point: Home exercise program (Done)       Learning Progress Summary             Patient Acceptance, E, VU by CD at 10/2/2023 1502    Comment: SEE FLOWSHEET    Acceptance, E, NR by KG at 9/18/2023 1403                         Point: Body mechanics (Done)       Learning Progress Summary             Patient Acceptance, E, VU by CD at 10/2/2023 1502    Comment: SEE FLOWSHEET    Acceptance, E, NR by KR at 9/25/2023 1324    Acceptance, E, NR by KG at 9/18/2023 1403    Acceptance, E,TB, NR by AY at 9/15/2023 1254                         Point: Precautions (Done)       Learning Progress Summary             Patient Acceptance, E, VU by CD at 10/2/2023 1502    Comment: SEE FLOWSHEET    Acceptance, E, NR by KR at 9/25/2023 1324    Acceptance, E, NR by KG at 9/18/2023 1403    Acceptance, E,TB, NR by AY at 9/15/2023 1254                                         User Key       Initials Effective Dates Name Provider Type Discipline    CD 02/03/23 -  Claudia Duarte, PT Physical Therapist PT    KG 05/22/20 -  Mackenzie Mckay PT Physical Therapist PT    AY 11/10/20 -  Aleshia Armstrong, PT Physical Therapist PT    KR 12/30/22 -  Raissa Soria, PT Physical Therapist PT                  PT Recommendation and Plan     Plan of Care Reviewed With: patient, spouse  Progress: improving  Outcome Evaluation: PT CONTINUES WITH R SIDED WEAKNESS, IMPAIRED BALANCE, L INATTENTION AND IMPAIRED COORDINATION BUT GIVES GOOD EFFORT AND IS FOLLOWING COMMANDS FOR MOBILITY AND THER EX. PT ABLE TO IMPROVE SITTING BALANCE WITH VERBAL, TACTILE AND VISUAL CUES. COMPLETES SIT TO STAND TRANSFER WITH MAX ASSIST OF 2 BUT ONCE STANDING MAINTAINS STATIC STANDING BALANCE WITH MOD ASSIST. RECOMMEND IRF AT D/C.     Time Calculation:         PT Charges       Row Name 10/02/23 1502             Time Calculation    Start Time 1358  -CD      PT  Received On 10/02/23  -      PT Goal Re-Cert Due Date 10/05/23  -CD         Time Calculation- PT    Total Timed Code Minutes- PT 38 minute(s)  -CD         Timed Charges    83606 - PT Therapeutic Exercise Minutes 13  -CD      73510 -  PT Neuromuscular Reeducation Minutes 25  -CD         Total Minutes    Timed Charges Total Minutes 38  -CD       Total Minutes 38  -CD                User Key  (r) = Recorded By, (t) = Taken By, (c) = Cosigned By      Initials Name Provider Type    Claudia Amaro PT Physical Therapist                  Therapy Charges for Today       Code Description Service Date Service Provider Modifiers Qty    75868993854  PT THER PROC EA 15 MIN 10/2/2023 Claudia Duarte, PT GP 1    50476145112 HC PT NEUROMUSC RE EDUCATION EA 15 MIN 10/2/2023 Claudia Duarte, PT GP 2    83687765297 HC PT THER SUPP EA 15 MIN 10/2/2023 Claudia Duarte, PT GP 2            PT G-Codes  Outcome Measure Options: AM-PAC 6 Clicks Basic Mobility (PT), Modified [place]  AM-PAC 6 Clicks Score (PT): 11  AM-PAC 6 Clicks Score (OT): 8  Modified Rae Scale: 4 - Moderately severe disability.  Unable to walk without assistance, and unable to attend to own bodily needs without assistance.  PT Discharge Summary  Anticipated Discharge Disposition (PT): inpatient rehabilitation facility    Claudia Duarte, PT  10/2/2023

## 2023-10-02 NOTE — CASE MANAGEMENT/SOCIAL WORK
Continued Stay Note  [facility]     Patient Name: Kenneth Wen  MRN: [id number]  Today's Date: 10/2/2023    Admit Date: 9/15/2023    Plan: TBD   Discharge Plan       Row Name 10/02/23 1553       Plan    Plan TBD    Patient/Family in Agreement with Plan yes    Plan Comments CM following up discharge planning. Awaiting removal of restraints to determine discharge planning. Will send referrals when appropriate. CM will continue to follow.    Final Discharge Disposition Code 30 - still a patient                   Discharge Codes    No documentation.                 Expected Discharge Date and Time       Expected Discharge Date Expected Discharge Time    Oct 5, 2023               Madelyn Beasley RN

## 2023-10-02 NOTE — PLAN OF CARE
Goal Outcome Evaluation:  Plan of Care Reviewed With: patient, spouse        Progress: improving  Outcome Evaluation: NIHSS=14. Anxious/agitated, hypertensive. -- BP improved and pt is more relaxed after tylenol and seroquel. Still pulls at tubes/equipment despite interventions, requiring BUE soft wrist restraints and mitts. Family at bedside. Skin precautions and fall precautions continue. Hospital staff sitter unavailable, family is arranging to have family members to stay w/ him when possible. Tolerating tube feeding at goal rate, PEG site intact, Abdominal binder maintained in place. Plan discharge to rehab when out of restraints & medically ready.

## 2023-10-02 NOTE — PLAN OF CARE
Goal Outcome Evaluation:  Plan of Care Reviewed With: patient, spouse        Progress: improving  Outcome Evaluation: PT CONTINUES WITH R SIDED WEAKNESS, IMPAIRED BALANCE, L INATTENTION AND IMPAIRED COORDINATION BUT GIVES GOOD EFFORT AND IS FOLLOWING COMMANDS FOR MOBILITY AND THER EX. PT ABLE TO IMPROVE SITTING BALANCE WITH VERBAL, TACTILE AND VISUAL CUES. COMPLETES SIT TO STAND TRANSFER WITH MAX ASSIST OF 2 BUT ONCE STANDING MAINTAINS STATIC STANDING BALANCE WITH MOD ASSIST. RECOMMEND IRF AT D/C.      Anticipated Discharge Disposition (PT): inpatient rehabilitation facility

## 2023-10-02 NOTE — PROGRESS NOTES
"    [facility] Medicine Services  PROGRESS NOTE    Patient Name: Kenneth Wen  : 1950  MRN: [id number]    Date of Admission: 9/15/2023  Primary Care Physician: Susan Powers APRN    Subjective   Subjective     CC:  F/u CVA    HPI:  Patient sitting up in bed confused, but recognizes his wife \"as above of my life\".  He knows his own name, but is unable to say his wife's or nieces name.  Tolerating tube feeds well.  Increased sputum production    Objective   Objective     Vital Signs:   Temp:  [98.1 °F (36.7 °C)-98.6 °F (37 °C)] 98.3 °F (36.8 °C)  Heart Rate:  [65-76] 74  Resp:  [16-18] 16  BP: (105-132)/(58-98) 129/74     Physical Exam:  Constitutional: Awake but falls asleep easily, WD, WN male in NAD  Eyes: sclerae anicteric, no conjunctival injection  Head: NCAT  ENT: [place], moist mucous membranes   Respiratory: Nonlabored, symmetrical chest expansion, CTAB, 100% RA  Cardiovascular: RRR, no M/R/G, +DP pulses bilaterally  Gastrointestinal: Soft, NT, ND +BS, +PEG tube w/abdominal binder  Musculoskeletal: HINES; no LE edema bilaterally, wrist restraints bilaterally  Neurologic: Oriented to self, weakness LE bilaterally follows all commands, speech garbled but understandable  Skin: No rashes on exposed skin  Psychiatric: Pleasant and cooperative; normal affect    Results Reviewed:  LAB RESULTS:      Lab 23  1023 23  0646 23  0658 23  0450   WBC 12.17* 13.30* 12.07* 11.75*   HEMOGLOBIN 12.3* 13.7 15.2 14.2   HEMATOCRIT 36.3* 41.3 45.5 43.0   PLATELETS 349 426 472* 425   NEUTROS ABS  --  9.97*  --   --    IMMATURE GRANS (ABS)  --  0.09*  --   --    LYMPHS ABS  --  1.98  --   --    MONOS ABS  --  0.89  --   --    EOS ABS  --  0.24  --   --    MCV 91.9 94.5 92.9 92.7         Lab 23  2112 23  1607 23  0808 23  0014 23  2054 23  0646 23  1052   SODIUM 144 147* 145 152* 149* 151* 151*   POTASSIUM 4.3  --  3.5 3.8 3.6 4.3 4.1 "   CHLORIDE  --   --  113* 116* 114* 116* 115*   CO2  --   --  25.0 26.0 24.0 23.0 26.0   ANION GAP  --   --  7.0 10.0 11.0 12.0 10.0   BUN  --   --  21 23 25* 30* 35*   CREATININE  --   --  0.65* 0.81 0.78 0.84 0.93   EGFR  --   --  100.7 94.3 95.3 93.2 87.8   GLUCOSE  --   --  159* 134* 123* 138* 163*   CALCIUM  --   --  8.6 8.8 8.9 9.2 9.6   MAGNESIUM  --   --  1.9  --   --   --   --    PHOSPHORUS  --   --  2.4*  --   --   --   --          Lab 09/27/23  0658   TOTAL PROTEIN 7.6   ALBUMIN 4.0   GLOBULIN 3.6   ALT (SGPT) 55*   AST (SGOT) 42*   BILIRUBIN 0.8   ALK PHOS 98                     Brief Urine Lab Results  (Last result in the past 365 days)        Color   Clarity   Blood   Leuk Est   Nitrite   Protein   CREAT   Urine HCG        09/15/23 1818 Yellow   Cloudy   Trace   Negative   Negative   Negative                   Microbiology Results Abnormal       Procedure Component Value - Date/Time    Blood Culture - Blood, Arm, Right [[id number]]  (Normal) Collected: 09/15/23 0212    Lab Status: Final result Specimen: Blood from Arm, Right Updated: 09/20/23 0230     Blood Culture No growth at 5 days    Blood Culture - Blood, Arm, Left [[id number]]  (Normal) Collected: 09/15/23 0212    Lab Status: Final result Specimen: Blood from Arm, Left Updated: 09/20/23 0230     Blood Culture No growth at 5 days            No radiology results from the last 24 hrs        Current medications:  Scheduled Meds:atorvastatin, 80 mg, Oral, Nightly  castor oil-balsam peru, 1 application , Topical, Q12H  clopidogrel, 75 mg, Oral, Daily  escitalopram, 10 mg, Oral, Daily  insulin regular, 2-7 Units, Subcutaneous, Q6H  lansoprazole, 15 mg, Oral, Q AM  metoprolol tartrate, 25 mg, Oral, Q12H  ProSource No Carb, 30 mL, Per G Tube, Daily  QUEtiapine, 25 mg, Oral, Daily  QUEtiapine, 75 mg, Oral, Nightly  sodium chloride, 10 mL, Intravenous, Q12H  sodium chloride, 10 mL, Intravenous, Q12H  sodium chloride, 10 mL, Intravenous, Q12H      Continuous  Infusions:sodium chloride, 50 mL/hr, Last Rate: 50 mL/hr (10/02/23 0600)      PRN Meds:.  acetaminophen **OR** acetaminophen    Calcium Replacement - Follow Nurse / BPA Driven Protocol    dextrose    dextrose    dextrose    glucagon (human recombinant)    ibuprofen    Magnesium Standard Dose Replacement - Follow Nurse / BPA Driven Protocol    ondansetron    Phosphorus Replacement - Follow Nurse / BPA Driven Protocol    Potassium Replacement - Follow Nurse / BPA Driven Protocol    QUEtiapine    sodium chloride    sodium chloride    sodium chloride    Assessment & Plan   Assessment & Plan     Active Hospital Problems    Diagnosis  POA    **Hemorrhagic CVA [I61.9]  Yes    Oropharyngeal dysphagia [R13.12]  Yes    Multiple bilateral CVAs [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        Brief Hospital Course to date:  Kenneth Wen is a 72 y.o. male with hx of HTN, HLD, T2DM, and GERD who presented to OSH with multiple acute ischemic infarcts of the bilateral cerebral and cerebellar hemispheres as well as left isaac. TTE/JOSIAS was negative PFO at OSH. On 9/13/23 he had worsening in mental status and new inability to move RLE, head CT showed evolution of the existing CVA with new development of petechial hemorrhage. DAPT was held for 24 hours per Neurology recommendations and repeat CT head that evening showed worsening effacement of the right quadrigeminal cistern with suspected increasing upward supratentorial pressure. He was then transferred to [facility] for Neurosurgery evaluation on 9/15/23. He was started on hypertonic saline 9/15/23 through 9/20/23 for cerebral edema. He had fevers with negative cultures but had worsening secretions and labored  breathing so was started on Zosyn on 9/16/23. Transferred to the hospitalist service on 9/22/23. Pt demonstrated poor oral intake with elevated sodium levels; therefore, PEG tube was recommended.      These problems are new to me today     This patient's problems and plans were partially entered by my partner and updated as appropriate by me 10/01/23.       Multiple bilateral posterior circulation strokes with hemorrhagic conversion  --Neurology has followed, suspect atheroembolic but cannot rule out cardioembolic given multiple vascular territories  --Plavix monotherapy  --High dose statin  --Needs to be unrestrained for placement  --Needs Holter monitor at discharge  --Follow up in stroke clinic in 8 weeks     Dysphagia  Hypernatremia  --SLP recommends mechanical ground with honey thick liquids   --Eating poorly, sodium elevated indicating probable poor fluid intake, NA+ 152. Pt is s/p D 5 1/2NS and NA has now normalized. Fluids stopped since now received TF and free water    --Risk of pulling out peg tube, abd binder to be in place at all times. --Surgery recommends to remain in restraints at all times or if off has to have sitter for next 2 weeks      HTN  --Goal -160  --Continue Metoprolol 25 mg BID     GERD  --Continue Protonix     COVID-19-resolved  --Overall asymptomatic and on room air  --No infiltrates seen and low procal  --Did not receive any treatment   --Off Isolation 9/30/2023      Agitation, improved  --Seroquel to 50 mg in morning, 75 mg in evening, prn seroquel 25 mg hs  --Seems more sedated during the day today, will decrease morning Seroquel to 25 mg   --Qtc 463      Leukocytosis-resolved  --Remains afebrile  --Procalcitionin low at 0.05  --CXR and UA negative  --Blood cultures negative  --AM labs     Elevated LFT's, mild  --Possibly secondary to statin?  --Negative acute hepatitis panel  --repeat AST/ALT only slightly improved on 09/27     Expected Discharge Location and Transportation:  rehab   Expected Discharge   Expected Discharge Date: 10/5/2023; Expected Discharge Time:      DVT prophylaxis:  Mechanical DVT prophylaxis orders are present.     AM-PAC 6 Clicks Score (PT): 10 (10/01/23 0800)    CODE STATUS:   Code Status and Medical Interventions:   Ordered at: 09/29/23 0854     Medical Intervention Limits:    NO intubation (DNI)     Code Status (Patient has no pulse and is not breathing):    No CPR (Do Not Attempt to Resuscitate)     Medical Interventions (Patient has pulse or is breathing):    Limited Support       Estefanía Light, ROLANDO  10/02/23

## 2023-10-03 LAB
ALBUMIN SERPL-MCNC: 3.3 G/DL (ref 3.5–5.2)
ALBUMIN/GLOB SERPL: 1.7 G/DL
ALP SERPL-CCNC: 75 U/L (ref 39–117)
ALT SERPL W P-5'-P-CCNC: 38 U/L (ref 1–41)
ANION GAP SERPL CALCULATED.3IONS-SCNC: 7 MMOL/L (ref 5–15)
AST SERPL-CCNC: 31 U/L (ref 1–40)
BILIRUB SERPL-MCNC: 0.4 MG/DL (ref 0–1.2)
BUN SERPL-MCNC: 13 MG/DL (ref 8–23)
BUN/CREAT SERPL: 20.3 (ref 7–25)
CALCIUM SPEC-SCNC: 8.4 MG/DL (ref 8.6–10.5)
CHLORIDE SERPL-SCNC: 105 MMOL/L (ref 98–107)
CO2 SERPL-SCNC: 27 MMOL/L (ref 22–29)
CREAT SERPL-MCNC: 0.64 MG/DL (ref 0.76–1.27)
DEPRECATED RDW RBC AUTO: 44.1 FL (ref 37–54)
EGFRCR SERPLBLD CKD-EPI 2021: 101.2 ML/MIN/1.73
ERYTHROCYTE [DISTWIDTH] IN BLOOD BY AUTOMATED COUNT: 13 % (ref 12.3–15.4)
GLOBULIN UR ELPH-MCNC: 2 GM/DL
GLUCOSE BLDC GLUCOMTR-MCNC: 115 MG/DL (ref 70–130)
GLUCOSE BLDC GLUCOMTR-MCNC: 120 MG/DL (ref 70–130)
GLUCOSE BLDC GLUCOMTR-MCNC: 125 MG/DL (ref 70–130)
GLUCOSE SERPL-MCNC: 153 MG/DL (ref 65–99)
HCT VFR BLD AUTO: 31.3 % (ref 37.5–51)
HGB BLD-MCNC: 10.5 G/DL (ref 13–17.7)
MCH RBC QN AUTO: 31.6 PG (ref 26.6–33)
MCHC RBC AUTO-ENTMCNC: 33.5 G/DL (ref 31.5–35.7)
MCV RBC AUTO: 94.3 FL (ref 79–97)
PLATELET # BLD AUTO: 281 10*3/MM3 (ref 140–450)
PMV BLD AUTO: 11.6 FL (ref 6–12)
POTASSIUM SERPL-SCNC: 4.1 MMOL/L (ref 3.5–5.2)
PROT SERPL-MCNC: 5.3 G/DL (ref 6–8.5)
RBC # BLD AUTO: 3.32 10*6/MM3 (ref 4.14–5.8)
SODIUM SERPL-SCNC: 139 MMOL/L (ref 136–145)
WBC NRBC COR # BLD: 6.89 10*3/MM3 (ref 3.4–10.8)

## 2023-10-03 PROCEDURE — 80053 COMPREHEN METABOLIC PANEL: CPT | Performed by: NURSE PRACTITIONER

## 2023-10-03 PROCEDURE — 92526 ORAL FUNCTION THERAPY: CPT

## 2023-10-03 PROCEDURE — 99232 SBSQ HOSP IP/OBS MODERATE 35: CPT | Performed by: NURSE PRACTITIONER

## 2023-10-03 PROCEDURE — 85027 COMPLETE CBC AUTOMATED: CPT | Performed by: NURSE PRACTITIONER

## 2023-10-03 PROCEDURE — 82948 REAGENT STRIP/BLOOD GLUCOSE: CPT

## 2023-10-03 PROCEDURE — 25810000003 SODIUM CHLORIDE 0.9 % SOLUTION: Performed by: NURSE PRACTITIONER

## 2023-10-03 RX ORDER — AMINO ACIDS/PROTEIN HYDROLYS 11G-40/45
30 LIQUID IN PACKET (ML) ORAL 2 TIMES DAILY
Status: DISCONTINUED | OUTPATIENT
Start: 2023-10-03 | End: 2023-10-09

## 2023-10-03 RX ADMIN — METOPROLOL TARTRATE 25 MG: 25 TABLET, FILM COATED ORAL at 09:51

## 2023-10-03 RX ADMIN — Medication 30 ML: at 09:52

## 2023-10-03 RX ADMIN — Medication 10 ML: at 20:28

## 2023-10-03 RX ADMIN — QUETIAPINE FUMARATE 25 MG: 25 TABLET ORAL at 09:51

## 2023-10-03 RX ADMIN — CASTOR OIL AND BALSAM, PERU 1 APPLICATION: 788; 87 OINTMENT TOPICAL at 09:52

## 2023-10-03 RX ADMIN — PANTOPRAZOLE SODIUM 40 MG: 40 INJECTION, POWDER, LYOPHILIZED, FOR SOLUTION INTRAVENOUS at 05:20

## 2023-10-03 RX ADMIN — METOPROLOL TARTRATE 25 MG: 25 TABLET, FILM COATED ORAL at 20:26

## 2023-10-03 RX ADMIN — Medication 30 ML: at 21:07

## 2023-10-03 RX ADMIN — ESCITALOPRAM OXALATE 10 MG: 10 TABLET ORAL at 09:52

## 2023-10-03 RX ADMIN — CASTOR OIL AND BALSAM, PERU 1 APPLICATION: 788; 87 OINTMENT TOPICAL at 21:06

## 2023-10-03 RX ADMIN — QUETIAPINE FUMARATE 75 MG: 25 TABLET ORAL at 20:26

## 2023-10-03 RX ADMIN — SODIUM CHLORIDE 50 ML/HR: 9 INJECTION, SOLUTION INTRAVENOUS at 21:10

## 2023-10-03 RX ADMIN — Medication 10 ML: at 20:27

## 2023-10-03 RX ADMIN — CLOPIDOGREL BISULFATE 75 MG: 75 TABLET ORAL at 09:52

## 2023-10-03 RX ADMIN — QUETIAPINE FUMARATE 25 MG: 25 TABLET ORAL at 05:20

## 2023-10-03 RX ADMIN — ATORVASTATIN CALCIUM 80 MG: 40 TABLET, FILM COATED ORAL at 20:26

## 2023-10-03 RX ADMIN — Medication 10 ML: at 09:52

## 2023-10-03 NOTE — PLAN OF CARE
Goal Outcome Evaluation:  Plan of Care Reviewed With: patient, spouse, family        Progress: no change     SLP treatment completed. Will continue to address cognitive-communication deficits in tx. Will tentatively plan to complete MBS when pt able to participate. Please see note for further details and recommendations.

## 2023-10-03 NOTE — PROGRESS NOTES
Clinical Nutrition   Nutrition Support Assessment  Reason for Visit: Follow-up protocol, EN    Patient Name: Kenneth Wen  YOB: 1950  MRN: [id number]  Date of Encounter: 10/03/23 15:10 EDT  Admission date: 9/15/2023    Comments:     Tolerating feeds at goal. Pt with ~25% at meals being fed by spouse - ? If able to improve PO intake with transition to nocturnal feeds. Discussed with spouse, agreeable to try.     Transition to nocturnal feeds infused over 12hrs (6p to 6a) of Isosource 1.5 @ 70mL/hr + flush of 30mL/hr. Provide Prosource 2x daily.   Infused at goal over 12hrs this regimen provides: 840mL TGV, 1380kcal (73% est needs), 87g protein (92% est needs), 13g fiber, 638mL TF FW (+ 360mL flush = 998mL total).   May need to add additional water flushes during the day to maintain hydration status    Nutrition Assessment   Admission Diagnosis:  ICH (intracerebral hemorrhage) [I61.9]      Problem List:    Hemorrhagic CVA    Dyslipidemia    Multiple bilateral CVAs    HFpEF    T2DM (type 2 diabetes mellitus)    HTN (hypertension)    GERD (gastroesophageal reflux disease)    ICH (intracerebral hemorrhage)    Oropharyngeal dysphagia        PMH:  He  has a past medical history of Acid reflux, Cancer, Diabetes mellitus, GERD (gastroesophageal reflux disease) (09/15/2023), HTN (hypertension) (09/15/2023), Kidney disease, and T2DM (type 2 diabetes mellitus) (09/15/2023).    PSH: He  has a past surgical history that includes Appendectomy; Nasal polyp surgery; Hemorrhoid surgery; and Esophagogastroduodenoscopy w/ PEG (N/A, 9/29/2023).      Applicable Nutrition Concerns:   Skin:  Oral:  GI:      Applicable Interval History:   9/16 3% Hypertonic saline initiated  9/15 FEES:  SLP Swallowing Diagnosis: mod-severe, oral dysphagia, severe, pharyngeal dysphagia (09/15/23 1331)  SLP Diet Recommendation: NPO, temporary alternate methods of nutrition/hydration, other (see comments) (okay for 2-3 ice  chips per hour as tolerated)   9/23-SLP Diet Recommendation: puree, honey thick liquids.  9/29- PEG tube placed. Consult for tube feeding assessment.     Reported/Observed/Food/Nutrition Related History:     10/3  Tolerating feeds at goal. Pt with ~25% at meals being fed by spouse - ? If able to improve PO intake with transition to nocturnal feeds. Discussed with spouse, agreeable to try.     10/1   EN ordered yesterday, started via PEG tube.  @ goal rate this morning and being tolerated. Patient also with order for a diet.  Able to reach patient wife over the phone to get a better sense of patient intake. Patient wife reports she was present for dinner meal yesterday and patient only ate bites of applesauce.  Overall not much intake. Reports patient has been a little sleepy past couple of days and no showing that much interest in eating.  We discussed current EN Rx.  We felt it would be best to keep EN continuous vs nocturnal at this time.  Can switch to nocturnal if the events intake improves.     9/30  Consult for tube feeding assessment.  Overall issues with sodium levels, fluid intake and confusion. Was getting IVF, however pulled out IV.  Wife asked for a PEG tube placement on 9/28.      Patient in COVID isolation, RD not able to visit in person.  No diet this morning (was made NPO for PEG tube placement). Discussed with RN re-order previous order. RD will start tube feeding.       9/25  Spoke w/RN who reports pt pulled out NGT Friday evening. RN states pt eating >/=75% of trays. Textures downgraded 9/23 to pureed.     9/22  Pt on diet though ate only sausage and some juice this am. RN states pt pocketing food still, had been happening in ICU per previous RD note. Discussed w/SLP.     9/18 RN reports pt on 3% saline therapy Na+ goal 145-155, Na+ w/I range, pt from OSH sent here d/t hemorrhagic conversion continues to have R weakness, confusion, tolerating TF. Uncertain if pt should have water flushes, these  "were dc'd after discussion w/ MD.     9/15  Per RN unclear if will start EN or if pt may progress to caden diet at this time.  No wt hx available today.     Anthropometrics     Admission Height 175.3 cm (69\") Documented at 09/15/2023 0122   Admission Weight 79.2 kg (174 lb 9.7 oz) Documented at 09/15/2023 0122     Height: Height: 175.3 cm (69\")  Last Filed Weight: Weight: 72.8 kg (160 lb 7.9 oz) (09/21/23 0500)  Method: Weight Method: Bed scale  BMI: BMI (Calculated): 23.7  BMI classification: Overweight: 25.0-29.9kg/m2      9/15/2023 9/17/2023   Weight     Weight (kg) 79.2 kg  82.5 kg    Weight (lbs) 174 lb 9.7 oz  181 lb 14.1 oz    Weight Method Bed scale  Bed scale        UBW: Per  lbs on 9/14/23  Note 172 lbs in 2018  Weight change: uncertain at this time    Labs    Labs Reviewed: Yes     Results from last 7 days   Lab Units 10/03/23  1101 10/02/23  0735 09/30/23  2112 09/30/23  1607 09/30/23  0808 09/27/23  1337 09/27/23  0658   GLUCOSE mg/dL 153* 131*  --   --  159*   < > 142*   BUN mg/dL 13 13  --   --  21   < > 32*   CREATININE mg/dL 0.64* 0.55*  --   --  0.65*   < > 0.88   SODIUM mmol/L 139 140 144   < > 145   < > 151*   CHLORIDE mmol/L 105 108*  --   --  113*   < > 112*   POTASSIUM mmol/L 4.1 4.4 4.3  --  3.5   < > 3.7   PHOSPHORUS mg/dL  --  2.3*  --   --  2.4*  --   --    MAGNESIUM mg/dL  --  1.9  --   --  1.9  --   --    ALT (SGPT) U/L 38 36  --   --   --   --  55*    < > = values in this interval not displayed.         Results from last 7 days   Lab Units 10/03/23  1101 10/02/23  0735 10/02/23  0441 09/27/23  0658   ALBUMIN g/dL 3.3* 2.9*  --  4.0   PREALBUMIN mg/dL  --   --  14.0*  --    CRP mg/dL  --  1.03*  --   --    CHOLESTEROL mg/dL  --  58  --   --    TRIGLYCERIDES mg/dL  --  43  --   --          Results from last 7 days   Lab Units 10/03/23  1200 10/03/23  0559 10/02/23  2335 10/02/23  1738 10/02/23  1231 10/02/23  0529   GLUCOSE mg/dL 120 115 159* 110 137* 105       Lab Results   Lab " "Value Date/Time    HGBA1C 6.40 (H) 09/15/2023 0212               Medications    Medications Reviewed: Yes  Pertinent: insulin, prevacid, protonix   Infusion: NS @ 50mL/hr  PRN:     Needs Assessment   Date:     Height used:Height: 175.3 cm (69\")  Weights used: 160lb/72.7kg      Estimated Calorie needs: ~ 1900 Kcal/day  Method:  Kcals/KG 25= 1818  Method:  MSJ 1479 x 1.3= 1923    Estimated Protein needs: ~ 95g PRO/day  Method: 1.3g/Kg= 95    Estimated Fluid needs: ~ ml/day   Per clinical status    Current Nutrition Prescription      PO: Diet: Regular/House Diet; No Straw, Feeding Assistance - Nursing, No Mixed Consistencies; Texture: Pureed (NDD 1); Fluid Consistency: Honey Thick   Oral Nutrition Supplement: Magic cup daily   Intake: 25% of lunch meal observed    EN: IsoSource 1.5  Goal Rate: 55ml/hr                Water Flushes: 30ml Q2hrs   Modular: Prosource -no carb 1/day  Route: PEG  Tube: Unknown     At goal over: 20Hrs/day     Rx will supply:   Goal Volume 1210 mL/day       Flush Volume 330 mL/day       Energy 1875 Kcal/day   % Est Need   Protein 97 g/day   % Est Need   Fiber 18.4 g/day       Water in   mL       Total Water 1250 mL       Meet DRI Yes            --------------------------------------------------------------------------  Product/Rate verified at bedside: Yes  Infusing Rate at time of visit: 55mL/hr + flush of 30mL q2hrs     Average Delivery from [facility]:  Volume 1284 mL/day 106  % Goal Vol.   Flush Volume 830 mL/day     Energy  Kcal/day  % Est Need   Protein  g/day  % Est Need   Fiber  g/day     Water in  EN  mL     Total Water  mL     Meet DRI Yes        Intake/Ouptut 24 hrs (0701 - 0700)   I&O's Reviewed: Yes     Last BM     Nutrition Diagnosis   Date: 9/15 Updated: ,   Problem Inadequate oral intake    Etiology stroke   Signs/Symptoms NPO w ice chips per SLP, confusion, +EN   Status: PEG tube placed ; consult for tube feeding     Date:   Updated:  Problem " Swallowing difficulty/chewing difficulty   Etiology Bilateral strokes   Signs/Symptoms Oral-pharyngeal dysphagia per SLP FEES eval   Status: ongoing  Goal:   General: Nutrition support treatment  PO: Increase intake, Advace diet as medically feasible/appropriate  EN/PN: Tolerate EN at goal, Adjust EN      Nutrition Intervention      Follow treatment progress, Care plan reviewed, Nutrition support order placed    Transition to nocturnal feeds infused over 12hrs (6p to 6a) of Isosource 1.5 @ 70mL/hr + flush of 30mL/hr. Provide Prosource 2x daily.   Infused at goal over 12hrs this regimen provides: 840mL TGV, 1380kcal (73% est needs), 87g protein (92% est needs), 13g fiber, 638mL TF FW (+ 360mL flush = 998mL total).   May need to add additional water flushes during the day to maintain hydration status    Monitoring/Evaluation:   Per protocol, I&O, Pertinent labs, Weight, Symptoms, Swallow function      Heidi Baxter, MS,RD,LD  Time Spent: 60min

## 2023-10-03 NOTE — PLAN OF CARE
Goal Outcome Evaluation:  Plan of Care Reviewed With: patient           Outcome Evaluation: PT TRANSFERRED FROM 3F TO ROOM 349.  PT RESTING IN BED WITH WIFE AT BEDSIDE.  PT HAS SOFT WRIST RESTRAINT'S SHAWNA AND MITTS SHAWNA.  PEG TUBE WITH TUBE FEEDING INFUSING WTH AN ABD BINDER IN PLACE.  NURSE PLACED A CONDOM CATH ON PT THAT IS WORKING GREAT.  WILL  CONT TO MONITOR FOR CHANGES.

## 2023-10-03 NOTE — PROGRESS NOTES
[facility] Medicine Services  PROGRESS NOTE    Patient Name: Kenneth Wen  : 1950  MRN: [id number]    Date of Admission: 9/15/2023  Primary Care Physician: Susan Powers APRN    Subjective   Subjective     CC:  F/u CVA    HPI:  Patient sitting up in bed answering questions and following all commands but remains in restraints.  According to staff, if his restraints are taken off he immediately pulls everything off.  Niece thinks that he does not want all of this and does not want to live after having a stroke.    Objective   Objective     Vital Signs:   Temp:  [97.7 °F (36.5 °C)-98.8 °F (37.1 °C)] 98.4 °F (36.9 °C)  Heart Rate:  [62-83] 74  Resp:  [16-18] 18  BP: (101-151)/() 118/75  Flow (L/min):  [2] 2     Physical Exam:  Constitutional: Awake, chronically ill-appearing male in NAD  Eyes: sclerae anicteric, no conjunctival injection  Head: NCAT  ENT: [place], moist mucous membranes   Respiratory: Nonlabored, symmetrical chest expansion, CTAB, 98% RA  Cardiovascular: RRR, no M/R/G, +DP pulses bilaterally  Gastrointestinal: Soft, NT, ND +BS, +PEG tube w/abdominal binder  Musculoskeletal: HINES; no LE edema bilaterally, wrist restraints bilaterally  Neurologic: Oriented to self, weakness LE bilaterally follows all commands, speech garbled but understandable  Skin: No rashes on exposed skin  Psychiatric: Pleasant and cooperative; normal affect    Results Reviewed:  LAB RESULTS:      Lab 10/03/23  0632 10/02/23  0735 23  1023 23  0646 23  0658   WBC 6.89  --  12.17* 13.30* 12.07*   HEMOGLOBIN 10.5*  --  12.3* 13.7 15.2   HEMATOCRIT 31.3*  --  36.3* 41.3 45.5   PLATELETS 281  --  349 426 472*   NEUTROS ABS  --   --   --  9.97*  --    IMMATURE GRANS (ABS)  --   --   --  0.09*  --    LYMPHS ABS  --   --   --  1.98  --    MONOS ABS  --   --   --  0.89  --    EOS ABS  --   --   --  0.24  --    MCV 94.3  --  91.9 94.5 92.9   CRP  --  1.03*  --   --   --          Lab  10/02/23  0735 09/30/23  2112 09/30/23  1607 09/30/23  0808 09/30/23  0014 09/29/23 2054 09/29/23  0646   SODIUM 140 144 147* 145 152* 149* 151*   POTASSIUM 4.4 4.3  --  3.5 3.8 3.6 4.3   CHLORIDE 108*  --   --  113* 116* 114* 116*   CO2 18.0*  --   --  25.0 26.0 24.0 23.0   ANION GAP 14.0  --   --  7.0 10.0 11.0 12.0   BUN 13  --   --  21 23 25* 30*   CREATININE 0.55*  --   --  0.65* 0.81 0.78 0.84   EGFR 106.0  --   --  100.7 94.3 95.3 93.2   GLUCOSE 131*  --   --  159* 134* 123* 138*   CALCIUM 8.0*  --   --  8.6 8.8 8.9 9.2   MAGNESIUM 1.9  --   --  1.9  --   --   --    PHOSPHORUS 2.3*  --   --  2.4*  --   --   --          Lab 10/02/23  0735 09/27/23  0658   TOTAL PROTEIN 4.8* 7.6   ALBUMIN 2.9* 4.0   GLOBULIN 1.9 3.6   ALT (SGPT) 36 55*   AST (SGOT) 34 42*   BILIRUBIN 0.4 0.8   ALK PHOS 72 98             Lab 10/02/23  0735   CHOLESTEROL 58   TRIGLYCERIDES 43             Brief Urine Lab Results  (Last result in the past 365 days)        Color   Clarity   Blood   Leuk Est   Nitrite   Protein   CREAT   Urine HCG        09/15/23 1818 Yellow   Cloudy   Trace   Negative   Negative   Negative                   Microbiology Results Abnormal       Procedure Component Value - Date/Time    Blood Culture - Blood, Arm, Right [[id number]]  (Normal) Collected: 09/15/23 0212    Lab Status: Final result Specimen: Blood from Arm, Right Updated: 09/20/23 0230     Blood Culture No growth at 5 days    Blood Culture - Blood, Arm, Left [[id number]]  (Normal) Collected: 09/15/23 0212    Lab Status: Final result Specimen: Blood from Arm, Left Updated: 09/20/23 0230     Blood Culture No growth at 5 days            No radiology results from the last 24 hrs        Current medications:  Scheduled Meds:atorvastatin, 80 mg, Per PEG Tube, Nightly  castor oil-balsam peru, 1 application , Topical, Q12H  clopidogrel, 75 mg, Per PEG Tube, Daily  escitalopram, 10 mg, Per PEG Tube, Daily  insulin regular, 2-7 Units, Subcutaneous, Q6H  metoprolol  tartrate, 25 mg, Per PEG Tube, Q12H  pantoprazole, 40 mg, Intravenous, Q AM  ProSource No Carb, 30 mL, Per G Tube, Daily  QUEtiapine, 25 mg, Per PEG Tube, Daily  QUEtiapine, 75 mg, Per PEG Tube, Nightly  sodium chloride, 10 mL, Intravenous, Q12H  sodium chloride, 10 mL, Intravenous, Q12H  sodium chloride, 10 mL, Intravenous, Q12H      Continuous Infusions:sodium chloride, 50 mL/hr, Last Rate: 50 mL/hr (10/02/23 2038)      PRN Meds:.  acetaminophen **OR** acetaminophen    Calcium Replacement - Follow Nurse / BPA Driven Protocol    dextrose    dextrose    glucagon (human recombinant)    ibuprofen    Magnesium Standard Dose Replacement - Follow Nurse / BPA Driven Protocol    ondansetron    Phosphorus Replacement - Follow Nurse / BPA Driven Protocol    Potassium Replacement - Follow Nurse / BPA Driven Protocol    QUEtiapine    sodium chloride    sodium chloride    sodium chloride    Assessment & Plan   Assessment & Plan     Active Hospital Problems    Diagnosis  POA    **Hemorrhagic CVA [I61.9]  Yes    Oropharyngeal dysphagia [R13.12]  Yes    Multiple bilateral CVAs [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        Brief Hospital Course to date:  Kenneth Wen is a 72 y.o. male with hx of HTN, HLD, T2DM, and GERD who presented to OSH with multiple acute ischemic infarcts of the bilateral cerebral and cerebellar hemispheres as well as left isaac. TTE/JOSIAS was negative PFO at OSH. On 9/13/23 he had worsening in mental status and new inability to move RLE, head CT showed evolution of the existing CVA with new development of petechial hemorrhage. DAPT was held for 24 hours per Neurology recommendations and repeat CT head that evening showed worsening  "effacement of the right quadrigeminal cistern with suspected increasing upward supratentorial pressure. He was then transferred to [facility] for Neurosurgery evaluation on 9/15/23. He was started on hypertonic saline 9/15/23 through 9/20/23 for cerebral edema. He had fevers with negative cultures but had worsening secretions and labored breathing so was started on Zosyn on 9/16/23. Transferred to the hospitalist service on 9/22/23. Pt demonstrated poor oral intake with elevated sodium levels; therefore, PEG tube was recommended.       Multiple bilateral posterior circulation strokes with hemorrhagic conversion  --Neurology has followed, suspect atheroembolic but cannot rule out cardioembolic given multiple vascular territories  --Plavix monotherapy  --High dose statin  --Needs to be unrestrained for placement  --Needs Holter monitor at discharge  --Follow up in stroke clinic in 8 weeks     Dysphagia  Hypernatremia  --SLP recommends mechanical ground with honey thick liquids   --Eating poorly, sodium elevated indicating probable poor fluid intake, NA+ 152. Pt is s/p D 5 1/2NS and NA has now normalized. Fluids stopped since now received TF and free water    --Risk of pulling out peg tube, abd binder to be in place at all times. --Surgery recommends to remain in restraints at all times or if off has to have sitter for next 2 weeks      HTN  --Goal -160  --Continue Metoprolol 25 mg BID     GERD  --Continue Protonix     COVID-19-resolved  --Overall asymptomatic and on room air  --No infiltrates seen and low procal  --Did not receive any treatment   --Off Isolation 9/30/2023   --Repeat CXR 10/2/2023 ordered  d/t \"sputum production\" showed no acute pulmonary changes problems     Agitation, improved  --Seroquel to 50 mg in morning, 75 mg in evening, prn seroquel 25 mg hs  --Seems more sedated during the day today, will decrease morning Seroquel to 25 mg   --Qtc 463      Leukocytosis-resolved  --Remains " afebrile  --Procalcitionin low at 0.05  --CXR and UA negative  --Blood cultures negative  --AM labs     Elevated LFT's, mild  --Possibly secondary to statin?  --Negative acute hepatitis panel  --repeat AST/ALT only slightly improved on 09/27     Expected Discharge Location and Transportation: rehab pending because patient has to be out of restraints before he can be placed; family aware    Expected Discharge   Expected Discharge Date: 10/5/2023; Expected Discharge Time:      DVT prophylaxis:  Mechanical DVT prophylaxis orders are present.     AM-PAC 6 Clicks Score (PT): 11 (10/02/23 8605)    CODE STATUS:   Code Status and Medical Interventions:   Ordered at: 09/29/23 0854     Medical Intervention Limits:    NO intubation (DNI)     Code Status (Patient has no pulse and is not breathing):    No CPR (Do Not Attempt to Resuscitate)     Medical Interventions (Patient has pulse or is breathing):    Limited Support       Estefanía Light, ROLANDO  10/03/23

## 2023-10-03 NOTE — THERAPY TREATMENT NOTE
Acute Care - Speech Language Pathology   Swallow Treatment Note [facility]     Patient Name: Kenneth Wen  : 1950  MRN: [id number]  Today's Date: 10/3/2023               Admit Date: 9/15/2023    Visit Dx:     ICD-10-CM ICD-9-CM   1. Hemorrhagic CVA  I61.9 431   2. Aphasia  R47.01 784.3   3. Dysarthria  R47.1 784.51   4. Oropharyngeal dysphagia  R13.12 787.22     Patient Active Problem List   Diagnosis    Precordial pain    Elevated BP without diagnosis of hypertension    Dyslipidemia    Hyperglycemia    Multiple bilateral CVAs    Hemorrhagic CVA    HFpEF    T2DM (type 2 diabetes mellitus)    HTN (hypertension)    GERD (gastroesophageal reflux disease)    ICH (intracerebral hemorrhage)    Oropharyngeal dysphagia     Past Medical History:   Diagnosis Date    Acid reflux     Cancer     Skin    Diabetes mellitus     Prediabetes    GERD (gastroesophageal reflux disease) 09/15/2023    HTN (hypertension) 09/15/2023    Kidney disease     T2DM (type 2 diabetes mellitus) 09/15/2023     Past Surgical History:   Procedure Laterality Date    APPENDECTOMY      ENDOSCOPY W/ PEG TUBE PLACEMENT N/A 2023    Procedure: ESOPHAGOGASTRODUODENOSCOPY WITH PERCUTANEOUS ENDOSCOPIC GASTROSTOMY TUBE INSERTION;  Surgeon: Haseeb Carrillo MD;  Location: [facility] ENDOSCOPY;  Service: General;  Laterality: N/A;    HEMORRHOIDECTOMY      NASAL POLYP SURGERY         SLP Recommendation and Plan     SLP Diet Recommendation: puree, honey thick liquids, no mixed consistencies (10/03/23 09)  Recommended Precautions and Strategies: 1:1 supervision, upright posture during/after eating, assist with feeding, small bites of food and sips of liquid, liquid via spoon only, alternate between small bites of food and sips of liquid, check mouth frequently for oral residue/pocketing (10/03/23 0900)         Oral Care Recommendations: Oral Care BID/PRN, Suction toothbrush (10/03/23 0900)        Daily Summary of Progress (SLP): progress toward  functional goals as expected (10/03/23 0900)        Treatment Assessment (SLP): continued, oral dysphagia, pharyngeal dysphagia, suspected (10/03/23 0900)  Treatment Assessment Comments (SLP): Delayed cough noted. ? Pharyngoesophageal vs. unrelated to swallowing (family reported residual cough/phlegm production since having covid-19). Has been ~2 weeks since last instrumental study. Will tentatively plan for MBS when pt alert/able to participate. (10/03/23 0900)  Plan for Continued Treatment (SLP): continue treatment per plan of care, further assessment needed (see comments), other (see comments) (MBS, as appropriate) (10/03/23 0900)       Oral Care Recommendations: Oral Care BID/PRN, Suction toothbrush (10/03/23 0900)    Plan of Care Reviewed With: patient, spouse, family  Progress: no change      SWALLOW EVALUATION (last 72 hours)       SLP Adult Swallow Evaluation       Row Name 10/03/23 0900                   Rehab Evaluation    Document Type therapy note (daily note)  -AC        Subjective Information complains of;fatigue  -AC        Patient Observations cooperative  groggy, but alert to stimuli  -AC        Patient/Family/Caregiver Comments/Observations Niece present. Spouse arrived near end of session.  -AC        Patient Effort adequate  -AC        Oral Care teeth brushed - suction toothbrush;swabbed with antiseptic solution;suction provided  -           Pain Scale: FACES Pre/Post-Treatment    Pain: FACES Scale, Pretreatment 0-->no hurt  -AC        Posttreatment Pain Rating 0-->no hurt  -AC           SLP Treatment Clinical Impressions    Treatment Assessment (SLP) continued;oral dysphagia;pharyngeal dysphagia;suspected  -AC        Treatment Assessment Comments (SLP) Delayed cough noted. ? Pharyngoesophageal vs. unrelated to swallowing (family reported residual cough/phlegm production since having covid-19). Has been ~2 weeks since last instrumental study. Will tentatively plan for MBS when pt alert/able to  participate.  -AC        Daily Summary of Progress (SLP) progress toward functional goals as expected  -AC        Barriers to Overall Progress (SLP) Cognitive status;Medically complex  -AC        Plan for Continued Treatment (SLP) continue treatment per plan of care;further assessment needed (see comments);other (see comments)  [facility], as appropriate  -AC        Care Plan Review evaluation/treatment results reviewed;care plan/treatment goals reviewed;risks/benefits reviewed;current/potential barriers reviewed;patient/other agree to care plan  -AC        Care Plan Review, Other Participant(s) family;spouse  -AC           Recommendations    SLP Diet Recommendation puree;honey thick liquids;no mixed consistencies  -AC        Recommended Precautions and Strategies 1:1 supervision;upright posture during/after eating;assist with feeding;small bites of food and sips of liquid;liquid via spoon only;alternate between small bites of food and sips of liquid;check mouth frequently for oral residue/pocketing  -AC        Oral Care Recommendations Oral Care BID/PRN;Suction toothbrush  -AC                  User Key  (r) = Recorded By, (t) = Taken By, (c) = Cosigned By      Initials Name Effective Dates    AC Rosa Maria Mercado MS [facility]-SLP 02/03/23 -                     EDUCATION  The patient has been educated in the following areas:   Dysphagia (Swallowing Impairment) Modified Diet Instruction.        SLP GOALS       Row Name 10/03/23 0900             (LTG) Patient will demonstrate functional swallow for    Diet Texture (Demonstrate functional swallow) soft to chew (chopped) textures  -AC      Liquid viscosity (Demonstrate functional swallow) nectar/ mildly thick liquids  -AC      [place] (Demonstrate functional swallow) with minimal cues (75-90% accuracy)  -AC      Time Frame (Demonstrate functional swallow) by discharge  -AC      Progress/Outcomes (Demonstrate functional swallow) continuing progress toward goal  -AC         (STG)  Patient will tolerate trials of    Consistencies Trialed (Tolerate trials) pureed textures;honey/ moderately thick liquids  -AC      Desired Outcome (Tolerate trials) without signs/symptoms of aspiration;with adequate oral prep/transit/clearance;with use of compensatory strategies (see comments)  small bolus size, liquid via tsp, alternate bites/sips  -AC      [place] (Tolerate trials) with 1:1 assist/ supervision  -AC      Time Frame (Tolerate trials) by discharge  -AC      Progress/Outcomes (Tolerate trials) new goal  -AC      Comment (Tolerate trials) Continued reduced labial seal around spoon. Occasional anterior loss of liquid on R & tonic bite. Increased oral transit time and mild-mod oral residue. Residue cleared w/ spontaneous 2nd swallow & liquid wash. No immediate clinical s/sxs aspiration. Noted delayed coughing in which pt brought yellow-tinged phlegm to oral cavity (SLP suctioned).  -AC         (STG) Patient will tolerate therapeutic trials of    Consistencies Trialed (Tolerate therapeutic trials) thin liquids;nectar/ mildly thick liquids  -AC      Desired Outcome (Tolerate therapeutic trials) without signs/symptoms of aspiration;without signs of distress  -AC      [place] (Tolerate therapeutic trials) with minimal cues (75-90% accuracy)  -AC      Time Frame (Tolerate therapeutic trials) by discharge  -AC      Progress/Outcomes (Tolerate therapeutic trials) goal revised this date  -AC      Comment (Tolerate therapeutic trials) DNT 2' fatigue  -AC         (STG) Labial Strengthening Goal 1 (SLP)    Increase Labial Tone labial resistance exercises;swallow trials  -AC      [place]/Accuracy (Labial Strengthening Goal 1, SLP) with moderate cues (50-74% accuracy)  -AC      Time Frame (Labial Strengthening Goal 1, SLP) short term goal (STG)  -AC      Progress/Outcomes (Labial Strengthening Goal 1, SLP) goal ongoing  -AC         (STG) Lingual Strengthening Goal 1 (SLP)    Increase Lingual  Tone/Sensation/Control/Coordination/Movement swallow trials;lingual resistance exercises  -AC      Increase Tongue Back Strength lingual resistance exercises  -AC      [place]/Accuracy (Lingual Strengthening Goal 1, SLP) with moderate cues (50-74% accuracy)  -AC      Time Frame (Lingual Strengthening Goal 1, SLP) short term goal (STG)  -AC      Progress/Outcomes (Lingual Strengthening Goal 1, SLP) goal ongoing  -AC         (STG) Pharyngeal Strengthening Exercise Goal 1 (SLP)    Increase Timing prepping - 3 second prep or suck swallow or 3-step swallow  -AC      Increase Superior Movement of the Hyolaryngeal Complex falsetto  -AC      Increase Anterior Movement of the Hyolaryngeal Complex chin tuck against resistance (CTAR)  -AC      Increase Closure at Entrance to Airway/Closure of Airway at Glottis breath hold exercises;supraglottic swallow  -AC      Increase Squeeze/Positive Pressure Generation hard effortful swallow  -AC      [place]/Accuracy (Pharyngeal Strengthening Goal 1, SLP) with moderate cues (50-74% accuracy)  -AC      Time Frame (Pharyngeal Strengthening Goal 1, SLP) short term goal (STG)  -AC      Progress/Outcomes (Pharyngeal Strengthening Goal 1, SLP) goal ongoing  -AC                User Key  (r) = Recorded By, (t) = Taken By, (c) = Cosigned By      Initials Name Provider Type    Rosa Maria Powell MS CCC-SLP Speech and Language Pathologist                       Time Calculation:    Time Calculation- SLP       Row Name 10/03/23 1032             Time Calculation- SLP    SLP Start Time 0900  -AC      SLP Received On 10/03/23  -AC         Untimed Charges    [id number] Treatment Swallow Minutes 55  -AC         Total Minutes    Untimed Charges Total Minutes 55  -AC       Total Minutes 55  -AC                User Key  (r) = Recorded By, (t) = Taken By, (c) = Cosigned By      Initials Name Provider Type    Rosa Maria Powell MS CCC-SLP Speech and Language Pathologist                    Therapy  Charges for Today       Code Description Service Date Service Provider Modifiers Qty    [id number] HC ST TREATMENT SWALLOW 4 10/3/2023 Rosa Maria Mercado, MS CCC-SLP GN 1                 Rosa Maria Mercado, MS CCC-SLP  10/3/2023

## 2023-10-04 ENCOUNTER — APPOINTMENT (OUTPATIENT)
Dept: GENERAL RADIOLOGY | Facility: HOSPITAL | Age: 73
End: 2023-10-04
Payer: MEDICARE

## 2023-10-04 LAB
GLUCOSE BLDC GLUCOMTR-MCNC: 121 MG/DL (ref 70–130)
GLUCOSE BLDC GLUCOMTR-MCNC: 122 MG/DL (ref 70–130)
GLUCOSE BLDC GLUCOMTR-MCNC: 147 MG/DL (ref 70–130)
GLUCOSE BLDC GLUCOMTR-MCNC: 175 MG/DL (ref 70–130)

## 2023-10-04 PROCEDURE — 82948 REAGENT STRIP/BLOOD GLUCOSE: CPT

## 2023-10-04 PROCEDURE — 97530 THERAPEUTIC ACTIVITIES: CPT

## 2023-10-04 PROCEDURE — 99232 SBSQ HOSP IP/OBS MODERATE 35: CPT | Performed by: NURSE PRACTITIONER

## 2023-10-04 PROCEDURE — 97110 THERAPEUTIC EXERCISES: CPT

## 2023-10-04 PROCEDURE — 25810000003 SODIUM CHLORIDE 0.9 % SOLUTION: Performed by: NURSE PRACTITIONER

## 2023-10-04 PROCEDURE — 74230 X-RAY XM SWLNG FUNCJ C+: CPT

## 2023-10-04 PROCEDURE — 92611 MOTION FLUOROSCOPY/SWALLOW: CPT

## 2023-10-04 RX ADMIN — ATORVASTATIN CALCIUM 80 MG: 40 TABLET, FILM COATED ORAL at 20:10

## 2023-10-04 RX ADMIN — Medication 10 ML: at 09:39

## 2023-10-04 RX ADMIN — CASTOR OIL AND BALSAM, PERU 1 APPLICATION: 788; 87 OINTMENT TOPICAL at 20:08

## 2023-10-04 RX ADMIN — CASTOR OIL AND BALSAM, PERU 1 APPLICATION: 788; 87 OINTMENT TOPICAL at 09:39

## 2023-10-04 RX ADMIN — QUETIAPINE FUMARATE 25 MG: 25 TABLET ORAL at 06:19

## 2023-10-04 RX ADMIN — BARIUM SULFATE 20 ML: 400 PASTE ORAL at 11:41

## 2023-10-04 RX ADMIN — QUETIAPINE FUMARATE 25 MG: 25 TABLET ORAL at 09:41

## 2023-10-04 RX ADMIN — METOPROLOL TARTRATE 25 MG: 25 TABLET, FILM COATED ORAL at 09:39

## 2023-10-04 RX ADMIN — BARIUM SULFATE 10 ML: 400 SUSPENSION ORAL at 11:41

## 2023-10-04 RX ADMIN — BARIUM SULFATE 50 ML: 400 SUSPENSION ORAL at 11:41

## 2023-10-04 RX ADMIN — ESCITALOPRAM OXALATE 10 MG: 10 TABLET ORAL at 09:39

## 2023-10-04 RX ADMIN — Medication 10 ML: at 09:40

## 2023-10-04 RX ADMIN — SODIUM CHLORIDE 50 ML/HR: 9 INJECTION, SOLUTION INTRAVENOUS at 15:00

## 2023-10-04 RX ADMIN — QUETIAPINE FUMARATE 75 MG: 25 TABLET ORAL at 20:10

## 2023-10-04 RX ADMIN — METOPROLOL TARTRATE 25 MG: 25 TABLET, FILM COATED ORAL at 20:10

## 2023-10-04 RX ADMIN — PANTOPRAZOLE SODIUM 40 MG: 40 INJECTION, POWDER, LYOPHILIZED, FOR SOLUTION INTRAVENOUS at 06:19

## 2023-10-04 RX ADMIN — Medication 30 ML: at 09:39

## 2023-10-04 RX ADMIN — BARIUM SULFATE 100 ML: 0.81 POWDER, FOR SUSPENSION ORAL at 11:41

## 2023-10-04 RX ADMIN — IBUPROFEN 400 MG: 100 SUSPENSION ORAL at 22:14

## 2023-10-04 RX ADMIN — CLOPIDOGREL BISULFATE 75 MG: 75 TABLET ORAL at 09:39

## 2023-10-04 NOTE — MBS/VFSS/FEES
Acute Care - Speech Language Pathology   Swallow Initial Evaluation [facility]  Modified Barium Swallow Study (MBS)      Patient Name: Kenneth Wen  : 1950  MRN: [id number]  Today's Date: 10/4/2023               Admit Date: 9/15/2023    Visit Dx:     ICD-10-CM ICD-9-CM   1. Hemorrhagic CVA  I61.9 431   2. Aphasia  R47.01 784.3   3. Dysarthria  R47.1 784.51   4. Oropharyngeal dysphagia  R13.12 787.22     Patient Active Problem List   Diagnosis    Precordial pain    Elevated BP without diagnosis of hypertension    Dyslipidemia    Hyperglycemia    Multiple bilateral CVAs    Hemorrhagic CVA    HFpEF    T2DM (type 2 diabetes mellitus)    HTN (hypertension)    GERD (gastroesophageal reflux disease)    ICH (intracerebral hemorrhage)    Oropharyngeal dysphagia     Past Medical History:   Diagnosis Date    Acid reflux     Cancer     Skin    Diabetes mellitus     Prediabetes    GERD (gastroesophageal reflux disease) 09/15/2023    HTN (hypertension) 09/15/2023    Kidney disease     T2DM (type 2 diabetes mellitus) 09/15/2023     Past Surgical History:   Procedure Laterality Date    APPENDECTOMY      ENDOSCOPY W/ PEG TUBE PLACEMENT N/A 2023    Procedure: ESOPHAGOGASTRODUODENOSCOPY WITH PERCUTANEOUS ENDOSCOPIC GASTROSTOMY TUBE INSERTION;  Surgeon: Haseeb Carrillo MD;  Location: [facility] ENDOSCOPY;  Service: General;  Laterality: N/A;    HEMORRHOIDECTOMY      NASAL POLYP SURGERY         SLP Recommendation and Plan  SLP Swallowing Diagnosis: moderate, oral dysphagia, pharyngeal dysphagia (10/04/23 111)  SLP Diet Recommendation: puree, honey thick liquids, no mixed consistencies (10/04/23 111)  Recommended Precautions and Strategies: upright posture during/after eating, small bites of food and sips of liquid, no straw, alternate between small bites of food and sips of liquid, general aspiration precautions, 1:1 supervision (10/04/23 1115)  SLP Rec. for Method of Medication Administration: meds crushed, with  puree (or via PEG as needed) (10/04/23 1115)     Monitor for Signs of Aspiration: notify SLP if any concerns (10/04/23 1115)     Swallow Criteria for Skilled Therapeutic Interventions Met: demonstrates skilled criteria (10/04/23 1115)  Anticipated Discharge Disposition (SLP): skilled nursing facility (10/04/23 1115)  Rehab Potential/Prognosis, Swallowing: re-evaluate goals as necessary (10/04/23 1115)  Therapy Frequency (Swallow): 5 days per week (10/04/23 1115)  Predicted Duration Therapy Intervention (Days): until discharge (10/04/23 1115)  Oral Care Recommendations: Oral Care BID/PRN, Suction toothbrush (10/04/23 1115)                                      Oral Care Recommendations: Oral Care BID/PRN, Suction toothbrush (10/04/23 1115)    Plan of Care Reviewed With: patient  Progress: no change      SWALLOW EVALUATION (last 72 hours)       SLP Adult Swallow Evaluation       Row Name 10/04/23 1115 10/03/23 0900                Rehab Evaluation    Document Type evaluation  -MP therapy note (daily note)  -       Subjective Information no complaints  -MP complains of;fatigue  -       Patient Observations lethargic  -MP cooperative  groggy, but alert to stimuli  -       Patient/Family/Caregiver Comments/Observations No family present  -MP Niece present. Spouse arrived near end of session.  -AC       Patient Effort adequate  -MP adequate  -       Oral Care -- teeth brushed - suction toothbrush;swabbed with antiseptic solution;suction provided  -          General Information    Patient Profile Reviewed yes  -MP --       Pertinent History Of Current Problem ICH, transfer from [facility] where adm w/ multi B infarcts  -MP --       Current Method of Nutrition pureed;honey-thick liquids;gastrostomy feedings  -MP --       Precautions/Limitations, Vision difficult to assess  -MP --       Precautions/Limitations, Hearing WFL;for purposes of eval  -MP --       Prior Level of Function-Communication unknown  -MP --       Prior  Level of Function-Swallowing no diet consistency restrictions  -MP --       Plans/Goals Discussed with patient  -MP --       Barriers to Rehab medically complex;cognitive status  -MP --       Patient's Goals for Discharge patient did not state  -MP --          Pain    Additional Documentation Pain Scale: FACES Pre/Post-Treatment (Group)  -MP --          Pain Scale: FACES Pre/Post-Treatment    Pain: FACES Scale, Pretreatment 2-->hurts little bit  -MP 0-->no hurt  -AC       Posttreatment Pain Rating 2-->hurts little bit  -MP 0-->no hurt  -AC          MBS/VFSS    Utensils Used spoon;cup  -MP --       Consistencies Trialed thin liquids;nectar/syrup-thick liquids;honey-thick liquids;pudding thick  -MP --          MBS/VFSS Interpretation    Oral Prep Phase impaired oral phase of swallowing  -MP --       Oral Transit Phase impaired  -MP --       Oral Residue impaired  -MP --       VFSS Summary Moderate oropharyngeal dysphagia. Reduced lip closure around spoon/cup, anterior loss, and oral residue across consistencies. Reduced lingual control resulting in pre-spill of liquids to the pharynx. Aspiration after the swallow w/ thin liquids from pyriform residue that spilled over into the airway. Penetration during/after & aspiration after w/ nectar-thick liquids. Cough response to aspiration. Significantly reduced hyolaryngeal elevation/excursion and decr'd pharyngeal stripping wave. Greatest amount of residue in pyriforms w/ thin & nectar-thick liquids. Smaller amount w/ honey-thick and pudding, and reduces further by alternating sips of honey-thick w/ bites of pudding. DNA solid 2' significant oral deficits. Rec continue puree diet & honey-thick liquids, alternate bites/sips, 1:1 assist, meds crushed in puree or via PEG.  -MP --          Oral Preparatory Phase    Oral Preparatory Phase reduced lip closure around utensil;anterior loss  -MP --       Reduced Lip Closure Around Utensil all consistencies tested  -MP --        Anterior Loss all consistencies tested  -MP --          Oral Transit Phase    Impaired Oral Transit Phase increased A-P transit time;premature spillage of liquids into pharynx  -MP --       Increased A-P Transit Time pudding/puree  -MP --       Premature Spillage of Liquids into Pharynx thin liquids;nectar-thick liquids  -MP --          Oral Residue    Impaired Oral Residue diffuse residue throughout oral cavity  -MP --       Diffuse Residue throughout Oral Cavity secondary to reduced lingual strength  -MP --       Response to Oral Residue partial residue clearance  -MP --          Initiation of Pharyngeal Swallow    Initiation of Pharyngeal Swallow bolus in pyriform sinuses  -MP --       Pharyngeal Phase impaired pharyngeal phase of swallowing  -MP --       Anatomical abnormalities noted other (see comments)  difficult to determine if truly hyperlordotic or just r/t positioning  -MP --       Penetration During the Swallow nectar-thick liquids;secondary to delayed swallow initiation or mistiming;secondary to reduced laryngeal elevation;secondary to reduced vestibular closure  -MP --       Penetration After the Swallow nectar-thick liquids;secondary to residue;in pyriform sinuses  -MP --       Aspiration After the Swallow thin liquids;nectar-thick liquids;secondary to residue;in pyriform sinuses;in laryngeal vestibule  -MP --       Response to Aspiration Yes  -MP --       Responded to aspiration with cough;non-effective  -MP --       Pharyngeal Residue all consistencies tested;diffuse within pharynx;secondary to reduced base of tongue retraction;secondary to reduced posterior pharyngeal wall stripping;secondary to reduced laryngeal elevation;secondary to reduced hyolaryngeal excursion;other (see comments)  > in pyriforms w/ thin & nectar  -MP --       Response to Residue partial residue clearance  -MP --       Attempted Compensatory Maneuvers alternate liquids/solids  -MP --       Response to Attempted Compensatory  Maneuvers reduced residue  -MP --          SLP Evaluation Clinical Impression    SLP Swallowing Diagnosis moderate;oral dysphagia;pharyngeal dysphagia  -MP --       Functional Impact risk of aspiration/pneumonia;risk of malnutrition;risk of dehydration  -MP --       Rehab Potential/Prognosis, Swallowing re-evaluate goals as necessary  -MP --       Swallow Criteria for Skilled Therapeutic Interventions Met demonstrates skilled criteria  -MP --          SLP Treatment Clinical Impressions    Treatment Assessment (SLP) -- continued;oral dysphagia;pharyngeal dysphagia;suspected  -AC       Treatment Assessment Comments (SLP) -- Delayed cough noted. ? Pharyngoesophageal vs. unrelated to swallowing (family reported residual cough/phlegm production since having covid-19). Has been ~2 weeks since last instrumental study. Will tentatively plan for MBS when pt alert/able to participate.  -       Daily Summary of Progress (SLP) -- progress toward functional goals as expected  -       Barriers to Overall Progress (SLP) -- Cognitive status;Medically complex  -       Plan for Continued Treatment (SLP) -- continue treatment per plan of care;further assessment needed (see comments);other (see comments)  MBS, as appropriate  -       Care Plan Review -- evaluation/treatment results reviewed;care plan/treatment goals reviewed;risks/benefits reviewed;current/potential barriers reviewed;patient/other agree to care plan  -       Care Plan Review, Other Participant(s) -- family;spouse  -          Recommendations    Therapy Frequency (Swallow) 5 days per week  -MP --       Predicted Duration Therapy Intervention (Days) until discharge  - --       SLP Diet Recommendation puree;honey thick liquids;no mixed consistencies  - puree;honey thick liquids;no mixed consistencies  -       Recommended Precautions and Strategies upright posture during/after eating;small bites of food and sips of liquid;no straw;alternate between small  bites of food and sips of liquid;general aspiration precautions;1:1 supervision  -MP 1:1 supervision;upright posture during/after eating;assist with feeding;small bites of food and sips of liquid;liquid via spoon only;alternate between small bites of food and sips of liquid;check mouth frequently for oral residue/pocketing  -AC       Oral Care Recommendations Oral Care BID/PRN;Suction toothbrush  -MP Oral Care BID/PRN;Suction toothbrush  -AC       SLP Rec. for Method of Medication Administration meds crushed;with puree  or via PEG as needed  -MP --       Monitor for Signs of Aspiration notify SLP if any concerns  -MP --       Anticipated Discharge Disposition (SLP) skilled nursing facility  -MP --                 User Key  (r) = Recorded By, (t) = Taken By, (c) = Cosigned By      Initials Name Effective Dates    AC Rosa Maria Mercado MS CCC-SLP 02/03/23 -     MP Aldo Harris MS CCC-SLP 12/28/21 -                     EDUCATION  The patient has been educated in the following areas:   Dysphagia (Swallowing Impairment) Modified Diet Instruction.        SLP GOALS       Row Name 10/04/23 1115 10/03/23 0900          (LTG) Patient will demonstrate functional swallow for    Diet Texture (Demonstrate functional swallow) soft to chew (chopped) textures  -MP soft to chew (chopped) textures  -AC     Liquid viscosity (Demonstrate functional swallow) nectar/ mildly thick liquids  -MP nectar/ mildly thick liquids  -AC     [place] (Demonstrate functional swallow) with minimal cues (75-90% accuracy)  -MP with minimal cues (75-90% accuracy)  -AC     Time Frame (Demonstrate functional swallow) by discharge  -MP by discharge  -AC     Progress/Outcomes (Demonstrate functional swallow) goal ongoing  -MP continuing progress toward goal  -AC        (STG) Patient will tolerate trials of    Consistencies Trialed (Tolerate trials) pureed textures;honey/ moderately thick liquids  -MP pureed textures;honey/ moderately thick liquids   -AC     Desired Outcome (Tolerate trials) without signs/symptoms of aspiration;without signs of distress;with adequate oral prep/transit/clearance;with use of compensatory strategies (see comments)  alternate b/w bites & sips  -MP without signs/symptoms of aspiration;with adequate oral prep/transit/clearance;with use of compensatory strategies (see comments)  small bolus size, liquid via tsp, alternate bites/sips  -AC     [place] (Tolerate trials) with 1:1 assist/ supervision  -MP with 1:1 assist/ supervision  -AC     Time Frame (Tolerate trials) by discharge  -MP by discharge  -AC     Progress/Outcomes (Tolerate trials) goal revised this date  - new goal  -AC     Comment (Tolerate trials) -- Continued reduced labial seal around spoon. Occasional anterior loss of liquid on R & tonic bite. Increased oral transit time and mild-mod oral residue. Residue cleared w/ spontaneous 2nd swallow & liquid wash. No immediate clinical s/sxs aspiration. Noted delayed coughing in which pt brought yellow-tinged phlegm to oral cavity (SLP suctioned).  -AC        (STG) Patient will tolerate therapeutic trials of    Consistencies Trialed (Tolerate therapeutic trials) thin liquids  -MP thin liquids;nectar/ mildly thick liquids  -AC     Desired Outcome (Tolerate therapeutic trials) without signs/symptoms of aspiration;without signs of distress  -MP without signs/symptoms of aspiration;without signs of distress  -AC     [place] (Tolerate therapeutic trials) with minimal cues (75-90% accuracy)  -MP with minimal cues (75-90% accuracy)  -AC     Time Frame (Tolerate therapeutic trials) by discharge  -MP by discharge  -AC     Progress/Outcomes (Tolerate therapeutic trials) goal revised this date  - goal revised this date  -AC     Comment (Tolerate therapeutic trials) -- DNT 2' fatigue  -AC        (STG) Labial Strengthening Goal 1 (SLP)    Activity (Labial Strengthening Goal 1, SLP) increase labial tone  -MP --     Increase Labial  Tone labial resistance exercises;swallow trials  -MP labial resistance exercises;swallow trials  -AC     [place]/Accuracy (Labial Strengthening Goal 1, SLP) with moderate cues (50-74% accuracy)  -MP with moderate cues (50-74% accuracy)  -AC     Time Frame (Labial Strengthening Goal 1, SLP) short term goal (STG)  -MP short term goal (STG)  -AC     Progress/Outcomes (Labial Strengthening Goal 1, SLP) goal ongoing  -MP goal ongoing  -AC        (STG) Lingual Strengthening Goal 1 (SLP)    Activity (Lingual Strengthening Goal 1, SLP) increase lingual tone/sensation/control/coordination/movement;increase tongue back strength  -MP --     Increase Lingual Tone/Sensation/Control/Coordination/Movement swallow trials;lingual resistance exercises  -MP swallow trials;lingual resistance exercises  -AC     Increase Tongue Back Strength lingual resistance exercises  -MP lingual resistance exercises  -AC     [place]/Accuracy (Lingual Strengthening Goal 1, SLP) with moderate cues (50-74% accuracy)  -MP with moderate cues (50-74% accuracy)  -AC     Time Frame (Lingual Strengthening Goal 1, SLP) short term goal (STG)  -MP short term goal (STG)  -AC     Progress/Outcomes (Lingual Strengthening Goal 1, SLP) goal ongoing  -MP goal ongoing  -AC        (STG) Pharyngeal Strengthening Exercise Goal 1 (SLP)    Activity (Pharyngeal Strengthening Goal 1, SLP) increase superior movement of the hyolaryngeal complex;increase anterior movement of the hyolaryngeal complex;increase closure at entrance to airway/closure of airway at glottis;increase squeeze/positive pressure generation;increase tongue base retraction;increase timing  -MP --     Increase Timing prepping - 3 second prep or suck swallow or 3-step swallow  -MP prepping - 3 second prep or suck swallow or 3-step swallow  -AC     Increase Superior Movement of the Hyolaryngeal Complex effortful pitch glide (falsetto + pharyngeal squeeze)  -MP falsetto  -AC     Increase Anterior  Movement of the Hyolaryngeal Complex chin tuck against resistance (CTAR)  -MP chin tuck against resistance (CTAR)  -AC     Increase Closure at Entrance to Airway/Closure of Airway at Glottis supraglottic swallow  -MP breath hold exercises;supraglottic swallow  -AC     Increase Squeeze/Positive Pressure Generation hard effortful swallow  -MP hard effortful swallow  -AC     Increase Tongue Base Retraction tamie  -MP --     [place]/Accuracy (Pharyngeal Strengthening Goal 1, SLP) with moderate cues (50-74% accuracy)  -MP with moderate cues (50-74% accuracy)  -AC     Time Frame (Pharyngeal Strengthening Goal 1, SLP) short term goal (STG)  -MP short term goal (STG)  -AC     Progress/Outcomes (Pharyngeal Strengthening Goal 1, SLP) goal revised this date  -MP goal ongoing  -AC               User Key  (r) = Recorded By, (t) = Taken By, (c) = Cosigned By      Initials Name Provider Type    AC Rosa Maria Mercado MS CCC-SLP Speech and Language Pathologist    Aldo Snowden MS CCC-SLP Speech and Language Pathologist                       Time Calculation:    Time Calculation- SLP       Row Name 10/04/23 1415             Time Calculation- SLP    SLP Start Time 1115  -MP      SLP Received On 10/04/23  -MP         Untimed Charges    90060-XV Motion Fluoro Eval Swallow Minutes 60  -MP         Total Minutes    Untimed Charges Total Minutes 60  -MP       Total Minutes 60  -MP                User Key  (r) = Recorded By, (t) = Taken By, (c) = Cosigned By      Initials Name Provider Type     Aldo Harris, MS CCC-SLP Speech and Language Pathologist                    Therapy Charges for Today       Code Description Service Date Service Provider Modifiers Qty    [id number]  ST MOTION FLUORO EVAL SWALLOW 4 10/4/2023 Aldo Harris MS CCC-SLP GN 1                 Aldo Wong MS CCC-SLP  10/4/2023

## 2023-10-04 NOTE — PLAN OF CARE
Goal Outcome Evaluation:  Plan of Care Reviewed With: patient, family        Progress: improving  Outcome Evaluation: Improved command following, sitting balance, and functional mobility this date. Progressed to SBA for balance sitting EOB following weight shifting/bearing, midline orientation, and postural ther-ex activities. Completed 10ft distance w/ BUE support and assist w/ RLE advancement (remains ataxic), excellent effort. RUE tone developing, educated family on benefits of routine PROM. Remains below baseline, will cont IPOT per POC as tolerated. Rec d/c to IP rehab.      Anticipated Discharge Disposition (OT): inpatient rehabilitation facility

## 2023-10-04 NOTE — CASE MANAGEMENT/SOCIAL WORK
Continued Stay Note  [facility]     Patient Name: Kenneth Wen  MRN: [id number]  Today's Date: 10/4/2023    Admit Date: 9/15/2023    Plan: TBD   Discharge Plan       Row Name 10/04/23 1530       Plan    Plan Comments MSW attempted to speak with pt's wife at bedside. At this time, pt remians in restraints. Palliative care has been consulted today for goals of care. MSW continues to follow for discharge needs.                   Discharge Codes    No documentation.                 Expected Discharge Date and Time       Expected Discharge Date Expected Discharge Time    Oct 5, 2023               JOEL Anna

## 2023-10-04 NOTE — PROGRESS NOTES
[facility] Medicine Services  PROGRESS NOTE    Patient Name: Kenneth Wen  : 1950  MRN: [id number]    Date of Admission: 9/15/2023  Primary Care Physician: Susan Powers APRN    Subjective   Subjective     CC:  F/u CVA    HPI:  Patient continues to be restrained due to pulling out tubes, lines and monitor leads.  Still has slurred speech and is in the process of going for a fees.  Discussed with wife that she needs to have a conversation with the patient (her ) about what he wants moving forward.  Consulted palliative to help wife and niece make decisions moving forward.    Objective   Objective     Vital Signs:   Temp:  [97.6 °F (36.4 °C)-99.8 °F (37.7 °C)] 98 °F (36.7 °C)  Heart Rate:  [72-76] 72  Resp:  [18] 18  BP: ()/(66-77) 132/77  Flow (L/min):  [2] 2     Physical Exam:  Constitutional: Awake, chronically ill-appearing male in NAD  Eyes: sclerae anicteric, no conjunctival injection  Head: NCAT  ENT: [place], moist mucous membranes   Respiratory: Nonlabored, symmetrical chest expansion, CTAB, 98% RA  Cardiovascular: RRR, no M/R/G, +DP pulses bilaterally  Gastrointestinal: Soft, NT, ND +BS, +PEG tube w/abdominal binder  Musculoskeletal: HINES; no LE edema bilaterally, wrist restraints bilaterally  Neurologic: Oriented to self, weakness LE bilaterally, follows all commands, speech garbled but understandable  Skin: No rashes on exposed skin  Psychiatric: Pleasant and cooperative; normal affect    Results Reviewed:  LAB RESULTS:      Lab 10/03/23  0632 10/02/23  0735 23  1023 23  0646   WBC 6.89  --  12.17* 13.30*   HEMOGLOBIN 10.5*  --  12.3* 13.7   HEMATOCRIT 31.3*  --  36.3* 41.3   PLATELETS 281  --  349 426   NEUTROS ABS  --   --   --  9.97*   IMMATURE GRANS (ABS)  --   --   --  0.09*   LYMPHS ABS  --   --   --  1.98   MONOS ABS  --   --   --  0.89   EOS ABS  --   --   --  0.24   MCV 94.3  --  91.9 94.5   CRP  --  1.03*  --   --            Lab  10/03/23  1101 10/02/23  0735 09/30/23  2112 09/30/23  1607 09/30/23  0808 09/30/23  0014 09/29/23 2054   SODIUM 139 140 144 147* 145 152* 149*   POTASSIUM 4.1 4.4 4.3  --  3.5 3.8 3.6   CHLORIDE 105 108*  --   --  113* 116* 114*   CO2 27.0 18.0*  --   --  25.0 26.0 24.0   ANION GAP 7.0 14.0  --   --  7.0 10.0 11.0   BUN 13 13  --   --  21 23 25*   CREATININE 0.64* 0.55*  --   --  0.65* 0.81 0.78   EGFR 101.2 106.0  --   --  100.7 94.3 95.3   GLUCOSE 153* 131*  --   --  159* 134* 123*   CALCIUM 8.4* 8.0*  --   --  8.6 8.8 8.9   MAGNESIUM  --  1.9  --   --  1.9  --   --    PHOSPHORUS  --  2.3*  --   --  2.4*  --   --            Lab 10/03/23  1101 10/02/23  0735   TOTAL PROTEIN 5.3* 4.8*   ALBUMIN 3.3* 2.9*   GLOBULIN 2.0 1.9   ALT (SGPT) 38 36   AST (SGOT) 31 34   BILIRUBIN 0.4 0.4   ALK PHOS 75 72               Lab 10/02/23  0735   CHOLESTEROL 58   TRIGLYCERIDES 43               Brief Urine Lab Results  (Last result in the past 365 days)        Color   Clarity   Blood   Leuk Est   Nitrite   Protein   CREAT   Urine HCG        09/15/23 1818 Yellow   Cloudy   Trace   Negative   Negative   Negative                   Microbiology Results Abnormal       Procedure Component Value - Date/Time    Blood Culture - Blood, Arm, Right [[id number]]  (Normal) Collected: 09/15/23 0212    Lab Status: Final result Specimen: Blood from Arm, Right Updated: 09/20/23 0230     Blood Culture No growth at 5 days    Blood Culture - Blood, Arm, Left [[id number]]  (Normal) Collected: 09/15/23 0212    Lab Status: Final result Specimen: Blood from Arm, Left Updated: 09/20/23 0230     Blood Culture No growth at 5 days            No radiology results from the last 24 hrs        Current medications:  Scheduled Meds:atorvastatin, 80 mg, Per PEG Tube, Nightly  castor oil-balsam peru, 1 application , Topical, Q12H  clopidogrel, 75 mg, Per PEG Tube, Daily  escitalopram, 10 mg, Per PEG Tube, Daily  insulin regular, 2-7 Units, Subcutaneous, Q6H  metoprolol  tartrate, 25 mg, Per PEG Tube, Q12H  pantoprazole, 40 mg, Intravenous, Q AM  ProSource No Carb, 30 mL, Per G Tube, BID  QUEtiapine, 25 mg, Per PEG Tube, Daily  QUEtiapine, 75 mg, Per PEG Tube, Nightly  sodium chloride, 10 mL, Intravenous, Q12H  sodium chloride, 10 mL, Intravenous, Q12H  sodium chloride, 10 mL, Intravenous, Q12H      Continuous Infusions:sodium chloride, 50 mL/hr, Last Rate: 50 mL/hr (10/03/23 2110)      PRN Meds:.  acetaminophen **OR** acetaminophen    Calcium Replacement - Follow Nurse / BPA Driven Protocol    dextrose    dextrose    glucagon (human recombinant)    ibuprofen    Magnesium Standard Dose Replacement - Follow Nurse / BPA Driven Protocol    ondansetron    Phosphorus Replacement - Follow Nurse / BPA Driven Protocol    Potassium Replacement - Follow Nurse / BPA Driven Protocol    QUEtiapine    sodium chloride    sodium chloride    sodium chloride    Assessment & Plan   Assessment & Plan     Active Hospital Problems    Diagnosis  POA    **Hemorrhagic CVA [I61.9]  Yes    Oropharyngeal dysphagia [R13.12]  Yes    Multiple bilateral CVAs [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        Brief Hospital Course to date:  Kenneth Wen is a 73 y.o. male with hx of HTN, HLD, T2DM, and GERD who presented to OSH with multiple acute ischemic infarcts of the bilateral cerebral and cerebellar hemispheres as well as left isaac. TTE/JOSIAS was negative PFO at OSH. On 9/13/23 he had worsening in mental status and new inability to move RLE, head CT showed evolution of the existing CVA with new development of petechial hemorrhage. DAPT was held for 24 hours per Neurology recommendations and repeat CT head that evening showed worsening  "effacement of the right quadrigeminal cistern with suspected increasing upward supratentorial pressure. He was then transferred to [facility] for Neurosurgery evaluation on 9/15/23. He was started on hypertonic saline 9/15/23 through 9/20/23 for cerebral edema. He had fevers with negative cultures but had worsening secretions and labored breathing so was started on Zosyn on 9/16/23. Transferred to the hospitalist service on 9/22/23. Pt demonstrated poor oral intake with elevated sodium levels; therefore, PEG tube was recommended.       Multiple bilateral posterior circulation strokes with hemorrhagic conversion  --Neurology has followed, suspect atheroembolic but cannot rule out cardioembolic given multiple vascular territories  --Plavix monotherapy  --High dose statin  --Needs to be unrestrained for placement  --Needs Holter monitor at discharge  --Follow up in stroke clinic in 8 weeks  --AM labs     Dysphagia  Hypernatremia  --SLP recommends mechanical ground with honey thick liquids   --Eating poorly, sodium elevated indicating probable poor fluid intake, NA+ 152. Pt is s/p D 5 1/2NS and NA has now normalized. Fluids stopped since now received TF and free water    --Risk of pulling out peg tube, abd binder to be in place at all times. --Surgery recommends to remain in restraints at all times or if off has to have sitter for next 2 weeks   --FEES scheduled for today     HTN  --Goal -160  --Continue Metoprolol 25 mg BID     GERD  --Continue Protonix     COVID-19-resolved  --Overall asymptomatic and on room air  --No infiltrates seen and low procal  --Did not receive any treatment   --Off Isolation 9/30/2023   --Repeat CXR 10/2/2023 ordered  d/t \"sputum production\" showed no acute pulmonary changes problems     Agitation  --Seroquel 25 mg QAM, Seroquel 75 mg QPM, prn seroquel 25 mg HS  --Qtc 463      Leukocytosis-resolved  --Remains afebrile  --Procalcitionin low at 0.05  --CXR and UA negative  --Blood cultures " negative  --AM labs     Elevated LFT's, mild  --Possibly secondary to statin?  --Negative acute hepatitis panel  --repeat AST/ALT only slightly improved on 09/27     Expected Discharge Location and Transportation: rehab pending because patient has to be out of restraints before he can be placed; family aware    Expected Discharge   Expected Discharge Date: 10/5/2023; Expected Discharge Time:      DVT prophylaxis:  Mechanical DVT prophylaxis orders are present.     AM-PAC 6 Clicks Score (PT): 11 (10/03/23 0820)    CODE STATUS:   Code Status and Medical Interventions:   Ordered at: 09/29/23 0854     Medical Intervention Limits:    NO intubation (DNI)     Code Status (Patient has no pulse and is not breathing):    No CPR (Do Not Attempt to Resuscitate)     Medical Interventions (Patient has pulse or is breathing):    Limited Support       Estefanía Light, ROLANDO  10/04/23

## 2023-10-04 NOTE — THERAPY TREATMENT NOTE
Patient Name: Kenneth Wen  : 1950    MRN: [id number]                              Today's Date: 10/4/2023       Admit Date: 9/15/2023    Visit Dx:     ICD-10-CM ICD-9-CM   1. Hemorrhagic CVA  I61.9 431   2. Aphasia  R47.01 784.3   3. Dysarthria  R47.1 784.51   4. Oropharyngeal dysphagia  R13.12 787.22     Patient Active Problem List   Diagnosis    Precordial pain    Elevated BP without diagnosis of hypertension    Dyslipidemia    Hyperglycemia    Multiple bilateral CVAs    Hemorrhagic CVA    HFpEF    T2DM (type 2 diabetes mellitus)    HTN (hypertension)    GERD (gastroesophageal reflux disease)    ICH (intracerebral hemorrhage)    Oropharyngeal dysphagia     Past Medical History:   Diagnosis Date    Acid reflux     Cancer     Skin    Diabetes mellitus     Prediabetes    GERD (gastroesophageal reflux disease) 09/15/2023    HTN (hypertension) 09/15/2023    Kidney disease     T2DM (type 2 diabetes mellitus) 09/15/2023     Past Surgical History:   Procedure Laterality Date    APPENDECTOMY      ENDOSCOPY W/ PEG TUBE PLACEMENT N/A 2023    Procedure: ESOPHAGOGASTRODUODENOSCOPY WITH PERCUTANEOUS ENDOSCOPIC GASTROSTOMY TUBE INSERTION;  Surgeon: Haseeb Carrillo MD;  Location: [facility] ENDOSCOPY;  Service: General;  Laterality: N/A;    HEMORRHOIDECTOMY      NASAL POLYP SURGERY        General Information       Row Name 10/04/23 0934          OT Time and Intention    Document Type therapy note (daily note)  -CS     Mode of Treatment occupational therapy  -CS       Row Name 10/04/23 0934          General Information    Patient Profile Reviewed yes  -CS     Existing Precautions/Restrictions fall;other (see comments)  PEG, R-sided weakness/tone/inattention, L gaze preference  -CS     Barriers to Rehab previous functional deficit;medically complex;visual deficit  -CS       Row Name 10/04/23 0934          Cognition    Orientation Status (Cognition) oriented to;person  -CS       Row Name 10/04/23 0934           Safety Issues, Functional Mobility    Safety Issues Affecting Function (Mobility) ability to follow commands;awareness of need for assistance;insight into deficits/self-awareness;problem-solving;positioning of assistive device;safety precaution awareness;judgment;safety precautions follow-through/compliance;sequencing abilities  -CS     Impairments Affecting Function (Mobility) balance;cognition;coordination;endurance/activity tolerance;grasp;motor control;motor planning;muscle tone abnormal;visual/perceptual;sensation/sensory awareness;postural/trunk control;strength  -CS     Cognitive Impairments, Mobility Safety/Performance awareness, need for assistance;attention;insight into deficits/self-awareness;judgment;problem-solving/reasoning;safety precaution awareness;safety precaution follow-through;sequencing abilities  -CS               User Key  (r) = Recorded By, (t) = Taken By, (c) = Cosigned By      Initials Name Provider Type    CS Jim Judge OT Occupational Therapist                     Mobility/ADL's       Row Name 10/04/23 0935          Bed Mobility    Bed Mobility supine-sit;scooting/bridging  -CS     Scooting/Bridging [place] (Bed Mobility) maximum assist (25% patient effort);1 person assist;verbal cues;nonverbal cues (demo/gesture)  -CS     Supine-Sit [place] (Bed Mobility) moderate assist (50% patient effort);2 person assist;verbal cues;nonverbal cues (demo/gesture)  -CS     Assistive Device (Bed Mobility) bed rails;draw sheet;head of bed elevated  -CS     Comment, (Bed Mobility) verbal/tactile cues for advancing BLEs to EOB, increased assist at trunk, Right/posterior lean upon sitting - improved with time to intermittent SBA with B foot placement and LUE weight bearing  -       Row Name 10/04/23 7635          Transfers    Transfers sit-stand transfer;stand-sit transfer;bed-chair transfer  -CS     Comment, (Transfers) BUE support for SPT transfer to L side, STS x 3 w/ BUE support and  RLE blocking, tactile cues for improved posture upon standing  -       Row Name 10/04/23 0935          Bed-Chair Transfer    Bed-Chair [place] (Transfers) moderate assist (50% patient effort);2 person assist;verbal cues;nonverbal cues (demo/gesture)  -     Assistive Device (Bed-Chair Transfers) other (see comments)  -       Row Name 10/04/23 0935          Sit-Stand Transfer    Sit-Stand [place] (Transfers) 2 person assist;verbal cues;moderate assist (50% patient effort)  -       Row Name 10/04/23 0935          Functional Mobility    Functional Mobility- Ind. Level moderate assist (50% patient effort);2 person assist required  -     Functional Mobility- Device other (see comments)  -     Functional Mobility-Distance (Feet) 12  -     Functional Mobility- Safety Issues step length decreased;sequencing ability decreased;weight-shifting ability decreased;balance decreased during turns  -     Functional Mobility- Comment BUE support and verbal cues for advancing BLEs, RLE w/ increased ataxia, chair follow by RN  -       Row Name 10/04/23 0935          Activities of Daily Living    BADL Assessment/Intervention upper body dressing;lower body dressing;grooming;toileting  -       Row Name 10/04/23 0935          Lower Body Dressing Assessment/Training    [place] Level (Lower Body Dressing) don;socks;dependent (less than 25% patient effort)  -     Position (Lower Body Dressing) edge of bed sitting  -       Row Name 10/04/23 0935          Upper Body Dressing Assessment/Training    [place] Level (Upper Body Dressing) don;pajama/robe;moderate assist (50% patient effort)  -     Position (Upper Body Dressing) edge of bed sitting  -     Comment, (Upper Body Dressing) improved command following for threading task w/ LUE  -       Row Name 10/04/23 0935          Toileting Assessment/Training    [place] Level (Toileting) adjust/manage clothing;change pad/brief;perform perineal  hygiene;dependent (less than 25% patient effort)  -CS     Comment, (Toileting) ext cath, pat-care, recommend brief for mobiltiy  -CS       Row Name 10/04/23 0935          Grooming Assessment/Training    [place] Level (Grooming) hair care, combing/brushing;wash face, hands;moderate assist (50% patient effort)  -CS     Position (Grooming) supported sitting  -CS               User Key  (r) = Recorded By, (t) = Taken By, (c) = Cosigned By      Initials Name Provider Type    CS Jim Judge OT Occupational Therapist                   Obj/Interventions       Row Name 10/04/23 0939          Motor Skills    Motor Skills coordination;functional endurance;motor control/coordination interventions  -     Functional Endurance imrpoving  -     Motor Control/Coordination Interventions gross motor coordination activities;weight-bearing activities;occupation/activity based treatment  -     Therapeutic Exercise elbow/forearm;shoulder;wrist;hand;other (see comments)  Educated niece on benefits of RUE PROM for maintenance for functional range (increased tone this date). Tolerated one set all LUE joints/planes. LUE AROM incorporated into targeted reaching activity  -CS       Row Name 10/04/23 0939          Balance    Balance Assessment sitting static balance;sitting dynamic balance;standing static balance;standing dynamic balance  -CS     Static Sitting Balance contact guard;2-person assist;verbal cues;non-verbal cues (demo/gesture)  -CS     Dynamic Sitting Balance moderate assist;2-person assist;verbal cues;non-verbal cues (demo/gesture)  -CS     Position, Sitting Balance unsupported;sitting edge of bed  -CS     Static Standing Balance moderate assist;2-person assist;verbal cues;non-verbal cues (demo/gesture)  -CS     Dynamic Standing Balance maximum assist;2-person assist;non-verbal cues (demo/gesture);verbal cues  -CS     Balance Interventions sitting;sit to stand;standing;UE activity with balance activity;trunk  training exercise;weight shifting activity;occupation based/functional task;core stability exercise  -CS     Comment, Balance sitting balance progressed to intermittent SBA following extended time EOB with midline orientation activities, LUE weight bearing, A/P weight shifting  -CS               User Key  (r) = Recorded By, (t) = Taken By, (c) = Cosigned By      Initials Name Provider Type    CS Jim Judge, OT Occupational Therapist                   Goals/Plan    No documentation.                  Clinical Impression       Row Name 10/04/23 0942          Pain Assessment    Additional Documentation Pain Scale: FACES Pre/Post-Treatment (Group)  -       Row Name 10/04/23 0942          Pain Scale: FACES Pre/Post-Treatment    Pain: FACES Scale, Pretreatment 0-->no hurt  -CS     Posttreatment Pain Rating 0-->no hurt  -CS     Pre/Posttreatment Pain Comment some grimacing with L hand PROM  -CS       Row Name 10/04/23 0942          Plan of Care Review    Plan of Care Reviewed With patient;family  -CS     Progress improving  -CS     Outcome Evaluation Improved command following, sitting balance, and functional mobility this date. Progressed to SBA for balance sitting EOB following weight shifting/bearing, midline orientation, and postural ther-ex activities. Completed 10ft distance w/ BUE support and assist w/ RLE advancement (remains ataxic), excellent effort. RUE tone developing, educated family on benefits of routine PROM. Remains below baseline, will cont IPOT per POC as tolerated. Rec d/c to IP rehab.  -       Row Name 10/04/23 0942          Therapy Plan Review/Discharge Plan (OT)    Anticipated Discharge Disposition (OT) inpatient rehabilitation facility  -       Row Name 10/04/23 0942          Vital Signs    Pre Patient Position Supine  -CS     Intra Patient Position Standing  -CS     Post Patient Position Sitting  -CS       Row Name 10/04/23 0942          Positioning and Restraints    Pre-Treatment  Position in bed  -CS     Post Treatment Position chair  -CS     Restraints released:;reapplied:;soft limb;mitten  no Mitts approved per RN  -CS               User Key  (r) = Recorded By, (t) = Taken By, (c) = Cosigned By      Initials Name Provider Type    Jim Munguia OT Occupational Therapist                   Outcome Measures       Row Name 10/04/23 0948          How much help from another is currently needed...    Putting on and taking off regular lower body clothing? 1  -CS     Bathing (including washing, rinsing, and drying) 2  -CS     Toileting (which includes using toilet bed pan or urinal) 1  -CS     Putting on and taking off regular upper body clothing 2  -CS     Taking care of personal grooming (such as brushing teeth) 2  -CS     Eating meals 2  -CS     AM-PAC 6 Clicks Score (OT) 10  -CS       Row Name 10/04/23 0948          Modified Wendell Scale    Modified Wendell Scale 4 - Moderately severe disability.  Unable to walk without assistance, and unable to attend to own bodily needs without assistance.  -CS       Row Name 10/04/23 0948          Functional Assessment    Outcome Measure Options AM-PAC 6 Clicks Daily Activity (OT);Modified [place]ell  -CS               User Key  (r) = Recorded By, (t) = Taken By, (c) = Cosigned By      Initials Name Provider Type    Jim Munguia OT Occupational Therapist                    Occupational Therapy Education       Title: PT OT SLP Therapies (In Progress)       Topic: Occupational Therapy (In Progress)       Point: ADL training (In Progress)       Description:   Instruct learner(s) on proper safety adaptation and remediation techniques during self care or transfers.   Instruct in proper use of assistive devices.                  Learning Progress Summary             Patient Acceptance, E,D, NR,NL by CS at 10/4/2023 0948    Acceptance, E, NR by MR at 9/28/2023 1556    Acceptance, E, NR by JR at 9/25/2023 1045    Acceptance, E, NR by CS1 at 9/20/2023 1420     Acceptance, E, NR by MC at 9/18/2023 1532    Acceptance, E, NR by MC at 9/15/2023 1531   Family Acceptance, E, NR by MR at 9/28/2023 1556                         Point: Home exercise program (In Progress)       Description:   Instruct learner(s) on appropriate technique for monitoring, assisting and/or progressing therapeutic exercises/activities.                  Learning Progress Summary             Patient Acceptance, E,D, NR,NL by CS at 10/4/2023 0948    Acceptance, E, NR by MR at 9/28/2023 1556    Acceptance, E, NR by  at 9/25/2023 1045    Acceptance, E, NR by [facility] at 9/20/2023 1420    Acceptance, E, NR by MC at 9/18/2023 1532    Acceptance, E, NR by  at 9/15/2023 1531   Family Acceptance, E, NR by MR at 9/28/2023 1556                         Point: Precautions (In Progress)       Description:   Instruct learner(s) on prescribed precautions during self-care and functional transfers.                  Learning Progress Summary             Patient Acceptance, E,D, NR,NL by CS at 10/4/2023 0948    Acceptance, E, NR by MR at 9/28/2023 1556    Acceptance, E, NR by [facility] at 9/20/2023 1420    Acceptance, E, NR by  at 9/18/2023 1532    Acceptance, E, NR by  at 9/15/2023 1531   Family Acceptance, E, NR by MR at 9/28/2023 1556                         Point: Body mechanics (In Progress)       Description:   Instruct learner(s) on proper positioning and spine alignment during self-care, functional mobility activities and/or exercises.                  Learning Progress Summary             Patient Acceptance, E,D, NR,NL by CS at 10/4/2023 0948    Acceptance, E, NR by MR at 9/28/2023 1556    Acceptance, E, NR by [facility] at 9/20/2023 1420    Acceptance, E, NR by  at 9/18/2023 1532    Acceptance, E, NR by  at 9/15/2023 1531   Family Acceptance, E, NR by  at 9/28/2023 1556                                         User Key       Initials Effective Dates Name Provider Type Discipline     02/03/23 -  Lorena, Jess FIELDS  OT Occupational Therapist OT    CS1 09/02/21 -  Carmen Carr, OT Occupational Therapist OT    CS 06/16/21 -  Jim Judge, OT Occupational Therapist OT     10/14/22 -  Miguel Jenny, OT Occupational Therapist OT    MR 09/22/22 -  Meera Doyle, OT Occupational Therapist OT                  OT Recommendation and Plan     Plan of Care Review  Plan of Care Reviewed With: patient, family  Progress: improving  Outcome Evaluation: Improved command following, sitting balance, and functional mobility this date. Progressed to SBA for balance sitting EOB following weight shifting/bearing, midline orientation, and postural ther-ex activities. Completed 10ft distance w/ BUE support and assist w/ RLE advancement (remains ataxic), excellent effort. RUE tone developing, educated family on benefits of routine PROM. Remains below baseline, will cont IPOT per POC as tolerated. Rec d/c to IP rehab.     Time Calculation:         Time Calculation- OT       Row Name 10/04/23 0949             Time Calculation- OT    OT Start Time 0837  -CS      OT Received On 10/04/23  -CS      OT Goal Re-Cert Due Date 10/05/23  -CS         Timed Charges    15874 - OT Therapeutic Exercise Minutes 14  -CS      43754 - OT Therapeutic Activity Minutes 22  -CS      87178 - OT Self Care/Mgmt Minutes 5  -CS         Total Minutes    Timed Charges Total Minutes 41  -CS       Total Minutes 41  -CS                User Key  (r) = Recorded By, (t) = Taken By, (c) = Cosigned By      Initials Name Provider Type    CS Jmi Judge, OT Occupational Therapist                           Jim Judge OT  10/4/2023

## 2023-10-04 NOTE — PLAN OF CARE
Goal Outcome Evaluation:  Plan of Care Reviewed With: patient        Progress: no change          SLP MBS re-evaluation completed. Will continue to address dysphagia. Please see note for further details and recommendations.

## 2023-10-04 NOTE — PROGRESS NOTES
Brief EN Review Note    Patient Name: Kenneth Wen  Date of Encounter: 10/04/23 14:11 EDT  MRN: [id number]  Admission date: 9/15/2023    Reason for visit: EN review . RD to continue to follow per protocol.     EMR reviewed   Medication reviewed  Labs reviewed     Estimated/Assessed Needs (9/30):      Energy: 1900 kcal  Protein: 95 gm  Fluids: per clinical status    Additional Information Obtained:   Pt with improved PO at lunch today however per spouse did not eat at breakfast. Suspect due to working with therapies at breakfast time. Tolerated nocturnal regimen. MBS completed - SLP recs puree, honey thick liquids, no mixed consistencies.     Current diet: Diet: Regular/House Diet; No Straw, Feeding Assistance - Nursing, No Mixed Consistencies; Texture: Pureed (NDD 1); Fluid Consistency: Honey Thick  Intake: ~50% of lunch meal observed.     EN: IsoSource 1.5  Goal Rate:  70mL/hr  Water Flushes:  30mL/hr  Modular: Prosource-no carb 2/day  Route: PEG  Tube: 20 PEG    At goal over: 12Hrs/day    Rx will supply:   Goal Volume 840 mL/day     Flush Volume 360 mL/day     Energy 1380 Kcal/day 73 % Est Need   Protein 87 g/day 92 % Est Need   Fiber 13 g/day     Water in   mL     Total Water 998 mL     Meet DRI No        --------------------------------------------------------------------------  Product/Rate verified at bedside: No  Infusing Rate at time of visit: nocturnal feeds    Average Delivery from Charting: Insufficient data - no TF documentation on I&Os for last 24hrs  Volume  mL/day   % Goal Vol.   Flush Volume  mL/day     Energy  Kcal/day  % Est Need   Protein  g/day  % Est Need   Fiber  g/day     Water in  EN  mL     Total Water  mL     Meet DRI N/A            Intervention:  Follow treatment plan  Care plan reviewed  - Continue current EN regimen as ordered.   - Will re-evaluate nocturnal regimen if unable to improve PO diet    Follow up:   Per protocol      Heidi Baxter MS,RD,LD  14:11 EDT  Time: 15min

## 2023-10-05 LAB
ANION GAP SERPL CALCULATED.3IONS-SCNC: 8 MMOL/L (ref 5–15)
BUN SERPL-MCNC: 16 MG/DL (ref 8–23)
BUN/CREAT SERPL: 31.4 (ref 7–25)
CALCIUM SPEC-SCNC: 8.5 MG/DL (ref 8.6–10.5)
CHLORIDE SERPL-SCNC: 105 MMOL/L (ref 98–107)
CO2 SERPL-SCNC: 25 MMOL/L (ref 22–29)
CREAT SERPL-MCNC: 0.51 MG/DL (ref 0.76–1.27)
DEPRECATED RDW RBC AUTO: 43.6 FL (ref 37–54)
EGFRCR SERPLBLD CKD-EPI 2021: 107.7 ML/MIN/1.73
ERYTHROCYTE [DISTWIDTH] IN BLOOD BY AUTOMATED COUNT: 13.1 % (ref 12.3–15.4)
GLUCOSE BLDC GLUCOMTR-MCNC: 107 MG/DL (ref 70–130)
GLUCOSE BLDC GLUCOMTR-MCNC: 115 MG/DL (ref 70–130)
GLUCOSE BLDC GLUCOMTR-MCNC: 143 MG/DL (ref 70–130)
GLUCOSE BLDC GLUCOMTR-MCNC: 157 MG/DL (ref 70–130)
GLUCOSE SERPL-MCNC: 146 MG/DL (ref 65–99)
HCT VFR BLD AUTO: 36.4 % (ref 37.5–51)
HGB BLD-MCNC: 12.2 G/DL (ref 13–17.7)
MAGNESIUM SERPL-MCNC: 1.9 MG/DL (ref 1.6–2.4)
MCH RBC QN AUTO: 31 PG (ref 26.6–33)
MCHC RBC AUTO-ENTMCNC: 33.5 G/DL (ref 31.5–35.7)
MCV RBC AUTO: 92.6 FL (ref 79–97)
PLATELET # BLD AUTO: 300 10*3/MM3 (ref 140–450)
PMV BLD AUTO: 11.3 FL (ref 6–12)
POTASSIUM SERPL-SCNC: 4.6 MMOL/L (ref 3.5–5.2)
RBC # BLD AUTO: 3.93 10*6/MM3 (ref 4.14–5.8)
SODIUM SERPL-SCNC: 138 MMOL/L (ref 136–145)
WBC NRBC COR # BLD: 7.24 10*3/MM3 (ref 3.4–10.8)

## 2023-10-05 PROCEDURE — 80048 BASIC METABOLIC PNL TOTAL CA: CPT | Performed by: NURSE PRACTITIONER

## 2023-10-05 PROCEDURE — 97530 THERAPEUTIC ACTIVITIES: CPT

## 2023-10-05 PROCEDURE — 83735 ASSAY OF MAGNESIUM: CPT | Performed by: NURSE PRACTITIONER

## 2023-10-05 PROCEDURE — 82948 REAGENT STRIP/BLOOD GLUCOSE: CPT

## 2023-10-05 PROCEDURE — 99232 SBSQ HOSP IP/OBS MODERATE 35: CPT | Performed by: NURSE PRACTITIONER

## 2023-10-05 PROCEDURE — 85027 COMPLETE CBC AUTOMATED: CPT | Performed by: NURSE PRACTITIONER

## 2023-10-05 PROCEDURE — 25810000003 SODIUM CHLORIDE 0.9 % SOLUTION: Performed by: NURSE PRACTITIONER

## 2023-10-05 PROCEDURE — 92507 TX SP LANG VOICE COMM INDIV: CPT

## 2023-10-05 PROCEDURE — 92526 ORAL FUNCTION THERAPY: CPT

## 2023-10-05 RX ORDER — LIDOCAINE 4 G/G
1 PATCH TOPICAL
Status: DISCONTINUED | OUTPATIENT
Start: 2023-10-05 | End: 2023-11-16 | Stop reason: HOSPADM

## 2023-10-05 RX ORDER — ACETAMINOPHEN 500 MG
500 TABLET ORAL EVERY 6 HOURS
Status: DISCONTINUED | OUTPATIENT
Start: 2023-10-05 | End: 2023-10-05

## 2023-10-05 RX ORDER — ACETAMINOPHEN 160 MG/5ML
500 SOLUTION ORAL EVERY 6 HOURS PRN
Status: DISCONTINUED | OUTPATIENT
Start: 2023-10-05 | End: 2023-10-05

## 2023-10-05 RX ORDER — ACETAMINOPHEN 160 MG/5ML
500 SOLUTION ORAL EVERY 6 HOURS
Status: DISCONTINUED | OUTPATIENT
Start: 2023-10-05 | End: 2023-10-21

## 2023-10-05 RX ADMIN — ATORVASTATIN CALCIUM 80 MG: 40 TABLET, FILM COATED ORAL at 22:21

## 2023-10-05 RX ADMIN — CLOPIDOGREL BISULFATE 75 MG: 75 TABLET ORAL at 08:31

## 2023-10-05 RX ADMIN — Medication 30 ML: at 22:21

## 2023-10-05 RX ADMIN — Medication 10 ML: at 22:22

## 2023-10-05 RX ADMIN — PANTOPRAZOLE SODIUM 40 MG: 40 INJECTION, POWDER, LYOPHILIZED, FOR SOLUTION INTRAVENOUS at 06:19

## 2023-10-05 RX ADMIN — Medication 10 ML: at 22:21

## 2023-10-05 RX ADMIN — CASTOR OIL AND BALSAM, PERU 1 APPLICATION: 788; 87 OINTMENT TOPICAL at 22:21

## 2023-10-05 RX ADMIN — CASTOR OIL AND BALSAM, PERU 1 APPLICATION: 788; 87 OINTMENT TOPICAL at 08:30

## 2023-10-05 RX ADMIN — ACETAMINOPHEN ORAL SOLUTION 500 MG: 650 SOLUTION ORAL at 12:39

## 2023-10-05 RX ADMIN — Medication 10 ML: at 08:31

## 2023-10-05 RX ADMIN — ACETAMINOPHEN ORAL SOLUTION 500 MG: 650 SOLUTION ORAL at 18:17

## 2023-10-05 RX ADMIN — Medication 10 ML: at 06:20

## 2023-10-05 RX ADMIN — METOPROLOL TARTRATE 25 MG: 25 TABLET, FILM COATED ORAL at 22:21

## 2023-10-05 RX ADMIN — METOPROLOL TARTRATE 25 MG: 25 TABLET, FILM COATED ORAL at 08:31

## 2023-10-05 RX ADMIN — QUETIAPINE FUMARATE 75 MG: 25 TABLET ORAL at 20:46

## 2023-10-05 RX ADMIN — ESCITALOPRAM OXALATE 10 MG: 10 TABLET ORAL at 08:30

## 2023-10-05 RX ADMIN — Medication 10 ML: at 06:19

## 2023-10-05 RX ADMIN — LIDOCAINE 1 PATCH: 4 PATCH TOPICAL at 12:39

## 2023-10-05 RX ADMIN — SODIUM CHLORIDE 50 ML/HR: 9 INJECTION, SOLUTION INTRAVENOUS at 08:30

## 2023-10-05 RX ADMIN — Medication 30 ML: at 08:32

## 2023-10-05 RX ADMIN — QUETIAPINE FUMARATE 25 MG: 25 TABLET ORAL at 08:31

## 2023-10-05 NOTE — CONSULTS
Palliative Care Initial Consult   Attending Physician: Juliet Toney MD  Referring Provider: ROLANDO Dennison     Reason for Referral:  assistance with clarification of goals of care    Code Status:   Code Status and Medical Interventions:   Ordered at: 09/29/23 0854     Medical Intervention Limits:    NO intubation (DNI)     Code Status (Patient has no pulse and is not breathing):    No CPR (Do Not Attempt to Resuscitate)     Medical Interventions (Patient has pulse or is breathing):    Limited Support      Advanced Directives: Advance Directive Status: Patient does not have advance directive   Family/Support: Reny Wen (spouse), Steve Almonte (son)  Goals of Care: TBD.    HPI: Kenneth Wen is a 73 y.o. male with PMH significant for HTN, T2DM, dyslipidemia, and GERD. Patient initially admitted to [facility] in [place] due to AMS and slurred speech on 9/10, found to have multiple acute ischemic infarcts of bilateral cerebral and cerebella hemispheres and left side of the isaac. Patient transferred to [facility] on 9/15 for higher level of care and management of hemorrhagic CVA with worsening mental status and inability to move RLE. Patient started on hypertonic saline for cerebral edema. Poor intake with elevated sodium levels, PEG placed. Patient requiring restraints as he has been pulling at lines. Receiving nocturnal feeds, modified diet per SLP evaluation. Palliative Care consulted for [facility] in the context of complex medical decision making.   Wife, Reny, at bedside. She reports prior to CVA patient was a  at the [facility] in [place]. Wife reports patient with arthritis and would complain of pain after sitting/laying too long. She reports that patient has been eating when she offers him food. He has been working with therapy and following commands. Patient sleeping throughout visit and did not respond to verbal stimuli or light physical stimuli.     ROS: ROS limited by patient sleeping.  "      Past Medical History:   Diagnosis Date    Acid reflux     Cancer     Skin    Diabetes mellitus     Prediabetes    GERD (gastroesophageal reflux disease) 09/15/2023    HTN (hypertension) 09/15/2023    Kidney disease     T2DM (type 2 diabetes mellitus) 09/15/2023     Past Surgical History:   Procedure Laterality Date    APPENDECTOMY      ENDOSCOPY W/ PEG TUBE PLACEMENT N/A 2023    Procedure: ESOPHAGOGASTRODUODENOSCOPY WITH PERCUTANEOUS ENDOSCOPIC GASTROSTOMY TUBE INSERTION;  Surgeon: Haseeb Carrillo MD;  Location: [facility] ENDOSCOPY;  Service: General;  Laterality: N/A;    HEMORRHOIDECTOMY      NASAL POLYP SURGERY       Social History     Socioeconomic History    Marital status:    Tobacco Use    Smoking status: Former     Types: Cigarettes     Quit date: 10/4/1982     Years since quittin.0    Smokeless tobacco: Never   Vaping Use    Vaping Use: Never used   Substance and Sexual Activity    Alcohol use: No    Drug use: No    Sexual activity: Defer     Family History   Problem Relation Age of Onset    No Known Problems Mother     Heart attack Father     No Known Problems Sister     No Known Problems Brother     No Known Problems Sister     No Known Problems Brother        Allergies   Allergen Reactions    Cefdinir Unknown - Low Severity       Current medication reviewed for route, type, dose and frequency and are current per MAR at time of dictation.    Palliative Performance Scale Score:  50%    /77   Pulse 84   Temp 98.8 °F (37.1 °C) (Oral)   Resp 20   Ht 175.3 cm (69\")   Wt 72.8 kg (160 lb 7.9 oz)   SpO2 98%   BMI 23.70 kg/m²     Intake/Output Summary (Last 24 hours) at 10/5/2023 0849  Last data filed at 10/5/2023 0600  Gross per 24 hour   Intake 1460 ml   Output 1200 ml   Net 260 ml       Physical Exam:    General Appearance:    Patient laying in bed, sleeping, acutely ill appearing, NAD   HEENT:    NC/AT, EOMI, anicteric, MMM, face relaxed   Neck:   supple, trachea midline, no " JVD   Lungs:     CTA bilat, diminished in bases; respirations regular, even and unlabored; RR 16-18 on exam, on RA    Heart:    RRR, normal S1 and S2, no M/R/G, HR 65    Abdomen:     Normal bowel sounds, soft, nontender, nondistended, PEG with abdominal binder covering   G/U:   Deferred   MSK/Extremities:   no edema, right foot cooler than left, moving right foot with stimulation   Pulses:   Pulses palpable and equal bilaterally   Skin:   Warm, dry   Neurologic:   sleeping   Psych:   sleeping         Labs:   Results from last 7 days   Lab Units 10/05/23  0659   WBC 10*3/mm3 7.24   HEMOGLOBIN g/dL 12.2*   HEMATOCRIT % 36.4*   PLATELETS 10*3/mm3 300     Results from last 7 days   Lab Units 10/05/23  0659   SODIUM mmol/L 138   POTASSIUM mmol/L 4.6   CHLORIDE mmol/L 105   CO2 mmol/L 25.0   BUN mg/dL 16   CREATININE mg/dL 0.51*   GLUCOSE mg/dL 146*   CALCIUM mg/dL 8.5*     Results from last 7 days   Lab Units 10/05/23  0659 10/03/23  1101   SODIUM mmol/L 138 139   POTASSIUM mmol/L 4.6 4.1   CHLORIDE mmol/L 105 105   CO2 mmol/L 25.0 27.0   BUN mg/dL 16 13   CREATININE mg/dL 0.51* 0.64*   CALCIUM mg/dL 8.5* 8.4*   BILIRUBIN mg/dL  --  0.4   ALK PHOS U/L  --  75   ALT (SGPT) U/L  --  38   AST (SGOT) U/L  --  31   GLUCOSE mg/dL 146* 153*     Imaging Results (Last 72 Hours)       Procedure Component Value Units Date/Time    FL Video Swallow With Speech Single Contrast [[id number]] Collected: 10/04/23 1142     Updated: 10/04/23 1513    Narrative:      FL VIDEO SWALLOW W SPEECH SINGLE-CONTRAST    Date of Exam: 10/4/2023 11:12 AM EDT    Indication: dysphagia.       Comparison: None available.    Technique:   The speech pathologist administered food and/or liquid mixed with barium to the patient with cine/video imaging.  Imaging assistance was provided to the speech pathologist and an image was saved.    Fluoroscopic Time: 42 seconds    Number of Images: 8 associated fluoroscopic loops were saved    Findings:  Please see speech  therapy report for full details and recommendations.      Impression:      Impression:  Fluoroscopy provided for a modified barium swallow. Please see speech therapy report for full details and recommendations.      Electronically Signed: Jackie Olson MD    10/4/2023 3:10 PM EDT    Workstation ID: [id number]                  Diagnostics: Reviewed    A:   Hemorrhagic CVA    Dyslipidemia    Multiple bilateral CVAs    HFpEF    T2DM (type 2 diabetes mellitus)    HTN (hypertension)    GERD (gastroesophageal reflux disease)    ICH (intracerebral hemorrhage)    Oropharyngeal dysphagia     73 y.o. male with hemorrhagic CVA, HFpEF, HTN.   S/S:   Pain -s/p stroke and arthritis, MSK  -scheduled Tylenol 500mg PO/PEG q 6 hours (LFT's normal)  -scheduled Lidocaine patch to low back    2. Dysphagia -SLP with diet modifications  -PEG in place    3. Debility -PT/OT following  -wife would like to discharge to rehab, placement complicated by restraints    4. Agitation -requiring restraints  -Seroquel being adjusted by Hospitalist    5. GOC -DNR/DNI -per discussion with wife  -Wife would like pain management as patient has appeared restless and complaining of pain with hope that this will allow for discontinuation of restraints  -wife would like to pursue rehab    P:  Introduced Palliative Care and services to wife at bedside. Patient did not awaken during discussion, restraints in place. Reviewed patient's condition and currently has worked with PT and eating modified diet per wife. Her goal is for patient to continue to work with PT, increase oral intake, and discharge to rehab. Working on pain management to prevent agitation and potentially allow discontinuation of restraints.   Thank you for this consult and allowing us to participate in patient's plan of care. Palliative Care Team will continue to follow patient. Please do not hesitate to contact us regarding further symptom management or goals of care needs.  Time: 45 minutes  spent reviewing medical and medication records, assessing and examining patient, discussing with family, answering questions, providing some guidance about a plan and documentation of care, and coordinating care with other healthcare members, with > 50% time spent face to face.         Dianelys Arriaga, APRN  10/5/2023

## 2023-10-05 NOTE — THERAPY PROGRESS REPORT/RE-CERT
Patient Name: Kenneth Wen  : 1950    MRN: [id number]                              Today's Date: 10/5/2023       Admit Date: 9/15/2023    Visit Dx:     ICD-10-CM ICD-9-CM   1. Hemorrhagic CVA  I61.9 431   2. Aphasia  R47.01 784.3   3. Dysarthria  R47.1 784.51   4. Oropharyngeal dysphagia  R13.12 787.22     Patient Active Problem List   Diagnosis    Precordial pain    Elevated BP without diagnosis of hypertension    Dyslipidemia    Hyperglycemia    Multiple bilateral CVAs    Hemorrhagic CVA    HFpEF    T2DM (type 2 diabetes mellitus)    HTN (hypertension)    GERD (gastroesophageal reflux disease)    ICH (intracerebral hemorrhage)    Oropharyngeal dysphagia     Past Medical History:   Diagnosis Date    Acid reflux     Cancer     Skin    Diabetes mellitus     Prediabetes    GERD (gastroesophageal reflux disease) 09/15/2023    HTN (hypertension) 09/15/2023    Kidney disease     T2DM (type 2 diabetes mellitus) 09/15/2023     Past Surgical History:   Procedure Laterality Date    APPENDECTOMY      ENDOSCOPY W/ PEG TUBE PLACEMENT N/A 2023    Procedure: ESOPHAGOGASTRODUODENOSCOPY WITH PERCUTANEOUS ENDOSCOPIC GASTROSTOMY TUBE INSERTION;  Surgeon: Haseeb Carrillo MD;  Location: [facility] ENDOSCOPY;  Service: General;  Laterality: N/A;    HEMORRHOIDECTOMY      NASAL POLYP SURGERY        General Information       Row Name 10/05/23 133          Physical Therapy Time and Intention    Document Type therapy note (daily note)  -SD     Mode of Treatment physical therapy  -SD       Row Name 10/05/23 133          General Information    Existing Precautions/Restrictions fall;other (see comments)  PEG, R-sided weakness, tone, and neglect  -SD       Row Name 10/05/23 133          Cognition    Orientation Status (Cognition) oriented to;person  -SD       Row Name 10/05/23 133          Safety Issues, Functional Mobility    Safety Issues Affecting Function (Mobility) ability to follow commands;awareness of need for  assistance;friction/shear risk;impulsivity;insight into deficits/self-awareness;judgment;sequencing abilities;safety precautions follow-through/compliance;safety precaution awareness;problem-solving  -SD     Impairments Affecting Function (Mobility) balance;cognition;coordination;endurance/activity tolerance;grasp;motor control;motor planning;muscle tone abnormal;visual/perceptual;sensation/sensory awareness;postural/trunk control;strength  -SD               User Key  (r) = Recorded By, (t) = Taken By, (c) = Cosigned By      Initials Name Provider Type    Chantel Toledo PT Physical Therapist                   Mobility       Row Name 10/05/23 1431          Bed Mobility    Bed Mobility rolling left;rolling right  -SD     Rolling Left [place] (Bed Mobility) maximum assist (25% patient effort);1 person assist;verbal cues  -SD     Rolling Right [place] (Bed Mobility) maximum assist (25% patient effort);1 person assist;verbal cues  -SD     Assistive Device (Bed Mobility) bed rails;draw sheet  -SD     Comment, (Bed Mobility) Pt rolled side to side to place lift sling  -SD       Row Name 10/05/23 1431          Transfers    Comment, (Transfers) mech lift to recliner, then t/f sit <> stand t/f training x3 reps with LUE support, gait belt, and RLE blocked. Pt able to maintain trunk extension for up to 30 sec before needing to sit.  -SD       Row Name 10/05/23 1431          Bed-Chair Transfer    Bed-Chair [place] (Transfers) dependent (less than 25% patient effort);1 person assist;verbal cues  -SD     Assistive Device (Bed-Chair Transfers) lift device  -SD       Row Name 10/05/23 1431          Sit-Stand Transfer    Sit-Stand [place] (Transfers) 2 person assist;verbal cues;moderate assist (50% patient effort)  -SD               User Key  (r) = Recorded By, (t) = Taken By, (c) = Cosigned By      Initials Name Provider Type    Chantel Toledo PT Physical Therapist                    Obj/Interventions       Row Name 10/05/23 1434          Balance    Balance Assessment sitting static balance;standing static balance  -SD     Static Sitting Balance contact guard  -SD     Position, Sitting Balance unsupported;sitting in chair  -SD     Static Standing Balance minimal assist;2-person assist;verbal cues;non-verbal cues (demo/gesture)  -SD     Position/Device Used, Standing Balance supported  -SD               User Key  (r) = Recorded By, (t) = Taken By, (c) = Cosigned By      Initials Name Provider Type    Chantel Toledo PT Physical Therapist                   Goals/Plan       Row Name 10/05/23 1438          Bed Mobility Goal 1 (PT)    Activity/Assistive Device (Bed Mobility Goal 1, PT) sit to supine/supine to sit;rolling to left;rolling to right  -SD     [place] Level/Cues Needed (Bed Mobility Goal 1, PT) minimum assist (75% or more patient effort)  -SD     Time Frame (Bed Mobility Goal 1, PT) long term goal (LTG);10 days  -SD     Progress/Outcomes (Bed Mobility Goal 1, PT) goal ongoing;progress slower than expected  -SD       Row Name 10/05/23 1438          Transfer Goal 1 (PT)    Activity/Assistive Device (Transfer Goal 1, PT) sit-to-stand/stand-to-sit;bed-to-chair/chair-to-bed;walker, rolling  -SD     [place] Level/Cues Needed (Transfer Goal 1, PT) moderate assist (50-74% patient effort)  -SD     Time Frame (Transfer Goal 1, PT) long term goal (LTG);10 days  -SD     Progress/Outcome (Transfer Goal 1, PT) continuing progress toward goal  -SD       Row Name 10/05/23 1438          Gait Training Goal 1 (PT)    Activity/Assistive Device (Gait Training Goal 1, PT) gait (walking locomotion);assistive device use;walker, rolling  -SD     [place] Level (Gait Training Goal 1, PT) maximum assist (25-49% patient effort)  -SD     Distance (Gait Training Goal 1, PT) 10  -SD     Time Frame (Gait Training Goal 1, PT) long term goal (LTG);10 days  -SD     Progress/Outcome (Gait Training Goal  1, PT) continuing progress toward goal  -SD               User Key  (r) = Recorded By, (t) = Taken By, (c) = Cosigned By      Initials Name Provider Type    Chantel Toledo PT Physical Therapist                   Clinical Impression       Row Name 10/05/23 1435          Pain    Pretreatment Pain Rating 0/10 - no pain  -SD     Posttreatment Pain Rating 0/10 - no pain  -SD       Row Name 10/05/23 1435          Plan of Care Review    Plan of Care Reviewed With patient;spouse  -SD     Progress improving  -SD     Outcome Evaluation pt t/f to recliner with mech lift, then practiced sit <> stand t/f's with assist x2. PT PROGRESS NOTE: Pt remains below baseline level of function for mobility and continues to progress toward goals. PT remains warranted at this time.  -SD       Row Name 10/05/23 1435          Vital Signs    Pretreatment Heart Rate (beats/min) 74  -SD     Pre SpO2 (%) 98  -SD     O2 Delivery Pre Treatment room air  -SD     Pre Patient Position Supine  -SD     Post Patient Position Sitting  -SD       Row Name 10/05/23 1435          Positioning and Restraints    Pre-Treatment Position in bed  -SD     Post Treatment Position chair  -SD     In Chair notified nsg;reclined;call light within reach;encouraged to call for assist;exit alarm on;with family/caregiver;waffle cushion;on mechanical lift sling;heels elevated  -SD               User Key  (r) = Recorded By, (t) = Taken By, (c) = Cosigned By      Initials Name Provider Type    Chantel Toledo, PIPER Physical Therapist                   Outcome Measures       Row Name 10/05/23 1437          How much help from another person do you currently need...    Turning from your back to your side while in flat bed without using bedrails? 2  -SD     Moving from lying on back to sitting on the side of a flat bed without bedrails? 2  -SD     Moving to and from a bed to a chair (including a wheelchair)? 2  -SD     Standing up from a chair using your arms (e.g.,  wheelchair, bedside chair)? 2  -SD     Climbing 3-5 steps with a railing? 1  -SD     To walk in hospital room? 2  -SD     AM-PAC 6 Clicks Score (PT) 11  -SD     Highest level of mobility 4 --> Transferred to chair/commode  -SD       Row Name 10/05/23 1437          Modified Harnett Scale    Modified Harnett Scale 4 - Moderately severe disability.  Unable to walk without assistance, and unable to attend to own bodily needs without assistance.  -SD       Row Name 10/05/23 1437          Functional Assessment    Outcome Measure Options AM-PAC 6 Clicks Basic Mobility (PT)  -SD               User Key  (r) = Recorded By, (t) = Taken By, (c) = Cosigned By      Initials Name Provider Type    Chantel Toledo PT Physical Therapist                                 Physical Therapy Education       Title: PT OT SLP Therapies (In Progress)       Topic: Physical Therapy (In Progress)       Point: Mobility training (In Progress)       Learning Progress Summary             Patient Acceptance, E, NR by SD at 10/5/2023 1437    Acceptance, E, VU by CD at 10/2/2023 1502    Comment: SEE FLOWSHEET    Acceptance, E, NR by KR at 9/25/2023 1324    Acceptance, E, NR by KG at 9/18/2023 1403    Acceptance, E,TB, NR by AY at 9/15/2023 1254   Family Acceptance, E, NR by SD at 10/5/2023 1437                         Point: Home exercise program (In Progress)       Learning Progress Summary             Patient Acceptance, E, NR by SD at 10/5/2023 1437    Acceptance, E, VU by CD at 10/2/2023 1502    Comment: SEE FLOWSHEET    Acceptance, E, NR by KG at 9/18/2023 1403   Family Acceptance, E, NR by SD at 10/5/2023 1437                         Point: Body mechanics (In Progress)       Learning Progress Summary             Patient Acceptance, E, NR by SD at 10/5/2023 1437    Acceptance, E, VU by CD at 10/2/2023 1502    Comment: SEE FLOWSHEET    Acceptance, E, NR by KR at 9/25/2023 1324    Acceptance, E, NR by KG at 9/18/2023 1403    Acceptance, E,TB,  NR by AY at 9/15/2023 1254   Family Acceptance, E, NR by SD at 10/5/2023 1437                         Point: Precautions (In Progress)       Learning Progress Summary             Patient Acceptance, E, NR by SD at 10/5/2023 1437    Acceptance, E, VU by CD at 10/2/2023 1502    Comment: SEE FLOWSHEET    Acceptance, E, NR by KR at 9/25/2023 1324    Acceptance, E, NR by KG at 9/18/2023 1403    Acceptance, E,TB, NR by AY at 9/15/2023 1254   Family Acceptance, E, NR by SD at 10/5/2023 1437                                         User Key       Initials Effective Dates Name Provider Type Discipline    CD 02/03/23 -  Claudia Duarte, PT Physical Therapist PT    SD 03/13/23 -  Chantel Scott, PT Physical Therapist PT    KG 05/22/20 -  Mackenzie Mckay, PT Physical Therapist PT    AY 11/10/20 -  Aleshia Armstrong, PT Physical Therapist PT    KR 12/30/22 -  Raissa Soria, PT Physical Therapist PT                  PT Recommendation and Plan     Plan of Care Reviewed With: patient, spouse  Progress: improving  Outcome Evaluation: pt t/f to recliner with mech lift, then practiced sit <> stand t/f's with assist x2. PT PROGRESS NOTE: Pt remains below baseline level of function for mobility and continues to progress toward goals. PT remains warranted at this time.     Time Calculation:         PT Charges       Row Name 10/05/23 1440             Time Calculation    Start Time 1331  -SD      PT Received On 10/05/23  -SD      PT Goal Re-Cert Due Date 10/05/23  -SD         Time Calculation- PT    Total Timed Code Minutes- PT 35 minute(s)  -SD         Timed Charges    87134 - PT Therapeutic Activity Minutes 35  -SD         Total Minutes    Timed Charges Total Minutes 35  -SD       Total Minutes 35  -SD                User Key  (r) = Recorded By, (t) = Taken By, (c) = Cosigned By      Initials Name Provider Type    SD Chantel Scott, PT Physical Therapist                  Therapy Charges for Today       Code Description  Service Date Service Provider Modifiers Qty    [id number]  PT THERAPEUTIC ACT EA 15 MIN 10/5/2023 Chantel Scott, PT GP 2            PT G-Codes  Outcome Measure Options: AM-PAC 6 Clicks Basic Mobility (PT)  AM-PAC 6 Clicks Score (PT): 11  AM-PAC 6 Clicks Score (OT): 10  Modified [place] Scale: 4 - Moderately severe disability.  Unable to walk without assistance, and unable to attend to own bodily needs without assistance.  PT Discharge Summary  Anticipated Discharge Disposition (PT): inpatient rehabilitation facility    Chantel Scott, PIPER  10/5/2023

## 2023-10-05 NOTE — PLAN OF CARE
Goal Outcome Evaluation:  Plan of Care Reviewed With: patient, spouse        Progress: improving  Outcome Evaluation: pt t/f to recliner with mech lift, then practiced sit <> stand t/f's with assist x2. PT PROGRESS NOTE: Pt remains below baseline level of function for mobility and continues to progress toward goals. PT remains warranted at this time.      Anticipated Discharge Disposition (PT): inpatient rehabilitation facility

## 2023-10-05 NOTE — CASE MANAGEMENT/SOCIAL WORK
Continued Stay Note  [facility]     Patient Name: Kenneth Wen  MRN: [id number]  Today's Date: 10/5/2023    Admit Date: 9/15/2023    Plan: TBD   Discharge Plan       Row Name 10/05/23 1215       Plan    Plan Comments MSW spoke with pt's wife at bedside. At this time, pt had the mitt restraints discontinued but is still in soft wrist restraints. MSW discussed plan with pt's wife once restraints removed and pt able to be without them. [facility] is following currently and pt's wife reports this is her first choice once pt appropriate for rehab. Pt's wife reports then if not [facility], second choice will be [facility] rehab and if not will be okay with referral to [facility]. MSW remains available and continues to follow.                   Discharge Codes    No documentation.                 Expected Discharge Date and Time       Expected Discharge Date Expected Discharge Time    Oct 7, 2023               JOEL Anna

## 2023-10-05 NOTE — THERAPY TREATMENT NOTE
Acute Care - Speech Language Pathology   Speech & Swallow Treatment Note [facility]     Patient Name: Kenneth Wen  : 1950  MRN: [id number]  Today's Date: 10/5/2023               Admit Date: 9/15/2023    Visit Dx:     ICD-10-CM ICD-9-CM   1. Hemorrhagic CVA  I61.9 431   2. Aphasia  R47.01 784.3   3. Dysarthria  R47.1 784.51   4. Oropharyngeal dysphagia  R13.12 787.22     Patient Active Problem List   Diagnosis    Precordial pain    Elevated BP without diagnosis of hypertension    Dyslipidemia    Hyperglycemia    Multiple bilateral CVAs    Hemorrhagic CVA    HFpEF    T2DM (type 2 diabetes mellitus)    HTN (hypertension)    GERD (gastroesophageal reflux disease)    ICH (intracerebral hemorrhage)    Oropharyngeal dysphagia     Past Medical History:   Diagnosis Date    Acid reflux     Cancer     Skin    Diabetes mellitus     Prediabetes    GERD (gastroesophageal reflux disease) 09/15/2023    HTN (hypertension) 09/15/2023    Kidney disease     T2DM (type 2 diabetes mellitus) 09/15/2023     Past Surgical History:   Procedure Laterality Date    APPENDECTOMY      ENDOSCOPY W/ PEG TUBE PLACEMENT N/A 2023    Procedure: ESOPHAGOGASTRODUODENOSCOPY WITH PERCUTANEOUS ENDOSCOPIC GASTROSTOMY TUBE INSERTION;  Surgeon: Haseeb Carrillo MD;  Location: [facility] ENDOSCOPY;  Service: General;  Laterality: N/A;    HEMORRHOIDECTOMY      NASAL POLYP SURGERY         SLP Recommendation and Plan     SLP Diet Recommendation: puree, honey thick liquids, no mixed consistencies (10/05/23 1010)  Recommended Precautions and Strategies: upright posture during/after eating, small bites of food and sips of liquid, no straw, alternate between small bites of food and sips of liquid, general aspiration precautions, 1:1 supervision (10/05/23 1010)  SLP Rec. for Method of Medication Administration: meds crushed, with puree (or via PEG) (10/05/23 1010)               Daily Summary of Progress (SLP): progress towards functional goals is fair  (10/05/23 1010)               Treatment Assessment (SLP): continued, moderate, oral dysphagia, pharyngeal dysphagia, aphasia, dysarthria, cognitive-linguistic disorder (10/05/23 1010)     Plan for Continued Treatment (SLP): continue treatment per plan of care (10/05/23 1010)            Plan of Care Reviewed With: patient, spouse, family  Progress: no change      SWALLOW EVALUATION (last 72 hours)       SLP Adult Swallow Evaluation       Row Name 10/05/23 1010 10/04/23 1115 10/03/23 0900             Rehab Evaluation    Document Type therapy note (daily note)  -AC evaluation  -MP therapy note (daily note)  -AC      Subjective Information complains of;fatigue  -AC no complaints  -MP complains of;fatigue  -AC      Patient Observations lethargic;cooperative  - lethargic  -MP cooperative  groggy, but alert to stimuli  -      Patient/Family/Caregiver Comments/Observations Spouse & family present.  -AC No family present  -MP Niece present. Spouse arrived near end of session.  -AC      Patient Effort adequate  -AC adequate  -MP adequate  -AC      Oral Care swabbed with antiseptic solution;suction provided  - -- teeth brushed - suction toothbrush;swabbed with antiseptic solution;suction provided  -AC         General Information    Patient Profile Reviewed -- yes  -MP --      Pertinent History Of Current Problem -- I[facility], transfer from [facility] where adm w/ multi B infarcts  -MP --      Current Method of Nutrition -- pureed;honey-thick liquids;gastrostomy feedings  -MP --      Precautions/Limitations, Vision -- difficult to assess  -MP --      Precautions/Limitations, Hearing -- WFL;for purposes of eval  -MP --      Prior Level of Function-Communication -- unknown  -MP --      Prior Level of Function-Swallowing -- no diet consistency restrictions  -MP --      Plans/Goals Discussed with -- patient  -MP --      Barriers to Rehab -- medically complex;cognitive status  -MP --      Patient's Goals for Discharge -- patient did not  state  -MP --         Pain    Additional Documentation -- Pain Scale: FACES Pre/Post-Treatment (Group)  -MP --         Pain Scale: FACES Pre/Post-Treatment    Pain: FACES Scale, Pretreatment 0-->no hurt  -AC 2-->hurts little bit  -MP 0-->no hurt  -AC      Posttreatment Pain Rating 0-->no hurt  -AC 2-->hurts little bit  -MP 0-->no hurt  -AC         MBS/VFSS    Utensils Used -- spoon;cup  -MP --      Consistencies Trialed -- thin liquids;nectar/syrup-thick liquids;honey-thick liquids;pudding thick  -MP --         MBS/VFSS Interpretation    Oral Prep Phase -- impaired oral phase of swallowing  -MP --      Oral Transit Phase -- impaired  -MP --      Oral Residue -- impaired  -MP --      VFSS Summary -- Moderate oropharyngeal dysphagia. Reduced lip closure around spoon/cup, anterior loss, and oral residue across consistencies. Reduced lingual control resulting in pre-spill of liquids to the pharynx. Aspiration after the swallow w/ thin liquids from pyriform residue that spilled over into the airway. Penetration during/after & aspiration after w/ nectar-thick liquids. Cough response to aspiration. Significantly reduced hyolaryngeal elevation/excursion and decr'd pharyngeal stripping wave. Greatest amount of residue in pyriforms w/ thin & nectar-thick liquids. Smaller amount w/ honey-thick and pudding, and reduces further by alternating sips of honey-thick w/ bites of pudding. DNA solid 2' significant oral deficits. Rec continue puree diet & honey-thick liquids, alternate bites/sips, 1:1 assist, meds crushed in puree or via PEG.  -MP --         Oral Preparatory Phase    Oral Preparatory Phase -- reduced lip closure around utensil;anterior loss  -MP --      Reduced Lip Closure Around Utensil -- all consistencies tested  -MP --      Anterior Loss -- all consistencies tested  -MP --         Oral Transit Phase    Impaired Oral Transit Phase -- increased A-P transit time;premature spillage of liquids into pharynx  -MP --       Increased A-P Transit Time -- pudding/puree  -MP --      Premature Spillage of Liquids into Pharynx -- thin liquids;nectar-thick liquids  -MP --         Oral Residue    Impaired Oral Residue -- diffuse residue throughout oral cavity  -MP --      Diffuse Residue throughout Oral Cavity -- secondary to reduced lingual strength  -MP --      Response to Oral Residue -- partial residue clearance  -MP --         Initiation of Pharyngeal Swallow    Initiation of Pharyngeal Swallow -- bolus in pyriform sinuses  -MP --      Pharyngeal Phase -- impaired pharyngeal phase of swallowing  -MP --      Anatomical abnormalities noted -- other (see comments)  difficult to determine if truly hyperlordotic or just r/t positioning  -MP --      Penetration During the Swallow -- nectar-thick liquids;secondary to delayed swallow initiation or mistiming;secondary to reduced laryngeal elevation;secondary to reduced vestibular closure  -MP --      Penetration After the Swallow -- nectar-thick liquids;secondary to residue;in pyriform sinuses  -MP --      Aspiration After the Swallow -- thin liquids;nectar-thick liquids;secondary to residue;in pyriform sinuses;in laryngeal vestibule  -MP --      Response to Aspiration -- Yes  -MP --      Responded to aspiration with -- cough;non-effective  -MP --      Pharyngeal Residue -- all consistencies tested;diffuse within pharynx;secondary to reduced base of tongue retraction;secondary to reduced posterior pharyngeal wall stripping;secondary to reduced laryngeal elevation;secondary to reduced hyolaryngeal excursion;other (see comments)  > in pyriforms w/ thin & nectar  -MP --      Response to Residue -- partial residue clearance  -MP --      Attempted Compensatory Maneuvers -- alternate liquids/solids  -MP --      Response to Attempted Compensatory Maneuvers -- reduced residue  -MP --         SLP Evaluation Clinical Impression    SLP Swallowing Diagnosis -- moderate;oral dysphagia;pharyngeal dysphagia   -MP --      Functional Impact -- risk of aspiration/pneumonia;risk of malnutrition;risk of dehydration  -MP --      Rehab Potential/Prognosis, Swallowing -- re-evaluate goals as necessary  -MP --      Swallow Criteria for Skilled Therapeutic Interventions Met -- demonstrates skilled criteria  -MP --         SLP Treatment Clinical Impressions    Treatment Assessment (SLP) continued;moderate;oral dysphagia;pharyngeal dysphagia;aphasia;dysarthria;cognitive-linguistic disorder  -AC -- continued;oral dysphagia;pharyngeal dysphagia;suspected  -AC      Treatment Assessment Comments (SLP) -- -- Delayed cough noted. ? Pharyngoesophageal vs. unrelated to swallowing (family reported residual cough/phlegm production since having covid-19). Has been ~2 weeks since last instrumental study. Will tentatively plan for MBS when pt alert/able to participate.  -AC      Daily Summary of Progress (SLP) progress towards functional goals is fair  -AC -- progress toward functional goals as expected  -AC      Barriers to Overall Progress (SLP) Cognitive status;Lethargy  -AC -- Cognitive status;Medically complex  -AC      Plan for Continued Treatment (SLP) continue treatment per plan of care  -AC -- continue treatment per plan of care;further assessment needed (see comments);other (see comments)  MBS, as appropriate  -AC      Care Plan Review evaluation/treatment results reviewed;care plan/treatment goals reviewed;risks/benefits reviewed;current/potential barriers reviewed;patient/other agree to care plan  -AC -- evaluation/treatment results reviewed;care plan/treatment goals reviewed;risks/benefits reviewed;current/potential barriers reviewed;patient/other agree to care plan  -AC      Care Plan Review, Other Participant(s) spouse;family  -AC -- family;spouse  -AC         Recommendations    Therapy Frequency (Swallow) -- 5 days per week  -MP --      Predicted Duration Therapy Intervention (Days) -- until discharge  -MP --      SLP Diet  Recommendation puree;honey thick liquids;no mixed consistencies  -AC puree;honey thick liquids;no mixed consistencies  -MP puree;honey thick liquids;no mixed consistencies  -AC      Recommended Precautions and Strategies upright posture during/after eating;small bites of food and sips of liquid;no straw;alternate between small bites of food and sips of liquid;general aspiration precautions;1:1 supervision  -AC upright posture during/after eating;small bites of food and sips of liquid;no straw;alternate between small bites of food and sips of liquid;general aspiration precautions;1:1 supervision  -MP 1:1 supervision;upright posture during/after eating;assist with feeding;small bites of food and sips of liquid;liquid via spoon only;alternate between small bites of food and sips of liquid;check mouth frequently for oral residue/pocketing  -AC      Oral Care Recommendations -- Oral Care BID/PRN;Suction toothbrush  -MP Oral Care BID/PRN;Suction toothbrush  -AC      SLP Rec. for Method of Medication Administration meds crushed;with puree  or via PEG  -AC meds crushed;with puree  or via PEG as needed  -MP --      Monitor for Signs of Aspiration -- notify SLP if any concerns  -MP --      Anticipated Discharge Disposition (SLP) -- skilled nursing facility  -MP --                User Key  (r) = Recorded By, (t) = Taken By, (c) = Cosigned By      Initials Name Effective Dates     Rosa Maria Mercado MS [facility]-SLP 02/03/23 -     MP Aldo Harris MS CCC-SLP 12/28/21 -                     EDUCATION  The patient has been educated in the following areas:   Cognitive Impairment Communication Impairment Dysphagia (Swallowing Impairment) Oral Care/Hydration Modified Diet Instruction.        SLP GOALS       Row Name 10/05/23 1010 10/04/23 1115 10/03/23 0900       (LTG) Patient will demonstrate functional swallow for    Diet Texture (Demonstrate functional swallow) soft to chew (chopped) textures  -AC soft to chew (chopped) textures   -MP soft to chew (chopped) textures  -AC    Liquid viscosity (Demonstrate functional swallow) nectar/ mildly thick liquids  -AC nectar/ mildly thick liquids  -MP nectar/ mildly thick liquids  -AC    [place] (Demonstrate functional swallow) with minimal cues (75-90% accuracy)  -AC with minimal cues (75-90% accuracy)  -MP with minimal cues (75-90% accuracy)  -AC    Time Frame (Demonstrate functional swallow) by discharge  -AC by discharge  -MP by discharge  -AC    Barriers (Demonstrate functional swallow) Lethargy  -AC -- --    Progress/Outcomes (Demonstrate functional swallow) continuing progress toward goal  -AC goal ongoing  -MP continuing progress toward goal  -AC    Comment (Demonstrate functional swallow) Reviewed results of MBS, dysphagia dx, aspiration risks, & recommendations. Pt's spouse stated understanding.  -AC -- --       (STG) Patient will tolerate trials of    Consistencies Trialed (Tolerate trials) pureed textures;honey/ moderately thick liquids  -AC pureed textures;honey/ moderately thick liquids  -MP pureed textures;honey/ moderately thick liquids  -AC    Desired Outcome (Tolerate trials) without signs/symptoms of aspiration;without signs of distress;with adequate oral prep/transit/clearance;with use of compensatory strategies (see comments)  alternate bites/sips  -AC without signs/symptoms of aspiration;without signs of distress;with adequate oral prep/transit/clearance;with use of compensatory strategies (see comments)  alternate b/w bites & sips  -MP without signs/symptoms of aspiration;with adequate oral prep/transit/clearance;with use of compensatory strategies (see comments)  small bolus size, liquid via tsp, alternate bites/sips  -AC    [place] (Tolerate trials) with 1:1 assist/ supervision  -AC with 1:1 assist/ supervision  -MP with 1:1 assist/ supervision  -AC    Time Frame (Tolerate trials) by discharge  -AC by discharge  -MP by discharge  -AC    Progress/Outcomes (Tolerate  trials) continuing progress toward goal  -AC goal revised this date  -MP new goal  -AC    Comment (Tolerate trials) Tolerated honey via tsp/cup w/o s/sxs aspiration. Better labial seal and more timely oral transit when taking liquid via tsp. Pt declined puree trials.  -AC -- Continued reduced labial seal around spoon. Occasional anterior loss of liquid on R & tonic bite. Increased oral transit time and mild-mod oral residue. Residue cleared w/ spontaneous 2nd swallow & liquid wash. No immediate clinical s/sxs aspiration. Noted delayed coughing in which pt brought yellow-tinged phlegm to oral cavity (SLP suctioned).  -AC       (STG) Patient will tolerate therapeutic trials of    Consistencies Trialed (Tolerate therapeutic trials) thin liquids  -AC thin liquids  -MP thin liquids;nectar/ mildly thick liquids  -AC    Desired Outcome (Tolerate therapeutic trials) without signs/symptoms of aspiration;without signs of distress  -AC without signs/symptoms of aspiration;without signs of distress  -MP without signs/symptoms of aspiration;without signs of distress  -AC    [place] (Tolerate therapeutic trials) with minimal cues (75-90% accuracy)  -AC with minimal cues (75-90% accuracy)  -MP with minimal cues (75-90% accuracy)  -AC    Time Frame (Tolerate therapeutic trials) by discharge  -AC by discharge  -MP by discharge  -AC    Progress/Outcomes (Tolerate therapeutic trials) goal ongoing  -AC goal revised this date  - goal revised this date  -AC    Comment (Tolerate therapeutic trials) DNT 2' increased lethargy as session progressed.  -AC -- DNT 2' fatigue  -AC       ([facility]) Labial Strengthening Goal 1 (SLP)    Activity (Labial Strengthening Goal 1, SLP) -- increase labial tone  -MP --    Increase Labial Tone labial resistance exercises;swallow trials  -AC labial resistance exercises;swallow trials  -MP labial resistance exercises;swallow trials  -AC    [place]/Accuracy (Labial Strengthening Goal 1, SLP) with  moderate cues (50-74% accuracy)  -AC with moderate cues (50-74% accuracy)  -MP with moderate cues (50-74% accuracy)  -AC    Time Frame (Labial Strengthening Goal 1, SLP) short term goal (STG)  -AC short term goal (STG)  -MP short term goal (STG)  -AC    Progress/Outcomes (Labial Strengthening Goal 1, SLP) goal ongoing  -AC goal ongoing  -MP goal ongoing  -AC       (STG) Lingual Strengthening Goal 1 (SLP)    Activity (Lingual Strengthening Goal 1, SLP) -- increase lingual tone/sensation/control/coordination/movement;increase tongue back strength  -MP --    Increase Lingual Tone/Sensation/Control/Coordination/Movement swallow trials;lingual resistance exercises  -AC swallow trials;lingual resistance exercises  -MP swallow trials;lingual resistance exercises  -AC    Increase Tongue Back Strength lingual resistance exercises  -AC lingual resistance exercises  -MP lingual resistance exercises  -AC    [place]/Accuracy (Lingual Strengthening Goal 1, SLP) with moderate cues (50-74% accuracy)  -AC with moderate cues (50-74% accuracy)  -MP with moderate cues (50-74% accuracy)  -AC    Time Frame (Lingual Strengthening Goal 1, SLP) short term goal (STG)  -AC short term goal (STG)  -MP short term goal (STG)  -AC    Progress/Outcomes (Lingual Strengthening Goal 1, SLP) goal ongoing  -AC goal ongoing  -MP goal ongoing  -AC       (STG) Pharyngeal Strengthening Exercise Goal 1 (SLP)    Activity (Pharyngeal Strengthening Goal 1, SLP) -- increase superior movement of the hyolaryngeal complex;increase anterior movement of the hyolaryngeal complex;increase closure at entrance to airway/closure of airway at glottis;increase squeeze/positive pressure generation;increase tongue base retraction;increase timing  -MP --    Increase Timing prepping - 3 second prep or suck swallow or 3-step swallow  -AC prepping - 3 second prep or suck swallow or 3-step swallow  -MP prepping - 3 second prep or suck swallow or 3-step swallow  -AC     Increase Superior Movement of the Hyolaryngeal Complex effortful pitch glide (falsetto + pharyngeal squeeze)  -AC effortful pitch glide (falsetto + pharyngeal squeeze)  -MP falsetto  -AC    Increase Anterior Movement of the Hyolaryngeal Complex chin tuck against resistance (CTAR)  -AC chin tuck against resistance (CTAR)  -MP chin tuck against resistance (CTAR)  -AC    Increase Closure at Entrance to Airway/Closure of Airway at Glottis supraglottic swallow  -AC supraglottic swallow  -MP breath hold exercises;supraglottic swallow  -AC    Increase Squeeze/Positive Pressure Generation hard effortful swallow  -AC hard effortful swallow  -MP hard effortful swallow  -AC    Increase Tongue Base Retraction tamie  -AC tamie  -MP --    [place]/Accuracy (Pharyngeal Strengthening Goal 1, SLP) with moderate cues (50-74% accuracy)  -AC with moderate cues (50-74% accuracy)  -MP with moderate cues (50-74% accuracy)  -AC    Time Frame (Pharyngeal Strengthening Goal 1, SLP) short term goal (STG)  -AC short term goal (STG)  -MP short term goal (STG)  -AC    Progress/Outcomes (Pharyngeal Strengthening Goal 1, SLP) goal ongoing  -AC goal revised this date  -MP goal ongoing  -AC       Patient will demonstrate functional communication skills for return to discharge environment     [place] with moderate cues  -AC -- --    Time frame by discharge  -AC -- --    Progress/Outcomes continuing progress toward goal  -AC -- --       Comprehend Questions Goal 1 (SLP)    Improve Ability to Comprehend Questions Goal 1 (SLP) complex yes/no questions;80%;with minimal cues (75-90%)  -AC -- --    Time Frame (Comprehend Questions Goal 1, SLP) short term goal (STG)  -AC -- --    Progress (Ability to Comprehend Questions Goal 1, SLP) 60%;with minimal cues (75-90%)  -AC -- --    Progress/Outcomes (Comprehend Questions Goal 1, SLP) good progress toward goal  -AC -- --       Follow Directions Goal 2 (SLP)    Improve Ability to Follow Directions Goal  1 (SLP) 2 step commands;80%;with minimal cues (75-90%)  -AC -- --    Time Frame (Follow Directions Goal 1, SLP) short term goal (STG)  -AC -- --    Progress (Ability to Follow Directions Goal 1, SLP) 0%;with minimal cues (75-90%)  -AC -- --    Progress/Outcomes (Follow Directions Goal 1, SLP) progress slower than expected  -AC -- --       Word Retrieval Skills Goal 1 (SLP)    Improve Word Retrieval Skills By Goal 1 (SLP) confrontational naming task;high frequency;responsive naming task;simple;80%;with minimal cues (75-90%)  -AC -- --    Time Frame (Word Retrieval Goal 1, SLP) short term goal (STG)  -AC -- --    Progress/Outcomes (Word Retrieval Goal 1, SLP) goal ongoing  -AC -- --       Articulation Goal 1 (SLP)    Improve Articulation Goal 1 (SLP) by over-articulating at phrase level;80%;with moderate cues (50-74%)  -AC -- --    Time Frame (Articulation Goal 1, SLP) short term goal (STG)  -AC -- --    Progress (Articulation Goal 1, SLP) 10%;with moderate cues (50-74%)  -AC -- --    Progress/Outcomes (Articulation Goal 1, SLP) progress slower than expected  -AC -- --       Orientation Goal 1 (SLP)    Improve Orientation Through Goal 1 (SLP) demonstrating orientation to day;demonstrating orientation to month;demonstrating orientation to year;demonstrating orientation to place;demonstrating orientation to disease/impairment;use environmental aids to assist with orientation;80%;with moderate cues (50-74%)  -AC -- --    Time Frame (Orientation Goal 1, SLP) short term goal (STG)  -AC -- --    Progress (Orientation Goal 1, SLP) 20%;with moderate cues (50-74%)  -AC -- --    Progress/Outcomes (Orientation Goal 1, SLP) continuing progress toward goal  -AC -- --              User Key  (r) = Recorded By, (t) = Taken By, (c) = Cosigned By      Initials Name Provider Type    Rosa Maria Powell, MS CCC-SLP Speech and Language Pathologist    Aldo Snowden MS CCC-SLP Speech and Language Pathologist                        Time Calculation:    Time Calculation- SLP       Row Name 10/05/23 1049             Time Calculation- SLP    SLP Start Time 1010  -AC      SLP Received On 10/05/23  -AC         Untimed Charges    43943-DS Treatment/ST Modification Prosth Aug Alter  25  -AC      01306-XK Treatment Swallow Minutes 20  -AC         Total Minutes    Untimed Charges Total Minutes 45  -AC       Total Minutes 45  -AC                User Key  (r) = Recorded By, (t) = Taken By, (c) = Cosigned By      Initials Name Provider Type     Rosa Maria Mercado MS CCC-SLP Speech and Language Pathologist                    Therapy Charges for Today       Code Description Service Date Service Provider Modifiers Qty    58022400043 HC ST TREATMENT SWALLOW 1 10/5/2023 Rosa Maria Mercado MS CCC-SLP GN 1    [id number] HC ST TREATMENT SPEECH 2 10/5/2023 Rosa Maria Mercado MS CCC-SLP GN 1                 Rosa Maria Mercado MS CCC-SLP  10/5/2023

## 2023-10-05 NOTE — PROGRESS NOTES
[facility] Medicine Services  PROGRESS NOTE    Patient Name: Kenneth Wen  : 1950  MRN: [id number]    Date of Admission: 9/15/2023  Primary Care Physician: Susan Powers APRN    Subjective   Subjective     CC:  F/u   CVA     HPI:  Patient is resting in bed with wife at bedside. Per wife he will pull at tubes if he is not restrained. Ate some  yesterday      Objective   Objective     Vital Signs:   Temp:  [98.3 °F (36.8 °C)-98.8 °F (37.1 °C)] 98.7 °F (37.1 °C)  Heart Rate:  [60-84] 65  Resp:  [16-20] 18  BP: (114-142)/(71-90) 130/77     Physical Exam:  Constitutional: No acute distress, drowsy , alert  HENT: NCAT, mucous membranes moist  Respiratory: Clear to auscultation bilaterally, respiratory effort normal room air 98%  Cardiovascular: RRR, no murmurs, rubs, or gallops  Gastrointestinal: Positive bowel sounds, soft, nontender, nondistended, PEG in place with abd binder   Musculoskeletal: No bilateral ankle edema, malou UE restraints in place   Psychiatric: Appropriate affect, cooperative  Neurologic: Oriented x 1, strength symmetric in all extremities, Cranial Nerves grossly intact to confrontation, speech understandable   Skin: No rashes      Results Reviewed:  LAB RESULTS:      Lab 10/05/23  0659 10/03/23  0632 10/02/23  0735 23  1023 23  0646   WBC 7.24 6.89  --  12.17* 13.30*   HEMOGLOBIN 12.2* 10.5*  --  12.3* 13.7   HEMATOCRIT 36.4* 31.3*  --  36.3* 41.3   PLATELETS 300 281  --  349 426   NEUTROS ABS  --   --   --   --  9.97*   IMMATURE GRANS (ABS)  --   --   --   --  0.09*   LYMPHS ABS  --   --   --   --  1.98   MONOS ABS  --   --   --   --  0.89   EOS ABS  --   --   --   --  0.24   MCV 92.6 94.3  --  91.9 94.5   CRP  --   --  1.03*  --   --          Lab 10/05/23  0659 10/03/23  1101 10/02/23  0735 23  2112 23  1607 23  0808 23  0014   SODIUM 138 139 140 144 147* 145 152*   POTASSIUM 4.6 4.1 4.4 4.3  --  3.5 3.8   CHLORIDE 105 105  108*  --   --  113* 116*   CO2 25.0 27.0 18.0*  --   --  25.0 26.0   ANION GAP 8.0 7.0 14.0  --   --  7.0 10.0   BUN 16 13 13  --   --  21 23   CREATININE 0.51* 0.64* 0.55*  --   --  0.65* 0.81   EGFR 107.7 101.2 106.0  --   --  100.7 94.3   GLUCOSE 146* 153* 131*  --   --  159* 134*   CALCIUM 8.5* 8.4* 8.0*  --   --  8.6 8.8   MAGNESIUM 1.9  --  1.9  --   --  1.9  --    PHOSPHORUS  --   --  2.3*  --   --  2.4*  --          Lab 10/03/23  1101 10/02/23  0735   TOTAL PROTEIN 5.3* 4.8*   ALBUMIN 3.3* 2.9*   GLOBULIN 2.0 1.9   ALT (SGPT) 38 36   AST (SGOT) 31 34   BILIRUBIN 0.4 0.4   ALK PHOS 75 72             Lab 10/02/23  0735   CHOLESTEROL 58   TRIGLYCERIDES 43             Brief Urine Lab Results  (Last result in the past 365 days)        Color   Clarity   Blood   Leuk Est   Nitrite   Protein   CREAT   Urine HCG        09/15/23 1818 Yellow   Cloudy   Trace   Negative   Negative   Negative                   Microbiology Results Abnormal       Procedure Component Value - Date/Time    Blood Culture - Blood, Arm, Right [[id number]]  (Normal) Collected: 09/15/23 0212    Lab Status: Final result Specimen: Blood from Arm, Right Updated: 09/20/23 0230     Blood Culture No growth at 5 days    Blood Culture - Blood, Arm, Left [[id number]]  (Normal) Collected: 09/15/23 0212    Lab Status: Final result Specimen: Blood from Arm, Left Updated: 09/20/23 0230     Blood Culture No growth at 5 days            FL Video Swallow With Speech Single Contrast    Result Date: 10/4/2023  FL VIDEO SWALLOW W SPEECH SINGLE-CONTRAST Date of Exam: 10/4/2023 11:12 AM EDT Indication: dysphagia. Comparison: None available. Technique:   The speech pathologist administered food and/or liquid mixed with barium to the patient with cine/video imaging.  Imaging assistance was provided to the speech pathologist and an image was saved. Fluoroscopic Time: 42 seconds Number of Images: 8 associated fluoroscopic loops were saved Findings: Please see speech therapy  report for full details and recommendations.     Impression: Impression: Fluoroscopy provided for a modified barium swallow. Please see speech therapy report for full details and recommendations. Electronically Signed: Jackie Olson MD  10/4/2023 3:10 PM EDT  Workstation ID: [id number]         Current medications:  Scheduled Meds:atorvastatin, 80 mg, Per PEG Tube, Nightly  castor oil-balsam peru, 1 application , Topical, Q12H  clopidogrel, 75 mg, Per PEG Tube, Daily  escitalopram, 10 mg, Per PEG Tube, Daily  insulin regular, 2-7 Units, Subcutaneous, Q6H  Lidocaine, 1 patch, Transdermal, Q24H  metoprolol tartrate, 25 mg, Per PEG Tube, Q12H  pantoprazole, 40 mg, Intravenous, Q AM  ProSource No Carb, 30 mL, Per G Tube, BID  QUEtiapine, 25 mg, Per PEG Tube, Daily  QUEtiapine, 75 mg, Per PEG Tube, Nightly  sodium chloride, 10 mL, Intravenous, Q12H  sodium chloride, 10 mL, Intravenous, Q12H  sodium chloride, 10 mL, Intravenous, Q12H      Continuous Infusions:sodium chloride, 50 mL/hr, Last Rate: 50 mL/hr (10/05/23 0830)      PRN Meds:.  acetaminophen    Calcium Replacement - Follow Nurse / BPA Driven Protocol    dextrose    dextrose    glucagon (human recombinant)    ibuprofen    Magnesium Standard Dose Replacement - Follow Nurse / BPA Driven Protocol    ondansetron    Phosphorus Replacement - Follow Nurse / BPA Driven Protocol    Potassium Replacement - Follow Nurse / BPA Driven Protocol    QUEtiapine    sodium chloride    sodium chloride    sodium chloride    Assessment & Plan   Assessment & Plan     Active Hospital Problems    Diagnosis  POA    **Hemorrhagic CVA [I61.9]  Yes    Oropharyngeal dysphagia [R13.12]  Yes    Multiple bilateral CVAs [I63.9]  Yes    HFpEF [I50.30]  Yes    T2DM (type 2 diabetes mellitus) [E11.9]  Yes    HTN (hypertension) [I10]  Yes    GERD (gastroesophageal reflux disease) [K21.9]  Yes    ICH (intracerebral hemorrhage) [I61.9]  Yes    Dyslipidemia [E78.5]  Yes      Resolved Hospital Problems    No resolved problems to display.        Brief Hospital Course to date:  Kenneth Wen is a 73 y.o. male  with hx of HTN, HLD, T2DM, and GERD who presented to OSH with multiple acute ischemic infarcts of the bilateral cerebral and cerebellar hemispheres as well as left isaac. TTE/JOSIAS was negative PFO at OSH. On 9/13/23 he had worsening in mental status and new inability to move RLE, head CT showed evolution of the existing CVA with new development of petechial hemorrhage. DAPT was held for 24 hours per Neurology recommendations and repeat CT head that evening showed worsening effacement of the right quadrigeminal cistern with suspected increasing upward supratentorial pressure. He was then transferred to [facility] for Neurosurgery evaluation on 9/15/23. He was started on hypertonic saline 9/15/23 through 9/20/23 for cerebral edema. He had fevers with negative cultures but had worsening secretions and labored breathing so was started on Zosyn on 9/16/23. Transferred to the hospitalist service on 9/22/23. Pt demonstrated poor oral intake with elevated sodium levels; therefore, PEG tube was recommended.     Plan was partially entered by my partner and I have reviewed and updated as appropriate on 10/5/2023      Multiple bilateral posterior circulation strokes with hemorrhagic conversion  --Neurology has followed, suspect atheroembolic but cannot rule out cardioembolic given multiple vascular territories  --Plavix monotherapy  --High dose statin  --Needs to be unrestrained for placement  --Needs Holter monitor at discharge  --Follow up in stroke clinic in 8 weeks     Dysphagia  Hypernatremia  --SLP recommends mechanical ground with honey thick liquids   --Eating poorly, sodium elevated indicating probable poor fluid intake, NA+  up to 152. Pt is s/p D 5 1/2NS and NA has now normalized. Fluids stopped since now received TF and free water    --Risk of pulling out peg tube, abd binder to be in place at all times. --Surgery  "recommends to remain in restraints at all times or if off has to have sitter for next 2 weeks   --FEES modified diet still recommended      HTN  --Goal -160  --Continue Metoprolol 25 mg BID     GERD  --Continue Protonix     COVID-19-resolved  --Overall asymptomatic and on room air  --No infiltrates seen and low procal  --Did not receive any treatment   --Off Isolation 9/30/2023   --Repeat CXR 10/2/2023 ordered  d/t \"sputum production\" showed no acute pulmonary changes problems     Agitation  --Seroquel 25 mg QAM, Seroquel 75 mg QPM, prn seroquel 25 mg HS  --Qtc 463      Leukocytosis-resolved  --Remains afebrile  --Procalcitionin low at 0.05  --CXR and UA negative  --Blood cultures negative     Elevated LFT's, mild  --Possibly secondary to statin?  --Negative acute hepatitis panel  --repeat AST/ALT improved on 10/3       Expected Discharge Location and Transportation: rehab   Expected Discharge   Expected Discharge Date: 10/7/2023; Expected Discharge Time:      DVT prophylaxis:  Mechanical DVT prophylaxis orders are present.     AM-PAC 6 Clicks Score (PT): 11 (10/04/23 0815)    CODE STATUS:   Code Status and Medical Interventions:   Ordered at: 09/29/23 0854     Medical Intervention Limits:    NO intubation (DNI)     Code Status (Patient has no pulse and is not breathing):    No CPR (Do Not Attempt to Resuscitate)     Medical Interventions (Patient has pulse or is breathing):    Limited Support       Rabia Stallings, APRN  10/05/23      "

## 2023-10-05 NOTE — PLAN OF CARE
Goal Outcome Evaluation:  Plan of Care Reviewed With: patient, spouse        Progress: no change  Outcome Evaluation: New palliative consult for assistance with GOC and support per ROLANDO Díaz.  No ACP on file.  NOK is spouse Reny Wen.  ROLANDO Honeycutt and palliative RN saw pt for initial palliative consult.  Pt. was asleep on RA in BUE restraints and did not demonstrate any visible signs of discomfort at time of palliative visit.  Per wife's report, pt has been eating approx 25% of his modified diet at times and has been working with therapy.  He walked a limited distance in roberto with PT.  Per Reny's report, pt is alert and oriented at times but confused and pulling at lines at other times.  She wants him to be more comfortable.  She reports that her  worked as a  at [employer] working very long shifts.  He suffers chronic back pain that he manages with tylenol and ibuprofen at home.  Code status verified by ROLANDO. Palliative care to continue to follow for support, POC and ongoing GOC.         Problem: Palliative Care  Goal: Enhanced Quality of Life  Intervention: Promote Advance Care Planning  Flowsheets (Taken 10/5/2023 3550)  Life Transition/Adjustment:   palliative care initiated   palliative care discussed

## 2023-10-05 NOTE — PLAN OF CARE
Goal Outcome Evaluation:  Plan of Care Reviewed With: patient, spouse, family        Progress: no change     SLP treatment completed. Will continue to address dysphagia, dysarthria, & cognitive-communication impairments in tx. Please see note for further details and recommendations.

## 2023-10-06 LAB
GLUCOSE BLDC GLUCOMTR-MCNC: 111 MG/DL (ref 70–130)
GLUCOSE BLDC GLUCOMTR-MCNC: 116 MG/DL (ref 70–130)
GLUCOSE BLDC GLUCOMTR-MCNC: 127 MG/DL (ref 70–130)
GLUCOSE BLDC GLUCOMTR-MCNC: 148 MG/DL (ref 70–130)
GLUCOSE BLDC GLUCOMTR-MCNC: 156 MG/DL (ref 70–130)
GLUCOSE BLDC GLUCOMTR-MCNC: 174 MG/DL (ref 70–130)

## 2023-10-06 PROCEDURE — 82948 REAGENT STRIP/BLOOD GLUCOSE: CPT

## 2023-10-06 PROCEDURE — 92526 ORAL FUNCTION THERAPY: CPT

## 2023-10-06 PROCEDURE — 97116 GAIT TRAINING THERAPY: CPT

## 2023-10-06 PROCEDURE — 97535 SELF CARE MNGMENT TRAINING: CPT

## 2023-10-06 PROCEDURE — 63710000001 INSULIN REGULAR HUMAN PER 5 UNITS: Performed by: SURGERY

## 2023-10-06 PROCEDURE — 99232 SBSQ HOSP IP/OBS MODERATE 35: CPT | Performed by: NURSE PRACTITIONER

## 2023-10-06 PROCEDURE — 92507 TX SP LANG VOICE COMM INDIV: CPT

## 2023-10-06 PROCEDURE — 97530 THERAPEUTIC ACTIVITIES: CPT

## 2023-10-06 PROCEDURE — 97110 THERAPEUTIC EXERCISES: CPT

## 2023-10-06 RX ORDER — QUETIAPINE FUMARATE 25 MG/1
12.5 TABLET, FILM COATED ORAL DAILY
Status: DISCONTINUED | OUTPATIENT
Start: 2023-10-07 | End: 2023-10-10

## 2023-10-06 RX ADMIN — Medication 10 ML: at 22:14

## 2023-10-06 RX ADMIN — MICONAZOLE NITRATE 1 APPLICATION: 20 CREAM TOPICAL at 22:13

## 2023-10-06 RX ADMIN — ATORVASTATIN CALCIUM 80 MG: 40 TABLET, FILM COATED ORAL at 21:16

## 2023-10-06 RX ADMIN — DICLOFENAC SODIUM 2 G: 9.3 GEL TOPICAL at 22:13

## 2023-10-06 RX ADMIN — QUETIAPINE FUMARATE 75 MG: 25 TABLET ORAL at 21:16

## 2023-10-06 RX ADMIN — DICLOFENAC SODIUM 2 G: 9.3 GEL TOPICAL at 12:13

## 2023-10-06 RX ADMIN — ESCITALOPRAM OXALATE 10 MG: 10 TABLET ORAL at 08:17

## 2023-10-06 RX ADMIN — ACETAMINOPHEN ORAL SOLUTION 500 MG: 650 SOLUTION ORAL at 01:34

## 2023-10-06 RX ADMIN — ACETAMINOPHEN ORAL SOLUTION 500 MG: 650 SOLUTION ORAL at 23:21

## 2023-10-06 RX ADMIN — ACETAMINOPHEN ORAL SOLUTION 500 MG: 650 SOLUTION ORAL at 06:44

## 2023-10-06 RX ADMIN — ACETAMINOPHEN ORAL SOLUTION 500 MG: 650 SOLUTION ORAL at 17:58

## 2023-10-06 RX ADMIN — MINERAL OIL: 1000 LIQUID ORAL at 15:35

## 2023-10-06 RX ADMIN — INSULIN HUMAN 2 UNITS: 100 INJECTION, SOLUTION PARENTERAL at 01:34

## 2023-10-06 RX ADMIN — Medication 10 ML: at 08:18

## 2023-10-06 RX ADMIN — Medication 30 ML: at 22:14

## 2023-10-06 RX ADMIN — ACETAMINOPHEN ORAL SOLUTION 500 MG: 650 SOLUTION ORAL at 11:52

## 2023-10-06 RX ADMIN — QUETIAPINE FUMARATE 25 MG: 25 TABLET ORAL at 23:21

## 2023-10-06 RX ADMIN — METOPROLOL TARTRATE 25 MG: 25 TABLET, FILM COATED ORAL at 21:16

## 2023-10-06 RX ADMIN — CLOPIDOGREL BISULFATE 75 MG: 75 TABLET ORAL at 08:17

## 2023-10-06 RX ADMIN — PANTOPRAZOLE SODIUM 40 MG: 40 INJECTION, POWDER, LYOPHILIZED, FOR SOLUTION INTRAVENOUS at 06:45

## 2023-10-06 RX ADMIN — CASTOR OIL AND BALSAM, PERU 1 APPLICATION: 788; 87 OINTMENT TOPICAL at 08:18

## 2023-10-06 RX ADMIN — QUETIAPINE FUMARATE 25 MG: 25 TABLET ORAL at 08:17

## 2023-10-06 RX ADMIN — MINERAL OIL: 1000 LIQUID ORAL at 17:59

## 2023-10-06 RX ADMIN — MINERAL OIL: 1000 LIQUID ORAL at 22:14

## 2023-10-06 RX ADMIN — METOPROLOL TARTRATE 25 MG: 25 TABLET, FILM COATED ORAL at 08:17

## 2023-10-06 RX ADMIN — LIDOCAINE 1 PATCH: 4 PATCH TOPICAL at 08:16

## 2023-10-06 RX ADMIN — Medication 30 ML: at 08:18

## 2023-10-06 NOTE — PLAN OF CARE
Goal Outcome Evaluation:  Plan of Care Reviewed With: patient, spouse (Simultaneous filing. User may be unaware of other data.)        Progress: improving (Progress Note/Re-Cert Simultaneous filing. User may be unaware of other data.)  Outcome Evaluation: Pt demonstrating improvements w/ SPT and standing balance this date. Pt continues to require assist w/ ADL based tasks. Pt endorsing R hand pain w/ PROM. Continue to recommend inpatient rehab at d/c for best functional outcome.     Anticipated Discharge Disposition (OT): inpatient rehabilitation facility

## 2023-10-06 NOTE — PROGRESS NOTES
Clinical Nutrition   Nutrition Support Assessment  Reason for Visit: Follow-up protocol, EN/PO    Patient Name: Kenneth Wen  YOB: 1950  MRN: [id number]  Date of Encounter: 10/06/23 12:06 EDT  Admission date: 9/15/2023    Comments:     Pt continues with inconsistent intake. Continues to require restraints to prevent pulling PEG regardless of abdominal binder. Spouse present at all meals to assist. Observed increased acceptance of lunch however was not alert enough for breakfast.     Initiate 3 day calorie count - RN tracking today's meals to start count today and go through Sunday at supper.   Continue nocturnal feeds as ordered    Nutrition Assessment   Admission Diagnosis:  ICH (intracerebral hemorrhage) [I61.9]      Problem List:    Hemorrhagic CVA    Dyslipidemia    Multiple bilateral CVAs    HFpEF    T2DM (type 2 diabetes mellitus)    HTN (hypertension)    GERD (gastroesophageal reflux disease)    ICH (intracerebral hemorrhage)    Oropharyngeal dysphagia        PMH:  He  has a past medical history of Acid reflux, Cancer, Diabetes mellitus, GERD (gastroesophageal reflux disease) (09/15/2023), HTN (hypertension) (09/15/2023), Kidney disease, and T2DM (type 2 diabetes mellitus) (09/15/2023).    PSH: He  has a past surgical history that includes Appendectomy; Nasal polyp surgery; Hemorrhoid surgery; and Esophagogastroduodenoscopy w/ PEG (N/A, 9/29/2023).      Applicable Nutrition Concerns:   Skin:  Oral:  GI:      Applicable Interval History:   9/16 3% Hypertonic saline initiated  9/15 FEES:  SLP Swallowing Diagnosis: mod-severe, oral dysphagia, severe, pharyngeal dysphagia (09/15/23 1331)  SLP Diet Recommendation: NPO, temporary alternate methods of nutrition/hydration, other (see comments) (okay for 2-3 ice chips per hour as tolerated)   9/23-SLP Diet Recommendation: puree, honey thick liquids.  9/29- PEG tube placed. Consult for tube feeding assessment.      Reported/Observed/Food/Nutrition Related History:     10/6  Pt continues with inconsistent intake. Continues to require restraints to prevent pulling PEG regardless of abdominal binder. Spouse present at all meals to assist. Observed increased acceptance of lunch however was not alert enough for breakfast.     10/4  Pt with improved PO at lunch today however per spouse did not eat at breakfast. Suspect due to working with therapies at breakfast time. Tolerated nocturnal regimen. MBS completed - SLP recs puree, honey thick liquids, no mixed consistencies.      10/3  Tolerating feeds at goal. Pt with ~25% at meals being fed by spouse - ? If able to improve PO intake with transition to nocturnal feeds. Discussed with spouse, agreeable to try.     10/1   EN ordered yesterday, started via PEG tube.  @ goal rate this morning and being tolerated. Patient also with order for a diet.  Able to reach patient wife over the phone to get a better sense of patient intake. Patient wife reports she was present for dinner meal yesterday and patient only ate bites of applesauce.  Overall not much intake. Reports patient has been a little sleepy past couple of days and no showing that much interest in eating.  We discussed current EN Rx.  We felt it would be best to keep EN continuous vs nocturnal at this time.  Can switch to nocturnal if the events intake improves.     9/30  Consult for tube feeding assessment.  Overall issues with sodium levels, fluid intake and confusion. Was getting IVF, however pulled out IV.  Wife asked for a PEG tube placement on 9/28.      Patient in COVID isolation, RD not able to visit in person.  No diet this morning (was made NPO for PEG tube placement). Discussed with RN re-order previous order. RD will start tube feeding.       9/25  Spoke w/RN who reports pt pulled out NGT Friday evening. RN states pt eating >/=75% of trays. Textures downgraded 9/23 to pureed.     9/22  Pt on diet though ate only  "sausage and some juice this am. RN states pt pocketing food still, had been happening in ICU per previous RD note. Discussed w/SLP.     9/18 RN reports pt on 3% saline therapy Na+ goal 145-155, Na+ w/I range, pt from OSH sent here d/t hemorrhagic conversion continues to have R weakness, confusion, tolerating TF. Uncertain if pt should have water flushes, these were dc'd after discussion w/ MD.     9/15  Per RN unclear if will start EN or if pt may progress to caden diet at this time.  No wt hx available today.     Anthropometrics     Admission Height 175.3 cm (69\") Documented at 09/15/2023 0122   Admission Weight 79.2 kg (174 lb 9.7 oz) Documented at 09/15/2023 0122     Height: Height: 175.3 cm (69\")  Last Filed Weight: Weight: 72.8 kg (160 lb 7.9 oz) (09/21/23 0500)  Method: Weight Method: Bed scale  BMI: BMI (Calculated): 23.7  BMI classification: Overweight: 25.0-29.9kg/m2      9/15/2023 9/17/2023   Weight     Weight (kg) 79.2 kg  82.5 kg    Weight (lbs) 174 lb 9.7 oz  181 lb 14.1 oz    Weight Method Bed scale  Bed scale        UBW: Per  lbs on 9/14/23  Note 172 lbs in 2018  Weight change: uncertain at this time    Labs    Labs Reviewed: Yes     Results from last 7 days   Lab Units 10/05/23  0659 10/03/23  1101 10/02/23  0735 09/30/23  1607 09/30/23  0808   GLUCOSE mg/dL 146* 153* 131*  --  159*   BUN mg/dL 16 13 13  --  21   CREATININE mg/dL 0.51* 0.64* 0.55*  --  0.65*   SODIUM mmol/L 138 139 140   < > 145   CHLORIDE mmol/L 105 105 108*  --  113*   POTASSIUM mmol/L 4.6 4.1 4.4   < > 3.5   PHOSPHORUS mg/dL  --   --  2.3*  --  2.4*   MAGNESIUM mg/dL 1.9  --  1.9  --  1.9   ALT (SGPT) U/L  --  38 36  --   --     < > = values in this interval not displayed.         Results from last 7 days   Lab Units 10/03/23  1101 10/02/23  0735 10/02/23  0441   ALBUMIN g/dL 3.3* 2.9*  --    PREALBUMIN mg/dL  --   --  14.0*   CRP mg/dL  --  1.03*  --    CHOLESTEROL mg/dL  --  58  --    TRIGLYCERIDES mg/dL  --  43  --  " "        Results from last 7 days   Lab Units 10/06/23  1119 10/06/23  0713 10/06/23  0549 10/06/23  0014 10/05/23  1738 10/05/23  1202   GLUCOSE mg/dL 127 156* 148* 174* 115 107       Lab Results   Lab Value Date/Time    HGBA1C 6.40 (H) 09/15/2023 0212               Medications    Medications Reviewed: Yes  Pertinent: insulin, protonix   Infusion:   PRN:     Needs Assessment   Date: 9/30    Height used:Height: 175.3 cm (69\")  Weights used: 160lb/72.7kg      Estimated Calorie needs: ~ 1900 Kcal/day  Method:  Kcals/KG 25= 1818  Method:  MSJ 1479 x 1.3= 1923    Estimated Protein needs: ~ 95g PRO/day  Method: 1.3g/Kg= 95    Estimated Fluid needs: ~ ml/day   Per clinical status    Current Nutrition Prescription      PO: Diet: Regular/House Diet; No Straw, Feeding Assistance - Nursing, No Mixed Consistencies; Texture: Pureed (NDD 1); Fluid Consistency: Honey Thick   Oral Nutrition Supplement: Magic cup 2x daily   Intake: 37.5% x2 meals today (10/6)    EN: IsoSource 1.5  Goal Rate: 55ml/hr                  Water Flushes: 30ml Q2hrs   Modular: Prosource -no carb 1/day  Route: PEG  Tube: Unknown     At goal over: 20Hrs/day     Rx will supply:   Goal Volume 1210 mL/day       Flush Volume 330 mL/day       Energy 1875 Kcal/day   % Est Need   Protein 97 g/day   % Est Need   Fiber 18.4 g/day       Water in   mL       Total Water 1250 mL       Meet DRI Yes            --------------------------------------------------------------------------  Product/Rate verified at bedside: Yes  Infusing Rate at time of visit: 55mL/hr + flush of 30mL q2hrs     Average Delivery from Charting: Insufficient data - ? Time of infusion  Volume 796 mL/day 66  % Goal Vol.   Flush Volume 270 mL/day     Energy  Kcal/day  % Est Need   Protein  g/day  % Est Need   Fiber  g/day     Water in  EN  mL     Total Water  mL     Meet DRI Yes        Intake/Ouptut 24 hrs (0701 - 0700)   I&O's Reviewed: Yes     Intake & Output (last day)         10/05 0701  10/06 " 0700 10/06 0701  10/07 0700    Other 270     NG/     Total Intake(mL/kg) 1066 (14.6)     Urine (mL/kg/hr)      Total Output      Net +1066           Urine Unmeasured Occurrence 2 x 1 x    Stool Unmeasured Occurrence 1 x 2 x          Nutrition Diagnosis   Date: 9/15 Updated: 9/18, 9/30  Problem Inadequate oral intake    Etiology stroke   Signs/Symptoms NPO w ice chips per SLP, confusion, +EN   Status: PEG tube placed 9/29; consult for tube feeding     Date:  9/18 Updated:  Problem Swallowing difficulty/chewing difficulty   Etiology Bilateral strokes   Signs/Symptoms Oral-pharyngeal dysphagia per SLP FEES eval   Status: ongoing  Goal:   General: Nutrition support treatment  PO: Increase intake, Advace diet as medically feasible/appropriate  EN/PN: Tolerate EN at goal      Nutrition Intervention      Follow treatment progress, Care plan reviewed, calorie count started    Initiate 3 day calorie count - RN tracking today's meals to start count today and go through Sunday at supper.   Continue nocturnal feeds as ordered    Monitoring/Evaluation:   Per protocol, I&O, Pertinent labs, Weight, Symptoms, Swallow function      Heidi Baxter, MS,RD,LD  Time Spent: 45min

## 2023-10-06 NOTE — CASE MANAGEMENT/SOCIAL WORK
Continued Stay Note  [facility]     Patient Name: Kenneth Wen  MRN: [id number]  Today's Date: 10/6/2023    Admit Date: 9/15/2023    Plan: TBD   Discharge Plan       Row Name 10/06/23 0946       Plan    Plan Comments At this time pt remains in wrist restraints. Plan is referrals to [facility], then [facility] acute rehab, then [facility] [place] once appropriate for referrals to be sent. Pain medicines being adjusted to keep pt more comfortable with palliative team on board. Pt's wife reports she believes the pain medications will help get pt out of restraints if pain is managed.                   Discharge Codes    No documentation.                 Expected Discharge Date and Time       Expected Discharge Date Expected Discharge Time    Oct 7, 2023               JOEL Anna

## 2023-10-06 NOTE — PLAN OF CARE
Goal Outcome Evaluation:  Plan of Care Reviewed With: patient, spouse        Progress: improving  Outcome Evaluation: Pt able to perform short-distance ambulation this date with 2-person assist with no R knee buckling noted. Pt demo'd increased muscle activation in RLE with therapeutic exercise. Pt will continue to benefit from PT to address ongoing strength, balance, and endurance deficits and return to PLOF. PT rec IRF upon dc.      Anticipated Discharge Disposition (PT): inpatient rehabilitation facility

## 2023-10-06 NOTE — THERAPY PROGRESS REPORT/RE-CERT
Patient Name: Kenneth Wen  : 1950    MRN: [id number]                              Today's Date: 10/6/2023       Admit Date: 9/15/2023    Visit Dx:     ICD-10-CM ICD-9-CM   1. Hemorrhagic CVA  I61.9 431   2. Aphasia  R47.01 784.3   3. Dysarthria  R47.1 784.51   4. Oropharyngeal dysphagia  R13.12 787.22     Patient Active Problem List   Diagnosis    Precordial pain    Elevated BP without diagnosis of hypertension    Dyslipidemia    Hyperglycemia    Multiple bilateral CVAs    Hemorrhagic CVA    HFpEF    T2DM (type 2 diabetes mellitus)    HTN (hypertension)    GERD (gastroesophageal reflux disease)    ICH (intracerebral hemorrhage)    Oropharyngeal dysphagia     Past Medical History:   Diagnosis Date    Acid reflux     Cancer     Skin    Diabetes mellitus     Prediabetes    GERD (gastroesophageal reflux disease) 09/15/2023    HTN (hypertension) 09/15/2023    Kidney disease     T2DM (type 2 diabetes mellitus) 09/15/2023     Past Surgical History:   Procedure Laterality Date    APPENDECTOMY      ENDOSCOPY W/ PEG TUBE PLACEMENT N/A 2023    Procedure: ESOPHAGOGASTRODUODENOSCOPY WITH PERCUTANEOUS ENDOSCOPIC GASTROSTOMY TUBE INSERTION;  Surgeon: Haseeb Carrillo MD;  Location: [facility] ENDOSCOPY;  Service: General;  Laterality: N/A;    HEMORRHOIDECTOMY      NASAL POLYP SURGERY        General Information       Row Name 10/06/23 1534          OT Time and Intention    Document Type progress note/recertification  -MR     Mode of Treatment occupational therapy  -MR       Row Name 10/06/23 1534          General Information    Patient Profile Reviewed yes  -MR     Existing Precautions/Restrictions fall;other (see comments)  PEG, R-sided weakness, tone, and neglect  -MR     Barriers to Rehab previous functional deficit;medically complex;visual deficit  -MR       Row Name 10/06/23 1534          Cognition    Orientation Status (Cognition) oriented to;person  -MR       Row Name 10/06/23 1534          Safety Issues,  Functional Mobility    Safety Issues Affecting Function (Mobility) ability to follow commands;awareness of need for assistance;impulsivity;judgment;sequencing abilities;safety precautions follow-through/compliance;safety precaution awareness;problem-solving  -MR     Impairments Affecting Function (Mobility) balance;cognition;coordination;endurance/activity tolerance;grasp;motor control;motor planning;muscle tone abnormal;visual/perceptual;sensation/sensory awareness;postural/trunk control;strength  -MR     Cognitive Impairments, Mobility Safety/Performance awareness, need for assistance;attention;insight into deficits/self-awareness;judgment;problem-solving/reasoning;safety precaution awareness;safety precaution follow-through;sequencing abilities  -MR               User Key  (r) = Recorded By, (t) = Taken By, (c) = Cosigned By      Initials Name Provider Type    MR Meera Doyle, LATIA Occupational Therapist                     Mobility/ADL's       Row Name 10/06/23 1535          Bed Mobility    Bed Mobility rolling right;sidelying-sit;scooting/bridging  -MR     Rolling Right [place] (Bed Mobility) 1 person assist;verbal cues;minimum assist (75% patient effort)  -MR     Scooting/Bridging [place] (Bed Mobility) maximum assist (25% patient effort);1 person assist;verbal cues;nonverbal cues (demo/gesture)  -MR     Sidelying-Sit [place] (Bed Mobility) maximum assist (25% patient effort);2 person assist  -MR     Assistive Device (Bed Mobility) head of bed elevated;bed rails;draw sheet  -MR       Row Name 10/06/23 1535          Transfers    Transfers bed-chair transfer  -MR       Row Name 10/06/23 1535          Bed-Chair Transfer    Bed-Chair [place] (Transfers) maximum assist (25% patient effort);2 person assist  -MR     Assistive Device (Bed-Chair Transfers) other (see comments)  BUE support  -MR       Row Name 10/06/23 1535          Activities of Daily Living    BADL Assessment/Intervention upper  body dressing;lower body dressing;toileting  -MR       Row Name 10/06/23 1535          Lower Body Dressing Assessment/Training    [place] Level (Lower Body Dressing) don;socks;dependent (less than 25% patient effort)  -MR     Position (Lower Body Dressing) supine  -MR       Row Name 10/06/23 1535          Upper Body Dressing Assessment/Training    [place] Level (Upper Body Dressing) minimum assist (75% patient effort);other (see comments)  adjusting hospital gown  -MR     Position (Upper Body Dressing) edge of bed sitting  -MR       Row Name 10/06/23 1535          Toileting Assessment/Training    [place] Level (Toileting) change pad/brief;perform perineal hygiene;maximum assist (25% patient effort);adjust/manage clothing  -MR     Position (Toileting) supported sitting  -MR     Comment, (Toileting) Episode of incontinence once in ther recliner. Pt requiring MaxA for toileting hygiene.  -MR               User Key  (r) = Recorded By, (t) = Taken By, (c) = Cosigned By      Initials Name Provider Type     Meera Doyle OT Occupational Therapist                   Obj/Interventions       Row Name 10/06/23 1539          Motor Skills    Therapeutic Exercise other (see comments)  Attempted PROM to the L hand. Pt verbalizing pain and becoming agitated. OT deferred PROM to the LUE this date.  -MR       Row Name 10/06/23 1539          Balance    Balance Assessment sitting static balance;sitting dynamic balance;standing static balance;standing dynamic balance  -MR     Static Sitting Balance minimal assist;verbal cues;non-verbal cues (demo/gesture)  -MR     Dynamic Sitting Balance moderate assist;verbal cues;non-verbal cues (demo/gesture)  -MR     Position, Sitting Balance unsupported;sitting in chair;sitting edge of bed  -MR     Static Standing Balance minimal assist;2-person assist;verbal cues;non-verbal cues (demo/gesture)  -MR     Dynamic Standing Balance moderate assist;2-person assist;verbal  cues;non-verbal cues (demo/gesture)  -MR     Position/Device Used, Standing Balance supported  -MR     Balance Interventions sitting;standing;sit to stand;supported;static;dynamic;occupation based/functional task  -MR               User Key  (r) = Recorded By, (t) = Taken By, (c) = Cosigned By      Initials Name Provider Type    Meera Juarez, OT Occupational Therapist                   Goals/Plan       Row Name 10/06/23 1545          Bed Mobility Goal 1 (OT)    Activity/Assistive Device (Bed Mobility Goal 1, OT) sit to supine;supine to sit  -MR     [place] Level/Cues Needed (Bed Mobility Goal 1, OT) moderate assist (50-74% patient effort);verbal cues required  -MR     Time Frame (Bed Mobility Goal 1, OT) long term goal (LTG);10 days  -MR     Progress/Outcomes (Bed Mobility Goal 1, OT) goal ongoing;continuing progress toward goal  -MR       Row Name 10/06/23 1543          Transfer Goal 1 (OT)    Activity/Assistive Device (Transfer Goal 1, OT) sit-to-stand/stand-to-sit;bed-to-chair/chair-to-bed  -MR     [place] Level/Cues Needed (Transfer Goal 1, OT) moderate assist (50-74% patient effort);verbal cues required  -MR     Time Frame (Transfer Goal 1, OT) long term goal (LTG);10 days  -MR     Progress/Outcome (Transfer Goal 1, OT) goal ongoing;continuing progress toward goal  -MR       Row Name 10/06/23 1540          Dressing Goal 1 (OT)    Activity/Device (Dressing Goal 1, OT) lower body dressing  -MR     [place]/Cues Needed (Dressing Goal 1, OT) moderate assist (50-74% patient effort);verbal cues required  -MR     Time Frame (Dressing Goal 1, OT) long term goal (LTG);10 days  -MR     Progress/Outcome (Dressing Goal 1, OT) goal ongoing;continuing progress toward goal  -MR       Row Name 10/06/23 154          Grooming Goal 1 (OT)    Activity/Device (Grooming Goal 1, OT) hair care;oral care;wash face, hands;other (see comments)  unsupported sitting  -MR     [place] (Grooming Goal 1, OT) moderate  assist (50-74% patient effort);verbal cues required  -MR     Time Frame (Grooming Goal 1, OT) long term goal (LTG);10 days  -MR     Progress/Outcome (Grooming Goal 1, OT) goal ongoing;continuing progress toward goal  -MR       Row Name 10/06/23 1545          Therapy Assessment/Plan (OT)    Planned Therapy Interventions (OT) activity tolerance training;adaptive equipment training;BADL retraining;functional balance retraining;occupation/activity based interventions;ROM/therapeutic exercise;strengthening exercise;patient/caregiver education/training;neuromuscular control/coordination retraining;cognitive/visual perception retraining;transfer/mobility retraining;passive ROM/stretching;IADL retraining  -MR               User Key  (r) = Recorded By, (t) = Taken By, (c) = Cosigned By      Initials Name Provider Type    Meera Juarez, OT Occupational Therapist                   Clinical Impression       Row Name 10/06/23 1547          Pain Assessment    Pretreatment Pain Rating 0/10 - no pain  Simultaneous filing. User may be unaware of other data.  -MR     Posttreatment Pain Rating 0/10 - no pain  Simultaneous filing. User may be unaware of other data.  -MR     Pain Intervention(s) Ambulation/increased activity;Repositioned  -MR       Row Name 10/06/23 1544          Plan of Care Review    Plan of Care Reviewed With patient;spouse  Simultaneous filing. User may be unaware of other data.  -MR     Progress improving  Progress Note/Re-Cert Simultaneous filing. User may be unaware of other data.  -MR     Outcome Evaluation Pt demonstrating improvements w/ SPT and standing balance this date. Pt continues to require assist w/ ADL based tasks. Pt endorsing R hand pain w/ PROM. Continue to recommend inpatient rehab at d/c for best functional outcome.  Simultaneous filing. User may be unaware of other data.  -MR       Row Name 10/06/23 154          Therapy Plan Review/Discharge Plan (OT)    Anticipated Discharge Disposition  (OT) inpatient rehabilitation facility  -MR       Row Name 10/06/23 1545 10/06/23 1543       Vital Signs    O2 Delivery Pre Treatment -- room air  -MR    O2 Delivery Intra Treatment -- room air  -MR    O2 Delivery Post Treatment -- room air  -MR    Pre Patient Position Supine  -MR --    Intra Patient Position Standing  -MR --    Post Patient Position Sitting  -MR --      Row Name 10/06/23 1545          Positioning and Restraints    Pre-Treatment Position in bed  -MR     Post Treatment Position chair  -MR     In Bed sitting;with PT  -MR     Restraints released:;reapplied:;notified nsg:;soft limb  -MR               User Key  (r) = Recorded By, (t) = Taken By, (c) = Cosigned By      Initials Name Provider Type    MR Doyle Meera, OT Occupational Therapist                   Outcome Measures       Row Name 10/06/23 1548          How much help from another is currently needed...    Putting on and taking off regular lower body clothing? 1  -MR     Bathing (including washing, rinsing, and drying) 2  -MR     Toileting (which includes using toilet bed pan or urinal) 1  -MR     Putting on and taking off regular upper body clothing 2  -MR     Taking care of personal grooming (such as brushing teeth) 2  -MR     Eating meals 2  -MR     AM-PAC 6 Clicks Score (OT) 10  -MR       Row Name 10/06/23 1549          How much help from another person do you currently need...    Turning from your back to your side while in flat bed without using bedrails? 2  -KR     Moving from lying on back to sitting on the side of a flat bed without bedrails? 2  -KR     Moving to and from a bed to a chair (including a wheelchair)? 2  -KR     Standing up from a chair using your arms (e.g., wheelchair, bedside chair)? 2  -KR     Climbing 3-5 steps with a railing? 1  -KR     To walk in hospital room? 2  -KR     AM-PAC 6 Clicks Score (PT) 11  -KR     Highest level of mobility 4 --> Transferred to chair/commode  -KR       Row Name 10/06/23 1549           Functional Assessment    Outcome Measure Options AM-PAC 6 Clicks Daily Activity (OT)  -MR               User Key  (r) = Recorded By, (t) = Taken By, (c) = Cosigned By      Initials Name Provider Type    Meera Juarez, OT Occupational Therapist    Raissa Viramontes, PT Physical Therapist                    Occupational Therapy Education       Title: PT OT SLP Therapies (In Progress)       Topic: Occupational Therapy (In Progress)       Point: ADL training (In Progress)       Description:   Instruct learner(s) on proper safety adaptation and remediation techniques during self care or transfers.   Instruct in proper use of assistive devices.                  Learning Progress Summary             Patient Acceptance, E, NR by MR at 10/6/2023 1549    Acceptance, E,D, NR,NL by  at 10/4/2023 0948    Acceptance, E, NR by MR at 9/28/2023 1556    Acceptance, E, NR by  at 9/25/2023 1045    Acceptance, E, NR by [facility] at 9/20/2023 1420    Acceptance, E, NR by  at 9/18/2023 1532    Acceptance, E, NR by  at 9/15/2023 1531   Family Acceptance, E, NR by MR at 10/6/2023 1549    Acceptance, E, NR by  at 9/28/2023 1556                         Point: Home exercise program (In Progress)       Description:   Instruct learner(s) on appropriate technique for monitoring, assisting and/or progressing therapeutic exercises/activities.                  Learning Progress Summary             Patient Acceptance, E, NR by MR at 10/6/2023 1549    Acceptance, E,D, NR,NL by  at 10/4/2023 0948    Acceptance, E, NR by MR at 9/28/2023 1556    Acceptance, E, NR by  at 9/25/2023 1045    Acceptance, E, NR by [facility] at 9/20/2023 1420    Acceptance, E, NR by  at 9/18/2023 1532    Acceptance, E, NR by  at 9/15/2023 1531   Family Acceptance, E, NR by  at 10/6/2023 1549    Acceptance, E, NR by MR at 9/28/2023 1556                         Point: Precautions (In Progress)       Description:   Instruct learner(s) on prescribed precautions during  self-care and functional transfers.                  Learning Progress Summary             Patient Acceptance, E, NR by MR at 10/6/2023 1549    Acceptance, E,D, NR,NL by  at 10/4/2023 0948    Acceptance, E, NR by MR at 9/28/2023 1556    Acceptance, E, NR by 1 at 9/20/2023 1420    Acceptance, E, NR by  at 9/18/2023 1532    Acceptance, E, NR by  at 9/15/2023 1531   Family Acceptance, E, NR by MR at 10/6/2023 1549    Acceptance, E, NR by MR at 9/28/2023 1556                         Point: Body mechanics (In Progress)       Description:   Instruct learner(s) on proper positioning and spine alignment during self-care, functional mobility activities and/or exercises.                  Learning Progress Summary             Patient Acceptance, E, NR by MR at 10/6/2023 1549    Acceptance, E,D, NR,NL by  at 10/4/2023 0948    Acceptance, E, NR by  at 9/28/2023 1556    Acceptance, E, NR by [facility] at 9/20/2023 1420    Acceptance, E, NR by  at 9/18/2023 1532    Acceptance, E, NR by  at 9/15/2023 1531   Family Acceptance, E, NR by  at 10/6/2023 1549    Acceptance, E, NR by  at 9/28/2023 1556                                         User Key       Initials Effective Dates Name Provider Type Discipline     02/03/23 -  Jess Carrillo, OT Occupational Therapist OT    [facility] 09/02/21 -  Carmen Carr, OT Occupational Therapist OT     06/16/21 -  Jim Judge, OT Occupational Therapist OT     10/14/22 -  Jenny Rivera, OT Occupational Therapist OT     09/22/22 -  Meera Doyle OT Occupational Therapist OT                  OT Recommendation and Plan  Planned Therapy Interventions (OT): activity tolerance training, adaptive equipment training, BADL retraining, functional balance retraining, occupation/activity based interventions, ROM/therapeutic exercise, strengthening exercise, patient/caregiver education/training, neuromuscular control/coordination retraining, cognitive/visual perception retraining,  transfer/mobility retraining, passive ROM/stretching, IADL retraining  Plan of Care Review  Plan of Care Reviewed With: patient, spouse (Simultaneous filing. User may be unaware of other data.)  Progress: improving (Progress Note/Re-Cert Simultaneous filing. User may be unaware of other data.)  Outcome Evaluation: Pt demonstrating improvements w/ SPT and standing balance this date. Pt continues to require assist w/ ADL based tasks. Pt endorsing R hand pain w/ PROM. Continue to recommend inpatient rehab at d/c for best functional outcome. (Simultaneous filing. User may be unaware of other data.)     Time Calculation:         Time Calculation- OT       Row Name 10/06/23 1551 10/06/23 1549          Time Calculation- OT    OT Start Time -- 1447  -MR     OT Received On -- 10/06/23  -MR     OT Goal Re-Cert Due Date -- 10/16/23  -MR        Timed Charges    49053 - Gait Training Minutes  10  -KR --     98853 - OT Therapeutic Activity Minutes -- 13  -MR     81923 - OT Self Care/Mgmt Minutes -- 10  -MR        Total Minutes    Timed Charges Total Minutes 10  -KR 23  -MR      Total Minutes 10  -KR 23  -MR               User Key  (r) = Recorded By, (t) = Taken By, (c) = Cosigned By      Initials Name Provider Type    Meera Juarez OT Occupational Therapist    KR Raissa Soria, PT Physical Therapist                  Therapy Charges for Today       Code Description Service Date Service Provider Modifiers Qty    52762330619  OT THERAPEUTIC ACT EA 15 MIN 10/6/2023 Meera Doyle OT GO 1    22688680300 HC OT SELF CARE/MGMT/TRAIN EA 15 MIN 10/6/2023 Meera Doyle OT GO 1                 Meera Doyle OT  10/6/2023

## 2023-10-06 NOTE — PROGRESS NOTES
"Palliative Care Daily Progress Note     C/C: Patient with garbled speech, following commands, reports pain to his mouth.     S: Medical record reviewed. Follow up visit for GOC in the context of complex medical decision making, symptom management. Events noted. Patient follows commands and answers some questions, endorses oral pain, denies pain with swallowing. Oriented to person and recognizes his wife in the room. Patient falls asleep easily even during conversation. Continues in restraints due to attempting to remove abdominal binder. PT note from yesterday shows he required lift to get up and worked on STS. Patient has been incontinent of bowel and bladder. TF at night. Daily decreased PO intake.     ROS: +oral pain. Denies nausea. ROS limited by AMS, drowsiness.     O: Code Status:   Code Status and Medical Interventions:   Ordered at: 09/29/23 0854     Medical Intervention Limits:    NO intubation (DNI)     Code Status (Patient has no pulse and is not breathing):    No CPR (Do Not Attempt to Resuscitate)     Medical Interventions (Patient has pulse or is breathing):    Limited Support      Advanced Directives: Advance Directive Status: Patient does not have advance directive   Goals of Care: Ongoing.   Palliative Performance Scale Score: 40%    /78 (BP Location: Right arm, Patient Position: Lying)   Pulse 82   Temp 98.3 °F (36.8 °C) (Oral)   Resp 18   Ht 175.3 cm (69\")   Wt 72.8 kg (160 lb 7.9 oz)   SpO2 95%   BMI 23.70 kg/m²     Intake/Output Summary (Last 24 hours) at 10/6/2023 1047  Last data filed at 10/6/2023 0600  Gross per 24 hour   Intake 1006 ml   Output --   Net 1006 ml       PE:  General Appearance:    Patient laying in bed, awake, drowsy, acutely ill appearing, NAD, follows commands   HEENT:    NC/AT, EOMI, anicteric, MMM, face relaxed   Neck:   supple, trachea midline, no JVD   Lungs:     CTA bilat, diminished in bases; respirations regular, even and unlabored; RR 16-18 on exam, on RA "    Heart:    RRR, normal S1 and S2, no M/R/G, HR 78    Abdomen:     Normal bowel sounds, soft, nontender, nondistended, PEG with abdominal binder covering   G/U:   Deferred   MSK/Extremities:   no edema, right foot cooler than left, moving right foot with stimulation   Pulses:   Pulses palpable and equal bilaterally   Skin:   Warm, dry   Neurologic:   oriented to self, drowsy, follows commands, flaccid RUE, decreased movement to RLE   Psych:   calm, drowsy     Meds: Reviewed and changes noted    Labs:   Results from last 7 days   Lab Units 10/05/23  0659   WBC 10*3/mm3 7.24   HEMOGLOBIN g/dL 12.2*   HEMATOCRIT % 36.4*   PLATELETS 10*3/mm3 300     Results from last 7 days   Lab Units 10/05/23  0659   SODIUM mmol/L 138   POTASSIUM mmol/L 4.6   CHLORIDE mmol/L 105   CO2 mmol/L 25.0   BUN mg/dL 16   CREATININE mg/dL 0.51*   GLUCOSE mg/dL 146*   CALCIUM mg/dL 8.5*     Results from last 7 days   Lab Units 10/05/23  0659 10/03/23  1101   SODIUM mmol/L 138 139   POTASSIUM mmol/L 4.6 4.1   CHLORIDE mmol/L 105 105   CO2 mmol/L 25.0 27.0   BUN mg/dL 16 13   CREATININE mg/dL 0.51* 0.64*   CALCIUM mg/dL 8.5* 8.4*   BILIRUBIN mg/dL  --  0.4   ALK PHOS U/L  --  75   ALT (SGPT) U/L  --  38   AST (SGOT) U/L  --  31   GLUCOSE mg/dL 146* 153*     Imaging Results (Last 72 Hours)       Procedure Component Value Units Date/Time    FL Video Swallow With Speech Single Contrast [[id number]] Collected: 10/04/23 1142     Updated: 10/04/23 1513    Narrative:      FL VIDEO SWALLOW W SPEECH SINGLE-CONTRAST    Date of Exam: 10/4/2023 11:12 AM EDT    Indication: dysphagia.       Comparison: None available.    Technique:   The speech pathologist administered food and/or liquid mixed with barium to the patient with cine/video imaging.  Imaging assistance was provided to the speech pathologist and an image was saved.    Fluoroscopic Time: 42 seconds    Number of Images: 8 associated fluoroscopic loops were saved    Findings:  Please see speech therapy  report for full details and recommendations.      Impression:      Impression:  Fluoroscopy provided for a modified barium swallow. Please see speech therapy report for full details and recommendations.      Electronically Signed: Jackie Olson MD    10/4/2023 3:10 PM EDT    Workstation ID: [id number]                  Diagnostics: Reviewed    A:   Hemorrhagic CVA    Dyslipidemia    Multiple bilateral CVAs    HFpEF    T2DM (type 2 diabetes mellitus)    HTN (hypertension)    GERD (gastroesophageal reflux disease)    ICH (intracerebral hemorrhage)    Oropharyngeal dysphagia     73 y.o. male with hemorrhagic CVA, HFpEF, HTN.   S/S:   Pain -s/p stroke and arthritis, MSK  -scheduled Tylenol 500mg PO/PEG q 6 hours (LFT's normal)  -scheduled Lidocaine patch to low back  -Voltaren gel  to bilateral knees  -complaining of oral pain -started Palliative oral rinse     2. Dysphagia -SLP with diet modifications  -PEG in place  -potential three day calorie count      3. Debility -PT/OT following  -wife would like to discharge to rehab, placement complicated by restraints     4. Agitation -requiring restraints  -Seroquel being adjusted by Hospitalist for goal of more awake during day     5. GOC -DNR/DNI -per discussion with wife  -Wife would like pain management as patient has appeared restless and complaining of pain with hope that this will allow for discontinuation of restraints  -wife would like to pursue rehab    P: Follow up visit for GOC in the context of complex medical decision making. Patient continues to require restraints to prevent pulling PEG. Lethargic/drowsy during day, plan to decrease Seroquel in attempt to increase PO intake. Emotional support provided to wife at bedside. Patient denies pain with scheduled Tylenol, however continued agitation at times.   Palliative Care Team will continue to follow patient. Please do not hesitate to contact us regarding further sx mgmt or GOC needs.  Dianelys Arriaga,  APRN  10/6/2023  Time spent: 25 minutes

## 2023-10-06 NOTE — THERAPY PROGRESS REPORT/RE-CERT
Patient Name: Kenneth Wen  : 1950    MRN: [id number]                              Today's Date: 10/6/2023       Admit Date: 9/15/2023    Visit Dx:     ICD-10-CM ICD-9-CM   1. Hemorrhagic CVA  I61.9 431   2. Aphasia  R47.01 784.3   3. Dysarthria  R47.1 784.51   4. Oropharyngeal dysphagia  R13.12 787.22     Patient Active Problem List   Diagnosis    Precordial pain    Elevated BP without diagnosis of hypertension    Dyslipidemia    Hyperglycemia    Multiple bilateral CVAs    Hemorrhagic CVA    HFpEF    T2DM (type 2 diabetes mellitus)    HTN (hypertension)    GERD (gastroesophageal reflux disease)    ICH (intracerebral hemorrhage)    Oropharyngeal dysphagia     Past Medical History:   Diagnosis Date    Acid reflux     Cancer     Skin    Diabetes mellitus     Prediabetes    GERD (gastroesophageal reflux disease) 09/15/2023    HTN (hypertension) 09/15/2023    Kidney disease     T2DM (type 2 diabetes mellitus) 09/15/2023     Past Surgical History:   Procedure Laterality Date    APPENDECTOMY      ENDOSCOPY W/ PEG TUBE PLACEMENT N/A 2023    Procedure: ESOPHAGOGASTRODUODENOSCOPY WITH PERCUTANEOUS ENDOSCOPIC GASTROSTOMY TUBE INSERTION;  Surgeon: Haseeb Carrillo MD;  Location: [facility]COPY;  Service: General;  Laterality: N/A;    HEMORRHOIDECTOMY      NASAL POLYP SURGERY        General Information       Row Name 10/06/23 1535          Physical Therapy Time and Intention    Document Type progress note/recertification  -KR     Mode of Treatment physical therapy  -KR       Row Name 10/06/23 1535          General Information    Patient Profile Reviewed yes  -KR     Existing Precautions/Restrictions fall;other (see comments)  -KR     Barriers to Rehab medically complex;visual deficit;previous functional deficit  -KR       Row Name 10/06/23 1535          Cognition    Orientation Status (Cognition) oriented to;person  -KR       Row Name 10/06/23 1535          Safety Issues, Functional Mobility    Safety  Issues Affecting Function (Mobility) ability to follow commands;awareness of need for assistance;insight into deficits/self-awareness;judgment;problem-solving;safety precaution awareness;sequencing abilities;safety precautions follow-through/compliance  -KR     Impairments Affecting Function (Mobility) balance;cognition;coordination;endurance/activity tolerance;grasp;motor control;motor planning;muscle tone abnormal;visual/perceptual;sensation/sensory awareness;postural/trunk control;strength  -KR     Cognitive Impairments, Mobility Safety/Performance awareness, need for assistance;attention;insight into deficits/self-awareness;problem-solving/reasoning;judgment;sequencing abilities;safety precaution follow-through;safety precaution awareness  -KR               User Key  (r) = Recorded By, (t) = Taken By, (c) = Cosigned By      Initials Name Provider Type    KR Raissa Soria PT Physical Therapist                   Mobility       Row Name 10/06/23 1541          Sit-Stand Transfer    Sit-Stand [place] (Transfers) 2 person assist;verbal cues;moderate assist (50% patient effort)  -KR     Assistive Device (Sit-Stand Transfers) other (see comments)  LUE support  -KR     Comment, (Sit-Stand Transfer) 2x from chair with cues for FAUSTINA and anterior weight shift.  -KR       Row Name 10/06/23 1541          Gait/Stairs (Locomotion)    [place] Level (Gait) moderate assist (50% patient effort);2 person assist  -KR     Assistive Device (Gait) other (see comments)  LUE support  -KR     Distance in Feet (Gait) 6  -KR     Deviations/Abnormal Patterns (Gait) tor decreased;festinating/shuffling;gait speed decreased;stride length decreased;weight shifting decreased;base of support, narrow;bilateral deviations  -KR     Bilateral Gait Deviations heel strike decreased;forward flexed posture  -KR     Right Sided Gait Deviations weight shift ability decreased  -KR     Comment, (Gait/Stairs) cues for upright potsure throughout.  Assist req'd for R foot placement and R knee blocked to prevent buckling during stance phase. Farther distance limited by fatigue.  -KR               User Key  (r) = Recorded By, (t) = Taken By, (c) = Cosigned By      Initials Name Provider Type    KR Raissa Soria PT Physical Therapist                   Obj/Interventions       Row Name 10/06/23 1542          Motor Skills    Therapeutic Exercise hip;knee;ankle  -       Row Name 10/06/23 1542          Hip (Therapeutic Exercise)    Hip (Therapeutic Exercise) AAROM (active assistive range of motion)  -     Hip AAROM (Therapeutic Exercise) 10 repetitions;right;aBduction;aDduction  -       Row Name 10/06/23 1542          Knee (Therapeutic Exercise)    Knee (Therapeutic Exercise) strengthening exercise;PROM (passive range of motion)  -KR     Knee Strengthening (Therapeutic Exercise) 10 repetitions;right;SLR (straight leg raise)  -KR       Row Name 10/06/23 1542          Ankle (Therapeutic Exercise)    Ankle (Therapeutic Exercise) AAROM (active assistive range of motion)  -     Ankle AAROM (Therapeutic Exercise) 10 repetitions;right;dorsiflexion;plantarflexion  -KR     Ankle PROM (Therapeutic Exercise) 3 repetitions;10 second hold;right;dorsiflexion  -KR       Row Name 10/06/23 1542          Balance    Balance Assessment sitting static balance;standing static balance;standing dynamic balance  -KR     Static Sitting Balance contact guard  -KR     Position, Sitting Balance unsupported;sitting in chair  -KR     Static Standing Balance maximum assist;1-person assist;verbal cues;non-verbal cues (demo/gesture)  -KR     Dynamic Standing Balance moderate assist;2-person assist;verbal cues;non-verbal cues (demo/gesture)  -KR     Position/Device Used, Standing Balance supported;other (see comments)  LUE support  -KR     Balance Interventions sitting;standing;sit to stand;supported;static;dynamic  -KR               User Key  (r) = Recorded By, (t) = Taken By, (c) =  Cosigned By      Initials Name Provider Type    KR Raissa Soria, PT Physical Therapist                   Goals/Plan       Row Name 10/06/23 1547          Bed Mobility Goal 1 (PT)    Activity/Assistive Device (Bed Mobility Goal 1, PT) sit to supine/supine to sit;rolling to left;rolling to right  -KR     [place] Level/Cues Needed (Bed Mobility Goal 1, PT) moderate assist (50-74% patient effort)  -KR     Time Frame (Bed Mobility Goal 1, PT) long term goal (LTG);10 days  -KR     Progress/Outcomes (Bed Mobility Goal 1, PT) goal revised this date;progress slower than expected  -KR       Row Name 10/06/23 1547          Transfer Goal 1 (PT)    Activity/Assistive Device (Transfer Goal 1, PT) sit-to-stand/stand-to-sit;bed-to-chair/chair-to-bed;walker, rolling  -KR     [place] Level/Cues Needed (Transfer Goal 1, PT) moderate assist (50-74% patient effort)  -KR     Time Frame (Transfer Goal 1, PT) long term goal (LTG);10 days  -KR     Progress/Outcome (Transfer Goal 1, PT) progress slower than expected;goal ongoing  -KR       Row Name 10/06/23 1547          Gait Training Goal 1 (PT)    Activity/Assistive Device (Gait Training Goal 1, PT) gait (walking locomotion);assistive device use;walker, rolling;walker, rolling platform;walker, bhavesh  -KR     [place] Level (Gait Training Goal 1, PT) maximum assist (25-49% patient effort)  -KR     Distance (Gait Training Goal 1, PT) 10  -KR     Time Frame (Gait Training Goal 1, PT) long term goal (LTG);10 days  -KR     Progress/Outcome (Gait Training Goal 1, PT) goal revised this date  -KR       Row Name 10/06/23 1547          Problem Specific Goal 1 (PT)    Problem Specific Goal 1 (PT) Pt will sit unsupported at EOB/EOC for 5 minutes with SBA for improved trunk control to facilitate independence with transfers.  -KR     Time Frame (Problem Specific Goal 1, PT) long-term goal (LTG);2 weeks  -KR     Progress/Outcome (Problem Specific Goal 1, PT) goal ongoing  -KR       Row  Name 10/06/23 1547          Therapy Assessment/Plan (PT)    Planned Therapy Interventions (PT) balance training;bed mobility training;gait training;home exercise program;postural re-education;patient/family education;neuromuscular re-education;ROM (range of motion);strengthening;stretching;transfer training;motor coordination training  -KR               User Key  (r) = Recorded By, (t) = Taken By, (c) = Cosigned By      Initials Name Provider Type    KR Raissa Soria, PT Physical Therapist                   Clinical Impression       Row Name 10/06/23 1545          Pain    Pretreatment Pain Rating 0/10 - no pain  -KR     Posttreatment Pain Rating 0/10 - no pain  -KR       Row Name 10/06/23 1545          Plan of Care Review    Plan of Care Reviewed With patient;spouse  -KR     Progress improving  -KR     Outcome Evaluation Pt able to perform short-distance ambulation this date with 2-person assist with no R knee buckling noted. Pt demo'd increased muscle activation in RLE with therapeutic exercise. Pt will continue to benefit from PT to address ongoing strength, balance, and endurance deficits and return to PLOF. PT rec IRF upon dc.  -KR       Row Name 10/06/23 1543          Therapy Assessment/Plan (PT)    Rehab Potential (PT) good, to achieve stated therapy goals  -KR     Criteria for Skilled Interventions Met (PT) yes;meets criteria;skilled treatment is necessary  -KR     Therapy Frequency (PT) daily  -KR       Row Name 10/06/23 1543          Vital Signs    Pre Patient Position Sitting  Simultaneous filing. User may be unaware of other data.  -KR     Intra Patient Position Standing  Simultaneous filing. User may be unaware of other data.  -KR     Post Patient Position Sitting  Simultaneous filing. User may be unaware of other data.  -KR       Row Name 10/06/23 1543          Positioning and Restraints    Pre-Treatment Position sitting in chair/recliner  Simultaneous filing. User may be unaware of other data.  -KR      Post Treatment Position chair  Simultaneous filing. User may be unaware of other data.  -KR     In Chair notified nsg;reclined;call light within reach;encouraged to call for assist;exit alarm on;with family/caregiver;waffle cushion;on mechanical lift sling;legs elevated;heels elevated;RUE elevated;LUE elevated  Simultaneous filing. User may be unaware of other data.  -KR     Restraints released:;reapplied:;notified nsg:;soft limb  Simultaneous filing. User may be unaware of other data.  -KR               User Key  (r) = Recorded By, (t) = Taken By, (c) = Cosigned By      Initials Name Provider Type    Raissa Viramontes, PIPER Physical Therapist                   Outcome Measures       Row Name 10/06/23 1549          How much help from another person do you currently need...    Turning from your back to your side while in flat bed without using bedrails? 2  -KR     Moving from lying on back to sitting on the side of a flat bed without bedrails? 2  -KR     Moving to and from a bed to a chair (including a wheelchair)? 2  -KR     Standing up from a chair using your arms (e.g., wheelchair, bedside chair)? 2  -KR     Climbing 3-5 steps with a railing? 1  -KR     To walk in hospital room? 2  -KR     AM-PAC 6 Clicks Score (PT) 11  -KR     Highest level of mobility 4 --> Transferred to chair/commode  -KR       Row Name 10/06/23 1548          Functional Assessment    Outcome Measure Options AM-PAC 6 Clicks Daily Activity (OT)  -MR               User Key  (r) = Recorded By, (t) = Taken By, (c) = Cosigned By      Initials Name Provider Type     Meera Doyle, OT Occupational Therapist    Raissa Viramontes, PT Physical Therapist                                 Physical Therapy Education       Title: PT OT SLP Therapies (In Progress)       Topic: Physical Therapy (In Progress)       Point: Mobility training (Done)       Learning Progress Summary             Patient Acceptance, E, NR,VU by NORY at 10/6/2023 1550    Acceptance,  E, NR by SD at 10/5/2023 1437    Acceptance, E, VU by CD at 10/2/2023 1502    Comment: SEE FLOWSHEET    Acceptance, E, NR by KR at 9/25/2023 1324    Acceptance, E, NR by KG at 9/18/2023 1403    Acceptance, E,TB, NR by AY at 9/15/2023 1254   Family Acceptance, E, NR,VU by KR at 10/6/2023 1550    Acceptance, E, NR by SD at 10/5/2023 1437                         Point: Home exercise program (In Progress)       Learning Progress Summary             Patient Acceptance, E, NR by SD at 10/5/2023 1437    Acceptance, E, VU by CD at 10/2/2023 1502    Comment: SEE FLOWSHEET    Acceptance, E, NR by KG at 9/18/2023 1403   Family Acceptance, E, NR by SD at 10/5/2023 1437                         Point: Body mechanics (Done)       Learning Progress Summary             Patient Acceptance, E, NR,VU by KR at 10/6/2023 1550    Acceptance, E, NR by SD at 10/5/2023 1437    Acceptance, E, VU by CD at 10/2/2023 1502    Comment: SEE FLOWSHEET    Acceptance, E, NR by KR at 9/25/2023 1324    Acceptance, E, NR by KG at 9/18/2023 1403    Acceptance, E,TB, NR by AY at 9/15/2023 1254   Family Acceptance, E, NR,VU by KR at 10/6/2023 1550    Acceptance, E, NR by SD at 10/5/2023 1437                         Point: Precautions (Done)       Learning Progress Summary             Patient Acceptance, E, NR,VU by KR at 10/6/2023 1550    Acceptance, E, NR by SD at 10/5/2023 1437    Acceptance, E, VU by CD at 10/2/2023 1502    Comment: SEE FLOWSHEET    Acceptance, E, NR by KR at 9/25/2023 1324    Acceptance, E, NR by KG at 9/18/2023 1403    Acceptance, E,TB, NR by AY at 9/15/2023 1254   Family Acceptance, E, NR,VU by KR at 10/6/2023 1550    Acceptance, E, NR by SD at 10/5/2023 1437                                         User Key       Initials Effective Dates Name Provider Type Discipline    CD 02/03/23 -  Claudia Duarte, PT Physical Therapist PT    SD 03/13/23 -  Chantel Scott, PT Physical Therapist PT    KG 05/22/20 -  Mackenzie Mckay, PT  Physical Therapist PT    AY 11/10/20 -  Aleshia Armstrong PT Physical Therapist PT    KR 12/30/22 -  Raissa Soria PT Physical Therapist PT                  PT Recommendation and Plan  Planned Therapy Interventions (PT): balance training, bed mobility training, gait training, home exercise program, postural re-education, patient/family education, neuromuscular re-education, ROM (range of motion), strengthening, stretching, transfer training, motor coordination training  Plan of Care Reviewed With: patient, spouse  Progress: improving  Outcome Evaluation: Pt able to perform short-distance ambulation this date with 2-person assist with no R knee buckling noted. Pt demo'd increased muscle activation in RLE with therapeutic exercise. Pt will continue to benefit from PT to address ongoing strength, balance, and endurance deficits and return to PLOF. PT rec IRF upon dc.     Time Calculation:         PT Charges       Row Name 10/06/23 1551             Time Calculation    Start Time 1500  -KR      PT Received On 10/06/23  -KR      PT Goal Re-Cert Due Date 10/16/23  -KR         Timed Charges    55043 - PT Therapeutic Exercise Minutes 10  -KR      73983 - Gait Training Minutes  10  -KR      83225 - PT Therapeutic Activity Minutes 3  -KR         Total Minutes    Timed Charges Total Minutes 23  -KR       Total Minutes 23  -KR                User Key  (r) = Recorded By, (t) = Taken By, (c) = Cosigned By      Initials Name Provider Type    KR Raissa Soria PT Physical Therapist                  Therapy Charges for Today       Code Description Service Date Service Provider Modifiers Qty    96394591712 HC PT THER PROC EA 15 MIN 10/6/2023 Raissa Soria, PT GP 1    92570698068 HC GAIT TRAINING EA 15 MIN 10/6/2023 Raissa Soria, PT GP 1            PT G-Codes  Outcome Measure Options: AM-PAC 6 Clicks Daily Activity (OT)  AM-PAC 6 Clicks Score (PT): 11  AM-PAC 6 Clicks Score (OT): 10  Modified [place] Scale: 4 - Moderately severe  disability.  Unable to walk without assistance, and unable to attend to own bodily needs without assistance.  PT Discharge Summary  Anticipated Discharge Disposition (PT): inpatient rehabilitation facility    Raissa Soria, PIPER  10/6/2023

## 2023-10-06 NOTE — PROGRESS NOTES
[facility] Medicine Services  PROGRESS NOTE    Patient Name: Kenneth Wen  : 1950  MRN: [id number]    Date of Admission: 9/15/2023  Primary Care Physician: Susan Powers APRN    Subjective   Subjective     CC:  F/u   CVA     HPI:  Patient is sleeping in  bed with family at bedside. He is able to answer some questions. He said he feels fine. Still having periods of restlessness       Objective   Objective     Vital Signs:   Temp:  [97.7 °F (36.5 °C)-98.6 °F (37 °C)] 98.3 °F (36.8 °C)  Heart Rate:  [66-86] 82  Resp:  [18-20] 18  BP: (105-142)/(59-87) 142/78  Flow (L/min):  [0] 0     Physical Exam:  Constitutional: No acute distress, drowsy , alert  HENT: NCAT, mucous membranes dry   Respiratory: Clear to auscultation bilaterally, respiratory effort normal room air 98%  Cardiovascular: RRR, no murmurs, rubs, or gallops  Gastrointestinal: Positive bowel sounds, soft, nontender, nondistended, PEG in place with abd binder   Musculoskeletal: No bilateral ankle edema, malou UE restraints in place   Psychiatric: flat affect, cooperative  Neurologic: Oriented x 1, strength symmetric in all extremities, Cranial Nerves grossly intact to confrontation, speech understandable   Skin: No rashes,       Results Reviewed:  LAB RESULTS:      Lab 10/05/23  0659 10/03/23  0632 10/02/23  0735 23  1023   WBC 7.24 6.89  --  12.17*   HEMOGLOBIN 12.2* 10.5*  --  12.3*   HEMATOCRIT 36.4* 31.3*  --  36.3*   PLATELETS 300 281  --  349   MCV 92.6 94.3  --  91.9   CRP  --   --  1.03*  --          Lab 10/05/23  0659 10/03/23  1101 10/02/23  0735 23  2112 23  1607 23  0808 23  0014   SODIUM 138 139 140 144 147* 145 152*   POTASSIUM 4.6 4.1 4.4 4.3  --  3.5 3.8   CHLORIDE 105 105 108*  --   --  113* 116*   CO2 25.0 27.0 18.0*  --   --  25.0 26.0   ANION GAP 8.0 7.0 14.0  --   --  7.0 10.0   BUN 16 13 13  --   --  21 23   CREATININE 0.51* 0.64* 0.55*  --   --  0.65* 0.81   EGFR 107.7  101.2 106.0  --   --  100.7 94.3   GLUCOSE 146* 153* 131*  --   --  159* 134*   CALCIUM 8.5* 8.4* 8.0*  --   --  8.6 8.8   MAGNESIUM 1.9  --  1.9  --   --  1.9  --    PHOSPHORUS  --   --  2.3*  --   --  2.4*  --          Lab 10/03/23  1101 10/02/23  0735   TOTAL PROTEIN 5.3* 4.8*   ALBUMIN 3.3* 2.9*   GLOBULIN 2.0 1.9   ALT (SGPT) 38 36   AST (SGOT) 31 34   BILIRUBIN 0.4 0.4   ALK PHOS 75 72             Lab 10/02/23  0735   CHOLESTEROL 58   TRIGLYCERIDES 43             Brief Urine Lab Results  (Last result in the past 365 days)        Color   Clarity   Blood   Leuk Est   Nitrite   Protein   CREAT   Urine HCG        09/15/23 1818 Yellow   Cloudy   Trace   Negative   Negative   Negative                   Microbiology Results Abnormal       Procedure Component Value - Date/Time    Blood Culture - Blood, Arm, Right [[id number]]  (Normal) Collected: 09/15/23 0212    Lab Status: Final result Specimen: Blood from Arm, Right Updated: 09/20/23 0230     Blood Culture No growth at 5 days    Blood Culture - Blood, Arm, Left [[id number]]  (Normal) Collected: 09/15/23 0212    Lab Status: Final result Specimen: Blood from Arm, Left Updated: 09/20/23 0230     Blood Culture No growth at 5 days            FL Video Swallow With Speech Single Contrast    Result Date: 10/4/2023  FL VIDEO SWALLOW W SPEECH SINGLE-CONTRAST Date of Exam: 10/4/2023 11:12 AM EDT Indication: dysphagia. Comparison: None available. Technique:   The speech pathologist administered food and/or liquid mixed with barium to the patient with cine/video imaging.  Imaging assistance was provided to the speech pathologist and an image was saved. Fluoroscopic Time: 42 seconds Number of Images: 8 associated fluoroscopic loops were saved Findings: Please see speech therapy report for full details and recommendations.     Impression: Impression: Fluoroscopy provided for a modified barium swallow. Please see speech therapy report for full details and recommendations.  Electronically Signed: Jackie Olson MD  10/4/2023 3:10 PM EDT  Workstation ID: [id number]         Current medications:  Scheduled Meds:acetaminophen, 500 mg, Per PEG Tube, Q6H  atorvastatin, 80 mg, Per PEG Tube, Nightly  castor oil-balsam peru, 1 application , Topical, Q12H  clopidogrel, 75 mg, Per PEG Tube, Daily  escitalopram, 10 mg, Per PEG Tube, Daily  insulin regular, 2-7 Units, Subcutaneous, Q6H  Lidocaine, 1 patch, Transdermal, Q24H  metoprolol tartrate, 25 mg, Per PEG Tube, Q12H  pantoprazole, 40 mg, Intravenous, Q AM  ProSource No Carb, 30 mL, Per G Tube, BID  [START ON 10/7/2023] QUEtiapine, 12.5 mg, Per PEG Tube, Daily  QUEtiapine, 75 mg, Per PEG Tube, Nightly  sodium chloride, 10 mL, Intravenous, Q12H  sodium chloride, 10 mL, Intravenous, Q12H  sodium chloride, 10 mL, Intravenous, Q12H      Continuous Infusions:sodium chloride, 50 mL/hr, Last Rate: 50 mL/hr (10/05/23 0830)      PRN Meds:.  Calcium Replacement - Follow Nurse / BPA Driven Protocol    dextrose    dextrose    glucagon (human recombinant)    ibuprofen    Magnesium Standard Dose Replacement - Follow Nurse / BPA Driven Protocol    ondansetron    Phosphorus Replacement - Follow Nurse / BPA Driven Protocol    Potassium Replacement - Follow Nurse / BPA Driven Protocol    QUEtiapine    sodium chloride    sodium chloride    sodium chloride    Assessment & Plan   Assessment & Plan     Active Hospital Problems    Diagnosis  POA    **Hemorrhagic CVA [I61.9]  Yes    Oropharyngeal dysphagia [R13.12]  Yes    Multiple bilateral CVAs [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        Brief Hospital Course to date:  Kenneth Wen is a 73 y.o. male  with hx of HTN, HLD, T2DM, and GERD who  presented to OSH with multiple acute ischemic infarcts of the bilateral cerebral and cerebellar hemispheres as well as left isaac. TTE/JOSIAS was negative PFO at OSH. On 9/13/23 he had worsening in mental status and new inability to move RLE, head CT showed evolution of the existing CVA with new development of petechial hemorrhage. DAPT was held for 24 hours per Neurology recommendations and repeat CT head that evening showed worsening effacement of the right quadrigeminal cistern with suspected increasing upward supratentorial pressure. He was then transferred to [facility] for Neurosurgery evaluation on 9/15/23. He was started on hypertonic saline 9/15/23 through 9/20/23 for cerebral edema. He had fevers with negative cultures but had worsening secretions and labored breathing so was started on Zosyn on 9/16/23. Transferred to the hospitalist service on 9/22/23. Pt demonstrated poor oral intake with elevated sodium levels; therefore, PEG tube was recommended.     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 Pt is s/p D 5 1/2NS and NA has now normalized. Fluids stopped since now received TF and free water    --Risk of pulling out peg tube, abd binder to be in place at all times. --Surgery recommends to remain in restraints at all times or if off has to have sitter for next 2 weeks   --FEES modified diet still recommended   -- talked with  "dietician about 3 day calorie count, talked with family that option would be to let him eat and pull PEG if unable to get out of restraints otherwise      HTN  --Goal -160  --Continue Metoprolol 25 mg BID     GERD  --Continue Protonix     COVID-19-resolved  --Overall asymptomatic and on room air  --No infiltrates seen and low procal  --Did not receive any treatment   --Off Isolation 9/30/2023   --Repeat CXR 10/2/2023 ordered  d/t \"sputum production\" showed no acute pulmonary changes problems     Agitation  --Seroquel 12.5mg QAM, Seroquel 75 mg QPM, prn seroquel 25 mg HS  --Qtc 463   -- decrease Seroquel during day to see if he is more alert and awake to eat     Leukocytosis-resolved  --Remains afebrile  --Procalcitionin low at 0.05  --CXR and UA negative  --Blood cultures negative     Elevated LFT's, mild  --Possibly secondary to statin?  --Negative acute hepatitis panel  --repeat AST/ALT improved on 10/3    Palliative medicine has been following trying to help with GOC. Wife still wants to try and get him to rehab with goal of going home. Will need to try and get out of restraints but still high risk of pulling out PEG tube. Talked with dietician to see if he can maintain nutrition with oral intake. She will talk with family and look at ordering a 3 day calorie count       Expected Discharge Location and Transportation: rehab   Expected Discharge   Expected Discharge Date: 10/7/2023; Expected Discharge Time:      DVT prophylaxis:  Mechanical DVT prophylaxis orders are present.     AM-PAC 6 Clicks Score (PT): 8 (10/05/23 2000)    CODE STATUS:   Code Status and Medical Interventions:   Ordered at: 09/29/23 0854     Medical Intervention Limits:    NO intubation (DNI)     Code Status (Patient has no pulse and is not breathing):    No CPR (Do Not Attempt to Resuscitate)     Medical Interventions (Patient has pulse or is breathing):    Limited Support       Rabia Stallings, APRN  10/06/23      "

## 2023-10-06 NOTE — NURSING NOTE
Reason for Wound, Ostomy and Continence (WOC) Nursing Consult:   Pt has a new red yeasty rash in his left armpit     Patient in bed, awake and alert but did not respond verbally.  RN and PCT as well as wife at bedside.         Identified suspected yeast infection to patient's left axilla, perianal area, scrotum and bilateral groin.  Skin is shiny red with satellite papules indicative of yeast.  Periwound skin is intact.  Cleansed areas with pH balanced foaming cleanser and applied miconazole cream.  Left axilla    Groin, scrotum and perianal areas              Sensory Perception: 3-->slightly limited  Moisture: 3-->occasionally moist  Activity: 2-->chairfast  Mobility: 2-->very limited  Nutrition: 2-->probably inadequate  Friction and Shear: 2-->potential problem  Timur Score: 14 (10/05/23 2000)     Ordered patient low-air-loss mattress.  Please implement pressure injury prevention interventions per protocol for patients with a Timur scale of 18 or less.    See orders for recommendations.    Discussed plan of care with RN at bedside who stated she would pass that on to patient's RN.      Thank you for consulting the WOC Nurse.  WOC Team will sign off.    If alteration to skin integrity or change in wound bed presentation, please consult WOC team.    Please note that parts of this note were completed with a voice recognition program. Review of the dictation was done, however, occasionally words are mistranscribed.

## 2023-10-06 NOTE — PLAN OF CARE
Problem: Adult Inpatient Plan of Care  Goal: Plan of Care Review  Outcome: Ongoing, Progressing  Flowsheets (Taken 10/6/2023 7570)  Plan of Care Reviewed With:   patient   spouse   Goal Outcome Evaluation:  Plan of Care Reviewed With: patient, spouse      SLP treatment completed. Will continue to address dysphagia, aphasia, and dysarthria in tx. Please see note for further details and recommendations.

## 2023-10-07 LAB
GLUCOSE BLDC GLUCOMTR-MCNC: 111 MG/DL (ref 70–130)
GLUCOSE BLDC GLUCOMTR-MCNC: 112 MG/DL (ref 70–130)
GLUCOSE BLDC GLUCOMTR-MCNC: 114 MG/DL (ref 70–130)
GLUCOSE BLDC GLUCOMTR-MCNC: 121 MG/DL (ref 70–130)

## 2023-10-07 PROCEDURE — 99232 SBSQ HOSP IP/OBS MODERATE 35: CPT | Performed by: INTERNAL MEDICINE

## 2023-10-07 PROCEDURE — 82948 REAGENT STRIP/BLOOD GLUCOSE: CPT

## 2023-10-07 PROCEDURE — 25010000002 LORAZEPAM PER 2 MG: Performed by: INTERNAL MEDICINE

## 2023-10-07 RX ORDER — LORAZEPAM 2 MG/ML
0.5 INJECTION INTRAMUSCULAR EVERY 4 HOURS PRN
Status: DISCONTINUED | OUTPATIENT
Start: 2023-10-07 | End: 2023-10-11

## 2023-10-07 RX ADMIN — Medication 30 ML: at 08:18

## 2023-10-07 RX ADMIN — METOPROLOL TARTRATE 25 MG: 25 TABLET, FILM COATED ORAL at 08:20

## 2023-10-07 RX ADMIN — ACETAMINOPHEN ORAL SOLUTION 500 MG: 650 SOLUTION ORAL at 18:08

## 2023-10-07 RX ADMIN — DICLOFENAC SODIUM 2 G: 9.3 GEL TOPICAL at 08:18

## 2023-10-07 RX ADMIN — Medication 10 ML: at 08:20

## 2023-10-07 RX ADMIN — DICLOFENAC SODIUM 2 G: 9.3 GEL TOPICAL at 21:55

## 2023-10-07 RX ADMIN — LIDOCAINE 1 PATCH: 4 PATCH TOPICAL at 08:18

## 2023-10-07 RX ADMIN — ATORVASTATIN CALCIUM 80 MG: 40 TABLET, FILM COATED ORAL at 21:54

## 2023-10-07 RX ADMIN — PANTOPRAZOLE SODIUM 40 MG: 40 INJECTION, POWDER, LYOPHILIZED, FOR SOLUTION INTRAVENOUS at 06:51

## 2023-10-07 RX ADMIN — QUETIAPINE FUMARATE 12.5 MG: 25 TABLET ORAL at 08:20

## 2023-10-07 RX ADMIN — Medication 10 ML: at 21:55

## 2023-10-07 RX ADMIN — MINERAL OIL: 1000 LIQUID ORAL at 08:21

## 2023-10-07 RX ADMIN — MICONAZOLE NITRATE 1 APPLICATION: 20 CREAM TOPICAL at 21:55

## 2023-10-07 RX ADMIN — MINERAL OIL: 1000 LIQUID ORAL at 18:11

## 2023-10-07 RX ADMIN — QUETIAPINE FUMARATE 75 MG: 25 TABLET ORAL at 21:54

## 2023-10-07 RX ADMIN — MINERAL OIL: 1000 LIQUID ORAL at 12:09

## 2023-10-07 RX ADMIN — LORAZEPAM 0.5 MG: 2 INJECTION INTRAMUSCULAR; INTRAVENOUS at 16:36

## 2023-10-07 RX ADMIN — MINERAL OIL: 1000 LIQUID ORAL at 21:55

## 2023-10-07 RX ADMIN — ACETAMINOPHEN ORAL SOLUTION 500 MG: 650 SOLUTION ORAL at 06:51

## 2023-10-07 RX ADMIN — MICONAZOLE NITRATE 1 APPLICATION: 20 CREAM TOPICAL at 08:20

## 2023-10-07 RX ADMIN — ACETAMINOPHEN ORAL SOLUTION 500 MG: 650 SOLUTION ORAL at 12:09

## 2023-10-07 RX ADMIN — CLOPIDOGREL BISULFATE 75 MG: 75 TABLET ORAL at 08:21

## 2023-10-07 RX ADMIN — METOPROLOL TARTRATE 25 MG: 25 TABLET, FILM COATED ORAL at 21:54

## 2023-10-07 RX ADMIN — Medication 10 ML: at 21:54

## 2023-10-07 RX ADMIN — LORAZEPAM 0.5 MG: 2 INJECTION INTRAMUSCULAR; INTRAVENOUS at 21:06

## 2023-10-07 RX ADMIN — ESCITALOPRAM OXALATE 10 MG: 10 TABLET ORAL at 08:20

## 2023-10-07 RX ADMIN — Medication 30 ML: at 21:55

## 2023-10-07 NOTE — PROGRESS NOTES
[facility] Medicine Services  PROGRESS NOTE    Patient Name: Kenneth Wen  : 1950  MRN: [id number]    Date of Admission: 9/15/2023  Primary Care Physician: Susan Powers APRN    Subjective   Subjective     CC:  F/u   CVA     HPI:  Patient sleeping in bed. Niece at bedside. She states he was very restless overnight. Trying to pull at things, currently sleeping in restraints.      Objective   Objective     Vital Signs:   Temp:  [97.6 °F (36.4 °C)-98.2 °F (36.8 °C)] 98 °F (36.7 °C)  Heart Rate:  [68-84] 72  Resp:  [18-20] 18  BP: (121-146)/(72-90) 146/88     Physical Exam:  Constitutional: No acute distress, sleeping in bed  HENT: NCAT, mucous membranes moist  Respiratory: Clear to auscultation bilaterally, respiratory effort normal   Cardiovascular: RRR, no murmurs, rubs, or gallops  Gastrointestinal: soft, PEG in place  Musculoskeletal: No bilateral ankle edema  Neurologic: UTO, patient sleeping, did not awaken  Skin: No rashes        Results Reviewed:  LAB RESULTS:      Lab 10/05/23  0659 10/03/23  0632 10/02/23  0735   WBC 7.24 6.89  --    HEMOGLOBIN 12.2* 10.5*  --    HEMATOCRIT 36.4* 31.3*  --    PLATELETS 300 281  --    MCV 92.6 94.3  --    CRP  --   --  1.03*         Lab 10/05/23  0659 10/03/23  1101 10/02/23  0735 23  2112 23  1607   SODIUM 138 139 140 144 147*   POTASSIUM 4.6 4.1 4.4 4.3  --    CHLORIDE 105 105 108*  --   --    CO2 25.0 27.0 18.0*  --   --    ANION GAP 8.0 7.0 14.0  --   --    BUN 16 13 13  --   --    CREATININE 0.51* 0.64* 0.55*  --   --    EGFR 107.7 101.2 106.0  --   --    GLUCOSE 146* 153* 131*  --   --    CALCIUM 8.5* 8.4* 8.0*  --   --    MAGNESIUM 1.9  --  1.9  --   --    PHOSPHORUS  --   --  2.3*  --   --          Lab 10/03/23  1101 10/02/23  0735   TOTAL PROTEIN 5.3* 4.8*   ALBUMIN 3.3* 2.9*   GLOBULIN 2.0 1.9   ALT (SGPT) 38 36   AST (SGOT) 31 34   BILIRUBIN 0.4 0.4   ALK PHOS 75 72             Lab 10/02/23  0735   CHOLESTEROL 58    TRIGLYCERIDES 43             Brief Urine Lab Results  (Last result in the past 365 days)        Color   Clarity   Blood   Leuk Est   Nitrite   Protein   CREAT   Urine HCG        09/15/23 1818 Yellow   Cloudy   Trace   Negative   Negative   Negative                   Microbiology Results Abnormal       Procedure Component Value - Date/Time    Blood Culture - Blood, Arm, Right [[id number]]  (Normal) Collected: 09/15/23 0212    Lab Status: Final result Specimen: Blood from Arm, Right Updated: 09/20/23 0230     Blood Culture No growth at 5 days    Blood Culture - Blood, Arm, Left [[id number]]  (Normal) Collected: 09/15/23 0212    Lab Status: Final result Specimen: Blood from Arm, Left Updated: 09/20/23 0230     Blood Culture No growth at 5 days            No radiology results from the last 24 hrs        Current medications:  Scheduled Meds:acetaminophen, 500 mg, Per PEG Tube, Q6H  atorvastatin, 80 mg, Per PEG Tube, Nightly  clopidogrel, 75 mg, Per PEG Tube, Daily  Diclofenac Sodium, 2 g, Topical, BID  escitalopram, 10 mg, Per PEG Tube, Daily  insulin regular, 2-7 Units, Subcutaneous, Q6H  Lidocaine, 1 patch, Transdermal, Q24H  metoprolol tartrate, 25 mg, Per PEG Tube, Q12H  miconazole, 1 application , Topical, Q12H  palliative care oral rinse, , Mouth/Throat, 4x Daily  pantoprazole, 40 mg, Intravenous, Q AM  ProSource No Carb, 30 mL, Per G Tube, BID  QUEtiapine, 12.5 mg, Per PEG Tube, Daily  QUEtiapine, 75 mg, Per PEG Tube, Nightly  sodium chloride, 10 mL, Intravenous, Q12H  sodium chloride, 10 mL, Intravenous, Q12H  sodium chloride, 10 mL, Intravenous, Q12H      Continuous Infusions:sodium chloride, 50 mL/hr, Last Rate: 50 mL/hr (10/05/23 0830)      PRN Meds:.  Calcium Replacement - Follow Nurse / BPA Driven Protocol    dextrose    dextrose    glucagon (human recombinant)    ibuprofen    Magnesium Standard Dose Replacement - Follow Nurse / BPA Driven Protocol    ondansetron    Phosphorus Replacement - Follow Nurse /  BPA Driven Protocol    Potassium Replacement - Follow Nurse / BPA Driven Protocol    QUEtiapine    sodium chloride    sodium chloride    sodium chloride    Assessment & Plan   Assessment & Plan     Active Hospital Problems    Diagnosis  POA    **Hemorrhagic CVA [I61.9]  Yes    Oropharyngeal dysphagia [R13.12]  Yes    Multiple bilateral CVAs [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        Brief Hospital Course to date:  Kenneth Wen is a 73 y.o. male  with hx of HTN, HLD, T2DM, and GERD who presented to OSH with multiple acute ischemic infarcts of the bilateral cerebral and cerebellar hemispheres as well as left isaac. TTE/JOSIAS was negative PFO at OSH. On 9/13/23 he had worsening in mental status and new inability to move RLE, head CT showed evolution of the existing CVA with new development of petechial hemorrhage. DAPT was held for 24 hours per Neurology recommendations and repeat CT head that evening showed worsening effacement of the right quadrigeminal cistern with suspected increasing upward supratentorial pressure. He was then transferred to [facility] for Neurosurgery evaluation on 9/15/23. He was started on hypertonic saline 9/15/23 through 9/20/23 for cerebral edema. He had fevers with negative cultures but had worsening secretions and labored breathing so was started on Zosyn on 9/16/23. Transferred to the hospitalist service on 9/22/23. Pt demonstrated poor oral intake with elevated sodium levels; therefore, PEG tube was recommended.         Multiple bilateral posterior circulation strokes with hemorrhagic conversion  --Neurology has followed, suspect atheroembolic but cannot rule out cardioembolic given multiple vascular  territories  --Plavix monotherapy, high intensity statin  --Needs Holter monitor at discharge  --Follow up in stroke clinic in 8 weeks     Dysphagia  Hypernatremia - resolved  --SLP recommends mechanical ground with honey thick liquids   --Risk of pulling out peg tube, abd binder to be in place at all times. --Surgery recommends to remain in restraints at all times or if off has to have sitter for next 2 weeks   -- talked with dietician about 3 day calorie count, talked with family that option would be to let him eat and not use PEG if unable to get out of restraints otherwise      HTN  --Goal -160  --Continue Metoprolol 25 mg BID     GERD  --Continue Protonix     COVID-19-resolved  --Overall asymptomatic and on room air  --No infiltrates seen and low procal  --Did not receive any treatment   --Off Isolation 9/30/2023      Agitation  --Seroquel 12.5mg QAM, Seroquel 75 mg QPM, prn seroquel 25 mg HS  -- decreased Seroquel during day to see if he is more alert and awake to eat     Leukocytosis-resolved  --Procalcitionin low at 0.05  --CXR and UA negative  --Blood cultures negative     Elevated LFT's, mild  --Possibly secondary to statin?  --Negative acute hepatitis panel  --repeat AST/ALT improved on 10/3    Palliative medicine has been following trying to help with GOC. Wife still wants to try and get him to rehab with goal of going home.     Expected Discharge Location and Transportation: rehab   Expected Discharge   Expected Discharge Date: 10/7/2023; Expected Discharge Time:      DVT prophylaxis:  Mechanical DVT prophylaxis orders are present.     AM-PAC 6 Clicks Score (PT): 10 (10/06/23 2000)    CODE STATUS:   Code Status and Medical Interventions:   Ordered at: 09/29/23 0854     Medical Intervention Limits:    NO intubation (DNI)     Code Status (Patient has no pulse and is not breathing):    No CPR (Do Not Attempt to Resuscitate)     Medical Interventions (Patient has pulse or is breathing):    Limited  Support       Tasha Parra,   10/07/23

## 2023-10-07 NOTE — PROGRESS NOTES
Nutrition Services    Patient Name:  Kenneth eWn  YOB: 1950  MRN: [id number]  Admit Date:  9/15/2023    Pt with noted improved PO yesterday however remains inadequate to d/c nocturnal feeds at this time. Will continue calorie count for 2 more days.     Calorie Count Results:    Day  # of  Meals   Calories  % Est      Need    Protein  % Est     Need    Day 1     3 498 Kcal 26 % 32.5 g 34 %   Day 2   Kcal  %  g  %   Day 3   Kcal  %  g  %                                           Unable to evaluate TF calories/protein provided due to insufficient data. However if provided as ordered, TF + PO intake would meet estimated needs    Estimated/Assessed Needs (9/30):      Energy: 1900 kcal  Protein: 95 gm  Fluids: per clinical status    Electronically signed by:  Heidi Baxter MS,RD,LD  10/07/23 09:15 EDT

## 2023-10-07 NOTE — CONSULTS
visited patient per palliative spiritual care consult.  Patient had just fallen asleep.  Talked with spouse at bedside who shared that their samara is important to them, that their Bahai and  are aware they are here and are supportive.  She appreciated empathic listening and prayer.

## 2023-10-08 LAB
GLUCOSE BLDC GLUCOMTR-MCNC: 112 MG/DL (ref 70–130)
GLUCOSE BLDC GLUCOMTR-MCNC: 125 MG/DL (ref 70–130)
GLUCOSE BLDC GLUCOMTR-MCNC: 132 MG/DL (ref 70–130)
GLUCOSE BLDC GLUCOMTR-MCNC: 134 MG/DL (ref 70–130)

## 2023-10-08 PROCEDURE — 99232 SBSQ HOSP IP/OBS MODERATE 35: CPT | Performed by: INTERNAL MEDICINE

## 2023-10-08 PROCEDURE — 25810000003 SODIUM CHLORIDE 0.9 % SOLUTION: Performed by: NURSE PRACTITIONER

## 2023-10-08 PROCEDURE — 82948 REAGENT STRIP/BLOOD GLUCOSE: CPT

## 2023-10-08 PROCEDURE — 25010000002 LORAZEPAM PER 2 MG: Performed by: INTERNAL MEDICINE

## 2023-10-08 RX ADMIN — ACETAMINOPHEN ORAL SOLUTION 500 MG: 650 SOLUTION ORAL at 23:56

## 2023-10-08 RX ADMIN — QUETIAPINE FUMARATE 75 MG: 25 TABLET ORAL at 21:44

## 2023-10-08 RX ADMIN — LIDOCAINE 1 PATCH: 4 PATCH TOPICAL at 09:52

## 2023-10-08 RX ADMIN — Medication 10 ML: at 09:59

## 2023-10-08 RX ADMIN — MICONAZOLE NITRATE 1 APPLICATION: 20 CREAM TOPICAL at 21:55

## 2023-10-08 RX ADMIN — ACETAMINOPHEN ORAL SOLUTION 500 MG: 650 SOLUTION ORAL at 01:00

## 2023-10-08 RX ADMIN — ESCITALOPRAM OXALATE 10 MG: 10 TABLET ORAL at 09:55

## 2023-10-08 RX ADMIN — LORAZEPAM 0.5 MG: 2 INJECTION INTRAMUSCULAR; INTRAVENOUS at 14:54

## 2023-10-08 RX ADMIN — PANTOPRAZOLE SODIUM 40 MG: 40 INJECTION, POWDER, LYOPHILIZED, FOR SOLUTION INTRAVENOUS at 05:44

## 2023-10-08 RX ADMIN — Medication 30 ML: at 10:00

## 2023-10-08 RX ADMIN — MINERAL OIL: 1000 LIQUID ORAL at 09:54

## 2023-10-08 RX ADMIN — QUETIAPINE FUMARATE 12.5 MG: 25 TABLET ORAL at 09:55

## 2023-10-08 RX ADMIN — DICLOFENAC SODIUM 2 G: 9.3 GEL TOPICAL at 21:55

## 2023-10-08 RX ADMIN — ACETAMINOPHEN ORAL SOLUTION 500 MG: 650 SOLUTION ORAL at 05:44

## 2023-10-08 RX ADMIN — ATORVASTATIN CALCIUM 80 MG: 40 TABLET, FILM COATED ORAL at 21:43

## 2023-10-08 RX ADMIN — DICLOFENAC SODIUM 2 G: 9.3 GEL TOPICAL at 10:00

## 2023-10-08 RX ADMIN — Medication 30 ML: at 21:52

## 2023-10-08 RX ADMIN — ACETAMINOPHEN ORAL SOLUTION 500 MG: 650 SOLUTION ORAL at 17:52

## 2023-10-08 RX ADMIN — QUETIAPINE FUMARATE 25 MG: 25 TABLET ORAL at 17:52

## 2023-10-08 RX ADMIN — METOPROLOL TARTRATE 25 MG: 25 TABLET, FILM COATED ORAL at 21:43

## 2023-10-08 RX ADMIN — ACETAMINOPHEN ORAL SOLUTION 500 MG: 650 SOLUTION ORAL at 11:55

## 2023-10-08 RX ADMIN — MICONAZOLE NITRATE 1 APPLICATION: 20 CREAM TOPICAL at 09:56

## 2023-10-08 RX ADMIN — METOPROLOL TARTRATE 25 MG: 25 TABLET, FILM COATED ORAL at 09:55

## 2023-10-08 RX ADMIN — SODIUM CHLORIDE 50 ML/HR: 9 INJECTION, SOLUTION INTRAVENOUS at 21:56

## 2023-10-08 RX ADMIN — CLOPIDOGREL BISULFATE 75 MG: 75 TABLET ORAL at 09:55

## 2023-10-08 RX ADMIN — LORAZEPAM 0.5 MG: 2 INJECTION INTRAMUSCULAR; INTRAVENOUS at 09:49

## 2023-10-08 NOTE — PROGRESS NOTES
[facility] Medicine Services  PROGRESS NOTE    Patient Name: Kenneth Wen  : 1950  MRN: [id number]    Date of Admission: 9/15/2023  Primary Care Physician: Susan Powers APRN    Subjective   Subjective     CC:  F/u   CVA     HPI:  Patient resting in bed. Remains drowsy/altered. Family at bedside. Not eating much by mouth.      Objective   Objective     Vital Signs:   Temp:  [98.4 °F (36.9 °C)-99.5 °F (37.5 °C)] 99.5 °F (37.5 °C)  Heart Rate:  [68-81] 81  Resp:  [16-18] 18  BP: (135-148)/(89-90) 135/90     Physical Exam:  Constitutional: No acute distress, sleeping in bed  HENT: NCAT, mucous membranes moist  Respiratory: Clear to auscultation bilaterally, respiratory effort normal   Cardiovascular: RRR, no murmurs, rubs, or gallops  Gastrointestinal: soft, PEG in place  Musculoskeletal: No bilateral ankle edema  Neurologic: moves all extremities, speech is clear, awakens to name  Skin: No rashes        Results Reviewed:  LAB RESULTS:      Lab 10/05/23  0659 10/03/23  0632 10/02/23  0735   WBC 7.24 6.89  --    HEMOGLOBIN 12.2* 10.5*  --    HEMATOCRIT 36.4* 31.3*  --    PLATELETS 300 281  --    MCV 92.6 94.3  --    CRP  --   --  1.03*         Lab 10/05/23  0659 10/03/23  1101 10/02/23  0735   SODIUM 138 139 140   POTASSIUM 4.6 4.1 4.4   CHLORIDE 105 105 108*   CO2 25.0 27.0 18.0*   ANION GAP 8.0 7.0 14.0   BUN 16 13 13   CREATININE 0.51* 0.64* 0.55*   EGFR 107.7 101.2 106.0   GLUCOSE 146* 153* 131*   CALCIUM 8.5* 8.4* 8.0*   MAGNESIUM 1.9  --  1.9   PHOSPHORUS  --   --  2.3*         Lab 10/03/23  1101 10/02/23  0735   TOTAL PROTEIN 5.3* 4.8*   ALBUMIN 3.3* 2.9*   GLOBULIN 2.0 1.9   ALT (SGPT) 38 36   AST (SGOT) 31 34   BILIRUBIN 0.4 0.4   ALK PHOS 75 72             Lab 10/02/23  0735   CHOLESTEROL 58   TRIGLYCERIDES 43             Brief Urine Lab Results  (Last result in the past 365 days)        Color   Clarity   Blood   Leuk Est   Nitrite   Protein   CREAT   Urine HCG         09/15/23 1818 Yellow   Cloudy   Trace   Negative   Negative   Negative                   Microbiology Results Abnormal       Procedure Component Value - Date/Time    Blood Culture - Blood, Arm, Right [[id number]]  (Normal) Collected: 09/15/23 0212    Lab Status: Final result Specimen: Blood from Arm, Right Updated: 09/20/23 0230     Blood Culture No growth at 5 days    Blood Culture - Blood, Arm, Left [[id number]]  (Normal) Collected: 09/15/23 0212    Lab Status: Final result Specimen: Blood from Arm, Left Updated: 09/20/23 0230     Blood Culture No growth at 5 days            No radiology results from the last 24 hrs        Current medications:  Scheduled Meds:acetaminophen, 500 mg, Per PEG Tube, Q6H  atorvastatin, 80 mg, Per PEG Tube, Nightly  clopidogrel, 75 mg, Per PEG Tube, Daily  Diclofenac Sodium, 2 g, Topical, BID  escitalopram, 10 mg, Per PEG Tube, Daily  insulin regular, 2-7 Units, Subcutaneous, Q6H  Lidocaine, 1 patch, Transdermal, Q24H  metoprolol tartrate, 25 mg, Per PEG Tube, Q12H  miconazole, 1 application , Topical, Q12H  palliative care oral rinse, , Mouth/Throat, 4x Daily  pantoprazole, 40 mg, Intravenous, Q AM  ProSource No Carb, 30 mL, Per G Tube, BID  QUEtiapine, 12.5 mg, Per PEG Tube, Daily  QUEtiapine, 75 mg, Per PEG Tube, Nightly  sodium chloride, 10 mL, Intravenous, Q12H  sodium chloride, 10 mL, Intravenous, Q12H      Continuous Infusions:sodium chloride, 50 mL/hr, Last Rate: 50 mL/hr (10/05/23 0830)      PRN Meds:.  Calcium Replacement - Follow Nurse / BPA Driven Protocol    dextrose    dextrose    glucagon (human recombinant)    ibuprofen    LORazepam    Magnesium Standard Dose Replacement - Follow Nurse / BPA Driven Protocol    ondansetron    Phosphorus Replacement - Follow Nurse / BPA Driven Protocol    Potassium Replacement - Follow Nurse / BPA Driven Protocol    QUEtiapine    sodium chloride    sodium chloride    Assessment & Plan   Assessment & Plan     Active Hospital Problems     Diagnosis  POA    **Hemorrhagic CVA [I61.9]  Yes    Oropharyngeal dysphagia [R13.12]  Yes    Multiple bilateral CVAs [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        Brief Hospital Course to date:  Kenneth Wen is a 73 y.o. male  with hx of HTN, HLD, T2DM, and GERD who presented to OSH with multiple acute ischemic infarcts of the bilateral cerebral and cerebellar hemispheres as well as left isaac. TTE/JOSIAS was negative PFO at OSH. On 9/13/23 he had worsening in mental status and new inability to move RLE, head CT showed evolution of the existing CVA with new development of petechial hemorrhage. DAPT was held for 24 hours per Neurology recommendations and repeat CT head that evening showed worsening effacement of the right quadrigeminal cistern with suspected increasing upward supratentorial pressure. He was then transferred to [facility] for Neurosurgery evaluation on 9/15/23. He was started on hypertonic saline 9/15/23 through 9/20/23 for cerebral edema. He had fevers with negative cultures but had worsening secretions and labored breathing so was started on Zosyn on 9/16/23. Transferred to the hospitalist service on 9/22/23. Pt demonstrated poor oral intake with elevated sodium levels; therefore, PEG tube was recommended.         Multiple bilateral posterior circulation strokes with hemorrhagic conversion  --Neurology has followed, suspect atheroembolic but cannot rule out cardioembolic given multiple vascular territories  --Plavix monotherapy, high intensity statin  --Needs Holter monitor at discharge  --Follow up in stroke clinic in 8 weeks     Dysphagia  Hypernatremia - resolved  --SLP recommends mechanical ground with honey thick liquids   --Risk  of pulling out peg tube, abd binder to be in place at all times. --Surgery recommends to remain in restraints at all times or if off has to have sitter for next 2 weeks   -- dietary following for calorie count, adequate PO intake is limited by his mental status unfortunately     HTN  --Continue Metoprolol 25 mg BID     GERD  --Continue Protonix     COVID-19-resolved  --Overall asymptomatic and on room air  --No infiltrates seen and low procal  --Did not receive any treatment   --Off Isolation 9/30/2023      Agitation  --Seroquel 12.5mg QAM, Seroquel 75 mg QPM, prn seroquel 25 mg HS  -- PRN ativan for agitation     Leukocytosis-resolved  --Procalcitionin low at 0.05  --CXR and UA negative  --Blood cultures negative     Elevated LFT's, mild  --Possibly secondary to statin?  --Negative acute hepatitis panel  --repeat AST/ALT improved on 10/3    Palliative medicine has been following trying to help with GOC. Wife still wants to try and get him to rehab with goal of going home.     Expected Discharge Location and Transportation: rehab   Expected Discharge   Expected Discharge Date: 10/7/2023; Expected Discharge Time:      DVT prophylaxis:  Mechanical DVT prophylaxis orders are present.     AM-PAC 6 Clicks Score (PT): 7 (10/07/23 2000)    CODE STATUS:   Code Status and Medical Interventions:   Ordered at: 09/29/23 0854     Medical Intervention Limits:    NO intubation (DNI)     Code Status (Patient has no pulse and is not breathing):    No CPR (Do Not Attempt to Resuscitate)     Medical Interventions (Patient has pulse or is breathing):    Limited Support       Tasha Parra,   10/08/23

## 2023-10-08 NOTE — PROGRESS NOTES
Nutrition Services    Patient Name:  Kenneth Wen  YOB: 1950  MRN: [id number]  Admit Date:  9/15/2023    Day 2 Jose David Count:    Pt with meal refusal x2 on 10/7. With refusal, nocturnal feeds inappropriate to meet estimated needs. Will continue calorie count through today and re-evaluate Monday if need to return to continuous feeds. Palliative continues following for GOC.     Calorie Count Results:    Day  # of  Meals   Calories  % Est      Need    Protein  % Est     Need    Day 1     3 498 Kcal 26 % 32.5 g 34 %   Day 2     3 70 Kcal 4 % 3.5 g 4 %   Day 3   Kcal  %  g  %                                           TF volume provided: 755mL(90% goal volume) = 1133kcal, 51g protein  TF + PO = 1203kcal (63% est needs), 54g protein (57% est needs)     Estimated/Assessed Needs (9/30):      Energy: 1900 kcal  Protein: 95 gm  Fluids: per clinical status    Electronically signed by:  Heidi Baxter MS,RD,LD  10/08/23 07:54 EDT

## 2023-10-09 LAB
ALBUMIN SERPL-MCNC: 3.6 G/DL (ref 3.5–5.2)
ALBUMIN/GLOB SERPL: 1.4 G/DL
ALP SERPL-CCNC: 83 U/L (ref 39–117)
ALT SERPL W P-5'-P-CCNC: 46 U/L (ref 1–41)
ANION GAP SERPL CALCULATED.3IONS-SCNC: 8 MMOL/L (ref 5–15)
AST SERPL-CCNC: 33 U/L (ref 1–40)
BASOPHILS # BLD AUTO: 0.1 10*3/MM3 (ref 0–0.2)
BASOPHILS NFR BLD AUTO: 1.8 % (ref 0–1.5)
BILIRUB SERPL-MCNC: 0.3 MG/DL (ref 0–1.2)
BUN SERPL-MCNC: 21 MG/DL (ref 8–23)
BUN/CREAT SERPL: 32.3 (ref 7–25)
CALCIUM SPEC-SCNC: 8.9 MG/DL (ref 8.6–10.5)
CHLORIDE SERPL-SCNC: 105 MMOL/L (ref 98–107)
CHOLEST SERPL-MCNC: 86 MG/DL (ref 0–200)
CO2 SERPL-SCNC: 26 MMOL/L (ref 22–29)
CREAT SERPL-MCNC: 0.65 MG/DL (ref 0.76–1.27)
CRP SERPL-MCNC: <0.3 MG/DL (ref 0–0.5)
DEPRECATED RDW RBC AUTO: 45.5 FL (ref 37–54)
EGFRCR SERPLBLD CKD-EPI 2021: 100.1 ML/MIN/1.73
EOSINOPHIL # BLD AUTO: 0.32 10*3/MM3 (ref 0–0.4)
EOSINOPHIL NFR BLD AUTO: 5.8 % (ref 0.3–6.2)
ERYTHROCYTE [DISTWIDTH] IN BLOOD BY AUTOMATED COUNT: 13.5 % (ref 12.3–15.4)
GLOBULIN UR ELPH-MCNC: 2.6 GM/DL
GLUCOSE BLDC GLUCOMTR-MCNC: 123 MG/DL (ref 70–130)
GLUCOSE BLDC GLUCOMTR-MCNC: 131 MG/DL (ref 70–130)
GLUCOSE BLDC GLUCOMTR-MCNC: 96 MG/DL (ref 70–130)
GLUCOSE SERPL-MCNC: 145 MG/DL (ref 65–99)
HCT VFR BLD AUTO: 36.4 % (ref 37.5–51)
HGB BLD-MCNC: 12.1 G/DL (ref 13–17.7)
IMM GRANULOCYTES # BLD AUTO: 0.01 10*3/MM3 (ref 0–0.05)
IMM GRANULOCYTES NFR BLD AUTO: 0.2 % (ref 0–0.5)
LYMPHOCYTES # BLD AUTO: 1.56 10*3/MM3 (ref 0.7–3.1)
LYMPHOCYTES NFR BLD AUTO: 28.3 % (ref 19.6–45.3)
MAGNESIUM SERPL-MCNC: 2 MG/DL (ref 1.6–2.4)
MCH RBC QN AUTO: 31 PG (ref 26.6–33)
MCHC RBC AUTO-ENTMCNC: 33.2 G/DL (ref 31.5–35.7)
MCV RBC AUTO: 93.3 FL (ref 79–97)
MONOCYTES # BLD AUTO: 0.74 10*3/MM3 (ref 0.1–0.9)
MONOCYTES NFR BLD AUTO: 13.4 % (ref 5–12)
NEUTROPHILS NFR BLD AUTO: 2.79 10*3/MM3 (ref 1.7–7)
NEUTROPHILS NFR BLD AUTO: 50.5 % (ref 42.7–76)
NRBC BLD AUTO-RTO: 0 /100 WBC (ref 0–0.2)
PHOSPHATE SERPL-MCNC: 3.4 MG/DL (ref 2.5–4.5)
PLATELET # BLD AUTO: 266 10*3/MM3 (ref 140–450)
PMV BLD AUTO: 11.4 FL (ref 6–12)
POTASSIUM SERPL-SCNC: 4 MMOL/L (ref 3.5–5.2)
PREALB SERPL-MCNC: 23 MG/DL (ref 20–40)
PROT SERPL-MCNC: 6.2 G/DL (ref 6–8.5)
RBC # BLD AUTO: 3.9 10*6/MM3 (ref 4.14–5.8)
SODIUM SERPL-SCNC: 139 MMOL/L (ref 136–145)
TRIGL SERPL-MCNC: 67 MG/DL (ref 0–150)
WBC NRBC COR # BLD: 5.52 10*3/MM3 (ref 3.4–10.8)

## 2023-10-09 PROCEDURE — 97535 SELF CARE MNGMENT TRAINING: CPT

## 2023-10-09 PROCEDURE — 85025 COMPLETE CBC W/AUTO DIFF WBC: CPT | Performed by: INTERNAL MEDICINE

## 2023-10-09 PROCEDURE — 80053 COMPREHEN METABOLIC PANEL: CPT | Performed by: NURSE PRACTITIONER

## 2023-10-09 PROCEDURE — 83735 ASSAY OF MAGNESIUM: CPT | Performed by: NURSE PRACTITIONER

## 2023-10-09 PROCEDURE — 82948 REAGENT STRIP/BLOOD GLUCOSE: CPT

## 2023-10-09 PROCEDURE — 86140 C-REACTIVE PROTEIN: CPT | Performed by: NURSE PRACTITIONER

## 2023-10-09 PROCEDURE — 97530 THERAPEUTIC ACTIVITIES: CPT

## 2023-10-09 PROCEDURE — 97110 THERAPEUTIC EXERCISES: CPT

## 2023-10-09 PROCEDURE — 84100 ASSAY OF PHOSPHORUS: CPT | Performed by: NURSE PRACTITIONER

## 2023-10-09 PROCEDURE — 84134 ASSAY OF PREALBUMIN: CPT | Performed by: NURSE PRACTITIONER

## 2023-10-09 PROCEDURE — 82465 ASSAY BLD/SERUM CHOLESTEROL: CPT | Performed by: NURSE PRACTITIONER

## 2023-10-09 PROCEDURE — 84478 ASSAY OF TRIGLYCERIDES: CPT | Performed by: NURSE PRACTITIONER

## 2023-10-09 PROCEDURE — 99232 SBSQ HOSP IP/OBS MODERATE 35: CPT | Performed by: INTERNAL MEDICINE

## 2023-10-09 PROCEDURE — 99233 SBSQ HOSP IP/OBS HIGH 50: CPT | Performed by: PSYCHIATRY & NEUROLOGY

## 2023-10-09 PROCEDURE — 25810000003 SODIUM CHLORIDE 0.9 % SOLUTION: Performed by: NURSE PRACTITIONER

## 2023-10-09 RX ORDER — AMINO ACIDS/PROTEIN HYDROLYS 11G-40/45
30 LIQUID IN PACKET (ML) ORAL DAILY
Status: DISCONTINUED | OUTPATIENT
Start: 2023-10-10 | End: 2023-11-16 | Stop reason: HOSPADM

## 2023-10-09 RX ORDER — MIRTAZAPINE 15 MG/1
15 TABLET, FILM COATED ORAL NIGHTLY
Status: DISCONTINUED | OUTPATIENT
Start: 2023-10-09 | End: 2023-10-10

## 2023-10-09 RX ORDER — DONEPEZIL HYDROCHLORIDE 5 MG/1
5 TABLET, FILM COATED ORAL NIGHTLY
Status: DISCONTINUED | OUTPATIENT
Start: 2023-10-09 | End: 2023-10-10

## 2023-10-09 RX ORDER — CHOLECALCIFEROL (VITAMIN D3) 125 MCG
5 CAPSULE ORAL NIGHTLY
Status: DISCONTINUED | OUTPATIENT
Start: 2023-10-09 | End: 2023-10-10

## 2023-10-09 RX ADMIN — DICLOFENAC SODIUM 2 G: 9.3 GEL TOPICAL at 08:38

## 2023-10-09 RX ADMIN — DONEPEZIL HYDROCHLORIDE 5 MG: 5 TABLET, FILM COATED ORAL at 20:28

## 2023-10-09 RX ADMIN — MINERAL OIL: 1000 LIQUID ORAL at 17:30

## 2023-10-09 RX ADMIN — MICONAZOLE NITRATE 1 APPLICATION: 20 CREAM TOPICAL at 08:39

## 2023-10-09 RX ADMIN — Medication 10 ML: at 20:45

## 2023-10-09 RX ADMIN — ATORVASTATIN CALCIUM 80 MG: 40 TABLET, FILM COATED ORAL at 20:29

## 2023-10-09 RX ADMIN — ACETAMINOPHEN ORAL SOLUTION 500 MG: 650 SOLUTION ORAL at 13:26

## 2023-10-09 RX ADMIN — Medication 30 ML: at 20:38

## 2023-10-09 RX ADMIN — MINERAL OIL: 1000 LIQUID ORAL at 13:26

## 2023-10-09 RX ADMIN — METOPROLOL TARTRATE 25 MG: 25 TABLET, FILM COATED ORAL at 08:39

## 2023-10-09 RX ADMIN — MINERAL OIL: 1000 LIQUID ORAL at 20:49

## 2023-10-09 RX ADMIN — ACETAMINOPHEN ORAL SOLUTION 500 MG: 650 SOLUTION ORAL at 06:02

## 2023-10-09 RX ADMIN — ESCITALOPRAM OXALATE 10 MG: 10 TABLET ORAL at 08:37

## 2023-10-09 RX ADMIN — ACETAMINOPHEN ORAL SOLUTION 500 MG: 650 SOLUTION ORAL at 17:30

## 2023-10-09 RX ADMIN — METOPROLOL TARTRATE 25 MG: 25 TABLET, FILM COATED ORAL at 20:28

## 2023-10-09 RX ADMIN — DICLOFENAC SODIUM 2 G: 9.3 GEL TOPICAL at 20:46

## 2023-10-09 RX ADMIN — PANTOPRAZOLE SODIUM 40 MG: 40 INJECTION, POWDER, LYOPHILIZED, FOR SOLUTION INTRAVENOUS at 06:01

## 2023-10-09 RX ADMIN — SODIUM CHLORIDE 50 ML/HR: 9 INJECTION, SOLUTION INTRAVENOUS at 17:30

## 2023-10-09 RX ADMIN — MINERAL OIL: 1000 LIQUID ORAL at 08:38

## 2023-10-09 RX ADMIN — QUETIAPINE FUMARATE 75 MG: 25 TABLET ORAL at 20:28

## 2023-10-09 RX ADMIN — Medication 30 ML: at 13:26

## 2023-10-09 RX ADMIN — QUETIAPINE FUMARATE 12.5 MG: 25 TABLET ORAL at 08:38

## 2023-10-09 RX ADMIN — CLOPIDOGREL BISULFATE 75 MG: 75 TABLET ORAL at 08:37

## 2023-10-09 RX ADMIN — MICONAZOLE NITRATE 1 APPLICATION: 20 CREAM TOPICAL at 22:41

## 2023-10-09 RX ADMIN — Medication 5 MG: at 20:29

## 2023-10-09 RX ADMIN — LIDOCAINE 1 PATCH: 4 PATCH TOPICAL at 08:38

## 2023-10-09 RX ADMIN — MIRTAZAPINE 15 MG: 15 TABLET, FILM COATED ORAL at 20:29

## 2023-10-09 NOTE — PLAN OF CARE
Goal Outcome Evaluation:  Plan of Care Reviewed With: (P) patient, spouse        Progress: (P) improving  Outcome Evaluation: (P) Pt continues to present below his functional baseline with deficits in balance, R sided weakness and ADLs warranting continuation of skilled IPOT services. Pt required frequent cueing hand placement and body positioning throughout this session. Pt continues to require significant assist for ADLs. Rec IPF at d/c when medically appropriate.      Anticipated Discharge Disposition (OT): (P) inpatient rehabilitation facility

## 2023-10-09 NOTE — CASE MANAGEMENT/SOCIAL WORK
Continued Stay Note  [facility]     Patient Name: Kenneth Wen  MRN: [id number]  Today's Date: 10/9/2023    Admit Date: 9/15/2023    Plan: TBD   Discharge Plan       Row Name 10/09/23 1057       Plan    Plan Comments At this time, pt remains in wrist restraints. MSW has continued to follow for discharge needs as arise and appropriate. Referrals to the rehab facilities will be made once pt is appropriate [facility], then [facility] and [facility] [place] [facility]                   Discharge Codes    No documentation.                 Expected Discharge Date and Time       Expected Discharge Date Expected Discharge Time    Oct 7, 2023               Lisa Pool [email or web address]

## 2023-10-09 NOTE — THERAPY TREATMENT NOTE
Patient Name: Kenneth Wen  : 1950    MRN: [id number]                              Today's Date: 10/9/2023       Admit Date: 9/15/2023    Visit Dx:     ICD-10-CM ICD-9-CM   1. Hemorrhagic CVA  I61.9 431   2. Aphasia  R47.01 784.3   3. Dysarthria  R47.1 784.51   4. Oropharyngeal dysphagia  R13.12 787.22     Patient Active Problem List   Diagnosis    Precordial pain    Elevated BP without diagnosis of hypertension    Dyslipidemia    Hyperglycemia    Multiple bilateral CVAs    Hemorrhagic CVA    HFpEF    T2DM (type 2 diabetes mellitus)    HTN (hypertension)    GERD (gastroesophageal reflux disease)    ICH (intracerebral hemorrhage)    Oropharyngeal dysphagia     Past Medical History:   Diagnosis Date    Acid reflux     Cancer     Skin    Diabetes mellitus     Prediabetes    GERD (gastroesophageal reflux disease) 09/15/2023    HTN (hypertension) 09/15/2023    Kidney disease     T2DM (type 2 diabetes mellitus) 09/15/2023     Past Surgical History:   Procedure Laterality Date    APPENDECTOMY      ENDOSCOPY W/ PEG TUBE PLACEMENT N/A 2023    Procedure: ESOPHAGOGASTRODUODENOSCOPY WITH PERCUTANEOUS ENDOSCOPIC GASTROSTOMY TUBE INSERTION;  Surgeon: Haseeb Carrillo MD;  Location: [facility] ENDOSCOPY;  Service: General;  Laterality: N/A;    HEMORRHOIDECTOMY      NASAL POLYP SURGERY        General Information       Row Name 10/09/23 1502          OT Time and Intention    Document Type therapy note (daily note) (P)   -JG     Mode of Treatment occupational therapy (P)   -JG       Row Name 10/09/23 1502          General Information    Patient Profile Reviewed yes (P)   -JG     Existing Precautions/Restrictions fall;other (see comments) (P)   PEG with ab binder  -JG     Barriers to Rehab medically complex;previous functional deficit;cognitive status (P)   -JG       Row Name 10/09/23 1502          Cognition    Orientation Status (Cognition) oriented to;person (P)   -JG       Row Name 10/09/23 1502          Safety  Issues, Functional Mobility    Safety Issues Affecting Function (Mobility) ability to follow commands;awareness of need for assistance;insight into deficits/self-awareness;problem-solving;safety precaution awareness;safety precautions follow-through/compliance;sequencing abilities (P)   -JG     Impairments Affecting Function (Mobility) balance;cognition;coordination;endurance/activity tolerance;grasp;motor control;motor planning;muscle tone abnormal;visual/perceptual;sensation/sensory awareness;postural/trunk control;strength;range of motion (ROM) (P)   -JG     Cognitive Impairments, Mobility Safety/Performance awareness, need for assistance;insight into deficits/self-awareness;problem-solving/reasoning;safety precaution awareness;safety precaution follow-through;sequencing abilities (P)   -JG               User Key  (r) = Recorded By, (t) = Taken By, (c) = Cosigned By      Initials Name Provider Type    Power Bermudez OT Student OT Student                     Mobility/ADL's       Row Name 10/09/23 1513          Bed Mobility    Bed Mobility rolling left;rolling right;sit-supine;sidelying-sit (P)   -JG     Rolling Left [place] (Bed Mobility) maximum assist (25% patient effort);1 person assist;verbal cues (P)   -JG     Rolling Right [place] (Bed Mobility) 1 person assist;verbal cues;minimum assist (75% patient effort) (P)   -JG     Scooting/Bridging [place] (Bed Mobility) 2 person assist;dependent (less than 25% patient effort) (P)   -JG     Sit-Supine [place] (Bed Mobility) maximum assist (25% patient effort);2 person assist (P)   -JG     Sidelying-Sit [place] (Bed Mobility) maximum assist (25% patient effort);2 person assist (P)   -JG     Bed Mobility, Safety Issues cognitive deficits limit understanding;decreased use of arms for pushing/pulling;decreased use of legs for bridging/pushing;impaired trunk control for bed mobility (P)   -JG     Assistive Device (Bed Mobility) bed  rails;draw sheet;head of bed elevated (P)   -JG     Comment, (Bed Mobility) Pt required vcs and tcs for hand placement and body positioning to place brief. Pt needed assistance scooting up in bed. (P)   -JG       Row Name 10/09/23 1513          Functional Mobility    Functional Mobility- Ind. Level not tested (P)   -JG       Row Name 10/09/23 1513          Activities of Daily Living    BADL Assessment/Intervention lower body dressing;upper body dressing;toileting (P)   -JG       Row Name 10/09/23 1513          Lower Body Dressing Assessment/Training    [place] Level (Lower Body Dressing) don;socks;maximum assist (25% patient effort) (P)   -JG     Position (Lower Body Dressing) supine (P)   -JG       Row Name 10/09/23 1513          Upper Body Dressing Assessment/Training    [place] Level (Upper Body Dressing) pajama/robe;doff;don;moderate assist (50% patient effort);verbal cues (P)   -JG     Position (Upper Body Dressing) supine (P)   -JG       Row Name 10/09/23 1513          Toileting Assessment/Training    [place] Level (Toileting) change pad/brief;perform perineal hygiene;maximum assist (25% patient effort);adjust/manage clothing (P)   -JG     Position (Toileting) supine (P)   -JG               User Key  (r) = Recorded By, (t) = Taken By, (c) = Cosigned By      Initials Name Provider Type    Power Bermudez, OT Student OT Student                   Obj/Interventions       Row Name 10/09/23 1505          Motor Skills    Therapeutic Exercise other (see comments);shoulder;elbow/forearm (P)   RUE elbow flex/ext IR/ER x10, BLE unsupported EOB x 10 LAQ, supine in bed SLR and ankle DF/PF x10  -       Row Name 10/09/23 1505          Balance    Balance Assessment sitting static balance;sitting dynamic balance (P)   -JG     Static Sitting Balance minimal assist (P)   -JG     Dynamic Sitting Balance maximum assist;1-person assist (P)   -JG     Position, Sitting Balance supported;sitting edge of bed (P)    -JG     Balance Interventions sitting;standing;occupation based/functional task (P)   -JG     Comment, Balance Pt presented with R posterior/lateral lean during this session, cues given to adjust back towards midline. Brief moments of min A noted sitting balance. (P)   -JG               User Key  (r) = Recorded By, (t) = Taken By, (c) = Cosigned By      Initials Name Provider Type    Power Bermudez, OT Student OT Student                   Goals/Plan    No documentation.                  Clinical Impression       Row Name 10/09/23 1523          Pain Scale: FACES Pre/Post-Treatment    Pain: FACES Scale, Pretreatment 0-->no hurt (P)   -JG     Posttreatment Pain Rating 2-->hurts little bit (P)   -JG     Pain Location - Side/Orientation Right (P)   -JG     Pain Location generalized (P)   -JG     Pain Location - shoulder (P)   -JG     Pre/Posttreatment Pain Comment Tolerated (P)   -JG       Row Name 10/09/23 1523          Plan of Care Review    Plan of Care Reviewed With patient;spouse (P)   -JG     Progress improving (P)   -JG     Outcome Evaluation Pt continues to present below his functional baseline with deficits in balance, R sided weakness and ADLs warranting continuation of skilled IPOT services. Pt required frequent cueing hand placement and body positioning throughout this session. Pt continues to require significant assist for ADLs. Rec IPF at d/c when medically appropriate. (P)   -JG       Row Name 10/09/23 1523          Therapy Assessment/Plan (OT)    Patient/Family Therapy Goal Statement (OT) Return to PLOF (P)   -JG     Rehab Potential (OT) good, to achieve stated therapy goals (P)   -JG     Criteria for Skilled Therapeutic Interventions Met (OT) yes;skilled treatment is necessary (P)   -JG     Therapy Frequency (OT) daily (P)   -JG       Row Name 10/09/23 1523          Therapy Plan Review/Discharge Plan (OT)    Anticipated Discharge Disposition (OT) inpatient rehabilitation facility (P)   -JG       Katerine  Name 10/09/23 1523          Vital Signs    O2 Delivery Pre Treatment room air (P)   -JG     O2 Delivery Intra Treatment room air (P)   -JG     O2 Delivery Post Treatment room air (P)   -JG     Pre Patient Position Supine (P)   -JG     Intra Patient Position Sitting (P)   -JG     Post Patient Position Supine (P)   -JG       Row Name 10/09/23 1523          Positioning and Restraints    Pre-Treatment Position in bed (P)   -JG     Post Treatment Position bed (P)   -JG     In Bed notified nsg;fowlers;call light within reach;encouraged to call for assist;exit alarm on;with family/caregiver;LUE elevated;heels elevated (P)   -JG     Restraints released:;reapplied:;notified nsg:;soft limb (P)   -JG               User Key  (r) = Recorded By, (t) = Taken By, (c) = Cosigned By      Initials Name Provider Type    Power Bermudez, OT Student OT Student                   Outcome Measures       Row Name 10/09/23 1533          How much help from another is currently needed...    Putting on and taking off regular lower body clothing? 1 (P)   -JG     Bathing (including washing, rinsing, and drying) 2 (P)   -JG     Toileting (which includes using toilet bed pan or urinal) 1 (P)   -JG     Putting on and taking off regular upper body clothing 2 (P)   -JG     Taking care of personal grooming (such as brushing teeth) 2 (P)   -JG     Eating meals 2 (P)   -JG     AM-PAC 6 Clicks Score (OT) 10 (P)   -JG               User Key  (r) = Recorded By, (t) = Taken By, (c) = Cosigned By      Initials Name Provider Type    Power Bermudez, OT Student OT Student                    Occupational Therapy Education       Title: PT OT SLP Therapies (In Progress)       Topic: Occupational Therapy (In Progress)       Point: ADL training (In Progress)       Description:   Instruct learner(s) on proper safety adaptation and remediation techniques during self care or transfers.   Instruct in proper use of assistive devices.                  Learning  Progress Summary             Patient Acceptance, E, NR by JG at 10/9/2023 1533    Acceptance, E, NR by MR at 10/6/2023 1549    Acceptance, E,D, NR,NL by CS at 10/4/2023 0948    Acceptance, E, NR by MR at 9/28/2023 1556    Acceptance, E, NR by  at 9/25/2023 1045    Acceptance, E, NR by [facility] at 9/20/2023 1420    Acceptance, E, NR by  at 9/18/2023 1532    Acceptance, E, NR by  at 9/15/2023 1531   Family Acceptance, E, NR by JG at 10/9/2023 1533    Acceptance, E, NR by MR at 10/6/2023 1549    Acceptance, E, NR by MR at 9/28/2023 1556                         Point: Home exercise program (In Progress)       Description:   Instruct learner(s) on appropriate technique for monitoring, assisting and/or progressing therapeutic exercises/activities.                  Learning Progress Summary             Patient Acceptance, E, NR by MR at 10/6/2023 1549    Acceptance, E,D, NR,NL by CS at 10/4/2023 0948    Acceptance, E, NR by MR at 9/28/2023 1556    Acceptance, E, NR by  at 9/25/2023 1045    Acceptance, E, NR by [facility] at 9/20/2023 1420    Acceptance, E, NR by  at 9/18/2023 1532    Acceptance, E, NR by  at 9/15/2023 1531   Family Acceptance, E, NR by MR at 10/6/2023 1549    Acceptance, E, NR by MR at 9/28/2023 1556                         Point: Precautions (In Progress)       Description:   Instruct learner(s) on prescribed precautions during self-care and functional transfers.                  Learning Progress Summary             Patient Acceptance, E, NR by JG at 10/9/2023 1533    Acceptance, E, NR by MR at 10/6/2023 1549    Acceptance, E,D, NR,NL by  at 10/4/2023 0948    Acceptance, E, NR by MR at 9/28/2023 1556    Acceptance, E, NR by 1 at 9/20/2023 1420    Acceptance, E, NR by  at 9/18/2023 1532    Acceptance, E, NR by [facility] at 9/15/2023 1531   Family Acceptance, E, NR by ANATOLY at 10/9/2023 1533    Acceptance, E, NR by MR at 10/6/2023 1549    Acceptance, E, NR by MR at 9/28/2023 1556                         Point:  Body mechanics (In Progress)       Description:   Instruct learner(s) on proper positioning and spine alignment during self-care, functional mobility activities and/or exercises.                  Learning Progress Summary             Patient Acceptance, E, NR by JG at 10/9/2023 1533    Acceptance, E, NR by MR at 10/6/2023 1549    Acceptance, E,D, NR,NL by  at 10/4/2023 0948    Acceptance, E, NR by MR at 9/28/2023 1556    Acceptance, E, NR by CS at 9/20/2023 1420    Acceptance, E, NR by  at 9/18/2023 1532    Acceptance, E, NR by  at 9/15/2023 1531   Family Acceptance, E, NR by JG at 10/9/2023 1533    Acceptance, E, NR by MR at 10/6/2023 1549    Acceptance, E, NR by MR at 9/28/2023 1556                                         User Key       Initials Effective Dates Name Provider Type Discipline     02/03/23 -  Jess Carrillo, OT Occupational Therapist OT    CS1 09/02/21 -  Carmen Carr, OT Occupational Therapist OT     06/16/21 -  Jim Judge, OT Occupational Therapist OT     10/14/22 -  Jenny Rivera, OT Occupational Therapist OT     09/22/22 -  Meera Doyle, OT Occupational Therapist OT     08/30/23 -  Power Herman, OT Student OT Student OT                  OT Recommendation and Plan  Therapy Frequency (OT): (P) daily  Plan of Care Review  Plan of Care Reviewed With: (P) patient, spouse  Progress: (P) improving  Outcome Evaluation: (P) Pt continues to present below his functional baseline with deficits in balance, R sided weakness and ADLs warranting continuation of skilled IPOT services. Pt required frequent cueing hand placement and body positioning throughout this session. Pt continues to require significant assist for ADLs. Rec IPF at d/c when medically appropriate.     Time Calculation:         Time Calculation- OT       Row Name 10/09/23 1534             Time Calculation- OT    OT Start Time 1435 (P)   -JG      OT Received On 10/09/23 (P)   -JG         Timed Charges     39989 - OT Therapeutic Exercise Minutes 5 (P)   -      59157 - OT Therapeutic Activity Minutes 8 (P)   -MONALISA      04416 - OT Self Care/Mgmt Minutes 10 (P)   -JG         Total Minutes    Timed Charges Total Minutes 23 (P)   -JG       Total Minutes 23 (P)   -G                User Key  (r) = Recorded By, (t) = Taken By, (c) = Cosigned By      Initials Name Provider Type    Power Bermudez, OT Student OT Student                  Therapy Charges for Today       Code Description Service Date Service Provider Modifiers Qty    58025505299 HC OT SELF CARE/MGMT/TRAIN EA 15 MIN 10/9/2023 Power Herman, OT Student GO 1    00883187607 HC OT THERAPEUTIC ACT EA 15 MIN 10/9/2023 Power Herman, OT Student GO 1                 Power Herman OT Student  10/9/2023

## 2023-10-09 NOTE — PROGRESS NOTES
Neurology       Patient Care Team:  Susan Powers APRN as PCP - General (Family Medicine)    Chief complaint: Restless    History: 73-year-old man admitted with multiple posterior circulation strokes presumably secondary to plaque rupture with bilateral cerebellar disease bilateral occipital disease.    He is 7 finally sleeping for a bit but is also sleeping during the day.    He is on Seroquel 75 mg at night and 25 mg during the day.    He is sleeping off and on during the day.    His wife is with him last night and he did sleep through the night.    In addition to the Seroquel he is on Lexapro 10.    He is eating poorly and is having nocturnal tube feeds he cannot swallow.    He does not appear to be able to see.      Past Medical History:   Diagnosis Date    Acid reflux     Cancer     Skin    Diabetes mellitus     Prediabetes    GERD (gastroesophageal reflux disease) 09/15/2023    HTN (hypertension) 09/15/2023    Kidney disease     T2DM (type 2 diabetes mellitus) 09/15/2023       Vital Signs   Vitals:    10/09/23 0320 10/09/23 0500 10/09/23 0725 10/09/23 1055   BP: 119/80  135/88 154/92   BP Location: Right arm  Right arm Right arm   Patient Position: Lying  Lying Lying   Pulse: 64 67 66 72   Resp: 18  16 16   Temp: 97.6 °F (36.4 °C)  97.5 °F (36.4 °C) 98 °F (36.7 °C)   TempSrc: Axillary  Oral Axillary   SpO2: 99% 98% 97% 98%   Weight:       Height:           Physical Exam:   General: Mildly restless moving his left arm and leg periodically ran at random.    He is not moving his right side.    Neuro:    He appears to be moderately to severely demented.    He cannot speak and follow commands but cannot carry on a conversation.    He is to confabulate being able to see.    Cranial nerves show widely dilated pupils which are reactive.  His eyes are disconjugate.    He appears to have a double hemianopsia.    Face is symmetrical.  Palate elevates normally.    Motor testing shows dense right hemiplegia with 3+  reflexes.  Left shows spontaneous movement with good  and 2+ reflexes.    Sensory testing cannot be done.                      Results Review:  CT angiogram of the head and neck show the major intracranial arterial vasculature to be widely patent.  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    Moderate stenosis at the origin of the left vertebral artery and distally the left vertebral artery is widely patent.    Carotid bifurcations are widely patent    Brain MR shows extensive posterior circulation infarcts with hemorrhagic conversion including bilateral cerebellar hemisphere right cerebellar peduncle and left isaac, bilateral occipital lobes bilateral medial temporal lobes and left thalamus.  SWI signal is consistent with hemorrhagic conversion  Results from last 7 days   Lab Units 10/09/23  0454   WBC 10*3/mm3 5.52   HEMOGLOBIN g/dL 12.1*   HEMATOCRIT % 36.4*   PLATELETS 10*3/mm3 266     Results from last 7 days   Lab Units 10/09/23  0454 10/05/23  0659 10/03/23  1101   SODIUM mmol/L 139 138 139   POTASSIUM mmol/L 4.0 4.6 4.1   CHLORIDE mmol/L 105 105 105   CO2 mmol/L 26.0 25.0 27.0   BUN mg/dL 21 16 13   CREATININE mg/dL 0.65* 0.51* 0.64*   CALCIUM mg/dL 8.9 8.5* 8.4*   BILIRUBIN mg/dL 0.3  --  0.4   ALK PHOS U/L 83  --  75   ALT (SGPT) U/L 46*  --  38   AST (SGOT) U/L 33  --  31   GLUCOSE mg/dL 145* 146* 153*       Imaging Results (Last 24 Hours)       ** No results found for the last 24 hours. 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    Cortical blindness.    Vascular dementia.    Regular sleep pattern.        Plan:  DC Lexapro.    Remeron 15 mg nightly.    Melatonin 5 mg nightly.    Aricept 5 mg nightly increasing to 10 mg as tolerated.        Comment:  Hopefully with a regular sleep, appetite stimulation with Remeron, Seroquel can be tapered or at least decreased.    Prognosis is guarded.          I discussed the patients findings and my recommendations with patient and family    Toni Rausch MD  10/09/23  16:00 EDT

## 2023-10-09 NOTE — PROGRESS NOTES
[facility] Medicine Services  PROGRESS NOTE    Patient Name: Kenneth Wen  : 1950  MRN: [id number]    Date of Admission: 9/15/2023  Primary Care Physician: Susan Powers APRN    Subjective   Subjective     CC:  F/u   CVA     HPI:  Patient sitting up in bed. Wife at bedside. Feeding patient, remains confused and in restraints.      Objective   Objective     Vital Signs:   Temp:  [97 °F (36.1 °C)-99 °F (37.2 °C)] 97.5 °F (36.4 °C)  Heart Rate:  [61-79] 67  Resp:  [16-20] 16  BP: (103-146)/(65-90) 119/80     Physical Exam:  Constitutional: No acute distress, awake, sitting up eating breakfast  HENT: NCAT, mucous membranes moist  Respiratory: Clear to auscultation bilaterally, respiratory effort normal   Cardiovascular: RRR, no murmurs, rubs, or gallops  Gastrointestinal: soft, PEG in place  Musculoskeletal: No bilateral ankle edema  Neurologic: moves all extremities, speech is clear, awakens to name  Skin: No rashes        Results Reviewed:  LAB RESULTS:      Lab 10/09/23  0454 10/05/23  0659 10/03/23  0632   WBC 5.52 7.24 6.89   HEMOGLOBIN 12.1* 12.2* 10.5*   HEMATOCRIT 36.4* 36.4* 31.3*   PLATELETS 266 300 281   NEUTROS ABS 2.79  --   --    IMMATURE GRANS (ABS) 0.01  --   --    LYMPHS ABS 1.56  --   --    MONOS ABS 0.74  --   --    EOS ABS 0.32  --   --    MCV 93.3 92.6 94.3   CRP <0.30  --   --          Lab 10/09/23  0454 10/05/23  0659 10/03/23  1101   SODIUM 139 138 139   POTASSIUM 4.0 4.6 4.1   CHLORIDE 105 105 105   CO2 26.0 25.0 27.0   ANION GAP 8.0 8.0 7.0   BUN 21 16 13   CREATININE 0.65* 0.51* 0.64*   EGFR 100.1 107.7 101.2   GLUCOSE 145* 146* 153*   CALCIUM 8.9 8.5* 8.4*   MAGNESIUM 2.0 1.9  --    PHOSPHORUS 3.4  --   --          Lab 10/09/23  0454 10/03/23  1101   TOTAL PROTEIN 6.2 5.3*   ALBUMIN 3.6 3.3*   GLOBULIN 2.6 2.0   ALT (SGPT) 46* 38   AST (SGOT) 33 31   BILIRUBIN 0.3 0.4   ALK PHOS 83 75             Lab 10/09/23  0454   CHOLESTEROL 86   TRIGLYCERIDES 67              Brief Urine Lab Results  (Last result in the past 365 days)        Color   Clarity   Blood   Leuk Est   Nitrite   Protein   CREAT   Urine HCG        09/15/23 1818 Yellow   Cloudy   Trace   Negative   Negative   Negative                   Microbiology Results Abnormal       Procedure Component Value - Date/Time    Blood Culture - Blood, Arm, Right [[id number]]  (Normal) Collected: 09/15/23 0212    Lab Status: Final result Specimen: Blood from Arm, Right Updated: 09/20/23 0230     Blood Culture No growth at 5 days    Blood Culture - Blood, Arm, Left [[id number]]  (Normal) Collected: 09/15/23 0212    Lab Status: Final result Specimen: Blood from Arm, Left Updated: 09/20/23 0230     Blood Culture No growth at 5 days            No radiology results from the last 24 hrs        Current medications:  Scheduled Meds:acetaminophen, 500 mg, Per PEG Tube, Q6H  atorvastatin, 80 mg, Per PEG Tube, Nightly  clopidogrel, 75 mg, Per PEG Tube, Daily  Diclofenac Sodium, 2 g, Topical, BID  escitalopram, 10 mg, Per PEG Tube, Daily  insulin regular, 2-7 Units, Subcutaneous, Q6H  Lidocaine, 1 patch, Transdermal, Q24H  metoprolol tartrate, 25 mg, Per PEG Tube, Q12H  miconazole, 1 application , Topical, Q12H  palliative care oral rinse, , Mouth/Throat, 4x Daily  pantoprazole, 40 mg, Intravenous, Q AM  ProSource No Carb, 30 mL, Per G Tube, BID  QUEtiapine, 12.5 mg, Per PEG Tube, Daily  QUEtiapine, 75 mg, Per PEG Tube, Nightly  sodium chloride, 10 mL, Intravenous, Q12H  sodium chloride, 10 mL, Intravenous, Q12H      Continuous Infusions:sodium chloride, 50 mL/hr, Last Rate: 50 mL/hr (10/08/23 2157)      PRN Meds:.  Calcium Replacement - Follow Nurse / BPA Driven Protocol    dextrose    dextrose    glucagon (human recombinant)    ibuprofen    LORazepam    Magnesium Standard Dose Replacement - Follow Nurse / BPA Driven Protocol    ondansetron    Phosphorus Replacement - Follow Nurse / BPA Driven Protocol    Potassium Replacement -  Follow Nurse / BPA Driven Protocol    QUEtiapine    sodium chloride    sodium chloride    Assessment & Plan   Assessment & Plan     Active Hospital Problems    Diagnosis  POA    **Hemorrhagic CVA [I61.9]  Yes    Oropharyngeal dysphagia [R13.12]  Yes    Multiple bilateral CVAs [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        Brief Hospital Course to date:  Kenneth Wen is a 73 y.o. male  with hx of HTN, HLD, T2DM, and GERD who presented to OSH with multiple acute ischemic infarcts of the bilateral cerebral and cerebellar hemispheres as well as left isaac. TTE/JOSIAS was negative PFO at OSH. On 9/13/23 he had worsening in mental status and new inability to move RLE, head CT showed evolution of the existing CVA with new development of petechial hemorrhage. DAPT was held for 24 hours per Neurology recommendations and repeat CT head that evening showed worsening effacement of the right quadrigeminal cistern with suspected increasing upward supratentorial pressure. He was then transferred to [facility] for Neurosurgery evaluation on 9/15/23. He was started on hypertonic saline 9/15/23 through 9/20/23 for cerebral edema. He had fevers with negative cultures but had worsening secretions and labored breathing so was started on Zosyn on 9/16/23. Transferred to the hospitalist service on 9/22/23. Pt demonstrated poor oral intake with elevated sodium levels; therefore, PEG tube was recommended.         Multiple bilateral posterior circulation strokes with hemorrhagic conversion  --Neurology has followed, suspect atheroembolic but cannot rule out cardioembolic given multiple vascular territories  --Plavix monotherapy, high intensity statin  --Needs Holter monitor at  discharge  --Follow up in stroke clinic in 8 weeks    Agitation  Encephalopathy  --Seroquel 12.5mg QAM, Seroquel 75 mg QPM, prn seroquel 25 mg HS  -- PRN ativan for agitation  --will ask general neurology to evaluate today     Dysphagia  Hypernatremia - resolved  --SLP recommends mechanical ground with honey thick liquids   --Risk of pulling out peg tube, abd binder to be in place at all times. --Surgery recommends to remain in restraints at all times or if off has to have sitter for next 2 weeks   -- dietary following for calorie count, adequate PO intake is limited by his mental status unfortunately     HTN  --Continue Metoprolol 25 mg BID     GERD  --Continue Protonix     COVID-19-resolved  --Overall asymptomatic and on room air  --No infiltrates seen and low procal  --Did not receive any treatment   --Off Isolation 9/30/2023      Leukocytosis-resolved  --Procalcitionin low at 0.05  --CXR and UA negative  --Blood cultures negative     Elevated LFT's, mild  --Possibly secondary to statin?  --Negative acute hepatitis panel  --repeat AST/ALT improved on 10/3    Palliative medicine has been following trying to help with GOC. Wife still wants to try and get him to rehab with goal of going home.     Expected Discharge Location and Transportation: rehab   Expected Discharge   Expected Discharge Date: 10/7/2023; Expected Discharge Time:      DVT prophylaxis:  Mechanical DVT prophylaxis orders are present.     AM-PAC 6 Clicks Score (PT): 7 (10/08/23 0800)    CODE STATUS:   Code Status and Medical Interventions:   Ordered at: 09/29/23 0854     Medical Intervention Limits:    NO intubation (DNI)     Code Status (Patient has no pulse and is not breathing):    No CPR (Do Not Attempt to Resuscitate)     Medical Interventions (Patient has pulse or is breathing):    Limited Support       Tasha Parra,   10/09/23

## 2023-10-10 LAB
GLUCOSE BLDC GLUCOMTR-MCNC: 107 MG/DL (ref 70–130)
GLUCOSE BLDC GLUCOMTR-MCNC: 124 MG/DL (ref 70–130)
GLUCOSE BLDC GLUCOMTR-MCNC: 141 MG/DL (ref 70–130)
GLUCOSE BLDC GLUCOMTR-MCNC: 185 MG/DL (ref 70–130)
GLUCOSE BLDC GLUCOMTR-MCNC: 95 MG/DL (ref 70–130)

## 2023-10-10 PROCEDURE — 25010000002 LORAZEPAM PER 2 MG: Performed by: INTERNAL MEDICINE

## 2023-10-10 PROCEDURE — 97110 THERAPEUTIC EXERCISES: CPT

## 2023-10-10 PROCEDURE — 97530 THERAPEUTIC ACTIVITIES: CPT

## 2023-10-10 PROCEDURE — 82948 REAGENT STRIP/BLOOD GLUCOSE: CPT

## 2023-10-10 PROCEDURE — 92507 TX SP LANG VOICE COMM INDIV: CPT

## 2023-10-10 PROCEDURE — 99232 SBSQ HOSP IP/OBS MODERATE 35: CPT | Performed by: INTERNAL MEDICINE

## 2023-10-10 PROCEDURE — 92526 ORAL FUNCTION THERAPY: CPT

## 2023-10-10 PROCEDURE — 99232 SBSQ HOSP IP/OBS MODERATE 35: CPT | Performed by: PSYCHIATRY & NEUROLOGY

## 2023-10-10 RX ORDER — QUETIAPINE FUMARATE 25 MG/1
50 TABLET, FILM COATED ORAL NIGHTLY
Status: DISCONTINUED | OUTPATIENT
Start: 2023-10-10 | End: 2023-10-12

## 2023-10-10 RX ORDER — CHOLECALCIFEROL (VITAMIN D3) 125 MCG
5 CAPSULE ORAL NIGHTLY
Status: DISCONTINUED | OUTPATIENT
Start: 2023-10-10 | End: 2023-10-12

## 2023-10-10 RX ORDER — DONEPEZIL HYDROCHLORIDE 5 MG/1
5 TABLET, FILM COATED ORAL NIGHTLY
Status: DISCONTINUED | OUTPATIENT
Start: 2023-10-10 | End: 2023-10-11

## 2023-10-10 RX ORDER — MIRTAZAPINE 15 MG/1
15 TABLET, FILM COATED ORAL NIGHTLY
Status: DISCONTINUED | OUTPATIENT
Start: 2023-10-10 | End: 2023-10-11

## 2023-10-10 RX ADMIN — ATORVASTATIN CALCIUM 80 MG: 40 TABLET, FILM COATED ORAL at 20:26

## 2023-10-10 RX ADMIN — QUETIAPINE FUMARATE 50 MG: 25 TABLET ORAL at 20:26

## 2023-10-10 RX ADMIN — MINERAL OIL: 1000 LIQUID ORAL at 20:27

## 2023-10-10 RX ADMIN — Medication 5 MG: at 20:26

## 2023-10-10 RX ADMIN — PANTOPRAZOLE SODIUM 40 MG: 40 INJECTION, POWDER, LYOPHILIZED, FOR SOLUTION INTRAVENOUS at 06:07

## 2023-10-10 RX ADMIN — MINERAL OIL: 1000 LIQUID ORAL at 08:57

## 2023-10-10 RX ADMIN — DICLOFENAC SODIUM 2 G: 9.3 GEL TOPICAL at 08:57

## 2023-10-10 RX ADMIN — LIDOCAINE 1 PATCH: 4 PATCH TOPICAL at 08:56

## 2023-10-10 RX ADMIN — METOPROLOL TARTRATE 25 MG: 25 TABLET, FILM COATED ORAL at 08:56

## 2023-10-10 RX ADMIN — MINERAL OIL: 1000 LIQUID ORAL at 13:22

## 2023-10-10 RX ADMIN — ACETAMINOPHEN ORAL SOLUTION 500 MG: 650 SOLUTION ORAL at 13:22

## 2023-10-10 RX ADMIN — ACETAMINOPHEN ORAL SOLUTION 500 MG: 650 SOLUTION ORAL at 20:42

## 2023-10-10 RX ADMIN — DONEPEZIL HYDROCHLORIDE 5 MG: 5 TABLET, FILM COATED ORAL at 20:26

## 2023-10-10 RX ADMIN — MIRTAZAPINE 15 MG: 15 TABLET, FILM COATED ORAL at 20:25

## 2023-10-10 RX ADMIN — METOPROLOL TARTRATE 25 MG: 25 TABLET, FILM COATED ORAL at 20:26

## 2023-10-10 RX ADMIN — DICLOFENAC SODIUM 2 G: 9.3 GEL TOPICAL at 20:28

## 2023-10-10 RX ADMIN — ACETAMINOPHEN ORAL SOLUTION 500 MG: 650 SOLUTION ORAL at 17:40

## 2023-10-10 RX ADMIN — ACETAMINOPHEN ORAL SOLUTION 500 MG: 650 SOLUTION ORAL at 06:03

## 2023-10-10 RX ADMIN — ACETAMINOPHEN ORAL SOLUTION 500 MG: 650 SOLUTION ORAL at 00:19

## 2023-10-10 RX ADMIN — LORAZEPAM 0.5 MG: 2 INJECTION INTRAMUSCULAR; INTRAVENOUS at 20:27

## 2023-10-10 RX ADMIN — CLOPIDOGREL BISULFATE 75 MG: 75 TABLET ORAL at 08:56

## 2023-10-10 RX ADMIN — Medication 10 ML: at 20:28

## 2023-10-10 RX ADMIN — Medication 30 ML: at 08:56

## 2023-10-10 RX ADMIN — QUETIAPINE FUMARATE 12.5 MG: 25 TABLET ORAL at 08:56

## 2023-10-10 RX ADMIN — MICONAZOLE NITRATE 1 APPLICATION: 20 CREAM TOPICAL at 20:27

## 2023-10-10 RX ADMIN — MICONAZOLE NITRATE 1 APPLICATION: 20 CREAM TOPICAL at 08:58

## 2023-10-10 NOTE — PROGRESS NOTES
Neurology       Patient Care Team:  Susan Powers APRN as PCP - General (Family Medicine)    Chief complaint: Restlessness and agitation    History: The patient is sedated but able to carry on simple conversations.    He is not restless at the moment.      Past Medical History:   Diagnosis Date    Acid reflux     Cancer     Skin    Diabetes mellitus     Prediabetes    GERD (gastroesophageal reflux disease) 09/15/2023    HTN (hypertension) 09/15/2023    Kidney disease     T2DM (type 2 diabetes mellitus) 09/15/2023       Vital Signs   Vitals:    10/10/23 0300 10/10/23 0440 10/10/23 0500 10/10/23 0700   BP:  138/95  (!) 150/102   BP Location:  Right arm  Right arm   Patient Position:  Lying  Lying   Pulse: 66 69 66 75   Resp:  18  16   Temp:  97.7 °F (36.5 °C)  97.9 °F (36.6 °C)   TempSrc:  Axillary  Axillary   SpO2: 95% 96% 95% 97%   Weight:       Height:           Physical Exam:   General: Lethargic              Neuro: Exam remains unchanged with the left MRI    This dysarthria decreased vision    Results Review:  Reviewed  Results from last 7 days   Lab Units 10/09/23  0454   WBC 10*3/mm3 5.52   HEMOGLOBIN g/dL 12.1*   HEMATOCRIT % 36.4*   PLATELETS 10*3/mm3 266     Results from last 7 days   Lab Units 10/09/23  0454 10/05/23  0659   SODIUM mmol/L 139 138   POTASSIUM mmol/L 4.0 4.6   CHLORIDE mmol/L 105 105   CO2 mmol/L 26.0 25.0   BUN mg/dL 21 16   CREATININE mg/dL 0.65* 0.51*   CALCIUM mg/dL 8.9 8.5*   BILIRUBIN mg/dL 0.3  --    ALK PHOS U/L 83  --    ALT (SGPT) U/L 46*  --    AST (SGOT) U/L 33  --    GLUCOSE mg/dL 145* 146*       Imaging Results (Last 24 Hours)       ** No results found for the last 24 hours. **            Assessment:  Restlessness.    Sleep difficulty    Plan:  Decrease Seroquel at bedtime to 50 mg.    DC a.m. Seroquel.        Comment:  Continue to decrease Seroquel if the patient remains calm with the addition of donepezil, melatonin, Remeron.         I discussed the patients findings and my  recommendations with patient, family, and primary care team    Toni Rausch MD  10/10/23  11:30 EDT

## 2023-10-10 NOTE — THERAPY TREATMENT NOTE
Acute Care - Speech Language Pathology Treatment Note  [facility]     Patient Name: Kenneth Wen  : 1950  MRN: [id number]  Today's Date: 10/10/2023               Admit Date: 9/15/2023     Visit Dx:    ICD-10-CM ICD-9-CM   1. Hemorrhagic CVA  I61.9 431   2. Aphasia  R47.01 784.3   3. Dysarthria  R47.1 784.51   4. Oropharyngeal dysphagia  R13.12 787.22     Patient Active Problem List   Diagnosis    Precordial pain    Elevated BP without diagnosis of hypertension    Dyslipidemia    Hyperglycemia    Multiple bilateral CVAs    Hemorrhagic CVA    HFpEF    T2DM (type 2 diabetes mellitus)    HTN (hypertension)    GERD (gastroesophageal reflux disease)    ICH (intracerebral hemorrhage)    Oropharyngeal dysphagia     Past Medical History:   Diagnosis Date    Acid reflux     Cancer     Skin    Diabetes mellitus     Prediabetes    GERD (gastroesophageal reflux disease) 09/15/2023    HTN (hypertension) 09/15/2023    Kidney disease     T2DM (type 2 diabetes mellitus) 09/15/2023     Past Surgical History:   Procedure Laterality Date    APPENDECTOMY      ENDOSCOPY W/ PEG TUBE PLACEMENT N/A 2023    Procedure: ESOPHAGOGASTRODUODENOSCOPY WITH PERCUTANEOUS ENDOSCOPIC GASTROSTOMY TUBE INSERTION;  Surgeon: Haseeb Carrillo MD;  Location: [facility] ENDOSCOPY;  Service: General;  Laterality: N/A;    HEMORRHOIDECTOMY      NASAL POLYP SURGERY         SLP Recommendation and Plan  SLP Diagnosis: moderate, aphasia, dysarthria (10/10/23 1430)           Swallow Criteria for Skilled Therapeutic Interventions Met: demonstrates skilled criteria (10/10/23 1430)  SLC Criteria for Skilled Therapy Interventions Met: yes (10/10/23 1430)  Anticipated Discharge Disposition (SLP): skilled nursing facility (10/10/23 1430)     Therapy Frequency (Swallow): 5 days per week (10/10/23 1430)  Therapy Frequency (SLP SLC): 5 days per week (10/10/23 1430)  Predicted Duration Therapy Intervention (Days): until discharge (10/10/23 1430)  Oral Care  Recommendations: Oral Care BID/PRN, Suction toothbrush (10/10/23 1430)     Daily Summary of Progress (SLP): progress toward functional goals as expected (10/10/23 1430)           Treatment Assessment (SLP): continued, moderate, oral dysphagia, pharyngeal dysphagia, aphasia, dysarthria, cognitive-linguistic disorder (10/10/23 1430)  Treatment Assessment Comments (SLP): Tolerated trials of pureed and HT liquid with poor oral closure and oral transit, however, no s/s of aspiration, Followed simple commands but had difficulty with dysphagia exercises. Continue to follow to  address dysphagia and cognitive communication (10/10/23 1430)  Plan for Continued Treatment (SLP): continue treatment per plan of care (10/10/23 1430)         SLP EVALUATION (last 72 hours)       SLP SLC Evaluation       Row Name 10/10/23 1430                   Communication Assessment/Intervention    Document Type therapy note (daily note)  -        Subjective Information no complaints  -CH        Patient Observations alert;cooperative;agree to therapy  -        Patient/Family/Caregiver Comments/Observations spouse present  -        Patient Effort adequate  -        Symptoms Noted During/After Treatment none  -CH           General Information    Patient Profile Reviewed yes  -CH           Pain    Additional Documentation Pain Scale: FACES Pre/Post-Treatment (Group)  -           Pain Scale: Word Pre/Post-Treatment    Pain: Word Scale, Pretreatment 0 - no pain  -CH        Posttreatment Pain Rating 0 - no pain  -           SLP Evaluation Clinical Impressions    SLP Diagnosis moderate;aphasia;dysarthria  -        Rehab Potential/Prognosis good  -[facility] Criteria for Skilled Therapy Interventions Met yes  -CH        Functional Impact difficulty communicating wants, needs;difficulty communicating in an emergency;difficulty in expressing complex messages;functional impact in ADLs  -           SLP Treatment Clinical Impressions     Treatment Assessment (SLP) continued;moderate;oral dysphagia;pharyngeal dysphagia;aphasia;dysarthria;cognitive-linguistic disorder  -CH        Daily Summary of Progress (SLP) progress toward functional goals as expected  -CH        Barriers to Overall Progress (SLP) Cognitive status;Medically complex  -CH        Plan for Continued Treatment (SLP) continue treatment per plan of care  -CH        Care Plan Review evaluation/treatment results reviewed;care plan/treatment goals reviewed  -CH        Care Plan Review, Other Participant(s) spouse  -CH           Recommendations    Therapy Frequency (SLP SLC) 5 days per week  -CH        Predicted Duration Therapy Intervention (Days) until discharge  -CH        Anticipated Discharge Disposition (SLP) skilled nursing [facility]  -                  User Key  (r) = Recorded By, (t) = Taken By, (c) = Cosigned By      Initials Name Effective Dates    CH Vi Diop MS CCC-SLP 06/16/21 -                        EDUCATION  The patient has been educated in the following areas:     Cognitive Impairment Communication Impairment.           SLP GOALS       Row Name 10/10/23 1430             (LTG) Patient will demonstrate functional swallow for    Diet Texture (Demonstrate functional swallow) soft to chew (chopped) textures  -CH      Liquid viscosity (Demonstrate functional swallow) nectar/ mildly thick liquids  -CH      [place] (Demonstrate functional swallow) with minimal cues (75-90% accuracy)  -CH      Time Frame (Demonstrate functional swallow) by discharge  -CH      Barriers (Demonstrate functional swallow) Lethargy  -CH      Progress/Outcomes (Demonstrate functional swallow) continuing progress toward goal  -CH         (STG) Patient will tolerate trials of    Consistencies Trialed (Tolerate trials) pureed textures;honey/ moderately thick liquids  -CH      Desired Outcome (Tolerate trials) without signs/symptoms of aspiration;without signs of distress;with adequate oral  prep/transit/clearance;with use of compensatory strategies (see comments)  -CH      [place] (Tolerate trials) with 1:1 assist/ supervision  -CH      Time Frame (Tolerate trials) by discharge  -CH      Progress/Outcomes (Tolerate trials) continuing progress toward goal  -CH      Comment (Tolerate trials) tolerated puree and honey liquids with no s/sx aspiration. continues with significant oral deficits  -CH         (STG) Patient will tolerate therapeutic trials of    Consistencies Trialed (Tolerate therapeutic trials) thin liquids  -CH      Desired Outcome (Tolerate therapeutic trials) without signs/symptoms of aspiration;without signs of distress  -CH      [place] (Tolerate therapeutic trials) with minimal cues (75-90% accuracy)  -CH      Time Frame (Tolerate therapeutic trials) by discharge  -CH      Progress/Outcomes (Tolerate therapeutic trials) continuing progress toward goal  -CH      Comment (Tolerate therapeutic trials) overt cough resposne  -         (STG) Labial Strengthening Goal 1 (SLP)    Activity (Labial Strengthening Goal 1, SLP) increase labial tone  -      Increase Labial Tone labial resistance exercises;swallow trials  -CH      [place]/Accuracy (Labial Strengthening Goal 1, SLP) with moderate cues (50-74% accuracy)  -CH      Time Frame (Labial Strengthening Goal 1, SLP) short term goal (STG)  -CH      Progress/Outcomes (Labial Strengthening Goal 1, SLP) continuing progress toward goal  -CH      Comment (Labial Strengthening Goal 1, SLP) swallow trials w anterior loss with HT liquids.  -         (STG) Lingual Strengthening Goal 1 (SLP)    Activity (Lingual Strengthening Goal 1, SLP) increase lingual tone/sensation/control/coordination/movement;increase tongue back strength  -      Increase Lingual Tone/Sensation/Control/Coordination/Movement swallow trials;lingual resistance exercises  -      Increase Tongue Back Strength lingual resistance exercises  -       [place]/Accuracy (Lingual Strengthening Goal 1, SLP) with moderate cues (50-74% accuracy)  -CH      Time Frame (Lingual Strengthening Goal 1, SLP) short term goal (STG)  -CH      Progress/Outcomes (Lingual Strengthening Goal 1, SLP) continuing progress toward goal  -CH      Comment (Lingual Strengthening Goal 1, SLP) completed lingual resistence with mod cues and models x 5 each  -CH         (STG) Pharyngeal Strengthening Exercise Goal 1 (SLP)    Activity (Pharyngeal Strengthening Goal 1, SLP) increase superior movement of the hyolaryngeal complex;increase anterior movement of the hyolaryngeal complex;increase closure at entrance to airway/closure of airway at glottis;increase squeeze/positive pressure generation;increase tongue base retraction;increase timing  -CH      Increase Timing prepping - 3 second prep or suck swallow or 3-step swallow  -CH      Increase Superior Movement of the Hyolaryngeal Complex effortful pitch glide (falsetto + pharyngeal squeeze)  -CH      Increase Anterior Movement of the Hyolaryngeal Complex chin tuck against resistance (CTAR)  -CH      Increase Closure at Entrance to Airway/Closure of Airway at Glottis supraglottic swallow  -CH      Increase Squeeze/Positive Pressure Generation hard effortful swallow  -CH      Increase Tongue Base Retraction tamie  -CH      [place]/Accuracy (Pharyngeal Strengthening Goal 1, SLP) with moderate cues (50-74% accuracy)  -CH      Time Frame (Pharyngeal Strengthening Goal 1, SLP) short term goal (STG)  -CH      Progress/Outcomes (Pharyngeal Strengthening Goal 1, SLP) continuing progress toward goal  -CH      Comment (Pharyngeal Strengthening Goal 1, SLP) completed effortful swallow and 123 prep with mod cues  -CH         Patient will demonstrate functional communication skills for return to discharge environment     [place] with moderate cues  -CH      Time frame by discharge  -CH      Progress/Outcomes continuing progress toward goal  -CH          Comprehend Questions Goal 1 (SLP)    Improve Ability to Comprehend Questions Goal 1 (SLP) complex yes/no questions;80%;with minimal cues (75-90%)  -CH      Time Frame (Comprehend Questions Goal 1, SLP) short term goal (STG)  -CH      Progress (Ability to Comprehend Questions Goal 1, SLP) 50%;with moderate cues (50-74%)  -CH      Progress/Outcomes (Comprehend Questions Goal 1, SLP) continuing progress toward goal  -CH         Follow Directions Goal 2 (SLP)    Improve Ability to Follow Directions Goal 1 (SLP) 2 step commands;80%;with minimal cues (75-90%)  -CH      Time Frame (Follow Directions Goal 1, SLP) short term goal (STG)  -CH      Progress (Ability to Follow Directions Goal 1, SLP) 50%;with moderate cues (50-74%)  -CH      Progress/Outcomes (Follow Directions Goal 1, SLP) continuing progress toward goal  -CH         Word Retrieval Skills Goal 1 (SLP)    Improve Word Retrieval Skills By Goal 1 (SLP) confrontational naming task;high frequency;responsive naming task;simple;80%;with minimal cues (75-90%)  -CH      Time Frame (Word Retrieval Goal 1, SLP) short term goal (STG)  -CH      Progress (Word Retrieval Skills Goal 1, SLP) 70%;100%;with minimal cues (75-90%)  -CH      Progress/Outcomes (Word Retrieval Goal 1, SLP) continuing progress toward goal  -CH      Comment (Word Retrieval Goal 1, SLP) responsive 70%, automatic 100%  -CH         Articulation Goal 1 (SLP)    Improve Articulation Goal 1 (SLP) by over-articulating at phrase level;80%;with moderate cues (50-74%)  -CH      Time Frame (Articulation Goal 1, SLP) short term goal (STG)  -CH      Progress (Articulation Goal 1, SLP) 10%;with moderate cues (50-74%)  -CH      Progress/Outcomes (Articulation Goal 1, SLP) goal ongoing  -CH         Orientation Goal 1 (SLP)    Improve Orientation Through Goal 1 (SLP) demonstrating orientation to day;demonstrating orientation to month;demonstrating orientation to year;demonstrating orientation to  place;demonstrating orientation to disease/impairment;use environmental aids to assist with orientation;80%;with moderate cues (50-74%)  -CH      Time Frame (Orientation Goal 1, SLP) short term goal (STG)  -CH      Progress (Orientation Goal 1, SLP) 20%;with moderate cues (50-74%)  -CH      Progress/Outcomes (Orientation Goal 1, SLP) continuing progress toward goal  -CH      Comment (Orientation Goal 1, SLP) oriented to self, grossly to place  -CH                User Key  (r) = Recorded By, (t) = Taken By, (c) = Cosigned By      Initials Name Provider Type    Vi Mcdermott MS CCC-SLP Speech and Language Pathologist                            Time Calculation:      Time Calculation- SLP       Row Name 10/10/23 1624             Time Calculation- SLP    SLP Start Time 1430  -CH      SLP Received On 10/10/23  -CH         Untimed Charges    52415-BT Treatment/ST Modification Prosth Aug Alter  30  -CH      12086-OF Treatment Swallow Minutes 40  -CH         Total Minutes    Untimed Charges Total Minutes 70  -CH       Total Minutes 70  -CH                User Key  (r) = Recorded By, (t) = Taken By, (c) = Cosigned By      Initials Name Provider Type    Vi Mcdermott MS CCC-SLP Speech and Language Pathologist                    Therapy Charges for Today       Code Description Service Date Service Provider Modifiers Qty    54455599031 HC ST TREATMENT SWALLOW 3 10/10/2023 Vi Doip MS CCC-SLP GN 1    70671793345 HC ST TREATMENT SPEECH 2 10/10/2023 Vi Diop MS CCC-SLP GN 1                       Vi Diop MS CCC-SLP  10/10/2023   and Acute Care - Speech Language Pathology   Swallow Treatment Note [facility]     Patient Name: Kenneth Wen  : 1950  MRN: [id number]  Today's Date: 10/10/2023               Admit Date: 9/15/2023    Visit Dx:     ICD-10-CM ICD-9-CM   1. Hemorrhagic CVA  I61.9 431   2. Aphasia  R47.01 784.3   3. Dysarthria  R47.1 784.51   4. Oropharyngeal dysphagia   R13.12 787.22     Patient Active Problem List   Diagnosis    Precordial pain    Elevated BP without diagnosis of hypertension    Dyslipidemia    Hyperglycemia    Multiple bilateral CVAs    Hemorrhagic CVA    HFpEF    T2DM (type 2 diabetes mellitus)    HTN (hypertension)    GERD (gastroesophageal reflux disease)    ICH (intracerebral hemorrhage)    Oropharyngeal dysphagia     Past Medical History:   Diagnosis Date    Acid reflux     Cancer     Skin    Diabetes mellitus     Prediabetes    GERD (gastroesophageal reflux disease) 09/15/2023    HTN (hypertension) 09/15/2023    Kidney disease     T2DM (type 2 diabetes mellitus) 09/15/2023     Past Surgical History:   Procedure Laterality Date    APPENDECTOMY      ENDOSCOPY W/ PEG TUBE PLACEMENT N/A 9/29/2023    Procedure: ESOPHAGOGASTRODUODENOSCOPY WITH PERCUTANEOUS ENDOSCOPIC GASTROSTOMY TUBE INSERTION;  Surgeon: Haseeb Carrillo MD;  Location: [facility] ENDOSCOPY;  Service: General;  Laterality: N/A;    HEMORRHOIDECTOMY      NASAL POLYP SURGERY         SLP Recommendation and Plan  SLP Swallowing Diagnosis: moderate, oral dysphagia, pharyngeal dysphagia (10/10/23 1430)  SLP Diet Recommendation: puree, honey thick liquids, no mixed consistencies (10/10/23 1430)  Recommended Precautions and Strategies: upright posture during/after eating, small bites of food and sips of liquid, no straw, alternate between small bites of food and sips of liquid, general aspiration precautions, 1:1 supervision (10/10/23 1430)  SLP Rec. for Method of Medication Administration: meds crushed, with puree (10/10/23 1430)     Monitor for Signs of Aspiration: notify SLP if any concerns (10/10/23 1430)     Swallow Criteria for Skilled Therapeutic Interventions Met: demonstrates skilled criteria (10/10/23 1430)  Anticipated Discharge Disposition (SLP): skilled nursing facility (10/10/23 1430)  Rehab Potential/Prognosis, Swallowing: re-evaluate goals as necessary (10/10/23 1430)  Therapy Frequency  (Swallow): 5 days per week (10/10/23 1430)  Predicted Duration Therapy Intervention (Days): until discharge (10/10/23 1430)  Oral Care Recommendations: Oral Care BID/PRN, Suction toothbrush (10/10/23 1430)        Daily Summary of Progress (SLP): progress toward functional goals as expected (10/10/23 1430)               Treatment Assessment (SLP): continued, moderate, oral dysphagia, pharyngeal dysphagia, aphasia, dysarthria, cognitive-linguistic disorder (10/10/23 1430)  Treatment Assessment Comments (SLP): Tolerated trials of pureed and HT liquid with poor oral closure and oral transit, however, no s/s of aspiration, Followed simple commands but had difficulty with dysphagia exercises. Continue to follow to  address dysphagia and cognitive communication (10/10/23 1430)  Plan for Continued Treatment (SLP): continue treatment per plan of care (10/10/23 1430)       Oral Care Recommendations: Oral Care BID/PRN, Suction toothbrush (10/10/23 1430)           SWALLOW EVALUATION (last 72 hours)       SLP Adult Swallow Evaluation       Row Name 10/10/23 1430                   SLP Evaluation Clinical Impression    SLP Swallowing Diagnosis moderate;oral dysphagia;pharyngeal dysphagia  -        Functional Impact risk of aspiration/pneumonia;risk of malnutrition;risk of dehydration  -        Rehab Potential/Prognosis, Swallowing re-evaluate goals as necessary  -        Swallow Criteria for Skilled Therapeutic Interventions Met demonstrates skilled criteria  -           SLP Treatment Clinical Impressions    Treatment Assessment Comments (SLP) Tolerated trials of pureed and HT liquid with poor oral closure and oral transit, however, no s/s of aspiration, Followed simple commands but had difficulty with dysphagia exercises. Continue to follow to  address dysphagia and cognitive communication  -           Recommendations    Therapy Frequency (Swallow) 5 days per week  -        SLP Diet Recommendation puree;honey thick  liquids;no mixed consistencies  -        Recommended Precautions and Strategies upright posture during/after eating;small bites of food and sips of liquid;no straw;alternate between small bites of food and sips of liquid;general aspiration precautions;1:1 supervision  -        Oral Care Recommendations Oral Care BID/PRN;Suction toothbrush  -        SLP Rec. for Method of Medication Administration meds crushed;with puree  -        Monitor for Signs of Aspiration notify SLP if any concerns  -                  User Key  (r) = Recorded By, (t) = Taken By, (c) = Cosigned By      Initials Name Effective Dates     Vi Diop, MS CCC-SLP 06/16/21 -                     EDUCATION  The patient has been educated in the following areas:   Home Exercise Program (HEP) Dysphagia (Swallowing Impairment) Oral Care/Hydration Modified Diet Instruction.        SLP GOALS       Row Name 10/10/23 1430             (LTG) Patient will demonstrate functional swallow for    Diet Texture (Demonstrate functional swallow) soft to chew (chopped) textures  -      Liquid viscosity (Demonstrate functional swallow) nectar/ mildly thick liquids  -      [place] (Demonstrate functional swallow) with minimal cues (75-90% accuracy)  -      Time Frame (Demonstrate functional swallow) by discharge  -      Barriers (Demonstrate functional swallow) Lethargy  -      Progress/Outcomes (Demonstrate functional swallow) continuing progress toward goal  -         (STG) Patient will tolerate trials of    Consistencies Trialed (Tolerate trials) pureed textures;honey/ moderately thick liquids  -      Desired Outcome (Tolerate trials) without signs/symptoms of aspiration;without signs of distress;with adequate oral prep/transit/clearance;with use of compensatory strategies (see comments)  -      [place] (Tolerate trials) with 1:1 assist/ supervision  -      Time Frame (Tolerate trials) by discharge  -       Progress/Outcomes (Tolerate trials) continuing progress toward goal  -CH      Comment (Tolerate trials) tolerated puree and honey liquids with no s/sx aspiration. continues with significant oral deficits  -CH         (STG) Patient will tolerate therapeutic trials of    Consistencies Trialed (Tolerate therapeutic trials) thin liquids  -CH      Desired Outcome (Tolerate therapeutic trials) without signs/symptoms of aspiration;without signs of distress  -CH      [place] (Tolerate therapeutic trials) with minimal cues (75-90% accuracy)  -CH      Time Frame (Tolerate therapeutic trials) by discharge  -CH      Progress/Outcomes (Tolerate therapeutic trials) continuing progress toward goal  -CH      Comment (Tolerate therapeutic trials) overt cough resposne  -CH         (STG) Labial Strengthening Goal 1 (SLP)    Activity (Labial Strengthening Goal 1, SLP) increase labial tone  -CH      Increase Labial Tone labial resistance exercises;swallow trials  -CH      [place]/Accuracy (Labial Strengthening Goal 1, SLP) with moderate cues (50-74% accuracy)  -CH      Time Frame (Labial Strengthening Goal 1, SLP) short term goal (STG)  -CH      Progress/Outcomes (Labial Strengthening Goal 1, SLP) continuing progress toward goal  -CH      Comment (Labial Strengthening Goal 1, SLP) swallow trials w anterior loss with HT liquids.  -CH         (STG) Lingual Strengthening Goal 1 (SLP)    Activity (Lingual Strengthening Goal 1, SLP) increase lingual tone/sensation/control/coordination/movement;increase tongue back strength  -CH      Increase Lingual Tone/Sensation/Control/Coordination/Movement swallow trials;lingual resistance exercises  -CH      Increase Tongue Back Strength lingual resistance exercises  -CH      [place]/Accuracy (Lingual Strengthening Goal 1, SLP) with moderate cues (50-74% accuracy)  -CH      Time Frame (Lingual Strengthening Goal 1, SLP) short term goal (STG)  -CH      Progress/Outcomes (Lingual  Strengthening Goal 1, SLP) continuing progress toward goal  -CH      Comment (Lingual Strengthening Goal 1, SLP) completed lingual resistence with mod cues and models x 5 each  -CH         (STG) Pharyngeal Strengthening Exercise Goal 1 (SLP)    Activity (Pharyngeal Strengthening Goal 1, SLP) increase superior movement of the hyolaryngeal complex;increase anterior movement of the hyolaryngeal complex;increase closure at entrance to airway/closure of airway at glottis;increase squeeze/positive pressure generation;increase tongue base retraction;increase timing  -CH      Increase Timing prepping - 3 second prep or suck swallow or 3-step swallow  -CH      Increase Superior Movement of the Hyolaryngeal Complex effortful pitch glide (falsetto + pharyngeal squeeze)  -CH      Increase Anterior Movement of the Hyolaryngeal Complex chin tuck against resistance (CTAR)  -CH      Increase Closure at Entrance to Airway/Closure of Airway at Glottis supraglottic swallow  -CH      Increase Squeeze/Positive Pressure Generation hard effortful swallow  -CH      Increase Tongue Base Retraction tamie  -CH      [place]/Accuracy (Pharyngeal Strengthening Goal 1, SLP) with moderate cues (50-74% accuracy)  -CH      Time Frame (Pharyngeal Strengthening Goal 1, SLP) short term goal (STG)  -CH      Progress/Outcomes (Pharyngeal Strengthening Goal 1, SLP) continuing progress toward goal  -CH      Comment (Pharyngeal Strengthening Goal 1, SLP) completed effortful swallow and 123 prep with mod cues  -CH         Patient will demonstrate functional communication skills for return to discharge environment     [place] with moderate cues  -CH      Time frame by discharge  -CH      Progress/Outcomes continuing progress toward goal  -CH         Comprehend Questions Goal 1 (SLP)    Improve Ability to Comprehend Questions Goal 1 (SLP) complex yes/no questions;80%;with minimal cues (75-90%)  -CH      Time Frame (Comprehend Questions Goal 1, SLP)  short term goal (STG)  -CH      Progress (Ability to Comprehend Questions Goal 1, SLP) 50%;with moderate cues (50-74%)  -CH      Progress/Outcomes (Comprehend Questions Goal 1, SLP) continuing progress toward goal  -CH         Follow Directions Goal 2 (SLP)    Improve Ability to Follow Directions Goal 1 (SLP) 2 step commands;80%;with minimal cues (75-90%)  -CH      Time Frame (Follow Directions Goal 1, SLP) short term goal (STG)  -CH      Progress (Ability to Follow Directions Goal 1, SLP) 50%;with moderate cues (50-74%)  -CH      Progress/Outcomes (Follow Directions Goal 1, SLP) continuing progress toward goal  -CH         Word Retrieval Skills Goal 1 (SLP)    Improve Word Retrieval Skills By Goal 1 (SLP) confrontational naming task;high frequency;responsive naming task;simple;80%;with minimal cues (75-90%)  -CH      Time Frame (Word Retrieval Goal 1, SLP) short term goal (STG)  -CH      Progress (Word Retrieval Skills Goal 1, SLP) 70%;100%;with minimal cues (75-90%)  -CH      Progress/Outcomes (Word Retrieval Goal 1, SLP) continuing progress toward goal  -CH      Comment (Word Retrieval Goal 1, SLP) responsive 70%, automatic 100%  -CH         Articulation Goal 1 (SLP)    Improve Articulation Goal 1 (SLP) by over-articulating at phrase level;80%;with moderate cues (50-74%)  -CH      Time Frame (Articulation Goal 1, SLP) short term goal (STG)  -CH      Progress (Articulation Goal 1, SLP) 10%;with moderate cues (50-74%)  -CH      Progress/Outcomes (Articulation Goal 1, SLP) goal ongoing  -CH         Orientation Goal 1 (SLP)    Improve Orientation Through Goal 1 (SLP) demonstrating orientation to day;demonstrating orientation to month;demonstrating orientation to year;demonstrating orientation to place;demonstrating orientation to disease/impairment;use environmental aids to assist with orientation;80%;with moderate cues (50-74%)  -CH      Time Frame (Orientation Goal 1, SLP) short term goal (STG)  -CH      Progress  (Orientation Goal 1, SLP) 20%;with moderate cues (50-74%)  -CH      Progress/Outcomes (Orientation Goal 1, SLP) continuing progress toward goal  -CH      Comment (Orientation Goal 1, SLP) oriented to self, grossly to place  -CH                User Key  (r) = Recorded By, (t) = Taken By, (c) = Cosigned By      Initials Name Provider Type    Vi Mcdermott MS CCC-SLP Speech and Language Pathologist                       Time Calculation:    Time Calculation- SLP       Row Name 10/10/23 1624             Time Calculation- SLP    SLP Start Time 1430  -CH      SLP Received On 10/10/23  -         Untimed Charges    64412-NK Treatment/ST Modification Prosth Aug Alter  30  -CH      [id number] Treatment Swallow Minutes 40  -CH         Total Minutes    Untimed Charges Total Minutes 70  -CH       Total Minutes 70  -CH                User Key  (r) = Recorded By, (t) = Taken By, (c) = Cosigned By      Initials Name Provider Type     Vi Diop MS CCC-SLP Speech and Language Pathologist                    Therapy Charges for Today       Code Description Service Date Service Provider Modifiers Qty    05540379808 HC ST TREATMENT SWALLOW 3 10/10/2023 Vi Diop MS CCC-SLP GN 1    [id number] HC ST TREATMENT SPEECH 2 10/10/2023 Vi Diop MS CCC-SLP GN 1                 Vi Diop MS CCC-SLP  10/10/2023

## 2023-10-10 NOTE — PROGRESS NOTES
Clinical Nutrition   Nutrition Support Assessment  Reason for Visit: Follow-up protocol, EN/PO    Patient Name: Kenneth Wen  YOB: 1950  MRN: [id number]  Date of Encounter: 10/09/23 22:04 EDT  Admission date: 9/15/2023    Comments:   PO intake cont inadequate. Spouse wishing to keep nocturnal EN to allow for effort to feed. Appeared somewhat better today compared to yesterday but no documentation.   Given pt avg 12% est Kcal 15% est PRO need from diet last 3 days, will increase hrs of EN delivery to 15 hr:   Beginning 10/10 EN: Isosource 1.5 70 ml/hr 5P-8A Water at 30 ml/hr   1 Prosource/day to supply:   1050 ml for 1635 Kcal 86% est need, 86 g PRO 91% est need 16 g Fiber, 798 ml Water in EN 1248 total ml Free Water.    (Note that per documentation delivery of nocturnal feeding appears to be at 55 ml/hr not 70 at this time)     Nutrition Assessment   Admission Diagnosis:  ICH (intracerebral hemorrhage) [I61.9]      Problem List:    Hemorrhagic CVA    Dyslipidemia    Multiple bilateral CVAs    HFpEF    T2DM (type 2 diabetes mellitus)    HTN (hypertension)    GERD (gastroesophageal reflux disease)    ICH (intracerebral hemorrhage)    Oropharyngeal dysphagia        PMH:  He  has a past medical history of Acid reflux, Cancer, Diabetes mellitus, GERD (gastroesophageal reflux disease) (09/15/2023), HTN (hypertension) (09/15/2023), Kidney disease, and T2DM (type 2 diabetes mellitus) (09/15/2023).    PSH: He  has a past surgical history that includes Appendectomy; Nasal polyp surgery; Hemorrhoid surgery; and Esophagogastroduodenoscopy w/ PEG (N/A, 9/29/2023).      Applicable Nutrition Concerns:   Skin:  Oral:  GI:      Applicable Interval History:   9/16 3% Hypertonic saline initiated  9/15 FEES:  SLP Swallowing Diagnosis: mod-severe, oral dysphagia, severe, pharyngeal dysphagia (09/15/23 1331)  SLP Diet Recommendation: NPO, temporary alternate methods of nutrition/hydration, other (see  comments) (okay for 2-3 ice chips per hour as tolerated)   9/23-SLP Diet Recommendation: puree, honey thick liquids.  9/29- PEG tube placed. Consult for tube feeding assessment.     Reported/Observed/Food/Nutrition Related History:       10/9  Spouse wanting to cont nocturnal feeding to be able to try p.o feeding. Seemed improved from yesterday. Will change hrs of delivery to 16.    10/8  Pt with meal refusal x2 on 10/7. With refusal, nocturnal feeds inappropriate to meet estimated needs. Will continue calorie count through today and re-evaluate Monday if need to return to continuous feeds. Palliative continues following for GOC.      10/7  Pt with noted improved PO yesterday however remains inadequate to d/c nocturnal feeds at this time. Will continue calorie count for 2 more days.      10/6  Pt continues with inconsistent intake. Continues to require restraints to prevent pulling PEG regardless of abdominal binder. Spouse present at all meals to assist. Observed increased acceptance of lunch however was not alert enough for breakfast.     10/4  Pt with improved PO at lunch today however per spouse did not eat at breakfast. Suspect due to working with therapies at breakfast time. Tolerated nocturnal regimen. MBS completed - SLP recs puree, honey thick liquids, no mixed consistencies.      10/3  Tolerating feeds at goal. Pt with ~25% at meals being fed by spouse - ? If able to improve PO intake with transition to nocturnal feeds. Discussed with spouse, agreeable to try.     10/1   EN ordered yesterday, started via PEG tube.  @ goal rate this morning and being tolerated. Patient also with order for a diet.  Able to reach patient wife over the phone to get a better sense of patient intake. Patient wife reports she was present for dinner meal yesterday and patient only ate bites of applesauce.  Overall not much intake. Reports patient has been a little sleepy past couple of days and no showing that much interest in  "eating.  We discussed current EN Rx.  We felt it would be best to keep EN continuous vs nocturnal at this time.  Can switch to nocturnal if the events intake improves.     9/30  Consult for tube feeding assessment.  Overall issues with sodium levels, fluid intake and confusion. Was getting IVF, however pulled out IV.  Wife asked for a PEG tube placement on 9/28.      Patient in COVID isolation, RD not able to visit in person.  No diet this morning (was made NPO for PEG tube placement). Discussed with RN re-order previous order. RD will start tube feeding.       9/25  Spoke w/RN who reports pt pulled out NGT Friday evening. RN states pt eating >/=75% of trays. Textures downgraded 9/23 to pureed.     9/22  Pt on diet though ate only sausage and some juice this am. RN states pt pocketing food still, had been happening in ICU per previous RD note. Discussed w/SLP.     9/18 RN reports pt on 3% saline therapy Na+ goal 145-155, Na+ w/I range, pt from OSH sent here d/t hemorrhagic conversion continues to have R weakness, confusion, tolerating TF. Uncertain if pt should have water flushes, these were dc'd after discussion w/ MD.     9/15  Per RN unclear if will start EN or if pt may progress to caden diet at this time.  No wt hx available today.     Anthropometrics     Admission Height 175.3 cm (69\") Documented at 09/15/2023 0122   Admission Weight 79.2 kg (174 lb 9.7 oz) Documented at 09/15/2023 0122     Height: Height: 175.3 cm (69\")  Last Filed Weight: Weight: 72.8 kg (160 lb 7.9 oz) (09/21/23 0500)  Method: Weight Method: Bed scale  BMI: BMI (Calculated): 23.7  BMI classification: Overweight: 25.0-29.9kg/m2      9/15/2023 9/17/2023   Weight     Weight (kg) 79.2 kg  82.5 kg    Weight (lbs) 174 lb 9.7 oz  181 lb 14.1 oz    Weight Method Bed scale  Bed scale        UBW: Per  lbs on 9/14/23  Note 172 lbs in 2018  Weight change: uncertain at this time    Labs    Labs Reviewed: Yes     Results from last 7 days   Lab Units " "10/09/23  0454 10/05/23  0659 10/03/23  1101   GLUCOSE mg/dL 145* 146* 153*   BUN mg/dL 21 16 13   CREATININE mg/dL 0.65* 0.51* 0.64*   SODIUM mmol/L 139 138 139   CHLORIDE mmol/L 105 105 105   POTASSIUM mmol/L 4.0 4.6 4.1   PHOSPHORUS mg/dL 3.4  --   --    MAGNESIUM mg/dL 2.0 1.9  --    ALT (SGPT) U/L 46*  --  38       Results from last 7 days   Lab Units 10/09/23  0454 10/03/23  1101   ALBUMIN g/dL 3.6 3.3*   PREALBUMIN mg/dL 23.0  --    CRP mg/dL <0.30  --    CHOLESTEROL mg/dL 86  --    TRIGLYCERIDES mg/dL 67  --        Results from last 7 days   Lab Units 10/09/23  1731 10/09/23  1220 10/09/23  0546 10/08/23  2340 10/08/23  1826 10/08/23  1253   GLUCOSE mg/dL 96 123 131* 134* 125 112     Lab Results   Lab Value Date/Time    HGBA1C 6.40 (H) 09/15/2023 0212               Medications    Medications Reviewed: Yes  Pertinent: insulin, remeron protonix   Infusion:   PRN:     Needs Assessment   Date: 9/30    Height used:Height: 175.3 cm (69\")  Weights used: 160lb/72.7kg      Estimated Calorie needs: ~ 1900 Kcal/day  Method:  Kcals/KG 25= 1818  Method:  MSJ 1479 x 1.3= 1923    Estimated Protein needs: ~ 95g PRO/day  Method: 1.3g/Kg= 95    Estimated Fluid needs: ~ ml/day   Per clinical status    Current Nutrition Prescription      PO: Diet: Regular/House Diet; No Straw, Feeding Assistance - Nursing, No Mixed Consistencies; Texture: Pureed (NDD 1); Fluid Consistency: Honey Thick   Oral Nutrition Supplement: Magic cup 2x daily   Intake: No data for 10/9  Calorie Count Results 10/6-8:       Day    # of  Meals    Calories     % Est      Need        Protein     % Est     Need     Day 1     3 498 Kcal 26 % 32.5 g 34 %   Day 2     3 70 Kcal 4 % 3.5 g 4 %   Day 3     3 138  Kcal 7  % 6  g 6  %    Avg   235   Kcal 12   % 14   g 15   %     EN: IsoSource 1.5  Goal Rate: 70 ml/hr                  Water Flushes: 30ml /hr   Modular: Prosource -no carb 2/day  Route: PEG  Tube: Unknown     At goal over: 12 Hrs/day     Rx will supply: "   Goal Volume 840 mL/day       Flush Volume 360 mL/day       Energy 1380 Kcal/day 73 % Est Need   Protein 87 g/day 92 % Est Need   Fiber 13 g/day       Water in   mL       Total Water 998 mL       Meet DRI No            --------------------------------------------------------------------------  Product/Rate verified at bedside: No  Infusing Rate at time of visit: off at time of visit     Average Delivery from [place] Days: w Prosource per documentation  Volume 691 mL/day 82  % Goal Vol.   Flush Volume 358 mL/day     Energy 1157 Kcal/day 61 % Est Need   Protein 77 g/day 81 % Est Need   Fiber 10.5 g/day     Water in   mL     Total Water 883 mL     Meet DRI No        Intake/Ouptut 24 hrs (0701 - 07)   I&O's Reviewed: Yes     Intake & Output (last day)         10/09 0701  10/10 0700    P.O.     Other     NG/GT     Total Intake(mL/kg)     Urine (mL/kg/hr)     Total Output     Net          Urine Unmeasured Occurrence 2 x        Last stool x 1 on 10/8    Nutrition Diagnosis   Date: 9/15 Updated: , , 10/9  Problem Inadequate oral intake    Etiology stroke   Signs/Symptoms NPO w ice chips per SLP, confusion, +EN   Status: on diet w nocturnal EN - inadequate PO thus to increase hrs of EN     Date:   Updated:  Problem Swallowing difficulty/chewing difficulty   Etiology Bilateral strokes   Signs/Symptoms Oral-pharyngeal dysphagia per SLP FEES eval   Status: ongoing    Goal:   General: Nutrition support treatment  PO: Increase intake, Advace diet as medically feasible/appropriate  EN/PN: Adjust EN, Deliver estimated needs      Nutrition Intervention      Follow treatment progress, Care plan reviewed, Nutrition support order placed    Beginning 10/10 EN: Isosource 1.5 70 ml/hr 5P-8A Water at 30 ml/hr   1 Prosource/day to supply:   1050 ml for 1635 Kcal 86% est need, 86 g PRO 91% est need 16 g Fiber, 798 ml Water in EN 1248 total ml Free Water.      onitoring/Evaluation:   Per protocol, I&O, PO  intake, Supplement intake, Pertinent labs, EN delivery/tolerance, Weight, Symptoms, Swallow function      Bernarda Adhikari RD  Time Spent: 45 min

## 2023-10-10 NOTE — PLAN OF CARE
Goal Outcome Evaluation:  Plan of Care Reviewed With: family        Progress: no change  Outcome Evaluation: VSS on RA. Right soft wrist restraint removed- tolerating well. Left soft wrist restraint continued. Tube feeding continuing @ 70ml/hr with 30ml flush every 1 hr. ABD binder in place. Incontinence care performed as needed. Family memeber at bedsider. No concerns noted at this time. POC ongoing.  Problem: Adult Inpatient Plan of Care  Goal: Plan of Care Review  Outcome: Ongoing, Progressing  Flowsheets (Taken 10/10/2023 0500)  Progress: no change  Plan of Care Reviewed With: family  Outcome Evaluation: VSS on RA. Right soft wrist restraint removed- tolerating well. Left soft wrist restraint continued. Tube feeding continuing @ 70ml/hr with 30ml flush every 1 hr. ABD binder in place. Incontinence care performed as needed. Family memeber at bedsider. No concerns noted at this time. POC ongoing.

## 2023-10-10 NOTE — PROGRESS NOTES
[facility] Medicine Services  PROGRESS NOTE    Patient Name: Kenneth Wen  : 1950  MRN: [id number]    Date of Admission: 9/15/2023  Primary Care Physician: Susan Powers APRN    Subjective   Subjective     CC:  F/u   CVA     HPI:  Patient resting in bed. No issues overnight, per family member at bedside he slept very well thought the night.      Objective   Objective     Vital Signs:   Temp:  [97.4 °F (36.3 °C)-99.5 °F (37.5 °C)] 97.9 °F (36.6 °C)  Heart Rate:  [59-86] 75  Resp:  [16-18] 16  BP: (117-161)/() 150/102     Physical Exam:  Constitutional: No acute distress, awake, asleep, awakens to name  HENT: NCAT, mucous membranes moist  Respiratory: Clear to auscultation bilaterally, respiratory effort normal   Cardiovascular: RRR, no murmurs, rubs, or gallops  Gastrointestinal: soft, PEG in place  Musculoskeletal: No bilateral ankle edema  Neurologic: moves all extremities, RUE out of restraints, LUE remains in restraint. speech is clear, awakens to name  Skin: No rashes        Results Reviewed:  LAB RESULTS:      Lab 10/09/23  0454 10/05/23  0659   WBC 5.52 7.24   HEMOGLOBIN 12.1* 12.2*   HEMATOCRIT 36.4* 36.4*   PLATELETS 266 300   NEUTROS ABS 2.79  --    IMMATURE GRANS (ABS) 0.01  --    LYMPHS ABS 1.56  --    MONOS ABS 0.74  --    EOS ABS 0.32  --    MCV 93.3 92.6   CRP <0.30  --          Lab 10/09/23  0454 10/05/23  0659 10/03/23  1101   SODIUM 139 138 139   POTASSIUM 4.0 4.6 4.1   CHLORIDE 105 105 105   CO2 26.0 25.0 27.0   ANION GAP 8.0 8.0 7.0   BUN 21 16 13   CREATININE 0.65* 0.51* 0.64*   EGFR 100.1 107.7 101.2   GLUCOSE 145* 146* 153*   CALCIUM 8.9 8.5* 8.4*   MAGNESIUM 2.0 1.9  --    PHOSPHORUS 3.4  --   --          Lab 10/09/23  0454 10/03/23  1101   TOTAL PROTEIN 6.2 5.3*   ALBUMIN 3.6 3.3*   GLOBULIN 2.6 2.0   ALT (SGPT) 46* 38   AST (SGOT) 33 31   BILIRUBIN 0.3 0.4   ALK PHOS 83 75             Lab 10/09/23  0454   CHOLESTEROL 86   TRIGLYCERIDES 67              Brief Urine Lab Results  (Last result in the past 365 days)        Color   Clarity   Blood   Leuk Est   Nitrite   Protein   CREAT   Urine HCG        09/15/23 1818 Yellow   Cloudy   Trace   Negative   Negative   Negative                   Microbiology Results Abnormal       Procedure Component Value - Date/Time    Blood Culture - Blood, Arm, Right [[id number]]  (Normal) Collected: 09/15/23 0212    Lab Status: Final result Specimen: Blood from Arm, Right Updated: 09/20/23 0230     Blood Culture No growth at 5 days    Blood Culture - Blood, Arm, Left [[id number]]  (Normal) Collected: 09/15/23 0212    Lab Status: Final result Specimen: Blood from Arm, Left Updated: 09/20/23 0230     Blood Culture No growth at 5 days            No radiology results from the last 24 hrs        Current medications:  Scheduled Meds:acetaminophen, 500 mg, Per PEG Tube, Q6H  atorvastatin, 80 mg, Per PEG Tube, Nightly  clopidogrel, 75 mg, Per PEG Tube, Daily  Diclofenac Sodium, 2 g, Topical, BID  donepezil, 5 mg, Oral, Nightly  insulin regular, 2-7 Units, Subcutaneous, Q6H  Lidocaine, 1 patch, Transdermal, Q24H  melatonin, 5 mg, Oral, Nightly  metoprolol tartrate, 25 mg, Per PEG Tube, Q12H  miconazole, 1 application , Topical, Q12H  mirtazapine, 15 mg, Oral, Nightly  palliative care oral rinse, , Mouth/Throat, 4x Daily  pantoprazole, 40 mg, Intravenous, Q AM  ProSource No Carb, 30 mL, Per G Tube, Daily  QUEtiapine, 12.5 mg, Per PEG Tube, Daily  QUEtiapine, 75 mg, Per PEG Tube, Nightly  sodium chloride, 10 mL, Intravenous, Q12H  sodium chloride, 10 mL, Intravenous, Q12H      Continuous Infusions:sodium chloride, 50 mL/hr, Last Rate: 50 mL/hr (10/09/23 1730)      PRN Meds:.  Calcium Replacement - Follow Nurse / BPA Driven Protocol    dextrose    dextrose    glucagon (human recombinant)    ibuprofen    LORazepam    Magnesium Standard Dose Replacement - Follow Nurse / BPA Driven Protocol    ondansetron    Phosphorus Replacement - Follow Nurse  / BPA Driven Protocol    Potassium Replacement - Follow Nurse / BPA Driven Protocol    QUEtiapine    sodium chloride    sodium chloride    Assessment & Plan   Assessment & Plan     Active Hospital Problems    Diagnosis  POA    **Hemorrhagic CVA [I61.9]  Yes    Oropharyngeal dysphagia [R13.12]  Yes    Multiple bilateral CVAs [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        Brief Hospital Course to date:  Kenneth Wen is a 73 y.o. male  with hx of HTN, HLD, T2DM, and GERD who presented to OSH with multiple acute ischemic infarcts of the bilateral cerebral and cerebellar hemispheres as well as left isaac. TTE/JOSIAS was negative PFO at OSH. On 9/13/23 he had worsening in mental status and new inability to move RLE, head CT showed evolution of the existing CVA with new development of petechial hemorrhage. DAPT was held for 24 hours per Neurology recommendations and repeat CT head that evening showed worsening effacement of the right quadrigeminal cistern with suspected increasing upward supratentorial pressure. He was then transferred to [facility] for Neurosurgery evaluation on 9/15/23. He was started on hypertonic saline 9/15/23 through 9/20/23 for cerebral edema. He had fevers with negative cultures but had worsening secretions and labored breathing so was started on Zosyn on 9/16/23. Transferred to the hospitalist service on 9/22/23. Pt demonstrated poor oral intake with elevated sodium levels; therefore, PEG tube was recommended.         Multiple bilateral posterior circulation strokes with hemorrhagic conversion  --Neurology has followed, suspect atheroembolic but cannot rule out cardioembolic given multiple vascular territories  --Plavix monotherapy,  high intensity statin  --Needs Holter monitor at discharge  --Follow up in stroke clinic in 8 weeks    Agitation  Encephalopathy  --Seroquel 12.5mg QAM, Seroquel 75 mg QPM  -- PRN ativan for agitation  --appreciate general neurology input  --restoril/melatonin nightly, aricept 5mg nightly increase to 10mg as tolerated     Dysphagia  Hypernatremia - resolved  --SLP recommends mechanical ground with honey thick liquids   --Risk of pulling out peg tube, abd binder to be in place at all times. --Surgery recommends to remain in restraints at all times or if off has to have sitter for next 2 weeks   -- dietary following for calorie count, adequate PO intake is limited by his mental status     HTN  --Continue Metoprolol 25 mg BID     GERD  --Continue Protonix     COVID-19-resolved  --Overall asymptomatic and on room air  --No infiltrates seen and low procal  --Did not receive any treatment   --Off Isolation 9/30/2023      Leukocytosis-resolved  --Procalcitionin low at 0.05  --CXR and UA negative  --Blood cultures negative     Elevated LFT's, mild  --Possibly secondary to statin?  --Negative acute hepatitis panel  --repeat AST/ALT improved on 10/3    D/w Palliative medicine APRN today, they plan to sign off as goal is for patient to be placed, happy to reconsult if needed    Expected Discharge Location and Transportation: rehab   Expected Discharge   Expected Discharge Date: 10/7/2023; Expected Discharge Time:      DVT prophylaxis:  Mechanical DVT prophylaxis orders are present.     AM-PAC 6 Clicks Score (PT): 6 (10/09/23 2000)    CODE STATUS:   Code Status and Medical Interventions:   Ordered at: 09/29/23 0854     Medical Intervention Limits:    NO intubation (DNI)     Code Status (Patient has no pulse and is not breathing):    No CPR (Do Not Attempt to Resuscitate)     Medical Interventions (Patient has pulse or is breathing):    Limited Support       Tasha Parra DO  10/10/23

## 2023-10-10 NOTE — THERAPY TREATMENT NOTE
Patient Name: Kenneth Wen  : 1950    MRN: [id number]                              Today's Date: 10/10/2023       Admit Date: 9/15/2023    Visit Dx:     ICD-10-CM ICD-9-CM   1. Hemorrhagic CVA  I61.9 431   2. Aphasia  R47.01 784.3   3. Dysarthria  R47.1 784.51   4. Oropharyngeal dysphagia  R13.12 787.22     Patient Active Problem List   Diagnosis    Precordial pain    Elevated BP without diagnosis of hypertension    Dyslipidemia    Hyperglycemia    Multiple bilateral CVAs    Hemorrhagic CVA    HFpEF    T2DM (type 2 diabetes mellitus)    HTN (hypertension)    GERD (gastroesophageal reflux disease)    ICH (intracerebral hemorrhage)    Oropharyngeal dysphagia     Past Medical History:   Diagnosis Date    Acid reflux     Cancer     Skin    Diabetes mellitus     Prediabetes    GERD (gastroesophageal reflux disease) 09/15/2023    HTN (hypertension) 09/15/2023    Kidney disease     T2DM (type 2 diabetes mellitus) 09/15/2023     Past Surgical History:   Procedure Laterality Date    APPENDECTOMY      ENDOSCOPY W/ PEG TUBE PLACEMENT N/A 2023    Procedure: ESOPHAGOGASTRODUODENOSCOPY WITH PERCUTANEOUS ENDOSCOPIC GASTROSTOMY TUBE INSERTION;  Surgeon: Haseeb Carrillo MD;  Location: [facility] ENDOSCOPY;  Service: General;  Laterality: N/A;    HEMORRHOIDECTOMY      NASAL POLYP SURGERY        General Information       Row Name 10/10/23 145          Physical Therapy Time and Intention    Document Type therapy note (daily note)  -CM     Mode of Treatment physical therapy;individual therapy  -CM       Row Name 10/10/23 1450          General Information    Patient Profile Reviewed yes  -CM     Existing Precautions/Restrictions fall;other (see comments)  PEG with abdominal binder, R sided weakness/coordination deficits  -CM     Barriers to Rehab medically complex;previous functional deficit;cognitive status  -CM       Row Name 10/10/23 1457          Cognition    Orientation Status (Cognition) oriented to;person  -CM        Row Name 10/10/23 1457          Safety Issues, Functional Mobility    Safety Issues Affecting Function (Mobility) ability to follow commands;awareness of need for assistance;insight into deficits/self-awareness;safety precaution awareness;safety precautions follow-through/compliance;sequencing abilities  -CM     Impairments Affecting Function (Mobility) balance;cognition;coordination;endurance/activity tolerance;grasp;motor control;motor planning;muscle tone abnormal;visual/perceptual;sensation/sensory awareness;postural/trunk control;strength;range of motion (ROM)  -CM               User Key  (r) = Recorded By, (t) = Taken By, (c) = Cosigned By      Initials Name Provider Type    Yanira Anderson PT Physical Therapist                   Mobility       Row Name 10/10/23 1508          Bed Mobility    Bed Mobility rolling left;rolling right;supine-sit;sit-supine  -CM     Rolling Left [place] (Bed Mobility) maximum assist (25% patient effort);1 person assist;verbal cues  -CM     Rolling Right [place] (Bed Mobility) maximum assist (25% patient effort);1 person assist;verbal cues  -CM     Supine-Sit [place] (Bed Mobility) maximum assist (25% patient effort);2 person assist;verbal cues;nonverbal cues (demo/gesture)  -CM     Sit-Supine [place] (Bed Mobility) dependent (less than 25% patient effort);1 person assist;verbal cues;nonverbal cues (demo/gesture)  -CM     Assistive Device (Bed Mobility) bed rails;draw sheet;head of bed elevated  -CM     Comment, (Bed Mobility) VCs provided for sequencing, patient able to assist some in reaching with LUE. Rolling L/R performed x2 for brief donning/doffing. Strong R lean upon sitting EOB, patient corrects with cues and quickly reverts back to R lean  -CM       Row Name 10/10/23 1501          Sit-Stand Transfer    Sit-Stand [place] (Transfers) 2 person assist;verbal cues;moderate assist (50% patient effort)  -CM     Assistive Device (Sit-Stand  Transfers) other (see comments)  BUE support  -CM     Comment, (Sit-Stand Transfer) performed from bed with BUE support, cues for upright posture, patient performed weightshifting L/R x3 and then standing marching x2 reps each leg. Assist required for weightshifting with all activity, no knee buckling noted. Further activity limited by significant R lean with fatigue.  -CM               User Key  (r) = Recorded By, (t) = Taken By, (c) = Cosigned By      Initials Name Provider Type    Yanira Anderson, PT Physical Therapist                   Obj/Interventions       Row Name 10/10/23 150          Motor Skills    Therapeutic Exercise hip;knee;ankle  -CM       Row Name 10/10/23 1504          Hip (Therapeutic Exercise)    Hip (Therapeutic Exercise) AROM (active range of motion);PROM (passive range of motion)  -CM     Hip AROM (Therapeutic Exercise) left;aBduction;aDduction;10 repetitions  -CM     Hip PROM (Therapeutic Exercise) right;aBduction;aDduction;10 repetitions  -CM       Row Name 10/10/23 1500          Knee (Therapeutic Exercise)    Knee (Therapeutic Exercise) isometric exercises;PROM (passive range of motion)  -CM     Knee PROM (Therapeutic Exercise) right;flexion;extension;10 repetitions  -CM     Knee Isometrics (Therapeutic Exercise) left;quad sets;10 repetitions;3 second hold  -CM       Row Name 10/10/23 1504          Ankle (Therapeutic Exercise)    Ankle (Therapeutic Exercise) AROM (active range of motion);PROM (passive range of motion)  -CM     Ankle AROM (Therapeutic Exercise) left;dorsiflexion;plantarflexion;10 repetitions  -CM     Ankle PROM (Therapeutic Exercise) right;dorsiflexion;plantarflexion;10 repetitions  -CM       Row Name 10/10/23 1502          Balance    Balance Assessment sitting static balance;standing static balance;standing dynamic balance  -CM     Static Sitting Balance moderate assist;other (see comments)  does progress to [place] and then back to mod  -CM     Position, Sitting  Balance unsupported;sitting edge of bed  -CM     Static Standing Balance moderate assist;2-person assist  -CM     Dynamic Standing Balance maximum assist;2-person assist  -CM     Position/Device Used, Standing Balance supported;other (see comments)  BUE support  -CM               User Key  (r) = Recorded By, (t) = Taken By, (c) = Cosigned By      Initials Name Provider Type    Yanira Anderson PT Physical Therapist                   Goals/Plan    No documentation.                  Clinical Impression       Row Name 10/10/23 1513          Pain    Pain Intervention(s) Ambulation/increased activity;Repositioned  -CM     Additional Documentation Pain Scale: FACES Pre/Post-Treatment (Group)  -CM       Row Name 10/10/23 1513          Pain Scale: FACES Pre/Post-Treatment    Pain: FACES Scale, Pretreatment 2-->hurts little bit  -CM     Posttreatment Pain Rating 2-->hurts little bit  -CM     Pain Location - Side/Orientation Right  -CM     Pain Location generalized  -CM     Pain Location - shoulder  -CM       Row Name 10/10/23 1513          Plan of Care Review    Plan of Care Reviewed With patient;spouse  -CM     Progress no change  -CM     Outcome Evaluation Patient continues to be limited by deficits in cognition, R sided weakness, endurance, and balance. He tolerated standing transfers modAx2 and dynamic standing with maxAx2. IPPT remains indicated to address current deficits. Continue to recommend D/C to IPR.  -CM       Row Name 10/10/23 1514          Vital Signs    Pre Systolic BP Rehab 148  -CM     Pre Treatment Diastolic BP 95  -CM     Post Systolic BP Rehab 154  -CM     Post Treatment Diastolic BP 94  -CM     O2 Delivery Pre Treatment room air  -CM     O2 Delivery Intra Treatment room air  -CM     O2 Delivery Post Treatment room air  -CM     Pre Patient Position Supine  -CM     Intra Patient Position Standing  -CM     Post Patient Position Supine  -CM       Row Name 10/10/23 1518          Positioning and  Restraints    Pre-Treatment Position in bed  -CM     Post Treatment Position bed  -CM     In Bed fowlers;call light within reach;encouraged to call for assist;exit alarm on;with family/caregiver;notified nsg  -CM     Restraints released:;reapplied:;notified nsg:;soft limb  -CM               User Key  (r) = Recorded By, (t) = Taken By, (c) = Cosigned By      Initials Name Provider Type    Yanira Anderson PT Physical Therapist                   Outcome Measures       Row Name 10/10/23 1516          How much help from another person do you currently need...    Turning from your back to your side while in flat bed without using bedrails? 2  -CM     Moving from lying on back to sitting on the side of a flat bed without bedrails? 2  -CM     Moving to and from a bed to a chair (including a wheelchair)? 2  -CM     Standing up from a chair using your arms (e.g., wheelchair, bedside chair)? 2  -CM     Climbing 3-5 steps with a railing? 1  -CM     To walk in hospital room? 2  -CM     AM-PAC 6 Clicks Score (PT) 11  -CM     Highest level of mobility 4 --> Transferred to chair/commode  -CM       Row Name 10/10/23 1516          Functional Assessment    Outcome Measure Options AM-PAC 6 Clicks Basic Mobility (PT)  -CM               User Key  (r) = Recorded By, (t) = Taken By, (c) = Cosigned By      Initials Name Provider Type    Yanira Anderson PT Physical Therapist                                 Physical Therapy Education       Title: PT OT SLP Therapies (In Progress)       Topic: Physical Therapy (In Progress)       Point: Mobility training (In Progress)       Learning Progress Summary             Patient Acceptance, E, NR by CM at 10/10/2023 1517    Acceptance, E, NR,VU by KR at 10/6/2023 1550    Acceptance, E, NR by SD at 10/5/2023 1437    Acceptance, E, VU by CD at 10/2/2023 1502    Comment: SEE FLOWSHEET    Acceptance, E, NR by KR at 9/25/2023 1324    Acceptance, E, NR by KG at 9/18/2023 1403    Acceptance,  E,TB, NR by AY at 9/15/2023 1254   Family Acceptance, E, NR,VU by KR at 10/6/2023 1550    Acceptance, E, NR by SD at 10/5/2023 1437   Significant Other Acceptance, E, NR by CM at 10/10/2023 1517                         Point: Home exercise program (In Progress)       Learning Progress Summary             Patient Acceptance, E, NR by CM at 10/10/2023 1517    Acceptance, E, NR by SD at 10/5/2023 1437    Acceptance, E, VU by CD at 10/2/2023 1502    Comment: SEE FLOWSHEET    Acceptance, E, NR by KG at 9/18/2023 1403   Family Acceptance, E, NR by SD at 10/5/2023 1437   Significant Other Acceptance, E, NR by CM at 10/10/2023 1517                         Point: Body mechanics (In Progress)       Learning Progress Summary             Patient Acceptance, E, NR by CM at 10/10/2023 1517    Acceptance, E, NR,VU by KR at 10/6/2023 1550    Acceptance, E, NR by SD at 10/5/2023 1437    Acceptance, E, VU by CD at 10/2/2023 1502    Comment: SEE FLOWSHEET    Acceptance, E, NR by KR at 9/25/2023 1324    Acceptance, E, NR by KG at 9/18/2023 1403    Acceptance, E,TB, NR by AY at 9/15/2023 1254   Family Acceptance, E, NR,VU by KR at 10/6/2023 1550    Acceptance, E, NR by SD at 10/5/2023 1437   Significant Other Acceptance, E, NR by CM at 10/10/2023 1517                         Point: Precautions (In Progress)       Learning Progress Summary             Patient Acceptance, E, NR by CM at 10/10/2023 1517    Acceptance, E, NR,VU by KR at 10/6/2023 1550    Acceptance, E, NR by SD at 10/5/2023 1437    Acceptance, E, VU by CD at 10/2/2023 1502    Comment: SEE FLOWSHEET    Acceptance, E, NR by KR at 9/25/2023 1324    Acceptance, E, NR by KG at 9/18/2023 1403    Acceptance, E,TB, NR by AY at 9/15/2023 1254   Family Acceptance, E, NR,VU by KR at 10/6/2023 1550    Acceptance, E, NR by SD at 10/5/2023 1437   Significant Other Acceptance, E, NR by CM at 10/10/2023 1517                                         User Key       Initials Effective Dates  Name Provider Type Discipline    CD 02/03/23 -  Claudia Duarte, PT Physical Therapist PT    SD 03/13/23 -  Chantel Scott, PT Physical Therapist PT    KG 05/22/20 -  Mackenzie Mckay, PT Physical Therapist PT    AY 11/10/20 -  Aleshia Armstrong, PT Physical Therapist PT    CM 09/22/22 -  Yanira Shields, PT Physical Therapist PT    KR 12/30/22 -  Raissa Soria, PT Physical Therapist PT                  PT Recommendation and Plan     Plan of Care Reviewed With: patient, spouse  Progress: no change  Outcome Evaluation: Patient continues to be limited by deficits in cognition, R sided weakness, endurance, and balance. He tolerated standing transfers modAx2 and dynamic standing with maxAx2. IPPT remains indicated to address current deficits. Continue to recommend D/C to IPR.     Time Calculation:         PT Charges       Row Name 10/10/23 1518             Time Calculation    Start Time 1349  -CM      PT Received On 10/10/23  -CM      PT Goal Re-Cert Due Date 10/16/23  -CM         Timed Charges    84360 - PT Therapeutic Exercise Minutes 10  -CM      18377 - PT Therapeutic Activity Minutes 18  -CM         Total Minutes    Timed Charges Total Minutes 28  -CM       Total Minutes 28  -CM                User Key  (r) = Recorded By, (t) = Taken By, (c) = Cosigned By      Initials Name Provider Type    CM Yanira Shields, PT Physical Therapist                  Therapy Charges for Today       Code Description Service Date Service Provider Modifiers Qty    41331258374 HC PT THER PROC EA 15 MIN 10/10/2023 Yanira Shields, PT GP 1    07829155633 HC PT THERAPEUTIC ACT EA 15 MIN 10/10/2023 Yanira Shields, PT GP 1    63118100211 HC PT THER SUPP EA 15 MIN 10/10/2023 Yanira Shields, PT GP 2            PT G-Codes  Outcome Measure Options: AM-PAC 6 Clicks Basic Mobility (PT)  AM-PAC 6 Clicks Score (PT): 11  AM-PAC 6 Clicks Score (OT): 10  Modified [place] Scale: 4 - Moderately severe disability.  Unable  to walk without assistance, and unable to attend to own bodily needs without assistance.  PT Discharge Summary  Anticipated Discharge Disposition (PT): inpatient rehabilitation facility    Yanira Shields, PT  10/10/2023

## 2023-10-10 NOTE — PROGRESS NOTES
"Palliative Care Daily Progress Note     C/C: Patient with garbled speech, following commands, reports pain to his mouth.     S: Medical record reviewed. Follow up visit for GOC in the context of complex medical decision making, symptom management. Events noted. Patient follows commands and answers some questions, endorses oral pain, denies pain with swallowing. Oriented to person and recognizes his wife in the room. Continues in restraints due to attempting to remove abdominal binder. PT/OT working with patient, short-distance ambulation with two person assist. Patient has been incontinent of bowel and bladder. TF at night. Daily decreased PO intake. Neurology consulted for AMS/agitation.    ROS: Denies pain, nausea. ROS limited by AMS, drowsiness.     O: Code Status:   Code Status and Medical Interventions:   Ordered at: 09/29/23 0854     Medical Intervention Limits:    NO intubation (DNI)     Code Status (Patient has no pulse and is not breathing):    No CPR (Do Not Attempt to Resuscitate)     Medical Interventions (Patient has pulse or is breathing):    Limited Support      Advanced Directives: Advance Directive Status: Patient does not have advance directive   Goals of Care: Ongoing.   Palliative Performance Scale Score: 40%    /95 (BP Location: Right arm, Patient Position: Lying)   Pulse 62   Temp 98.1 °F (36.7 °C) (Oral)   Resp 18   Ht 175.3 cm (69\")   Wt 72.8 kg (160 lb 7.9 oz)   SpO2 96%   BMI 23.70 kg/m²     Intake/Output Summary (Last 24 hours) at 10/10/2023 1219  Last data filed at 10/10/2023 0558  Gross per 24 hour   Intake 1092 ml   Output --   Net 1092 ml       PE:  General Appearance:    Patient laying in bed, awake, alert, acutely ill appearing, NAD, follows commands   HEENT:    NC/AT, EOMI, anicteric, MMM, face relaxed   Neck:   supple, trachea midline, no JVD   Lungs:     CTA bilat, diminished in bases; respirations regular, even and unlabored; RR 16-18 on exam, on RA    Heart:    RRR, " normal S1 and S2, no M/R/G, HR 78    Abdomen:     Normal bowel sounds, soft, nontender, nondistended, PEG with abdominal binder covering   G/U:   Deferred   MSK/Extremities:   no edema, right foot cooler than left, moving right foot with stimulation   Pulses:   Pulses palpable and equal bilaterally   Skin:   Warm, dry   Neurologic:   oriented to self, follows commands, flaccid RUE, decreased movement to RLE   Psych:   calm, appropriate     Meds: Reviewed and changes noted    Labs:   Results from last 7 days   Lab Units 10/09/23  0454   WBC 10*3/mm3 5.52   HEMOGLOBIN g/dL 12.1*   HEMATOCRIT % 36.4*   PLATELETS 10*3/mm3 266     Results from last 7 days   Lab Units 10/09/23  0454   SODIUM mmol/L 139   POTASSIUM mmol/L 4.0   CHLORIDE mmol/L 105   CO2 mmol/L 26.0   BUN mg/dL 21   CREATININE mg/dL 0.65*   GLUCOSE mg/dL 145*   CALCIUM mg/dL 8.9     Results from last 7 days   Lab Units 10/09/23  0454   SODIUM mmol/L 139   POTASSIUM mmol/L 4.0   CHLORIDE mmol/L 105   CO2 mmol/L 26.0   BUN mg/dL 21   CREATININE mg/dL 0.65*   CALCIUM mg/dL 8.9   BILIRUBIN mg/dL 0.3   ALK PHOS U/L 83   ALT (SGPT) U/L 46*   AST (SGOT) U/L 33   GLUCOSE mg/dL 145*     Imaging Results (Last 72 Hours)       ** No results found for the last 72 hours. **                  Diagnostics: Reviewed    A:   Hemorrhagic CVA    Dyslipidemia    Multiple bilateral CVAs    HFpEF    T2DM (type 2 diabetes mellitus)    HTN (hypertension)    GERD (gastroesophageal reflux disease)    ICH (intracerebral hemorrhage)    Oropharyngeal dysphagia     73 y.o. male with hemorrhagic CVA, HFpEF, HTN.   S/S:   Pain -s/p stroke and arthritis, MSK  -scheduled Tylenol 500mg PO/PEG q 6 hours (LFT's normal)  -scheduled Lidocaine patch to low back  -Voltaren gel  to bilateral knees  -complaining of oral pain -started Palliative oral rinse     2. Dysphagia -SLP with diet modifications  -PEG in place     3. Debility -PT/OT following  -wife would like to discharge to rehab, placement  complicated by restraints     4. Agitation -requiring restraints  -Seroquel   -Neurology following with medication additions and adjustments     5. GOC -DNR/DNI -per discussion with wife  -Wife would like pain management as patient has appeared restless and complaining of pain with hope that this will allow for discontinuation of restraints  -wife would like to pursue rehab    P: Follow up visit for GOC in the context of complex medical decision making. Patient continues to require restraints to prevent pulling PEG.  Emotional support provided to wife at bedside. Patient denies pain with scheduled Tylenol, however continued agitation at times. Goal continues to be rehab. Palliative Care will sign off as GOC established.   Please do not hesitate to re-contact us regarding further sx mgmt or GOC needs.  Dianelys Arriaga, APRN  10/10/2023  Time spent: 25 minutes

## 2023-10-10 NOTE — PLAN OF CARE
Goal Outcome Evaluation:    SLP treatment completed. Will continue to address cognitive communication and swallowing. Please see note for further details and recommendations.                           English

## 2023-10-10 NOTE — PLAN OF CARE
Goal Outcome Evaluation:  Plan of Care Reviewed With: patient, spouse        Progress: no change  Outcome Evaluation: Patient continues to be limited by deficits in cognition, R sided weakness, endurance, and balance. He tolerated standing transfers modAx2 and dynamic standing with maxAx2. IPPT remains indicated to address current deficits. Continue to recommend D/C to IPR.      Anticipated Discharge Disposition (PT): inpatient rehabilitation facility

## 2023-10-11 LAB
GLUCOSE BLDC GLUCOMTR-MCNC: 107 MG/DL (ref 70–130)
GLUCOSE BLDC GLUCOMTR-MCNC: 117 MG/DL (ref 70–130)
GLUCOSE BLDC GLUCOMTR-MCNC: 119 MG/DL (ref 70–130)
GLUCOSE BLDC GLUCOMTR-MCNC: 99 MG/DL (ref 70–130)

## 2023-10-11 PROCEDURE — 97530 THERAPEUTIC ACTIVITIES: CPT

## 2023-10-11 PROCEDURE — 99233 SBSQ HOSP IP/OBS HIGH 50: CPT | Performed by: INTERNAL MEDICINE

## 2023-10-11 PROCEDURE — 25010000002 LORAZEPAM PER 2 MG: Performed by: INTERNAL MEDICINE

## 2023-10-11 PROCEDURE — 99232 SBSQ HOSP IP/OBS MODERATE 35: CPT | Performed by: PSYCHIATRY & NEUROLOGY

## 2023-10-11 PROCEDURE — 25810000003 SODIUM CHLORIDE 0.9 % SOLUTION: Performed by: NURSE PRACTITIONER

## 2023-10-11 PROCEDURE — 25010000002 LORAZEPAM PER 2 MG: Performed by: PSYCHIATRY & NEUROLOGY

## 2023-10-11 PROCEDURE — 97110 THERAPEUTIC EXERCISES: CPT

## 2023-10-11 PROCEDURE — 82948 REAGENT STRIP/BLOOD GLUCOSE: CPT

## 2023-10-11 PROCEDURE — 25010000002 ONDANSETRON PER 1 MG: Performed by: SURGERY

## 2023-10-11 RX ORDER — LORAZEPAM 2 MG/ML
0.25 INJECTION INTRAMUSCULAR EVERY 4 HOURS PRN
Status: DISCONTINUED | OUTPATIENT
Start: 2023-10-11 | End: 2023-10-13

## 2023-10-11 RX ORDER — DONEPEZIL HYDROCHLORIDE 10 MG/1
10 TABLET, FILM COATED ORAL NIGHTLY
Status: DISCONTINUED | OUTPATIENT
Start: 2023-10-11 | End: 2023-10-12

## 2023-10-11 RX ORDER — MIRTAZAPINE 15 MG/1
30 TABLET, FILM COATED ORAL NIGHTLY
Status: DISCONTINUED | OUTPATIENT
Start: 2023-10-11 | End: 2023-10-12

## 2023-10-11 RX ADMIN — ACETAMINOPHEN ORAL SOLUTION 500 MG: 650 SOLUTION ORAL at 18:06

## 2023-10-11 RX ADMIN — LORAZEPAM 0.25 MG: 2 INJECTION INTRAMUSCULAR; INTRAVENOUS at 20:13

## 2023-10-11 RX ADMIN — DONEPEZIL HYDROCHLORIDE 10 MG: 10 TABLET, FILM COATED ORAL at 20:23

## 2023-10-11 RX ADMIN — PANTOPRAZOLE SODIUM 40 MG: 40 INJECTION, POWDER, LYOPHILIZED, FOR SOLUTION INTRAVENOUS at 06:16

## 2023-10-11 RX ADMIN — Medication 10 ML: at 09:29

## 2023-10-11 RX ADMIN — LIDOCAINE 1 PATCH: 4 PATCH TOPICAL at 10:08

## 2023-10-11 RX ADMIN — DICLOFENAC SODIUM 2 G: 9.3 GEL TOPICAL at 20:24

## 2023-10-11 RX ADMIN — MICONAZOLE NITRATE 1 APPLICATION: 20 CREAM TOPICAL at 20:24

## 2023-10-11 RX ADMIN — QUETIAPINE FUMARATE 50 MG: 25 TABLET ORAL at 20:22

## 2023-10-11 RX ADMIN — Medication 30 ML: at 09:27

## 2023-10-11 RX ADMIN — ONDANSETRON 4 MG: 2 INJECTION INTRAMUSCULAR; INTRAVENOUS at 23:05

## 2023-10-11 RX ADMIN — MINERAL OIL: 1000 LIQUID ORAL at 12:47

## 2023-10-11 RX ADMIN — CLOPIDOGREL BISULFATE 75 MG: 75 TABLET ORAL at 09:27

## 2023-10-11 RX ADMIN — METOPROLOL TARTRATE 25 MG: 25 TABLET, FILM COATED ORAL at 20:23

## 2023-10-11 RX ADMIN — MINERAL OIL 5 ML: 1000 LIQUID ORAL at 20:24

## 2023-10-11 RX ADMIN — ACETAMINOPHEN ORAL SOLUTION 500 MG: 650 SOLUTION ORAL at 12:47

## 2023-10-11 RX ADMIN — ACETAMINOPHEN ORAL SOLUTION 500 MG: 650 SOLUTION ORAL at 06:16

## 2023-10-11 RX ADMIN — SODIUM CHLORIDE 50 ML/HR: 9 INJECTION, SOLUTION INTRAVENOUS at 10:08

## 2023-10-11 RX ADMIN — ACETAMINOPHEN ORAL SOLUTION 500 MG: 650 SOLUTION ORAL at 23:05

## 2023-10-11 RX ADMIN — METOPROLOL TARTRATE 25 MG: 25 TABLET, FILM COATED ORAL at 09:27

## 2023-10-11 RX ADMIN — Medication 5 MG: at 20:23

## 2023-10-11 RX ADMIN — LORAZEPAM 0.5 MG: 2 INJECTION INTRAMUSCULAR; INTRAVENOUS at 07:29

## 2023-10-11 RX ADMIN — MIRTAZAPINE 30 MG: 15 TABLET, FILM COATED ORAL at 20:22

## 2023-10-11 RX ADMIN — ATORVASTATIN CALCIUM 80 MG: 40 TABLET, FILM COATED ORAL at 20:22

## 2023-10-11 NOTE — PLAN OF CARE
Goal Outcome Evaluation:  Plan of Care Reviewed With: patient, family        Progress: no change  Outcome Evaluation: Pt continues to present below his functional baseline with deficits in balance, cognition, endurance, and balance. Pt sat EOB for ~10 minutes with max Ax2. Strong R lateral lean noted with pushing through LUE. Participated in [employer]. Further IPPT is warrented. PT will progress as able per POC.      Anticipated Discharge Disposition (PT): inpatient rehabilitation facility

## 2023-10-11 NOTE — PROGRESS NOTES
Neurology       Patient Care Team:  Susan Powers APRN as PCP - General (Family Medicine)    Chief complaint: Restless    History: Patient slept poorly last night becoming somewhat agitated.    He continues not to eat.    He has no nausea or diarrhea.    He received a half a milligram of Ativan and is now sound asleep and minimally arousable.      Past Medical History:   Diagnosis Date    Acid reflux     Cancer     Skin    Diabetes mellitus     Prediabetes    GERD (gastroesophageal reflux disease) 09/15/2023    HTN (hypertension) 09/15/2023    Kidney disease     T2DM (type 2 diabetes mellitus) 09/15/2023       Vital Signs   Vitals:    10/11/23 0645 10/11/23 0655 10/11/23 1020 10/11/23 1110   BP:  136/81  128/83   BP Location:  Right arm  Right arm   Patient Position:  Lying  Lying   Pulse: 78 78 64 (!) 149   Resp:  18  20   Temp:  98.3 °F (36.8 °C)  97.8 °F (36.6 °C)   TempSrc:  Axillary  Axillary   SpO2: 99% 94% 97% 100%   Weight:       Height:           Physical Exam:   General: Sedated              Neuro: Minimally respond    Results Review:  No new result  Results from last 7 days   Lab Units 10/09/23  0454   WBC 10*3/mm3 5.52   HEMOGLOBIN g/dL 12.1*   HEMATOCRIT % 36.4*   PLATELETS 10*3/mm3 266     Results from last 7 days   Lab Units 10/09/23  0454 10/05/23  0659   SODIUM mmol/L 139 138   POTASSIUM mmol/L 4.0 4.6   CHLORIDE mmol/L 105 105   CO2 mmol/L 26.0 25.0   BUN mg/dL 21 16   CREATININE mg/dL 0.65* 0.51*   CALCIUM mg/dL 8.9 8.5*   BILIRUBIN mg/dL 0.3  --    ALK PHOS U/L 83  --    ALT (SGPT) U/L 46*  --    AST (SGOT) U/L 33  --    GLUCOSE mg/dL 145* 146*       Imaging Results (Last 24 Hours)       ** No results found for the last 24 hours. **            Assessment:  Restless.    Sleep disturbance.    Cognitive impairment likely.        Plan:  Decrease Ativan to 0.25 mg IV as needed.    Increase Remeron to 30 mg nightly.    Increase donepezil to 10 mg nightly.        Comment:    Guarded prognosis discussed  with the patient's wife.  He seems highly sensitive to Ativan given the relatively heavy burden of sedating medication.         I discussed the patients findings and my recommendations with patient, family, and primary care team    Toni Rausch MD  10/11/23  12:08 EDT

## 2023-10-11 NOTE — PROGRESS NOTES
[facility] Medicine Services  PROGRESS NOTE    Patient Name: Kenneth Wen  : 1950  MRN: [id number]    Date of Admission: 9/15/2023  Primary Care Physician: Susan Powers APRN    Subjective   Subjective     CC:  F/u   CVA     HPI:  Tolerating noctural TF's.  Wife at bedside, no events.       Objective   Objective     Vital Signs:   Temp:  [97.3 °F (36.3 °C)-100.2 °F (37.9 °C)] 97.8 °F (36.6 °C)  Heart Rate:  [] 74  Resp:  [16-20] 20  BP: ()/(67-88) 128/83     Physical Exam:  Constitutional: No acute distress, awake, alert, wife at bedside  HENT: NCAT, mucous membranes moist  Respiratory: Clear to auscultation bilaterally, respiratory effort normal   Cardiovascular: RRR, no murmurs, rubs, or gallops  Gastrointestinal: soft, PEG in place, abdominal binder  Musculoskeletal: No bilateral ankle edema  Neurologic: moves all extremities, restraints. speech is clear, awakens to name  Skin: No rashes        Results Reviewed:  LAB RESULTS:      Lab 10/09/23  0454 10/05/23  0659   WBC 5.52 7.24   HEMOGLOBIN 12.1* 12.2*   HEMATOCRIT 36.4* 36.4*   PLATELETS 266 300   NEUTROS ABS 2.79  --    IMMATURE GRANS (ABS) 0.01  --    LYMPHS ABS 1.56  --    MONOS ABS 0.74  --    EOS ABS 0.32  --    MCV 93.3 92.6   CRP <0.30  --          Lab 10/09/23  0454 10/05/23  0659   SODIUM 139 138   POTASSIUM 4.0 4.6   CHLORIDE 105 105   CO2 26.0 25.0   ANION GAP 8.0 8.0   BUN 21 16   CREATININE 0.65* 0.51*   EGFR 100.1 107.7   GLUCOSE 145* 146*   CALCIUM 8.9 8.5*   MAGNESIUM 2.0 1.9   PHOSPHORUS 3.4  --          Lab 10/09/23  0454   TOTAL PROTEIN 6.2   ALBUMIN 3.6   GLOBULIN 2.6   ALT (SGPT) 46*   AST (SGOT) 33   BILIRUBIN 0.3   ALK PHOS 83             Lab 10/09/23  0454   CHOLESTEROL 86   TRIGLYCERIDES 67             Brief Urine Lab Results  (Last result in the past 365 days)        Color   Clarity   Blood   Leuk Est   Nitrite   Protein   CREAT   Urine HCG        09/15/23 1818 Yellow   Cloudy    Trace   Negative   Negative   Negative                   Microbiology Results Abnormal       Procedure Component Value - Date/Time    Blood Culture - Blood, Arm, Right [[id number]]  (Normal) Collected: 09/15/23 0212    Lab Status: Final result Specimen: Blood from Arm, Right Updated: 09/20/23 0230     Blood Culture No growth at 5 days    Blood Culture - Blood, Arm, Left [[id number]]  (Normal) Collected: 09/15/23 0212    Lab Status: Final result Specimen: Blood from Arm, Left Updated: 09/20/23 0230     Blood Culture No growth at 5 days            No radiology results from the last 24 hrs        Current medications:  Scheduled Meds:acetaminophen, 500 mg, Per PEG Tube, Q6H  atorvastatin, 80 mg, Per PEG Tube, Nightly  clopidogrel, 75 mg, Per PEG Tube, Daily  Diclofenac Sodium, 2 g, Topical, BID  donepezil, 10 mg, Per PEG Tube, Nightly  Lidocaine, 1 patch, Transdermal, Q24H  melatonin, 5 mg, Per PEG Tube, Nightly  metoprolol tartrate, 25 mg, Per PEG Tube, Q12H  miconazole, 1 application , Topical, Q12H  mirtazapine, 30 mg, Per PEG Tube, Nightly  palliative care oral rinse, , Mouth/Throat, 4x Daily  pantoprazole, 40 mg, Intravenous, Q AM  ProSource No Carb, 30 mL, Per G Tube, Daily  QUEtiapine, 50 mg, Per PEG Tube, Nightly  sodium chloride, 10 mL, Intravenous, Q12H  sodium chloride, 10 mL, Intravenous, Q12H      Continuous Infusions:sodium chloride, 50 mL/hr, Last Rate: 50 mL/hr (10/11/23 1008)      PRN Meds:.  Calcium Replacement - Follow Nurse / BPA Driven Protocol    dextrose    dextrose    glucagon (human recombinant)    ibuprofen    LORazepam    Magnesium Standard Dose Replacement - Follow Nurse / BPA Driven Protocol    ondansetron    Phosphorus Replacement - Follow Nurse / BPA Driven Protocol    Potassium Replacement - Follow Nurse / BPA Driven Protocol    QUEtiapine    sodium chloride    sodium chloride    Assessment & Plan   Assessment & Plan     Active Hospital Problems    Diagnosis  POA    **Hemorrhagic CVA  [I61.9]  Yes    Oropharyngeal dysphagia [R13.12]  Yes    Multiple bilateral CVAs [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        Brief Hospital Course to date:  Kenneth Wen is a 73 y.o. male  with hx of HTN, HLD, T2DM, and GERD who presented to OSH with multiple acute ischemic infarcts of the bilateral cerebral and cerebellar hemispheres as well as left isaac. TTE/JOSIAS was negative PFO at OSH. On 9/13/23 he had worsening in mental status and new inability to move RLE, head CT showed evolution of the existing CVA with new development of petechial hemorrhage. DAPT was held for 24 hours per Neurology recommendations and repeat CT head that evening showed worsening effacement of the right quadrigeminal cistern with suspected increasing upward supratentorial pressure. He was then transferred to [facility] for Neurosurgery evaluation on 9/15/23. He was started on hypertonic saline 9/15/23 through 9/20/23 for cerebral edema. He had fevers with negative cultures but had worsening secretions and labored breathing so was started on Zosyn on 9/16/23. Transferred to the hospitalist service on 9/22/23. Pt demonstrated poor oral intake with elevated sodium levels; therefore, PEG tube was recommended.         Multiple bilateral posterior circulation strokes with hemorrhagic conversion  --Neurology has followed, suspect atheroembolic but cannot rule out cardioembolic given multiple vascular territories  --Plavix monotherapy, high intensity statin  --Needs Holter monitor at discharge  --Follow up in stroke clinic in 8 weeks    Agitation  Encephalopathy  --Seroquel 12.5mg QAM, Seroquel 75 mg QPM  -- PRN ativan for agitation  --appreciate general neurology input. D/w  Dr. Rausch who recs to decrease ativan dose to 0.25mg IV PRN, changed lexapro to remeron and increased remeron to 30mg qhs, increase aricept to 10mg qhs  --restoril/melatonin nightly    Dysphagia  Hypernatremia - resolved  --SLP recommends mechanical ground with honey thick liquids   --Risk of pulling out peg tube, abd binder to be in place at all times. --Surgery recommends to remain in restraints at all times or if off has to have sitter for next 2 weeks   -- dietary following for calorie count, adequate PO intake is limited by his mental status     HTN  --Continue Metoprolol 25 mg BID     GERD  --Continue Protonix     COVID-19-resolved  --Overall asymptomatic and on room air  --No infiltrates seen and low procal  --Did not receive any treatment   --Off Isolation 9/30/2023      Leukocytosis-resolved  --Procalcitionin low at 0.05  --CXR and UA negative  --Blood cultures negative     Elevated LFT's, mild  --Possibly secondary to statin?  --Negative acute hepatitis panel  --repeat AST/ALT improved on 10/3    D/w wife at bedside on 10/11/23    Expected Discharge Location and Transportation: rehab   Expected Discharge   Expected Discharge Date: 10/7/2023; Expected Discharge Time:      DVT prophylaxis:  Mechanical DVT prophylaxis orders are present.     AM-PAC 6 Clicks Score (PT): 9 (10/11/23 8053)    CODE STATUS:   Code Status and Medical Interventions:   Ordered at: 09/29/23 0854     Medical Intervention Limits:    NO intubation (DNI)     Code Status (Patient has no pulse and is not breathing):    No CPR (Do Not Attempt to Resuscitate)     Medical Interventions (Patient has pulse or is breathing):    Limited Support       Osmel Lee MD  10/11/23

## 2023-10-11 NOTE — THERAPY TREATMENT NOTE
Patient Name: Kenneth Wen  : 1950    MRN: [id number]                              Today's Date: 10/11/2023       Admit Date: 9/15/2023    Visit Dx:     ICD-10-CM ICD-9-CM   1. Hemorrhagic CVA  I61.9 431   2. Aphasia  R47.01 784.3   3. Dysarthria  R47.1 784.51   4. Oropharyngeal dysphagia  R13.12 787.22     Patient Active Problem List   Diagnosis    Precordial pain    Elevated BP without diagnosis of hypertension    Dyslipidemia    Hyperglycemia    Multiple bilateral CVAs    Hemorrhagic CVA    HFpEF    T2DM (type 2 diabetes mellitus)    HTN (hypertension)    GERD (gastroesophageal reflux disease)    ICH (intracerebral hemorrhage)    Oropharyngeal dysphagia     Past Medical History:   Diagnosis Date    Acid reflux     Cancer     Skin    Diabetes mellitus     Prediabetes    GERD (gastroesophageal reflux disease) 09/15/2023    HTN (hypertension) 09/15/2023    Kidney disease     T2DM (type 2 diabetes mellitus) 09/15/2023     Past Surgical History:   Procedure Laterality Date    APPENDECTOMY      ENDOSCOPY W/ PEG TUBE PLACEMENT N/A 2023    Procedure: ESOPHAGOGASTRODUODENOSCOPY WITH PERCUTANEOUS ENDOSCOPIC GASTROSTOMY TUBE INSERTION;  Surgeon: Haseeb Carrillo MD;  Location: [facility] ENDOSCOPY;  Service: General;  Laterality: N/A;    HEMORRHOIDECTOMY      NASAL POLYP SURGERY        General Information       Row Name 10/11/23 1538          Physical Therapy Time and Intention    Document Type therapy note (daily note)  -AB     Mode of Treatment physical therapy  -AB       Row Name 10/11/23 1538          General Information    Patient Profile Reviewed yes  -AB     Existing Precautions/Restrictions fall;other (see comments)  PEG with abdominal binder, R sided weakness/coordination deficits  -AB     Barriers to Rehab medically complex;previous functional deficit;cognitive status  -AB       Row Name 10/11/23 1538          Cognition    Orientation Status (Cognition) oriented to;person;disoriented  to;place;situation;time  -AB       Row Name 10/11/23 1538          Safety Issues, Functional Mobility    Safety Issues Affecting Function (Mobility) ability to follow commands;awareness of need for assistance;impulsivity;insight into deficits/self-awareness;judgment;problem-solving;safety precaution awareness;safety precautions follow-through/compliance;sequencing abilities  -AB     Impairments Affecting Function (Mobility) balance;cognition;coordination;endurance/activity tolerance;grasp;motor control;motor planning;muscle tone abnormal;visual/perceptual;sensation/sensory awareness;postural/trunk control;strength;range of motion (ROM)  -AB     Cognitive Impairments, Mobility Safety/Performance attention;awareness, need for assistance;impulsivity;insight into deficits/self-awareness;problem-solving/reasoning;judgment;safety precaution awareness;safety precaution follow-through;sequencing abilities  -AB     Comment, Safety Issues/Impairments (Mobility) L inattition, decreased command following.  -AB               User Key  (r) = Recorded By, (t) = Taken By, (c) = Cosigned By      Initials Name Provider Type    AB Aleshia Keita, PT Physical Therapist                   Mobility       Row Name 10/11/23 1539          Bed Mobility    Bed Mobility rolling left;rolling right;supine-sit;sit-supine  -AB     Rolling Left [place] (Bed Mobility) maximum assist (25% patient effort);1 person assist;verbal cues  -AB     Rolling Right [place] (Bed Mobility) maximum assist (25% patient effort);1 person assist;verbal cues  -AB     Supine-Sit [place] (Bed Mobility) maximum assist (25% patient effort);2 person assist;verbal cues;nonverbal cues (demo/gesture)  -AB     Sit-Supine [place] (Bed Mobility) dependent (less than 25% patient effort);1 person assist;verbal cues;nonverbal cues (demo/gesture)  -AB     Assistive Device (Bed Mobility) bed rails;draw sheet;head of bed elevated  -AB     Comment, (Bed Mobility) Pt  rolled L/R for osman reynolds. Able to assist some with sup>sit and initiated reaching for bed rail and upward trunk movement. Very strong R lateral lean once sitting EOB with pushing through LUE. Unable to correct with cues or manual assist.  -AB       Row Name 10/11/23 1539          Transfers    Comment, (Transfers) Unable to progress from EOB activity secondary to strong R lateral lean. Pt reported dizziness sitting EOB when asked, /92  -AB       Row Name 10/11/23 1539          Bed-Chair Transfer    Bed-Chair [place] (Transfers) unable to assess  -AB       Row Name 10/11/23 1539          Sit-Stand Transfer    Sit-Stand [place] (Transfers) unable to assess  -AB               User Key  (r) = Recorded By, (t) = Taken By, (c) = Cosigned By      Initials Name Provider Type    AB Aleshia Keita, PT Physical Therapist                   Obj/Interventions       Row Name 10/11/23 1542          Motor Skills    Therapeutic Exercise hip;knee;ankle  -AB       Row Name 10/11/23 1542          Hip (Therapeutic Exercise)    Hip AROM (Therapeutic Exercise) left;flexion;aBduction;aDduction;10 repetitions  -AB     Hip AAROM (Therapeutic Exercise) right;aBduction;aDduction;flexion;10 repetitions  -AB       Row Name 10/11/23 1542          Knee (Therapeutic Exercise)    Knee (Therapeutic Exercise) AAROM (active assistive range of motion)  -AB     Knee AAROM (Therapeutic Exercise) right;flexion;extension;10 repetitions  -AB     Knee Strengthening (Therapeutic Exercise) right;heel slides;10 repetitions  -AB       Row Name 10/11/23 1542          Ankle (Therapeutic Exercise)    Ankle (Therapeutic Exercise) AROM (active range of motion)  -AB     Ankle AROM (Therapeutic Exercise) left;dorsiflexion;plantarflexion;10 repetitions  -AB     Ankle AAROM (Therapeutic Exercise) right;plantarflexion;dorsiflexion;10 repetitions  -AB       Row Name 10/11/23 1542          Balance    Balance Assessment sitting static balance;sitting  dynamic balance;standing static balance;standing dynamic balance  -AB     Static Sitting Balance maximum assist;2-person assist;verbal cues  -AB     Dynamic Sitting Balance maximum assist;2-person assist;verbal cues  -AB     Position, Sitting Balance unsupported;sitting edge of bed  -AB     Balance Interventions sitting;static;dynamic  -AB     Comment, Balance Strong R lateral lean with pushing through LUE. Unable to correct with cues.  -AB               User Key  (r) = Recorded By, (t) = Taken By, (c) = Cosigned By      Initials Name Provider Type    AB Aleshia Keita, PT Physical Therapist                   Goals/Plan    No documentation.                  Clinical Impression       Row Name 10/11/23 1544          Pain    Additional Documentation Pain Scale: FACES Pre/Post-Treatment (Group)  -AB       Row Name 10/11/23 1549          Pain Scale: FACES Pre/Post-Treatment    Pain: FACES Scale, Pretreatment 2-->hurts little bit  -AB     Posttreatment Pain Rating 2-->hurts little bit  -AB     Pre/Posttreatment Pain Comment Pt groans with touch to R UE and LE.  -AB       Row Name 10/11/23 1548          Plan of Care Review    Plan of Care Reviewed With patient;family  -AB     Progress no change  -AB     Outcome Evaluation Pt continues to present below his functional baseline with deficits in balance, cognition, endurance, and balance. Pt sat EOB for ~10 minutes with max Ax2. Strong R lateral lean noted with pushing through LUE. Participated in TherEx. Further IPPT is warrented. PT will progress as able per POC.  -AB       Row Name 10/11/23 1544          Vital Signs    Pre Systolic BP Rehab 128  -AB     Pre Treatment Diastolic BP 83  -AB     Post Systolic BP Rehab 139  -AB     Post Treatment Diastolic BP 92  -AB     Pretreatment Heart Rate (beats/min) 76  -AB     Posttreatment Heart Rate (beats/min) 74  -AB     Pre SpO2 (%) 98  -AB     O2 Delivery Pre Treatment room air  -AB     O2 Delivery Intra Treatment room air  -AB      Post SpO2 (%) 97  -AB     O2 Delivery Post Treatment room air  -AB     Pre Patient Position Supine  -AB     Intra Patient Position Sitting  -AB     Post Patient Position Supine  -AB       Row Name 10/11/23 1544          Positioning and Restraints    Pre-Treatment Position in bed  -AB     Post Treatment Position bed  -AB     In Bed notified nsg;supine;call light within reach;encouraged to call for assist;exit alarm on;with family/caregiver;side rails up x3  -AB     Restraints released:;reapplied:;notified nsg:;soft limb  -AB               User Key  (r) = Recorded By, (t) = Taken By, (c) = Cosigned By      Initials Name Provider Type    AB Aleshia Keita, PT Physical Therapist                   Outcome Measures       Row Name 10/11/23 1547 10/11/23 0800       How much help from another person do you currently need...    Turning from your back to your side while in flat bed without using bedrails? 2  -AB 2  -EO    Moving from lying on back to sitting on the side of a flat bed without bedrails? 2  -AB 2  -EO    Moving to and from a bed to a chair (including a wheelchair)? 1  -AB 2  -EO    Standing up from a chair using your arms (e.g., wheelchair, bedside chair)? 2  -AB 2  -EO    Climbing 3-5 steps with a railing? 1  -AB 1  -EO    To walk in hospital room? 1  -AB 2  -EO    AM-PAC 6 Clicks Score (PT) 9  -AB 11  -EO    Highest level of mobility 3 --> Sat at edge of bed  -AB 4 --> Transferred to chair/commode  -EO      Row Name 10/11/23 1547          Functional Assessment    Outcome Measure Options AM-PAC 6 Clicks Basic Mobility (PT)  -AB               User Key  (r) = Recorded By, (t) = Taken By, (c) = Cosigned By      Initials Name Provider Type    EO Alisha Joel RN Registered Nurse    Aleshia Milton, PT Physical Therapist                                 Physical Therapy Education       Title: PT OT SLP Therapies (In Progress)       Topic: Physical Therapy (In Progress)       Point: Mobility training (In  Progress)       Learning Progress Summary             Patient Acceptance, E,D, NR by AB at 10/11/2023 1547    Acceptance, E, NR by CM at 10/10/2023 1517    Acceptance, E, NR,VU by KR at 10/6/2023 1550    Acceptance, E, NR by SD at 10/5/2023 1437    Acceptance, E, VU by CD at 10/2/2023 1502    Comment: SEE FLOWSHEET    Acceptance, E, NR by KR at 9/25/2023 1324    Acceptance, E, NR by KG at 9/18/2023 1403    Acceptance, E,TB, NR by AY at 9/15/2023 1254   Family Acceptance, E, NR,VU by KR at 10/6/2023 1550    Acceptance, E, NR by SD at 10/5/2023 1437   Significant Other Acceptance, E, NR by CM at 10/10/2023 1517                         Point: Home exercise program (In Progress)       Learning Progress Summary             Patient Acceptance, E,D, NR by AB at 10/11/2023 1547    Acceptance, E, NR by CM at 10/10/2023 1517    Acceptance, E, NR by SD at 10/5/2023 1437    Acceptance, E, VU by CD at 10/2/2023 1502    Comment: SEE FLOWSHEET    Acceptance, E, NR by KG at 9/18/2023 1403   Family Acceptance, E, NR by SD at 10/5/2023 1437   Significant Other Acceptance, E, NR by CM at 10/10/2023 1517                         Point: Body mechanics (In Progress)       Learning Progress Summary             Patient Acceptance, E,D, NR by AB at 10/11/2023 1547    Acceptance, E, NR by CM at 10/10/2023 1517    Acceptance, E, NR,VU by KR at 10/6/2023 1550    Acceptance, E, NR by SD at 10/5/2023 1437    Acceptance, E, VU by CD at 10/2/2023 1502    Comment: SEE FLOWSHEET    Acceptance, E, NR by KR at 9/25/2023 1324    Acceptance, E, NR by KG at 9/18/2023 1403    Acceptance, E,TB, NR by AY at 9/15/2023 1254   Family Acceptance, E, NR,VU by KR at 10/6/2023 1550    Acceptance, E, NR by SD at 10/5/2023 1437   Significant Other Acceptance, E, NR by CM at 10/10/2023 1517                         Point: Precautions (In Progress)       Learning Progress Summary             Patient Acceptance, E,D, NR by AB at 10/11/2023 1547    Acceptance, E, NR by CM  at 10/10/2023 1517    Acceptance, E, NR,VU by KR at 10/6/2023 1550    Acceptance, E, NR by SD at 10/5/2023 1437    Acceptance, E, VU by CD at 10/2/2023 1502    Comment: SEE FLOWSHEET    Acceptance, E, NR by KR at 9/25/2023 1324    Acceptance, E, NR by KG at 9/18/2023 1403    Acceptance, E,TB, NR by AY at 9/15/2023 1254   Family Acceptance, E, NR,VU by KR at 10/6/2023 1550    Acceptance, E, NR by SD at 10/5/2023 1437   Significant Other Acceptance, E, NR by CM at 10/10/2023 1517                                         User Key       Initials Effective Dates Name Provider Type Discipline    CD 02/03/23 -  Claudia Duarte, PT Physical Therapist PT    SD 03/13/23 -  Chantel Scott, PT Physical Therapist PT    KG 05/22/20 -  Mackenzie Mckay, PT Physical Therapist PT    AY 11/10/20 -  Aleshia Armstrong, PT Physical Therapist PT    AB 09/22/22 -  Aleshia Keita, PT Physical Therapist PT    CM 09/22/22 -  Yanira Shields, PT Physical Therapist PT    KR 12/30/22 -  Raissa Soria, PT Physical Therapist PT                  PT Recommendation and Plan     Plan of Care Reviewed With: patient, family  Progress: no change  Outcome Evaluation: Pt continues to present below his functional baseline with deficits in balance, cognition, endurance, and balance. Pt sat EOB for ~10 minutes with max Ax2. Strong R lateral lean noted with pushing through LUE. Participated in TherEx. Further IPPT is warrented. PT will progress as able per POC.     Time Calculation:         PT Charges       Row Name 10/11/23 1547             Time Calculation    Start Time 1420  -AB      PT Received On 10/11/23  -AB         Timed Charges    31593 - PT Therapeutic Exercise Minutes 10  -AB      58845 - PT Therapeutic Activity Minutes 14  -AB         Total Minutes    Timed Charges Total Minutes 24  -AB       Total Minutes 24  -AB                User Key  (r) = Recorded By, (t) = Taken By, (c) = Cosigned By      Initials Name Provider Type    AB  Aleshia Keita, PT Physical Therapist                  Therapy Charges for Today       Code Description Service Date Service Provider Modifiers Qty    [id number]  PT THER PROC EA 15 MIN 10/11/2023 Aleshia Keita, PT GP 1    [id number]  PT THERAPEUTIC ACT EA 15 MIN 10/11/2023 Aleshia Keita, PT GP 1            PT G-Codes  Outcome Measure Options: AM-PAC 6 Clicks Basic Mobility (PT)  AM-PAC 6 Clicks Score (PT): 9  AM-PAC 6 Clicks Score (OT): 10  Modified [place] Scale: 4 - Moderately severe disability.  Unable to walk without assistance, and unable to attend to own bodily needs without assistance.  PT Discharge Summary  Anticipated Discharge Disposition (PT): inpatient rehabilitation facility    Aleshia Keita PT  10/11/2023

## 2023-10-11 NOTE — CASE MANAGEMENT/SOCIAL WORK
Continued Stay Note  [facility]     Patient Name: Kenneth Wen  MRN: [id number]  Today's Date: 10/11/2023    Admit Date: 9/15/2023    Plan: TBD   Discharge Plan       Row Name 10/11/23 1049       Plan    Plan Comments Pt remains in left wrist restraint. MSW continues to follow and work with pt's wife on discharge planning and plan from here. [facility] has been following for when pt is able to get out of restraints. Pt's wife has provided two other facilities that referrals can be sent to once pt able to get out of restraints fully. MSW continues to follow and will send referrals as appropriate.                   Discharge Codes    No documentation.                 Expected Discharge Date and Time       Expected Discharge Date Expected Discharge Time    Oct 7, 2023               JOEL Anna

## 2023-10-12 LAB
ANION GAP SERPL CALCULATED.3IONS-SCNC: 5 MMOL/L (ref 5–15)
BUN SERPL-MCNC: 15 MG/DL (ref 8–23)
BUN/CREAT SERPL: 22.7 (ref 7–25)
CALCIUM SPEC-SCNC: 9.1 MG/DL (ref 8.6–10.5)
CHLORIDE SERPL-SCNC: 105 MMOL/L (ref 98–107)
CO2 SERPL-SCNC: 28 MMOL/L (ref 22–29)
CREAT SERPL-MCNC: 0.66 MG/DL (ref 0.76–1.27)
DEPRECATED RDW RBC AUTO: 46.9 FL (ref 37–54)
EGFRCR SERPLBLD CKD-EPI 2021: 99.7 ML/MIN/1.73
ERYTHROCYTE [DISTWIDTH] IN BLOOD BY AUTOMATED COUNT: 14 % (ref 12.3–15.4)
GLUCOSE BLDC GLUCOMTR-MCNC: 125 MG/DL (ref 70–130)
GLUCOSE BLDC GLUCOMTR-MCNC: 127 MG/DL (ref 70–130)
GLUCOSE BLDC GLUCOMTR-MCNC: 135 MG/DL (ref 70–130)
GLUCOSE BLDC GLUCOMTR-MCNC: 96 MG/DL (ref 70–130)
GLUCOSE SERPL-MCNC: 168 MG/DL (ref 65–99)
HCT VFR BLD AUTO: 36 % (ref 37.5–51)
HGB BLD-MCNC: 12 G/DL (ref 13–17.7)
MCH RBC QN AUTO: 31.2 PG (ref 26.6–33)
MCHC RBC AUTO-ENTMCNC: 33.3 G/DL (ref 31.5–35.7)
MCV RBC AUTO: 93.5 FL (ref 79–97)
PLATELET # BLD AUTO: 256 10*3/MM3 (ref 140–450)
PMV BLD AUTO: 10.8 FL (ref 6–12)
POTASSIUM SERPL-SCNC: 4.4 MMOL/L (ref 3.5–5.2)
RBC # BLD AUTO: 3.85 10*6/MM3 (ref 4.14–5.8)
SODIUM SERPL-SCNC: 138 MMOL/L (ref 136–145)
WBC NRBC COR # BLD: 5.29 10*3/MM3 (ref 3.4–10.8)

## 2023-10-12 PROCEDURE — 25810000003 SODIUM CHLORIDE 0.9 % SOLUTION: Performed by: NURSE PRACTITIONER

## 2023-10-12 PROCEDURE — 25010000002 LORAZEPAM PER 2 MG: Performed by: PSYCHIATRY & NEUROLOGY

## 2023-10-12 PROCEDURE — 99232 SBSQ HOSP IP/OBS MODERATE 35: CPT | Performed by: INTERNAL MEDICINE

## 2023-10-12 PROCEDURE — 80048 BASIC METABOLIC PNL TOTAL CA: CPT | Performed by: INTERNAL MEDICINE

## 2023-10-12 PROCEDURE — 85027 COMPLETE CBC AUTOMATED: CPT | Performed by: INTERNAL MEDICINE

## 2023-10-12 PROCEDURE — 97535 SELF CARE MNGMENT TRAINING: CPT

## 2023-10-12 PROCEDURE — 97530 THERAPEUTIC ACTIVITIES: CPT

## 2023-10-12 PROCEDURE — 99232 SBSQ HOSP IP/OBS MODERATE 35: CPT | Performed by: PSYCHIATRY & NEUROLOGY

## 2023-10-12 PROCEDURE — 82948 REAGENT STRIP/BLOOD GLUCOSE: CPT

## 2023-10-12 RX ORDER — QUETIAPINE FUMARATE 25 MG/1
50 TABLET, FILM COATED ORAL NIGHTLY
Status: DISCONTINUED | OUTPATIENT
Start: 2023-10-12 | End: 2023-10-13

## 2023-10-12 RX ORDER — CHOLECALCIFEROL (VITAMIN D3) 125 MCG
5 CAPSULE ORAL NIGHTLY
Status: DISCONTINUED | OUTPATIENT
Start: 2023-10-12 | End: 2023-10-18

## 2023-10-12 RX ORDER — DONEPEZIL HYDROCHLORIDE 10 MG/1
10 TABLET, FILM COATED ORAL NIGHTLY
Status: DISCONTINUED | OUTPATIENT
Start: 2023-10-12 | End: 2023-11-16 | Stop reason: HOSPADM

## 2023-10-12 RX ORDER — MIRTAZAPINE 15 MG/1
30 TABLET, FILM COATED ORAL NIGHTLY
Status: DISCONTINUED | OUTPATIENT
Start: 2023-10-12 | End: 2023-11-16 | Stop reason: HOSPADM

## 2023-10-12 RX ADMIN — QUETIAPINE FUMARATE 25 MG: 25 TABLET ORAL at 22:52

## 2023-10-12 RX ADMIN — LIDOCAINE 1 PATCH: 4 PATCH TOPICAL at 09:39

## 2023-10-12 RX ADMIN — LORAZEPAM 0.25 MG: 2 INJECTION INTRAMUSCULAR; INTRAVENOUS at 20:26

## 2023-10-12 RX ADMIN — ATORVASTATIN CALCIUM 80 MG: 40 TABLET, FILM COATED ORAL at 20:16

## 2023-10-12 RX ADMIN — MINERAL OIL: 1000 LIQUID ORAL at 18:00

## 2023-10-12 RX ADMIN — QUETIAPINE FUMARATE 50 MG: 25 TABLET ORAL at 18:00

## 2023-10-12 RX ADMIN — MINERAL OIL: 1000 LIQUID ORAL at 20:17

## 2023-10-12 RX ADMIN — MICONAZOLE NITRATE 1 APPLICATION: 20 CREAM TOPICAL at 09:39

## 2023-10-12 RX ADMIN — ACETAMINOPHEN ORAL SOLUTION 500 MG: 650 SOLUTION ORAL at 18:00

## 2023-10-12 RX ADMIN — METOPROLOL TARTRATE 25 MG: 25 TABLET, FILM COATED ORAL at 20:16

## 2023-10-12 RX ADMIN — MIRTAZAPINE 30 MG: 15 TABLET, FILM COATED ORAL at 18:00

## 2023-10-12 RX ADMIN — DICLOFENAC SODIUM 2 G: 9.3 GEL TOPICAL at 20:17

## 2023-10-12 RX ADMIN — SODIUM CHLORIDE 50 ML/HR: 9 INJECTION, SOLUTION INTRAVENOUS at 06:23

## 2023-10-12 RX ADMIN — DICLOFENAC SODIUM 2 G: 9.3 GEL TOPICAL at 09:39

## 2023-10-12 RX ADMIN — Medication 5 MG: at 18:00

## 2023-10-12 RX ADMIN — Medication 30 ML: at 09:40

## 2023-10-12 RX ADMIN — PANTOPRAZOLE SODIUM 40 MG: 40 INJECTION, POWDER, LYOPHILIZED, FOR SOLUTION INTRAVENOUS at 06:24

## 2023-10-12 RX ADMIN — Medication 10 ML: at 09:40

## 2023-10-12 RX ADMIN — Medication 10 ML: at 20:16

## 2023-10-12 RX ADMIN — CLOPIDOGREL BISULFATE 75 MG: 75 TABLET ORAL at 09:38

## 2023-10-12 RX ADMIN — MINERAL OIL: 1000 LIQUID ORAL at 09:39

## 2023-10-12 RX ADMIN — DONEPEZIL HYDROCHLORIDE 10 MG: 10 TABLET, FILM COATED ORAL at 18:00

## 2023-10-12 RX ADMIN — MINERAL OIL: 1000 LIQUID ORAL at 12:46

## 2023-10-12 RX ADMIN — METOPROLOL TARTRATE 25 MG: 25 TABLET, FILM COATED ORAL at 09:38

## 2023-10-12 RX ADMIN — ACETAMINOPHEN ORAL SOLUTION 500 MG: 650 SOLUTION ORAL at 12:44

## 2023-10-12 RX ADMIN — MICONAZOLE NITRATE 1 APPLICATION: 20 CREAM TOPICAL at 20:17

## 2023-10-12 RX ADMIN — ACETAMINOPHEN ORAL SOLUTION 500 MG: 650 SOLUTION ORAL at 06:23

## 2023-10-12 NOTE — PLAN OF CARE
Goal Outcome Evaluation:  Plan of Care Reviewed With: patient        Progress: no change  Outcome Evaluation: Pt continues to require siginifcant assist for bed mobility, toileting, UB/LB dressing and balance. Improved command following noted this date w/ improved bed mobility noted. Continue to progress as able per current POC.      Anticipated Discharge Disposition (OT): inpatient rehabilitation facility

## 2023-10-12 NOTE — PROGRESS NOTES
[facility] Medicine Services  PROGRESS NOTE    Patient Name: Kenneth Wen  : 1950  MRN: [id number]    Date of Admission: 9/15/2023  Primary Care Physician: Susan Powers APRN    Subjective   Subjective     CC:  F/u   CVA     HPI:  Multiple med adjustments per Dr. Rausch yesterday.  Was calm this AM when I saw but still in restraints.  Required ativan 2x overnight.       Objective   Objective     Vital Signs:   Temp:  [97 °F (36.1 °C)-99.7 °F (37.6 °C)] 99.7 °F (37.6 °C)  Heart Rate:  [69-88] 82  Resp:  [18] 18  BP: (107-157)/(70-89) 136/89     Physical Exam:  Constitutional: No acute distress, awake, alert, calm, wife at bedside  HENT: NCAT, mucous membranes moist  Respiratory: Clear to auscultation bilaterally, respiratory effort normal   Cardiovascular: RRR, no murmurs, rubs, or gallops  Gastrointestinal: soft, PEG in place, abdominal binder  Musculoskeletal: No bilateral ankle edema  Neurologic: moves all extremities, restraints. speech is clear, awakens to name  Skin: No rashes        Results Reviewed:  LAB RESULTS:      Lab 10/12/23  0725 10/09/23  0454   WBC 5.29 5.52   HEMOGLOBIN 12.0* 12.1*   HEMATOCRIT 36.0* 36.4*   PLATELETS 256 266   NEUTROS ABS  --  2.79   IMMATURE GRANS (ABS)  --  0.01   LYMPHS ABS  --  1.56   MONOS ABS  --  0.74   EOS ABS  --  0.32   MCV 93.5 93.3   CRP  --  <0.30         Lab 10/12/23  0725 10/09/23  045   SODIUM 138 139   POTASSIUM 4.4 4.0   CHLORIDE 105 105   CO2 28.0 26.0   ANION GAP 5.0 8.0   BUN 15 21   CREATININE 0.66* 0.65*   EGFR 99.7 100.1   GLUCOSE 168* 145*   CALCIUM 9.1 8.9   MAGNESIUM  --  2.0   PHOSPHORUS  --  3.4         Lab 10/09/23  045   TOTAL PROTEIN 6.2   ALBUMIN 3.6   GLOBULIN 2.6   ALT (SGPT) 46*   AST (SGOT) 33   BILIRUBIN 0.3   ALK PHOS 83             Lab 10/09/23  0454   CHOLESTEROL 86   TRIGLYCERIDES 67             Brief Urine Lab Results  (Last result in the past 365 days)        Color   Clarity   Blood   Leuk Est    Nitrite   Protein   CREAT   Urine HCG        09/15/23 1818 Yellow   Cloudy   Trace   Negative   Negative   Negative                   Microbiology Results Abnormal       Procedure Component Value - Date/Time    Blood Culture - Blood, Arm, Right [[id number]]  (Normal) Collected: 09/15/23 0212    Lab Status: Final result Specimen: Blood from Arm, Right Updated: 09/20/23 0230     Blood Culture No growth at 5 days    Blood Culture - Blood, Arm, Left [[id number]]  (Normal) Collected: 09/15/23 0212    Lab Status: Final result Specimen: Blood from Arm, Left Updated: 09/20/23 0230     Blood Culture No growth at 5 days            No radiology results from the last 24 hrs        Current medications:  Scheduled Meds:acetaminophen, 500 mg, Per PEG Tube, Q6H  atorvastatin, 80 mg, Per PEG Tube, Nightly  clopidogrel, 75 mg, Per PEG Tube, Daily  Diclofenac Sodium, 2 g, Topical, BID  donepezil, 10 mg, Per PEG Tube, Nightly  Lidocaine, 1 patch, Transdermal, Q24H  melatonin, 5 mg, Per PEG Tube, Nightly  metoprolol tartrate, 25 mg, Per PEG Tube, Q12H  miconazole, 1 application , Topical, Q12H  mirtazapine, 30 mg, Per PEG Tube, Nightly  palliative care oral rinse, , Mouth/Throat, 4x Daily  pantoprazole, 40 mg, Intravenous, Q AM  ProSource No Carb, 30 mL, Per G Tube, Daily  QUEtiapine, 50 mg, Per PEG Tube, Nightly  sodium chloride, 10 mL, Intravenous, Q12H  sodium chloride, 10 mL, Intravenous, Q12H      Continuous Infusions:sodium chloride, 50 mL/hr, Last Rate: 50 mL/hr (10/12/23 0623)      PRN Meds:.  Calcium Replacement - Follow Nurse / BPA Driven Protocol    dextrose    dextrose    glucagon (human recombinant)    ibuprofen    LORazepam    Magnesium Standard Dose Replacement - Follow Nurse / BPA Driven Protocol    ondansetron    Phosphorus Replacement - Follow Nurse / BPA Driven Protocol    Potassium Replacement - Follow Nurse / BPA Driven Protocol    QUEtiapine    sodium chloride    sodium chloride    Assessment & Plan   Assessment &  Plan     Active Hospital Problems    Diagnosis  POA    **Hemorrhagic CVA [I61.9]  Yes    Oropharyngeal dysphagia [R13.12]  Yes    Multiple bilateral CVAs [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        Brief Hospital Course to date:  Kenneth Wen is a 73 y.o. male  with hx of HTN, HLD, T2DM, and GERD who presented to OSH with multiple acute ischemic infarcts of the bilateral cerebral and cerebellar hemispheres as well as left isaac. TTE/JOSIAS was negative PFO at OSH. On 9/13/23 he had worsening in mental status and new inability to move RLE, head CT showed evolution of the existing CVA with new development of petechial hemorrhage. DAPT was held for 24 hours per Neurology recommendations and repeat CT head that evening showed worsening effacement of the right quadrigeminal cistern with suspected increasing upward supratentorial pressure. He was then transferred to [facility] for Neurosurgery evaluation on 9/15/23. He was started on hypertonic saline 9/15/23 through 9/20/23 for cerebral edema. He had fevers with negative cultures but had worsening secretions and labored breathing so was started on Zosyn on 9/16/23. Transferred to the hospitalist service on 9/22/23. Pt demonstrated poor oral intake with elevated sodium levels; therefore, PEG tube was recommended.         Multiple bilateral posterior circulation strokes with hemorrhagic conversion  --Neurology has followed, suspect atheroembolic but cannot rule out cardioembolic given multiple vascular territories  --Plavix monotherapy, high intensity statin  --Needs Holter monitor at discharge  --Follow up in stroke clinic in 8 weeks    Agitation  Encephalopathy  --Seroquel 12.5mg QAM, Seroquel 75 mg  QPM  -- PRN ativan for agitation  --appreciate general neurology input. D/w Dr. Rausch who recs to decrease ativan dose to 0.25mg IV PRN, changed lexapro to remeron and increased remeron to 30mg qhs, increase aricept to 10mg qhs--continues to adjust meds.  Still in restraints currentlly  --restoril/melatonin nightly    Dysphagia  Hypernatremia - resolved  --SLP recommends mechanical ground with honey thick liquids   --Risk of pulling out peg tube, abd binder to be in place at all times. --Surgery recommends to remain in restraints at all times or if off has to have sitter for next 2 weeks   -- dietary following for calorie count, adequate PO intake is limited by his mental status     HTN  --Continue Metoprolol 25 mg BID     GERD  --Continue Protonix     COVID-19-resolved  --Overall asymptomatic and on room air  --No infiltrates seen and low procal  --Did not receive any treatment   --Off Isolation 9/30/2023      Leukocytosis-resolved  --Procalcitionin low at 0.05  --CXR and UA negative  --Blood cultures negative     Elevated LFT's, mild  --Possibly secondary to statin?  --Negative acute hepatitis panel  --repeat AST/ALT improved on 10/3    D/w wife at bedside on 10/12/23    Expected Discharge Location and Transportation: rehab   Expected Discharge   Expected Discharge Date: 10/7/2023; Expected Discharge Time:      DVT prophylaxis:  Mechanical DVT prophylaxis orders are present.     AM-PAC 6 Clicks Score (PT): 10 (10/12/23 1453)    CODE STATUS:   Code Status and Medical Interventions:   Ordered at: 09/29/23 0854     Medical Intervention Limits:    NO intubation (DNI)     Code Status (Patient has no pulse and is not breathing):    No CPR (Do Not Attempt to Resuscitate)     Medical Interventions (Patient has pulse or is breathing):    Limited Support       Osmel Lee MD  10/12/23

## 2023-10-12 NOTE — THERAPY TREATMENT NOTE
Patient Name: Kenneth Wen  : 1950    MRN: [id number]                              Today's Date: 10/12/2023       Admit Date: 9/15/2023    Visit Dx:     ICD-10-CM ICD-9-CM   1. Hemorrhagic CVA  I61.9 431   2. Aphasia  R47.01 784.3   3. Dysarthria  R47.1 784.51   4. Oropharyngeal dysphagia  R13.12 787.22     Patient Active Problem List   Diagnosis    Precordial pain    Elevated BP without diagnosis of hypertension    Dyslipidemia    Hyperglycemia    Multiple bilateral CVAs    Hemorrhagic CVA    HFpEF    T2DM (type 2 diabetes mellitus)    HTN (hypertension)    GERD (gastroesophageal reflux disease)    ICH (intracerebral hemorrhage)    Oropharyngeal dysphagia     Past Medical History:   Diagnosis Date    Acid reflux     Cancer     Skin    Diabetes mellitus     Prediabetes    GERD (gastroesophageal reflux disease) 09/15/2023    HTN (hypertension) 09/15/2023    Kidney disease     T2DM (type 2 diabetes mellitus) 09/15/2023     Past Surgical History:   Procedure Laterality Date    APPENDECTOMY      ENDOSCOPY W/ PEG TUBE PLACEMENT N/A 2023    Procedure: ESOPHAGOGASTRODUODENOSCOPY WITH PERCUTANEOUS ENDOSCOPIC GASTROSTOMY TUBE INSERTION;  Surgeon: Haseeb Carrillo MD;  Location: [facility] ENDOSCOPY;  Service: General;  Laterality: N/A;    HEMORRHOIDECTOMY      NASAL POLYP SURGERY        General Information       Row Name 10/12/23 1424          Physical Therapy Time and Intention    Document Type therapy note (daily note)  -KR     Mode of Treatment physical therapy  -KR       Row Name 10/12/23 1424          General Information    Patient Profile Reviewed yes  -KR     Existing Precautions/Restrictions fall;other (see comments)  PEG with abdominal binder, R sided weakness/coordination deficits  -KR     Barriers to Rehab medically complex;previous functional deficit;cognitive status  -KR       Row Name 10/12/23 1424          Cognition    Orientation Status (Cognition) oriented to;person;disoriented  to;place;situation;time  -KR       Row Name 10/12/23 1424          Safety Issues, Functional Mobility    Safety Issues Affecting Function (Mobility) ability to follow commands;awareness of need for assistance;insight into deficits/self-awareness;impulsivity;judgment;problem-solving;safety precaution awareness;sequencing abilities;safety precautions follow-through/compliance  -KR     Impairments Affecting Function (Mobility) balance;cognition;coordination;endurance/activity tolerance;grasp;motor control;motor planning;muscle tone abnormal;visual/perceptual;sensation/sensory awareness;postural/trunk control;strength;range of motion (ROM)  -KR     Cognitive Impairments, Mobility Safety/Performance attention;awareness, need for assistance;insight into deficits/self-awareness;judgment;problem-solving/reasoning;sequencing abilities;safety precaution follow-through;safety precaution awareness  -KR               User Key  (r) = Recorded By, (t) = Taken By, (c) = Cosigned By      Initials Name Provider Type    Raissa Viramonets, PT Physical Therapist                   Mobility       Row Name 10/12/23 1448          Bed-Chair Transfer    Bed-Chair [place] (Transfers) maximum assist (25% patient effort);2 person assist  -KR     Assistive Device (Bed-Chair Transfers) other (see comments)  UE support  -KR     Comment, (Bed-Chair Transfer) stand pivot towards L. Pt with difficulty sequencing pivot requiring heavy assist to shift hips towards chair.  -KR       Row Name 10/12/23 1448          Sit-Stand Transfer    Sit-Stand [place] (Transfers) maximum assist (25% patient effort);2 person assist  -KR     Comment, (Sit-Stand Transfer) 2x from bed. Pt with heavy R lean.  -KR       Row Name 10/12/23 1448          Gait/Stairs (Locomotion)    [place] Level (Gait) unable to assess  -KR     Comment, (Gait/Stairs) unsafe to assess this date d/t heavy R lean and fatigue.  -KR               User Key  (r) = Recorded By, (t) =  Taken By, (c) = Cosigned By      Initials Name Provider Type    Raissa Viramontes PT Physical Therapist                   Obj/Interventions       Row Name 10/12/23 1450          Balance    Balance Assessment sitting static balance;sitting dynamic balance;standing static balance;standing dynamic balance  -KR     Static Sitting Balance moderate assist;1-person assist;verbal cues;non-verbal cues (demo/gesture)  -KR     Dynamic Sitting Balance maximum assist;2-person assist  -KR     Position, Sitting Balance unsupported;sitting edge of bed  -KR     Static Standing Balance maximum assist;2-person assist  -KR     Dynamic Standing Balance maximum assist;2-person assist  -KR     Position/Device Used, Standing Balance supported;other (see comments)  BUE support  -KR               User Key  (r) = Recorded By, (t) = Taken By, (c) = Cosigned By      Initials Name Provider Type    Raissa Viramontes PT Physical Therapist                   Goals/Plan    No documentation.                  Clinical Impression       Row Name 10/12/23 1451          Pain    Pretreatment Pain Rating 0/10 - no pain  -KR     Posttreatment Pain Rating 0/10 - no pain  -KR       Row Name 10/12/23 1451          Plan of Care Review    Plan of Care Reviewed With patient  -KR     Progress no change  -KR     Outcome Evaluation Pt continues to require 2-person assist for stand-pivot transfer to chair this date with a heavy R lean. Unsafe to assess gait this date d/t fatigue and contraversive pushing to R. Pt will continue to benefit from PT to address ongoing deficits.  -KR       Row Name 10/12/23 1451          Vital Signs    Pre Patient Position Sitting  -KR     Intra Patient Position Standing  -KR     Post Patient Position Sitting  -KR       Row Name 10/12/23 1451          Positioning and Restraints    Pre-Treatment Position in bed  -KR     Post Treatment Position chair  -KR     In Chair notified nsg;reclined;call light within reach;encouraged to call for  assist;exit alarm on;with family/caregiver;waffle cushion;legs elevated;heels elevated;RUE elevated  chair lift in room  -KR     Restraints released:;reapplied:;notified nsg:;soft limb  -KR               User Key  (r) = Recorded By, (t) = Taken By, (c) = Cosigned By      Initials Name Provider Type    KR Raissa Soria, PT Physical Therapist                   Outcome Measures       Row Name 10/12/23 1453 10/12/23 0815       How much help from another person do you currently need...    Turning from your back to your side while in flat bed without using bedrails? 2  -KR 2  -EO    Moving from lying on back to sitting on the side of a flat bed without bedrails? 2  -KR 2  -EO    Moving to and from a bed to a chair (including a wheelchair)? 2  -KR 1  -EO    Standing up from a chair using your arms (e.g., wheelchair, bedside chair)? 2  -KR 2  -EO    Climbing 3-5 steps with a railing? 1  -KR 1  -EO    To walk in hospital room? 1  -KR 1  -EO    AM-PAC 6 Clicks Score (PT) 10  -KR 9  -EO    Highest level of mobility 4 --> Transferred to chair/commode  -KR 3 --> Sat at edge of bed  -EO              User Key  (r) = Recorded By, (t) = Taken By, (c) = Cosigned By      Initials Name Provider Type    EO Alisha Joel RN Registered Nurse    Raissa Viramontes, PT Physical Therapist                                 Physical Therapy Education       Title: PT OT SLP Therapies (In Progress)       Topic: Physical Therapy (In Progress)       Point: Mobility training (In Progress)       Learning Progress Summary             Patient Acceptance, E, NR,VU by KR at 10/12/2023 1453    Acceptance, E,D, NR by AB at 10/11/2023 1547    Acceptance, E, NR by CM at 10/10/2023 1517    Acceptance, E, NR,VU by KR at 10/6/2023 1550    Acceptance, E, NR by SD at 10/5/2023 1437    Acceptance, E, VU by CD at 10/2/2023 1502    Comment: SEE FLOWSHEET    Acceptance, E, NR by KR at 9/25/2023 1324    Acceptance, E, NR by KG at 9/18/2023 1403    Acceptance, E,TB,  NR by AY at 9/15/2023 1254   Family Acceptance, E, NR,VU by KR at 10/12/2023 1453    Acceptance, E, NR,VU by KR at 10/6/2023 1550    Acceptance, E, NR by SD at 10/5/2023 1437   Significant Other Acceptance, E, NR by CM at 10/10/2023 1517                         Point: Home exercise program (In Progress)       Learning Progress Summary             Patient Acceptance, E,D, NR by AB at 10/11/2023 1547    Acceptance, E, NR by CM at 10/10/2023 1517    Acceptance, E, NR by SD at 10/5/2023 1437    Acceptance, E, VU by CD at 10/2/2023 1502    Comment: SEE FLOWSHEET    Acceptance, E, NR by KG at 9/18/2023 1403   Family Acceptance, E, NR by SD at 10/5/2023 1437   Significant Other Acceptance, E, NR by CM at 10/10/2023 1517                         Point: Body mechanics (In Progress)       Learning Progress Summary             Patient Acceptance, E, NR,VU by KR at 10/12/2023 1453    Acceptance, E,D, NR by AB at 10/11/2023 1547    Acceptance, E, NR by CM at 10/10/2023 1517    Acceptance, E, NR,VU by KR at 10/6/2023 1550    Acceptance, E, NR by SD at 10/5/2023 1437    Acceptance, E, VU by CD at 10/2/2023 1502    Comment: SEE FLOWSHEET    Acceptance, E, NR by KR at 9/25/2023 1324    Acceptance, E, NR by KG at 9/18/2023 1403    Acceptance, E,TB, NR by AY at 9/15/2023 1254   Family Acceptance, E, NR,VU by KR at 10/12/2023 1453    Acceptance, E, NR,VU by KR at 10/6/2023 1550    Acceptance, E, NR by SD at 10/5/2023 1437   Significant Other Acceptance, E, NR by CM at 10/10/2023 1517                         Point: Precautions (In Progress)       Learning Progress Summary             Patient Acceptance, E, NR,VU by KR at 10/12/2023 1453    Acceptance, E,D, NR by AB at 10/11/2023 1547    Acceptance, E, NR by CM at 10/10/2023 1517    Acceptance, E, NR,VU by KR at 10/6/2023 1550    Acceptance, E, NR by SD at 10/5/2023 1437    Acceptance, E, VU by CD at 10/2/2023 1502    Comment: SEE FLOWSHEET    Acceptance, E, NR by KR at 9/25/2023 1324     Acceptance, E, NR by KG at 9/18/2023 1403    Acceptance, E,TB, NR by AY at 9/15/2023 1254   Family Acceptance, E, NR,VU by KR at 10/12/2023 1453    Acceptance, E, NR,VU by KR at 10/6/2023 1550    Acceptance, E, NR by SD at 10/5/2023 1437   Significant Other Acceptance, E, NR by CM at 10/10/2023 1517                                         User Key       Initials Effective Dates Name Provider Type Discipline    CD 02/03/23 -  Claudia Duarte, PT Physical Therapist PT    SD 03/13/23 -  Chanetl Scott, PT Physical Therapist PT    KG 05/22/20 -  Mackenzie Mckay, PT Physical Therapist PT    AY 11/10/20 -  Aleshia Armstrong, PT Physical Therapist PT    AB 09/22/22 -  Aleshia Keita, PT Physical Therapist PT    CM 09/22/22 -  Yanira Shields, PT Physical Therapist PT    KR 12/30/22 -  Raissa Soria, PT Physical Therapist PT                  PT Recommendation and Plan  Planned Therapy Interventions (PT): balance training, bed mobility training, gait training, home exercise program, postural re-education, patient/family education, neuromuscular re-education, ROM (range of motion), strengthening, stretching, transfer training, motor coordination training  Plan of Care Reviewed With: patient  Progress: no change  Outcome Evaluation: Pt continues to require 2-person assist for stand-pivot transfer to chair this date with a heavy R lean. Unsafe to assess gait this date d/t fatigue and contraversive pushing to R. Pt will continue to benefit from PT to address ongoing deficits.     Time Calculation:         PT Charges       Row Name 10/12/23 1453             Time Calculation    Start Time 1424  -KR      PT Received On 10/12/23  -KR      PT Goal Re-Cert Due Date 10/16/23  -KR         Timed Charges    10301 - PT Therapeutic Activity Minutes 10  -KR         Total Minutes    Timed Charges Total Minutes 10  -KR       Total Minutes 10  -KR                User Key  (r) = Recorded By, (t) = Taken By, (c) = Cosigned By       Initials Name Provider Type    KR Raissa Soria PT Physical Therapist                  Therapy Charges for Today       Code Description Service Date Service Provider Modifiers Qty    [id number] HC PT THERAPEUTIC ACT EA 15 MIN 10/12/2023 Raissa Soria, PT GP 1            PT G-Codes  Outcome Measure Options: AM-PAC 6 Clicks Basic Mobility (PT)  AM-PAC 6 Clicks Score (PT): 10  AM-PAC 6 Clicks Score (OT): 10  Modified Rae Scale: 4 - Moderately severe disability.  Unable to walk without assistance, and unable to attend to own bodily needs without assistance.  PT Discharge Summary  Anticipated Discharge Disposition (PT): inpatient rehabilitation facility    Raissa Soria PT  10/12/2023

## 2023-10-12 NOTE — THERAPY TREATMENT NOTE
Patient Name: Kenneth Wen  : 1950    MRN: [id number]                              Today's Date: 10/12/2023       Admit Date: 9/15/2023    Visit Dx:     ICD-10-CM ICD-9-CM   1. Hemorrhagic CVA  I61.9 431   2. Aphasia  R47.01 784.3   3. Dysarthria  R47.1 784.51   4. Oropharyngeal dysphagia  R13.12 787.22     Patient Active Problem List   Diagnosis    Precordial pain    Elevated BP without diagnosis of hypertension    Dyslipidemia    Hyperglycemia    Multiple bilateral CVAs    Hemorrhagic CVA    HFpEF    T2DM (type 2 diabetes mellitus)    HTN (hypertension)    GERD (gastroesophageal reflux disease)    ICH (intracerebral hemorrhage)    Oropharyngeal dysphagia     Past Medical History:   Diagnosis Date    Acid reflux     Cancer     Skin    Diabetes mellitus     Prediabetes    GERD (gastroesophageal reflux disease) 09/15/2023    HTN (hypertension) 09/15/2023    Kidney disease     T2DM (type 2 diabetes mellitus) 09/15/2023     Past Surgical History:   Procedure Laterality Date    APPENDECTOMY      ENDOSCOPY W/ PEG TUBE PLACEMENT N/A 2023    Procedure: ESOPHAGOGASTRODUODENOSCOPY WITH PERCUTANEOUS ENDOSCOPIC GASTROSTOMY TUBE INSERTION;  Surgeon: Haseeb Carrillo MD;  Location: [facility] ENDOSCOPY;  Service: General;  Laterality: N/A;    HEMORRHOIDECTOMY      NASAL POLYP SURGERY        General Information       Row Name 10/12/23 1501          OT Time and Intention    Document Type therapy note (daily note)  -MR     Mode of Treatment occupational therapy  -MR       Row Name 10/12/23 1501          General Information    Patient Profile Reviewed yes  -MR     Existing Precautions/Restrictions fall;other (see comments)  PEG with abdominal binder, R sided weakness/coordination deficits  -MR     Barriers to Rehab medically complex;previous functional deficit;cognitive status  -MR       Row Name 10/12/23 1501          Cognition    Orientation Status (Cognition) oriented to;person;disoriented to;place;situation;time   -MR       Row Name 10/12/23 1501          Safety Issues, Functional Mobility    Safety Issues Affecting Function (Mobility) ability to follow commands;awareness of need for assistance;insight into deficits/self-awareness;sequencing abilities;safety precaution awareness;safety precautions follow-through/compliance  -MR     Impairments Affecting Function (Mobility) balance;cognition;coordination;endurance/activity tolerance;grasp;motor control;motor planning;muscle tone abnormal;visual/perceptual;sensation/sensory awareness;postural/trunk control;strength;range of motion (ROM)  -MR     Cognitive Impairments, Mobility Safety/Performance attention;awareness, need for assistance;insight into deficits/self-awareness;judgment;sequencing abilities;safety precaution follow-through;safety precaution awareness  -MR               User Key  (r) = Recorded By, (t) = Taken By, (c) = Cosigned By      Initials Name Provider Type     Vivek Meera, OT Occupational Therapist                     Mobility/ADL's       Row Name 10/12/23 1502          Bed Mobility    Bed Mobility rolling left;rolling right;supine-sit  -MR     Rolling Left [place] (Bed Mobility) contact guard;verbal cues;nonverbal cues (demo/gesture)  -MR     Rolling Right [place] (Bed Mobility) maximum assist (25% patient effort);verbal cues;nonverbal cues (demo/gesture)  -MR     Supine-Sit [place] (Bed Mobility) maximum assist (25% patient effort);verbal cues;nonverbal cues (demo/gesture)  -MR     Bed Mobility, Safety Issues cognitive deficits limit understanding;decreased use of arms for pushing/pulling;decreased use of legs for bridging/pushing;impaired trunk control for bed mobility  -MR     Assistive Device (Bed Mobility) bed rails;draw sheet;head of bed elevated  -MR       Row Name 10/12/23 1502          Transfers    Transfers sit-stand transfer  -MR       Row Name 10/12/23 1502          Sit-Stand Transfer    Sit-Stand [place] (Transfers)  maximum assist (25% patient effort);2 person assist  -MR     Assistive Device (Sit-Stand Transfers) other (see comments)  BUE support  -MR       Row Name 10/12/23 1502          Activities of Daily Living    BADL Assessment/Intervention upper body dressing;lower body dressing;toileting  -MR       Row Name 10/12/23 1502          Lower Body Dressing Assessment/Training    [place] (Lower Body Dressing) don;socks;dependent (less than 25% patient effort)  -MR     Position (Lower Body Dressing) supine  -MR       Row Name 10/12/23 1502          Upper Body Dressing Assessment/Training    [place] (Upper Body Dressing) don;maximum assist (25% patient effort)  -MR     Position (Upper Body Dressing) supine  -MR       Row Name 10/12/23 1502          Toileting Assessment/Training    [place] (Toileting) change pad/brief;perform perineal hygiene;adjust/manage clothing;dependent (less than 25% patient effort)  -MR     Position (Toileting) supine  -MR               User Key  (r) = Recorded By, (t) = Taken By, (c) = Cosigned By      Initials Name Provider Type    Meera Juarez OT Occupational Therapist                   Obj/Interventions       Row Name 10/12/23 1504          Balance    Balance Assessment sitting static balance;sitting dynamic balance;standing static balance;standing dynamic balance  -MR     Static Sitting Balance moderate assist;verbal cues;non-verbal cues (demo/gesture)  -MR     Dynamic Sitting Balance maximum assist;2-person assist;verbal cues;non-verbal cues (demo/gesture)  -MR     Position, Sitting Balance unsupported;sitting edge of bed  -MR     Static Standing Balance maximum assist;2-person assist;verbal cues;non-verbal cues (demo/gesture)  -MR     Dynamic Standing Balance maximum assist;2-person assist;verbal cues;non-verbal cues (demo/gesture)  -MR     Position/Device Used, Standing Balance supported;other (see comments)  BUE support  -MR     Balance Interventions  sitting;standing;sit to stand;static;supported;dynamic;minimal challenge;occupation based/functional task  -MR     Comment, Balance Pt demonstrating strong R lateral lean once seated at EOB. Pt able to improve w/ assist for hand placement on bed rail to pull self to the L w/ cueing briefly.  -MR               User Key  (r) = Recorded By, (t) = Taken By, (c) = Cosigned By      Initials Name Provider Type    Meera Juarez OT Occupational Therapist                   Goals/Plan    No documentation.                  Clinical Impression       Row Name 10/12/23 150          Pain Assessment    Pretreatment Pain Rating 0/10 - no pain  -MR     Posttreatment Pain Rating 0/10 - no pain  -MR     Pain Intervention(s) Ambulation/increased activity;Repositioned  -MR       Row Name 10/12/23 6824          Plan of Care Review    Plan of Care Reviewed With patient  -MR     Progress no change  -MR     Outcome Evaluation Pt continues to require siginifcant assist for bed mobility, toileting, UB/LB dressing and balance. Improved command following noted this date w/ improved bed mobility noted. Continue to progress as able per current POC.  -MR       Row Name 10/12/23 9057          Therapy Plan Review/Discharge Plan (OT)    Anticipated Discharge Disposition (OT) inpatient rehabilitation facility  -MR       Row Name 10/12/23 1501          Vital Signs    O2 Delivery Pre Treatment room air  -MR     O2 Delivery Intra Treatment room air  -MR     O2 Delivery Post Treatment room air  -MR     Pre Patient Position Supine  -MR     Intra Patient Position Standing  -MR     Post Patient Position Sitting  -MR       Row Name 10/12/23 150          Positioning and Restraints    Pre-Treatment Position in bed  -MR     Post Treatment Position bed  -MR     In Bed sitting EOB;with PT  -MR               User Key  (r) = Recorded By, (t) = Taken By, (c) = Cosigned By      Initials Name Provider Type    Meera Juarez, LATIA Occupational Therapist                    Outcome Measures       Row Name 10/12/23 1508          How much help from another is currently needed...    Putting on and taking off regular lower body clothing? 1  -MR     Bathing (including washing, rinsing, and drying) 2  -MR     Toileting (which includes using toilet bed pan or urinal) 1  -MR     Putting on and taking off regular upper body clothing 2  -MR     Taking care of personal grooming (such as brushing teeth) 2  -MR     Eating meals 2  -MR     AM-PAC 6 Clicks Score (OT) 10  -MR       Row Name 10/12/23 1453 10/12/23 0815       How much help from another person do you currently need...    Turning from your back to your side while in flat bed without using bedrails? 2  -KR 2  -EO    Moving from lying on back to sitting on the side of a flat bed without bedrails? 2  -KR 2  -EO    Moving to and from a bed to a chair (including a wheelchair)? 2  -KR 1  -EO    Standing up from a chair using your arms (e.g., wheelchair, bedside chair)? 2  -KR 2  -EO    Climbing 3-5 steps with a railing? 1  -KR 1  -EO    To walk in hospital room? 1  -KR 1  -EO    AM-PAC 6 Clicks Score (PT) 10  -KR 9  -EO    Highest level of mobility 4 --> Transferred to chair/commode  -KR 3 --> Sat at edge of bed  -EO      Row Name 10/12/23 1508          Functional Assessment    Outcome Measure Options AM-PAC 6 Clicks Daily Activity (OT)  -MR               User Key  (r) = Recorded By, (t) = Taken By, (c) = Cosigned By      Initials Name Provider Type    EO Alisha Joel, RN Registered Nurse    Meera Juarez, OT Occupational Therapist    KR Raissa Soria, PT Physical Therapist                    Occupational Therapy Education       Title: PT OT SLP Therapies (In Progress)       Topic: Occupational Therapy (In Progress)       Point: ADL training (In Progress)       Description:   Instruct learner(s) on proper safety adaptation and remediation techniques during self care or transfers.   Instruct in proper use of assistive devices.                   Learning Progress Summary             Patient Acceptance, E, NR by MR at 10/12/2023 1508    Acceptance, E, NR by JG at 10/9/2023 1533    Acceptance, E, NR by MR at 10/6/2023 1549    Acceptance, E,D, NR,NL by  at 10/4/2023 0948    Acceptance, E, NR by MR at 9/28/2023 1556    Acceptance, E, NR by  at 9/25/2023 1045    Acceptance, E, NR by [facility] at 9/20/2023 1420    Acceptance, E, NR by  at 9/18/2023 1532    Acceptance, E, NR by  at 9/15/2023 1531   Family Acceptance, E, NR by MR at 10/12/2023 1508    Acceptance, E, NR by JG at 10/9/2023 1533    Acceptance, E, NR by MR at 10/6/2023 1549    Acceptance, E, NR by MR at 9/28/2023 1556                         Point: Home exercise program (In Progress)       Description:   Instruct learner(s) on appropriate technique for monitoring, assisting and/or progressing therapeutic exercises/activities.                  Learning Progress Summary             Patient Acceptance, E, NR by MR at 10/12/2023 1508    Acceptance, E, NR by MR at 10/6/2023 1549    Acceptance, E,D, NR,NL by  at 10/4/2023 0948    Acceptance, E, NR by MR at 9/28/2023 1556    Acceptance, E, NR by  at 9/25/2023 1045    Acceptance, E, NR by [facility] at 9/20/2023 1420    Acceptance, E, NR by  at 9/18/2023 1532    Acceptance, E, NR by  at 9/15/2023 1531   Family Acceptance, E, NR by MR at 10/12/2023 1508    Acceptance, E, NR by MR at 10/6/2023 1549    Acceptance, E, NR by MR at 9/28/2023 1556                         Point: Precautions (In Progress)       Description:   Instruct learner(s) on prescribed precautions during self-care and functional transfers.                  Learning Progress Summary             Patient Acceptance, E, NR by MR at 10/12/2023 1508    Acceptance, E, NR by JG at 10/9/2023 1533    Acceptance, E, NR by MR at 10/6/2023 1549    Acceptance, E,D, NR,NL by CS at 10/4/2023 0948    Acceptance, E, NR by MR at 9/28/2023 1556    Acceptance, E, NR by CS1 at 9/20/2023 1420     Acceptance, E, NR by  at 9/18/2023 1532    Acceptance, E, NR by  at 9/15/2023 1531   Family Acceptance, E, NR by MR at 10/12/2023 1508    Acceptance, E, NR by JG at 10/9/2023 1533    Acceptance, E, NR by MR at 10/6/2023 1549    Acceptance, E, NR by MR at 9/28/2023 1556                         Point: Body mechanics (In Progress)       Description:   Instruct learner(s) on proper positioning and spine alignment during self-care, functional mobility activities and/or exercises.                  Learning Progress Summary             Patient Acceptance, E, NR by MR at 10/12/2023 1508    Acceptance, E, NR by JG at 10/9/2023 1533    Acceptance, E, NR by MR at 10/6/2023 1549    Acceptance, E,D, NR,NL by  at 10/4/2023 0948    Acceptance, E, NR by MR at 9/28/2023 1556    Acceptance, E, NR by [facility] at 9/20/2023 1420    Acceptance, E, NR by  at 9/18/2023 1532    Acceptance, E, NR by  at 9/15/2023 1531   Family Acceptance, E, NR by MR at 10/12/2023 1508    Acceptance, E, NR by JG at 10/9/2023 1533    Acceptance, E, NR by MR at 10/6/2023 1549    Acceptance, E, NR by MR at 9/28/2023 1556                                         User Key       Initials Effective Dates Name Provider Type Discipline     02/03/23 -  Jess Carrillo, OT Occupational Therapist OT    1 09/02/21 -  Carmen Carr, OT Occupational Therapist OT     06/16/21 -  Jim Judge, OT Occupational Therapist OT     10/14/22 -  Jenny Rivera, OT Occupational Therapist OT     09/22/22 -  Meera Doyle, OT Occupational Therapist OT    J 08/30/23 -  Power Herman, OT Student OT Student OT                  OT Recommendation and Plan  Planned Therapy Interventions (OT): activity tolerance training, adaptive equipment training, BADL retraining, functional balance retraining, occupation/activity based interventions, ROM/therapeutic exercise, strengthening exercise, patient/caregiver education/training, neuromuscular control/coordination  retraining, cognitive/visual perception retraining, transfer/mobility retraining, passive ROM/stretching, IADL retraining  Plan of Care Review  Plan of Care Reviewed With: patient  Progress: no change  Outcome Evaluation: Pt continues to require siginifcant assist for bed mobility, toileting, UB/LB dressing and balance. Improved command following noted this date w/ improved bed mobility noted. Continue to progress as able per current POC.     Time Calculation:         Time Calculation- OT       Row Name 10/12/23 1508             Time Calculation- OT    OT Start Time 1412  -MR      OT Received On 10/12/23  -MR         Timed Charges    05469 - OT Therapeutic Activity Minutes 3  -MR      67890 - OT Self Care/Mgmt Minutes 9  -MR         Total Minutes    Timed Charges Total Minutes 12  -MR       Total Minutes 12  -MR                User Key  (r) = Recorded By, (t) = Taken By, (c) = Cosigned By      Initials Name Provider Type    Meera Juarez OT Occupational Therapist                  Therapy Charges for Today       Code Description Service Date Service Provider Modifiers Qty    38728341666 HC OT SELF CARE/MGMT/TRAIN EA 15 MIN 10/12/2023 Meera Doyle OT GO 1                 Meera Doyle OT  10/12/2023

## 2023-10-12 NOTE — PLAN OF CARE
Goal Outcome Evaluation:  Plan of Care Reviewed With: patient        Progress: no change  Outcome Evaluation: Pt continues to require 2-person assist for stand-pivot transfer to chair this date with a heavy R lean. Unsafe to assess gait this date d/t fatigue and contraversive pushing to R. Pt will continue to benefit from PT to address ongoing deficits.      Anticipated Discharge Disposition (PT): inpatient rehabilitation facility

## 2023-10-12 NOTE — PROGRESS NOTES
[facility] note[facility]urology     [facility]    Patient Care Team:  Susan Powers APRN as PCP - General (Family Medicine)    Chief complaint: Restless    History: The patient is more awake today.  He is also eating better.    He slept through the night.    He is still in restraints.      Past Medical History:   Diagnosis Date    Acid reflux     Cancer     Skin    Diabetes mellitus     Prediabetes    GERD (gastroesophageal reflux disease) 09/15/2023    HTN (hypertension) 09/15/2023    Kidney disease     T2DM (type 2 diabetes mellitus) 09/15/2023       Vital Signs   Vitals:    10/11/23 2023 10/11/23 2301 10/12/23 0351 10/12/23 0755   BP: 138/84 107/70 157/75 140/75   BP Location:  Right arm Right arm Right arm   Patient Position:  Lying Lying Lying   Pulse: 88 72 69 82   Resp:  18 18 18   Temp:  98.2 °F (36.8 °C) 97 °F (36.1 °C) 98.5 °F (36.9 °C)   TempSrc:  Axillary Axillary Axillary   SpO2:   99% 100%   Weight:       Height:           Physical Exam:   General: More awake.  Conversation is better.                  Neuro: Speech shows a significant dysarthria.    The right arm and leg are now moving.    He is still somewhat restless with his left arm.        Results Review:  Reviewed  Results from last 7 days   Lab Units 10/12/23  0725   WBC 10*3/mm3 5.29   HEMOGLOBIN g/dL 12.0*   HEMATOCRIT % 36.0*   PLATELETS 10*3/mm3 256     Results from last 7 days   Lab Units 10/12/23  0725 10/09/23  0454   SODIUM mmol/L 138 139   POTASSIUM mmol/L 4.4 4.0   CHLORIDE mmol/L 105 105   CO2 mmol/L 28.0 26.0   BUN mg/dL 15 21   CREATININE mg/dL 0.66* 0.65*   CALCIUM mg/dL 9.1 8.9   BILIRUBIN mg/dL  --  0.3   ALK PHOS U/L  --  83   ALT (SGPT) U/L  --  46*   AST (SGOT) U/L  --  33   GLUCOSE mg/dL 168* 145*       Imaging Results (Last 24 Hours)       ** No results found for the last 24 hours. **            Assessment:  Restless continues.    Sleep and appetite are better.        Plan:  Patient is getting Ativan 0.25 concomitant with his sleep  medications and donepezil.    We will move the time up to 1900 to see if that helps.    Comment:  Doing somewhat better but still in restraints.  Continues we need to resume the morning dose of Seroquel 12.5.         I discussed the patients findings and my recommendations with patient and family    Toni Rausch MD  10/12/23  13:13 EDT

## 2023-10-13 LAB
GLUCOSE BLDC GLUCOMTR-MCNC: 108 MG/DL (ref 70–130)
GLUCOSE BLDC GLUCOMTR-MCNC: 112 MG/DL (ref 70–130)
GLUCOSE BLDC GLUCOMTR-MCNC: 115 MG/DL (ref 70–130)
GLUCOSE BLDC GLUCOMTR-MCNC: 142 MG/DL (ref 70–130)

## 2023-10-13 PROCEDURE — 25010000002 ONDANSETRON PER 1 MG: Performed by: SURGERY

## 2023-10-13 PROCEDURE — 25810000003 SODIUM CHLORIDE 0.9 % SOLUTION: Performed by: NURSE PRACTITIONER

## 2023-10-13 PROCEDURE — 82948 REAGENT STRIP/BLOOD GLUCOSE: CPT

## 2023-10-13 PROCEDURE — 99232 SBSQ HOSP IP/OBS MODERATE 35: CPT | Performed by: INTERNAL MEDICINE

## 2023-10-13 PROCEDURE — 99232 SBSQ HOSP IP/OBS MODERATE 35: CPT | Performed by: PSYCHIATRY & NEUROLOGY

## 2023-10-13 RX ORDER — QUETIAPINE FUMARATE 100 MG/1
100 TABLET, FILM COATED ORAL NIGHTLY
Status: DISCONTINUED | OUTPATIENT
Start: 2023-10-13 | End: 2023-10-16

## 2023-10-13 RX ORDER — QUETIAPINE FUMARATE 25 MG/1
25 TABLET, FILM COATED ORAL EVERY 12 HOURS PRN
Status: DISCONTINUED | OUTPATIENT
Start: 2023-10-13 | End: 2023-10-24

## 2023-10-13 RX ADMIN — ATORVASTATIN CALCIUM 80 MG: 40 TABLET, FILM COATED ORAL at 20:30

## 2023-10-13 RX ADMIN — MINERAL OIL: 1000 LIQUID ORAL at 18:29

## 2023-10-13 RX ADMIN — ACETAMINOPHEN ORAL SOLUTION 500 MG: 650 SOLUTION ORAL at 18:30

## 2023-10-13 RX ADMIN — METOPROLOL TARTRATE 25 MG: 25 TABLET, FILM COATED ORAL at 08:37

## 2023-10-13 RX ADMIN — ONDANSETRON 4 MG: 2 INJECTION INTRAMUSCULAR; INTRAVENOUS at 20:30

## 2023-10-13 RX ADMIN — MINERAL OIL: 1000 LIQUID ORAL at 12:00

## 2023-10-13 RX ADMIN — PANTOPRAZOLE SODIUM 40 MG: 40 INJECTION, POWDER, LYOPHILIZED, FOR SOLUTION INTRAVENOUS at 08:38

## 2023-10-13 RX ADMIN — Medication 10 ML: at 08:37

## 2023-10-13 RX ADMIN — ACETAMINOPHEN ORAL SOLUTION 500 MG: 650 SOLUTION ORAL at 08:36

## 2023-10-13 RX ADMIN — Medication 5 MG: at 18:31

## 2023-10-13 RX ADMIN — MICONAZOLE NITRATE 1 APPLICATION: 20 CREAM TOPICAL at 20:31

## 2023-10-13 RX ADMIN — SODIUM CHLORIDE 50 ML/HR: 9 INJECTION, SOLUTION INTRAVENOUS at 03:25

## 2023-10-13 RX ADMIN — MICONAZOLE NITRATE 1 APPLICATION: 20 CREAM TOPICAL at 09:05

## 2023-10-13 RX ADMIN — Medication 30 ML: at 08:42

## 2023-10-13 RX ADMIN — DICLOFENAC SODIUM 2 G: 9.3 GEL TOPICAL at 20:30

## 2023-10-13 RX ADMIN — MIRTAZAPINE 30 MG: 15 TABLET, FILM COATED ORAL at 18:30

## 2023-10-13 RX ADMIN — QUETIAPINE FUMARATE 25 MG: 25 TABLET ORAL at 07:27

## 2023-10-13 RX ADMIN — DONEPEZIL HYDROCHLORIDE 10 MG: 10 TABLET, FILM COATED ORAL at 18:30

## 2023-10-13 RX ADMIN — LIDOCAINE 1 PATCH: 4 PATCH TOPICAL at 08:43

## 2023-10-13 RX ADMIN — CLOPIDOGREL BISULFATE 75 MG: 75 TABLET ORAL at 08:37

## 2023-10-13 RX ADMIN — MINERAL OIL: 1000 LIQUID ORAL at 10:02

## 2023-10-13 RX ADMIN — ACETAMINOPHEN ORAL SOLUTION 500 MG: 650 SOLUTION ORAL at 23:21

## 2023-10-13 RX ADMIN — MINERAL OIL: 1000 LIQUID ORAL at 20:30

## 2023-10-13 RX ADMIN — QUETIAPINE FUMARATE 100 MG: 100 TABLET ORAL at 18:30

## 2023-10-13 RX ADMIN — METOPROLOL TARTRATE 25 MG: 25 TABLET, FILM COATED ORAL at 20:30

## 2023-10-13 RX ADMIN — DICLOFENAC SODIUM 2 G: 9.3 GEL TOPICAL at 08:45

## 2023-10-13 RX ADMIN — Medication 10 ML: at 20:31

## 2023-10-13 RX ADMIN — ACETAMINOPHEN ORAL SOLUTION 500 MG: 650 SOLUTION ORAL at 11:55

## 2023-10-13 NOTE — PROGRESS NOTES
Neurology       Patient Care Team:  Susan Powers APRN as PCP - General (Family Medicine)    Chief complaint: Restless    History: Patient slept poorly became combative and agitated last night.    He remains restless today.    He had been in mittens before and was able to use his teeth to get them off.      Past Medical History:   Diagnosis Date    Acid reflux     Cancer     Skin    Diabetes mellitus     Prediabetes    GERD (gastroesophageal reflux disease) 09/15/2023    HTN (hypertension) 09/15/2023    Kidney disease     T2DM (type 2 diabetes mellitus) 09/15/2023       Vital Signs   Vitals:    10/13/23 0736 10/13/23 0829 10/13/23 1103 10/13/23 1114   BP: 152/91  126/85    BP Location:   Right arm    Patient Position:   Lying    Pulse:  72 71    Resp: 20 18 18    Temp: 99.8 °F (37.7 °C)  99.3 °F (37.4 °C) 98.3 °F (36.8 °C)   TempSrc: Axillary  Axillary Oral   SpO2:  100% 94%    Weight:       Height:           Physical Exam:   General: Awake              Neuro: Cognitively impaired.    Dysarthric.    Hemiplegic on the right.    Restless on the left.        Results Review:  Reviewed  Results from last 7 days   Lab Units 10/12/23  0725   WBC 10*3/mm3 5.29   HEMOGLOBIN g/dL 12.0*   HEMATOCRIT % 36.0*   PLATELETS 10*3/mm3 256     Results from last 7 days   Lab Units 10/12/23  0725 10/09/23  0454   SODIUM mmol/L 138 139   POTASSIUM mmol/L 4.4 4.0   CHLORIDE mmol/L 105 105   CO2 mmol/L 28.0 26.0   BUN mg/dL 15 21   CREATININE mg/dL 0.66* 0.65*   CALCIUM mg/dL 9.1 8.9   BILIRUBIN mg/dL  --  0.3   ALK PHOS U/L  --  83   ALT (SGPT) U/L  --  46*   AST (SGOT) U/L  --  33   GLUCOSE mg/dL 168* 145*       Imaging Results (Last 24 Hours)       ** No results found for the last 24 hours. **            Assessment:  Ongoing confusion and sleep disturbance    Plan:  Increase Seroquel to 100 mg at bedtime.    Continue 12.5 Seroquel in the morning.    Seroquel 25 mg as needed for sedation    Comment:  Patient is not much better at this  point.  Prognosis is guarded         I discussed the patients findings and my recommendations with patient, family, and primary care team    Toni Rausch MD  10/13/23  11:36 EDT

## 2023-10-13 NOTE — PROGRESS NOTES
[facility] Medicine Services  PROGRESS NOTE    Patient Name: Kenneth Wen  : 1950  MRN: [id number]    Date of Admission: 9/15/2023  Primary Care Physician: Susan Powers APRN    Subjective   Subjective     CC:  F/u   CVA     HPI:  Much more agitated overnight.  ?didn't sleep.  Per nursing ativan didn't help but much better after PRN seroquel this AM.       Objective   Objective     Vital Signs:   Temp:  [97.5 °F (36.4 °C)-99.8 °F (37.7 °C)] 98.3 °F (36.8 °C)  Heart Rate:  [71-72] 71  Resp:  [18-20] 18  BP: (123-153)/() 126/85     Physical Exam:  Constitutional: No acute distress, awake, alert, more agitated today, in restraints, wife at bedside  HENT: NCAT, mucous membranes moist  Respiratory: Clear to auscultation bilaterally, respiratory effort normal   Cardiovascular: RRR, no murmurs, rubs, or gallops  Gastrointestinal: soft, PEG in place, abdominal binder  Musculoskeletal: No bilateral ankle edema  Neurologic: moves all extremities, restraints. Speech clear  Skin: No rashes        Results Reviewed:  LAB RESULTS:      Lab 10/12/23  0725 10/09/23  0454   WBC 5.29 5.52   HEMOGLOBIN 12.0* 12.1*   HEMATOCRIT 36.0* 36.4*   PLATELETS 256 266   NEUTROS ABS  --  2.79   IMMATURE GRANS (ABS)  --  0.01   LYMPHS ABS  --  1.56   MONOS ABS  --  0.74   EOS ABS  --  0.32   MCV 93.5 93.3   CRP  --  <0.30         Lab 10/12/23  0725 10/09/23  0454   SODIUM 138 139   POTASSIUM 4.4 4.0   CHLORIDE 105 105   CO2 28.0 26.0   ANION GAP 5.0 8.0   BUN 15 21   CREATININE 0.66* 0.65*   EGFR 99.7 100.1   GLUCOSE 168* 145*   CALCIUM 9.1 8.9   MAGNESIUM  --  2.0   PHOSPHORUS  --  3.4         Lab 10/09/23  045   TOTAL PROTEIN 6.2   ALBUMIN 3.6   GLOBULIN 2.6   ALT (SGPT) 46*   AST (SGOT) 33   BILIRUBIN 0.3   ALK PHOS 83             Lab 10/09/23  0454   CHOLESTEROL 86   TRIGLYCERIDES 67             Brief Urine Lab Results  (Last result in the past 365 days)        Color   Clarity   Blood   Leuk Est    Nitrite   Protein   CREAT   Urine HCG        09/15/23 1818 Yellow   Cloudy   Trace   Negative   Negative   Negative                   Microbiology Results Abnormal       Procedure Component Value - Date/Time    Blood Culture - Blood, Arm, Right [[id number]]  (Normal) Collected: 09/15/23 0212    Lab Status: Final result Specimen: Blood from Arm, Right Updated: 09/20/23 0230     Blood Culture No growth at 5 days    Blood Culture - Blood, Arm, Left [[id number]]  (Normal) Collected: 09/15/23 0212    Lab Status: Final result Specimen: Blood from Arm, Left Updated: 09/20/23 0230     Blood Culture No growth at 5 days            No radiology results from the last 24 hrs        Current medications:  Scheduled Meds:acetaminophen, 500 mg, Per PEG Tube, Q6H  atorvastatin, 80 mg, Per PEG Tube, Nightly  clopidogrel, 75 mg, Per PEG Tube, Daily  Diclofenac Sodium, 2 g, Topical, BID  donepezil, 10 mg, Per PEG Tube, Nightly  Lidocaine, 1 patch, Transdermal, Q24H  melatonin, 5 mg, Per PEG Tube, Nightly  metoprolol tartrate, 25 mg, Per PEG Tube, Q12H  miconazole, 1 application , Topical, Q12H  mirtazapine, 30 mg, Per PEG Tube, Nightly  palliative care oral rinse, , Mouth/Throat, 4x Daily  pantoprazole, 40 mg, Intravenous, Q AM  ProSource No Carb, 30 mL, Per G Tube, Daily  QUEtiapine, 100 mg, Per PEG Tube, Nightly  sodium chloride, 10 mL, Intravenous, Q12H  sodium chloride, 10 mL, Intravenous, Q12H      Continuous Infusions:sodium chloride, 50 mL/hr, Last Rate: 50 mL/hr (10/13/23 0325)      PRN Meds:.  Calcium Replacement - Follow Nurse / BPA Driven Protocol    dextrose    dextrose    glucagon (human recombinant)    ibuprofen    Magnesium Standard Dose Replacement - Follow Nurse / BPA Driven Protocol    ondansetron    Phosphorus Replacement - Follow Nurse / BPA Driven Protocol    Potassium Replacement - Follow Nurse / BPA Driven Protocol    QUEtiapine    sodium chloride    sodium chloride    Assessment & Plan   Assessment & Plan      Active Hospital Problems    Diagnosis  POA    **Hemorrhagic CVA [I61.9]  Yes    Oropharyngeal dysphagia [R13.12]  Yes    Multiple bilateral CVAs [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        Brief Hospital Course to date:  Kenneth Wen is a 73 y.o. male  with hx of HTN, HLD, T2DM, and GERD who presented to OSH with multiple acute ischemic infarcts of the bilateral cerebral and cerebellar hemispheres as well as left isaac. TTE/JOSIAS was negative PFO at OSH. On 9/13/23 he had worsening in mental status and new inability to move RLE, head CT showed evolution of the existing CVA with new development of petechial hemorrhage. DAPT was held for 24 hours per Neurology recommendations and repeat CT head that evening showed worsening effacement of the right quadrigeminal cistern with suspected increasing upward supratentorial pressure. He was then transferred to [facility] for Neurosurgery evaluation on 9/15/23. He was started on hypertonic saline 9/15/23 through 9/20/23 for cerebral edema. He had fevers with negative cultures but had worsening secretions and labored breathing so was started on Zosyn on 9/16/23. Transferred to the hospitalist service on 9/22/23. Pt demonstrated poor oral intake with elevated sodium levels; therefore, PEG tube was recommended.         Multiple bilateral posterior circulation strokes with hemorrhagic conversion  --Neurology has followed, suspect atheroembolic but cannot rule out cardioembolic given multiple vascular territories  --Plavix monotherapy, high intensity statin  --Needs Holter monitor at discharge  --Follow up in stroke clinic in 8 weeks    Agitation  Encephalopathy  --Seroquel 12.5mg QAM, Seroquel 75 mg QPM  --  PRN ativan for agitation  --appreciate general neurology input recs to decrease ativan dose to 0.25mg IV PRN, changed lexapro to remeron and increased remeron to 30mg qhs, increase aricept to 10mg qhs--continues to adjust meds.  Plan to increase seroquel to 100mg at bedtime, 12.5mg in the morning and continue seroquel 25mg PRN.  Still in restraints currentlly  --restoril/melatonin nightly    Dysphagia  Hypernatremia - resolved  --SLP recommends mechanical ground with honey thick liquids   --Risk of pulling out peg tube, abd binder to be in place at all times. --Surgery recommends to remain in restraints at all times or if off has to have sitter for next 2 weeks   -- dietary following for calorie count, adequate PO intake is limited by his mental status     HTN  --Continue Metoprolol 25 mg BID     GERD  --Continue Protonix     COVID-19-resolved  --Overall asymptomatic and on room air  --No infiltrates seen and low procal  --Did not receive any treatment   --Off Isolation 9/30/2023      Leukocytosis-resolved  --Procalcitionin low at 0.05  --CXR and UA negative  --Blood cultures negative     Elevated LFT's, mild  --Possibly secondary to statin?  --Negative acute hepatitis panel  --repeat AST/ALT improved on 10/3    D/w wife at bedside on 10/13/23    Expected Discharge Location and Transportation: rehab   Expected Discharge   Expected Discharge Date: 10/7/2023; Expected Discharge Time:      DVT prophylaxis:  Mechanical DVT prophylaxis orders are present.     AM-PAC 6 Clicks Score (PT): 10 (10/12/23 3303)    CODE STATUS:   Code Status and Medical Interventions:   Ordered at: 09/29/23 0835     Medical Intervention Limits:    NO intubation (DNI)     Code Status (Patient has no pulse and is not breathing):    No CPR (Do Not Attempt to Resuscitate)     Medical Interventions (Patient has pulse or is breathing):    Limited Support       Osmel eLe MD  10/13/23

## 2023-10-13 NOTE — PROGRESS NOTES
Clinical Nutrition   Nutrition Support Assessment  Reason for Visit: Follow-up protocol, EN/PO    Patient Name: Kenneth Wen  YOB: 1950  MRN: [id number]  Date of Encounter: 10/13/23 15:01 EDT  Admission date: 9/15/2023    Comments:     Pt continues to have poor PO intake regardless of nocturnal feeds and addition of Remeron. Currently requiring restraints again. Per spouse pt with difficult night and did not wish to wake for lunch. Discussed option to change to bolus feeds and meet 100% of needs and allow for PO intake for comfort only. Spouse agreeable.     Bolus recommendation:  Formula: Isosource 1.5  Total Cartons: 5  Total volume: 1250mL  Bolus Regimen: 1 carton (250mL) 5x/d - ~q3hrs between 6a and 9p  Provide Prosource 1x daily  Water Flush: 75mL before and after each bolus     At goal volume, this regimen provides: 1935kcal (102% est needs), 100g protein (100% est needs), 19g fiber, 950mL TF FW (+750mL flush = 1700mL total)       Nutrition Assessment   Admission Diagnosis:  ICH (intracerebral hemorrhage) [I61.9]      Problem List:    Hemorrhagic CVA    Dyslipidemia    Multiple bilateral CVAs    HFpEF    T2DM (type 2 diabetes mellitus)    HTN (hypertension)    GERD (gastroesophageal reflux disease)    ICH (intracerebral hemorrhage)    Oropharyngeal dysphagia        PMH:  He  has a past medical history of Acid reflux, Cancer, Diabetes mellitus, GERD (gastroesophageal reflux disease) (09/15/2023), HTN (hypertension) (09/15/2023), Kidney disease, and T2DM (type 2 diabetes mellitus) (09/15/2023).    PSH: He  has a past surgical history that includes Appendectomy; Nasal polyp surgery; Hemorrhoid surgery; and Esophagogastroduodenoscopy w/ PEG (N/A, 9/29/2023).      Applicable Nutrition Concerns:   Skin:  Oral:  GI:      Applicable Interval History:   9/16 3% Hypertonic saline initiated  9/15 FEES:  SLP Swallowing Diagnosis: mod-severe, oral dysphagia, severe, pharyngeal dysphagia  (09/15/23 0831)  SLP Diet Recommendation: NPO, temporary alternate methods of nutrition/hydration, other (see comments) (okay for 2-3 ice chips per hour as tolerated)   9/23-SLP Diet Recommendation: puree, honey thick liquids.  9/29- PEG tube placed. Consult for tube feeding assessment.   10/4: MBS - SLP Diet Recommendation: puree, honey thick liquids, no mixed consistencies     Reported/Observed/Food/Nutrition Related History:     10/13  Pt continues to have poor PO intake regardless of nocturnal feeds and addition of Remeron. Currently requiring restraints again. Per spouse pt with difficult night and did not wish to wake for lunch. Discussed option to change to bolus feeds and meet 100% of needs and allow for PO intake for comfort only. Spouse agreeable.     10/9  Spouse wanting to cont nocturnal feeding to be able to try p.o feeding. Seemed improved from yesterday. Will change hrs of delivery to 16.    10/8  Pt with meal refusal x2 on 10/7. With refusal, nocturnal feeds inappropriate to meet estimated needs. Will continue calorie count through today and re-evaluate Monday if need to return to continuous feeds. Palliative continues following for GOC.      10/7  Pt with noted improved PO yesterday however remains inadequate to d/c nocturnal feeds at this time. Will continue calorie count for 2 more days.      10/6  Pt continues with inconsistent intake. Continues to require restraints to prevent pulling PEG regardless of abdominal binder. Spouse present at all meals to assist. Observed increased acceptance of lunch however was not alert enough for breakfast.     10/4  Pt with improved PO at lunch today however per spouse did not eat at breakfast. Suspect due to working with therapies at breakfast time. Tolerated nocturnal regimen. [facility] completed - SLP recs puree, honey thick liquids, no mixed consistencies.      10/3  Tolerating feeds at goal. Pt with ~25% at meals being fed by spouse - ? If able to improve PO  "intake with transition to nocturnal feeds. Discussed with spouse, agreeable to try.     10/1   EN ordered yesterday, started via PEG tube.  @ goal rate this morning and being tolerated. Patient also with order for a diet.  Able to reach patient wife over the phone to get a better sense of patient intake. Patient wife reports she was present for dinner meal yesterday and patient only ate bites of applesauce.  Overall not much intake. Reports patient has been a little sleepy past couple of days and no showing that much interest in eating.  We discussed current EN Rx.  We felt it would be best to keep EN continuous vs nocturnal at this time.  Can switch to nocturnal if the events intake improves.     9/30  Consult for tube feeding assessment.  Overall issues with sodium levels, fluid intake and confusion. Was getting IVF, however pulled out IV.  Wife asked for a PEG tube placement on 9/28.      Patient in COVID isolation, RD not able to visit in person.  No diet this morning (was made NPO for PEG tube placement). Discussed with RN re-order previous order. RD will start tube feeding.       9/25  Spoke w/RN who reports pt pulled out NGT Friday evening. RN states pt eating >/=75% of trays. Textures downgraded 9/23 to pureed.     9/22  Pt on diet though ate only sausage and some juice this am. RN states pt pocketing food still, had been happening in ICU per previous RD note. Discussed w/SLP.     9/18 RN reports pt on 3% saline therapy Na+ goal 145-155, Na+ w/I range, pt from OSH sent here d/t hemorrhagic conversion continues to have R weakness, confusion, tolerating TF. Uncertain if pt should have water flushes, these were dc'd after discussion w/ MD.     9/15  Per RN unclear if will start EN or if pt may progress to caden diet at this time.  No wt hx available today.     Anthropometrics     Admission Height 175.3 cm (69\") Documented at 09/15/2023 0122   Admission Weight 79.2 kg (174 lb 9.7 oz) Documented at 09/15/2023 0122 " "    Height: Height: 175.3 cm (69\")  Last Filed Weight: Weight: 72.8 kg (160 lb 7.9 oz) (09/21/23 0500)  Method: Weight Method: Bed scale  BMI: BMI (Calculated): 23.7  BMI classification: Overweight: 25.0-29.9kg/m2      9/15/2023 9/17/2023   Weight     Weight (kg) 79.2 kg  82.5 kg    Weight (lbs) 174 lb 9.7 oz  181 lb 14.1 oz    Weight Method Bed scale  Bed scale        UBW: Per  lbs on 9/14/23  Note 172 lbs in 2018  Weight change: uncertain at this time    Labs    Labs Reviewed: Yes     Results from last 7 days   Lab Units 10/12/23  0725 10/09/23  0454   GLUCOSE mg/dL 168* 145*   BUN mg/dL 15 21   CREATININE mg/dL 0.66* 0.65*   SODIUM mmol/L 138 139   CHLORIDE mmol/L 105 105   POTASSIUM mmol/L 4.4 4.0   PHOSPHORUS mg/dL  --  3.4   MAGNESIUM mg/dL  --  2.0   ALT (SGPT) U/L  --  46*       Results from last 7 days   Lab Units 10/09/23  0454   ALBUMIN g/dL 3.6   PREALBUMIN mg/dL 23.0   CRP mg/dL <0.30   CHOLESTEROL mg/dL 86   TRIGLYCERIDES mg/dL 67       Results from last 7 days   Lab Units 10/13/23  1206 10/13/23  0609 10/12/23  2341 10/12/23  1755 10/12/23  1133 10/12/23  0541   GLUCOSE mg/dL 108 142* 125 96 127 135*     Lab Results   Lab Value Date/Time    HGBA1C 6.40 (H) 09/15/2023 0212               Medications    Medications Reviewed: Yes  Pertinent: insulin, remeron protonix   Infusion:   PRN:     Needs Assessment   Date: 9/30  **CBW similar to last used for calculation    Height used:Height: 175.3 cm (69\")  Weights used: 160lb/72.7kg      Estimated Calorie needs: ~ 1900 Kcal/day  Method:  Kcals/KG 25= 1818  Method:  MSJ 1479 x 1.3= 1923    Estimated Protein needs: ~ 95g PRO/day  Method: 1.3g/Kg= 95    Estimated Fluid needs: ~ ml/day   Per clinical status    Current Nutrition Prescription      PO: Diet: Regular/House Diet; No Straw, Feeding Assistance - Nursing, No Mixed Consistencies; Texture: Pureed (NDD 1); Fluid Consistency: Honey Thick   Oral Nutrition Supplement: Magic cup 2x daily   Intake: " N/A    EN: IsoSource 1.5  Goal Rate: 70 ml/hr                  Water Flushes: 30ml /hr   Modular: Prosource -no carb 1/day  Route: PEG  Tube: Unknown     At goal over: 15 Hrs/day     Rx will supply:   Goal Volume 1050 mL/day       Flush Volume 450 mL/day       Energy 1635 Kcal/day 86 % Est Need   Protein 86 g/day 91 % Est Need   Fiber 16 g/day       Water in   mL       Total Water 1248 mL       Meet DRI No            --------------------------------------------------------------------------  Product/Rate verified at bedside: No  Infusing Rate at time of visit: off at time of visit     Average Delivery from [facility] Day:  Volume 978 mL/day 93  % Goal Vol.   Flush Volume 330 mL/day     Energy  Kcal/day  % Est Need   Protein  g/day  % Est Need   Fiber  g/day     Water in  EN  mL     Total Water  mL     Meet DRI No        Intake/Ouptut 24 hrs (701 - 700)   I&O's Reviewed: Yes     Intake & Output (last day)         10/12 0701  10/13 0700 10/13 0701  10/14 07    Other 330     NG/     Total Intake(mL/kg) 1308 (18)     Urine (mL/kg/hr) 1150 (0.7) 750 (1.3)    Stool 0     Total Output 1150 750    Net +158 -750          Stool Unmeasured Occurrence 6 x         Last stool x 1 on 10/8    Nutrition Diagnosis   Date: 9/15 Updated: , , 10/9, 10/13  Problem Inadequate oral intake    Etiology stroke   Signs/Symptoms NPO w ice chips per SLP, confusion, +EN   Status: ongoing - plan to meet 100% of need via EN    Date:   Updated:  Problem Swallowing difficulty/chewing difficulty   Etiology Bilateral strokes   Signs/Symptoms Oral-pharyngeal dysphagia per SLP FEES eval   Status: ongoing    Goal:   General: Nutrition support treatment  PO: Increase intake  EN/PN: Adjust EN, Deliver estimated needs      Nutrition Intervention      Follow treatment progress, Care plan reviewed, Nutrition support order placed    Bolus recommendation:  Formula: Isosource 1.5  Total Cartons: 5  Total volume: 1250mL  Bolus Regimen:  1 carton (250mL) 5x/d - ~q3hrs between 6a and 9p  Provide Prosource 1x daily  Water Flush: 75mL before and after each bolus     At goal volume, this regimen provides: 1935kcal (102% est needs), 100g protein (100% est needs), 19g fiber, 950mL TF FW (+750mL flush = 1700mL total)       onitoring/Evaluation:   Per protocol, I&O, PO intake, Supplement intake, Pertinent labs, EN delivery/tolerance, Weight, Symptoms, Swallow function      Heidi Baxter, MS,RD,LD  Time Spent: 45 min

## 2023-10-13 NOTE — CASE MANAGEMENT/SOCIAL WORK
Continued Stay Note  [facility]     Patient Name: Kenneth Wen  MRN: [id number]  Today's Date: 10/13/2023    Admit Date: 9/15/2023    Plan: TBD   Discharge Plan       Row Name 10/13/23 1049       Plan    Plan Comments Pt was placed in 4 point restraints overnight. Neurology working further on adjusting medications again. MSW will send referrals was able and appropriate.                   Discharge Codes    No documentation.                 Expected Discharge Date and Time       Expected Discharge Date Expected Discharge Time    Oct 7, 2023               JOEL Anna

## 2023-10-13 NOTE — SIGNIFICANT NOTE
10/13/23 1438   SLP Deferred Reason   SLP Deferred Reason   (Spoke to RN who reported pt not appropriate for dysphagia/cognitive-communication tx. Noted neuro adjusting medications. Will f/u likely next week. Consider NPO if concern for status change/increased aspiration risk.)

## 2023-10-14 LAB
GLUCOSE BLDC GLUCOMTR-MCNC: 145 MG/DL (ref 70–130)
GLUCOSE BLDC GLUCOMTR-MCNC: 89 MG/DL (ref 70–130)
GLUCOSE BLDC GLUCOMTR-MCNC: 98 MG/DL (ref 70–130)

## 2023-10-14 PROCEDURE — 94761 N-INVAS EAR/PLS OXIMETRY MLT: CPT

## 2023-10-14 PROCEDURE — 99232 SBSQ HOSP IP/OBS MODERATE 35: CPT | Performed by: STUDENT IN AN ORGANIZED HEALTH CARE EDUCATION/TRAINING PROGRAM

## 2023-10-14 PROCEDURE — 99232 SBSQ HOSP IP/OBS MODERATE 35: CPT | Performed by: INTERNAL MEDICINE

## 2023-10-14 PROCEDURE — 82948 REAGENT STRIP/BLOOD GLUCOSE: CPT

## 2023-10-14 RX ADMIN — ACETAMINOPHEN ORAL SOLUTION 500 MG: 650 SOLUTION ORAL at 13:32

## 2023-10-14 RX ADMIN — ACETAMINOPHEN ORAL SOLUTION 500 MG: 650 SOLUTION ORAL at 07:57

## 2023-10-14 RX ADMIN — MINERAL OIL: 1000 LIQUID ORAL at 09:51

## 2023-10-14 RX ADMIN — MIRTAZAPINE 30 MG: 15 TABLET, FILM COATED ORAL at 18:54

## 2023-10-14 RX ADMIN — PANTOPRAZOLE SODIUM 40 MG: 40 INJECTION, POWDER, LYOPHILIZED, FOR SOLUTION INTRAVENOUS at 07:58

## 2023-10-14 RX ADMIN — MINERAL OIL: 1000 LIQUID ORAL at 18:00

## 2023-10-14 RX ADMIN — MICONAZOLE NITRATE 1 APPLICATION: 20 CREAM TOPICAL at 09:51

## 2023-10-14 RX ADMIN — QUETIAPINE FUMARATE 100 MG: 100 TABLET ORAL at 18:53

## 2023-10-14 RX ADMIN — DONEPEZIL HYDROCHLORIDE 10 MG: 10 TABLET, FILM COATED ORAL at 18:54

## 2023-10-14 RX ADMIN — CLOPIDOGREL BISULFATE 75 MG: 75 TABLET ORAL at 09:49

## 2023-10-14 RX ADMIN — ATORVASTATIN CALCIUM 80 MG: 40 TABLET, FILM COATED ORAL at 20:59

## 2023-10-14 RX ADMIN — ACETAMINOPHEN ORAL SOLUTION 500 MG: 650 SOLUTION ORAL at 18:00

## 2023-10-14 RX ADMIN — Medication 30 ML: at 09:52

## 2023-10-14 RX ADMIN — MICONAZOLE NITRATE 1 APPLICATION: 20 CREAM TOPICAL at 20:59

## 2023-10-14 RX ADMIN — MINERAL OIL: 1000 LIQUID ORAL at 13:33

## 2023-10-14 RX ADMIN — Medication 5 MG: at 18:54

## 2023-10-14 RX ADMIN — DICLOFENAC SODIUM 2 G: 9.3 GEL TOPICAL at 09:49

## 2023-10-14 RX ADMIN — METOPROLOL TARTRATE 25 MG: 25 TABLET, FILM COATED ORAL at 09:49

## 2023-10-14 RX ADMIN — METOPROLOL TARTRATE 25 MG: 25 TABLET, FILM COATED ORAL at 20:59

## 2023-10-14 NOTE — PROGRESS NOTES
[facility] Medicine Services  PROGRESS NOTE    Patient Name: Kenneth Wen  : 1950  MRN: [id number]    Date of Admission: 9/15/2023  Primary Care Physician: Susan Powers APRN    Subjective   Subjective     CC:  F/u   CVA     HPI:  Wife at bedside.  Slept through the night.  Agitated but maybe a little better.       Objective   Objective     Vital Signs:   Temp:  [97 °F (36.1 °C)-99.6 °F (37.6 °C)] 99.6 °F (37.6 °C)  Heart Rate:  [] 72  Resp:  [16] 16  BP: (105-167)/(61-96) 129/66     Physical Exam:  Constitutional: No acute distress, awake, alert, more agitated today, in restraints, wife at bedside  HENT: NCAT, mucous membranes moist  Respiratory: Clear to auscultation bilaterally, respiratory effort normal   Cardiovascular: RRR, no murmurs, rubs, or gallops  Gastrointestinal: soft, PEG in place, abdominal binder  Musculoskeletal: No bilateral ankle edema  Neurologic: moves all extremities, restraints. Speech clear  Skin: No rashes        Results Reviewed:  LAB RESULTS:      Lab 10/12/23  0725 10/09/23  0454   WBC 5.29 5.52   HEMOGLOBIN 12.0* 12.1*   HEMATOCRIT 36.0* 36.4*   PLATELETS 256 266   NEUTROS ABS  --  2.79   IMMATURE GRANS (ABS)  --  0.01   LYMPHS ABS  --  1.56   MONOS ABS  --  0.74   EOS ABS  --  0.32   MCV 93.5 93.3   CRP  --  <0.30         Lab 10/12/23  0725 10/09/23  0454   SODIUM 138 139   POTASSIUM 4.4 4.0   CHLORIDE 105 105   CO2 28.0 26.0   ANION GAP 5.0 8.0   BUN 15 21   CREATININE 0.66* 0.65*   EGFR 99.7 100.1   GLUCOSE 168* 145*   CALCIUM 9.1 8.9   MAGNESIUM  --  2.0   PHOSPHORUS  --  3.4         Lab 10/09/23  0454   TOTAL PROTEIN 6.2   ALBUMIN 3.6   GLOBULIN 2.6   ALT (SGPT) 46*   AST (SGOT) 33   BILIRUBIN 0.3   ALK PHOS 83             Lab 10/09/23  0454   CHOLESTEROL 86   TRIGLYCERIDES 67             Brief Urine Lab Results  (Last result in the past 365 days)        Color   Clarity   Blood   Leuk Est   Nitrite   Protein   CREAT   Urine HCG         09/15/23 1818 Yellow   Cloudy   Trace   Negative   Negative   Negative                   Microbiology Results Abnormal       Procedure Component Value - Date/Time    Blood Culture - Blood, Arm, Right [[id number]]  (Normal) Collected: 09/15/23 0212    Lab Status: Final result Specimen: Blood from Arm, Right Updated: 09/20/23 0230     Blood Culture No growth at 5 days    Blood Culture - Blood, Arm, Left [[id number]]  (Normal) Collected: 09/15/23 0212    Lab Status: Final result Specimen: Blood from Arm, Left Updated: 09/20/23 0230     Blood Culture No growth at 5 days            No radiology results from the last 24 hrs        Current medications:  Scheduled Meds:acetaminophen, 500 mg, Per PEG Tube, Q6H  atorvastatin, 80 mg, Per PEG Tube, Nightly  clopidogrel, 75 mg, Per PEG Tube, Daily  Diclofenac Sodium, 2 g, Topical, BID  donepezil, 10 mg, Per PEG Tube, Nightly  Lidocaine, 1 patch, Transdermal, Q24H  melatonin, 5 mg, Per PEG Tube, Nightly  metoprolol tartrate, 25 mg, Per PEG Tube, Q12H  miconazole, 1 application , Topical, Q12H  mirtazapine, 30 mg, Per PEG Tube, Nightly  palliative care oral rinse, , Mouth/Throat, 4x Daily  pantoprazole, 40 mg, Intravenous, Q AM  ProSource No Carb, 30 mL, Per G Tube, Daily  QUEtiapine, 100 mg, Per PEG Tube, Nightly  sodium chloride, 10 mL, Intravenous, Q12H  sodium chloride, 10 mL, Intravenous, Q12H      Continuous Infusions:sodium chloride, 50 mL/hr, Last Rate: 50 mL/hr (10/13/23 0325)      PRN Meds:.  Calcium Replacement - Follow Nurse / BPA Driven Protocol    dextrose    dextrose    glucagon (human recombinant)    ibuprofen    Magnesium Standard Dose Replacement - Follow Nurse / BPA Driven Protocol    ondansetron    Phosphorus Replacement - Follow Nurse / BPA Driven Protocol    Potassium Replacement - Follow Nurse / BPA Driven Protocol    QUEtiapine    sodium chloride    sodium chloride    Assessment & Plan   Assessment & Plan     Active Hospital Problems    Diagnosis  POA     **Hemorrhagic CVA [I61.9]  Yes    Oropharyngeal dysphagia [R13.12]  Yes    Multiple bilateral CVAs [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        Brief Hospital Course to date:  Kenneth Wen is a 73 y.o. male  with hx of HTN, HLD, T2DM, and GERD who presented to OSH with multiple acute ischemic infarcts of the bilateral cerebral and cerebellar hemispheres as well as left isaac. TTE/JOSIAS was negative PFO at OSH. On 9/13/23 he had worsening in mental status and new inability to move RLE, head CT showed evolution of the existing CVA with new development of petechial hemorrhage. DAPT was held for 24 hours per Neurology recommendations and repeat CT head that evening showed worsening effacement of the right quadrigeminal cistern with suspected increasing upward supratentorial pressure. He was then transferred to [facility] for Neurosurgery evaluation on 9/15/23. He was started on hypertonic saline 9/15/23 through 9/20/23 for cerebral edema. He had fevers with negative cultures but had worsening secretions and labored breathing so was started on Zosyn on 9/16/23. Transferred to the hospitalist service on 9/22/23. Pt demonstrated poor oral intake with elevated sodium levels; therefore, PEG tube was recommended.         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  Increased seroquel to 100mg at bedtime, 12.5mg in the morning and continue seroquel 25mg PRN.  Still in restraints currentlly  --restoril/melatonin nightly    Dysphagia  Hypernatremia - resolved  --SLP recommends mechanical ground with honey thick liquids   --Risk of pulling out peg tube, abd binder to be in place at all times. --Surgery recommends to remain in restraints at all times or if off has to have sitter for next 2 weeks   -- dietary following for calorie count, adequate PO intake is limited by his mental status     HTN  --Continue Metoprolol 25 mg BID     GERD  --Continue Protonix     COVID-19-resolved  --Overall asymptomatic and on room air  --No infiltrates seen and low procal  --Did not receive any treatment   --Off Isolation 9/30/2023      Leukocytosis-resolved  --Procalcitionin low at 0.05  --CXR and UA negative  --Blood cultures negative     Elevated LFT's, mild  --Possibly secondary to statin?  --Negative acute hepatitis panel  --repeat AST/ALT improved on 10/3    D/w wife at bedside on 10/14/23    Expected Discharge Location and Transportation: rehab   Expected Discharge   Expected Discharge Date: 10/7/2023; Expected Discharge Time:      DVT prophylaxis:  Mechanical DVT prophylaxis orders are present.     AM-PAC 6 Clicks Score (PT): 7 (10/14/23 0500)    CODE STATUS:   Code Status and Medical Interventions:   Ordered at: 09/29/23 0854     Medical Intervention Limits:    NO intubation (DNI)     Code Status (Patient has no pulse and is not breathing):    No CPR (Do Not Attempt to Resuscitate)     Medical Interventions (Patient has pulse or is breathing):    Limited Support       Osmel Lee MD  10/14/23

## 2023-10-14 NOTE — PROGRESS NOTES
[facility] Neurology    Progress Note    Patient Name: Kenneth Wen  : 1950  MRN: [id number]  Primary Care Physician:  Susan Powers APRN  Date of admission: 9/15/2023    Subjective     Reason for consult: Restless    History of Present Illness   Wife at bedside.  She reports he slept well overnight.  He is doing well today.  He said he would like to go home.    Review of Systems   General: Negative for fever, nausea, or vomiting.   Neurological: Negative for headache, pain, or weakness.     Objective     Vitals:   Temp:  [97 °F (36.1 °C)-99.3 °F (37.4 °C)] 98 °F (36.7 °C)  Heart Rate:  [] 97  Resp:  [16-18] 16  BP: (105-167)/(61-96) 120/82    Neurologic Exam   Mental status/Cognition: alert;   Speech/language: fluent; comprehension intact    Face appears symmetric    Motor: Moves left side spontaneously and on purposefully.    Current Medications    Current Facility-Administered Medications:     acetaminophen (TYLENOL) 160 MG/5ML oral solution 500 mg, 500 mg, Per PEG Tube, Q6H, Rabia Stallings APRN, 500 mg at 10/14/23 0757    atorvastatin (LIPITOR) tablet 80 mg, 80 mg, Per PEG Tube, Nightly, Miguel Whittington RPH, 80 mg at 10/13/23 2030    Calcium Replacement - Follow Nurse / BPA Driven Protocol, , Does not apply, PRN, Haseeb Carrillo MD    clopidogrel (PLAVIX) tablet 75 mg, 75 mg, Per PEG Tube, Daily, Miguel Whittington RPH, 75 mg at 10/14/23 0949    dextrose (D50W) (25 g/50 mL) IV injection 25 g, 25 g, Intravenous, Q15 Min PRN, Haseeb Carrillo MD    dextrose (D50W) (25 g/50 mL) IV injection 25 g, 25 g, Intravenous, Q15 Min PRN, Haseeb Carrillo MD    Diclofenac Sodium (VOLTAREN) 1 % gel 2 g, 2 g, Topical, BID, Rabia Stallings APRN, 2 g at 10/14/23 0949    donepezil (ARICEPT) tablet 10 mg, 10 mg, Per PEG Tube, Nightly, Toni Rausch MD, 10 mg at 10/13/23 1830    glucagon (GLUCAGEN) injection 1 mg, 1 mg, Intramuscular, Q15 Min PRN, Haseeb Carrillo MD    ibuprofen  (ADVIL,MOTRIN) 100 MG/5ML suspension 400 mg, 400 mg, Per PEG Tube, Q6H PRN, Miguel Whittington RPH, 400 mg at 10/04/23 2214    Lidocaine 4 % 1 patch, 1 patch, Transdermal, Q24H, Dianelys Arriaga APRN, 1 patch at 10/13/23 0843    Magnesium Standard Dose Replacement - Follow Nurse / BPA Driven Protocol, , Does not apply, Gerardo CISNEROS Gustavo V, MD    melatonin tablet 5 mg, 5 mg, Per PEG Tube, Nightly, Toni Rausch MD, 5 mg at 10/13/23 1831    metoprolol tartrate (LOPRESSOR) tablet 25 mg, 25 mg, Per PEG Tube, Q12H, Miguel Whittington RPH, 25 mg at 10/14/23 0949    miconazole (MICOTIN) 2 % cream 1 application , 1 application , Topical, Q12H, Estefanía Light APRN, 1 application  at 10/14/23 0951    mirtazapine (REMERON) tablet 30 mg, 30 mg, Per PEG Tube, Nightly, Toni Rausch MD, 30 mg at 10/13/23 1830    ondansetron (ZOFRAN) injection 4 mg, 4 mg, Intravenous, Q6H PRN, Haseeb Carrillo MD, 4 mg at 10/13/23 2030    palliative care oral rinse, , Mouth/Throat, 4x Daily, Dianelys Arriaga APRN, Given at 10/14/23 0951    pantoprazole (PROTONIX) injection 40 mg, 40 mg, Intravenous, Q AM, Miguel Whittington RPH, 40 mg at 10/14/23 0758    Phosphorus Replacement - Follow Nurse / BPA Driven Protocol, , Does not apply, Gerardo CISNEROS Gustavo V, MD    Potassium Replacement - Follow Nurse / BPA Driven Protocol, , Does not apply, Gerardo CISNEROS Gustavo V, MD    ProSource No Carb oral solution 30 mL, 30 mL, Per G Tube, Daily, Bernarda Adhikari, RD, 30 mL at 10/14/23 0952    QUEtiapine (SEROquel) tablet 100 mg, 100 mg, Per PEG Tube, Nightly, Toni Rausch MD, 100 mg at 10/13/23 1830    QUEtiapine (SEROquel) tablet 25 mg, 25 mg, Per PEG Tube, Q12H PRN, Toni Rausch MD    sodium chloride 0.9 % flush 10 mL, 10 mL, Intravenous, PRN, Haseeb Carrillo MD    sodium chloride 0.9 % flush 10 mL, 10 mL, Intravenous, Q12H, Haseeb Carrillo MD, 10 mL at 10/13/23 2031    sodium chloride 0.9 % flush 10 mL, 10 mL, Intravenous,  "Q12H, Haseeb Carrillo MD, 10 mL at 10/13/23 2031    sodium chloride 0.9 % flush 10 mL, 10 mL, Intravenous, PRN, Haseeb Carrillo MD    sodium chloride 0.9 % infusion, 50 mL/hr, Intravenous, Continuous, Rabia Stallings, APRN, Last Rate: 50 mL/hr at 10/13/23 0325, 50 mL/hr at 10/13/23 0325    Facility-Administered Medications Ordered in Other Encounters:     ceFAZolin (ANCEF) injection, , Intravenous, PRN, Gisselle Mabry CRNA, 2 g at 09/29/23 1439    lactated ringers infusion, , Intravenous, Continuous PRN, Gisselle Mabry CRNA, Stopped at 09/29/23 1455    lidocaine PF 1% (XYLOCAINE) injection, , Intravenous, PRN, Gisselle Mabry CRNA, 50 mg at 09/29/23 1436    Phenylephrine HCl-NaCl 100 mcg/ml injection, , Intravenous, PRN, Gisselle Mabry CRNA, 100 mcg at 09/29/23 1440    Propofol (DIPRIVAN) injection, , Intravenous, PRN, Gisselle Mabry CRNA, 100 mg at 09/29/23 1436    Laboratory Results:   Lab Results   Component Value Date    GLUCOSE 168 (H) 10/12/2023    CALCIUM 9.1 10/12/2023     10/12/2023    K 4.4 10/12/2023    CO2 28.0 10/12/2023     10/12/2023    BUN 15 10/12/2023    CREATININE 0.66 (L) 10/12/2023    BCR 22.7 10/12/2023    ANIONGAP 5.0 10/12/2023     Lab Results   Component Value Date    WBC 5.29 10/12/2023    HGB 12.0 (L) 10/12/2023    HCT 36.0 (L) 10/12/2023    MCV 93.5 10/12/2023     10/12/2023     Lab Results   Component Value Date    CHOL 86 10/09/2023    CHOL 58 10/02/2023    CHOL 109 09/25/2023     Lab Results   Component Value Date    HDL 36 (L) 09/15/2023     Lab Results   Component Value Date     (H) 09/15/2023     Lab Results   Component Value Date    TRIG 67 10/09/2023    TRIG 43 10/02/2023    TRIG 65 09/25/2023     Lab Results   Component Value Date    HGBA1C 6.40 (H) 09/15/2023     Lab Results   Component Value Date    INR 1.09 09/15/2023    PROTIME 14.2 09/15/2023     No results found for: \"FOLATE\"  No results " "found for: \"[id number]\"      Images/Procedures   MRI brain without contrast 9/15/2023  Extensive posterior circulation infarct with hemorrhagic conversion    MRA brain and neck 9/20/2023  No gross abnormality, distal vertebral artery occlusion or dissection cannot be excluded.    CT head 9/20/2023  Continued evolution of bilateral hemorrhagic occipital lobe infarcts.  Stable appearance of bilateral cerebellar hemisphere and left pontine infarcts.    CTA brain and neck 9/22/2023  No LVO.  3 mm saccular right supra ophthalmic ICA aneurysm.  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 male with history of hypertension, hyperlipidemia, type 2 diabetes presenting with acute ischemic infarcts.  Encephalopathy improving.  We will continue current course of medications    Plan:   -Seroquel 100 mg at bedtime, Seroquel 25 mg every 12 hours as needed  -Remeron 30 mg at bedtime  -Aricept 10 mg at bedtime  -Melatonin 5 mg at bedtime      I have discussed the above with the patient, family  Time spent with patient: 35 minutes in face-to-face evaluation and management of the patient.      Beltran Cadena DO, MS            "

## 2023-10-14 NOTE — PLAN OF CARE
Goal Outcome Evaluation:  Plan of Care Reviewed With: patient, spouse, family        Progress: no change  Outcome Evaluation: patient in restraints (wrist). very agitated at the beginning of the shift. he did voice pain in his shoulders and in his right hand, medication given. turned q2 hours,he favors the right side, pillow was used for support. pulling at peg tube. he did sleep for the majority of the night 0000-on. he will follow very simple commands and does voice his understanding when you tell him to not throw  his legs  over the side rails, and not to pull at tubes. client is disoriented, only oriented to his name. bolus feedings given at 2100 and 6pm. he did seem to calm down when his right arm was readjusted (restraint loosend, placed on a pillow, pillow case rolled and placed in it, volteran cream applied to it). family at bedside all night. bed alarm on. he is moving all extremities well, except his right arm. patient's workds are easier to understand. meds given through peg. vss. normal saline at 50mls/hr.

## 2023-10-14 NOTE — PLAN OF CARE
Goal Outcome Evaluation:  Plan of Care Reviewed With: patient, spouse, family        Progress: no change  Outcome Evaluation: Still requires BUE restraints, but able to tolerate removal of BLE restraints, spouse instrumental in keeping him from throwing his legs out of bed & calming him, but he remains confused & forgetful, still pulls at equipment & tries to get OOB unassisted. Improved/calmer w/ Rx changes & spouse participation in care. He follows simple commands and his speech is improved, less garbled when he is less agitated. Verbal responses appropriate in conversation, however he remains disoriented to place/time/purpose. VSS. NS infusing as ordered. Bolus tube feedings initiated as ordered, PEG intact. He takes only 1 or 2 bites of food infrequently, mostly refuses p.o. intake. Moves all extremeties purposefully, less on right than left.

## 2023-10-15 LAB
ANION GAP SERPL CALCULATED.3IONS-SCNC: 10 MMOL/L (ref 5–15)
BUN SERPL-MCNC: 19 MG/DL (ref 8–23)
BUN/CREAT SERPL: 29.7 (ref 7–25)
CA-I SERPL ISE-MCNC: 1.31 MMOL/L (ref 1.12–1.32)
CALCIUM SPEC-SCNC: 9.1 MG/DL (ref 8.6–10.5)
CHLORIDE SERPL-SCNC: 105 MMOL/L (ref 98–107)
CO2 SERPL-SCNC: 26 MMOL/L (ref 22–29)
CREAT SERPL-MCNC: 0.64 MG/DL (ref 0.76–1.27)
DEPRECATED RDW RBC AUTO: 49.1 FL (ref 37–54)
EGFRCR SERPLBLD CKD-EPI 2021: 100.6 ML/MIN/1.73
ERYTHROCYTE [DISTWIDTH] IN BLOOD BY AUTOMATED COUNT: 14.3 % (ref 12.3–15.4)
GLUCOSE BLDC GLUCOMTR-MCNC: 124 MG/DL (ref 70–130)
GLUCOSE BLDC GLUCOMTR-MCNC: 146 MG/DL (ref 70–130)
GLUCOSE BLDC GLUCOMTR-MCNC: 148 MG/DL (ref 70–130)
GLUCOSE BLDC GLUCOMTR-MCNC: 162 MG/DL (ref 70–130)
GLUCOSE SERPL-MCNC: 133 MG/DL (ref 65–99)
HCT VFR BLD AUTO: 36 % (ref 37.5–51)
HGB BLD-MCNC: 11.9 G/DL (ref 13–17.7)
MAGNESIUM SERPL-MCNC: 2 MG/DL (ref 1.6–2.4)
MCH RBC QN AUTO: 31.2 PG (ref 26.6–33)
MCHC RBC AUTO-ENTMCNC: 33.1 G/DL (ref 31.5–35.7)
MCV RBC AUTO: 94.5 FL (ref 79–97)
PHOSPHATE SERPL-MCNC: 3.6 MG/DL (ref 2.5–4.5)
PLATELET # BLD AUTO: 257 10*3/MM3 (ref 140–450)
PMV BLD AUTO: 11.1 FL (ref 6–12)
POTASSIUM SERPL-SCNC: 3.8 MMOL/L (ref 3.5–5.2)
RBC # BLD AUTO: 3.81 10*6/MM3 (ref 4.14–5.8)
SODIUM SERPL-SCNC: 141 MMOL/L (ref 136–145)
WBC NRBC COR # BLD: 9.08 10*3/MM3 (ref 3.4–10.8)

## 2023-10-15 PROCEDURE — 99232 SBSQ HOSP IP/OBS MODERATE 35: CPT | Performed by: INTERNAL MEDICINE

## 2023-10-15 PROCEDURE — 85027 COMPLETE CBC AUTOMATED: CPT | Performed by: INTERNAL MEDICINE

## 2023-10-15 PROCEDURE — 82948 REAGENT STRIP/BLOOD GLUCOSE: CPT

## 2023-10-15 PROCEDURE — 94761 N-INVAS EAR/PLS OXIMETRY MLT: CPT

## 2023-10-15 PROCEDURE — 84100 ASSAY OF PHOSPHORUS: CPT | Performed by: INTERNAL MEDICINE

## 2023-10-15 PROCEDURE — 83735 ASSAY OF MAGNESIUM: CPT | Performed by: INTERNAL MEDICINE

## 2023-10-15 PROCEDURE — 97530 THERAPEUTIC ACTIVITIES: CPT

## 2023-10-15 PROCEDURE — 82330 ASSAY OF CALCIUM: CPT | Performed by: INTERNAL MEDICINE

## 2023-10-15 PROCEDURE — 80048 BASIC METABOLIC PNL TOTAL CA: CPT | Performed by: INTERNAL MEDICINE

## 2023-10-15 RX ADMIN — MIRTAZAPINE 30 MG: 15 TABLET, FILM COATED ORAL at 18:15

## 2023-10-15 RX ADMIN — MICONAZOLE NITRATE 1 APPLICATION: 20 CREAM TOPICAL at 08:54

## 2023-10-15 RX ADMIN — ATORVASTATIN CALCIUM 80 MG: 40 TABLET, FILM COATED ORAL at 20:35

## 2023-10-15 RX ADMIN — IBUPROFEN 400 MG: 100 SUSPENSION ORAL at 04:08

## 2023-10-15 RX ADMIN — DONEPEZIL HYDROCHLORIDE 10 MG: 10 TABLET, FILM COATED ORAL at 18:15

## 2023-10-15 RX ADMIN — METOPROLOL TARTRATE 25 MG: 25 TABLET, FILM COATED ORAL at 08:53

## 2023-10-15 RX ADMIN — QUETIAPINE FUMARATE 25 MG: 25 TABLET ORAL at 20:35

## 2023-10-15 RX ADMIN — MINERAL OIL: 1000 LIQUID ORAL at 08:53

## 2023-10-15 RX ADMIN — MINERAL OIL: 1000 LIQUID ORAL at 20:36

## 2023-10-15 RX ADMIN — ACETAMINOPHEN ORAL SOLUTION 500 MG: 650 SOLUTION ORAL at 12:30

## 2023-10-15 RX ADMIN — MINERAL OIL: 1000 LIQUID ORAL at 18:15

## 2023-10-15 RX ADMIN — ACETAMINOPHEN ORAL SOLUTION 500 MG: 650 SOLUTION ORAL at 00:02

## 2023-10-15 RX ADMIN — QUETIAPINE FUMARATE 25 MG: 25 TABLET ORAL at 00:02

## 2023-10-15 RX ADMIN — LANSOPRAZOLE 15 MG: 15 TABLET, ORALLY DISINTEGRATING ORAL at 08:53

## 2023-10-15 RX ADMIN — MINERAL OIL: 1000 LIQUID ORAL at 12:30

## 2023-10-15 RX ADMIN — Medication 5 MG: at 18:15

## 2023-10-15 RX ADMIN — ACETAMINOPHEN ORAL SOLUTION 500 MG: 650 SOLUTION ORAL at 06:10

## 2023-10-15 RX ADMIN — CLOPIDOGREL BISULFATE 75 MG: 75 TABLET ORAL at 08:53

## 2023-10-15 RX ADMIN — ACETAMINOPHEN ORAL SOLUTION 500 MG: 650 SOLUTION ORAL at 23:01

## 2023-10-15 RX ADMIN — QUETIAPINE FUMARATE 100 MG: 100 TABLET ORAL at 18:15

## 2023-10-15 RX ADMIN — DICLOFENAC SODIUM 2 G: 9.3 GEL TOPICAL at 10:36

## 2023-10-15 RX ADMIN — MICONAZOLE NITRATE 1 APPLICATION: 20 CREAM TOPICAL at 20:35

## 2023-10-15 RX ADMIN — DICLOFENAC SODIUM 2 G: 9.3 GEL TOPICAL at 20:35

## 2023-10-15 RX ADMIN — METOPROLOL TARTRATE 25 MG: 25 TABLET, FILM COATED ORAL at 20:34

## 2023-10-15 RX ADMIN — ACETAMINOPHEN ORAL SOLUTION 500 MG: 650 SOLUTION ORAL at 18:14

## 2023-10-15 NOTE — THERAPY TREATMENT NOTE
Patient Name: Kenneth Wen  : 1950    MRN: [id number]                              Today's Date: 10/15/2023       Admit Date: 9/15/2023    Visit Dx:     ICD-10-CM ICD-9-CM   1. Hemorrhagic CVA  I61.9 431   2. Aphasia  R47.01 784.3   3. Dysarthria  R47.1 784.51   4. Oropharyngeal dysphagia  R13.12 787.22     Patient Active Problem List   Diagnosis    Precordial pain    Elevated BP without diagnosis of hypertension    Dyslipidemia    Hyperglycemia    Multiple bilateral CVAs    Hemorrhagic CVA    HFpEF    T2DM (type 2 diabetes mellitus)    HTN (hypertension)    GERD (gastroesophageal reflux disease)    ICH (intracerebral hemorrhage)    Oropharyngeal dysphagia     Past Medical History:   Diagnosis Date    Acid reflux     Cancer     Skin    Diabetes mellitus     Prediabetes    GERD (gastroesophageal reflux disease) 09/15/2023    HTN (hypertension) 09/15/2023    Kidney disease     T2DM (type 2 diabetes mellitus) 09/15/2023     Past Surgical History:   Procedure Laterality Date    APPENDECTOMY      ENDOSCOPY W/ PEG TUBE PLACEMENT N/A 2023    Procedure: ESOPHAGOGASTRODUODENOSCOPY WITH PERCUTANEOUS ENDOSCOPIC GASTROSTOMY TUBE INSERTION;  Surgeon: Haseeb Carrillo MD;  Location: [facility] ENDOSCOPY;  Service: General;  Laterality: N/A;    HEMORRHOIDECTOMY      NASAL POLYP SURGERY        General Information       Row Name 10/15/23 3751          Physical Therapy Time and Intention    Document Type therapy note (daily note)  -KG     Mode of Treatment physical therapy  -KG       Row Name 10/15/23 2276          General Information    Patient Profile Reviewed yes  -KG     Existing Precautions/Restrictions fall;other (see comments)  PEG with abdominal binder, R sided weakness/coordination deficits  -KG       Row Name 10/15/23 4900          Cognition    Orientation Status (Cognition) oriented to;person;disoriented to;place;situation;time  -KG       Row Name 10/15/23 1431          Safety Issues, Functional Mobility     Impairments Affecting Function (Mobility) balance;cognition;coordination;endurance/activity tolerance;grasp;motor control;motor planning;muscle tone abnormal;visual/perceptual;sensation/sensory awareness;postural/trunk control;strength;range of motion (ROM)  -KG               User Key  (r) = Recorded By, (t) = Taken By, (c) = Cosigned By      Initials Name Provider Type    GABRIELE Monik Solorio Physical Therapist                   Mobility       Row Name 10/15/23 6435          Bed Mobility    Bed Mobility rolling left;rolling right;supine-sit  -KG     Rolling Left [place] (Bed Mobility) verbal cues;nonverbal cues (demo/gesture);moderate assist (50% patient effort)  -KG     Rolling Right [place] (Bed Mobility) maximum assist (25% patient effort);verbal cues;nonverbal cues (demo/gesture)  -KG     Scooting/Bridging [place] (Bed Mobility) 2 person assist;dependent (less than 25% patient effort)  -KG     Comment, (Bed Mobility) rolling for sling placement  -KG       Row Name 10/15/23 1935          Transfers    Comment, (Transfers) mechanical lift to chair, STS from chair, BUE support, knees blocked, improved to mod-A x2 and maintained standing for 2 mins  -KG       Row Name 10/15/23 1601          Sit-Stand Transfer    Sit-Stand [place] (Transfers) moderate assist (50% patient effort);2 person assist  -KG     Assistive Device (Sit-Stand Transfers) other (see comments)  BUE support  -KG               User Key  (r) = Recorded By, (t) = Taken By, (c) = Cosigned By      Initials Name Provider Type    GABRIELE Monik Solorio Physical Therapist                   Obj/Interventions       Row Name 10/15/23 6285          Motor Skills    Therapeutic Exercise other (see comments)  -KG       Row Name 10/15/23 6546          Balance    Balance Assessment standing dynamic balance  -KG     Dynamic Standing Balance moderate assist;2-person assist  -KG     Position/Device Used, Standing Balance supported;other (see comments)   UE support  -KG     Balance Interventions standing;sit to stand;weight shifting activity  -KG               User Key  (r) = Recorded By, (t) = Taken By, (c) = Cosigned By      Initials Name Provider Type    Monik Knapp Physical Therapist                   Goals/Plan    No documentation.                  Clinical Impression       Row Name 10/15/23 1358          Pain    Pretreatment Pain Rating 0/10 - no pain  -KG     Posttreatment Pain Rating 0/10 - no pain  -KG       Row Name 10/15/23 1358          Plan of Care Review    Plan of Care Reviewed With patient;spouse  -KG     Progress no change  -KG     Outcome Evaluation Pt with increased alertness, improving mobility.  mechanical lift to chair, STS from chair, BUE support, knees blocked, improved to mod-A x2 and maintained standing for 2 mins  -KG       Row Name 10/15/23 1358          Vital Signs    Pre Systolic BP Rehab 131  -KG     Pre Treatment Diastolic BP 89  -KG     Post Systolic BP Rehab 142  -KG     Post Treatment Diastolic BP 74  -KG       Row Name 10/15/23 1358          Positioning and Restraints    Pre-Treatment Position in bed  -KG     Post Treatment Position chair  -KG     In Chair notified nsg;call light within reach;encouraged to call for assist;exit alarm on;with family/caregiver;legs elevated;waffle cushion;on mechanical lift sling  RN ok'd chair  -KG     Restraints released:;reapplied:;soft limb  reapplied left soft limb restraint  -KG               User Key  (r) = Recorded By, (t) = Taken By, (c) = Cosigned By      Initials Name Provider Type    Monik Knapp Physical Therapist                   Outcome Measures       Row Name 10/15/23 1400          How much help from another person do you currently need...    Turning from your back to your side while in flat bed without using bedrails? 2  -KG     Moving from lying on back to sitting on the side of a flat bed without bedrails? 2  -KG     Moving to and from a bed to a chair (including  a wheelchair)? 1  -KG     Standing up from a chair using your arms (e.g., wheelchair, bedside chair)? 2  -KG     Climbing 3-5 steps with a railing? 1  -KG     To walk in hospital room? 1  -KG     AM-PAC 6 Clicks Score (PT) 9  -KG     Highest level of mobility 3 --> Sat at edge of bed  -KG       Row Name 10/15/23 1400          Functional Assessment    Outcome Measure Options AM-PAC 6 Clicks Basic Mobility (PT)  -KG               User Key  (r) = Recorded By, (t) = Taken By, (c) = Cosigned By      Initials Name Provider Type    Monik Knapp Physical Therapist                                 Physical Therapy Education       Title: PT OT SLP Therapies (In Progress)       Topic: Physical Therapy (In Progress)       Point: Mobility training (In Progress)       Learning Progress Summary             Patient Acceptance, E, NR by KG at 10/15/2023 1401    Acceptance, E, NR,VU by KR at 10/12/2023 1453    Acceptance, E,D, NR by AB at 10/11/2023 1547    Acceptance, E, NR by CM at 10/10/2023 1517    Acceptance, E, NR,VU by KR at 10/6/2023 1550    Acceptance, E, NR by SD at 10/5/2023 1437    Acceptance, E, VU by CD at 10/2/2023 1502    Comment: SEE FLOWSHEET    Acceptance, E, NR by KR at 9/25/2023 1324    Acceptance, E, NR by KG1 at 9/18/2023 1403    Acceptance, E,TB, NR by AY at 9/15/2023 1254   Family Acceptance, E, NR,VU by KR at 10/12/2023 1453    Acceptance, E, NR,VU by KR at 10/6/2023 1550    Acceptance, E, NR by SD at 10/5/2023 1437   Significant Other Acceptance, E, NR by CM at 10/10/2023 1517                         Point: Home exercise program (In Progress)       Learning Progress Summary             Patient Acceptance, E, NR by KG at 10/15/2023 1401    Acceptance, E,D, NR by AB at 10/11/2023 1547    Acceptance, E, NR by CM at 10/10/2023 1517    Acceptance, E, NR by SD at 10/5/2023 1437    Acceptance, E, VU by CD at 10/2/2023 1502    Comment: SEE FLOWSHEET    Acceptance, E, NR by KG1 at 9/18/2023 1403   Family  Acceptance, E, NR by SD at 10/5/2023 1437   Significant Other Acceptance, E, NR by CM at 10/10/2023 1517                         Point: Body mechanics (In Progress)       Learning Progress Summary             Patient Acceptance, E, NR by KG at 10/15/2023 1401    Acceptance, E, NR,VU by KR at 10/12/2023 1453    Acceptance, E,D, NR by AB at 10/11/2023 1547    Acceptance, E, NR by CM at 10/10/2023 1517    Acceptance, E, NR,VU by KR at 10/6/2023 1550    Acceptance, E, NR by SD at 10/5/2023 1437    Acceptance, E, VU by CD at 10/2/2023 1502    Comment: SEE FLOWSHEET    Acceptance, E, NR by KR at 9/25/2023 1324    Acceptance, E, NR by KG1 at 9/18/2023 1403    Acceptance, E,TB, NR by AY at 9/15/2023 1254   Family Acceptance, E, NR,VU by KR at 10/12/2023 1453    Acceptance, E, NR,VU by KR at 10/6/2023 1550    Acceptance, E, NR by SD at 10/5/2023 1437   Significant Other Acceptance, E, NR by CM at 10/10/2023 1517                         Point: Precautions (In Progress)       Learning Progress Summary             Patient Acceptance, E, NR by KG at 10/15/2023 1401    Acceptance, E, NR,VU by KR at 10/12/2023 1453    Acceptance, E,D, NR by AB at 10/11/2023 1547    Acceptance, E, NR by CM at 10/10/2023 1517    Acceptance, E, NR,VU by KR at 10/6/2023 1550    Acceptance, E, NR by SD at 10/5/2023 1437    Acceptance, E, VU by CD at 10/2/2023 1502    Comment: SEE FLOWSHEET    Acceptance, E, NR by KR at 9/25/2023 1324    Acceptance, E, NR by KG1 at 9/18/2023 1403    Acceptance, E,TB, NR by AY at 9/15/2023 1254   Family Acceptance, E, NR,VU by KR at 10/12/2023 1453    Acceptance, E, NR,VU by KR at 10/6/2023 1550    Acceptance, E, NR by SD at 10/5/2023 1437   Significant Other Acceptance, E, NR by CM at 10/10/2023 1517                                         User Key       Initials Effective Dates Name Provider Type Discipline    CD 02/03/23 -  Claudia Duarte, PT Physical Therapist PT    SD 03/13/23 -  Chantel Scott, PT Physical  Therapist PT    KG1 05/22/20 -  Mackenzie Mckay, PT Physical Therapist PT    KG 01/04/23 -  Monik Solorio Physical Therapist PT    AY 11/10/20 -  Aleshia Armstrong, PT Physical Therapist PT    AB 09/22/22 -  Aleshia Keita, PT Physical Therapist PT    CM 09/22/22 -  Yanira Shields, PT Physical Therapist PT    KR 12/30/22 -  Raissa Soria, PT Physical Therapist PT                  PT Recommendation and Plan     Plan of Care Reviewed With: patient, spouse  Progress: no change  Outcome Evaluation: Pt with increased alertness, improving mobility.  mechanical lift to chair, STS from chair, BUE support, knees blocked, improved to mod-A x2 and maintained standing for 2 mins     Time Calculation:         PT Charges       Row Name 10/15/23 1403             Time Calculation    Start Time 1300  -KG      PT Received On 10/15/23  -KG         Time Calculation- PT    Total Timed Code Minutes- PT 38 minute(s)  -KG         Timed Charges    94207 - PT Therapeutic Activity Minutes 38  -KG         Total Minutes    Timed Charges Total Minutes 38  -KG       Total Minutes 38  -KG                User Key  (r) = Recorded By, (t) = Taken By, (c) = Cosigned By      Initials Name Provider Type    KG Monik Solorio Physical Therapist                  Therapy Charges for Today       Code Description Service Date Service Provider Modifiers Qty    [id number] HC PT THERAPEUTIC ACT EA 15 MIN 10/15/2023 Monik Solorio GP 3            PT G-Codes  Outcome Measure Options: AM-PAC 6 Clicks Basic Mobility (PT)  AM-PAC 6 Clicks Score (PT): 9  AM-PAC 6 Clicks Score (OT): 10  Modified Rae Scale: 4 - Moderately severe disability.  Unable to walk without assistance, and unable to attend to own bodily needs without assistance.  PT Discharge Summary  Anticipated Discharge Disposition (PT): inpatient rehabilitation facility    Monik Osmin  10/15/2023

## 2023-10-15 NOTE — PROGRESS NOTES
"    [facility] Medicine Services  PROGRESS NOTE    Patient Name: Kenneth Wen  : 1950  MRN: [id number]    Date of Admission: 9/15/2023  Primary Care Physician: Susan Powers APRN    Subjective   Subjective     CC:  F/u   CVA     HPI:  Wife thinks his \"thinking\" is a little better.        Objective   Objective     Vital Signs:   Temp:  [97.1 °F (36.2 °C)-100.1 °F (37.8 °C)] 98 °F (36.7 °C)  Heart Rate:  [76-90] 76  Resp:  [16-18] 17  BP: (131-145)/(89-95) 131/89     Physical Exam:  Constitutional: No acute distress, awake, alert, in restraints, wife at bedside  HENT: NCAT, mucous membranes moist  Respiratory: Clear to auscultation bilaterally, respiratory effort normal   Cardiovascular: RRR, no murmurs, rubs, or gallops  Gastrointestinal: soft, PEG in place, abdominal binder  Musculoskeletal: No bilateral ankle edema  Neurologic: moves all extremities, restraints. Speech clear, oriented x 2 (note date)  Skin: No rashes        Results Reviewed:  LAB RESULTS:      Lab 10/15/23  0537 10/12/23  0725 10/09/23  0454   WBC 9.08 5.29 5.52   HEMOGLOBIN 11.9* 12.0* 12.1*   HEMATOCRIT 36.0* 36.0* 36.4*   PLATELETS 257 256 266   NEUTROS ABS  --   --  2.79   IMMATURE GRANS (ABS)  --   --  0.01   LYMPHS ABS  --   --  1.56   MONOS ABS  --   --  0.74   EOS ABS  --   --  0.32   MCV 94.5 93.5 93.3   CRP  --   --  <0.30         Lab 10/15/23  0537 10/12/23  0725 10/09/23  0454   SODIUM 141 138 139   POTASSIUM 3.8 4.4 4.0   CHLORIDE 105 105 105   CO2 26.0 28.0 26.0   ANION GAP 10.0 5.0 8.0   BUN 19 15 21   CREATININE 0.64* 0.66* 0.65*   EGFR 100.6 99.7 100.1   GLUCOSE 133* 168* 145*   CALCIUM 9.1 9.1 8.9   IONIZED CALCIUM 1.31  --   --    MAGNESIUM 2.0  --  2.0   PHOSPHORUS 3.6  --  3.4         Lab 10/09/23  0454   TOTAL PROTEIN 6.2   ALBUMIN 3.6   GLOBULIN 2.6   ALT (SGPT) 46*   AST (SGOT) 33   BILIRUBIN 0.3   ALK PHOS 83             Lab 10/09/23  0454   CHOLESTEROL 86   TRIGLYCERIDES 67           "   Brief Urine Lab Results  (Last result in the past 365 days)        Color   Clarity   Blood   Leuk Est   Nitrite   Protein   CREAT   Urine HCG        09/15/23 1818 Yellow   Cloudy   Trace   Negative   Negative   Negative                   Microbiology Results Abnormal       Procedure Component Value - Date/Time    Blood Culture - Blood, Arm, Right [[id number]]  (Normal) Collected: 09/15/23 0212    Lab Status: Final result Specimen: Blood from Arm, Right Updated: 09/20/23 0230     Blood Culture No growth at 5 days    Blood Culture - Blood, Arm, Left [[id number]]  (Normal) Collected: 09/15/23 0212    Lab Status: Final result Specimen: Blood from Arm, Left Updated: 09/20/23 0230     Blood Culture No growth at 5 days            No radiology results from the last 24 hrs        Current medications:  Scheduled Meds:acetaminophen, 500 mg, Per PEG Tube, Q6H  atorvastatin, 80 mg, Per PEG Tube, Nightly  clopidogrel, 75 mg, Per PEG Tube, Daily  Diclofenac Sodium, 2 g, Topical, BID  donepezil, 10 mg, Per PEG Tube, Nightly  lansoprazole, 15 mg, Oral, Q AM  Lidocaine, 1 patch, Transdermal, Q24H  melatonin, 5 mg, Per PEG Tube, Nightly  metoprolol tartrate, 25 mg, Per PEG Tube, Q12H  miconazole, 1 application , Topical, Q12H  mirtazapine, 30 mg, Per PEG Tube, Nightly  palliative care oral rinse, , Mouth/Throat, 4x Daily  ProSource No Carb, 30 mL, Per G Tube, Daily  QUEtiapine, 100 mg, Per PEG Tube, Nightly  sodium chloride, 10 mL, Intravenous, Q12H  sodium chloride, 10 mL, Intravenous, Q12H      Continuous Infusions:sodium chloride, 50 mL/hr, Last Rate: Stopped (10/14/23 1200)      PRN Meds:.  Calcium Replacement - Follow Nurse / BPA Driven Protocol    dextrose    dextrose    glucagon (human recombinant)    ibuprofen    Magnesium Standard Dose Replacement - Follow Nurse / BPA Driven Protocol    ondansetron    Phosphorus Replacement - Follow Nurse / BPA Driven Protocol    Potassium Replacement - Follow Nurse / BPA Driven Protocol     QUEtiapine    sodium chloride    sodium chloride    Assessment & Plan   Assessment & Plan     Active Hospital Problems    Diagnosis  POA    **Hemorrhagic CVA [I61.9]  Yes    Oropharyngeal dysphagia [R13.12]  Yes    Multiple bilateral CVAs [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        Brief Hospital Course to date:  Kenneth Wen is a 73 y.o. male  with hx of HTN, HLD, T2DM, and GERD who presented to OSH with multiple acute ischemic infarcts of the bilateral cerebral and cerebellar hemispheres as well as left isaac. TTE/JOSIAS was negative PFO at OSH. On 9/13/23 he had worsening in mental status and new inability to move RLE, head CT showed evolution of the existing CVA with new development of petechial hemorrhage. DAPT was held for 24 hours per Neurology recommendations and repeat CT head that evening showed worsening effacement of the right quadrigeminal cistern with suspected increasing upward supratentorial pressure. He was then transferred to [facility] for [facility]surgery evaluation on 9/15/23. He was started on hypertonic saline 9/15/23 through 9/20/23 for cerebral edema. He had fevers with negative cultures but had worsening secretions and labored breathing so was started on Zosyn on 9/16/23. Transferred to the hospitalist service on 9/22/23. Pt demonstrated poor oral intake with elevated sodium levels; therefore, PEG tube was recommended.         Multiple bilateral posterior circulation strokes with hemorrhagic conversion  --Neurology has followed, suspect atheroembolic but cannot rule out cardioembolic given multiple vascular territories  --Plavix monotherapy, high intensity statin  --Needs Holter monitor at discharge  --Follow up in stroke clinic  in 8 weeks    Agitation  Encephalopathy  --Seroquel 12.5mg QAM, Seroquel 75 mg QPM  -- PRN ativan for agitation  --appreciate general neurology input recs to decrease ativan dose to 0.25mg IV PRN, changed lexapro to remeron and increased remeron to 30mg qhs, increase aricept to 10mg qhs--continues to adjust meds.  Increased seroquel to 100mg at bedtime, 12.5mg in the morning and continue seroquel 25mg PRN.  Still in restraints currentlly  --restoril/melatonin nightly    Dysphagia  Hypernatremia - resolved  --SLP recommends mechanical ground with honey thick liquids   --Risk of pulling out peg tube, abd binder to be in place at all times. --Surgery recommends to remain in restraints at all times or if off has to have sitter for next 2 weeks   -- dietary following for calorie count, adequate PO intake is limited by his mental status     HTN  --Continue Metoprolol 25 mg BID     GERD  --Continue Protonix     COVID-19-resolved  --Overall asymptomatic and on room air  --No infiltrates seen and low procal  --Did not receive any treatment   --Off Isolation 9/30/2023      Leukocytosis-resolved  --Procalcitionin low at 0.05  --CXR and UA negative  --Blood cultures negative     Elevated LFT's, mild  --Possibly secondary to statin?  --Negative acute hepatitis panel  --repeat AST/ALT improved on 10/3    D/w wife at bedside on 10/15/23    Expected Discharge Location and Transportation: rehab   Expected Discharge   Expected Discharge Date: 10/7/2023; Expected Discharge Time:      DVT prophylaxis:  Mechanical DVT prophylaxis orders are present.     AM-PAC 6 Clicks Score (PT): 9 (10/15/23 1400)    CODE STATUS:   Code Status and Medical Interventions:   Ordered at: 09/29/23 0854     Medical Intervention Limits:    NO intubation (DNI)     Code Status (Patient has no pulse and is not breathing):    No CPR (Do Not Attempt to Resuscitate)     Medical Interventions (Patient has pulse or is breathing):    Limited Support       Osmel QUINTANILLA  MD Rosa  10/15/23

## 2023-10-15 NOTE — PLAN OF CARE
Goal Outcome Evaluation:  Plan of Care Reviewed With: patient, spouse        Progress: no change  Outcome Evaluation: Pt with increased alertness, improving mobility.  mechanical lift to chair, STS from chair, BUE support, knees blocked, improved to mod-A x2 and maintained standing for 2 mins      Anticipated Discharge Disposition (PT): inpatient rehabilitation facility

## 2023-10-16 LAB
ALBUMIN SERPL-MCNC: 4 G/DL (ref 3.5–5.2)
ALBUMIN/GLOB SERPL: 1.7 G/DL
ALP SERPL-CCNC: 88 U/L (ref 39–117)
ALT SERPL W P-5'-P-CCNC: 37 U/L (ref 1–41)
ANION GAP SERPL CALCULATED.3IONS-SCNC: 12 MMOL/L (ref 5–15)
AST SERPL-CCNC: 32 U/L (ref 1–40)
BILIRUB SERPL-MCNC: 0.6 MG/DL (ref 0–1.2)
BUN SERPL-MCNC: 17 MG/DL (ref 8–23)
BUN/CREAT SERPL: 26.6 (ref 7–25)
CALCIUM SPEC-SCNC: 9.5 MG/DL (ref 8.6–10.5)
CHLORIDE SERPL-SCNC: 99 MMOL/L (ref 98–107)
CHOLEST SERPL-MCNC: 81 MG/DL (ref 0–200)
CO2 SERPL-SCNC: 23 MMOL/L (ref 22–29)
CREAT SERPL-MCNC: 0.64 MG/DL (ref 0.76–1.27)
CRP SERPL-MCNC: 0.41 MG/DL (ref 0–0.5)
EGFRCR SERPLBLD CKD-EPI 2021: 100.6 ML/MIN/1.73
GLOBULIN UR ELPH-MCNC: 2.4 GM/DL
GLUCOSE BLDC GLUCOMTR-MCNC: 136 MG/DL (ref 70–130)
GLUCOSE BLDC GLUCOMTR-MCNC: 149 MG/DL (ref 70–130)
GLUCOSE BLDC GLUCOMTR-MCNC: 172 MG/DL (ref 70–130)
GLUCOSE BLDC GLUCOMTR-MCNC: 94 MG/DL (ref 70–130)
GLUCOSE SERPL-MCNC: 141 MG/DL (ref 65–99)
MAGNESIUM SERPL-MCNC: 1.7 MG/DL (ref 1.6–2.4)
PHOSPHATE SERPL-MCNC: 3.7 MG/DL (ref 2.5–4.5)
POTASSIUM SERPL-SCNC: 4.4 MMOL/L (ref 3.5–5.2)
PREALB SERPL-MCNC: 22.6 MG/DL (ref 20–40)
PROT SERPL-MCNC: 6.4 G/DL (ref 6–8.5)
SODIUM SERPL-SCNC: 134 MMOL/L (ref 136–145)
TRIGL SERPL-MCNC: 43 MG/DL (ref 0–150)

## 2023-10-16 PROCEDURE — 84100 ASSAY OF PHOSPHORUS: CPT | Performed by: NURSE PRACTITIONER

## 2023-10-16 PROCEDURE — 99232 SBSQ HOSP IP/OBS MODERATE 35: CPT | Performed by: INTERNAL MEDICINE

## 2023-10-16 PROCEDURE — 86140 C-REACTIVE PROTEIN: CPT | Performed by: NURSE PRACTITIONER

## 2023-10-16 PROCEDURE — 83735 ASSAY OF MAGNESIUM: CPT | Performed by: NURSE PRACTITIONER

## 2023-10-16 PROCEDURE — 82948 REAGENT STRIP/BLOOD GLUCOSE: CPT

## 2023-10-16 PROCEDURE — 92507 TX SP LANG VOICE COMM INDIV: CPT | Performed by: SPEECH-LANGUAGE PATHOLOGIST

## 2023-10-16 PROCEDURE — 84134 ASSAY OF PREALBUMIN: CPT | Performed by: NURSE PRACTITIONER

## 2023-10-16 PROCEDURE — 99232 SBSQ HOSP IP/OBS MODERATE 35: CPT | Performed by: PSYCHIATRY & NEUROLOGY

## 2023-10-16 PROCEDURE — 82465 ASSAY BLD/SERUM CHOLESTEROL: CPT | Performed by: NURSE PRACTITIONER

## 2023-10-16 PROCEDURE — 80053 COMPREHEN METABOLIC PANEL: CPT | Performed by: NURSE PRACTITIONER

## 2023-10-16 PROCEDURE — 84478 ASSAY OF TRIGLYCERIDES: CPT | Performed by: NURSE PRACTITIONER

## 2023-10-16 PROCEDURE — 92526 ORAL FUNCTION THERAPY: CPT | Performed by: SPEECH-LANGUAGE PATHOLOGIST

## 2023-10-16 RX ORDER — QUETIAPINE FUMARATE 25 MG/1
25 TABLET, FILM COATED ORAL DAILY
Status: DISCONTINUED | OUTPATIENT
Start: 2023-10-16 | End: 2023-10-19

## 2023-10-16 RX ADMIN — ATORVASTATIN CALCIUM 80 MG: 40 TABLET, FILM COATED ORAL at 21:48

## 2023-10-16 RX ADMIN — Medication 5 MG: at 18:45

## 2023-10-16 RX ADMIN — MINERAL OIL: 1000 LIQUID ORAL at 17:31

## 2023-10-16 RX ADMIN — MICONAZOLE NITRATE 1 APPLICATION: 20 CREAM TOPICAL at 21:49

## 2023-10-16 RX ADMIN — DONEPEZIL HYDROCHLORIDE 10 MG: 10 TABLET, FILM COATED ORAL at 18:45

## 2023-10-16 RX ADMIN — DICLOFENAC SODIUM 2 G: 9.3 GEL TOPICAL at 08:24

## 2023-10-16 RX ADMIN — Medication 30 ML: at 08:26

## 2023-10-16 RX ADMIN — DICLOFENAC SODIUM 2 G: 9.3 GEL TOPICAL at 21:49

## 2023-10-16 RX ADMIN — CLOPIDOGREL BISULFATE 75 MG: 75 TABLET ORAL at 08:24

## 2023-10-16 RX ADMIN — MICONAZOLE NITRATE 1 APPLICATION: 20 CREAM TOPICAL at 08:26

## 2023-10-16 RX ADMIN — ACETAMINOPHEN ORAL SOLUTION 500 MG: 650 SOLUTION ORAL at 23:52

## 2023-10-16 RX ADMIN — QUETIAPINE FUMARATE 150 MG: 100 TABLET ORAL at 18:44

## 2023-10-16 RX ADMIN — METOPROLOL TARTRATE 25 MG: 25 TABLET, FILM COATED ORAL at 08:24

## 2023-10-16 RX ADMIN — MINERAL OIL: 1000 LIQUID ORAL at 11:49

## 2023-10-16 RX ADMIN — ACETAMINOPHEN ORAL SOLUTION 500 MG: 650 SOLUTION ORAL at 17:31

## 2023-10-16 RX ADMIN — MIRTAZAPINE 30 MG: 15 TABLET, FILM COATED ORAL at 18:45

## 2023-10-16 RX ADMIN — LANSOPRAZOLE 15 MG: 15 TABLET, ORALLY DISINTEGRATING ORAL at 06:19

## 2023-10-16 RX ADMIN — QUETIAPINE FUMARATE 25 MG: 25 TABLET ORAL at 10:16

## 2023-10-16 RX ADMIN — METOPROLOL TARTRATE 25 MG: 25 TABLET, FILM COATED ORAL at 21:48

## 2023-10-16 RX ADMIN — LIDOCAINE 1 PATCH: 4 PATCH TOPICAL at 08:24

## 2023-10-16 RX ADMIN — MINERAL OIL 5 ML: 1000 LIQUID ORAL at 08:45

## 2023-10-16 RX ADMIN — ACETAMINOPHEN ORAL SOLUTION 500 MG: 650 SOLUTION ORAL at 11:49

## 2023-10-16 RX ADMIN — ACETAMINOPHEN ORAL SOLUTION 500 MG: 650 SOLUTION ORAL at 06:19

## 2023-10-16 NOTE — PROGRESS NOTES
[facility] Medicine Services  PROGRESS NOTE    Patient Name: Kenneth Wen  : 1950  MRN: [id number]    Date of Admission: 9/15/2023  Primary Care Physician: Susan Powers APRN    Subjective   Subjective     CC:  F/u   CVA     HPI:  Resting in bed in no acute distress and family is also present at the bedside.  Patient is very restless and thrashing in the bed constantly.  Tells me that he is comfortable.  Does not have any pain does not have any difficulty breathing.      Objective   Objective     Vital Signs:   Temp:  [97.5 °F (36.4 °C)-98.8 °F (37.1 °C)] 97.5 °F (36.4 °C)  Heart Rate:  [] 87  Resp:  [16-18] 18  BP: (109-148)/(57-91) 142/91     Physical Exam:  Constitutional: No acute distress, very restless.  HENT: NCAT, mucous membranes moist  Respiratory: Clear to auscultation bilaterally, respiratory effort normal   Cardiovascular: RRR, no murmurs, rubs, or gallops  Gastrointestinal: Positive bowel sounds, soft, nontender, nondistended  Musculoskeletal: No bilateral ankle edema  Psychiatric: Unable to assess  Neurologic: Awake, alert, confused but follows very simple commands, speech clear  Skin: No rashes         Results Reviewed:  LAB RESULTS:      Lab 10/16/23  0609 10/15/23  0537 10/12/23  0725   WBC  --  9.08 5.29   HEMOGLOBIN  --  11.9* 12.0*   HEMATOCRIT  --  36.0* 36.0*   PLATELETS  --  257 256   MCV  --  94.5 93.5   CRP 0.41  --   --          Lab 10/16/23  0609 10/15/23  0537 10/12/23  0725   SODIUM 134* 141 138   POTASSIUM 4.4 3.8 4.4   CHLORIDE 99 105 105   CO2 23.0 26.0 28.0   ANION GAP 12.0 10.0 5.0   BUN 17 19 15   CREATININE 0.64* 0.64* 0.66*   EGFR 100.6 100.6 99.7   GLUCOSE 141* 133* 168*   CALCIUM 9.5 9.1 9.1   IONIZED CALCIUM  --  1.31  --    MAGNESIUM 1.7 2.0  --    PHOSPHORUS 3.7 3.6  --          Lab 10/16/23  0609   TOTAL PROTEIN 6.4   ALBUMIN 4.0   GLOBULIN 2.4   ALT (SGPT) 37   AST (SGOT) 32   BILIRUBIN 0.6   ALK PHOS 88             Lab  10/16/23  0609   CHOLESTEROL 81   TRIGLYCERIDES 43             Brief Urine Lab Results  (Last result in the past 365 days)        Color   Clarity   Blood   Leuk Est   Nitrite   Protein   CREAT   Urine HCG        09/15/23 1818 Yellow   Cloudy   Trace   Negative   Negative   Negative                   Microbiology Results Abnormal       Procedure Component Value - Date/Time    Blood Culture - Blood, Arm, Right [[id number]]  (Normal) Collected: 09/15/23 0212    Lab Status: Final result Specimen: Blood from Arm, Right Updated: 09/20/23 0230     Blood Culture No growth at 5 days    Blood Culture - Blood, Arm, Left [[id number]]  (Normal) Collected: 09/15/23 0212    Lab Status: Final result Specimen: Blood from Arm, Left Updated: 09/20/23 0230     Blood Culture No growth at 5 days            No radiology results from the last 24 hrs        Current medications:  Scheduled Meds:acetaminophen, 500 mg, Per PEG Tube, Q6H  atorvastatin, 80 mg, Per PEG Tube, Nightly  clopidogrel, 75 mg, Per PEG Tube, Daily  Diclofenac Sodium, 2 g, Topical, BID  donepezil, 10 mg, Per PEG Tube, Nightly  lansoprazole, 15 mg, Oral, Q AM  Lidocaine, 1 patch, Transdermal, Q24H  melatonin, 5 mg, Per PEG Tube, Nightly  metoprolol tartrate, 25 mg, Per PEG Tube, Q12H  miconazole, 1 application , Topical, Q12H  mirtazapine, 30 mg, Per PEG Tube, Nightly  palliative care oral rinse, , Mouth/Throat, 4x Daily  ProSource No Carb, 30 mL, Per G Tube, Daily  QUEtiapine, 150 mg, Per PEG Tube, Nightly  QUEtiapine, 25 mg, Oral, Daily  sodium chloride, 10 mL, Intravenous, Q12H  sodium chloride, 10 mL, Intravenous, Q12H      Continuous Infusions:     PRN Meds:.  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        Brief Hospital Course to date:  Kenneth Wen is a 73 y.o. male  with hx of HTN, HLD, T2DM, and GERD who presented to OSH with multiple acute ischemic infarcts of the bilateral cerebral and cerebellar hemispheres as well as left isaac. TTE/JOSIAS was negative PFO at OSH. On 9/13/23 he had worsening in mental status and new inability to move RLE, head CT showed evolution of the existing CVA with new development of petechial hemorrhage. DAPT was held for 24 hours per Neurology recommendations and repeat CT head that evening showed worsening effacement of the right quadrigeminal cistern with suspected increasing upward supratentorial pressure. He was then transferred to [facility] for Neurosurgery evaluation on 9/15/23. He was started on hypertonic saline 9/15/23 through 9/20/23 for cerebral edema. He had fevers with negative cultures but had worsening secretions and labored breathing so was started on Zosyn on 9/16/23. Transferred to the hospitalist service on 9/22/23. Pt demonstrated poor oral intake with elevated sodium levels; therefore, PEG tube was recommended.         Multiple bilateral posterior circulation strokes with hemorrhagic conversion  --Neurology has followed, suspect atheroembolic but cannot rule out cardioembolic given multiple vascular territories  --Plavix monotherapy, high intensity statin  --Needs Holter monitor at discharge  --Follow up in stroke clinic in 8  weeks    Agitation  Encephalopathy  --appreciate general neurology input recs to decrease ativan dose to 0.25mg IV PRN, changed lexapro to remeron and increased remeron to 30mg qhs, increase aricept to 10mg qhs--continues to adjust meds.  Increased seroquel to 100mg at bedtime, 12.5mg in the morning and continue seroquel 25mg PRN.  Still in restraints currentlly  -- Neurology is following and trying to help patient's sleep and calm down with tweaking Seroquel dose.    Dysphagia  Hypernatremia - resolved  --SLP recommends mechanical ground with honey thick liquids   --Risk of pulling out peg tube, abd binder to be in place at all times. --Surgery recommends to remain in restraints at all times or if off has to have sitter for next 2 weeks   -- dietary following for calorie count, adequate PO intake is limited by his mental status     HTN  --Continue Metoprolol 25 mg BID     GERD  --Continue Protonix     COVID-19-resolved  --Overall asymptomatic and on room air  --No infiltrates seen and low procal  --Did not receive any treatment   --Off Isolation 9/30/2023      Leukocytosis-resolved  --Procalcitionin low at 0.05  --CXR and UA negative  --Blood cultures negative     Elevated LFT's, mild  --Possibly secondary to statin?  --Negative acute hepatitis panel  --repeat AST/ALT improved on 10/3    D/w wife at bedside on 10/15/23    Expected Discharge Location and Transportation: rehab   Expected Discharge   Expected Discharge Date: 10/7/2023; Expected Discharge Time:      DVT prophylaxis:  Mechanical DVT prophylaxis orders are present.     AM-PAC 6 Clicks Score (PT): 9 (10/16/23 0830)    CODE STATUS:   Code Status and Medical Interventions:   Ordered at: 09/29/23 0854     Medical Intervention Limits:    NO intubation (DNI)     Code Status (Patient has no pulse and is not breathing):    No CPR (Do Not Attempt to Resuscitate)     Medical Interventions (Patient has pulse or is breathing):    Limited Support       Bruce  MD Florian  10/16/23

## 2023-10-16 NOTE — CASE MANAGEMENT/SOCIAL WORK
Continued Stay Note  [facility]     Patient Name: Kenneth Wen  MRN: [id number]  Today's Date: 10/16/2023    Admit Date: 9/15/2023    Plan: TBD   Discharge Plan       Row Name 10/16/23 1113       Plan    Plan Comments Pt down to wrist restraints at this time. Plan is in place for referrals once pt able to get out of restraints. MSW continues to follow for discharge needs as arise.                   Discharge Codes    No documentation.                 Expected Discharge Date and Time       Expected Discharge Date Expected Discharge Time    Oct 7, 2023               JOEL Anna

## 2023-10-16 NOTE — PLAN OF CARE
Goal Outcome Evaluation: Pt still in 2 point restraints overnight; still attempting to pull at PEG. Abdominal binder x 2 in place at this time. Family at bedside.  VS WDL at this time. POC ongoing. Nayeli Aguilera RN

## 2023-10-16 NOTE — PROGRESS NOTES
Clinical Nutrition   Nutrition Support Assessment  Reason for Visit: Follow-up protocol, EN/PO    Patient Name: Kenneth Wen  YOB: 1950  MRN: [id number]  Date of Encounter: 10/16/23 14:49 EDT  Admission date: 9/15/2023    Comments:     Pt tolerating bolus feeds per RN. Continues with minimal PO acceptance. Pt continues to require restraints due to pulling at PEG tube.      Continue bolus feeds as ordered with PO comfort diet vs for nutritional intake.      Nutrition Assessment   Admission Diagnosis:  ICH (intracerebral hemorrhage) [I61.9]      Problem List:    Hemorrhagic CVA    Dyslipidemia    Multiple bilateral CVAs    HFpEF    T2DM (type 2 diabetes mellitus)    HTN (hypertension)    GERD (gastroesophageal reflux disease)    ICH (intracerebral hemorrhage)    Oropharyngeal dysphagia        PMH:  He  has a past medical history of Acid reflux, Cancer, Diabetes mellitus, GERD (gastroesophageal reflux disease) (09/15/2023), HTN (hypertension) (09/15/2023), Kidney disease, and T2DM (type 2 diabetes mellitus) (09/15/2023).    PSH: He  has a past surgical history that includes Appendectomy; Nasal polyp surgery; Hemorrhoid surgery; and Esophagogastroduodenoscopy w/ PEG (N/A, 9/29/2023).      Applicable Nutrition Concerns:   Skin:  Oral:  GI: PEG (9/29)      Applicable Interval History:   9/16 3% Hypertonic saline initiated  9/15 FEES:  SLP Swallowing Diagnosis: mod-severe, oral dysphagia, severe, pharyngeal dysphagia (09/15/23 1331)  SLP Diet Recommendation: NPO, temporary alternate methods of nutrition/hydration, other (see comments) (okay for 2-3 ice chips per hour as tolerated)   9/23-SLP Diet Recommendation: puree, honey thick liquids.  9/29- PEG tube placed. Consult for tube feeding assessment.   10/4: [facility] [place] SLP Diet Recommendation: puree, honey thick liquids, no mixed consistencies   10/16: SLP Diet Recommendation: puree, honey thick liquids, no mixed consistencies (10/16/23  2578)  Recommended Precautions and Strategies: upright posture during/after eating, small bites of food and sips of liquid, no straw, alternate between small bites of food and sips of liquid, general aspiration precautions, 1:1 supervision  Recommended Diagnostics: reassess via VFSS (JAMIR) (10/17)     Reported/Observed/Food/Nutrition Related History:     10/16  Pt tolerating bolus feeds per RN. Continues with minimal PO acceptance. Pt continues to require restraints due to pulling at PEG tube. Documented BM x2 in past 24hrs.      10/13  Pt continues to have poor PO intake regardless of nocturnal feeds and addition of Remeron. Currently requiring restraints again. Per spouse pt with difficult night and did not wish to wake for lunch. Discussed option to change to bolus feeds and meet 100% of needs and allow for PO intake for comfort only. Spouse agreeable.     10/9  Spouse wanting to cont nocturnal feeding to be able to try p.o feeding. Seemed improved from yesterday. Will change hrs of delivery to 16.    10/8  Pt with meal refusal x2 on 10/7. With refusal, nocturnal feeds inappropriate to meet estimated needs. Will continue calorie count through today and re-evaluate Monday if need to return to continuous feeds. Palliative continues following for GOC.      10/7  Pt with noted improved PO yesterday however remains inadequate to d/c nocturnal feeds at this time. Will continue calorie count for 2 more days.      10/6  Pt continues with inconsistent intake. Continues to require restraints to prevent pulling PEG regardless of abdominal binder. Spouse present at all meals to assist. Observed increased acceptance of lunch however was not alert enough for breakfast.     10/4  Pt with improved PO at lunch today however per spouse did not eat at breakfast. Suspect due to working with therapies at breakfast time. Tolerated nocturnal regimen. MBS completed - SLP recs puree, honey thick liquids, no mixed consistencies.   "    10/3  Tolerating feeds at goal. Pt with ~25% at meals being fed by spouse - ? If able to improve PO intake with transition to nocturnal feeds. Discussed with spouse, agreeable to try.     10/1   EN ordered yesterday, started via PEG tube.  @ goal rate this morning and being tolerated. Patient also with order for a diet.  Able to reach patient wife over the phone to get a better sense of patient intake. Patient wife reports she was present for dinner meal yesterday and patient only ate bites of applesauce.  Overall not much intake. Reports patient has been a little sleepy past couple of days and no showing that much interest in eating.  We discussed current EN Rx.  We felt it would be best to keep EN continuous vs nocturnal at this time.  Can switch to nocturnal if the events intake improves.     9/30  Consult for tube feeding assessment.  Overall issues with sodium levels, fluid intake and confusion. Was getting IVF, however pulled out IV.  Wife asked for a PEG tube placement on 9/28.      Patient in COVID isolation, RD not able to visit in person.  No diet this morning (was made NPO for PEG tube placement). Discussed with RN re-order previous order. RD will start tube feeding.       9/25  Spoke w/RN who reports pt pulled out NGT Friday evening. RN states pt eating >/=75% of trays. Textures downgraded 9/23 to pureed.     9/22  Pt on diet though ate only sausage and some juice this am. RN states pt pocketing food still, had been happening in ICU per previous RD note. Discussed w/SLP.     9/18 RN reports pt on 3% saline therapy Na+ goal 145-155, Na+ w/I range, pt from OSH sent here d/t hemorrhagic conversion continues to have R weakness, confusion, tolerating TF. Uncertain if pt should have water flushes, these were dc'd after discussion w/ MD.     9/15  Per RN unclear if will start EN or if pt may progress to caden diet at this time.  No wt hx available today.     Anthropometrics     Admission Height 175.3 cm (69\") " "Documented at 09/15/2023 0122   Admission Weight 79.2 kg (174 lb 9.7 oz) Documented at 09/15/2023 0122     Height: Height: 175.3 cm (69\")  Last Filed Weight: Weight: 72.8 kg (160 lb 7.9 oz) (09/21/23 0500)  Method: Weight Method: Bed scale  BMI: BMI (Calculated): 23.7  BMI classification: Overweight: 25.0-29.9kg/m2      9/15/2023 9/17/2023   Weight     Weight (kg) 79.2 kg  82.5 kg    Weight (lbs) 174 lb 9.7 oz  181 lb 14.1 oz    Weight Method Bed scale  Bed scale        UBW: Per  lbs on 9/14/23  Note 172 lbs in 2018  Weight change: uncertain at this time    Labs    Labs Reviewed: Yes     Results from last 7 days   Lab Units 10/16/23  0609 10/15/23  0537 10/12/23  0725   GLUCOSE mg/dL 141* 133* 168*   BUN mg/dL 17 19 15   CREATININE mg/dL 0.64* 0.64* 0.66*   SODIUM mmol/L 134* 141 138   CHLORIDE mmol/L 99 105 105   POTASSIUM mmol/L 4.4 3.8 4.4   PHOSPHORUS mg/dL 3.7 3.6  --    MAGNESIUM mg/dL 1.7 2.0  --    ALT (SGPT) U/L 37  --   --        Results from last 7 days   Lab Units 10/16/23  0609 10/15/23  0537   ALBUMIN g/dL 4.0  --    PREALBUMIN mg/dL 22.6  --    CRP mg/dL 0.41  --    IONIZED CALCIUM mmol/L  --  1.31   CHOLESTEROL mg/dL 81  --    TRIGLYCERIDES mg/dL 43  --        Results from last 7 days   Lab Units 10/16/23  1117 10/16/23  0611 10/16/23  0009 10/15/23  1731 10/15/23  1137 10/15/23  0553   GLUCOSE mg/dL 149* 136* 172* 146* 148* 124     Lab Results   Lab Value Date/Time    HGBA1C 6.40 (H) 09/15/2023 0212               Medications    Medications Reviewed: Yes  Pertinent: insulin, prevacid, remeron, protonix   Infusion:   PRN:     Needs Assessment   Date: 9/30  **CBW similar to last used for calculation    Height used:Height: 175.3 cm (69\")  Weights used: 160lb/72.7kg      Estimated Calorie needs: ~ 1900 Kcal/day  Method:  Kcals/KG 25= 1818  Method:  MSJ 1479 x 1.3= 1923    Estimated Protein needs: ~ 95g PRO/day  Method: 1.3g/Kg= 95    Estimated Fluid needs: ~ ml/day   Per clinical status    Current " Nutrition Prescription      PO: Diet: Regular/House Diet; No Straw, Feeding Assistance - Nursing, No Mixed Consistencies; Texture: Pureed (NDD 1); Fluid Consistency: Honey Thick   Oral Nutrition Supplement: Magic cup 2x daily   Intake: 37.5% x last 4 meals documented    EN: IsoSource 1.5  Total Cartons: 5  Bolus Regimen: 1 carton (250mL) 5x/d - ~q3hrs between 6a and 9p  Water Flush: 75mL before and after each bolus  Modular: Prosource -no carb 1/day  Route: PEG  Tube: Unknown     At goal over: bolus feeds     Rx will supply:   Goal Volume 1250 mL/day       Flush Volume 750 mL/day       Energy 1935 Kcal/day 102 % Est Need   Protein 100 g/day 100 % Est Need   Fiber 19 g/day       Water in   mL       Total Water 1700 mL       Meet DRI Yes            --------------------------------------------------------------------------  Product/Rate verified at bedside: No  Infusing Rate at time of visit: bolus feeds     Average Delivery from [facility] Day:  Volume 1250 mL/day 100  % Goal Vol.   Flush Volume 675 mL/day     Energy  Kcal/day  % Est Need   Protein  g/day  % Est Need   Fiber  g/day     Water in  EN  mL     Total Water  mL     Meet DRI No        Intake/Ouptut 24 hrs (701 - 07)   I&O's Reviewed: Yes     Intake & Output (last day)         10/15 0701  10/16 0700 10/16 0701  10/17 07    Other 675 60    NG/GT 1250 250    Total Intake(mL/kg) 1925 (26.4) 310 (4.3)    Net +1925 +310          Urine Unmeasured Occurrence 7 x 2 x    Stool Unmeasured Occurrence 2 x             Nutrition Diagnosis   Date: 9/15 Updated: , , 10/9, 10/13  Problem Inadequate oral intake    Etiology stroke   Signs/Symptoms NPO w ice chips per SLP, confusion, +EN   Status: ongoing - plan to meet 100% of need via EN    Date:   Updated:  Problem Swallowing difficulty/chewing difficulty   Etiology Bilateral strokes   Signs/Symptoms Oral-pharyngeal dysphagia per SLP FEES eval   Status: ongoing    Goal:   General: Nutrition support  treatment  PO: Increase intake  EN/PN: Maintain EN, Deliver estimated needs      Nutrition Intervention      Follow treatment progress, Care plan reviewed    Continue bolus feeds as ordered with PO comfort diet vs for nutritional intake.     onitoring/Evaluation:   Per protocol, I&O, PO intake, Supplement intake, Pertinent labs, EN delivery/tolerance, Weight, Symptoms, Swallow function      Heidi Baxter MS,RD,LD  Time Spent: 30 min

## 2023-10-16 NOTE — PLAN OF CARE
Goal Outcome Evaluation:  Plan of Care Reviewed With: patient        Progress: improving     SLP treatment and caregiver instruction completed. Will continue to address dysphagia and communication deficits. Will tentatively plan for MBS tomorrow. Please see note for further details and recommendations.

## 2023-10-16 NOTE — THERAPY TREATMENT NOTE
Acute Care - Speech Language Pathology   Swallow Treatment Note [facility]     Patient Name: Kenneth Wen  : 1950  MRN: [id number]  Today's Date: 10/16/2023               Admit Date: 9/15/2023    Visit Dx:     ICD-10-CM ICD-9-CM   1. Hemorrhagic CVA  I61.9 431   2. Aphasia  R47.01 784.3   3. Dysarthria  R47.1 784.51   4. Oropharyngeal dysphagia  R13.12 787.22     Patient Active Problem List   Diagnosis    Precordial pain    Elevated BP without diagnosis of hypertension    Dyslipidemia    Hyperglycemia    Multiple bilateral CVAs    Hemorrhagic CVA    HFpEF    T2DM (type 2 diabetes mellitus)    HTN (hypertension)    GERD (gastroesophageal reflux disease)    ICH (intracerebral hemorrhage)    Oropharyngeal dysphagia     Past Medical History:   Diagnosis Date    Acid reflux     Cancer     Skin    Diabetes mellitus     Prediabetes    GERD (gastroesophageal reflux disease) 09/15/2023    HTN (hypertension) 09/15/2023    Kidney disease     T2DM (type 2 diabetes mellitus) 09/15/2023     Past Surgical History:   Procedure Laterality Date    APPENDECTOMY      ENDOSCOPY W/ PEG TUBE PLACEMENT N/A 2023    Procedure: ESOPHAGOGASTRODUODENOSCOPY WITH PERCUTANEOUS ENDOSCOPIC GASTROSTOMY TUBE INSERTION;  Surgeon: Haseeb Carrillo MD;  Location: [facility] ENDOSCOPY;  Service: General;  Laterality: N/A;    HEMORRHOIDECTOMY      NASAL POLYP SURGERY         SLP Recommendation and Plan     SLP Diet Recommendation: puree, honey thick liquids, no mixed consistencies (10/16/23 0930)  Recommended Precautions and Strategies: upright posture during/after eating, small bites of food and sips of liquid, no straw, alternate between small bites of food and sips of liquid, general aspiration precautions, 1:1 supervision (10/16/23 0930)  SLP Rec. for Method of Medication Administration: meds crushed, with puree (10/16/23 0930)     Monitor for Signs of Aspiration: notify SLP if any concerns (10/16/23 0930)  Recommended Diagnostics:  reassess via VFSS (MBS) (10/17) (10/16/23 0930)     Anticipated Discharge Disposition (SLP): skilled nursing facility (10/16/23 0930)     Therapy Frequency (Swallow): 5 days per week (10/16/23 0930)  Predicted Duration Therapy Intervention (Days): until discharge (10/16/23 0930)  Oral Care Recommendations: Oral Care BID/PRN, Suction toothbrush (10/16/23 0930)        Daily Summary of Progress (SLP): progress toward functional goals as expected (10/16/23 0930)               Treatment Assessment (SLP): improved, no clinical signs of, suspected, aspiration, toleration of diet, cognitive-linguistic disorder, dysarthria (10/16/23 0930)  Treatment Assessment Comments (SLP): Pt seen for tx this am. Improvement in oral manipulation of bolus as well as speech intelligibility. Pt very eager for trials of ice chips, and participatory in all tx goals. Will tentatively plan to repeat MBS tomorrow as pt w/ only x1 cough response w/ trials of ice chips this date (10/16/23 0930)  Plan for Continued Treatment (SLP): continue treatment per plan of care (10/16/23 0930)       Oral Care Recommendations: Oral Care BID/PRN, Suction toothbrush (10/16/23 0930)    Plan of Care Reviewed With: patient  Progress: improving      SWALLOW EVALUATION (last 72 hours)       SLP Adult Swallow Evaluation       Row Name 10/16/23 0930       Rehab Evaluation    Document Type therapy note (daily note)  -CJ    Subjective Information no complaints  -CJ    Patient Observations alert;cooperative  -CJ    Patient/Family/Caregiver Comments/Observations family present  -CJ    Patient Effort adequate  -CJ    Symptoms Noted During/After Treatment none  -CJ       Pain    Additional Documentation Pain Scale: FACES Pre/Post-Treatment (Group)  -CJ       Pain Scale: FACES Pre/Post-Treatment    Pain: FACES Scale, Pretreatment 0-->no hurt  -CJ    Posttreatment Pain Rating 0-->no hurt  -CJ       SLP Treatment Clinical Impressions    Treatment Assessment (SLP) improved;no  clinical signs of;suspected;aspiration;toleration of diet;cognitive-linguistic disorder;dysarthria  -    Treatment Assessment Comments (SLP) Pt seen for tx this am. Improvement in oral manipulation of bolus as well as speech intelligibility. Pt very eager for trials of ice chips, and participatory in all tx goals. Will tentatively plan to repeat MBS tomorrow as pt w/ only x1 cough response w/ trials of ice chips this date  -    Daily Summary of Progress (SLP) progress toward functional goals as expected  -    Plan for Continued Treatment (SLP) continue treatment per plan of care  -    Care Plan Review evaluation/treatment results reviewed;care plan/treatment goals reviewed  -    Care Plan Review, Other Participant(s) spouse;family  -       Recommendations    Therapy Frequency (Swallow) 5 days per week  -    Predicted Duration Therapy Intervention (Days) until discharge  -    SLP Diet Recommendation puree;honey thick liquids;no mixed consistencies  -    Recommended Diagnostics reassess via VFSS (MBS)  10/17  -    Recommended Precautions and Strategies upright posture during/after eating;small bites of food and sips of liquid;no straw;alternate between small bites of food and sips of liquid;general aspiration precautions;1:1 supervision  -    Oral Care Recommendations Oral Care BID/PRN;Suction toothbrush  -    SLP Rec. for Method of Medication Administration meds crushed;with puree  -    Monitor for Signs of Aspiration notify SLP if any concerns  -    Anticipated Discharge Disposition (SLP) skilled nursing facility  -              User Key  (r) = Recorded By, (t) = Taken By, (c) = Cosigned By      Initials Name Effective Dates    Tonya Guzman, MS CCC-SLP 07/11/23 -                     EDUCATION  The patient has been educated in the following areas:   Cognitive Impairment Communication Impairment Dysphagia (Swallowing Impairment) Oral Care/Hydration Modified Diet Instruction.         SLP GOALS       Row Name 10/16/23 1764             (LTG) Patient will demonstrate functional swallow for    Diet Texture (Demonstrate functional swallow) soft to chew (chopped) textures  -CJ      Liquid viscosity (Demonstrate functional swallow) nectar/ mildly thick liquids  -CJ      [place] (Demonstrate functional swallow) with minimal cues (75-90% accuracy)  -CJ      Time Frame (Demonstrate functional swallow) by discharge  -CJ      Progress/Outcomes (Demonstrate functional swallow) continuing progress toward goal  -CJ         (STG) Patient will tolerate trials of    Consistencies Trialed (Tolerate trials) pureed textures;honey/ moderately thick liquids  -CJ      Desired Outcome (Tolerate trials) without signs/symptoms of aspiration;without signs of distress;with adequate oral prep/transit/clearance;with use of compensatory strategies (see comments)  -CJ      [place] (Tolerate trials) with 1:1 assist/ supervision  -CJ      Time Frame (Tolerate trials) by discharge  -CJ      Progress/Outcomes (Tolerate trials) continuing progress toward goal  -CJ      Comment (Tolerate trials) no overt s/s of aspiration w/ trials  -CJ         (STG) Patient will tolerate therapeutic trials of    Consistencies Trialed (Tolerate therapeutic trials) thin liquids  -CJ      Desired Outcome (Tolerate therapeutic trials) without signs/symptoms of aspiration;without signs of distress  -CJ      [place] (Tolerate therapeutic trials) with minimal cues (75-90% accuracy)  -CJ      Time Frame (Tolerate therapeutic trials) by discharge  -CJ      Progress/Outcomes (Tolerate therapeutic trials) continuing progress toward goal  -CJ      Comment (Tolerate therapeutic trials) cough x1  -CJ         (STG) Labial Strengthening Goal 1 (SLP)    Activity (Labial Strengthening Goal 1, SLP) increase labial tone  -CJ      Increase Labial Tone labial resistance exercises;swallow trials  -CJ      [place]/Accuracy (Labial Strengthening Goal 1,  SLP) with moderate cues (50-74% accuracy)  -CJ      Time Frame (Labial Strengthening Goal 1, SLP) short term goal (STG)  -CJ      Progress/Outcomes (Labial Strengthening Goal 1, SLP) continuing progress toward goal  -CJ      Comment (Labial Strengthening Goal 1, SLP) x5 reps; improvement  -CJ         (STG) Lingual Strengthening Goal 1 (SLP)    Activity (Lingual Strengthening Goal 1, SLP) increase lingual tone/sensation/control/coordination/movement;increase tongue back strength  -CJ      Increase Lingual Tone/Sensation/Control/Coordination/Movement swallow trials;lingual resistance exercises  -CJ      Increase Tongue Back Strength lingual resistance exercises  -CJ      [place]/Accuracy (Lingual Strengthening Goal 1, SLP) with moderate cues (50-74% accuracy)  -CJ      Time Frame (Lingual Strengthening Goal 1, SLP) short term goal (STG)  -CJ      Progress/Outcomes (Lingual Strengthening Goal 1, SLP) continuing progress toward goal  -CJ      Comment (Lingual Strengthening Goal 1, SLP) lingual resistance x10  -CJ         (STG) Pharyngeal Strengthening Exercise Goal 1 (SLP)    Activity (Pharyngeal Strengthening Goal 1, SLP) increase superior movement of the hyolaryngeal complex;increase anterior movement of the hyolaryngeal complex;increase closure at entrance to airway/closure of airway at glottis;increase squeeze/positive pressure generation;increase tongue base retraction;increase timing  -CJ      Increase Timing prepping - 3 second prep or suck swallow or 3-step swallow  -CJ      Increase Superior Movement of the Hyolaryngeal Complex effortful pitch glide (falsetto + pharyngeal squeeze)  -CJ      Increase Anterior Movement of the Hyolaryngeal Complex chin tuck against resistance (CTAR)  -CJ      Increase Closure at Entrance to Airway/Closure of Airway at Glottis supraglottic swallow  -CJ      Increase Squeeze/Positive Pressure Generation hard effortful swallow  -CJ      Increase Tongue Base Retraction tamie   -CJ      [place]/Accuracy (Pharyngeal Strengthening Goal 1, SLP) with moderate cues (50-74% accuracy)  -CJ      Time Frame (Pharyngeal Strengthening Goal 1, SLP) short term goal (STG)  -CJ      Progress/Outcomes (Pharyngeal Strengthening Goal 1, SLP) continuing progress toward goal  -CJ      Comment (Pharyngeal Strengthening Goal 1, SLP) Pt able to complete effortful swallow, pitch glide and attempted CTAR  -CJ         Patient will demonstrate functional communication skills for return to discharge environment     [place] with moderate cues  -CJ      Time frame by discharge  -CJ      Progress/Outcomes continuing progress toward goal  -CJ         Comprehend Questions Goal 1 (SLP)    Improve Ability to Comprehend Questions Goal 1 (SLP) complex yes/no questions;80%;with minimal cues (75-90%)  -CJ      Time Frame (Comprehend Questions Goal 1, SLP) short term goal (STG)  -CJ      Progress (Ability to Comprehend Questions Goal 1, SLP) 60%;with moderate cues (50-74%)  -CJ      Progress/Outcomes (Comprehend Questions Goal 1, SLP) continuing progress toward goal  -CJ         Follow Directions Goal 2 (SLP)    Improve Ability to Follow Directions Goal 1 (SLP) 2 step commands;80%;with minimal cues (75-90%)  -CJ      Time Frame (Follow Directions Goal 1, SLP) short term goal (STG)  -CJ      Progress (Ability to Follow Directions Goal 1, SLP) 60%;with minimal cues (75-90%)  -CJ      Progress/Outcomes (Follow Directions Goal 1, SLP) continuing progress toward goal  -CJ         Word Retrieval Skills Goal 1 (SLP)    Improve Word Retrieval Skills By Goal 1 (SLP) confrontational naming task;high frequency;responsive naming task;simple;80%;with minimal cues (75-90%)  -CJ      Time Frame (Word Retrieval Goal 1, SLP) short term goal (STG)  -CJ      Progress (Word Retrieval Skills Goal 1, SLP) 80%;with minimal cues (75-90%)  -CJ      Progress/Outcomes (Word Retrieval Goal 1, SLP) continuing progress toward goal  -CJ          Articulation Goal 1 (SLP)    Improve Articulation Goal 1 (SLP) by over-articulating at phrase level;80%;with moderate cues (50-74%)  -CJ      Time Frame (Articulation Goal 1, SLP) short term goal (STG)  -CJ      Progress (Articulation Goal 1, SLP) 70%;with moderate cues (50-74%)  -CJ      Progress/Outcomes (Articulation Goal 1, SLP) continuing progress toward goal  -CJ         Orientation Goal 1 (SLP)    Improve Orientation Through Goal 1 (SLP) demonstrating orientation to day;demonstrating orientation to month;demonstrating orientation to year;demonstrating orientation to place;demonstrating orientation to disease/impairment;use environmental aids to assist with orientation;80%;with moderate cues (50-74%)  -CJ      Time Frame (Orientation Goal 1, SLP) short term goal (STG)  -CJ      Progress (Orientation Goal 1, SLP) 30%;with moderate cues (50-74%)  -CJ      Progress/Outcomes (Orientation Goal 1, SLP) continuing progress toward goal  -CJ                User Key  (r) = Recorded By, (t) = Taken By, (c) = Cosigned By      Initials Name Provider Type    Tonya Guzman MS CCC-SLP Speech and Language Pathologist                       Time Calculation:    Time Calculation- SLP       Row Name 10/16/23 1155             Time Calculation- SLP    SLP Start Time 0930  -      SLP Received On 10/16/23  -         Untimed Charges    19825-HK Treatment/ST Modification Prosth Aug Alter  23  -      64056-JH Treatment Swallow Minutes 24  -CJ         Total Minutes    Untimed Charges Total Minutes 47  -CJ       Total Minutes 47  -CJ                User Key  (r) = Recorded By, (t) = Taken By, (c) = Cosigned By      Initials Name Provider Type    Tonya Guzman MS CCC-SLP Speech and Language Pathologist                    Therapy Charges for Today       Code Description Service Date Service Provider Modifiers Qty    41587489784 HC ST TREATMENT SPEECH 2 10/16/2023 Tonya Jhaveri MS CCC-SLP GN 1    34950806157 HC ST  TREATMENT SWALLOW 2 10/16/2023 Tonya Jhaveri, MS CCC-SLP GN 1                 Tonya Jhaveri MS CCC-SLP  10/16/2023

## 2023-10-16 NOTE — PLAN OF CARE
Goal Outcome Evaluation:           Progress: no change  Outcome Evaluation: Patient alert and awake the entire shift this far, spouse at bedside, moves freely in bed but favors and turns to his right side. Turned and repositioned every 2 hours by staff. Patient has denied eat eating meals but is tolerating tube feeds.  Did require PRN Seroquel this AM and dose has been adjusted per [facility].

## 2023-10-16 NOTE — PROGRESS NOTES
Neurology       Patient Care Team:  Susan Powers APRN as PCP - General (Family Medicine)    Chief complaint: Restless    History: The patient remains restless and is sleeping erratically.    He had 2 as needed doses of 25 mg Seroquel yesterday and another one this morning.    He said that he is slept poorly last night.  Past Medical History:   Diagnosis Date    Acid reflux     Cancer     Skin    Diabetes mellitus     Prediabetes    GERD (gastroesophageal reflux disease) 09/15/2023    HTN (hypertension) 09/15/2023    Kidney disease     T2DM (type 2 diabetes mellitus) 09/15/2023       Vital Signs   Vitals:    10/15/23 2034 10/15/23 2302 10/16/23 0300 10/16/23 0735   BP: 109/57 127/91 148/82 142/91   BP Location:  Right arm Right arm Right arm   Patient Position:  Lying Lying Lying   Pulse: 100  85 87   Resp:  16 16 18   Temp:  98.3 °F (36.8 °C) 98.3 °F (36.8 °C) 97.5 °F (36.4 °C)   TempSrc:  Axillary Axillary Oral   SpO2:   100% 98%   Weight:       Height:           Physical Exam:   General: Restless              Neuro: Moving about the bed    Results Review:  Reviewed  Results from last 7 days   Lab Units 10/15/23  0537   WBC 10*3/mm3 9.08   HEMOGLOBIN g/dL 11.9*   HEMATOCRIT % 36.0*   PLATELETS 10*3/mm3 257     Results from last 7 days   Lab Units 10/16/23  0609 10/15/23  0537 10/12/23  0725   SODIUM mmol/L 134* 141 138   POTASSIUM mmol/L 4.4 3.8 4.4   CHLORIDE mmol/L 99 105 105   CO2 mmol/L 23.0 26.0 28.0   BUN mg/dL 17 19 15   CREATININE mg/dL 0.64* 0.64* 0.66*   CALCIUM mg/dL 9.5 9.1 9.1   BILIRUBIN mg/dL 0.6  --   --    ALK PHOS U/L 88  --   --    ALT (SGPT) U/L 37  --   --    AST (SGOT) U/L 32  --   --    GLUCOSE mg/dL 141* 133* 168*       Imaging Results (Last 24 Hours)       ** No results found for the last 24 hours. **            Assessment:  Ongoing confusion and restlessness associated with sleep deprivation.    Multiple devastating strokes.    Plan:  Increased nighttime dose of Seroquel 250  mg.    Schedule 25 mg dose of Seroquel in the morning.        Comment:  Patient clearly needs more sedation and seems to be tolerating larger doses without adverse effects         I discussed the patients findings and my recommendations with patient and family    Toni Rausch MD  10/16/23  12:31 EDT

## 2023-10-17 LAB
GLUCOSE BLDC GLUCOMTR-MCNC: 127 MG/DL (ref 70–130)
GLUCOSE BLDC GLUCOMTR-MCNC: 155 MG/DL (ref 70–130)
GLUCOSE BLDC GLUCOMTR-MCNC: 174 MG/DL (ref 70–130)
GLUCOSE BLDC GLUCOMTR-MCNC: 180 MG/DL (ref 70–130)
GLUCOSE BLDC GLUCOMTR-MCNC: 237 MG/DL (ref 70–130)

## 2023-10-17 PROCEDURE — 93010 ELECTROCARDIOGRAM REPORT: CPT | Performed by: INTERNAL MEDICINE

## 2023-10-17 PROCEDURE — 25010000002 ZIPRASIDONE MESYLATE PER 10 MG: Performed by: PSYCHIATRY & NEUROLOGY

## 2023-10-17 PROCEDURE — 93005 ELECTROCARDIOGRAM TRACING: CPT | Performed by: PSYCHIATRY & NEUROLOGY

## 2023-10-17 PROCEDURE — 99231 SBSQ HOSP IP/OBS SF/LOW 25: CPT | Performed by: PSYCHIATRY & NEUROLOGY

## 2023-10-17 PROCEDURE — 99232 SBSQ HOSP IP/OBS MODERATE 35: CPT | Performed by: INTERNAL MEDICINE

## 2023-10-17 PROCEDURE — 82948 REAGENT STRIP/BLOOD GLUCOSE: CPT

## 2023-10-17 RX ORDER — QUETIAPINE FUMARATE 100 MG/1
200 TABLET, FILM COATED ORAL NIGHTLY
Status: DISCONTINUED | OUTPATIENT
Start: 2023-10-17 | End: 2023-10-25

## 2023-10-17 RX ORDER — ZIPRASIDONE MESYLATE 20 MG/ML
10 INJECTION, POWDER, LYOPHILIZED, FOR SOLUTION INTRAMUSCULAR ONCE
Status: COMPLETED | OUTPATIENT
Start: 2023-10-17 | End: 2023-10-17

## 2023-10-17 RX ADMIN — Medication 5 MG: at 18:23

## 2023-10-17 RX ADMIN — ACETAMINOPHEN ORAL SOLUTION 500 MG: 650 SOLUTION ORAL at 06:43

## 2023-10-17 RX ADMIN — DICLOFENAC SODIUM 2 G: 9.3 GEL TOPICAL at 08:41

## 2023-10-17 RX ADMIN — DONEPEZIL HYDROCHLORIDE 10 MG: 10 TABLET, FILM COATED ORAL at 18:23

## 2023-10-17 RX ADMIN — ATORVASTATIN CALCIUM 80 MG: 40 TABLET, FILM COATED ORAL at 20:47

## 2023-10-17 RX ADMIN — MINERAL OIL: 1000 LIQUID ORAL at 08:38

## 2023-10-17 RX ADMIN — METOPROLOL TARTRATE 25 MG: 25 TABLET, FILM COATED ORAL at 20:47

## 2023-10-17 RX ADMIN — Medication 30 ML: at 08:41

## 2023-10-17 RX ADMIN — QUETIAPINE FUMARATE 200 MG: 100 TABLET ORAL at 18:23

## 2023-10-17 RX ADMIN — ZIPRASIDONE MESYLATE 10 MG: 20 INJECTION, POWDER, LYOPHILIZED, FOR SOLUTION INTRAMUSCULAR at 10:53

## 2023-10-17 RX ADMIN — DICLOFENAC SODIUM 2 G: 9.3 GEL TOPICAL at 20:48

## 2023-10-17 RX ADMIN — CLOPIDOGREL BISULFATE 75 MG: 75 TABLET ORAL at 08:40

## 2023-10-17 RX ADMIN — MINERAL OIL: 1000 LIQUID ORAL at 18:28

## 2023-10-17 RX ADMIN — MIRTAZAPINE 30 MG: 15 TABLET, FILM COATED ORAL at 18:23

## 2023-10-17 RX ADMIN — MICONAZOLE NITRATE 1 APPLICATION: 20 CREAM TOPICAL at 20:48

## 2023-10-17 RX ADMIN — METOPROLOL TARTRATE 25 MG: 25 TABLET, FILM COATED ORAL at 08:40

## 2023-10-17 RX ADMIN — LIDOCAINE 1 PATCH: 4 PATCH TOPICAL at 08:41

## 2023-10-17 RX ADMIN — ACETAMINOPHEN ORAL SOLUTION 500 MG: 650 SOLUTION ORAL at 12:20

## 2023-10-17 RX ADMIN — MICONAZOLE NITRATE 1 APPLICATION: 20 CREAM TOPICAL at 08:41

## 2023-10-17 RX ADMIN — ACETAMINOPHEN ORAL SOLUTION 500 MG: 650 SOLUTION ORAL at 18:23

## 2023-10-17 RX ADMIN — QUETIAPINE FUMARATE 25 MG: 25 TABLET ORAL at 08:40

## 2023-10-17 RX ADMIN — QUETIAPINE FUMARATE 25 MG: 25 TABLET ORAL at 06:43

## 2023-10-17 RX ADMIN — LANSOPRAZOLE 15 MG: 15 TABLET, ORALLY DISINTEGRATING ORAL at 06:44

## 2023-10-17 RX ADMIN — MINERAL OIL: 1000 LIQUID ORAL at 20:49

## 2023-10-17 NOTE — PROGRESS NOTES
Neurology       Patient Care Team:  Susan Powers APRN as PCP - General (Family Medicine)    Chief complaint: Agitation    History: Patient had a bad morning.    He was extremely restless crying and inconsolable.  He is resolved with 10 mg of Geodon.      Past Medical History:   Diagnosis Date    Acid reflux     Cancer     Skin    Diabetes mellitus     Prediabetes    GERD (gastroesophageal reflux disease) 09/15/2023    HTN (hypertension) 09/15/2023    Kidney disease     T2DM (type 2 diabetes mellitus) 09/15/2023       Vital Signs   Vitals:    10/17/23 0305 10/17/23 0746 10/17/23 0840 10/17/23 1248   BP: (!) 136/102 123/76  133/80   BP Location: Left arm Right arm  Right arm   Patient Position: Lying Lying  Lying   Pulse: 101 102 120 86   Resp: 18 18  18   Temp: 99.4 °F (37.4 °C) 98.4 °F (36.9 °C)  99.3 °F (37.4 °C)   TempSrc: Axillary Axillary  Axillary   SpO2:  100%  97%   Weight:       Height:           Physical Exam:   General: Sedated              Neuro: Not awakened    Results Review:  Reviewed  Results from last 7 days   Lab Units 10/15/23  0537   WBC 10*3/mm3 9.08   HEMOGLOBIN g/dL 11.9*   HEMATOCRIT % 36.0*   PLATELETS 10*3/mm3 257     Results from last 7 days   Lab Units 10/16/23  0609 10/15/23  0537 10/12/23  0725   SODIUM mmol/L 134* 141 138   POTASSIUM mmol/L 4.4 3.8 4.4   CHLORIDE mmol/L 99 105 105   CO2 mmol/L 23.0 26.0 28.0   BUN mg/dL 17 19 15   CREATININE mg/dL 0.64* 0.64* 0.66*   CALCIUM mg/dL 9.5 9.1 9.1   BILIRUBIN mg/dL 0.6  --   --    ALK PHOS U/L 88  --   --    ALT (SGPT) U/L 37  --   --    AST (SGOT) U/L 32  --   --    GLUCOSE mg/dL 141* 133* 168*       Imaging Results (Last 24 Hours)       ** No results found for the last 24 hours. **              Assessment:  Sleep deprivation psychosis    Plan:  Daily EKGs for QTc review    Increase Seroquel to 200 mg nightly guarded prognosis    Comment:  Guarded prognosis         I discussed the patients findings and my recommendations with primary care  team    Toni Rausch MD  10/17/23  13:13 EDT

## 2023-10-17 NOTE — SIGNIFICANT NOTE
10/17/23 1257   SLP Deferred Reason   SLP Deferred Reason Routine  (Spoke w/ RN- pt not appropriate for MBS today. Will f/u.)

## 2023-10-17 NOTE — PLAN OF CARE
Problem: Restraint, Nonviolent  Goal: Absence of Harm or Injury  Outcome: Ongoing, Progressing  Intervention: Protect Skin and Joint Integrity  Recent Flowsheet Documentation  Taken 10/16/2023 2000 by Ashtyn Petty RN  Body Position: turned   Goal Outcome Evaluation:

## 2023-10-17 NOTE — PROGRESS NOTES
[facility] Medicine Services  PROGRESS NOTE    Patient Name: Kenneth Wen  : 1950  MRN: [id number]    Date of Admission: 9/15/2023  Primary Care Physician: Susan Powers APRN    Subjective   Subjective     CC:  F/u   CVA     HPI:  Resting in bed in no acute distress and patient's wife is also present at the bedside.  Patient is very restless and thrashing in the bed constantly.  He is more confused and agitated today compared to yesterday.  Patient did not have much of sleep last night at all.    Objective   Objective     Vital Signs:   Temp:  [98.4 °F (36.9 °C)-100.1 °F (37.8 °C)] 98.5 °F (36.9 °C)  Heart Rate:  [] 84  Resp:  [18] 18  BP: (121-144)/() 125/79     Physical Exam:  Constitutional: No acute distress, agitated  HENT: NCAT, mucous membranes moist  Respiratory: Clear to auscultation bilaterally, respiratory effort normal   Cardiovascular: RRR, no murmurs, rubs, or gallops  Gastrointestinal: Positive bowel sounds, soft, nontender, nondistended  Musculoskeletal: No bilateral ankle edema  Psychiatric: Unable to assess  Neurologic: Awake, alert, confused but follows very simple commands  Skin: No rashes         Results Reviewed:  LAB RESULTS:      Lab 10/16/23  0609 10/15/23  0537 10/12/23  0725   WBC  --  9.08 5.29   HEMOGLOBIN  --  11.9* 12.0*   HEMATOCRIT  --  36.0* 36.0*   PLATELETS  --  257 256   MCV  --  94.5 93.5   CRP 0.41  --   --          Lab 10/16/23  0609 10/15/23  0537 10/12/23  0725   SODIUM 134* 141 138   POTASSIUM 4.4 3.8 4.4   CHLORIDE 99 105 105   CO2 23.0 26.0 28.0   ANION GAP 12.0 10.0 5.0   BUN 17 19 15   CREATININE 0.64* 0.64* 0.66*   EGFR 100.6 100.6 99.7   GLUCOSE 141* 133* 168*   CALCIUM 9.5 9.1 9.1   IONIZED CALCIUM  --  1.31  --    MAGNESIUM 1.7 2.0  --    PHOSPHORUS 3.7 3.6  --          Lab 10/16/23  0609   TOTAL PROTEIN 6.4   ALBUMIN 4.0   GLOBULIN 2.4   ALT (SGPT) 37   AST (SGOT) 32   BILIRUBIN 0.6   ALK PHOS 88             Lab  10/16/23  0609   CHOLESTEROL 81   TRIGLYCERIDES 43             Brief Urine Lab Results  (Last result in the past 365 days)        Color   Clarity   Blood   Leuk Est   Nitrite   Protein   CREAT   Urine HCG        09/15/23 1818 Yellow   Cloudy   Trace   Negative   Negative   Negative                   Microbiology Results Abnormal       Procedure Component Value - Date/Time    Blood Culture - Blood, Arm, Right [[id number]]  (Normal) Collected: 09/15/23 0212    Lab Status: Final result Specimen: Blood from Arm, Right Updated: 09/20/23 0230     Blood Culture No growth at 5 days    Blood Culture - Blood, Arm, Left [[id number]]  (Normal) Collected: 09/15/23 0212    Lab Status: Final result Specimen: Blood from Arm, Left Updated: 09/20/23 0230     Blood Culture No growth at 5 days            No radiology results from the last 24 hrs        Current medications:  Scheduled Meds:acetaminophen, 500 mg, Per PEG Tube, Q6H  atorvastatin, 80 mg, Per PEG Tube, Nightly  clopidogrel, 75 mg, Per PEG Tube, Daily  Diclofenac Sodium, 2 g, Topical, BID  donepezil, 10 mg, Per PEG Tube, Nightly  lansoprazole, 15 mg, Oral, Q AM  Lidocaine, 1 patch, Transdermal, Q24H  melatonin, 5 mg, Per PEG Tube, Nightly  metoprolol tartrate, 25 mg, Per PEG Tube, Q12H  miconazole, 1 application , Topical, Q12H  mirtazapine, 30 mg, Per PEG Tube, Nightly  palliative care oral rinse, , Mouth/Throat, 4x Daily  ProSource No Carb, 30 mL, Per G Tube, Daily  QUEtiapine, 200 mg, Per PEG Tube, Nightly  QUEtiapine, 25 mg, Oral, Daily  sodium chloride, 10 mL, Intravenous, Q12H  sodium chloride, 10 mL, Intravenous, Q12H      Continuous Infusions:     PRN Meds:.  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        Brief Hospital Course to date:  Kenneth Wen is a 73 y.o. male  with hx of HTN, HLD, T2DM, and GERD who presented to OSH with multiple acute ischemic infarcts of the bilateral cerebral and cerebellar hemispheres as well as left isaac. TTE/JOSIAS was negative PFO at OSH. On 9/13/23 he had worsening in mental status and new inability to move RLE, head CT showed evolution of the existing CVA with new development of petechial hemorrhage. DAPT was held for 24 hours per Neurology recommendations and repeat CT head that evening showed worsening effacement of the right quadrigeminal cistern with suspected increasing upward supratentorial pressure. He was then transferred to [facility] for Neurosurgery evaluation on 9/15/23. He was started on hypertonic saline 9/15/23 through 9/20/23 for cerebral edema. He had fevers with negative cultures but had worsening secretions and labored breathing so was started on Zosyn on 9/16/23. Transferred to the hospitalist service on 9/22/23. Pt demonstrated poor oral intake with elevated sodium levels; therefore, PEG tube was recommended.         Multiple bilateral posterior circulation strokes with hemorrhagic conversion  --Neurology has followed, suspect atheroembolic but cannot rule out cardioembolic given multiple vascular territories  --Plavix monotherapy, high intensity statin  --Needs Holter monitor at discharge  --Follow up in stroke clinic in 8  weeks    Agitation  Encephalopathy  --appreciate general neurology input recs to decrease ativan dose to 0.25mg IV PRN, changed lexapro to remeron and increased remeron to 30mg qhs, increase aricept to 10mg qhs--continues to adjust meds.  Increased seroquel to 100mg at bedtime, 12.5mg in the morning and continue seroquel 25mg PRN.  Still in restraints currentlly  -- Neurology is following and trying to help patient's sleep and calm down with tweaking Seroquel dose.  Patient also was started on Geodon today    Dysphagia  Hypernatremia - resolved  --SLP recommends mechanical ground with honey thick liquids   --Risk of pulling out peg tube, abd binder to be in place at all times. --Surgery recommends to remain in restraints at all times or if off has to have sitter for next 2 weeks   -- dietary following for calorie count, adequate PO intake is limited by his mental status     HTN  --Continue Metoprolol 25 mg BID     GERD  --Continue Protonix     COVID-19-resolved  --Overall asymptomatic and on room air  --No infiltrates seen and low procal  --Did not receive any treatment   --Off Isolation 9/30/2023      Leukocytosis-resolved  --Procalcitionin low at 0.05  --CXR and UA negative  --Blood cultures negative     Elevated LFT's, mild  --Possibly secondary to statin?  --Negative acute hepatitis panel  --repeat AST/ALT improved on 10/3    D/w wife at bedside on 10/15/23    Expected Discharge Location and Transportation: rehab   Expected Discharge   Expected Discharge Date: 10/7/2023; Expected Discharge Time:      DVT prophylaxis:  Mechanical DVT prophylaxis orders are present.     AM-PAC 6 Clicks Score (PT): 12 (10/17/23 0800)    CODE STATUS:   Code Status and Medical Interventions:   Ordered at: 09/29/23 0854     Medical Intervention Limits:    NO intubation (DNI)     Code Status (Patient has no pulse and is not breathing):    No CPR (Do Not Attempt to Resuscitate)     Medical Interventions (Patient has pulse or is  breathing):    Limited Support       Bruce Du MD  10/17/23

## 2023-10-18 LAB
GLUCOSE BLDC GLUCOMTR-MCNC: 138 MG/DL (ref 70–130)
GLUCOSE BLDC GLUCOMTR-MCNC: 149 MG/DL (ref 70–130)
GLUCOSE BLDC GLUCOMTR-MCNC: 153 MG/DL (ref 70–130)
QT INTERVAL: 368 MS
QTC INTERVAL: 455 MS

## 2023-10-18 PROCEDURE — 99232 SBSQ HOSP IP/OBS MODERATE 35: CPT | Performed by: INTERNAL MEDICINE

## 2023-10-18 PROCEDURE — 92507 TX SP LANG VOICE COMM INDIV: CPT

## 2023-10-18 PROCEDURE — 97530 THERAPEUTIC ACTIVITIES: CPT

## 2023-10-18 PROCEDURE — 97535 SELF CARE MNGMENT TRAINING: CPT

## 2023-10-18 PROCEDURE — 92526 ORAL FUNCTION THERAPY: CPT

## 2023-10-18 PROCEDURE — 99232 SBSQ HOSP IP/OBS MODERATE 35: CPT | Performed by: PSYCHIATRY & NEUROLOGY

## 2023-10-18 PROCEDURE — 93005 ELECTROCARDIOGRAM TRACING: CPT | Performed by: PSYCHIATRY & NEUROLOGY

## 2023-10-18 PROCEDURE — 82948 REAGENT STRIP/BLOOD GLUCOSE: CPT

## 2023-10-18 PROCEDURE — 93010 ELECTROCARDIOGRAM REPORT: CPT | Performed by: INTERNAL MEDICINE

## 2023-10-18 PROCEDURE — 25010000002 ZIPRASIDONE MESYLATE PER 10 MG: Performed by: PSYCHIATRY & NEUROLOGY

## 2023-10-18 RX ORDER — ZIPRASIDONE MESYLATE 20 MG/ML
10 INJECTION, POWDER, LYOPHILIZED, FOR SOLUTION INTRAMUSCULAR 2 TIMES DAILY PRN
Status: DISCONTINUED | OUTPATIENT
Start: 2023-10-18 | End: 2023-11-10

## 2023-10-18 RX ORDER — CHOLECALCIFEROL (VITAMIN D3) 125 MCG
10 CAPSULE ORAL NIGHTLY
Status: DISCONTINUED | OUTPATIENT
Start: 2023-10-18 | End: 2023-11-10

## 2023-10-18 RX ORDER — TEMAZEPAM 15 MG/1
15 CAPSULE ORAL NIGHTLY
Status: DISCONTINUED | OUTPATIENT
Start: 2023-10-18 | End: 2023-10-19

## 2023-10-18 RX ORDER — TEMAZEPAM 15 MG/1
15 CAPSULE ORAL NIGHTLY PRN
Status: DISCONTINUED | OUTPATIENT
Start: 2023-10-18 | End: 2023-10-18

## 2023-10-18 RX ORDER — ZIPRASIDONE MESYLATE 20 MG/ML
20 INJECTION, POWDER, LYOPHILIZED, FOR SOLUTION INTRAMUSCULAR 2 TIMES DAILY PRN
Status: DISCONTINUED | OUTPATIENT
Start: 2023-10-18 | End: 2023-10-18

## 2023-10-18 RX ORDER — ZIPRASIDONE MESYLATE 20 MG/ML
10 INJECTION, POWDER, LYOPHILIZED, FOR SOLUTION INTRAMUSCULAR EVERY 4 HOURS PRN
Status: DISCONTINUED | OUTPATIENT
Start: 2023-10-18 | End: 2023-10-18

## 2023-10-18 RX ADMIN — ACETAMINOPHEN ORAL SOLUTION 500 MG: 650 SOLUTION ORAL at 18:33

## 2023-10-18 RX ADMIN — MICONAZOLE NITRATE 1 APPLICATION: 20 CREAM TOPICAL at 20:10

## 2023-10-18 RX ADMIN — ACETAMINOPHEN ORAL SOLUTION 500 MG: 650 SOLUTION ORAL at 12:45

## 2023-10-18 RX ADMIN — DICLOFENAC SODIUM 2 G: 9.3 GEL TOPICAL at 09:28

## 2023-10-18 RX ADMIN — MINERAL OIL: 1000 LIQUID ORAL at 20:10

## 2023-10-18 RX ADMIN — IBUPROFEN 400 MG: 100 SUSPENSION ORAL at 15:51

## 2023-10-18 RX ADMIN — MINERAL OIL: 1000 LIQUID ORAL at 12:10

## 2023-10-18 RX ADMIN — Medication 10 MG: at 18:32

## 2023-10-18 RX ADMIN — LIDOCAINE 1 PATCH: 4 PATCH TOPICAL at 09:24

## 2023-10-18 RX ADMIN — MINERAL OIL: 1000 LIQUID ORAL at 09:27

## 2023-10-18 RX ADMIN — ATORVASTATIN CALCIUM 80 MG: 40 TABLET, FILM COATED ORAL at 20:07

## 2023-10-18 RX ADMIN — MIRTAZAPINE 30 MG: 15 TABLET, FILM COATED ORAL at 18:32

## 2023-10-18 RX ADMIN — METOPROLOL TARTRATE 25 MG: 25 TABLET, FILM COATED ORAL at 09:00

## 2023-10-18 RX ADMIN — QUETIAPINE FUMARATE 25 MG: 25 TABLET ORAL at 06:15

## 2023-10-18 RX ADMIN — ZIPRASIDONE MESYLATE 10 MG: 20 INJECTION, POWDER, LYOPHILIZED, FOR SOLUTION INTRAMUSCULAR at 12:31

## 2023-10-18 RX ADMIN — DICLOFENAC SODIUM 2 G: 9.3 GEL TOPICAL at 20:08

## 2023-10-18 RX ADMIN — TEMAZEPAM 15 MG: 15 CAPSULE ORAL at 18:32

## 2023-10-18 RX ADMIN — METOPROLOL TARTRATE 25 MG: 25 TABLET, FILM COATED ORAL at 20:08

## 2023-10-18 RX ADMIN — ACETAMINOPHEN ORAL SOLUTION 500 MG: 650 SOLUTION ORAL at 06:15

## 2023-10-18 RX ADMIN — MINERAL OIL: 1000 LIQUID ORAL at 18:34

## 2023-10-18 RX ADMIN — Medication 30 ML: at 09:28

## 2023-10-18 RX ADMIN — QUETIAPINE FUMARATE 200 MG: 100 TABLET ORAL at 18:33

## 2023-10-18 RX ADMIN — QUETIAPINE FUMARATE 25 MG: 25 TABLET ORAL at 09:27

## 2023-10-18 RX ADMIN — CLOPIDOGREL BISULFATE 75 MG: 75 TABLET ORAL at 09:27

## 2023-10-18 RX ADMIN — MICONAZOLE NITRATE 1 APPLICATION: 20 CREAM TOPICAL at 09:28

## 2023-10-18 RX ADMIN — ACETAMINOPHEN ORAL SOLUTION 500 MG: 650 SOLUTION ORAL at 23:45

## 2023-10-18 RX ADMIN — ACETAMINOPHEN ORAL SOLUTION 500 MG: 650 SOLUTION ORAL at 00:24

## 2023-10-18 RX ADMIN — DONEPEZIL HYDROCHLORIDE 10 MG: 10 TABLET, FILM COATED ORAL at 18:32

## 2023-10-18 RX ADMIN — LANSOPRAZOLE 15 MG: 15 TABLET, ORALLY DISINTEGRATING ORAL at 06:15

## 2023-10-18 NOTE — PLAN OF CARE
Problem: Adult Inpatient Plan of Care  Goal: Plan of Care Review  Outcome: Ongoing, Progressing  Flowsheets  Taken 10/18/2023 1103 by Aleshia Reid MS CCC-SLP  Progress: no change  Taken 10/18/2023 0927 by Ofelia Armstrong OT  Plan of Care Reviewed With:   patient   spouse   Goal Outcome Evaluation:           Progress: no change       SLP treatment completed. Will continue to address communication and dysphagia. Pt is unable to participate in MBS at this time but will do as soon as pt is able to cooperate. Please see note for further details and recommendations.

## 2023-10-18 NOTE — PROGRESS NOTES
Neurology       Patient Care Team:  Susan Powers APRN as PCP - General (Family Medicine)    Chief complaint: Agitation    History: Patient demonstrates repeated episodes of agitation.  Seem to parallel his sleep pattern which is still poor.    Was agitated and combative earlier today.    10 mg of Geodon seem to help.      Past Medical History:   Diagnosis Date    Acid reflux     Cancer     Skin    Diabetes mellitus     Prediabetes    GERD (gastroesophageal reflux disease) 09/15/2023    HTN (hypertension) 09/15/2023    Kidney disease     T2DM (type 2 diabetes mellitus) 09/15/2023       Vital Signs   Vitals:    10/18/23 0833 10/18/23 1115 10/18/23 1117 10/18/23 1200   BP:  131/100 130/73    BP Location:  Right arm Right arm    Patient Position:  Lying Lying    Pulse:       Resp:  18     Temp:  98.9 °F (37.2 °C)     TempSrc:  Axillary     SpO2: 98%   98%   Weight:       Height:           Physical Exam:   General: Crying              Neuro: Consolable a bit.    Less restless.        Results Review:  QT interval 330.    QTc 432  Results from last 7 days   Lab Units 10/15/23  0537   WBC 10*3/mm3 9.08   HEMOGLOBIN g/dL 11.9*   HEMATOCRIT % 36.0*   PLATELETS 10*3/mm3 257     Results from last 7 days   Lab Units 10/16/23  0609 10/15/23  0537 10/12/23  0725   SODIUM mmol/L 134* 141 138   POTASSIUM mmol/L 4.4 3.8 4.4   CHLORIDE mmol/L 99 105 105   CO2 mmol/L 23.0 26.0 28.0   BUN mg/dL 17 19 15   CREATININE mg/dL 0.64* 0.64* 0.66*   CALCIUM mg/dL 9.5 9.1 9.1   BILIRUBIN mg/dL 0.6  --   --    ALK PHOS U/L 88  --   --    ALT (SGPT) U/L 37  --   --    AST (SGOT) U/L 32  --   --    GLUCOSE mg/dL 141* 133* 168*       Imaging Results (Last 24 Hours)       ** No results found for the last 24 hours. **            Assessment:  Protracted delirium    Multiple strokes    Plan:  Increase melatonin to 10 mg nightly.    Add mirtazapine 15 mg nightly    Comment:  Guarded prognosis         I discussed the patients findings and my  recommendations with patient, family, and nursing staff    Toni Rausch MD  10/18/23  15:08 EDT

## 2023-10-18 NOTE — CASE MANAGEMENT/SOCIAL WORK
Continued Stay Note  [facility]     Patient Name: Kenneth Wen  MRN: [id number]  Today's Date: 10/18/2023    Admit Date: 9/15/2023    Plan: TBD   Discharge Plan       Row Name 10/18/23 0916       Plan    Plan Comments Pt continues with wrist restraints. Plan is in place for referrals once pt able to get out of restraints. MSW continues to follow for discharge needs as arise.                   Discharge Codes    No documentation.                 Expected Discharge Date and Time       Expected Discharge Date Expected Discharge Time    Oct 7, 2023               JOEL Anna

## 2023-10-18 NOTE — PROGRESS NOTES
[facility] Medicine Services  PROGRESS NOTE    Patient Name: Kenneth Wen  : 1950  MRN: [id number]    Date of Admission: 9/15/2023  Primary Care Physician: Susan Powers APRN    Subjective   Subjective     CC:  F/u   CVA     HPI:  Resting in bed in no acute distress and patient's wife is also present at the bedside.  Patient had some sleep last night.  Was very agitated earlier today but currently calmer. Still in restraint.    Objective   Objective     Vital Signs:   Temp:  [97.3 °F (36.3 °C)-99.9 °F (37.7 °C)] 98.9 °F (37.2 °C)  Heart Rate:  [] 115  Resp:  [16-18] 18  BP: (104-131)/() 130/73     Physical Exam:  Constitutional: No acute distress, agitated  HENT: NCAT, mucous membranes moist  Respiratory: Clear to auscultation bilaterally, respiratory effort normal   Cardiovascular: RRR, no murmurs, rubs, or gallops  Gastrointestinal: Positive bowel sounds, soft, nontender, nondistended  Musculoskeletal: No bilateral ankle edema  Psychiatric: Unable to assess  Neurologic: Awake, alert, confused but follows very simple commands  Skin: No rashes         Results Reviewed:  LAB RESULTS:      Lab 10/16/23  0609 10/15/23  0537 10/12/23  0725   WBC  --  9.08 5.29   HEMOGLOBIN  --  11.9* 12.0*   HEMATOCRIT  --  36.0* 36.0*   PLATELETS  --  257 256   MCV  --  94.5 93.5   CRP 0.41  --   --          Lab 10/16/23  0609 10/15/23  0537 10/12/23  0725   SODIUM 134* 141 138   POTASSIUM 4.4 3.8 4.4   CHLORIDE 99 105 105   CO2 23.0 26.0 28.0   ANION GAP 12.0 10.0 5.0   BUN 17 19 15   CREATININE 0.64* 0.64* 0.66*   EGFR 100.6 100.6 99.7   GLUCOSE 141* 133* 168*   CALCIUM 9.5 9.1 9.1   IONIZED CALCIUM  --  1.31  --    MAGNESIUM 1.7 2.0  --    PHOSPHORUS 3.7 3.6  --          Lab 10/16/23  0609   TOTAL PROTEIN 6.4   ALBUMIN 4.0   GLOBULIN 2.4   ALT (SGPT) 37   AST (SGOT) 32   BILIRUBIN 0.6   ALK PHOS 88             Lab 10/16/23  0609   CHOLESTEROL 81   TRIGLYCERIDES 43             Brief  Urine Lab Results  (Last result in the past 365 days)        Color   Clarity   Blood   Leuk Est   Nitrite   Protein   CREAT   Urine HCG        09/15/23 1818 Yellow   Cloudy   Trace   Negative   Negative   Negative                   Microbiology Results Abnormal       Procedure Component Value - Date/Time    Blood Culture - Blood, Arm, Right [[id number]]  (Normal) Collected: 09/15/23 0212    Lab Status: Final result Specimen: Blood from Arm, Right Updated: 09/20/23 0230     Blood Culture No growth at 5 days    Blood Culture - Blood, Arm, Left [[id number]]  (Normal) Collected: 09/15/23 0212    Lab Status: Final result Specimen: Blood from Arm, Left Updated: 09/20/23 0230     Blood Culture No growth at 5 days            No radiology results from the last 24 hrs        Current medications:  Scheduled Meds:acetaminophen, 500 mg, Per PEG Tube, Q6H  atorvastatin, 80 mg, Per PEG Tube, Nightly  clopidogrel, 75 mg, Per PEG Tube, Daily  Diclofenac Sodium, 2 g, Topical, BID  donepezil, 10 mg, Per PEG Tube, Nightly  lansoprazole, 15 mg, Oral, Q AM  Lidocaine, 1 patch, Transdermal, Q24H  melatonin, 10 mg, Per PEG Tube, Nightly  metoprolol tartrate, 25 mg, Per PEG Tube, Q12H  miconazole, 1 application , Topical, Q12H  mirtazapine, 30 mg, Per PEG Tube, Nightly  palliative care oral rinse, , Mouth/Throat, 4x Daily  ProSource No Carb, 30 mL, Per G Tube, Daily  QUEtiapine, 200 mg, Per PEG Tube, Nightly  QUEtiapine, 25 mg, Oral, Daily  sodium chloride, 10 mL, Intravenous, Q12H  sodium chloride, 10 mL, Intravenous, Q12H  temazepam, 15 mg, Oral, Nightly      Continuous Infusions:     PRN Meds:.  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        Brief Hospital Course to date:  Kenneth Wen is a 73 y.o. male  with hx of HTN, HLD, T2DM, and GERD who presented to OSH with multiple acute ischemic infarcts of the bilateral cerebral and cerebellar hemispheres as well as left isaac. TTE/JOSIAS was negative PFO at OSH. On 9/13/23 he had worsening in mental status and new inability to move RLE, head CT showed evolution of the existing CVA with new development of petechial hemorrhage. DAPT was held for 24 hours per Neurology recommendations and repeat CT head that evening showed worsening effacement of the right quadrigeminal cistern with suspected increasing upward supratentorial pressure. He was then transferred to [facility] for Neurosurgery evaluation on 9/15/23. He was started on hypertonic saline 9/15/23 through 9/20/23 for cerebral edema. He had fevers with negative cultures but had worsening secretions and labored breathing so was started on Zosyn on 9/16/23. Transferred to the hospitalist service on 9/22/23. Pt demonstrated poor oral intake with elevated sodium levels; therefore, PEG tube was recommended.         Multiple bilateral posterior circulation strokes with hemorrhagic conversion  --Neurology has followed, suspect atheroembolic but cannot rule out cardioembolic given multiple vascular territories  --Plavix monotherapy, high intensity statin  --Needs Holter monitor at discharge  --Follow up in stroke clinic in 8  weeks    Agitation  Encephalopathy  --appreciate general neurology input recs to decrease ativan dose to 0.25mg IV PRN, changed lexapro to remeron and increased remeron to 30mg qhs, increase aricept to 10mg qhs--continues to adjust meds.  Increased seroquel to 100mg at bedtime, 12.5mg in the morning and continue seroquel 25mg PRN.  Still in restraints currentlly  -- Neurology is following and trying to help patient's sleep and calm down with tweaking Seroquel dose.  Patient also was started on Geodon today    Dysphagia  Hypernatremia - resolved  --SLP recommends mechanical ground with honey thick liquids   --Risk of pulling out peg tube, abd binder to be in place at all times. --Surgery recommends to remain in restraints at all times or if off has to have sitter for next 2 weeks   -- dietary following for calorie count, adequate PO intake is limited by his mental status     HTN  --Continue Metoprolol 25 mg BID     GERD  --Continue Protonix     COVID-19-resolved  --Overall asymptomatic and on room air  --No infiltrates seen and low procal  --Did not receive any treatment   --Off Isolation 9/30/2023      Leukocytosis-resolved  --Procalcitionin low at 0.05  --CXR and UA negative  --Blood cultures negative     Elevated LFT's, mild  --Possibly secondary to statin?  --Negative acute hepatitis panel  --repeat AST/ALT improved on 10/3    D/w wife at bedside on 10/15/23    Expected Discharge Location and Transportation: rehab   Expected Discharge   Expected Discharge Date: 10/7/2023; Expected Discharge Time:      DVT prophylaxis:  Mechanical DVT prophylaxis orders are present.     AM-PAC 6 Clicks Score (PT): 12 (10/17/23 0800)    CODE STATUS:   Code Status and Medical Interventions:   Ordered at: 09/29/23 0854     Medical Intervention Limits:    NO intubation (DNI)     Code Status (Patient has no pulse and is not breathing):    No CPR (Do Not Attempt to Resuscitate)     Medical Interventions (Patient has pulse or is  breathing):    Limited Support       Bruce Du MD  10/18/23

## 2023-10-18 NOTE — THERAPY PROGRESS REPORT/RE-CERT
Patient Name: Kenneth Wen  : 1950    MRN: [id number]                              Today's Date: 10/18/2023       Admit Date: 9/15/2023    Visit Dx:     ICD-10-CM ICD-9-CM   1. Hemorrhagic CVA  I61.9 431   2. Aphasia  R47.01 784.3   3. Dysarthria  R47.1 784.51   4. Oropharyngeal dysphagia  R13.12 787.22     Patient Active Problem List   Diagnosis    Precordial pain    Elevated BP without diagnosis of hypertension    Dyslipidemia    Hyperglycemia    Multiple bilateral CVAs    Hemorrhagic CVA    HFpEF    T2DM (type 2 diabetes mellitus)    HTN (hypertension)    GERD (gastroesophageal reflux disease)    ICH (intracerebral hemorrhage)    Oropharyngeal dysphagia     Past Medical History:   Diagnosis Date    Acid reflux     Cancer     Skin    Diabetes mellitus     Prediabetes    GERD (gastroesophageal reflux disease) 09/15/2023    HTN (hypertension) 09/15/2023    Kidney disease     T2DM (type 2 diabetes mellitus) 09/15/2023     Past Surgical History:   Procedure Laterality Date    APPENDECTOMY      ENDOSCOPY W/ PEG TUBE PLACEMENT N/A 2023    Procedure: ESOPHAGOGASTRODUODENOSCOPY WITH PERCUTANEOUS ENDOSCOPIC GASTROSTOMY TUBE INSERTION;  Surgeon: Haseeb Carrillo MD;  Location: [facility] ENDOSCOPY;  Service: General;  Laterality: N/A;    HEMORRHOIDECTOMY      NASAL POLYP SURGERY        General Information       Row Name 10/18/23 0905          OT Time and Intention    Document Type progress note/recertification  -JY     Mode of Treatment occupational therapy;individual therapy  -JY       Row Name 10/18/23 0905          General Information    Patient Profile Reviewed yes  -JY     Existing Precautions/Restrictions fall;other (see comments)  PEG w/ binder, R side weakness/coordination deficits, recently agitated w/ current B soft wrist restraints  -JY     Barriers to Rehab medically complex;previous functional deficit;cognitive status  -JY       Row Name 10/18/23 0905          Cognition    Orientation Status  (Cognition) oriented to;person;unable/difficult to assess;refused to attempt;other (see comments)  pt answered to name called, declined to answer any further orientation questions asked w/ increased agitation w/ inquiry; confirmed pt with ID shamika BHANDARI       Row Name 10/18/23 0905          Safety Issues, Functional Mobility    Safety Issues Affecting Function (Mobility) ability to follow commands;awareness of need for assistance;insight into deficits/self-awareness;judgment;safety precaution awareness;safety precautions follow-through/compliance;sequencing abilities  -JY     Impairments Affecting Function (Mobility) balance;cognition;coordination;endurance/activity tolerance;grasp;motor control;motor planning;muscle tone abnormal;visual/perceptual;sensation/sensory awareness;postural/trunk control;strength;range of motion (ROM)  -ORAL     Cognitive Impairments, Mobility Safety/Performance attention;awareness, need for assistance;insight into deficits/self-awareness;judgment;safety precaution awareness;safety precaution follow-through;sequencing abilities  -JY     Comment, Safety Issues/Impairments (Mobility) pt alert yet presented w/ increased agitation this date thus needs met and further mobility assessment limited for safety concerns, RN aware  -ORAL               User Key  (r) = Recorded By, (t) = Taken By, (c) = Cosigned By      Initials Name Provider Type    Ofelia Montiel OT Occupational Therapist                     Mobility/ADL's       Row Name 10/18/23 0911          Bed Mobility    Bed Mobility rolling left;rolling right;scooting/bridging  -ORAL     Rolling Left [place] (Bed Mobility) maximum assist (25% patient effort);verbal cues;nonverbal cues (demo/gesture)  -ORAL     Rolling Right [place] (Bed Mobility) maximum assist (25% patient effort);verbal cues;nonverbal cues (demo/gesture)  -JY     Scooting/Bridging [place] (Bed Mobility) dependent (less than 25% patient effort);2 person  assist;verbal cues;nonverbal cues (demo/gesture)  -     Bed Mobility, Safety Issues cognitive deficits limit understanding;decreased use of arms for pushing/pulling;decreased use of legs for bridging/pushing;impaired trunk control for bed mobility;other (see comments)  increased agitation this date  -     Assistive Device (Bed Mobility) bed rails;draw sheet;head of bed elevated  -JY     Comment, (Bed Mobility) rolling L & R for toileting hygiene and linen exchange needed upon arrival, skilled cues for optimal hand placement and seq to reach acros body to reach bed rail to aid in pulling self to sidelying; pt able to reach more effectively with LUE to roll R compared to reaching with impacted RUE to roll left; gross max A w/ draw sheet A to roll most effectively for task; close monitoring for safety as pt presented with increased agitation and attempts at pulling at PEG; req'd dep A x 2 for scooting to HOB for improved position and pressure relief  -[employer]       Row Name 10/18/23 0909          Transfers    Comment, (Transfers) t/fs withheld this date d/t safety concerns given pt's decreased command following limited by increased agitation, RN notified and pt positioned optimally in bed  -ORAL       Row Name 10/18/23 0909          Bed-Chair Transfer    Bed-Chair [place] (Transfers) unable to assess  -[employer]       Row Name 10/18/23 0909          Sit-Stand Transfer    Sit-Stand [place] (Transfers) unable to assess  -[employer]       Row Name 10/18/23 0909          Stand-Sit Transfer    Stand-Sit [place] (Transfers) unable to assess  -       Row Name 10/18/23 0909          Functional Mobility    Functional Mobility- Ind. Level not tested  -     Functional Mobility- Comment defer to PT for specifics  -[employer]       Row Name 10/18/23 0909          Activities of Daily Living    BADL Assessment/Intervention upper body dressing;toileting  -[employer]       Row Name 10/18/23 0909          Upper Body Dressing Assessment/Training     [place] Level (Upper Body Dressing) don;maximum assist (25% patient effort)  -JY     Position (Upper Body Dressing) supine  -JY     Comment, (Upper Body Dressing) UBD completion witheld d/t pt's decreased command following with increased agitation; pt preference to not wear any UB garments  -JANY       Row Name 10/18/23 0909          Toileting Assessment/Training    [place] Level (Toileting) change pad/brief;perform perineal hygiene;adjust/manage clothing;dependent (less than 25% patient effort)  -     Assistive Devices (Toileting) other (see comments)  bedside care  -JY     Position (Toileting) supine;other (see comments)  sidelying  -J     Comment, (Toileting) episode of incontinence at bed level, req'd dep care for all toileting hygiene, linen and nanda mgmt; pt rolled L & R with max A and skilled cues for necessary positioning for access for incontinence care  -               User Key  (r) = Recorded By, (t) = Taken By, (c) = Cosigned By      Initials Name Provider Type    Ofelia Montiel, OT Occupational Therapist                   Obj/Interventions       Row Name 10/18/23 0921          Sensory Assessment (Somatosensory)    Sensory Assessment (Somatosensory) unable/difficult to assess  -     Sensory Assessment pt's current cognitive status w/ increased agitation limited more formal sensory assessment for re cert purposes  -ORAL       Row Name 10/18/23 0921          Vision Assessment/Intervention    Vision Assessment Comment pt's current cognitive status w/ increased agitation limited more formal vision assessment for re cert purposes; pt does appear less attentive to L side and favors R side  -ANY       Row Name 10/18/23 0921          Strength Comprehensive (MMT)    General Manual Muscle Testing (MMT) Assessment upper extremity strength deficits identified  -J     Comment, General Manual Muscle Testing (MMT) Assessment pt's current cognitive status w/ increased agitation limited more formal  strength assessment for re cert purposes; limited use of RUE compared to LUE; observation of fxl movement patterns at LUE during assessment out of restraints  -JY       Row Name 10/18/23 0921          Motor Skills    Motor Skills coordination;functional endurance  -JY     Functional Endurance pt's cognitive status and decreased command following d/t increased agitation limited full assessment of functional endurance; pt requested rest  -JY     Therapeutic Exercise other (see comments)  educated spouse present on appropriate graded ROM at BUEs when pt able to follow commands more consistently and less agitated to promote joint mobility for improved integration in ADLs, related t/fs, mobility  -JY       Row Name 10/18/23 0921          Balance    Comment, Balance u/a to progress pt to less supported sitting or standing positions for balance assessment d/t decreased command following w/ increased agitation for safety concerns, will need further balance assessment when appropriate  -               User Key  (r) = Recorded By, (t) = Taken By, (c) = Cosigned By      Initials Name Provider Type    Ofelia Montiel OT Occupational Therapist                   Goals/Plan       Row Name 10/18/23 0940          Bed Mobility Goal 1 (OT)    Activity/Assistive Device (Bed Mobility Goal 1, OT) sit to supine;supine to sit;rolling to left;rolling to right  -JY     [place] Level/Cues Needed (Bed Mobility Goal 1, OT) moderate assist (50-74% patient effort);verbal cues required  -JY     Time Frame (Bed Mobility Goal 1, OT) long term goal (LTG);by discharge  -JY     Progress/Outcomes (Bed Mobility Goal 1, OT) progress slower than expected;goal revised this date;goal ongoing  -JY       Row Name 10/18/23 0964          Transfer Goal 1 (OT)    Activity/Assistive Device (Transfer Goal 1, OT) sit-to-stand/stand-to-sit;bed-to-chair/chair-to-bed  -JY     [place] Level/Cues Needed (Transfer Goal 1, OT) moderate assist (50-74% patient  effort);verbal cues required  -JY     Time Frame (Transfer Goal 1, OT) long term goal (LTG);by discharge  -JY     Progress/Outcome (Transfer Goal 1, OT) progress slower than expected;goal ongoing  -JY       Row Name 10/18/23 0940          Dressing Goal 1 (OT)    Activity/Device (Dressing Goal 1, OT) lower body dressing  -JY     Progress/Outcome (Dressing Goal 1, OT) goal no longer appropriate;other (see comments)  not appropriate at this time given inconsistent command following  -JY       Row Name 10/18/23 0940          Grooming Goal 1 (OT)    Activity/Device (Grooming Goal 1, OT) hair care;oral care;wash face, hands;other (see comments)  graded, less supported sitting  -JY     [place] (Grooming Goal 1, OT) moderate assist (50-74% patient effort);verbal cues required  -JY     Time Frame (Grooming Goal 1, OT) long term goal (LTG);by discharge  -JY     Progress/Outcome (Grooming Goal 1, OT) goal ongoing  -JY       Row Name 10/18/23 0940          ROM Goal 1 (OT)    ROM Goal 1 (OT) Pt to complete TE program w/ spouse A and understanding targeting BUE ROM, coordination with greater emphasis on RUE to improve joint mobility and integration in ADLs.  -JY     Time Frame (ROM Goal 1, OT) long term goal (LTG);by discharge  -JY     Progress/Outcome (ROM Goal 1, OT) new goal  -JY       Row Name 10/18/23 0940          Therapy Assessment/Plan (OT)    Planned Therapy Interventions (OT) activity tolerance training;adaptive equipment training;BADL retraining;functional balance retraining;neuromuscular control/coordination retraining;occupation/activity based interventions;patient/caregiver education/training;ROM/therapeutic exercise;strengthening exercise;transfer/mobility retraining  -JY               User Key  (r) = Recorded By, (t) = Taken By, (c) = Cosigned By      Initials Name Provider Type    Ofelia Montiel, LATIA Occupational Therapist                   Clinical Impression       Row Name 10/18/23 0954          Pain  Assessment    Additional Documentation Pain Scale: FACES Pre/Post-Treatment (Group)  -ORAL       Row Name 10/18/23 0927          Pain Scale: FACES Pre/Post-Treatment    Pain: FACES Scale, Pretreatment 0-->no hurt  -JY     Posttreatment Pain Rating 0-->no hurt  -JY     Pre/Posttreatment Pain Comment pt did not present or report pain pre and post OT interventions however grimaced and vocalized pain during bed mobility when R UE touched, RN aware of pain  -ORAL       Row Name 10/18/23 0927          Plan of Care Review    Plan of Care Reviewed With patient;spouse  -ORAL     Progress no change  -JY     Outcome Evaluation OT re cert completed this date. Assessment of pt performance in ADLs and related t/fs, mobility limited this date by decreased command following seemingly d/t increased agitation. Further below baseline still with decreased activity tolerance, muscle weakness, decreased coordination, cognitive deficits and safety awareness concerns. Fxl t/fs not completed d/t safety concerns. OT assisted pt in needs met with need for dep care in incontinence/toileting mgmt at bedside with max A for rolling L < > R for mgmt of supplies and acess for posterior hygiene. Attempted UBD w/ gross max A for donning however pt preference to not wear UB garments thus removed to decrease agitation. Educated spouse present at bedside on BUE TE for joint mobility and UE integration purposes when pt less agitated. Will continue to progress pt as able while hospitalized with revised goals as needed. Based on assessment this date, recommend SNF at d/c when medically ready.  -ORAL       Row Name 10/18/23 0927          Therapy Assessment/Plan (OT)    Rehab Potential (OT) fair, will monitor progress closely  -JY     Criteria for Skilled Therapeutic Interventions Met (OT) yes;skilled treatment is necessary  -JY     Therapy Frequency (OT) daily  -MONALISAANY       Row Name 10/18/23 0927          Therapy Plan Review/Discharge Plan (OT)    Anticipated  Discharge Disposition (OT) skilled nursing facility  -       Row Name 10/18/23 0927          Vital Signs    Pre Systolic BP Rehab 122  -JY     Pre Treatment Diastolic BP 79  -JY     O2 Delivery Pre Treatment room air  -JY     O2 Delivery Intra Treatment room air  -JY     O2 Delivery Post Treatment room air  -JY     Pre Patient Position Side Lying  -JY     Intra Patient Position Supine  -JY     Post Patient Position Side Lying  -JY       Row Name 10/18/23 0927          Positioning and Restraints    Pre-Treatment Position in bed  -JY     Post Treatment Position bed  -JY               User Key  (r) = Recorded By, (t) = Taken By, (c) = Cosigned By      Initials Name Provider Type    Ofelia Montile OT Occupational Therapist                   Outcome Measures       Row Name 10/18/23 0946          How much help from another is currently needed...    Putting on and taking off regular lower body clothing? 1  -JY     Bathing (including washing, rinsing, and drying) 2  -JY     Toileting (which includes using toilet bed pan or urinal) 1  -JY     Putting on and taking off regular upper body clothing 2  -JY     Taking care of personal grooming (such as brushing teeth) 2  -JY     Eating meals 2  -JY     AM-PAC 6 Clicks Score (OT) 10  -       Row Name 10/18/23 0946          Modified Beverly Scale    Pre-Stroke Modified Beverly Scale 6 - Unable to determine (UTD) from the medical record documentation  -JY     Modified Beverly Scale 4 - Moderately severe disability.  Unable to walk without assistance, and unable to attend to own bodily needs without assistance.  -       Row Name 10/18/23 0946          Functional Assessment    Outcome Measure Options AM-PAC 6 Clicks Daily Activity (OT)  -JY               User Key  (r) = Recorded By, (t) = Taken By, (c) = Cosigned By      Initials Name Provider Type    Ofelia Montiel OT Occupational Therapist                    Occupational Therapy Education       Title: PT OT SLP Therapies  (In Progress)       Topic: Occupational Therapy (In Progress)       Point: ADL training (In Progress)       Description:   Instruct learner(s) on proper safety adaptation and remediation techniques during self care or transfers.   Instruct in proper use of assistive devices.                  Learning Progress Summary             Patient Acceptance, E,D, NR by JY at 10/18/2023 0830    Acceptance, E, NR by MR at 10/12/2023 1508    Acceptance, E, NR by JG at 10/9/2023 1533    Acceptance, E, NR by MR at 10/6/2023 1549    Acceptance, E,D, NR,NL by  at 10/4/2023 0948    Acceptance, E, NR by MR at 9/28/2023 1556    Acceptance, E, NR by  at 9/25/2023 1045    Acceptance, E, NR by [facility] at 9/20/2023 1420    Acceptance, E, NR by  at 9/18/2023 1532    Acceptance, E, NR by  at 9/15/2023 1531   Family Acceptance, E,D, NR by JY at 10/18/2023 0830    Acceptance, E, NR by MR at 10/12/2023 1508    Acceptance, E, NR by J at 10/9/2023 1533    Acceptance, E, NR by MR at 10/6/2023 1549    Acceptance, E, NR by MR at 9/28/2023 1556                         Point: Home exercise program (In Progress)       Description:   Instruct learner(s) on appropriate technique for monitoring, assisting and/or progressing therapeutic exercises/activities.                  Learning Progress Summary             Patient Acceptance, E,D, NR by JY at 10/18/2023 0830    Acceptance, E, NR by MR at 10/12/2023 1508    Acceptance, E, NR by MR at 10/6/2023 1549    Acceptance, E,D, NR,NL by  at 10/4/2023 0948    Acceptance, E, NR by MR at 9/28/2023 1556    Acceptance, E, NR by  at 9/25/2023 1045    Acceptance, E, NR by [facility] at 9/20/2023 1420    Acceptance, E, NR by  at 9/18/2023 1532    Acceptance, E, NR by  at 9/15/2023 1531   Family Acceptance, E,D, NR by JY at 10/18/2023 0830    Acceptance, E, NR by MR at 10/12/2023 1508    Acceptance, E, NR by MR at 10/6/2023 1549    Acceptance, E, NR by MR at 9/28/2023 1556                         Point:  Precautions (In Progress)       Description:   Instruct learner(s) on prescribed precautions during self-care and functional transfers.                  Learning Progress Summary             Patient Acceptance, E,D, NR by JY at 10/18/2023 0830    Acceptance, E, NR by MR at 10/12/2023 1508    Acceptance, E, NR by JG at 10/9/2023 1533    Acceptance, E, NR by MR at 10/6/2023 1549    Acceptance, E,D, NR,NL by  at 10/4/2023 0948    Acceptance, E, NR by MR at 9/28/2023 1556    Acceptance, E, NR by CS1 at 9/20/2023 1420    Acceptance, E, NR by [facility] at 9/18/2023 1532    Acceptance, E, NR by [facility] at 9/15/2023 1531   Family Acceptance, E,D, NR by JY at 10/18/2023 0830    Acceptance, E, NR by MR at 10/12/2023 1508    Acceptance, E, NR by JG at 10/9/2023 1533    Acceptance, E, NR by MR at 10/6/2023 1549    Acceptance, E, NR by MR at 9/28/2023 1556                         Point: Body mechanics (In Progress)       Description:   Instruct learner(s) on proper positioning and spine alignment during self-care, functional mobility activities and/or exercises.                  Learning Progress Summary             Patient Acceptance, E,D, NR by JY at 10/18/2023 0830    Acceptance, E, NR by MR at 10/12/2023 1508    Acceptance, E, NR by JG at 10/9/2023 1533    Acceptance, E, NR by MR at 10/6/2023 1549    Acceptance, E,D, NR,NL by  at 10/4/2023 0948    Acceptance, E, NR by MR at 9/28/2023 1556    Acceptance, E, NR by [facility] at 9/20/2023 1420    Acceptance, E, NR by [facility] at 9/18/2023 1532    Acceptance, E, NR by [facility] at 9/15/2023 1531   Family Acceptance, E,D, NR by JY at 10/18/2023 0830    Acceptance, E, NR by MR at 10/12/2023 1508    Acceptance, E, NR by JG at 10/9/2023 1533    Acceptance, E, NR by MR at 10/6/2023 1549    Acceptance, E, NR by MR at 9/28/2023 1556                                         User Key       Initials Effective Dates Name Provider Type Discipline     02/03/23 -  Lorena, Jess FIELDS OT Occupational Therapist OT    CS1  09/02/21 -  Carmen Carr, OT Occupational Therapist OT    JY 06/16/21 -  Ofelia Armstrong, OT Occupational Therapist OT    CS 06/16/21 -  Jim Judge, OT Occupational Therapist OT     10/14/22 -  Jenny Rivera, OT Occupational Therapist OT    MR 09/22/22 -  Meera Doyle, OT Occupational Therapist OT    JG 08/30/23 -  Power Herman, OT Student OT Student OT                  OT Recommendation and Plan  Planned Therapy Interventions (OT): activity tolerance training, adaptive equipment training, BADL retraining, functional balance retraining, neuromuscular control/coordination retraining, occupation/activity based interventions, patient/caregiver education/training, ROM/therapeutic exercise, strengthening exercise, transfer/mobility retraining  Therapy Frequency (OT): daily  Plan of Care Review  Plan of Care Reviewed With: patient, spouse  Progress: no change  Outcome Evaluation: OT re cert completed this date. Assessment of pt performance in ADLs and related t/fs, mobility limited this date by decreased command following seemingly d/t increased agitation. Further below baseline still with decreased activity tolerance, muscle weakness, decreased coordination, cognitive deficits and safety awareness concerns. Fxl t/fs not completed d/t safety concerns. OT assisted pt in needs met with need for dep care in incontinence/toileting mgmt at bedside with max A for rolling L < > R for mgmt of supplies and acess for posterior hygiene. Attempted UBD w/ gross max A for donning however pt preference to not wear UB garments thus removed to decrease agitation. Educated spouse present at bedside on BUE TE for joint mobility and UE integration purposes when pt less agitated. Will continue to progress pt as able while hospitalized with revised goals as needed. Based on assessment this date, recommend SNF at d/c when medically ready.     Time Calculation:         Time Calculation- OT       Row Name 10/18/23 0947              Time Calculation- OT    OT Start Time 0830  -JY      OT Received On 10/18/23  -JY      OT Goal Re-Cert Due Date 10/28/23  -JY         Timed Charges    03318 - OT Therapeutic Activity Minutes 12  -JY      53595 - OT Self Care/Mgmt Minutes 16  -JY         Total Minutes    Timed Charges Total Minutes 28  -JY       Total Minutes 28  -JY                User Key  (r) = Recorded By, (t) = Taken By, (c) = Cosigned By      Initials Name Provider Type    Ofelia Montiel OT Occupational Therapist                  Therapy Charges for Today       Code Description Service Date Service Provider Modifiers Qty    24713218818 HC OT THERAPEUTIC ACT EA 15 MIN 10/18/2023 Ofelia Armstrong OT GO 1    52645971393 HC OT SELF CARE/MGMT/TRAIN EA 15 MIN 10/18/2023 Ofelia Armstrong OT GO 1                 Ofelia Armstrong OT  10/18/2023

## 2023-10-18 NOTE — THERAPY TREATMENT NOTE
Acute Care - Speech Language Pathology Treatment Note  [facility]     Patient Name: Kenneth Wen  : 1950  MRN: [id number]  Today's Date: 10/18/2023               Admit Date: 9/15/2023     Visit Dx:    ICD-10-CM ICD-9-CM   1. Hemorrhagic CVA  I61.9 431   2. Aphasia  R47.01 784.3   3. Dysarthria  R47.1 784.51   4. Oropharyngeal dysphagia  R13.12 787.22     Patient Active Problem List   Diagnosis    Precordial pain    Elevated BP without diagnosis of hypertension    Dyslipidemia    Hyperglycemia    Multiple bilateral CVAs    Hemorrhagic CVA    HFpEF    T2DM (type 2 diabetes mellitus)    HTN (hypertension)    GERD (gastroesophageal reflux disease)    ICH (intracerebral hemorrhage)    Oropharyngeal dysphagia     Past Medical History:   Diagnosis Date    Acid reflux     Cancer     Skin    Diabetes mellitus     Prediabetes    GERD (gastroesophageal reflux disease) 09/15/2023    HTN (hypertension) 09/15/2023    Kidney disease     T2DM (type 2 diabetes mellitus) 09/15/2023     Past Surgical History:   Procedure Laterality Date    APPENDECTOMY      ENDOSCOPY W/ PEG TUBE PLACEMENT N/A 2023    Procedure: ESOPHAGOGASTRODUODENOSCOPY WITH PERCUTANEOUS ENDOSCOPIC GASTROSTOMY TUBE INSERTION;  Surgeon: Haseeb Carrillo MD;  Location: [facility] ENDOSCOPY;  Service: General;  Laterality: N/A;    HEMORRHOIDECTOMY      NASAL POLYP SURGERY         SLP Recommendation and Plan                                      Daily Summary of Progress (SLP): progress towards functional goals is fair (10/18/23 1000)           Treatment Assessment (SLP): improved, no clinical signs of, pharyngeal dysphagia (10/18/23 1000)  Treatment Assessment Comments (SLP): pt very agitated, but did calm with encouragement and took multiple trials of puree and nectar and honey. Very little intake - advised wife to continue offering as much as possible to encourage PO. Cog status largely impacting. (10/18/23 1000)  Plan for Continued Treatment (SLP):  continue treatment per plan of care (10/18/23 1000)  Progress: no change (10/18/23 1103)      SLP EVALUATION (last 72 hours)       SLP SLC Evaluation       Row Name 10/18/23 1000                   Communication Assessment/Intervention    Document Type therapy note (daily note)  -AW        Subjective Information no complaints  agitated, restrained  -AW        Patient Observations alert;poorly cooperative  -AW        Patient/Family/Caregiver Comments/Observations wife at bedside  -AW        Patient Effort adequate  -AW        Symptoms Noted During/After Treatment none  -AW           SLP Treatment Clinical Impressions    Treatment Assessment (SLP) improved;no clinical signs of;pharyngeal dysphagia  -AW        Treatment Assessment Comments (SLP) pt very agitated, but did calm with encouragement and took multiple trials of puree and nectar and honey. Very little intake - advised wife to continue offering as much as possible to encourage PO. Cog status largely impacting.  -AW        Daily Summary of Progress (SLP) progress towards functional goals is fair  -AW        Barriers to Overall Progress (SLP) Cognitive status  -AW        Plan for Continued Treatment (SLP) continue treatment per plan of care  -AW        Care Plan Review care plan/treatment goals reviewed  -AW        Care Plan Review, Other Participant(s) significant other  will repeat MBS when cognitively appropriate  -AW                  User Key  (r) = Recorded By, (t) = Taken By, (c) = Cosigned By      Initials Name Effective Dates    AW Aleshia Reid, MS CCC-SLP 02/03/23 -                        EDUCATION  The patient has been educated in the following areas:     Cognitive Impairment.           SLP GOALS       Row Name 10/18/23 0977 10/16/23 0930          (LTG) Patient will demonstrate functional swallow for    Diet Texture (Demonstrate functional swallow) -- soft to chew (chopped) textures  -CJ     Liquid viscosity (Demonstrate functional swallow) --  nectar/ mildly thick liquids  -CJ     [place] (Demonstrate functional swallow) -- with minimal cues (75-90% accuracy)  -CJ     Time Frame (Demonstrate functional swallow) -- by discharge  -CJ     Progress/Outcomes (Demonstrate functional swallow) continuing progress toward goal  -AW continuing progress toward goal  -CJ     Comment (Demonstrate functional swallow) still on honey and puree until repeat study can be done  -AW --        (STG) Patient will tolerate trials of    Consistencies Trialed (Tolerate trials) -- pureed textures;honey/ moderately thick liquids  -CJ     Desired Outcome (Tolerate trials) -- without signs/symptoms of aspiration;without signs of distress;with adequate oral prep/transit/clearance;with use of compensatory strategies (see comments)  -CJ     [place] (Tolerate trials) -- with 1:1 assist/ supervision  -CJ     Time Frame (Tolerate trials) -- by discharge  -CJ     Progress/Outcomes (Tolerate trials) continuing progress toward goal  -AW continuing progress toward goal  -CJ     Comment (Tolerate trials) no overt s/s of aspiration w/ trials  -AW no overt s/s of aspiration w/ trials  -CJ        (STG) Patient will tolerate therapeutic trials of    Consistencies Trialed (Tolerate therapeutic trials) -- thin liquids  -CJ     Desired Outcome (Tolerate therapeutic trials) -- without signs/symptoms of aspiration;without signs of distress  -CJ     [place] (Tolerate therapeutic trials) -- with minimal cues (75-90% accuracy)  -CJ     Time Frame (Tolerate therapeutic trials) -- by discharge  -CJ     Progress/Outcomes (Tolerate therapeutic trials) continuing progress toward goal  -AW continuing progress toward goal  -CJ     Comment (Tolerate therapeutic trials) no s/s with tsp sip thin or nectar or puree  -AW cough x1  -CJ        (STG) Labial Strengthening Goal 1 (SLP)    Activity (Labial Strengthening Goal 1, SLP) -- increase labial tone  -CJ     Increase Labial Tone -- labial resistance  exercises;swallow trials  -CJ     [place]/Accuracy (Labial Strengthening Goal 1, SLP) -- with moderate cues (50-74% accuracy)  -CJ     Time Frame (Labial Strengthening Goal 1, SLP) -- short term goal (STG)  -CJ     Progress/Outcomes (Labial Strengthening Goal 1, SLP) continuing progress toward goal  difficulty following commands, did close lips when prompted  -AW continuing progress toward goal  -CJ     Comment (Labial Strengthening Goal 1, SLP) encourage pressing lips together  -AW x5 reps; improvement  -CJ        (STG) Lingual Strengthening Goal 1 (SLP)    Activity (Lingual Strengthening Goal 1, SLP) -- increase lingual tone/sensation/control/coordination/movement;increase tongue back strength  -CJ     Increase Lingual Tone/Sensation/Control/Coordination/Movement -- swallow trials;lingual resistance exercises  -CJ     Increase Tongue Back Strength -- lingual resistance exercises  -CJ     [place]/Accuracy (Lingual Strengthening Goal 1, SLP) -- with moderate cues (50-74% accuracy)  -CJ     Time Frame (Lingual Strengthening Goal 1, SLP) -- short term goal (STG)  -CJ     Progress/Outcomes (Lingual Strengthening Goal 1, SLP) continuing progress toward goal  -AW continuing progress toward goal  -CJ     Comment (Lingual Strengthening Goal 1, SLP) encouraged tongue positioning in roof of mouth, did effortful swallow x2  -AW lingual resistance x10  -CJ        (STG) Pharyngeal Strengthening Exercise Goal 1 (SLP)    Activity (Pharyngeal Strengthening Goal 1, SLP) -- increase superior movement of the hyolaryngeal complex;increase anterior movement of the hyolaryngeal complex;increase closure at entrance to airway/closure of airway at glottis;increase squeeze/positive pressure generation;increase tongue base retraction;increase timing  -CJ     Increase Timing -- prepping - 3 second prep or suck swallow or 3-step swallow  -CJ     Increase Superior Movement of the Hyolaryngeal Complex -- effortful pitch glide (falsetto  + pharyngeal squeeze)  -CJ     Increase Anterior Movement of the Hyolaryngeal Complex -- chin tuck against resistance (CTAR)  -CJ     Increase Closure at Entrance to Airway/Closure of Airway at Glottis -- supraglottic swallow  -CJ     Increase Squeeze/Positive Pressure Generation -- hard effortful swallow  -CJ     Increase Tongue Base Retraction -- tamie  -CJ     [place]/Accuracy (Pharyngeal Strengthening Goal 1, SLP) -- with moderate cues (50-74% accuracy)  -CJ     Time Frame (Pharyngeal Strengthening Goal 1, SLP) -- short term goal (STG)  -CJ     Progress/Outcomes (Pharyngeal Strengthening Goal 1, SLP) continuing progress toward goal  -AW continuing progress toward goal  -CJ     Comment (Pharyngeal Strengthening Goal 1, SLP) effortful swallow x2, getting agitated  -AW Pt able to complete effortful swallow, pitch glide and attempted CTAR  -CJ        Patient will demonstrate functional communication skills for return to discharge environment     [place] -- with moderate cues  -CJ     Time frame -- by discharge  -CJ     Progress/Outcomes progress slower than expected  -AW continuing progress toward goal  -CJ        Comprehend Questions Goal 1 (SLP)    Improve Ability to Comprehend Questions Goal 1 (SLP) -- complex yes/no questions;80%;with minimal cues (75-90%)  -CJ     Time Frame (Comprehend Questions Goal 1, SLP) -- short term goal (STG)  -CJ     Progress (Ability to Comprehend Questions Goal 1, SLP) 70%;with maximum cues (25-49%)  -AW 60%;with moderate cues (50-74%)  -CJ     Progress/Outcomes (Comprehend Questions Goal 1, SLP) continuing progress toward goal  -AW continuing progress toward goal  -CJ        Follow Directions Goal 2 (SLP)    Improve Ability to Follow Directions Goal 1 (SLP) -- 2 step commands;80%;with minimal cues (75-90%)  -CJ     Time Frame (Follow Directions Goal 1, SLP) -- short term goal (STG)  -CJ     Progress (Ability to Follow Directions Goal 1, SLP) 70%;with moderate cues  (50-74%)  -AW 60%;with minimal cues (75-90%)  -CJ     Progress/Outcomes (Follow Directions Goal 1, SLP) continuing progress toward goal  -AW continuing progress toward goal  -CJ        Word Retrieval Skills Goal 1 (SLP)    Improve Word Retrieval Skills By Goal 1 (SLP) -- confrontational naming task;high frequency;responsive naming task;simple;80%;with minimal cues (75-90%)  -CJ     Time Frame (Word Retrieval Goal 1, SLP) -- short term goal (STG)  -CJ     Progress (Word Retrieval Skills Goal 1, SLP) -- 80%;with minimal cues (75-90%)  -CJ     Progress/Outcomes (Word Retrieval Goal 1, SLP) -- continuing progress toward goal  -CJ        Articulation Goal 1 (SLP)    Improve Articulation Goal 1 (SLP) -- by over-articulating at phrase level;80%;with moderate cues (50-74%)  -CJ     Time Frame (Articulation Goal 1, SLP) -- short term goal (STG)  -CJ     Progress (Articulation Goal 1, SLP) -- 70%;with moderate cues (50-74%)  -CJ     Progress/Outcomes (Articulation Goal 1, SLP) -- continuing progress toward goal  -CJ        Orientation Goal 1 (SLP)    Improve Orientation Through Goal 1 (SLP) -- demonstrating orientation to day;demonstrating orientation to month;demonstrating orientation to year;demonstrating orientation to place;demonstrating orientation to disease/impairment;use environmental aids to assist with orientation;80%;with moderate cues (50-74%)  -CJ     Time Frame (Orientation Goal 1, SLP) -- short term goal (STG)  -CJ     Progress (Orientation Goal 1, SLP) 30%;other (comment)  confusion, agitation  -AW 30%;with moderate cues (50-74%)  -CJ     Progress/Outcomes (Orientation Goal 1, SLP) progress slower than expected  -AW continuing progress toward goal  -CJ               User Key  (r) = Recorded By, (t) = Taken By, (c) = Cosigned By      Initials Name Provider Type    Aleshia Stephens, MS CCC-SLP Speech and Language Pathologist    Tonya Guzman, MS CCC-SLP Speech and Language Pathologist                             Time Calculation:      Time Calculation- SLP       Row Name 10/18/23 1104             Time Calculation- SLP    SLP Start Time 1020  -AW      SLP Stop Time 1105  -AW      SLP Time Calculation (min) 45 min  -AW      SLP Received On 10/18/23  -                User Key  (r) = Recorded By, (t) = Taken By, (c) = Cosigned By      Initials Name Provider Type    Aleshia Stephens, MS CCC-SLP Speech and Language Pathologist                                   Aleshia Reid, MS CCC-SLP  10/18/2023   and [facility] Care - Speech Language Pathology   Swallow Treatment Note [facility]     Patient Name: Kenneth Wen  : 1950  MRN: [id number]  Today's Date: 10/18/2023               Admit Date: 9/15/2023    Visit Dx:     ICD-10-CM ICD-9-CM   1. Hemorrhagic CVA  I61.9 431   2. Aphasia  R47.01 784.3   3. Dysarthria  R47.1 784.51   4. Oropharyngeal dysphagia  R13.12 787.22     Patient Active Problem List   Diagnosis    Precordial pain    Elevated BP without diagnosis of hypertension    Dyslipidemia    Hyperglycemia    Multiple bilateral CVAs    Hemorrhagic CVA    HFpEF    T2DM (type 2 diabetes mellitus)    HTN (hypertension)    GERD (gastroesophageal reflux disease)    ICH (intracerebral hemorrhage)    Oropharyngeal dysphagia     Past Medical History:   Diagnosis Date    Acid reflux     Cancer     Skin    Diabetes mellitus     Prediabetes    GERD (gastroesophageal reflux disease) 09/15/2023    HTN (hypertension) 09/15/2023    Kidney disease     T2DM (type 2 diabetes mellitus) 09/15/2023     Past Surgical History:   Procedure Laterality Date    APPENDECTOMY      ENDOSCOPY W/ PEG TUBE PLACEMENT N/A 2023    Procedure: ESOPHAGOGASTRODUODENOSCOPY WITH PERCUTANEOUS ENDOSCOPIC GASTROSTOMY TUBE INSERTION;  Surgeon: Haseeb Carrillo MD;  Location: [facility];  Service: General;  Laterality: N/A;    HEMORRHOIDECTOMY      NASAL POLYP SURGERY         SLP Recommendation and Plan                                                Daily Summary of Progress (SLP): progress towards functional goals is fair (10/18/23 1000)               Treatment Assessment (SLP): improved, no clinical signs of, pharyngeal dysphagia (10/18/23 1000)  Treatment Assessment Comments (SLP): pt very agitated, but did calm with encouragement and took multiple trials of puree and nectar and honey. Very little intake - advised wife to continue offering as much as possible to encourage PO. Cog status largely impacting. (10/18/23 1000)  Plan for Continued Treatment (SLP): continue treatment per plan of care (10/18/23 1000)            Progress: no change      SWALLOW EVALUATION (last 72 hours)       SLP Adult Swallow Evaluation       Row Name 10/16/23 1698                   Rehab Evaluation    Document Type therapy note (daily note)  -CJ        Subjective Information no complaints  -CJ        Patient Observations alert;cooperative  -CJ        Patient/Family/Caregiver Comments/Observations family present  -CJ        Patient Effort adequate  -CJ        Symptoms Noted During/After Treatment none  -CJ           Pain    Additional Documentation Pain Scale: FACES Pre/Post-Treatment (Group)  -           Pain Scale: FACES Pre/Post-Treatment    Pain: FACES Scale, Pretreatment 0-->no hurt  -CJ        Posttreatment Pain Rating 0-->no hurt  -CJ           SLP Treatment Clinical Impressions    Treatment Assessment (SLP) improved;no clinical signs of;suspected;aspiration;toleration of diet;cognitive-linguistic disorder;dysarthria  -CJ        Treatment Assessment Comments (SLP) Pt seen for tx this am. Improvement in oral manipulation of bolus as well as speech intelligibility. Pt very eager for trials of ice chips, and participatory in all tx goals. Will tentatively plan to repeat MBS tomorrow as pt w/ only x1 cough response w/ trials of ice chips this date  -CJ        Daily Summary of Progress (SLP) progress toward functional goals as expected  -CJ        Plan for  Continued Treatment (SLP) continue treatment per plan of care  -        Care Plan Review evaluation/treatment results reviewed;care plan/treatment goals reviewed  -        Care Plan Review, Other Participant(s) spouse;family  -           Recommendations    Therapy Frequency (Swallow) 5 days per week  -        Predicted Duration Therapy Intervention (Days) until discharge  -        SLP Diet Recommendation puree;honey thick liquids;no mixed consistencies  -        Recommended Diagnostics reassess via [facility] ([facility] [place])  10/17  -        Recommended Precautions and Strategies upright posture during/after eating;small bites of food and sips of liquid;no straw;alternate between small bites of food and sips of liquid;general aspiration precautions;1:1 supervision  -        Oral Care Recommendations Oral Care BID/PRN;Suction toothbrush  -        SLP Rec. for Method of Medication Administration meds crushed;with puree  -        Monitor for Signs of Aspiration notify SLP if any concerns  -        Anticipated Discharge Disposition (SLP) skilled nursing facility  -                  User Key  (r) = Recorded By, (t) = Taken By, (c) = Cosigned By      Initials Name Effective Dates    Tonya Guzman, MS CCC-SLP 07/11/23 -                     EDUCATION  The patient has been educated in the following areas:   Dysphagia (Swallowing Impairment).        SLP GOALS       Row Name 10/18/23 0940 10/16/23 0930          (LTG) Patient will demonstrate functional swallow for    Diet Texture (Demonstrate functional swallow) -- soft to chew (chopped) textures  -CJ     Liquid viscosity (Demonstrate functional swallow) -- nectar/ mildly thick liquids  -CJ     [place] (Demonstrate functional swallow) -- with minimal cues (75-90% accuracy)  -     Time Frame (Demonstrate functional swallow) -- by discharge  -CJ     Progress/Outcomes (Demonstrate functional swallow) continuing progress toward goal  -AW continuing progress  toward goal  -CJ     Comment (Demonstrate functional swallow) still on honey and puree until repeat study can be done  -AW --        (STG) Patient will tolerate trials of    Consistencies Trialed (Tolerate trials) -- pureed textures;honey/ moderately thick liquids  -CJ     Desired Outcome (Tolerate trials) -- without signs/symptoms of aspiration;without signs of distress;with adequate oral prep/transit/clearance;with use of compensatory strategies (see comments)  -CJ     [place] (Tolerate trials) -- with 1:1 assist/ supervision  -CJ     Time Frame (Tolerate trials) -- by discharge  -CJ     Progress/Outcomes (Tolerate trials) continuing progress toward goal  -AW continuing progress toward goal  -CJ     Comment (Tolerate trials) no overt s/s of aspiration w/ trials  -AW no overt s/s of aspiration w/ trials  -CJ        (STG) Patient will tolerate therapeutic trials of    Consistencies Trialed (Tolerate therapeutic trials) -- thin liquids  -CJ     Desired Outcome (Tolerate therapeutic trials) -- without signs/symptoms of aspiration;without signs of distress  -CJ     [place] (Tolerate therapeutic trials) -- with minimal cues (75-90% accuracy)  -CJ     Time Frame (Tolerate therapeutic trials) -- by discharge  -CJ     Progress/Outcomes (Tolerate therapeutic trials) continuing progress toward goal  -AW continuing progress toward goal  -CJ     Comment (Tolerate therapeutic trials) no s/s with tsp sip thin or nectar or puree  -AW cough x1  -CJ        (STG) Labial Strengthening Goal 1 (SLP)    Activity (Labial Strengthening Goal 1, SLP) -- increase labial tone  -CJ     Increase Labial Tone -- labial resistance exercises;swallow trials  -CJ     [place]/Accuracy (Labial Strengthening Goal 1, SLP) -- with moderate cues (50-74% accuracy)  -CJ     Time Frame (Labial Strengthening Goal 1, SLP) -- short term goal (STG)  -CJ     Progress/Outcomes (Labial Strengthening Goal 1, SLP) continuing progress toward goal   difficulty following commands, did close lips when prompted  -AW continuing progress toward goal  -CJ     Comment (Labial Strengthening Goal 1, SLP) encourage pressing lips together  -AW x5 reps; improvement  -CJ        (STG) Lingual Strengthening Goal 1 (SLP)    Activity (Lingual Strengthening Goal 1, SLP) -- increase lingual tone/sensation/control/coordination/movement;increase tongue back strength  -CJ     Increase Lingual Tone/Sensation/Control/Coordination/Movement -- swallow trials;lingual resistance exercises  -CJ     Increase Tongue Back Strength -- lingual resistance exercises  -CJ     [place]/Accuracy (Lingual Strengthening Goal 1, SLP) -- with moderate cues (50-74% accuracy)  -CJ     Time Frame (Lingual Strengthening Goal 1, SLP) -- short term goal (STG)  -CJ     Progress/Outcomes (Lingual Strengthening Goal 1, SLP) continuing progress toward goal  -AW continuing progress toward goal  -CJ     Comment (Lingual Strengthening Goal 1, SLP) encouraged tongue positioning in roof of mouth, did effortful swallow x2  -AW lingual resistance x10  -CJ        (STG) Pharyngeal Strengthening Exercise Goal 1 (SLP)    Activity (Pharyngeal Strengthening Goal 1, SLP) -- increase superior movement of the hyolaryngeal complex;increase anterior movement of the hyolaryngeal complex;increase closure at entrance to airway/closure of airway at glottis;increase squeeze/positive pressure generation;increase tongue base retraction;increase timing  -CJ     Increase Timing -- prepping - 3 second prep or suck swallow or 3-step swallow  -CJ     Increase Superior Movement of the Hyolaryngeal Complex -- effortful pitch glide (falsetto + pharyngeal squeeze)  -CJ     Increase Anterior Movement of the Hyolaryngeal Complex -- chin tuck against resistance (CTAR)  -CJ     Increase Closure at Entrance to Airway/Closure of Airway at Glottis -- supraglottic swallow  -CJ     Increase Squeeze/Positive Pressure Generation -- hard effortful  swallow  -CJ     Increase Tongue Base Retraction -- tamie  -CJ     [place]/Accuracy (Pharyngeal Strengthening Goal 1, SLP) -- with moderate cues (50-74% accuracy)  -CJ     Time Frame (Pharyngeal Strengthening Goal 1, SLP) -- short term goal (STG)  -CJ     Progress/Outcomes (Pharyngeal Strengthening Goal 1, SLP) continuing progress toward goal  -AW continuing progress toward goal  -CJ     Comment (Pharyngeal Strengthening Goal 1, SLP) effortful swallow x2, getting agitated  -AW Pt able to complete effortful swallow, pitch glide and attempted CTAR  -CJ        Patient will demonstrate functional communication skills for return to discharge environment     [place] -- with moderate cues  -CJ     Time frame -- by discharge  -CJ     Progress/Outcomes progress slower than expected  -AW continuing progress toward goal  -CJ        Comprehend Questions Goal 1 (SLP)    Improve Ability to Comprehend Questions Goal 1 (SLP) -- complex yes/no questions;80%;with minimal cues (75-90%)  -CJ     Time Frame (Comprehend Questions Goal 1, SLP) -- short term goal (STG)  -CJ     Progress (Ability to Comprehend Questions Goal 1, SLP) 70%;with maximum cues (25-49%)  -AW 60%;with moderate cues (50-74%)  -CJ     Progress/Outcomes (Comprehend Questions Goal 1, SLP) continuing progress toward goal  -AW continuing progress toward goal  -CJ        Follow Directions Goal 2 (SLP)    Improve Ability to Follow Directions Goal 1 (SLP) -- 2 step commands;80%;with minimal cues (75-90%)  -CJ     Time Frame (Follow Directions Goal 1, SLP) -- short term goal (STG)  -CJ     Progress (Ability to Follow Directions Goal 1, SLP) 70%;with moderate cues (50-74%)  -AW 60%;with minimal cues (75-90%)  -CJ     Progress/Outcomes (Follow Directions Goal 1, SLP) continuing progress toward goal  -AW continuing progress toward goal  -CJ        Word Retrieval Skills Goal 1 (SLP)    Improve Word Retrieval Skills By Goal 1 (SLP) -- confrontational naming task;high  frequency;responsive naming task;simple;80%;with minimal cues (75-90%)  -CJ     Time Frame (Word Retrieval Goal 1, SLP) -- short term goal (STG)  -CJ     Progress (Word Retrieval Skills Goal 1, SLP) -- 80%;with minimal cues (75-90%)  -CJ     Progress/Outcomes (Word Retrieval Goal 1, SLP) -- continuing progress toward goal  -CJ        Articulation Goal 1 (SLP)    Improve Articulation Goal 1 (SLP) -- by over-articulating at phrase level;80%;with moderate cues (50-74%)  -CJ     Time Frame (Articulation Goal 1, SLP) -- short term goal (STG)  -CJ     Progress (Articulation Goal 1, SLP) -- 70%;with moderate cues (50-74%)  -CJ     Progress/Outcomes (Articulation Goal 1, SLP) -- continuing progress toward goal  -CJ        Orientation Goal 1 (SLP)    Improve Orientation Through Goal 1 (SLP) -- demonstrating orientation to day;demonstrating orientation to month;demonstrating orientation to year;demonstrating orientation to place;demonstrating orientation to disease/impairment;use environmental aids to assist with orientation;80%;with moderate cues (50-74%)  -CJ     Time Frame (Orientation Goal 1, SLP) -- short term goal (STG)  -CJ     Progress (Orientation Goal 1, SLP) 30%;other (comment)  confusion, agitation  -AW 30%;with moderate cues (50-74%)  -CJ     Progress/Outcomes (Orientation Goal 1, SLP) progress slower than expected  -AW continuing progress toward goal  -CJ               User Key  (r) = Recorded By, (t) = Taken By, (c) = Cosigned By      Initials Name Provider Type    AW Aleshia Reid, MS CCC-SLP Speech and Language Pathologist    Tonya Guzman, MS CCC-SLP Speech and Language Pathologist                       Time Calculation:    Time Calculation- SLP       Row Name 10/18/23 1104             Time Calculation- SLP    SLP Start Time 1020  -AW      SLP Stop Time 1105  -      SLP Time Calculation (min) 45 min  -      SLP Received On 10/18/23  -AW                User Key  (r) = Recorded By, (t) = Taken By,  (c) = Cosigned By      Initials Name Provider Type    AW Aleshia Reid, MS CCC-SLP Speech and Language Pathologist                             Aleshia Reid MS CCC-SLP  10/18/2023

## 2023-10-18 NOTE — PLAN OF CARE
Goal Outcome Evaluation:  Plan of Care Reviewed With: patient, spouse        Progress: no change  Outcome Evaluation: OT re cert completed this date. Assessment of pt performance in ADLs and related t/fs, mobility limited this date by decreased command following seemingly d/t increased agitation. Further below baseline still with decreased activity tolerance, muscle weakness, decreased coordination, cognitive deficits and safety awareness concerns. Fxl t/fs not completed d/t safety concerns. OT assisted pt in needs met with need for dep care in incontinence/toileting mgmt at bedside with max A for rolling L < > R for mgmt of supplies and acess for posterior hygiene. Attempted UBD w/ gross max A for donning however pt preference to not wear UB garments thus removed to decrease agitation. Educated spouse present at bedside on BUE TE for joint mobility and UE integration purposes when pt less agitated. Will continue to progress pt as able while hospitalized with revised goals as needed. Based on assessment this date, recommend SNF at d/c when medically ready.      Anticipated Discharge Disposition (OT): skilled nursing facility

## 2023-10-19 LAB
GLUCOSE BLDC GLUCOMTR-MCNC: 129 MG/DL (ref 70–130)
GLUCOSE BLDC GLUCOMTR-MCNC: 149 MG/DL (ref 70–130)
GLUCOSE BLDC GLUCOMTR-MCNC: 228 MG/DL (ref 70–130)
QT INTERVAL: 330 MS
QTC INTERVAL: 432 MS

## 2023-10-19 PROCEDURE — 99232 SBSQ HOSP IP/OBS MODERATE 35: CPT | Performed by: INTERNAL MEDICINE

## 2023-10-19 PROCEDURE — 82948 REAGENT STRIP/BLOOD GLUCOSE: CPT

## 2023-10-19 PROCEDURE — 25010000002 ZIPRASIDONE MESYLATE PER 10 MG: Performed by: PSYCHIATRY & NEUROLOGY

## 2023-10-19 PROCEDURE — 94761 N-INVAS EAR/PLS OXIMETRY MLT: CPT

## 2023-10-19 PROCEDURE — 97535 SELF CARE MNGMENT TRAINING: CPT

## 2023-10-19 PROCEDURE — 99231 SBSQ HOSP IP/OBS SF/LOW 25: CPT | Performed by: PSYCHIATRY & NEUROLOGY

## 2023-10-19 RX ORDER — QUETIAPINE FUMARATE 25 MG/1
25 TABLET, FILM COATED ORAL DAILY
Status: DISCONTINUED | OUTPATIENT
Start: 2023-10-20 | End: 2023-10-19

## 2023-10-19 RX ORDER — TEMAZEPAM 15 MG/1
15 CAPSULE ORAL NIGHTLY
Status: DISCONTINUED | OUTPATIENT
Start: 2023-10-19 | End: 2023-11-08

## 2023-10-19 RX ORDER — QUETIAPINE FUMARATE 25 MG/1
50 TABLET, FILM COATED ORAL DAILY
Status: DISCONTINUED | OUTPATIENT
Start: 2023-10-20 | End: 2023-10-20

## 2023-10-19 RX ADMIN — ATORVASTATIN CALCIUM 80 MG: 40 TABLET, FILM COATED ORAL at 20:38

## 2023-10-19 RX ADMIN — DONEPEZIL HYDROCHLORIDE 10 MG: 10 TABLET, FILM COATED ORAL at 18:07

## 2023-10-19 RX ADMIN — ACETAMINOPHEN ORAL SOLUTION 500 MG: 650 SOLUTION ORAL at 17:59

## 2023-10-19 RX ADMIN — ZIPRASIDONE MESYLATE 10 MG: 20 INJECTION, POWDER, LYOPHILIZED, FOR SOLUTION INTRAMUSCULAR at 02:53

## 2023-10-19 RX ADMIN — QUETIAPINE FUMARATE 200 MG: 100 TABLET ORAL at 18:06

## 2023-10-19 RX ADMIN — ACETAMINOPHEN ORAL SOLUTION 500 MG: 650 SOLUTION ORAL at 12:20

## 2023-10-19 RX ADMIN — MICONAZOLE NITRATE 1 APPLICATION: 20 CREAM TOPICAL at 20:39

## 2023-10-19 RX ADMIN — MINERAL OIL: 1000 LIQUID ORAL at 12:20

## 2023-10-19 RX ADMIN — LANSOPRAZOLE 15 MG: 15 TABLET, ORALLY DISINTEGRATING ORAL at 06:22

## 2023-10-19 RX ADMIN — DICLOFENAC SODIUM 2 G: 9.3 GEL TOPICAL at 08:30

## 2023-10-19 RX ADMIN — QUETIAPINE FUMARATE 25 MG: 25 TABLET ORAL at 06:22

## 2023-10-19 RX ADMIN — METOPROLOL TARTRATE 25 MG: 25 TABLET, FILM COATED ORAL at 08:29

## 2023-10-19 RX ADMIN — DICLOFENAC SODIUM 2 G: 9.3 GEL TOPICAL at 20:38

## 2023-10-19 RX ADMIN — Medication 30 ML: at 08:30

## 2023-10-19 RX ADMIN — MINERAL OIL: 1000 LIQUID ORAL at 18:00

## 2023-10-19 RX ADMIN — ACETAMINOPHEN ORAL SOLUTION 500 MG: 650 SOLUTION ORAL at 06:21

## 2023-10-19 RX ADMIN — Medication 10 MG: at 18:06

## 2023-10-19 RX ADMIN — CLOPIDOGREL BISULFATE 75 MG: 75 TABLET ORAL at 08:30

## 2023-10-19 RX ADMIN — TEMAZEPAM 15 MG: 15 CAPSULE ORAL at 18:07

## 2023-10-19 RX ADMIN — MICONAZOLE NITRATE 1 APPLICATION: 20 CREAM TOPICAL at 08:30

## 2023-10-19 RX ADMIN — MIRTAZAPINE 30 MG: 15 TABLET, FILM COATED ORAL at 18:07

## 2023-10-19 RX ADMIN — QUETIAPINE FUMARATE 25 MG: 25 TABLET ORAL at 13:07

## 2023-10-19 RX ADMIN — ZIPRASIDONE MESYLATE 10 MG: 20 INJECTION, POWDER, LYOPHILIZED, FOR SOLUTION INTRAMUSCULAR at 12:19

## 2023-10-19 RX ADMIN — MINERAL OIL: 1000 LIQUID ORAL at 08:30

## 2023-10-19 NOTE — CONSULTS
"Palliative Care Daily Progress Note     C/C: Patient sleeping.     S: Medical record reviewed. Re-consult visit for GOC in the context of complex medical decision. Events noted. Neurology adjusting medications due to encephalopathy/protracted delirium post acute ischemic infarcts. Patient continues with agitation despite multiple medication adjustments that then causes increased sedation. Patient in two point restraints and sitter at bedside. Patient with night feeding via PEG. RN reports decreased intake PO due to sedation and lack of appetite. Incontinent of bowel and bladder. OT limited by agitation, weakness and cognitive deficits. Patient unable to participate in MBS at this time.     ROS: ROS limited by sleeping.     O: Code Status:   Code Status and Medical Interventions:   Ordered at: 09/29/23 0854     Medical Intervention Limits:    NO intubation (DNI)     Code Status (Patient has no pulse and is not breathing):    No CPR (Do Not Attempt to Resuscitate)     Medical Interventions (Patient has pulse or is breathing):    Limited Support      Advanced Directives: Advance Directive Status: Patient does not have advance directive   Goals of Care: Ongoing.   Palliative Performance Scale Score: 30%     /76 (BP Location: Right arm, Patient Position: Lying)   Pulse 81   Temp 98.8 °F (37.1 °C) (Temporal)   Resp 20   Ht 175.3 cm (69\")   Wt 72.8 kg (160 lb 7.9 oz)   SpO2 95%   BMI 23.70 kg/m²     Intake/Output Summary (Last 24 hours) at 10/19/2023 1414  Last data filed at 10/19/2023 1100  Gross per 24 hour   Intake 1955 ml   Output 700 ml   Net 1255 ml       PE:  General Appearance:    Patient laying in bed, A/C ill appearing, sleeping, NAD   HEENT:    NC/AT, MMM, face relaxed   Neck:   supple, trachea midline, no JVD   Lungs:     CTA bilat, diminished in bases; respirations regular, even and unlabored; RR 16-18 on exam, on RA    Heart:    RRR, normal S1 and S2, no M/R/G, HR 81   Abdomen:     PEG not " visualized, normal bowel sounds, soft, non-tender, non-distended   G/U:   Purewick in place   MSK/Extremities:   Wasting, no edema, bilateral wrist restraints   Pulses:   Pulses palpable and equal bilaterally   Skin:   Warm, dry   Neurologic:   sleeping   Psych:  Sleeping, calm     Meds: Reviewed and changes noted    Labs:   Results from last 7 days   Lab Units 10/15/23  0537   WBC 10*3/mm3 9.08   HEMOGLOBIN g/dL 11.9*   HEMATOCRIT % 36.0*   PLATELETS 10*3/mm3 257     Results from last 7 days   Lab Units 10/16/23  0609   SODIUM mmol/L 134*   POTASSIUM mmol/L 4.4   CHLORIDE mmol/L 99   CO2 mmol/L 23.0   BUN mg/dL 17   CREATININE mg/dL 0.64*   GLUCOSE mg/dL 141*   CALCIUM mg/dL 9.5     Results from last 7 days   Lab Units 10/16/23  0609   SODIUM mmol/L 134*   POTASSIUM mmol/L 4.4   CHLORIDE mmol/L 99   CO2 mmol/L 23.0   BUN mg/dL 17   CREATININE mg/dL 0.64*   CALCIUM mg/dL 9.5   BILIRUBIN mg/dL 0.6   ALK PHOS U/L 88   ALT (SGPT) U/L 37   AST (SGOT) U/L 32   GLUCOSE mg/dL 141*     Imaging Results (Last 72 Hours)       ** No results found for the last 72 hours. **                  Diagnostics: Reviewed    A:   Hemorrhagic CVA    Dyslipidemia    Multiple bilateral CVAs    HFpEF    T2DM (type 2 diabetes mellitus)    HTN (hypertension)    GERD (gastroesophageal reflux disease)    ICH (intracerebral hemorrhage)    Oropharyngeal dysphagia     73 y.o. male with hemorrhagic CVA, HFpEF, HTN.   S/S:   Pain -s/p stroke and arthritis, MSK  -scheduled Tylenol 500mg PO/PEG q 6 hours (LFT's normal)  -scheduled Lidocaine patch to low back  -Voltaren gel  to bilateral knees  -continue Palliative oral rinse     2. Dysphagia -SLP with diet modifications  -PEG in place  -patient with TF at night, during day minimal intake     3. Debility -PT/OT following  -placement complicated by restraints     4. Agitation -requiring restraints  -Neurology following with multiple medication adjustments     5. GOC -DNR/DNI -per discussion with  wife  -wife not at bedside as went home, will follow up with her tomorrow to discuss GOC including a comfort focused plan of care versus continued Full treatment    P: Palliative Care re-consulted for GOC in the context of complex medical decision making. Patient continues to be agitated and is not progressing to be able to discharge to SNF for STR. No wife at bedside, will follow up tomorrow and discuss GOC.   Palliative Care Team will continue to follow patient. Please do not hesitate to contact us regarding further sx mgmt or GOC needs.  Dianelys Arriaga, APRN  10/19/2023  Time spent: 35 minutes

## 2023-10-19 NOTE — SIGNIFICANT NOTE
10/19/23 1542   SLP Deferred Reason   SLP Deferred Reason Routine  (Attempt to see x2 today. In AM, getting cleaned up, in PM spoke w/ RN sitter in room who reported pt b/w agitated and sleeping. Not appropriate for SLP tx. Will f/u.)

## 2023-10-19 NOTE — PLAN OF CARE
Goal Outcome Evaluation:  Plan of Care Reviewed With: patient, spouse, family        Progress: no change  Outcome Evaluation: Remains confused, combative at times requiring 4pt soft restraints & med Rx change (added geodon).  Now in only 2pt restraints (BUE), less agitated since med changes. Tolerating tube feedings as ordered. VSS. Resp unlabored. Private duty sitter at bedside. Wife/family supportive and involved in care, now gone home to rest for the night. Plan inpt rehab vs. LTC/SNF when medically ready for discharge.  Falls precautions & skin protective precautions maintained.

## 2023-10-19 NOTE — PLAN OF CARE
Problem: Palliative Care  Goal: Enhanced Quality of Life  Intervention: Promote Advance Care Planning  Flowsheets (Taken 10/19/2023 1423)  Life Transition/Adjustment: palliative care initiated     Problem: Palliative Care  Goal: Enhanced Quality of Life  Intervention: Optimize Psychosocial Wellbeing  Flowsheets (Taken 10/19/2023 1425)  Supportive Measures: (palliative IDT: ROLANDO Calix MD, . After hours#[phone]) other (see comments)     Problem: Adjustment to Illness (Stroke, Hemorrhagic)  Goal: Optimal Coping  Intervention: Support Psychosocial Response to Stroke  Recent Flowsheet Documentation  Taken 10/19/2023 1425 by Raquel Garza RN  Supportive Measures: (palliative IDT: ROLADNO Calix MD, . After hours#[phone]) other (see comments)   Goal Outcome Evaluation:           Progress: no change  Outcome Evaluation: Palliative care reconsulted for GOC/symptoms from Dr Rausch. Pt has been agitated and confused and unable to make his own decisions. Pt required Geodon for agitation. Pt is in bilateral wrist restraints to prevent pulling of tubes/lines. Pt currently has a sitter for 1:1 care.   Pt's next of kin is his wife, Reny.  She has gone home to rest and take care of some things.  Plans to talk with her tomorrow when she returns.  Pt has required frequent adjustments of meds to help with agitation.  Nursing reports pt had a bm today and has not been eating or drinking by mouth.  Pt has a PEG for tube feeds.

## 2023-10-19 NOTE — PLAN OF CARE
"Goal Outcome Evaluation:  Plan of Care Reviewed With: patient        Progress: no change  Outcome Evaluation: Transferred to  after midnight. Previous RN (Jonas @9223) from  stated he will call wife in am regarding transfer. VSS on RA. PRN geodon given once. Pt remains agitated w/ q2hr turns, neurovascular/circulation checks, and skin assessments. 2pt (BUE) n/v soft restraints remains in place. Sitter at bedside. NP on call contacted to verify necessity of both sitter and restraints considering most recent [place] note stated \"Surgery recommends to remain in restraints at all times OR if off has to have sitter for next 2 weeks\" and pt not actively pulling at lines/PEG at the time, also new PRNs ordered. Instructed to leave both non-voilent restraints and sitter since both orders exist and pt has had both recently, will reassess in am. Pt remains incontinent of urine. Resting well in between care at this time.       "

## 2023-10-19 NOTE — PROGRESS NOTES
Neurology       Patient Care Team:  Susan Powers APRN as PCP - General (Family Medicine)    Chief complaint: Altered mental status    History: The patient slept better last night but on awakening was restless agitated enough to need another dose of Geodon.    Dr. Du added 25 mg of Seroquel to start tomorrow morning.  Past Medical History:   Diagnosis Date    Acid reflux     Cancer     Skin    Diabetes mellitus     Prediabetes    GERD (gastroesophageal reflux disease) 09/15/2023    HTN (hypertension) 09/15/2023    Kidney disease     T2DM (type 2 diabetes mellitus) 09/15/2023       Vital Signs   Vitals:    10/18/23 2000 10/18/23 2120 10/19/23 0021 10/19/23 0800   BP: 95/68 110/70 108/57 126/76   BP Location:   Right arm Right arm   Patient Position:   Lying Lying   Pulse:  80 71 81   Resp:   8 20   Temp:   97.9 °F (36.6 °C) 98.8 °F (37.1 °C)   TempSrc:   Temporal Temporal   SpO2:   94% 95%   Weight:       Height:           Physical Exam:   General: Awake and alert              Neuro: Confused and restless.    Results Review:  Reviewed KG which does not show prolonged QT.  Results from last 7 days   Lab Units 10/15/23  0537   WBC 10*3/mm3 9.08   HEMOGLOBIN g/dL 11.9*   HEMATOCRIT % 36.0*   PLATELETS 10*3/mm3 257     Results from last 7 days   Lab Units 10/16/23  0609 10/15/23  0537   SODIUM mmol/L 134* 141   POTASSIUM mmol/L 4.4 3.8   CHLORIDE mmol/L 99 105   CO2 mmol/L 23.0 26.0   BUN mg/dL 17 19   CREATININE mg/dL 0.64* 0.64*   CALCIUM mg/dL 9.5 9.1   BILIRUBIN mg/dL 0.6  --    ALK PHOS U/L 88  --    ALT (SGPT) U/L 37  --    AST (SGOT) U/L 32  --    GLUCOSE mg/dL 141* 133*       Imaging Results (Last 24 Hours)       ** No results found for the last 24 hours. **            Assessment:  Protracted delirium    Plan:  A.M. Seroquel increased to 50 mg with 1 dose today.    Palliative care reevaluation    Comment:  Guarded prognosis         I discussed the patients findings and my recommendations with nursing  staff    Toni Rausch MD  10/19/23  12:31 EDT

## 2023-10-19 NOTE — PROGRESS NOTES
[facility] Medicine Services  PROGRESS NOTE    Patient Name: Kenneth Wen  : 1950  MRN: [id number]    Date of Admission: 9/15/2023  Primary Care Physician: Susan Powers APRN    Subjective   Subjective     CC:  F/u   CVA     HPI:  Resting in bed in no acute distress and at the time of my exam patient is a little calmer than yesterday.  Still very confused but follows commands.  Tells me that he is comfortable and does not have any pain or shortness of breath.    Objective   Objective     Vital Signs:   Temp:  [97.9 °F (36.6 °C)-98.8 °F (37.1 °C)] 98.8 °F (37.1 °C)  Heart Rate:  [] 81  Resp:  [8-20] 20  BP: ()/(57-78) 126/76     Physical Exam:  Constitutional: No acute distress, restless  HENT: NCAT, mucous membranes moist  Respiratory: Clear to auscultation bilaterally, respiratory effort normal   Cardiovascular: RRR, no murmurs, rubs, or gallops  Gastrointestinal: Positive bowel sounds, soft, nontender, nondistended  Musculoskeletal: No bilateral ankle edema  Psychiatric: Unable to assess  Neurologic: Awake, alert, confused but follows very simple commands  Skin: No rashes         Results Reviewed:  LAB RESULTS:      Lab 10/16/23  0609 10/15/23  0537   WBC  --  9.08   HEMOGLOBIN  --  11.9*   HEMATOCRIT  --  36.0*   PLATELETS  --  257   MCV  --  94.5   CRP 0.41  --          Lab 10/16/23  0609 10/15/23  0537   SODIUM 134* 141   POTASSIUM 4.4 3.8   CHLORIDE 99 105   CO2 23.0 26.0   ANION GAP 12.0 10.0   BUN 17 19   CREATININE 0.64* 0.64*   EGFR 100.6 100.6   GLUCOSE 141* 133*   CALCIUM 9.5 9.1   IONIZED CALCIUM  --  1.31   MAGNESIUM 1.7 2.0   PHOSPHORUS 3.7 3.6         Lab 10/16/23  0609   TOTAL PROTEIN 6.4   ALBUMIN 4.0   GLOBULIN 2.4   ALT (SGPT) 37   AST (SGOT) 32   BILIRUBIN 0.6   ALK PHOS 88             Lab 10/16/23  0609   CHOLESTEROL 81   TRIGLYCERIDES 43             Brief Urine Lab Results  (Last result in the past 365 days)        Color   Clarity   Blood    Leuk Est   Nitrite   Protein   CREAT   Urine HCG        09/15/23 1818 Yellow   Cloudy   Trace   Negative   Negative   Negative                   Microbiology Results Abnormal       Procedure Component Value - Date/Time    Blood Culture - Blood, Arm, Right [[id number]]  (Normal) Collected: 09/15/23 0212    Lab Status: Final result Specimen: Blood from Arm, Right Updated: 09/20/23 0230     Blood Culture No growth at 5 days    Blood Culture - Blood, Arm, Left [[id number]]  (Normal) Collected: 09/15/23 0212    Lab Status: Final result Specimen: Blood from Arm, Left Updated: 09/20/23 0230     Blood Culture No growth at 5 days            No radiology results from the last 24 hrs        Current medications:  Scheduled Meds:acetaminophen, 500 mg, Per PEG Tube, Q6H  atorvastatin, 80 mg, Per PEG Tube, Nightly  clopidogrel, 75 mg, Per PEG Tube, Daily  Diclofenac Sodium, 2 g, Topical, BID  donepezil, 10 mg, Per PEG Tube, Nightly  lansoprazole, 15 mg, Oral, Q AM  Lidocaine, 1 patch, Transdermal, Q24H  melatonin, 10 mg, Per PEG Tube, Nightly  metoprolol tartrate, 25 mg, Per PEG Tube, Q12H  miconazole, 1 application , Topical, Q12H  mirtazapine, 30 mg, Per PEG Tube, Nightly  palliative care oral rinse, , Mouth/Throat, 4x Daily  ProSource No Carb, 30 mL, Per G Tube, Daily  QUEtiapine, 200 mg, Per PEG Tube, Nightly  [START ON 10/20/2023] QUEtiapine, 50 mg, Per PEG Tube, Daily  sodium chloride, 10 mL, Intravenous, Q12H  sodium chloride, 10 mL, Intravenous, Q12H  temazepam, 15 mg, Per PEG Tube, Nightly      Continuous Infusions:     PRN Meds:.  Calcium Replacement - Follow Nurse / BPA Driven Protocol    dextrose    dextrose    glucagon (human recombinant)    ibuprofen    Magnesium Standard Dose Replacement - Follow Nurse / BPA Driven Protocol    ondansetron    Phosphorus Replacement - Follow Nurse / BPA Driven Protocol    Potassium Replacement - Follow Nurse / BPA Driven Protocol    QUEtiapine    ziprasidone    Assessment & Plan    Assessment & Plan     Active Hospital Problems    Diagnosis  POA    **Hemorrhagic CVA [I61.9]  Yes    Oropharyngeal dysphagia [R13.12]  Yes    Multiple bilateral CVAs [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        Brief Hospital Course to date:  Kenneth Wen is a 73 y.o. male  with hx of HTN, HLD, T2DM, and GERD who presented to OSH with multiple acute ischemic infarcts of the bilateral cerebral and cerebellar hemispheres as well as left isaac. TTE/JOSIAS was negative PFO at OSH. On 9/13/23 he had worsening in mental status and new inability to move RLE, head CT showed evolution of the existing CVA with new development of petechial hemorrhage. DAPT was held for 24 hours per Neurology recommendations and repeat CT head that evening showed worsening effacement of the right quadrigeminal cistern with suspected increasing upward supratentorial pressure. He was then transferred to [facility] for Neurosurgery evaluation on 9/15/23. He was started on hypertonic saline 9/15/23 through 9/20/23 for cerebral edema. He had fevers with negative cultures but had worsening secretions and labored breathing so was started on Zosyn on 9/16/23. Transferred to the hospitalist service on 9/22/23. Pt demonstrated poor oral intake with elevated sodium levels; therefore, PEG tube was recommended.         Multiple bilateral posterior circulation strokes with hemorrhagic conversion  --Neurology has followed, suspect atheroembolic but cannot rule out cardioembolic given multiple vascular territories  --Plavix monotherapy, high intensity statin  --Needs Holter monitor at discharge  --Follow up in stroke clinic in 8 weeks    Agitation  Encephalopathy  --appreciate general  neurology input recs to decrease ativan dose to 0.25mg IV PRN, changed lexapro to remeron and increased remeron to 30mg qhs, increase aricept to 10mg qhs--continues to adjust meds.  Increased seroquel to 100mg at bedtime, 12.5mg in the morning and continue seroquel 25mg PRN.  Still in restraints currentlly  -- Neurology is following and trying to help patient's sleep and calm down with tweaking Seroquel dose.  Patient also was started on Geodon today    Dysphagia  Hypernatremia - resolved  --SLP recommends mechanical ground with honey thick liquids   --Risk of pulling out peg tube, abd binder to be in place at all times. --Surgery recommends to remain in restraints at all times or if off has to have sitter for next 2 weeks   -- dietary following for calorie count, adequate PO intake is limited by his mental status     HTN  --Continue Metoprolol 25 mg BID     GERD  --Continue Protonix     COVID-19-resolved  --Overall asymptomatic and on room air  --No infiltrates seen and low procal  --Did not receive any treatment   --Off Isolation 9/30/2023      Leukocytosis-resolved  --Procalcitionin low at 0.05  --CXR and UA negative  --Blood cultures negative     Elevated LFT's, mild  --Possibly secondary to statin?  --Negative acute hepatitis panel  --repeat AST/ALT improved on 10/3    D/w wife at bedside on 10/15/23    Expected Discharge Location and Transportation: rehab   Expected Discharge   Expected Discharge Date: 10/7/2023; Expected Discharge Time:      DVT prophylaxis:  Mechanical DVT prophylaxis orders are present.     AM-PAC 6 Clicks Score (PT): 6 (10/19/23 0800)    CODE STATUS:   Code Status and Medical Interventions:   Ordered at: 09/29/23 0854     Medical Intervention Limits:    NO intubation (DNI)     Code Status (Patient has no pulse and is not breathing):    No CPR (Do Not Attempt to Resuscitate)     Medical Interventions (Patient has pulse or is breathing):    Limited Support       Bruce Du  MD  10/19/23

## 2023-10-19 NOTE — PLAN OF CARE
Goal Outcome Evaluation:  Plan of Care Reviewed With: patient        Progress: no change  Outcome Evaluation: No significant change noted with therapy this date. Pt with recent increased agitation requiring medication therefore pt with decreased alert level this date. Pt remains with decreased strength, balance, cognition, ROM, coordination limiting independence with ADL's and mobility from baseline. Recommend continued skilled OT services and transfer to SNF at d/c.      Anticipated Discharge Disposition (OT): skilled nursing facility

## 2023-10-19 NOTE — THERAPY TREATMENT NOTE
Patient Name: Kenneth Wen  : 1950    MRN: [id number]                              Today's Date: 10/19/2023       Admit Date: 9/15/2023    Visit Dx:     ICD-10-CM ICD-9-CM   1. Hemorrhagic CVA  I61.9 431   2. Aphasia  R47.01 784.3   3. Dysarthria  R47.1 784.51   4. Oropharyngeal dysphagia  R13.12 787.22     Patient Active Problem List   Diagnosis    Precordial pain    Elevated BP without diagnosis of hypertension    Dyslipidemia    Hyperglycemia    Multiple bilateral CVAs    Hemorrhagic CVA    HFpEF    T2DM (type 2 diabetes mellitus)    HTN (hypertension)    GERD (gastroesophageal reflux disease)    ICH (intracerebral hemorrhage)    Oropharyngeal dysphagia     Past Medical History:   Diagnosis Date    Acid reflux     Cancer     Skin    Diabetes mellitus     Prediabetes    GERD (gastroesophageal reflux disease) 09/15/2023    HTN (hypertension) 09/15/2023    Kidney disease     T2DM (type 2 diabetes mellitus) 09/15/2023     Past Surgical History:   Procedure Laterality Date    APPENDECTOMY      ENDOSCOPY W/ PEG TUBE PLACEMENT N/A 2023    Procedure: ESOPHAGOGASTRODUODENOSCOPY WITH PERCUTANEOUS ENDOSCOPIC GASTROSTOMY TUBE INSERTION;  Surgeon: Haseeb Carrillo MD;  Location: [facility] ENDOSCOPY;  Service: General;  Laterality: N/A;    HEMORRHOIDECTOMY      NASAL POLYP SURGERY        General Information       Row Name 10/19/23 1015          OT Time and Intention    Document Type therapy note (daily note)  -JR     Mode of Treatment occupational therapy  -JR       Row Name 10/19/23 1015          General Information    Patient Profile Reviewed yes  -JR     Existing Precautions/Restrictions fall;seizures  R sided weakness/coordination deficits, recently agitated with B soft wrist restraints, PEG with abdominal binder  -JR     Barriers to Rehab medically complex;cognitive status  -JR       Row Name 10/19/23 1015          Cognition    Orientation Status (Cognition) oriented to;person  Pt sleeping upon arrival,  awakened. Oriented to self, speech difficult to understand this date.  -       Row Name 10/19/23 1015          Safety Issues, Functional Mobility    Safety Issues Affecting Function (Mobility) ability to follow commands;awareness of need for assistance;insight into deficits/self-awareness;judgment;problem-solving;safety precaution awareness;safety precautions follow-through/compliance  -     Impairments Affecting Function (Mobility) balance;cognition;coordination;endurance/activity tolerance;grasp;motor control;motor planning;muscle tone abnormal;pain;visual/perceptual;strength;sensation/sensory awareness;range of motion (ROM);postural/trunk control  -     Cognitive Impairments, Mobility Safety/Performance attention;awareness, need for assistance;insight into deficits/self-awareness;judgment;problem-solving/reasoning;safety precaution awareness;safety precaution follow-through  -               User Key  (r) = Recorded By, (t) = Taken By, (c) = Cosigned By      Initials Name Provider Type     Jess Carrillo OT Occupational Therapist                     Mobility/ADL's       Row Name 10/19/23 1017          Bed Mobility    Bed Mobility supine-sit;sit-supine;rolling left;rolling right;scooting/bridging  -     Rolling Left [place] (Bed Mobility) maximum assist (25% patient effort);verbal cues  -JR     Rolling Right [place] (Bed Mobility) maximum assist (25% patient effort);verbal cues  -JR     Scooting/Bridging [place] (Bed Mobility) dependent (less than 25% patient effort);2 person assist;verbal cues  -JR     Supine-Sit [place] (Bed Mobility) maximum assist (25% patient effort);verbal cues  -JR     Sit-Supine [place] (Bed Mobility) maximum assist (25% patient effort);verbal cues  -JR     Bed Mobility, Safety Issues cognitive deficits limit understanding;decreased use of arms for pushing/pulling;decreased use of legs for bridging/pushing;impaired trunk control for bed  mobility;unable to safely maintain weight bearing restrictions  -     Assistive Device (Bed Mobility) bed rails;draw sheet;head of bed elevated  -     Comment, (Bed Mobility) Pt required assist to bring LE's off EOB and bring trunk into sitting with verbal cues for sequencing, safety and hand placement.  -       Row Name 10/19/23 1017          Transfers    Comment, (Transfers) Deferred  -       Row Name 10/19/23 1017          Activities of Daily Living    BADL Assessment/Intervention upper body dressing;toileting  -       Row Name 10/19/23 1017          Upper Body Dressing Assessment/Training    [place] Level (Upper Body Dressing) don;doff;dependent (less than 25% patient effort)  -     Position (Upper Body Dressing) supine  -JR     Comment, (Upper Body Dressing) hospital gown  -       Row Name 10/19/23 1017          Toileting Assessment/Training    [place] Level (Toileting) adjust/manage clothing;perform perineal hygiene;dependent (less than 25% patient effort)  -JR     Position (Toileting) supine  -JR     Comment, (Toileting) Pt incontinent of bowel and dependent with hygiene.  -       Row Name 10/19/23 1017          Grooming Assessment/Training    [place] Level (Grooming) wash face, hands;set up;minimum assist (75% patient effort)  -     Position (Grooming) supine  -JR     Comment, (Grooming) limited effort  -               User Key  (r) = Recorded By, (t) = Taken By, (c) = Cosigned By      Initials Name Provider Type     Jess Carrillo, OT Occupational Therapist                   Obj/Interventions       Row Name 10/19/23 1019          Shoulder (Therapeutic Exercise)    Shoulder PROM (Therapeutic Exercise) right;flexion;extension;5 repetitions  Pt c/o pain with R UE therex this date  -       Row Name 10/19/23 1019          Elbow/Forearm (Therapeutic Exercise)    Elbow/Forearm PROM (Therapeutic Exercise) right;flexion;extension;supination;pronation;5 repetitions  -        Row Name 10/19/23 1019          Wrist (Therapeutic Exercise)    Wrist PROM (Therapeutic Exercise) right;flexion;extension;5 repetitions  -JR       Row Name 10/19/23 1019          Hand (Therapeutic Exercise)    Hand PROM (Therapeutic Exercise) right;finger flexion;finger extension;5 repetitions  -JR       Row Name 10/19/23 1019          Motor Skills    Therapeutic Exercise shoulder;elbow/forearm;wrist;hand  -JR       Row Name 10/19/23 1019          Balance    Balance Assessment sitting static balance  -JR     Static Sitting Balance dependent  -JR     Comment, Balance Pt with posterior LOB in sitting this date, no self correction noted this date despite cues  -JR               User Key  (r) = Recorded By, (t) = Taken By, (c) = Cosigned By      Initials Name Provider Type    JR Jess Carrillo, OT Occupational Therapist                   Goals/Plan    No documentation.                  Clinical Impression       Row Name 10/19/23 1021          Pain Assessment    Pretreatment Pain Rating 0/10 - no pain  -JR     Posttreatment Pain Rating 0/10 - no pain  -JR     Additional Documentation Pain Scale: Word Pre/Post-Treatment (Group)  -       Row Name 10/19/23 1021          Plan of Care Review    Plan of Care Reviewed With patient  -     Progress no change  -     Outcome Evaluation No significant change noted with therapy this date. Pt with recent increased agitation requiring medication therefore pt with decreased alert level this date. Pt remains with decreased strength, balance, cognition, ROM, coordination limiting independence with ADL's and mobility from baseline. Recommend continued skilled OT services and transfer to SNF at d/c.  -       Row Name 10/19/23 1021          Therapy Plan Review/Discharge Plan (OT)    Anticipated Discharge Disposition (OT) skilled nursing facility  -       Row Name 10/19/23 1021          Vital Signs    Pre Patient Position Supine  -JR     Intra Patient Position Sitting  -JR      Post Patient Position Supine  -JR       Row Name 10/19/23 1021          Positioning and Restraints    Pre-Treatment Position in bed  -JR     Post Treatment Position bed  -JR     In Bed notified nsg;supine;call light within reach;encouraged to call for assist;exit alarm on;patient within staff view  -JR     Restraints released:;reapplied:;notified nsg:;soft limb  -JR               User Key  (r) = Recorded By, (t) = Taken By, (c) = Cosigned By      Initials Name Provider Type    JR Jess Carrillo, OT Occupational Therapist                   Outcome Measures       Row Name 10/19/23 1026          How much help from another is currently needed...    Putting on and taking off regular lower body clothing? 1  -JR     Bathing (including washing, rinsing, and drying) 1  -JR     Toileting (which includes using toilet bed pan or urinal) 1  -JR     Putting on and taking off regular upper body clothing 1  -JR     Taking care of personal grooming (such as brushing teeth) 2  -JR     Eating meals 1  -JR     AM-PAC 6 Clicks Score (OT) 7  -JR       Row Name 10/19/23 0800 10/19/23 0015       How much help from another person do you currently need...    Turning from your back to your side while in flat bed without using bedrails? 1  -NH 3  -DC    Moving from lying on back to sitting on the side of a flat bed without bedrails? 1  -NH 2  -DC    Moving to and from a bed to a chair (including a wheelchair)? 1  -NH 2  -DC    Standing up from a chair using your arms (e.g., wheelchair, bedside chair)? 1  -NH 1  -DC    Climbing 3-5 steps with a railing? 1  -NH 1  -DC    To walk in hospital room? 1  -NH 1  -DC    AM-PAC 6 Clicks Score (PT) 6  -NH 10  -DC    Highest level of mobility 2 --> Bed activities/dependent transfer  -NH 4 --> Transferred to chair/commode  -DC      Row Name 10/19/23 1026          Functional Assessment    Outcome Measure Options AM-PAC 6 Clicks Daily Activity (OT)  -               User Key  (r) = Recorded By, (t) =  Taken By, (c) = Cosigned By      Initials Name Provider Type    Jess Medley OT Occupational Therapist    Linda Harry, RN Registered Nurse    Cinthia Bustamante, RN Registered Nurse                    Occupational Therapy Education       Title: PT OT SLP Therapies (In Progress)       Topic: Occupational Therapy (In Progress)       Point: ADL training (In Progress)       Description:   Instruct learner(s) on proper safety adaptation and remediation techniques during self care or transfers.   Instruct in proper use of assistive devices.                  Learning Progress Summary             Patient Acceptance, E, NR by  at 10/19/2023 0939    Acceptance, E,D, NR by J at 10/18/2023 0830    Acceptance, E, NR by  at 10/12/2023 1508    Acceptance, E, NR by  at 10/9/2023 1533    Acceptance, E, NR by  at 10/6/2023 1549    Acceptance, E,D, NR,NL by  at 10/4/2023 0948    Acceptance, E, NR by  at 9/28/2023 1556    Acceptance, E, NR by  at 9/25/2023 1045    Acceptance, E, NR by [facility] at 9/20/2023 1420    Acceptance, E, NR by  at 9/18/2023 1532    Acceptance, E, NR by  at 9/15/2023 1531   Family Acceptance, E,D, NR by J at 10/18/2023 0830    Acceptance, E, NR by MR at 10/12/2023 1508    Acceptance, E, NR by  at 10/9/2023 1533    Acceptance, E, NR by  at 10/6/2023 1549    Acceptance, E, NR by  at 9/28/2023 1556                         Point: Home exercise program (In Progress)       Description:   Instruct learner(s) on appropriate technique for monitoring, assisting and/or progressing therapeutic exercises/activities.                  Learning Progress Summary             Patient Acceptance, E, NR by  at 10/19/2023 0939    Acceptance, E,D, NR by J at 10/18/2023 0830    Acceptance, E, NR by  at 10/12/2023 1508    Acceptance, E, NR by  at 10/6/2023 1549    Acceptance, E,D, NR,NL by  at 10/4/2023 0948    Acceptance, E, NR by MR at 9/28/2023 1556    Acceptance, E, NR by  at  9/25/2023 1045    Acceptance, E, NR by CS1 at 9/20/2023 1420    Acceptance, E, NR by MC at 9/18/2023 1532    Acceptance, E, NR by MC at 9/15/2023 1531   Family Acceptance, E,D, NR by JY at 10/18/2023 0830    Acceptance, E, NR by MR at 10/12/2023 1508    Acceptance, E, NR by MR at 10/6/2023 1549    Acceptance, E, NR by MR at 9/28/2023 1556                         Point: Precautions (In Progress)       Description:   Instruct learner(s) on prescribed precautions during self-care and functional transfers.                  Learning Progress Summary             Patient Acceptance, E,D, NR by JY at 10/18/2023 0830    Acceptance, E, NR by MR at 10/12/2023 1508    Acceptance, E, NR by JG at 10/9/2023 1533    Acceptance, E, NR by MR at 10/6/2023 1549    Acceptance, E,D, NR,NL by  at 10/4/2023 0948    Acceptance, E, NR by MR at 9/28/2023 1556    Acceptance, E, NR by CS1 at 9/20/2023 1420    Acceptance, E, NR by MC at 9/18/2023 1532    Acceptance, E, NR by MC at 9/15/2023 1531   Family Acceptance, E,D, NR by JY at 10/18/2023 0830    Acceptance, E, NR by MR at 10/12/2023 1508    Acceptance, E, NR by JG at 10/9/2023 1533    Acceptance, E, NR by MR at 10/6/2023 1549    Acceptance, E, NR by MR at 9/28/2023 1556                         Point: Body mechanics (In Progress)       Description:   Instruct learner(s) on proper positioning and spine alignment during self-care, functional mobility activities and/or exercises.                  Learning Progress Summary             Patient Acceptance, E,D, NR by JY at 10/18/2023 0830    Acceptance, E, NR by MR at 10/12/2023 1508    Acceptance, E, NR by JG at 10/9/2023 1533    Acceptance, E, NR by MR at 10/6/2023 1549    Acceptance, E,D, NR,NL by CS at 10/4/2023 0948    Acceptance, E, NR by MR at 9/28/2023 1556    Acceptance, E, NR by CS1 at 9/20/2023 1420    Acceptance, E, NR by MC at 9/18/2023 1532    Acceptance, E, NR by  at 9/15/2023 1531   Family Acceptance, E,D, NR by ORAL at  10/18/2023 0830    Acceptance, E, NR by MR at 10/12/2023 1508    Acceptance, E, NR by JG at 10/9/2023 1533    Acceptance, E, NR by MR at 10/6/2023 1549    Acceptance, E, NR by MR at 9/28/2023 1556                                         User Key       Initials Effective Dates Name Provider Type Discipline     02/03/23 -  Lorena, Jess FIELDS, OT Occupational Therapist OT    CS1 09/02/21 -  Carmen Carr, OT Occupational Therapist OT    JY 06/16/21 -  Ofelia Armstrong, OT Occupational Therapist OT    CS 06/16/21 -  Jim Judge, OT Occupational Therapist OT     10/14/22 -  Jenny Rivera, OT Occupational Therapist OT    MR 09/22/22 -  Meera Doyle, OT Occupational Therapist OT    JG 08/30/23 -  Power Herman, OT Student OT Student OT                  OT Recommendation and Plan  Planned Therapy Interventions (OT): activity tolerance training, adaptive equipment training, BADL retraining, functional balance retraining, occupation/activity based interventions, ROM/therapeutic exercise, strengthening exercise, transfer/mobility retraining, patient/caregiver education/training, neuromuscular control/coordination retraining, cognitive/visual perception retraining  Therapy Frequency (OT): daily  Plan of Care Review  Plan of Care Reviewed With: patient  Progress: no change  Outcome Evaluation: No significant change noted with therapy this date. Pt with recent increased agitation requiring medication therefore pt with decreased alert level this date. Pt remains with decreased strength, balance, cognition, ROM, coordination limiting independence with ADL's and mobility from baseline. Recommend continued skilled OT services and transfer to SNF at d/c.     Time Calculation:         Time Calculation- OT       Row Name 10/19/23 1027             Time Calculation- OT    OT Start Time 0939 -JR      OT Received On 10/19/23  -         Timed Charges    55150 - OT Therapeutic Activity Minutes 7 -JR      28702 - OT Self  Care/Mgmt Minutes 23  -JR         Total Minutes    Timed Charges Total Minutes 30  -JR       Total Minutes 30  -JR                User Key  (r) = Recorded By, (t) = Taken By, (c) = Cosigned By      Initials Name Provider Type    Jess Medley OT Occupational Therapist                  Therapy Charges for Today       Code Description Service Date Service Provider Modifiers Qty    [id number]  OT SELF CARE/MGMT/TRAIN EA 15 MIN 10/19/2023 Jess Carrillo OT GO 2                 Jess Carrillo, OT  10/19/2023

## 2023-10-20 LAB
GLUCOSE BLDC GLUCOMTR-MCNC: 113 MG/DL (ref 70–130)
GLUCOSE BLDC GLUCOMTR-MCNC: 123 MG/DL (ref 70–130)
GLUCOSE BLDC GLUCOMTR-MCNC: 163 MG/DL (ref 70–130)
GLUCOSE BLDC GLUCOMTR-MCNC: 197 MG/DL (ref 70–130)
GLUCOSE BLDC GLUCOMTR-MCNC: 220 MG/DL (ref 70–130)
QT INTERVAL: 386 MS
QTC INTERVAL: 490 MS

## 2023-10-20 PROCEDURE — 82948 REAGENT STRIP/BLOOD GLUCOSE: CPT

## 2023-10-20 PROCEDURE — 93005 ELECTROCARDIOGRAM TRACING: CPT | Performed by: PSYCHIATRY & NEUROLOGY

## 2023-10-20 PROCEDURE — 99232 SBSQ HOSP IP/OBS MODERATE 35: CPT | Performed by: INTERNAL MEDICINE

## 2023-10-20 PROCEDURE — 97530 THERAPEUTIC ACTIVITIES: CPT

## 2023-10-20 PROCEDURE — 99232 SBSQ HOSP IP/OBS MODERATE 35: CPT | Performed by: PSYCHIATRY & NEUROLOGY

## 2023-10-20 PROCEDURE — 25010000002 ZIPRASIDONE MESYLATE PER 10 MG: Performed by: PSYCHIATRY & NEUROLOGY

## 2023-10-20 PROCEDURE — 92507 TX SP LANG VOICE COMM INDIV: CPT

## 2023-10-20 PROCEDURE — 93010 ELECTROCARDIOGRAM REPORT: CPT | Performed by: INTERNAL MEDICINE

## 2023-10-20 PROCEDURE — 92526 ORAL FUNCTION THERAPY: CPT

## 2023-10-20 RX ORDER — QUETIAPINE FUMARATE 100 MG/1
100 TABLET, FILM COATED ORAL DAILY
Status: DISCONTINUED | OUTPATIENT
Start: 2023-10-21 | End: 2023-10-25

## 2023-10-20 RX ADMIN — MINERAL OIL: 1000 LIQUID ORAL at 21:02

## 2023-10-20 RX ADMIN — ACETAMINOPHEN ORAL SOLUTION 500 MG: 650 SOLUTION ORAL at 05:46

## 2023-10-20 RX ADMIN — Medication 10 MG: at 18:44

## 2023-10-20 RX ADMIN — DICLOFENAC SODIUM 2 G: 9.3 GEL TOPICAL at 21:02

## 2023-10-20 RX ADMIN — MIRTAZAPINE 30 MG: 15 TABLET, FILM COATED ORAL at 18:45

## 2023-10-20 RX ADMIN — METOPROLOL TARTRATE 25 MG: 25 TABLET, FILM COATED ORAL at 21:02

## 2023-10-20 RX ADMIN — ACETAMINOPHEN ORAL SOLUTION 500 MG: 650 SOLUTION ORAL at 18:45

## 2023-10-20 RX ADMIN — MINERAL OIL: 1000 LIQUID ORAL at 08:50

## 2023-10-20 RX ADMIN — MICONAZOLE NITRATE 1 APPLICATION: 20 CREAM TOPICAL at 21:03

## 2023-10-20 RX ADMIN — ZIPRASIDONE MESYLATE 10 MG: 20 INJECTION, POWDER, LYOPHILIZED, FOR SOLUTION INTRAMUSCULAR at 12:43

## 2023-10-20 RX ADMIN — CLOPIDOGREL BISULFATE 75 MG: 75 TABLET ORAL at 08:49

## 2023-10-20 RX ADMIN — ACETAMINOPHEN ORAL SOLUTION 500 MG: 650 SOLUTION ORAL at 12:43

## 2023-10-20 RX ADMIN — MINERAL OIL: 1000 LIQUID ORAL at 12:19

## 2023-10-20 RX ADMIN — TEMAZEPAM 15 MG: 15 CAPSULE ORAL at 18:45

## 2023-10-20 RX ADMIN — DICLOFENAC SODIUM 2 G: 9.3 GEL TOPICAL at 08:50

## 2023-10-20 RX ADMIN — QUETIAPINE FUMARATE 50 MG: 25 TABLET ORAL at 05:46

## 2023-10-20 RX ADMIN — MICONAZOLE NITRATE 1 APPLICATION: 20 CREAM TOPICAL at 08:50

## 2023-10-20 RX ADMIN — Medication 30 ML: at 08:49

## 2023-10-20 RX ADMIN — METOPROLOL TARTRATE 25 MG: 25 TABLET, FILM COATED ORAL at 08:49

## 2023-10-20 RX ADMIN — DONEPEZIL HYDROCHLORIDE 10 MG: 10 TABLET, FILM COATED ORAL at 18:45

## 2023-10-20 RX ADMIN — LANSOPRAZOLE 15 MG: 15 TABLET, ORALLY DISINTEGRATING ORAL at 05:46

## 2023-10-20 RX ADMIN — ATORVASTATIN CALCIUM 80 MG: 40 TABLET, FILM COATED ORAL at 21:02

## 2023-10-20 RX ADMIN — QUETIAPINE FUMARATE 25 MG: 25 TABLET ORAL at 10:24

## 2023-10-20 RX ADMIN — QUETIAPINE FUMARATE 200 MG: 100 TABLET ORAL at 18:44

## 2023-10-20 NOTE — THERAPY TREATMENT NOTE
Acute Care - Speech Language Pathology Progress Note  [facility]     Patient Name: Kenneth Wen  : 1950  MRN: [id number]  Today's Date: 10/20/2023               Admit Date: 9/15/2023     Visit Dx:    ICD-10-CM ICD-9-CM   1. Hemorrhagic CVA  I61.9 431   2. Aphasia  R47.01 784.3   3. Dysarthria  R47.1 784.51   4. Oropharyngeal dysphagia  R13.12 787.22     Patient Active Problem List   Diagnosis    Precordial pain    Elevated BP without diagnosis of hypertension    Dyslipidemia    Hyperglycemia    Multiple bilateral CVAs    Hemorrhagic CVA    HFpEF    T2DM (type 2 diabetes mellitus)    HTN (hypertension)    GERD (gastroesophageal reflux disease)    ICH (intracerebral hemorrhage)    Oropharyngeal dysphagia     Past Medical History:   Diagnosis Date    Acid reflux     Cancer     Skin    Diabetes mellitus     Prediabetes    GERD (gastroesophageal reflux disease) 09/15/2023    HTN (hypertension) 09/15/2023    Kidney disease     T2DM (type 2 diabetes mellitus) 09/15/2023     Past Surgical History:   Procedure Laterality Date    APPENDECTOMY      ENDOSCOPY W/ PEG TUBE PLACEMENT N/A 2023    Procedure: ESOPHAGOGASTRODUODENOSCOPY WITH PERCUTANEOUS ENDOSCOPIC GASTROSTOMY TUBE INSERTION;  Surgeon: Haseeb Carrillo MD;  Location: [facility] ENDOSCOPY;  Service: General;  Laterality: N/A;    HEMORRHOIDECTOMY      NASAL POLYP SURGERY         SLP Recommendation and Plan              Swallow Criteria for Skilled Therapeutic Interventions Met: demonstrates skilled criteria (10/20/23 113)  Anticipated Discharge Disposition (SLP): skilled nursing facility (10/20/23 1130)     Therapy Frequency (Swallow): 5 days per week (10/20/23 1130)     Predicted Duration Therapy Intervention (Days): until discharge (10/20/23 1130)  Oral Care Recommendations: Oral Care BID/PRN, Suction toothbrush (10/20/23 1130)     Daily Summary of Progress (SLP): unable to show any progress toward functional goals (10/20/23 1130)           Treatment  Assessment (SLP): no clinical signs of, improved (10/20/23 1130)  Treatment Assessment Comments (SLP): pt very agitated, restlless throughout treatment. Tonic bite on spoon, pulled off purewick catheter. RN notified. (10/20/23 1130)  Plan for Continued Treatment (SLP): continue treatment per plan of care (10/20/23 1130)  Plan of Care Reviewed With: patient (10/20/23 1408)  Progress: no change (10/20/23 1408)      SLP EVALUATION (last 72 hours)       SLP SLC Evaluation       Row Name 10/18/23 1000                   Communication Assessment/Intervention    Document Type therapy note (daily note)  -AW        Subjective Information no complaints  agitated, restrained  -AW        Patient Observations alert;poorly cooperative  -AW        Patient/Family/Caregiver Comments/Observations wife at bedside  -AW        Patient Effort adequate  -AW        Symptoms Noted During/After Treatment none  -AW           SLP Treatment Clinical Impressions    Treatment Assessment (SLP) improved;no clinical signs of;pharyngeal dysphagia  -AW        Treatment Assessment Comments (SLP) pt very agitated, but did calm with encouragement and took multiple trials of puree and nectar and honey. Very little intake - advised wife to continue offering as much as possible to encourage PO. Cog status largely impacting.  -AW        Daily Summary of Progress (SLP) progress towards functional goals is fair  -AW        Barriers to Overall Progress (SLP) Cognitive status  -AW        Plan for Continued Treatment (SLP) continue treatment per plan of care  -AW        Care Plan Review care plan/treatment goals reviewed  -AW        Care Plan Review, Other Participant(s) significant other  will repeat MBS when cognitively appropriate  -AW                  User Key  (r) = Recorded By, (t) = Taken By, (c) = Cosigned By      Initials Name Effective Dates    AW Aleshia Reid, MS CCC-SLP 02/03/23 -                        EDUCATION  The patient has been educated in  the following areas:     Cognitive Impairment Communication Impairment.           SLP GOALS       Row Name 10/20/23 1130 10/18/23 0940          (LTG) Patient will demonstrate functional swallow for    Diet Texture (Demonstrate functional swallow) soft to chew (chopped) textures  -AV --     Liquid viscosity (Demonstrate functional swallow) nectar/ mildly thick liquids  -AV --     [place] (Demonstrate functional swallow) with minimal cues (75-90% accuracy)  -AV --     Time Frame (Demonstrate functional swallow) by discharge  -AV --     Barriers (Demonstrate functional swallow) cognition  -AV --     Progress/Outcomes (Demonstrate functional swallow) continuing progress toward goal  -AV continuing progress toward goal  -AW     Comment (Demonstrate functional swallow) -- still on honey and puree until repeat study can be done  -AW        (STG) Patient will tolerate trials of    Consistencies Trialed (Tolerate trials) pureed textures;honey/ moderately thick liquids  -AV --     Desired Outcome (Tolerate trials) without signs/symptoms of aspiration;without signs of distress;with adequate oral prep/transit/clearance;with use of compensatory strategies (see comments)  -AV --     [place] (Tolerate trials) with 1:1 assist/ supervision  -AV --     Time Frame (Tolerate trials) by discharge  -AV --     Progress/Outcomes (Tolerate trials) continuing progress toward goal  -AV continuing progress toward goal  -AW     Comment (Tolerate trials) -- no overt s/s of aspiration w/ trials  -AW        (STG) Patient will tolerate therapeutic trials of    Consistencies Trialed (Tolerate therapeutic trials) thin liquids  -AV --     Desired Outcome (Tolerate therapeutic trials) without signs/symptoms of aspiration;without signs of distress  -AV --     [place] (Tolerate therapeutic trials) with minimal cues (75-90% accuracy)  -AV --     Time Frame (Tolerate therapeutic trials) by discharge  -AV --     Progress/Outcomes (Tolerate  therapeutic trials) continuing progress toward goal  -AV continuing progress toward goal  -AW     Comment (Tolerate therapeutic trials) delayed cough with thins  -AV no s/s with tsp sip thin or nectar or puree  -AW        (STG) Labial Strengthening Goal 1 (SLP)    Activity (Labial Strengthening Goal 1, SLP) increase labial tone  -AV --     Increase Labial Tone labial resistance exercises;swallow trials  -AV --     [place]/Accuracy (Labial Strengthening Goal 1, SLP) with moderate cues (50-74% accuracy)  -AV --     Time Frame (Labial Strengthening Goal 1, SLP) short term goal (STG)  -AV --     Progress/Outcomes (Labial Strengthening Goal 1, SLP) continuing progress toward goal  -AV continuing progress toward goal  difficulty following commands, did close lips when prompted  -AW     Comment (Labial Strengthening Goal 1, SLP) -- encourage pressing lips together  -AW        (STG) Lingual Strengthening Goal 1 (SLP)    Activity (Lingual Strengthening Goal 1, SLP) increase lingual tone/sensation/control/coordination/movement;increase tongue back strength  -AV --     Increase Lingual Tone/Sensation/Control/Coordination/Movement swallow trials;lingual resistance exercises  -AV --     Increase Tongue Back Strength lingual resistance exercises  -AV --     [place]/Accuracy (Lingual Strengthening Goal 1, SLP) with moderate cues (50-74% accuracy)  -AV --     Time Frame (Lingual Strengthening Goal 1, SLP) short term goal (STG)  -AV --     Progress/Outcomes (Lingual Strengthening Goal 1, SLP) continuing progress toward goal  -AV continuing progress toward goal  -AW     Comment (Lingual Strengthening Goal 1, SLP) -- encouraged tongue positioning in roof of mouth, did effortful swallow x2  -AW        (STG) Pharyngeal Strengthening Exercise Goal 1 (SLP)    Progress/Outcomes (Pharyngeal Strengthening Goal 1, SLP) -- continuing progress toward goal  -AW     Comment (Pharyngeal Strengthening Goal 1, SLP) -- effortful swallow x2,  getting agitated  -AW        Patient will demonstrate functional communication skills for return to discharge environment     [place] with moderate cues  -AV --     Time frame by discharge  -AV --     Progress/Outcomes progress slower than expected  -AV progress slower than expected  -AW        Comprehend Questions Goal 1 (SLP)    Progress (Ability to Comprehend Questions Goal 1, SLP) -- 70%;with maximum cues (25-49%)  -AW     Progress/Outcomes (Comprehend Questions Goal 1, SLP) -- continuing progress toward goal  -AW        Follow Directions Goal 2 (SLP)    Improve Ability to Follow Directions Goal 1 (SLP) 2 step commands;80%;with minimal cues (75-90%)  -AV --     Time Frame (Follow Directions Goal 1, SLP) short term goal (STG)  -AV --     Progress (Ability to Follow Directions Goal 1, SLP) 40%;with moderate cues (50-74%)  -AV 70%;with moderate cues (50-74%)  -AW     Progress/Outcomes (Follow Directions Goal 1, SLP) continuing progress toward goal  -AV continuing progress toward goal  -AW        Word Retrieval Skills Goal 1 (SLP)    Improve Word Retrieval Skills By Goal 1 (SLP) confrontational naming task;high frequency;responsive naming task;simple;80%;with minimal cues (75-90%)  -AV --     Time Frame (Word Retrieval Goal 1, SLP) short term goal (STG)  -AV --     Progress (Word Retrieval Skills Goal 1, SLP) 50%;with moderate cues (50-74%)  -AV --     Progress/Outcomes (Word Retrieval Goal 1, SLP) continuing progress toward goal  -AV --        Orientation Goal 1 (SLP)    Progress (Orientation Goal 1, SLP) -- 30%;other (comment)  confusion, agitation  -AW     Progress/Outcomes (Orientation Goal 1, SLP) -- progress slower than expected  -AW               User Key  (r) = Recorded By, (t) = Taken By, (c) = Cosigned By      Initials Name Provider Type    Aleshia Stephens MS CCC-SLP Speech and Language Pathologist    AV Gina Lopez MS CCC-SLP Speech and Language Pathologist                             Time Calculation:      Time Calculation- SLP       Row Name 10/20/23 1410             Time Calculation- SLP    SLP Start Time 1130  -AV      SLP Received On 10/20/23  -AV         Untimed Charges    54937-RZ Treatment/ST Modification Prosth Aug Alter  15  -AV      46743-ZV Treatment Swallow Minutes 15  -AV         Total Minutes    Untimed Charges Total Minutes 30  -AV       Total Minutes 30  -AV                User Key  (r) = Recorded By, (t) = Taken By, (c) = Cosigned By      Initials Name Provider Type    AV Gina Lopez, MS CCC-SLP Speech and Language Pathologist                    Therapy Charges for Today       Code Description Service Date Service Provider Modifiers Qty    49287988193 HC ST TREATMENT SWALLOW 1 10/20/2023 Gina Lopez, MS CCC-SLP GN 1    [id number] HC ST TREATMENT SPEECH 1 10/20/2023 Gina Lopez, MS CCC-SLP GN 1                       Gina SUMA Dias MS CCC-SLP  10/20/2023   and Acute Care - Speech Language Pathology   Swallow Progress Note  [facility]     Patient Name: Kenneth Wen  : 1950  MRN: [id number]  Today's Date: 10/20/2023               Admit Date: 9/15/2023    Visit Dx:     ICD-10-CM ICD-9-CM   1. Hemorrhagic CVA  I61.9 431   2. Aphasia  R47.01 784.3   3. Dysarthria  R47.1 784.51   4. Oropharyngeal dysphagia  R13.12 787.22     Patient Active Problem List   Diagnosis    Precordial pain    Elevated BP without diagnosis of hypertension    Dyslipidemia    Hyperglycemia    Multiple bilateral CVAs    Hemorrhagic CVA    HFpEF    T2DM (type 2 diabetes mellitus)    HTN (hypertension)    GERD (gastroesophageal reflux disease)    ICH (intracerebral hemorrhage)    Oropharyngeal dysphagia     Past Medical History:   Diagnosis Date    Acid reflux     Cancer     Skin    Diabetes mellitus     Prediabetes    GERD (gastroesophageal reflux disease) 09/15/2023    HTN (hypertension) 09/15/2023    Kidney disease     T2DM (type 2  diabetes mellitus) 09/15/2023     Past Surgical History:   Procedure Laterality Date    APPENDECTOMY      ENDOSCOPY W/ PEG TUBE PLACEMENT N/A 9/29/2023    Procedure: ESOPHAGOGASTRODUODENOSCOPY WITH PERCUTANEOUS ENDOSCOPIC GASTROSTOMY TUBE INSERTION;  Surgeon: Haseeb Carrillo MD;  Location: [facility] ENDOSCOPY;  Service: General;  Laterality: N/A;    HEMORRHOIDECTOMY      NASAL POLYP SURGERY         SLP Recommendation and Plan  SLP Swallowing Diagnosis: moderate, oral dysphagia, pharyngeal dysphagia (10/20/23 1130)  SLP Diet Recommendation: puree, honey thick liquids, no mixed consistencies (10/20/23 1130)  Recommended Precautions and Strategies: upright posture during/after eating, small bites of food and sips of liquid, no straw, alternate between small bites of food and sips of liquid, general aspiration precautions, 1:1 supervision (10/20/23 1130)  SLP Rec. for Method of Medication Administration: meds crushed, with puree (10/20/23 1130)     Monitor for Signs of Aspiration: notify SLP if any concerns (10/20/23 1130)  Recommended Diagnostics: reassess via VFSS (MBS) (10/20/23 1130)  Swallow Criteria for Skilled Therapeutic Interventions Met: demonstrates skilled criteria (10/20/23 1130)  Anticipated Discharge Disposition (SLP): skilled nursing facility (10/20/23 1130)  Rehab Potential/Prognosis, Swallowing: re-evaluate goals as necessary (10/20/23 1130)  Therapy Frequency (Swallow): 5 days per week (10/20/23 1130)  Predicted Duration Therapy Intervention (Days): until discharge (10/20/23 1130)  Oral Care Recommendations: Oral Care BID/PRN, Suction toothbrush (10/20/23 1130)        Daily Summary of Progress (SLP): unable to show any progress toward functional goals (10/20/23 1130)               Treatment Assessment (SLP): no clinical signs of, improved (10/20/23 1130)  Treatment Assessment Comments (SLP): pt very agitated, restlless throughout treatment. Tonic bite on spoon, pulled off purewick catheter. RN  notified. (10/20/23 1130)  Plan for Continued Treatment (SLP): continue treatment per plan of care (10/20/23 1130)       Oral Care Recommendations: Oral Care BID/PRN, Suction toothbrush (10/20/23 1130)    Plan of Care Reviewed With: patient  Progress: no change      SWALLOW EVALUATION (last 72 hours)       SLP Adult Swallow Evaluation       Row Name 10/20/23 1130                   Rehab Evaluation    Document Type therapy note (daily note)  -AV        Patient Observations alert;poorly cooperative  -AV        Patient/Family/Caregiver Comments/Observations no family present  -AV        Patient Effort fair  -AV           Pain    Additional Documentation Pain Scale: FACES Pre/Post-Treatment (Group)  -AV           Pain Scale: FACES Pre/Post-Treatment    Pain: FACES Scale, Pretreatment 0-->no hurt  -AV        Posttreatment Pain Rating 0-->no hurt  -AV           SLP Evaluation Clinical Impression    SLP Swallowing Diagnosis moderate;oral dysphagia;pharyngeal dysphagia  -AV        Functional Impact risk of aspiration/pneumonia;risk of malnutrition;risk of dehydration  -AV        Rehab Potential/Prognosis, Swallowing re-evaluate goals as necessary  -AV        Swallow Criteria for Skilled Therapeutic Interventions Met demonstrates skilled criteria  -AV           SLP Treatment Clinical Impressions    Treatment Assessment (SLP) no clinical signs of;improved  -AV        Treatment Assessment Comments (SLP) pt very agitated, restlless throughout treatment. Tonic bite on spoon, pulled off purewick catheter. RN notified.  -AV        Daily Summary of Progress (SLP) unable to show any progress toward functional goals  -AV        Barriers to Overall Progress (SLP) Cognitive status;Medically complex  -AV        Plan for Continued Treatment (SLP) continue treatment per plan of care  -AV        Care Plan Review care plan/treatment goals reviewed  -AV           Recommendations    Therapy Frequency (Swallow) 5 days per week  -AV         Predicted Duration Therapy Intervention (Days) until discharge  -AV        SLP Diet Recommendation puree;honey thick liquids;no mixed consistencies  -AV        Recommended Diagnostics reassess via VFSS (MBS)  -AV        Recommended Precautions and Strategies upright posture during/after eating;small bites of food and sips of liquid;no straw;alternate between small bites of food and sips of liquid;general aspiration precautions;1:1 supervision  -AV        Oral Care Recommendations Oral Care BID/PRN;Suction toothbrush  -AV        SLP Rec. for Method of Medication Administration meds crushed;with puree  -AV        Monitor for Signs of Aspiration notify SLP if any concerns  -AV        Anticipated Discharge Disposition (SLP) skilled nursing [facility]  -AV                  User Key  (r) = Recorded By, (t) = Taken By, (c) = Cosigned By      Initials Name Effective Dates    AV Gina Lopez MS CCC-SLP 06/16/21 -                     EDUCATION  The patient has been educated in the following areas:   Dysphagia (Swallowing Impairment) Oral Care/Hydration.        SLP GOALS       Row Name 10/20/23 4550 10/18/23 9267          (LTG) Patient will demonstrate functional swallow for    Diet Texture (Demonstrate functional swallow) soft to chew (chopped) textures  -AV --     Liquid viscosity (Demonstrate functional swallow) nectar/ mildly thick liquids  -AV --     [place] (Demonstrate functional swallow) with minimal cues (75-90% accuracy)  -AV --     Time Frame (Demonstrate functional swallow) by discharge  -AV --     Barriers (Demonstrate functional swallow) cognition  -AV --     Progress/Outcomes (Demonstrate functional swallow) continuing progress toward goal  -AV continuing progress toward goal  -AW     Comment (Demonstrate functional swallow) -- still on honey and puree until repeat study can be done  -AW        (STG) Patient will tolerate trials of    Consistencies Trialed (Tolerate trials) pureed textures;honey/  moderately thick liquids  -AV --     Desired Outcome (Tolerate trials) without signs/symptoms of aspiration;without signs of distress;with adequate oral prep/transit/clearance;with use of compensatory strategies (see comments)  -AV --     [place] (Tolerate trials) with 1:1 assist/ supervision  -AV --     Time Frame (Tolerate trials) by discharge  -AV --     Progress/Outcomes (Tolerate trials) continuing progress toward goal  -AV continuing progress toward goal  -AW     Comment (Tolerate trials) -- no overt s/s of aspiration w/ trials  -AW        (STG) Patient will tolerate therapeutic trials of    Consistencies Trialed (Tolerate therapeutic trials) thin liquids  -AV --     Desired Outcome (Tolerate therapeutic trials) without signs/symptoms of aspiration;without signs of distress  -AV --     [place] (Tolerate therapeutic trials) with minimal cues (75-90% accuracy)  -AV --     Time Frame (Tolerate therapeutic trials) by discharge  -AV --     Progress/Outcomes (Tolerate therapeutic trials) continuing progress toward goal  -AV continuing progress toward goal  -AW     Comment (Tolerate therapeutic trials) delayed cough with thins  -AV no s/s with tsp sip thin or nectar or puree  -AW        (STG) Labial Strengthening Goal 1 (SLP)    Activity (Labial Strengthening Goal 1, SLP) increase labial tone  -AV --     Increase Labial Tone labial resistance exercises;swallow trials  -AV --     [place]/Accuracy (Labial Strengthening Goal 1, SLP) with moderate cues (50-74% accuracy)  -AV --     Time Frame (Labial Strengthening Goal 1, SLP) short term goal (STG)  -AV --     Progress/Outcomes (Labial Strengthening Goal 1, SLP) continuing progress toward goal  -AV continuing progress toward goal  difficulty following commands, did close lips when prompted  -AW     Comment (Labial Strengthening Goal 1, SLP) -- encourage pressing lips together  -AW        (STG) Lingual Strengthening Goal 1 (SLP)    Activity (Lingual  Strengthening Goal 1, SLP) increase lingual tone/sensation/control/coordination/movement;increase tongue back strength  -AV --     Increase Lingual Tone/Sensation/Control/Coordination/Movement swallow trials;lingual resistance exercises  -AV --     Increase Tongue Back Strength lingual resistance exercises  -AV --     [place]/Accuracy (Lingual Strengthening Goal 1, SLP) with moderate cues (50-74% accuracy)  -AV --     Time Frame (Lingual Strengthening Goal 1, SLP) short term goal (STG)  -AV --     Progress/Outcomes (Lingual Strengthening Goal 1, SLP) continuing progress toward goal  -AV continuing progress toward goal  -AW     Comment (Lingual Strengthening Goal 1, SLP) -- encouraged tongue positioning in roof of mouth, did effortful swallow x2  -AW        (STG) Pharyngeal Strengthening Exercise Goal 1 (SLP)    Progress/Outcomes (Pharyngeal Strengthening Goal 1, SLP) -- continuing progress toward goal  -AW     Comment (Pharyngeal Strengthening Goal 1, SLP) -- effortful swallow x2, getting agitated  -AW        Patient will demonstrate functional communication skills for return to discharge environment     [place] with moderate cues  -AV --     Time frame by discharge  -AV --     Progress/Outcomes progress slower than expected  -AV progress slower than expected  -AW        Comprehend Questions Goal 1 (SLP)    Progress (Ability to Comprehend Questions Goal 1, SLP) -- 70%;with maximum cues (25-49%)  -AW     Progress/Outcomes (Comprehend Questions Goal 1, SLP) -- continuing progress toward goal  -AW        Follow Directions Goal 2 (SLP)    Improve Ability to Follow Directions Goal 1 (SLP) 2 step commands;80%;with minimal cues (75-90%)  -AV --     Time Frame (Follow Directions Goal 1, SLP) short term goal (STG)  -AV --     Progress (Ability to Follow Directions Goal 1, SLP) 40%;with moderate cues (50-74%)  -AV 70%;with moderate cues (50-74%)  -AW     Progress/Outcomes (Follow Directions Goal 1, SLP) continuing  progress toward goal  -AV continuing progress toward goal  -AW        Word Retrieval Skills Goal 1 (SLP)    Improve Word Retrieval Skills By Goal 1 (SLP) confrontational naming task;high frequency;responsive naming task;simple;80%;with minimal cues (75-90%)  -AV --     Time Frame (Word Retrieval Goal 1, SLP) short term goal (STG)  -AV --     Progress (Word Retrieval Skills Goal 1, SLP) 50%;with moderate cues (50-74%)  -AV --     Progress/Outcomes (Word Retrieval Goal 1, SLP) continuing progress toward goal  -AV --        Orientation Goal 1 (SLP)    Progress (Orientation Goal 1, SLP) -- 30%;other (comment)  confusion, agitation  -AW     Progress/Outcomes (Orientation Goal 1, SLP) -- progress slower than expected  -AW               User Key  (r) = Recorded By, (t) = Taken By, (c) = Cosigned By      Initials Name Provider Type    AW Aleshia Reid, MS CCC-SLP Speech and Language Pathologist    AV Gina Lopez MS CCC-SLP Speech and Language Pathologist                       Time Calculation:    Time Calculation- SLP       Row Name 10/20/23 1410             Time Calculation- SLP    SLP Start Time 1130  -AV      SLP Received On 10/20/23  -AV         Untimed Charges    78099-EV Treatment/ST Modification Prosth Aug Alter  15  -AV      34527-DA Treatment Swallow Minutes 15  -AV         Total Minutes    Untimed Charges Total Minutes 30  -AV       Total Minutes 30  -AV                User Key  (r) = Recorded By, (t) = Taken By, (c) = Cosigned By      Initials Name Provider Type    Gina Lagunas MS CCC-SLP Speech and Language Pathologist                    Therapy Charges for Today       Code Description Service Date Service Provider Modifiers Qty    [id number] HC ST TREATMENT SWALLOW 1 10/20/2023 Gina Lopez MS CCC-SLP GN 1    [id number] HC ST TREATMENT SPEECH 1 10/20/2023 Gina Lopez MS CCC-SLP GN 1                 Gina Dias MS CCC-SLP  10/20/2023

## 2023-10-20 NOTE — THERAPY TREATMENT NOTE
Patient Name: Kenneth Wen  : 1950    MRN: [id number]                              Today's Date: 10/20/2023       Admit Date: 9/15/2023    Visit Dx:     ICD-10-CM ICD-9-CM   1. Hemorrhagic CVA  I61.9 431   2. Aphasia  R47.01 784.3   3. Dysarthria  R47.1 784.51   4. Oropharyngeal dysphagia  R13.12 787.22     Patient Active Problem List   Diagnosis    Precordial pain    Elevated BP without diagnosis of hypertension    Dyslipidemia    Hyperglycemia    Multiple bilateral CVAs    Hemorrhagic CVA    HFpEF    T2DM (type 2 diabetes mellitus)    HTN (hypertension)    GERD (gastroesophageal reflux disease)    ICH (intracerebral hemorrhage)    Oropharyngeal dysphagia     Past Medical History:   Diagnosis Date    Acid reflux     Cancer     Skin    Diabetes mellitus     Prediabetes    GERD (gastroesophageal reflux disease) 09/15/2023    HTN (hypertension) 09/15/2023    Kidney disease     T2DM (type 2 diabetes mellitus) 09/15/2023     Past Surgical History:   Procedure Laterality Date    APPENDECTOMY      ENDOSCOPY W/ PEG TUBE PLACEMENT N/A 2023    Procedure: ESOPHAGOGASTRODUODENOSCOPY WITH PERCUTANEOUS ENDOSCOPIC GASTROSTOMY TUBE INSERTION;  Surgeon: Haseeb Carrillo MD;  Location: [facility] ENDOSCOPY;  Service: General;  Laterality: N/A;    HEMORRHOIDECTOMY      NASAL POLYP SURGERY        General Information       Row Name 10/20/23 1300          Physical Therapy Time and Intention    Document Type progress note/recertification  -AY     Mode of Treatment physical therapy  -AY       Row Name 10/20/23 1300          General Information    Patient Profile Reviewed yes  -AY     Existing Precautions/Restrictions fall;seizures  R sided weakness/coordination deficits, agitated with B soft wrist restraints, PEG with abdominal binder  -AY     Barriers to Rehab medically complex;cognitive status  -AY       Row Name 10/20/23 1300          Cognition    Orientation Status (Cognition) oriented to;person  -AY       Row Name  10/20/23 1300          Safety Issues, Functional Mobility    Safety Issues Affecting Function (Mobility) ability to follow commands;awareness of need for assistance;safety precautions follow-through/compliance;sequencing abilities;insight into deficits/self-awareness;safety precaution awareness  -AY     Impairments Affecting Function (Mobility) balance;cognition;coordination;endurance/activity tolerance;grasp;motor control;motor planning;muscle tone abnormal;pain;visual/perceptual;strength;sensation/sensory awareness;range of motion (ROM);postural/trunk control  -AY     Cognitive Impairments, Mobility Safety/Performance attention;awareness, need for assistance;safety precaution awareness;insight into deficits/self-awareness;safety precaution follow-through  -AY               User Key  (r) = Recorded By, (t) = Taken By, (c) = Cosigned By      Initials Name Provider Type    AY Aleshia Armstrong, PT Physical Therapist                   Mobility       Row Name 10/20/23 5271          Bed Mobility    Bed Mobility supine-sit;sit-supine;rolling left;rolling right;scooting/bridging  -AY     Rolling Left [place] (Bed Mobility) moderate assist (50% patient effort);1 person assist;verbal cues  -AY     Rolling Right [place] (Bed Mobility) moderate assist (50% patient effort);1 person assist;verbal cues  -AY     Scooting/Bridging [place] (Bed Mobility) dependent (less than 25% patient effort);2 person assist;verbal cues  -AY     Supine-Sit [place] (Bed Mobility) moderate assist (50% patient effort);1 person assist;verbal cues  -AY     Sit-Supine [place] (Bed Mobility) moderate assist (50% patient effort);1 person assist;verbal cues  -AY     Assistive Device (Bed Mobility) bed rails;draw sheet;head of bed elevated  -AY       Row Name 10/20/23 1262          Transfers    Comment, (Transfers) pt with worsening sitting balance; OOB mobility deferred.  -AY               User Key  (r) = Recorded By, (t) = Taken By,  (c) = Cosigned By      Initials Name Provider Type    Aleshia Mendes PT Physical Therapist                   Obj/Interventions       Row Name 10/20/23 1302          Balance    Balance Assessment sitting dynamic balance;sitting static balance  -AY     Static Sitting Balance minimal assist  -AY     Dynamic Sitting Balance maximum assist  -AY     Position, Sitting Balance unsupported;sitting edge of bed  -AY               User Key  (r) = Recorded By, (t) = Taken By, (c) = Cosigned By      Initials Name Provider Type    Aleshia Mendes PT Physical Therapist                   Goals/Plan       Row Name 10/20/23 1305          Bed Mobility Goal 1 (PT)    Activity/Assistive Device (Bed Mobility Goal 1, PT) sit to supine/supine to sit  -AY     [place] Level/Cues Needed (Bed Mobility Goal 1, PT) minimum assist (75% or more patient effort)  -AY     Time Frame (Bed Mobility Goal 1, PT) long term goal (LTG);10 days  -AY     Progress/Outcomes (Bed Mobility Goal 1, PT) goal met;goal revised this date  -AY       Row Name 10/20/23 1302          Transfer Goal 1 (PT)    Activity/Assistive Device (Transfer Goal 1, PT) sit-to-stand/stand-to-sit;bed-to-chair/chair-to-bed;walker, rolling  -AY     [place] Level/Cues Needed (Transfer Goal 1, PT) moderate assist (50-74% patient effort)  -AY     Time Frame (Transfer Goal 1, PT) long term goal (LTG);10 days  -AY     Progress/Outcome (Transfer Goal 1, PT) continuing progress toward goal;goal ongoing  -AY       Row Name 10/20/23 1305          Gait Training Goal 1 (PT)    Activity/Assistive Device (Gait Training Goal 1, PT) gait (walking locomotion);assistive device use;walker, rolling  -AY     [place] Level (Gait Training Goal 1, PT) maximum assist (25-49% patient effort)  -AY     Distance (Gait Training Goal 1, PT) 10  -AY     Time Frame (Gait Training Goal 1, PT) long term goal (LTG);10 days  -AY     Progress/Outcome (Gait Training Goal 1, PT) goal ongoing;medical status  inhibiting progress  -AY       Row Name 10/20/23 1301          Problem Specific Goal 1 (PT)    Problem Specific Goal 1 (PT) Pt will sit unsupported at EOB/EOC for 5 minutes with SBA for improved trunk control to facilitate independence with transfers.  -AY     Time Frame (Problem Specific Goal 1, PT) long-term goal (LTG);2 weeks  -AY     Progress/Outcome (Problem Specific Goal 1, PT) goal ongoing  -AY               User Key  (r) = Recorded By, (t) = Taken By, (c) = Cosigned By      Initials Name Provider Type    AY Aleshia Armstrong, PT Physical Therapist                   Clinical Impression       Row Name 10/20/23 1307          Pain    Pretreatment Pain Rating 0/10 - no pain  -AY     Posttreatment Pain Rating 0/10 - no pain  -AY     Pain Intervention(s) Ambulation/increased activity;Repositioned  -AY     Additional Documentation Pain Scale: Numbers Pre/Post-Treatment (Group)  -AY       Row Name 10/20/23 1301          Plan of Care Review    Plan of Care Reviewed With patient;spouse  -AY     Progress improving  -AY     Outcome Evaluation Pt tolerated sitting EOB for ~5 minutes with min A, progressing to max A for sitting balance. Cont to progress as able; limited by decreased functional endurance, balance deficit, generalized weakness, and impaired cognition compared to baseline. d/c rec for SNF.  -AY       Row Name 10/20/23 1308          Therapy Assessment/Plan (PT)    Rehab Potential (PT) fair, will monitor progress closely  -AY     Criteria for Skilled Interventions Met (PT) yes;meets criteria;skilled treatment is necessary  -AY     Therapy Frequency (PT) daily  -AY       Row Name 10/20/23 1302          Vital Signs    O2 Delivery Pre Treatment room air  -AY     O2 Delivery Intra Treatment room air  -AY     O2 Delivery Post Treatment room air  -AY     Pre Patient Position Supine  -AY     Intra Patient Position Sitting  -AY     Post Patient Position Supine  -AY       Row Name 10/20/23 130          Positioning and  Restraints    Pre-Treatment Position in bed  -AY     Post Treatment Position bed  -AY     In Bed notified nsg;supine;encouraged to call for assist;exit alarm on;patient within staff view;side rails up x3;RUE elevated;LUE elevated;call light within reach  -AY               User Key  (r) = Recorded By, (t) = Taken By, (c) = Cosigned By      Initials Name Provider Type    Aleshia Mendes, PIPER Physical Therapist                   Outcome Measures       Row Name 10/20/23 1306 10/20/23 0800       How much help from another person do you currently need...    Turning from your back to your side while in flat bed without using bedrails? 2  -AY 1  -AP    Moving from lying on back to sitting on the side of a flat bed without bedrails? 2  -AY 1  -AP    Moving to and from a bed to a chair (including a wheelchair)? 1  -AY 1  -AP    Standing up from a chair using your arms (e.g., wheelchair, bedside chair)? 1  -AY 1  -AP    Climbing 3-5 steps with a railing? 1  -AY 1  -AP    To walk in hospital room? 1  -AY 1  -AP    AM-PAC 6 Clicks Score (PT) 8  -AY 6  -AP    Highest level of mobility 3 --> Sat at edge of bed  -AY 2 --> Bed activities/dependent transfer  -AP      Row Name 10/20/23 1306          Functional Assessment    Outcome Measure Options AM-PAC 6 Clicks Basic Mobility (PT)  -AY               User Key  (r) = Recorded By, (t) = Taken By, (c) = Cosigned By      Initials Name Provider Type    AP Otto Alexander RN Registered Nurse    Aleshia Mendes, PT Physical Therapist                                 Physical Therapy Education       Title: PT OT SLP Therapies (In Progress)       Topic: Physical Therapy (In Progress)       Point: Mobility training (In Progress)       Learning Progress Summary             Patient Acceptance, E,TB, NR by AY at 10/20/2023 1308    Acceptance, E, NR by KG at 10/15/2023 1401    Acceptance, E, NR,VU by KR at 10/12/2023 1453    Acceptance, E,D, NR by AB at 10/11/2023 1547    Acceptance, E, NR by  CM at 10/10/2023 1517    Acceptance, E, NR,VU by KR at 10/6/2023 1550    Acceptance, E, NR by SD at 10/5/2023 1437    Acceptance, E, VU by CD at 10/2/2023 1502    Comment: SEE FLOWSHEET    Acceptance, E, NR by KR at 9/25/2023 1324    Acceptance, E, NR by KG1 at 9/18/2023 1403    Acceptance, E,TB, NR by AY at 9/15/2023 1254   Family Acceptance, E, NR,VU by KR at 10/12/2023 1453    Acceptance, E, NR,VU by KR at 10/6/2023 1550    Acceptance, E, NR by SD at 10/5/2023 1437   Significant Other Acceptance, E, NR by CM at 10/10/2023 1517                         Point: Home exercise program (In Progress)       Learning Progress Summary             Patient Acceptance, E, NR by KG at 10/15/2023 1401    Acceptance, E,D, NR by AB at 10/11/2023 1547    Acceptance, E, NR by CM at 10/10/2023 1517    Acceptance, E, NR by SD at 10/5/2023 1437    Acceptance, E, VU by CD at 10/2/2023 1502    Comment: SEE FLOWSHEET    Acceptance, E, NR by KG1 at 9/18/2023 1403   Family Acceptance, E, NR by SD at 10/5/2023 1437   Significant Other Acceptance, E, NR by CM at 10/10/2023 1517                         Point: Body mechanics (In Progress)       Learning Progress Summary             Patient Acceptance, E,TB, NR by AY at 10/20/2023 1308    Acceptance, E, NR by KG at 10/15/2023 1401    Acceptance, E, NR,VU by KR at 10/12/2023 1453    Acceptance, E,D, NR by AB at 10/11/2023 1547    Acceptance, E, NR by CM at 10/10/2023 1517    Acceptance, E, NR,VU by KR at 10/6/2023 1550    Acceptance, E, NR by SD at 10/5/2023 1437    Acceptance, E, VU by CD at 10/2/2023 1502    Comment: SEE FLOWSHEET    Acceptance, E, NR by KR at 9/25/2023 1324    Acceptance, E, NR by KG1 at 9/18/2023 1403    Acceptance, E,TB, NR by AY at 9/15/2023 1254   Family Acceptance, E, NR,VU by KR at 10/12/2023 1453    Acceptance, E, NR,VU by KR at 10/6/2023 1550    Acceptance, E, NR by SD at 10/5/2023 1437   Significant Other Acceptance, E, NR by CM at 10/10/2023 1517                          Point: Precautions (In Progress)       Learning Progress Summary             Patient Acceptance, E,TB, NR by AY at 10/20/2023 1308    Acceptance, E, NR by KG at 10/15/2023 1401    Acceptance, E, NR,VU by KR at 10/12/2023 1453    Acceptance, E,D, NR by AB at 10/11/2023 1547    Acceptance, E, NR by CM at 10/10/2023 1517    Acceptance, E, NR,VU by KR at 10/6/2023 1550    Acceptance, E, NR by SD at 10/5/2023 1437    Acceptance, E, VU by CD at 10/2/2023 1502    Comment: SEE FLOWSHEET    Acceptance, E, NR by KR at 9/25/2023 1324    Acceptance, E, NR by KG1 at 9/18/2023 1403    Acceptance, E,TB, NR by AY at 9/15/2023 1254   Family Acceptance, E, NR,VU by KR at 10/12/2023 1453    Acceptance, E, NR,VU by KR at 10/6/2023 1550    Acceptance, E, NR by SD at 10/5/2023 1437   Significant Other Acceptance, E, NR by CM at 10/10/2023 1517                                         User Key       Initials Effective Dates Name Provider Type Discipline    CD 02/03/23 -  Claudia Duarte, PT Physical Therapist PT    SD 03/13/23 -  Chantel Scott, PT Physical Therapist PT    KG1 05/22/20 -  Mackenzie Mckay, PT Physical Therapist PT    KG 01/04/23 -  Monik Solorio Physical Therapist PT    AY 11/10/20 -  Aleshia Armstrong, PT Physical Therapist PT    AB 09/22/22 -  Aleshia Keita, PT Physical Therapist PT    CM 09/22/22 -  Yanira Shields, PT Physical Therapist PT    KR 12/30/22 -  Raissa Soria, PT Physical Therapist PT                  PT Recommendation and Plan  Planned Therapy Interventions (PT): balance training, bed mobility training, home exercise program, gait training, postural re-education, transfer training, patient/family education, strengthening, neuromuscular re-education  Plan of Care Reviewed With: patient, spouse  Progress: improving  Outcome Evaluation: Pt tolerated sitting EOB for ~5 minutes with min A, progressing to max A for sitting balance. Cont to progress as able; limited by decreased functional  endurance, balance deficit, generalized weakness, and impaired cognition compared to baseline. d/c rec for SNF.     Time Calculation:   PT Evaluation Complexity  History, PT Evaluation Complexity: 1-2 personal factors and/or comorbidities  Examination of Body Systems (PT Eval Complexity): total of 3 or more elements  Clinical Presentation (PT Evaluation Complexity): evolving  Clinical Decision Making (PT Evaluation Complexity): moderate complexity  Overall Complexity (PT Evaluation Complexity): moderate complexity     PT Charges       Row Name 10/20/23 1308             Time Calculation    Start Time 1030  -AY      PT Received On 10/20/23  -AY      PT Goal Re-Cert Due Date 10/30/23  -AY         Timed Charges    34775 - PT Therapeutic Activity Minutes 23  -AY         Total Minutes    Timed Charges Total Minutes 23  -AY       Total Minutes 23  -AY                User Key  (r) = Recorded By, (t) = Taken By, (c) = Cosigned By      Initials Name Provider Type    Aleshia Mendes PT Physical Therapist                  Therapy Charges for Today       Code Description Service Date Service Provider Modifiers Qty    84904269214 HC PT THERAPEUTIC ACT EA 15 MIN 10/20/2023 Aleshia Armstrong, PT GP 2            PT G-Codes  Outcome Measure Options: AM-PAC 6 Clicks Basic Mobility (PT)  AM-PAC 6 Clicks Score (PT): 8  AM-PAC 6 Clicks Score (OT): 7  Modified [place] Scale: 4 - Moderately severe disability.  Unable to walk without assistance, and unable to attend to own bodily needs without assistance.  PT Discharge Summary  Anticipated Discharge Disposition (PT): skilled nursing facility    Aleshia Armstrong PT  10/20/2023

## 2023-10-20 NOTE — PROGRESS NOTES
Neurology       Patient Care Team:  Susan Powers APRN as PCP - General (Family Medicine)    Chief complaint: Delirium    History: Patient slept reasonably well last night.    He was calm this morning until about noon and then required a shot of Geodon.    He is getting 200 mg of Seroquel at night and 50 mg morning.  Past Medical History:   Diagnosis Date    Acid reflux     Cancer     Skin    Diabetes mellitus     Prediabetes    GERD (gastroesophageal reflux disease) 09/15/2023    HTN (hypertension) 09/15/2023    Kidney disease     T2DM (type 2 diabetes mellitus) 09/15/2023       Vital Signs   Vitals:    10/19/23 0021 10/19/23 0800 10/19/23 1928 10/20/23 0802   BP: 108/57 126/76 113/80 125/75   BP Location: Right arm Right arm Right arm Right arm   Patient Position: Lying Lying Lying Lying   Pulse: 71 81 90 89   Resp: 8 20 18 20   Temp: 97.9 °F (36.6 °C) 98.8 °F (37.1 °C) 99 °F (37.2 °C) 99.1 °F (37.3 °C)   TempSrc: Temporal Temporal Axillary Axillary   SpO2: 94% 95% 96%    Weight:       Height:           Physical Exam:   General: Restless              Neuro: Sleepy post Geodon injection    Results Review:  EKG shows a QT/QTc be ratio of 386/490.      Results from last 7 days   Lab Units 10/15/23  0537   WBC 10*3/mm3 9.08   HEMOGLOBIN g/dL 11.9*   HEMATOCRIT % 36.0*   PLATELETS 10*3/mm3 257     Results from last 7 days   Lab Units 10/16/23  0609 10/15/23  0537   SODIUM mmol/L 134* 141   POTASSIUM mmol/L 4.4 3.8   CHLORIDE mmol/L 99 105   CO2 mmol/L 23.0 26.0   BUN mg/dL 17 19   CREATININE mg/dL 0.64* 0.64*   CALCIUM mg/dL 9.5 9.1   BILIRUBIN mg/dL 0.6  --    ALK PHOS U/L 88  --    ALT (SGPT) U/L 37  --    AST (SGOT) U/L 32  --    GLUCOSE mg/dL 141* 133*       Imaging Results (Last 24 Hours)       ** No results found for the last 24 hours. **            Assessment:  Prolonged delirium    Plan:  Wife is considering hospice care    Increase a.m. Seroquel to 100 mg    Comment:  Extremely guarded prognosis         I  discussed the patients findings and my recommendations with family and nursing staff    Toni Rausch MD  10/20/23  13:40 EDT

## 2023-10-20 NOTE — PROGRESS NOTES
"Palliative Care Daily Progress Note     C/C: Patient restless in bed, asking for water.     S: Medical record reviewed. Follow up visit for GOC in the context of complex medical decision making. Events noted. Patient continues to be agitated at times, currently on Seroquel 50mg qam, 200mg qhs and 25mg q12 hours prn. Also receiving Geodon 10mg IV x1 and Restoril 15mg nightly. Patient able to deny pain but states he is anxious and asking for water. He does know his wife at bedside. Patient remains in two point restraints, he is not taking any PO intake. He continues to be incontinent of bowel and bladder.      ROS: +anxiety. +thirsty, asking for water. +decreased appetite. Denies pain, nausea.      O: Code Status:   Code Status and Medical Interventions:   Ordered at: 09/29/23 0854     Medical Intervention Limits:    NO intubation (DNI)     Code Status (Patient has no pulse and is not breathing):    No CPR (Do Not Attempt to Resuscitate)     Medical Interventions (Patient has pulse or is breathing):    Limited Support      Advanced Directives: Advance Directive Status: Patient does not have advance directive   Goals of Care: Ongoing.   Palliative Performance Scale Score: 30%     /75 (BP Location: Right arm, Patient Position: Lying)   Pulse 89   Temp 99.1 °F (37.3 °C) (Axillary)   Resp 20   Ht 175.3 cm (69\")   Wt 72.8 kg (160 lb 7.9 oz)   SpO2 96%   BMI 23.70 kg/m²     Intake/Output Summary (Last 24 hours) at 10/20/2023 1050  Last data filed at 10/20/2023 0550  Gross per 24 hour   Intake 540 ml   Output 1100 ml   Net -560 ml       PE:  General Appearance:    Patient laying in bed, restless, moving and pulling at restraints, awake, alert, A/C ill appearing,    HEENT:    NC/AT, EOMI, anicteric, MM dry, facial grimacing   Neck:   supple, trachea midline, no JVD   Lungs:     CTA bilat, diminished in bases; respirations regular, even and unlabored; RR 18-20 on exam    Heart:    RRR, normal S1 and S2, no M/R/G, " HR 89   Abdomen:     Normal bowel sounds, soft, non-tender, non-distended, abd binder in place covering PEG   G/U:   Deferred   MSK/Extremities:   Wasting, no edema   Pulses:   Pulses palpable and equal bilaterally   Skin:   Warm, dry   Neurologic:   Oriented to self, pulling at restraints,    Psych:   Anxious, restless, agitated with all care     Meds: Reviewed and changes noted    Labs:   Results from last 7 days   Lab Units 10/15/23  0537   WBC 10*3/mm3 9.08   HEMOGLOBIN g/dL 11.9*   HEMATOCRIT % 36.0*   PLATELETS 10*3/mm3 257     Results from last 7 days   Lab Units 10/16/23  0609   SODIUM mmol/L 134*   POTASSIUM mmol/L 4.4   CHLORIDE mmol/L 99   CO2 mmol/L 23.0   BUN mg/dL 17   CREATININE mg/dL 0.64*   GLUCOSE mg/dL 141*   CALCIUM mg/dL 9.5     Results from last 7 days   Lab Units 10/16/23  0609   SODIUM mmol/L 134*   POTASSIUM mmol/L 4.4   CHLORIDE mmol/L 99   CO2 mmol/L 23.0   BUN mg/dL 17   CREATININE mg/dL 0.64*   CALCIUM mg/dL 9.5   BILIRUBIN mg/dL 0.6   ALK PHOS U/L 88   ALT (SGPT) U/L 37   AST (SGOT) U/L 32   GLUCOSE mg/dL 141*     Imaging Results (Last 72 Hours)       ** No results found for the last 72 hours. **                  Diagnostics: Reviewed    A:   Hemorrhagic CVA    Dyslipidemia    Multiple bilateral CVAs    HFpEF    T2DM (type 2 diabetes mellitus)    HTN (hypertension)    GERD (gastroesophageal reflux disease)    ICH (intracerebral hemorrhage)    Oropharyngeal dysphagia     73 y.o. male with hemorrhagic CVA, HFpEF, HTN.   S/S:   Pain -s/p stroke and arthritis, MSK  -scheduled Tylenol 500mg PO/PEG q 6 hours (LFT's normal)  -scheduled Lidocaine patch to low back  -Voltaren gel  to bilateral knees  -continue Palliative oral rinse     2. Dysphagia -SLP with diet modifications  -PEG in place  -patient with bolus feeding via TF, minimal PO intake     3. Debility -PT/OT following  -placement complicated by restraints     4. Agitation -requiring restraints  -Neurology following with multiple  medication adjustments     5. GOC -DNR/DNI -per discussion with wife  -long discussion with wifeReny, regarding continued full treatment versus a comfort focused plan of care with hospice services    P: Follow up visit to discuss GOC with wifeReny. Discussed that despite continued Full treatment and multiple medication adjustments, patient is not progressing towards STR. Shared the unfortunate news that patient's prognosis may be six months or less due to increased risk of infection, dysphagia, and debility from stroke. Reviewed comfort focused plan of care with hospice services. Reviewed comfort measures include discontinuation of restraints, labs, and goal of using medications for symptom management. Reviewed that even with hospice services, patient's may continue to receive TF but consideration of stopping it would be based on how patient was tolerating it.  Wife shares that she is unable to care for him in the home setting due to her own health issues. Reviewed comfort focused plan of care within a LTC facility. Discussed that placement may continue to take time since he will need to be out of restraints and may not receive certain sedating medications prior to placement. Wife appropriately tearful and emotional support provided throughout discussion. Wife would like to discuss with her family and take some time to think about it. Discussed follow up on Monday to answer further questions.   Palliative Care Team will continue to follow patient. Please do not hesitate to contact us regarding further sx mgmt or GOC needs.  Dianelsy Arriaga, APRN  10/20/2023  Time spent: 45 minutes

## 2023-10-20 NOTE — PLAN OF CARE
Goal Outcome Evaluation:  Plan of Care Reviewed With: patient        Progress: no change          SLP treatment completed. Will continue to address feeding difficulty. Please see note for further details and recommendations.

## 2023-10-20 NOTE — CASE MANAGEMENT/SOCIAL WORK
Continued Stay Note  [facility]     Patient Name: Kenneth Wen  MRN: [id number]  Today's Date: 10/20/2023    Admit Date: 9/15/2023    Plan: TBD   Discharge Plan       Row Name 10/20/23 0903       Plan    Plan Comments Pt remains in wrist restraints. MSW has continued to follow and speak with wife about plan of rehab once pt able to get out of restraints. Referral choices have been picked by pt's wife. As soon as pt appropriate, MSW will initiate these referrals to [facility], [facility] and then [facility]. MSW continues to follow for discharge needs as arise and appropriate.                   Discharge Codes    No documentation.                 Expected Discharge Date and Time       Expected Discharge Date Expected Discharge Time    Oct 7, 2023               JOEL Anna

## 2023-10-20 NOTE — PLAN OF CARE
Goal Outcome Evaluation:  Plan of Care Reviewed With: patient, spouse        Progress: improving  Outcome Evaluation: Pt tolerated sitting EOB for ~5 minutes with min A, progressing to max A for sitting balance. Cont to progress as able; limited by decreased functional endurance, balance deficit, generalized weakness, and impaired cognition compared to baseline. d/c rec for SNF.      Anticipated Discharge Disposition (PT): skilled nursing facility

## 2023-10-20 NOTE — PROGRESS NOTES
[facility] Medicine Services  PROGRESS NOTE    Patient Name: Kenneth Wen  : 1950  MRN: [id number]    Date of Admission: 9/15/2023  Primary Care Physician: Susan Powers APRN    Subjective   Subjective     CC:  F/u   CVA     HPI:  Resting in bed in no acute distress.  Still very confused but follows commands.  Tells me that he is comfortable and does not have any pain or shortness of breath. restless and thrashing in the bed.    Objective   Objective     Vital Signs:   Temp:  [99 °F (37.2 °C)-99.1 °F (37.3 °C)] 99.1 °F (37.3 °C)  Heart Rate:  [89-90] 89  Resp:  [18-20] 20  BP: (113-125)/(75-80) 125/75     Physical Exam:  Constitutional: No acute distress, restless, remains in restraint  HENT: NCAT, mucous membranes moist  Respiratory: Clear to auscultation bilaterally, respiratory effort normal   Cardiovascular: RRR, no murmurs, rubs, or gallops  Gastrointestinal: Positive bowel sounds, soft, nontender, nondistended  Musculoskeletal: No bilateral ankle edema  Psychiatric: Unable to assess  Neurologic: Awake, alert, confused but follows very simple commands, restless and agitated  Skin: No rashes         Results Reviewed:  LAB RESULTS:      Lab 10/16/23  0609 10/15/23  0537   WBC  --  9.08   HEMOGLOBIN  --  11.9*   HEMATOCRIT  --  36.0*   PLATELETS  --  257   MCV  --  94.5   CRP 0.41  --          Lab 10/16/23  0609 10/15/23  0537   SODIUM 134* 141   POTASSIUM 4.4 3.8   CHLORIDE 99 105   CO2 23.0 26.0   ANION GAP 12.0 10.0   BUN 17 19   CREATININE 0.64* 0.64*   EGFR 100.6 100.6   GLUCOSE 141* 133*   CALCIUM 9.5 9.1   IONIZED CALCIUM  --  1.31   MAGNESIUM 1.7 2.0   PHOSPHORUS 3.7 3.6         Lab 10/16/23  0609   TOTAL PROTEIN 6.4   ALBUMIN 4.0   GLOBULIN 2.4   ALT (SGPT) 37   AST (SGOT) 32   BILIRUBIN 0.6   ALK PHOS 88             Lab 10/16/23  0609   CHOLESTEROL 81   TRIGLYCERIDES 43             Brief Urine Lab Results  (Last result in the past 365 days)        Color   Clarity    Blood   Leuk Est   Nitrite   Protein   CREAT   Urine HCG        09/15/23 1818 Yellow   Cloudy   Trace   Negative   Negative   Negative                   Microbiology Results Abnormal       Procedure Component Value - Date/Time    Blood Culture - Blood, Arm, Right [[id number]]  (Normal) Collected: 09/15/23 0212    Lab Status: Final result Specimen: Blood from Arm, Right Updated: 09/20/23 0230     Blood Culture No growth at 5 days    Blood Culture - Blood, Arm, Left [[id number]]  (Normal) Collected: 09/15/23 0212    Lab Status: Final result Specimen: Blood from Arm, Left Updated: 09/20/23 0230     Blood Culture No growth at 5 days            No radiology results from the last 24 hrs        Current medications:  Scheduled Meds:acetaminophen, 500 mg, Per PEG Tube, Q6H  atorvastatin, 80 mg, Per PEG Tube, Nightly  clopidogrel, 75 mg, Per PEG Tube, Daily  Diclofenac Sodium, 2 g, Topical, BID  donepezil, 10 mg, Per PEG Tube, Nightly  lansoprazole, 15 mg, Oral, Q AM  Lidocaine, 1 patch, Transdermal, Q24H  melatonin, 10 mg, Per PEG Tube, Nightly  metoprolol tartrate, 25 mg, Per PEG Tube, Q12H  miconazole, 1 application , Topical, Q12H  mirtazapine, 30 mg, Per PEG Tube, Nightly  palliative care oral rinse, , Mouth/Throat, 4x Daily  ProSource No Carb, 30 mL, Per G Tube, Daily  [START ON 10/21/2023] QUEtiapine, 100 mg, Per PEG Tube, Daily  QUEtiapine, 200 mg, Per PEG Tube, Nightly  temazepam, 15 mg, Per PEG Tube, Nightly      Continuous Infusions:     PRN Meds:.  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        Brief Hospital Course to date:  Kenneth Wen is a 73 y.o. male  with hx of HTN, HLD, T2DM, and GERD who presented to OSH with multiple acute ischemic infarcts of the bilateral cerebral and cerebellar hemispheres as well as left isaac. TTE/JOSIAS was negative PFO at OSH. On 9/13/23 he had worsening in mental status and new inability to move RLE, head CT showed evolution of the existing CVA with new development of petechial hemorrhage. DAPT was held for 24 hours per Neurology recommendations and repeat CT head that evening showed worsening effacement of the right quadrigeminal cistern with suspected increasing upward supratentorial pressure. He was then transferred to [facility] for Neurosurgery evaluation on 9/15/23. He was started on hypertonic saline 9/15/23 through 9/20/23 for cerebral edema. He had fevers with negative cultures but had worsening secretions and labored breathing so was started on Zosyn on 9/16/23. Transferred to the hospitalist service on 9/22/23. Pt demonstrated poor oral intake with elevated sodium levels; therefore, PEG tube was recommended.         Multiple bilateral posterior circulation strokes with hemorrhagic conversion  --Neurology has followed, suspect atheroembolic but cannot rule out cardioembolic given multiple vascular territories  --Plavix monotherapy, high intensity statin  --Needs Holter monitor at discharge  -- Patient remains very confused, agitated requiring two-point restraint.  Patient has fluctuations but for the most part he has not had any meaningful  improvement.    Agitation  Encephalopathy  --appreciate general neurology input recs to decrease ativan dose to 0.25mg IV PRN, changed lexapro to remeron and increased remeron to 30mg qhs, increase aricept to 10mg qhs--continues to adjust meds.  Increased seroquel to 100mg at bedtime, 12.5mg in the morning and continue seroquel 25mg PRN.  Still in restraints currentlly  -- Neurology is following and trying to help patient's sleep and calm down with tweaking Seroquel dose.  Patient also was started on Geodon today    Dysphagia  Hypernatremia - resolved  --SLP recommends mechanical ground with honey thick liquids   --Risk of pulling out peg tube, abd binder to be in place at all times. --Surgery recommends to remain in restraints at all times or if off has to have sitter for next 2 weeks   -- dietary following for calorie count, adequate PO intake is limited by his mental status     HTN  --Continue Metoprolol 25 mg BID     GERD  --Continue Protonix     COVID-19-resolved  --Overall asymptomatic and on room air  --No infiltrates seen and low procal  --Did not receive any treatment   --Off Isolation 9/30/2023      Leukocytosis-resolved  --Procalcitionin low at 0.05  --CXR and UA negative  --Blood cultures negative     Elevated LFT's, mild  --Possibly secondary to statin?  --Negative acute hepatitis panel  --repeat AST/ALT improved on 10/3      PLAN:  - continue current care  - neurology trying to  tweak his medication to help him rest an sleep  -Prognosis is guarded    Expected Discharge Location and Transportation: rehab   Expected Discharge   Expected Discharge Date: 10/7/2023; Expected Discharge Time:      DVT prophylaxis:  Mechanical DVT prophylaxis orders are present.     AM-PAC 6 Clicks Score (PT): 8 (10/20/23 6590)    CODE STATUS:   Code Status and Medical Interventions:   Ordered at: 09/29/23 4072     Medical Intervention Limits:    NO intubation (DNI)     Code Status (Patient has no pulse and is not breathing):     No CPR (Do Not Attempt to Resuscitate)     Medical Interventions (Patient has pulse or is breathing):    Limited Support       Bruce Du MD  10/20/23

## 2023-10-21 LAB
GLUCOSE BLDC GLUCOMTR-MCNC: 127 MG/DL (ref 70–130)
GLUCOSE BLDC GLUCOMTR-MCNC: 153 MG/DL (ref 70–130)
GLUCOSE BLDC GLUCOMTR-MCNC: 188 MG/DL (ref 70–130)

## 2023-10-21 PROCEDURE — 82948 REAGENT STRIP/BLOOD GLUCOSE: CPT

## 2023-10-21 PROCEDURE — 99232 SBSQ HOSP IP/OBS MODERATE 35: CPT | Performed by: STUDENT IN AN ORGANIZED HEALTH CARE EDUCATION/TRAINING PROGRAM

## 2023-10-21 RX ORDER — ACETAMINOPHEN 160 MG/5ML
500 SOLUTION ORAL 3 TIMES DAILY
Status: DISCONTINUED | OUTPATIENT
Start: 2023-10-21 | End: 2023-10-24

## 2023-10-21 RX ADMIN — DONEPEZIL HYDROCHLORIDE 10 MG: 10 TABLET, FILM COATED ORAL at 18:13

## 2023-10-21 RX ADMIN — MICONAZOLE NITRATE 1 APPLICATION: 20 CREAM TOPICAL at 20:35

## 2023-10-21 RX ADMIN — ATORVASTATIN CALCIUM 80 MG: 40 TABLET, FILM COATED ORAL at 20:34

## 2023-10-21 RX ADMIN — LANSOPRAZOLE 15 MG: 15 TABLET, ORALLY DISINTEGRATING ORAL at 06:13

## 2023-10-21 RX ADMIN — QUETIAPINE FUMARATE 200 MG: 100 TABLET ORAL at 18:13

## 2023-10-21 RX ADMIN — DICLOFENAC SODIUM 2 G: 9.3 GEL TOPICAL at 20:35

## 2023-10-21 RX ADMIN — MINERAL OIL: 1000 LIQUID ORAL at 12:12

## 2023-10-21 RX ADMIN — Medication 30 ML: at 09:54

## 2023-10-21 RX ADMIN — METOPROLOL TARTRATE 25 MG: 25 TABLET, FILM COATED ORAL at 20:34

## 2023-10-21 RX ADMIN — Medication 10 MG: at 18:14

## 2023-10-21 RX ADMIN — DICLOFENAC SODIUM 2 G: 9.3 GEL TOPICAL at 09:01

## 2023-10-21 RX ADMIN — ACETAMINOPHEN ORAL SOLUTION 500 MG: 650 SOLUTION ORAL at 00:17

## 2023-10-21 RX ADMIN — TEMAZEPAM 15 MG: 15 CAPSULE ORAL at 18:14

## 2023-10-21 RX ADMIN — QUETIAPINE FUMARATE 100 MG: 100 TABLET ORAL at 06:13

## 2023-10-21 RX ADMIN — METOPROLOL TARTRATE 25 MG: 25 TABLET, FILM COATED ORAL at 09:00

## 2023-10-21 RX ADMIN — MINERAL OIL: 1000 LIQUID ORAL at 09:00

## 2023-10-21 RX ADMIN — MINERAL OIL: 1000 LIQUID ORAL at 20:35

## 2023-10-21 RX ADMIN — ACETAMINOPHEN ORAL SOLUTION 500 MG: 650 SOLUTION ORAL at 06:13

## 2023-10-21 RX ADMIN — ACETAMINOPHEN 500 MG: 650 SOLUTION ORAL at 16:21

## 2023-10-21 RX ADMIN — MINERAL OIL: 1000 LIQUID ORAL at 18:14

## 2023-10-21 RX ADMIN — ACETAMINOPHEN 500 MG: 650 SOLUTION ORAL at 20:33

## 2023-10-21 RX ADMIN — MIRTAZAPINE 30 MG: 15 TABLET, FILM COATED ORAL at 18:14

## 2023-10-21 RX ADMIN — MICONAZOLE NITRATE 1 APPLICATION: 20 CREAM TOPICAL at 10:07

## 2023-10-21 RX ADMIN — QUETIAPINE FUMARATE 25 MG: 25 TABLET ORAL at 04:32

## 2023-10-21 RX ADMIN — CLOPIDOGREL BISULFATE 75 MG: 75 TABLET ORAL at 09:00

## 2023-10-21 RX ADMIN — QUETIAPINE FUMARATE 25 MG: 25 TABLET ORAL at 20:34

## 2023-10-21 NOTE — PLAN OF CARE
Problem: Pain Acute  Goal: Acceptable Pain Control and Functional Ability  Intervention: Optimize Psychosocial Wellbeing  Recent Flowsheet Documentation  Taken 10/21/2023 1344 by Raquel Garza RN  Supportive Measures:   active listening utilized   verbalization of feelings encouraged     Problem: Adjustment to Illness (Stroke, Hemorrhagic)  Goal: Optimal Coping  Intervention: Support Psychosocial Response to Stroke  Recent Flowsheet Documentation  Taken 10/21/2023 1344 by Raquel Garza RN  Supportive Measures:   active listening utilized   verbalization of feelings encouraged     Problem: Adjustment to Illness (Sepsis/Septic Shock)  Goal: Optimal Coping  Intervention: Optimize Psychosocial Adjustment to Illness  Recent Flowsheet Documentation  Taken 10/21/2023 1344 by Raquel Garza RN  Supportive Measures:   active listening utilized   verbalization of feelings encouraged   Goal Outcome Evaluation:  Plan of Care Reviewed With: patient        Progress: no change  Outcome Evaluation: Pt is alert and able to spell his name. When asked if anything was bothering him, he said the the defects. Confirmed that he was talking about he change since his stroke. Pt is used to being busy and denies being anxious but is very restless in the bed throwing left leg over the rail.  He has a binder on for peg and is bilateral wrist restraints with sitter present.  Wife is home in [place] per RN. Discussed with Dr haque that we plan to follow up with wife on Monday for decision regarding goals.

## 2023-10-21 NOTE — PROGRESS NOTES
[facility] Medicine Services  PROGRESS NOTE    Patient Name: Kenneth Wen  : 1950  MRN: [id number]    Date of Admission: 9/15/2023  Primary Care Physician: Susan Powers APRN    Subjective   Subjective     CC:  F/u   CVA     HPI:  On RA. No fevers. Tolerating TF. Had a BM. Remains agitated and confused. Sitter at bedside. He continuously kicks off his sheets, socks, etc. No family at bedside. Seen with nursing bedside.     Objective   Objective     Vital Signs:   Temp:  [98.5 °F (36.9 °C)-99.1 °F (37.3 °C)] 98.5 °F (36.9 °C)  Heart Rate:  [89-95] 95  Resp:  [18-20] 18  BP: (116-125)/(55-75) 116/55     Physical Exam:  Constitutional: Restless, in restraints  HENT: NCAT, mucous membranes moist  Respiratory: Clear to auscultation bilaterally, respiratory effort normal   Cardiovascular: RRR, no murmurs, rubs, or gallops  Gastrointestinal: Positive bowel sounds, soft, nontender, nondistended  Musculoskeletal: No bilateral ankle edema  Psychiatric: restless and agitated  Neurologic: Awake, alert, confused but follows simple commands, spells his last name, but also says his last name is his first name  Skin: No rashes         Results Reviewed:  LAB RESULTS:      Lab 10/16/23  0609 10/15/23  0537   WBC  --  9.08   HEMOGLOBIN  --  11.9*   HEMATOCRIT  --  36.0*   PLATELETS  --  257   MCV  --  94.5   CRP 0.41  --          Lab 10/16/23  0609 10/15/23  0537   SODIUM 134* 141   POTASSIUM 4.4 3.8   CHLORIDE 99 105   CO2 23.0 26.0   ANION GAP 12.0 10.0   BUN 17 19   CREATININE 0.64* 0.64*   EGFR 100.6 100.6   GLUCOSE 141* 133*   CALCIUM 9.5 9.1   IONIZED CALCIUM  --  1.31   MAGNESIUM 1.7 2.0   PHOSPHORUS 3.7 3.6         Lab 10/16/23  0609   TOTAL PROTEIN 6.4   ALBUMIN 4.0   GLOBULIN 2.4   ALT (SGPT) 37   AST (SGOT) 32   BILIRUBIN 0.6   ALK PHOS 88             Lab 10/16/23  0609   CHOLESTEROL 81   TRIGLYCERIDES 43             Brief Urine Lab Results  (Last result in the past 365 days)         Color   Clarity   Blood   Leuk Est   Nitrite   Protein   CREAT   Urine HCG        09/15/23 1818 Yellow   Cloudy   Trace   Negative   Negative   Negative                   Microbiology Results Abnormal       Procedure Component Value - Date/Time    Blood Culture - Blood, Arm, Right [[id number]]  (Normal) Collected: 09/15/23 0212    Lab Status: Final result Specimen: Blood from Arm, Right Updated: 09/20/23 0230     Blood Culture No growth at 5 days    Blood Culture - Blood, Arm, Left [[id number]]  (Normal) Collected: 09/15/23 0212    Lab Status: Final result Specimen: Blood from Arm, Left Updated: 09/20/23 0230     Blood Culture No growth at 5 days            No radiology results from the last 24 hrs        Current medications:  Scheduled Meds:acetaminophen, 500 mg, Per PEG Tube, Q6H  atorvastatin, 80 mg, Per PEG Tube, Nightly  clopidogrel, 75 mg, Per PEG Tube, Daily  Diclofenac Sodium, 2 g, Topical, BID  donepezil, 10 mg, Per PEG Tube, Nightly  lansoprazole, 15 mg, Oral, Q AM  Lidocaine, 1 patch, Transdermal, Q24H  melatonin, 10 mg, Per PEG Tube, Nightly  metoprolol tartrate, 25 mg, Per PEG Tube, Q12H  miconazole, 1 application , Topical, Q12H  mirtazapine, 30 mg, Per PEG Tube, Nightly  palliative care oral rinse, , Mouth/Throat, 4x Daily  ProSource No Carb, 30 mL, Per G Tube, Daily  QUEtiapine, 100 mg, Per PEG Tube, Daily  QUEtiapine, 200 mg, Per PEG Tube, Nightly  temazepam, 15 mg, Per PEG Tube, Nightly      Continuous Infusions:     PRN Meds:.  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        Brief Hospital Course to date:  Kenneth Wen is a 73 y.o. male  with hx of HTN, HLD, T2DM, and GERD who presented to OSH with multiple acute ischemic infarcts of the bilateral cerebral and cerebellar hemispheres as well as left isaac. TTE/JOSIAS was negative PFO at OSH. On 9/13/23 he had worsening in mental status and new inability to move RLE, head CT showed evolution of the existing CVA with new development of petechial hemorrhage. DAPT was held for 24 hours per Neurology recommendations and repeat CT head that evening showed worsening effacement of the right quadrigeminal cistern with suspected increasing upward supratentorial pressure. He was then transferred to [facility] for Neurosurgery evaluation on 9/15/23. He was started on hypertonic saline 9/15/23 through 9/20/23 for cerebral edema. He had fevers with negative cultures but had worsening secretions and labored breathing so was started on Zosyn on 9/16/23. Transferred to the hospitalist service on 9/22/23. Pt demonstrated poor oral intake with elevated sodium levels; therefore, PEG tube was recommended & placed.     This patient's problems and plans were partially entered by my partner and updated as appropriate by me 10/21/23.    Goals of care  --The patient's wife is considering transition to hospice care, but wants to think about it over the weekend; will reassess on 10/23    Multiple bilateral posterior circulation strokes with hemorrhagic conversion  --Neurology has followed, suspect atheroembolic but cannot rule out cardioembolic given multiple vascular territories  --Plavix  monotherapy, high intensity statin  --Needs Holter monitor at discharge  -- Patient remains very confused, agitated requiring two-point restraints.  Patient has fluctuations but for the most part he has not had any meaningful improvement.    Agitation  Encephalopathy  --appreciate general neurology input recs  --Donepezil 10 mg nightly, melatonin 10 mg nightly, mirtazapine 30 mg nightly, Seroquel 100 mg daily, Seroquel 200 mg nightly, Restoril 15 mg nightly  --Continue as needed Geodon  -- Neurology is following and trying to help patient's sleep and decrease agitation    Dysphagia  Hypernatremia - resolved  --SLP recommends mechanical ground with honey thick liquids   --Risk of pulling out peg tube, abd binder to be in place at all times. --Surgery recommends to remain in restraints at all times or if off has to have sitter for next 2 weeks   -- dietary following for calorie count, adequate PO intake is limited by his mental status     HTN  --Continue Metoprolol 25 mg BID     GERD  --Continue PPI     COVID-19-resolved  --Overall asymptomatic and on room air  --No infiltrates seen and low procal  --Did not receive any treatment   --Off Isolation 9/30/2023      Leukocytosis-resolved  --Procalcitionin low at 0.05  --CXR and UA negative  --Blood cultures negative     Elevated LFT's, mild  --Possibly secondary to statin?  --Negative acute hepatitis panel  --repeat AST/ALT improved on 10/3    PLAN:  - continue current care  - neurology trying to  tweak his medication to help him rest an sleep  -Prognosis is guarded, wife considering hospice    Expected Discharge Location and Transportation: rehab vs hospice/LTC  Expected Discharge   Expected Discharge Date: 10/7/2023; Expected Discharge Time:      DVT prophylaxis:  Mechanical DVT prophylaxis orders are present.     AM-PAC 6 Clicks Score (PT): 8 (10/20/23 2000)    CODE STATUS:   Code Status and Medical Interventions:   Ordered at: 09/29/23 0854     Medical Intervention  Limits:    NO intubation (DNI)     Code Status (Patient has no pulse and is not breathing):    No CPR (Do Not Attempt to Resuscitate)     Medical Interventions (Patient has pulse or is breathing):    Limited Support       Maude Pimentel MD  10/21/23

## 2023-10-21 NOTE — PLAN OF CARE
Goal Outcome Evaluation:           Progress: no change          VSS on room air. PEG flushed and feedings initiated. Abd binder 2x in place. Slept well throughout most of night; PRN administered to manage symptoms.

## 2023-10-22 LAB
GLUCOSE BLDC GLUCOMTR-MCNC: 100 MG/DL (ref 70–130)
GLUCOSE BLDC GLUCOMTR-MCNC: 130 MG/DL (ref 70–130)
GLUCOSE BLDC GLUCOMTR-MCNC: 135 MG/DL (ref 70–130)
GLUCOSE BLDC GLUCOMTR-MCNC: 157 MG/DL (ref 70–130)

## 2023-10-22 PROCEDURE — 82948 REAGENT STRIP/BLOOD GLUCOSE: CPT

## 2023-10-22 PROCEDURE — 99232 SBSQ HOSP IP/OBS MODERATE 35: CPT | Performed by: NURSE PRACTITIONER

## 2023-10-22 RX ADMIN — MINERAL OIL: 1000 LIQUID ORAL at 12:16

## 2023-10-22 RX ADMIN — QUETIAPINE FUMARATE 200 MG: 100 TABLET ORAL at 18:02

## 2023-10-22 RX ADMIN — IBUPROFEN 400 MG: 100 SUSPENSION ORAL at 16:31

## 2023-10-22 RX ADMIN — TEMAZEPAM 15 MG: 15 CAPSULE ORAL at 18:03

## 2023-10-22 RX ADMIN — LIDOCAINE 1 PATCH: 4 PATCH TOPICAL at 08:37

## 2023-10-22 RX ADMIN — METOPROLOL TARTRATE 25 MG: 25 TABLET, FILM COATED ORAL at 08:35

## 2023-10-22 RX ADMIN — MICONAZOLE NITRATE 1 APPLICATION: 20 CREAM TOPICAL at 20:04

## 2023-10-22 RX ADMIN — MINERAL OIL: 1000 LIQUID ORAL at 18:03

## 2023-10-22 RX ADMIN — QUETIAPINE FUMARATE 25 MG: 25 TABLET ORAL at 20:03

## 2023-10-22 RX ADMIN — MINERAL OIL: 1000 LIQUID ORAL at 08:36

## 2023-10-22 RX ADMIN — ATORVASTATIN CALCIUM 80 MG: 40 TABLET, FILM COATED ORAL at 20:03

## 2023-10-22 RX ADMIN — MIRTAZAPINE 30 MG: 15 TABLET, FILM COATED ORAL at 18:02

## 2023-10-22 RX ADMIN — QUETIAPINE FUMARATE 100 MG: 100 TABLET ORAL at 06:02

## 2023-10-22 RX ADMIN — DONEPEZIL HYDROCHLORIDE 10 MG: 10 TABLET, FILM COATED ORAL at 18:02

## 2023-10-22 RX ADMIN — QUETIAPINE FUMARATE 25 MG: 25 TABLET ORAL at 09:58

## 2023-10-22 RX ADMIN — Medication 10 MG: at 18:02

## 2023-10-22 RX ADMIN — LANSOPRAZOLE 15 MG: 15 TABLET, ORALLY DISINTEGRATING ORAL at 06:03

## 2023-10-22 RX ADMIN — Medication 30 ML: at 08:36

## 2023-10-22 RX ADMIN — ACETAMINOPHEN 500 MG: 650 SOLUTION ORAL at 20:04

## 2023-10-22 RX ADMIN — MICONAZOLE NITRATE 1 APPLICATION: 20 CREAM TOPICAL at 08:36

## 2023-10-22 RX ADMIN — DICLOFENAC SODIUM 2 G: 9.3 GEL TOPICAL at 20:04

## 2023-10-22 RX ADMIN — DICLOFENAC SODIUM 2 G: 9.3 GEL TOPICAL at 08:36

## 2023-10-22 RX ADMIN — MINERAL OIL: 1000 LIQUID ORAL at 20:04

## 2023-10-22 RX ADMIN — CLOPIDOGREL BISULFATE 75 MG: 75 TABLET ORAL at 08:35

## 2023-10-22 RX ADMIN — ACETAMINOPHEN 500 MG: 650 SOLUTION ORAL at 08:35

## 2023-10-22 RX ADMIN — ACETAMINOPHEN 500 MG: 650 SOLUTION ORAL at 15:17

## 2023-10-22 NOTE — PROGRESS NOTES
[facility]  PROGRESS NOTE    Patient Name: Kenneth Wen  : 1950  MRN: [id number]    Date of Admission: 9/15/2023  Primary Care Physician: Susan Powers APRN    Subjective   Subjective   CC:  F/u CVA     HPI:        On RA. No fevers. Tolerating TF. Had a BM. Remains agitated and confused. Sitter at bedside. He continuously kicks off his sheets, socks, etc. No family at bedside. Seen with nursing bedside.     Objective   Objective     Vital Signs:   Temp:  [97.7 °F (36.5 °C)-98.1 °F (36.7 °C)] 97.7 °F (36.5 °C)  Heart Rate:  [] 79  Resp:  [18] 18  BP: (112-117)/(69-71) 112/69     Physical Exam:  Constitutional: Asleep  Eyes: EOMI, sclerae anicteric, no conjunctival injection  Head: NCAT  ENT: [place], moist mucous membranes   Respiratory: Nonlabored, symmetrical chest expansion, CTAB  Cardiovascular: RRR  Gastrointestinal: Soft, ND +BS  Musculoskeletal: HINES; no LE edema bilaterally  Neurologic: BAN  Skin: No rashes on exposed skin  Psychiatric: BAN    Results Reviewed:  LAB RESULTS:      Lab 10/16/23  0609   CRP 0.41         Lab 10/16/23  0609   SODIUM 134*   POTASSIUM 4.4   CHLORIDE 99   CO2 23.0   ANION GAP 12.0   BUN 17   CREATININE 0.64*   EGFR 100.6   GLUCOSE 141*   CALCIUM 9.5   MAGNESIUM 1.7   PHOSPHORUS 3.7         Lab 10/16/23  0609   TOTAL PROTEIN 6.4   ALBUMIN 4.0   GLOBULIN 2.4   ALT (SGPT) 37   AST (SGOT) 32   BILIRUBIN 0.6   ALK PHOS 88             Lab 10/16/23  0609   CHOLESTEROL 81   TRIGLYCERIDES 43             Brief Urine Lab Results  (Last result in the past 365 days)        Color   Clarity   Blood   Leuk Est   Nitrite   Protein   CREAT   Urine HCG        09/15/23 181 Yellow   Cloudy   Trace   Negative   Negative   Negative                   Microbiology Results Abnormal       Procedure Component Value - Date/Time    Blood Culture - Blood, Arm, Right [[id number]]  (Normal) Collected: 09/15/23 0212    Lab Status: Final result Specimen:  Blood from Arm, Right Updated: 09/20/23 0230     Blood Culture No growth at 5 days    Blood Culture - Blood, Arm, Left [[id number]]  (Normal) Collected: 09/15/23 0212    Lab Status: Final result Specimen: Blood from Arm, Left Updated: 09/20/23 0230     Blood Culture No growth at 5 days            No radiology results from the last 24 hrs        Current medications:  Scheduled Meds:acetaminophen, 500 mg, Per PEG Tube, TID  atorvastatin, 80 mg, Per PEG Tube, Nightly  clopidogrel, 75 mg, Per PEG Tube, Daily  Diclofenac Sodium, 2 g, Topical, BID  donepezil, 10 mg, Per PEG Tube, Nightly  lansoprazole, 15 mg, Oral, Q AM  Lidocaine, 1 patch, Transdermal, Q24H  melatonin, 10 mg, Per PEG Tube, Nightly  metoprolol tartrate, 25 mg, Per PEG Tube, Q12H  miconazole, 1 application , Topical, Q12H  mirtazapine, 30 mg, Per PEG Tube, Nightly  palliative care oral rinse, , Mouth/Throat, 4x Daily  ProSource No Carb, 30 mL, Per G Tube, Daily  QUEtiapine, 100 mg, Per PEG Tube, Daily  QUEtiapine, 200 mg, Per PEG Tube, Nightly  temazepam, 15 mg, Per PEG Tube, Nightly      Continuous Infusions:     PRN Meds:.  Calcium Replacement - Follow Nurse / BPA Driven Protocol    dextrose    glucagon (human recombinant)    ibuprofen    Magnesium Standard Dose Replacement - Follow Nurse / BPA Driven Protocol    ondansetron    Phosphorus Replacement - Follow Nurse / BPA Driven Protocol    Potassium Replacement - Follow Nurse / BPA Driven Protocol    QUEtiapine    ziprasidone    Assessment & Plan   Assessment & Plan     Active Hospital Problems    Diagnosis  POA    **Hemorrhagic CVA [I61.9]  Yes    Oropharyngeal dysphagia [R13.12]  Yes    Multiple bilateral CVAs [I63.9]  Yes    HFpEF [I50.30]  Yes    T2DM (type 2 diabetes mellitus) [E11.9]  Yes    HTN (hypertension) [I10]  Yes    GERD (gastroesophageal reflux disease) [K21.9]  Yes    ICH (intracerebral hemorrhage) [I61.9]  Yes    Dyslipidemia [E78.5]  Yes      Resolved Hospital Problems   No resolved  problems to display.        Brief Hospital Course to date:  Kenneth Wen is a 73 y.o. male  with hx of HTN, HLD, T2DM, and GERD who presented to OSH with multiple acute ischemic infarcts of the bilateral cerebral and cerebellar hemispheres as well as left isaac. TTE/JOSIAS was negative PFO at OSH. On 9/13/23 he had worsening in mental status and new inability to move RLE, head CT showed evolution of the existing CVA with new development of petechial hemorrhage. DAPT was held for 24 hours per Neurology recommendations and repeat CT head that evening showed worsening effacement of the right quadrigeminal cistern with suspected increasing upward supratentorial pressure. He was then transferred to [facility] for Neurosurgery evaluation on 9/15/23. He was started on hypertonic saline 9/15/23 through 9/20/23 for cerebral edema. He had fevers with negative cultures but had worsening secretions and labored breathing so was started on Zosyn on 9/16/23. Transferred to the hospitalist service on 9/22/23. Pt demonstrated poor oral intake with elevated sodium levels; therefore, PEG tube was recommended & placed.     These problems are new to me today    This patient's problems and plans were partially entered by my partner and updated as appropriate by me 10/22/23.    Goals of care  --The patient's wife is considering transition to hospice care, but wants to think about it over the weekend; will reassess on 10/23    Multiple bilateral posterior circulation strokes with hemorrhagic conversion  --Neurology has followed, suspect atheroembolic but cannot rule out cardioembolic given multiple vascular territories  --Plavix monotherapy, high intensity statin  --Needs Holter monitor at discharge  -- Patient remains very confused, agitated requiring two-point restraints.  Patient has fluctuations but for the most part he has not had any meaningful improvement.    Agitation  Encephalopathy  --appreciate general neurology input recs  --Donepezil  10 mg nightly, melatonin 10 mg nightly, mirtazapine 30 mg nightly, Seroquel 100 mg daily, Seroquel 200 mg nightly, Restoril 15 mg nightly  --Continue as needed Geodon  -- Neurology is following and trying to help patient's sleep and decrease agitation    Dysphagia  Hypernatremia - resolved  --SLP recommends mechanical ground with honey thick liquids   --Risk of pulling out peg tube, abd binder to be in place at all times. --Surgery recommends to remain in restraints at all times or if off has to have sitter for next 2 weeks   -- dietary following for calorie count, adequate PO intake is limited by his mental status     HTN  --Continue Metoprolol 25 mg BID     GERD  --Continue PPI     COVID-19-resolved  --Overall asymptomatic and on room air  --No infiltrates seen and low procal  --Did not receive any treatment   --Off Isolation 9/30/2023      Leukocytosis-resolved  --Procalcitionin low at 0.05  --CXR and UA negative  --Blood cultures negative     Elevated LFT's, mild  --Possibly secondary to statin?  --Negative acute hepatitis panel  --repeat AST/ALT improved on 10/3    Expected Discharge Location and Transportation: rehab vs hospice/LTC    Expected Discharge   Expected Discharge Date: 10/25/2023; Expected Discharge Time:      DVT prophylaxis:  Mechanical DVT prophylaxis orders are present.     AM-PAC 6 Clicks Score (PT): 11 (10/22/23 0800)    CODE STATUS:   Code Status and Medical Interventions:   Ordered at: 09/29/23 0854     Medical Intervention Limits:    NO intubation (DNI)     Code Status (Patient has no pulse and is not breathing):    No CPR (Do Not Attempt to Resuscitate)     Medical Interventions (Patient has pulse or is breathing):    Limited Support       Estefanía Light, APRN  10/22/23

## 2023-10-22 NOTE — PLAN OF CARE
Goal Outcome Evaluation:      Visited with pt and family. PT was able to talk slowly. Wife was able to understand him. Wife stated they have a supportive [employer] home in [place] and family that lives close. Wife asked for prayer.

## 2023-10-22 NOTE — PLAN OF CARE
Goal Outcome Evaluation:  Plan of Care Reviewed With: patient           Outcome Evaluation: VSS, on RA. No c/o pain or nausea. UOP-WNL bladder Incontinence noted. Pt slept well, only waking occasionally for shifting purposes. Turned Q3-4 hrs, Tolerated well. Meds and bolus feeds given through PEG tube, per order. Pt appears to have moments of clearer mentation, with less slurred speech and more coherent communication, however, these periods dont seem to last very long based on observation. Sitter at bedside and soft limb Restraints X3 in use. Restraints order due to be renewed at 0710. Will continue to monitor.

## 2023-10-22 NOTE — PLAN OF CARE
Goal Outcome Evaluation:  Plan of Care Reviewed With: patient, spouse        Progress: no change  Outcome Evaluation: VSS, PRN seroquel given for restlessness and agitation. Wife at bedside most of day.  Patient became mildly aggressive during bed bath while out of restraints and grabbed RN's wrist and twisted. Tolerating bolus tube feeds well but not taking much in by mouth. PEG tube working well, split gauze changed, and secured under abdominal binder.  Patient tolerating 3 point restraints, needed for pulling at PEG tube and attempts at climbing out of bed.  Sitter at bedside RN and patient's Wife.

## 2023-10-23 LAB
ALBUMIN SERPL-MCNC: 3.5 G/DL (ref 3.5–5.2)
ALBUMIN/GLOB SERPL: 1.7 G/DL
ALP SERPL-CCNC: 81 U/L (ref 39–117)
ALT SERPL W P-5'-P-CCNC: 44 U/L (ref 1–41)
ANION GAP SERPL CALCULATED.3IONS-SCNC: 9 MMOL/L (ref 5–15)
AST SERPL-CCNC: 29 U/L (ref 1–40)
BILIRUB SERPL-MCNC: 0.4 MG/DL (ref 0–1.2)
BUN SERPL-MCNC: 19 MG/DL (ref 8–23)
BUN/CREAT SERPL: 33.9 (ref 7–25)
CALCIUM SPEC-SCNC: 9 MG/DL (ref 8.6–10.5)
CHLORIDE SERPL-SCNC: 101 MMOL/L (ref 98–107)
CHOLEST SERPL-MCNC: 80 MG/DL (ref 0–200)
CO2 SERPL-SCNC: 27 MMOL/L (ref 22–29)
CREAT SERPL-MCNC: 0.56 MG/DL (ref 0.76–1.27)
CRP SERPL-MCNC: <0.3 MG/DL (ref 0–0.5)
EGFRCR SERPLBLD CKD-EPI 2021: 104.7 ML/MIN/1.73
GLOBULIN UR ELPH-MCNC: 2.1 GM/DL
GLUCOSE BLDC GLUCOMTR-MCNC: 117 MG/DL (ref 70–130)
GLUCOSE BLDC GLUCOMTR-MCNC: 136 MG/DL (ref 70–130)
GLUCOSE BLDC GLUCOMTR-MCNC: 137 MG/DL (ref 70–130)
GLUCOSE BLDC GLUCOMTR-MCNC: 156 MG/DL (ref 70–130)
GLUCOSE SERPL-MCNC: 185 MG/DL (ref 65–99)
MAGNESIUM SERPL-MCNC: 2.2 MG/DL (ref 1.6–2.4)
PHOSPHATE SERPL-MCNC: 3.3 MG/DL (ref 2.5–4.5)
POTASSIUM SERPL-SCNC: 4.4 MMOL/L (ref 3.5–5.2)
PREALB SERPL-MCNC: 23.5 MG/DL (ref 20–40)
PROT SERPL-MCNC: 5.6 G/DL (ref 6–8.5)
SODIUM SERPL-SCNC: 137 MMOL/L (ref 136–145)
TRIGL SERPL-MCNC: 48 MG/DL (ref 0–150)

## 2023-10-23 PROCEDURE — 97530 THERAPEUTIC ACTIVITIES: CPT

## 2023-10-23 PROCEDURE — 82465 ASSAY BLD/SERUM CHOLESTEROL: CPT | Performed by: NURSE PRACTITIONER

## 2023-10-23 PROCEDURE — 84134 ASSAY OF PREALBUMIN: CPT | Performed by: NURSE PRACTITIONER

## 2023-10-23 PROCEDURE — 80053 COMPREHEN METABOLIC PANEL: CPT | Performed by: NURSE PRACTITIONER

## 2023-10-23 PROCEDURE — 84478 ASSAY OF TRIGLYCERIDES: CPT | Performed by: NURSE PRACTITIONER

## 2023-10-23 PROCEDURE — 82948 REAGENT STRIP/BLOOD GLUCOSE: CPT

## 2023-10-23 PROCEDURE — 83735 ASSAY OF MAGNESIUM: CPT | Performed by: NURSE PRACTITIONER

## 2023-10-23 PROCEDURE — 25010000002 ZIPRASIDONE MESYLATE PER 10 MG: Performed by: PSYCHIATRY & NEUROLOGY

## 2023-10-23 PROCEDURE — 86140 C-REACTIVE PROTEIN: CPT | Performed by: NURSE PRACTITIONER

## 2023-10-23 PROCEDURE — 99232 SBSQ HOSP IP/OBS MODERATE 35: CPT | Performed by: INTERNAL MEDICINE

## 2023-10-23 PROCEDURE — 84100 ASSAY OF PHOSPHORUS: CPT | Performed by: NURSE PRACTITIONER

## 2023-10-23 PROCEDURE — 99232 SBSQ HOSP IP/OBS MODERATE 35: CPT | Performed by: PSYCHIATRY & NEUROLOGY

## 2023-10-23 RX ORDER — FLUTICASONE PROPIONATE 50 MCG
2 SPRAY, SUSPENSION (ML) NASAL DAILY
Status: DISCONTINUED | OUTPATIENT
Start: 2023-10-23 | End: 2023-11-16 | Stop reason: HOSPADM

## 2023-10-23 RX ADMIN — MINERAL OIL: 1000 LIQUID ORAL at 17:03

## 2023-10-23 RX ADMIN — MICONAZOLE NITRATE 1 APPLICATION: 20 CREAM TOPICAL at 08:10

## 2023-10-23 RX ADMIN — CLOPIDOGREL BISULFATE 75 MG: 75 TABLET ORAL at 08:02

## 2023-10-23 RX ADMIN — QUETIAPINE FUMARATE 200 MG: 100 TABLET ORAL at 18:06

## 2023-10-23 RX ADMIN — ACETAMINOPHEN 500 MG: 650 SOLUTION ORAL at 15:25

## 2023-10-23 RX ADMIN — Medication 10 MG: at 18:07

## 2023-10-23 RX ADMIN — QUETIAPINE FUMARATE 25 MG: 25 TABLET ORAL at 11:52

## 2023-10-23 RX ADMIN — MIRTAZAPINE 30 MG: 15 TABLET, FILM COATED ORAL at 18:07

## 2023-10-23 RX ADMIN — QUETIAPINE FUMARATE 25 MG: 25 TABLET ORAL at 20:26

## 2023-10-23 RX ADMIN — DONEPEZIL HYDROCHLORIDE 10 MG: 10 TABLET, FILM COATED ORAL at 18:07

## 2023-10-23 RX ADMIN — ZIPRASIDONE MESYLATE 10 MG: 20 INJECTION, POWDER, LYOPHILIZED, FOR SOLUTION INTRAMUSCULAR at 23:49

## 2023-10-23 RX ADMIN — ATORVASTATIN CALCIUM 80 MG: 40 TABLET, FILM COATED ORAL at 20:27

## 2023-10-23 RX ADMIN — DICLOFENAC SODIUM 2 G: 9.3 GEL TOPICAL at 20:27

## 2023-10-23 RX ADMIN — QUETIAPINE FUMARATE 100 MG: 100 TABLET ORAL at 05:12

## 2023-10-23 RX ADMIN — METOPROLOL TARTRATE 25 MG: 25 TABLET, FILM COATED ORAL at 20:28

## 2023-10-23 RX ADMIN — MINERAL OIL: 1000 LIQUID ORAL at 08:03

## 2023-10-23 RX ADMIN — MINERAL OIL: 1000 LIQUID ORAL at 20:27

## 2023-10-23 RX ADMIN — LANSOPRAZOLE 15 MG: 15 TABLET, ORALLY DISINTEGRATING ORAL at 05:13

## 2023-10-23 RX ADMIN — FLUTICASONE PROPIONATE 2 SPRAY: 50 SPRAY, METERED NASAL at 18:17

## 2023-10-23 RX ADMIN — ACETAMINOPHEN 500 MG: 650 SOLUTION ORAL at 08:02

## 2023-10-23 RX ADMIN — MICONAZOLE NITRATE 1 APPLICATION: 20 CREAM TOPICAL at 20:27

## 2023-10-23 RX ADMIN — LIDOCAINE 1 PATCH: 4 PATCH TOPICAL at 08:10

## 2023-10-23 RX ADMIN — METOPROLOL TARTRATE 25 MG: 25 TABLET, FILM COATED ORAL at 08:02

## 2023-10-23 RX ADMIN — ACETAMINOPHEN 500 MG: 650 SOLUTION ORAL at 20:28

## 2023-10-23 RX ADMIN — ZIPRASIDONE MESYLATE 10 MG: 20 INJECTION, POWDER, LYOPHILIZED, FOR SOLUTION INTRAMUSCULAR at 07:25

## 2023-10-23 RX ADMIN — TEMAZEPAM 15 MG: 15 CAPSULE ORAL at 18:07

## 2023-10-23 RX ADMIN — DICLOFENAC SODIUM 2 G: 9.3 GEL TOPICAL at 08:03

## 2023-10-23 RX ADMIN — MINERAL OIL: 1000 LIQUID ORAL at 11:37

## 2023-10-23 RX ADMIN — Medication 30 ML: at 08:03

## 2023-10-23 NOTE — PROGRESS NOTES
Neurology       Patient Care Team:  Susan Powers APRN as PCP - General (Family Medicine)    Chief complaint: delirium    History: The patient remains agitated periodically in restraints, needing parenteral medication to avoid self-harm.  Past Medical History:   Diagnosis Date    Acid reflux     Cancer     Skin    Diabetes mellitus     Prediabetes    GERD (gastroesophageal reflux disease) 09/15/2023    HTN (hypertension) 09/15/2023    Kidney disease     T2DM (type 2 diabetes mellitus) 09/15/2023       Vital Signs   Vitals:    10/22/23 0838 10/22/23 1932 10/23/23 0800 10/23/23 0950   BP: 112/69 108/55 146/69    BP Location: Left arm Left arm Left arm    Patient Position: Lying Lying Lying    Pulse: 79 104 94 80   Resp: 18 16 17    Temp: 97.7 °F (36.5 °C) 98.4 °F (36.9 °C) 98.5 °F (36.9 °C)    TempSrc: Temporal Axillary Axillary    SpO2: 95% 95% 94%    Weight:       Height:           Physical Exam:   General: Sedated with Geodon              Neuro: In restraints    Results Review:  Reviewed      Results from last 7 days   Lab Units 10/23/23  0703   SODIUM mmol/L 137   POTASSIUM mmol/L 4.4   CHLORIDE mmol/L 101   CO2 mmol/L 27.0   BUN mg/dL 19   CREATININE mg/dL 0.56*   CALCIUM mg/dL 9.0   BILIRUBIN mg/dL 0.4   ALK PHOS U/L 81   ALT (SGPT) U/L 44*   AST (SGOT) U/L 29   GLUCOSE mg/dL 185*       Imaging Results (Last 24 Hours)       ** No results found for the last 24 hours. **            Assessment:  Protracted delirium.    Multiple embolic strokes    Plan:  Patient does not appear to be making any forward progress..  Discussed inpatient hospice with the patient's wife and of asked him to evaluate for that.    Comment:  Patient has been agitated and delirious for 5 weeks following his stroke with no significant benefit from multiple radiations on delirium therapy.         I discussed the patients findings and my recommendations with patient, family, nursing staff, and primary care team    Toni Rausch,  MD  10/23/23  11:48 EDT

## 2023-10-23 NOTE — PLAN OF CARE
Goal Outcome Evaluation:  Plan of Care Reviewed With: patient, spouse        Progress: no change  Outcome Evaluation: Pt presents w/ impaired cognition, generalized weakness (R>L), decreased functional nedurance, and balance deficits limiting his ADL independnece. Will continue to progress pt as tolerated per OT POC. Rec SNF at d/c.      Anticipated Discharge Disposition (OT): skilled nursing facility

## 2023-10-23 NOTE — THERAPY TREATMENT NOTE
Patient Name: Kenneth Wen  : 1950    MRN: [id number]                              Today's Date: 10/23/2023       Admit Date: 9/15/2023    Visit Dx:     ICD-10-CM ICD-9-CM   1. Hemorrhagic CVA  I61.9 431   2. Aphasia  R47.01 784.3   3. Dysarthria  R47.1 784.51   4. Oropharyngeal dysphagia  R13.12 787.22     Patient Active Problem List   Diagnosis    Precordial pain    Elevated BP without diagnosis of hypertension    Dyslipidemia    Hyperglycemia    Multiple bilateral CVAs    Hemorrhagic CVA    HFpEF    T2DM (type 2 diabetes mellitus)    HTN (hypertension)    GERD (gastroesophageal reflux disease)    ICH (intracerebral hemorrhage)    Oropharyngeal dysphagia     Past Medical History:   Diagnosis Date    Acid reflux     Cancer     Skin    Diabetes mellitus     Prediabetes    GERD (gastroesophageal reflux disease) 09/15/2023    HTN (hypertension) 09/15/2023    Kidney disease     T2DM (type 2 diabetes mellitus) 09/15/2023     Past Surgical History:   Procedure Laterality Date    APPENDECTOMY      ENDOSCOPY W/ PEG TUBE PLACEMENT N/A 2023    Procedure: ESOPHAGOGASTRODUODENOSCOPY WITH PERCUTANEOUS ENDOSCOPIC GASTROSTOMY TUBE INSERTION;  Surgeon: Haseeb Carrillo MD;  Location: [facility] ENDOSCOPY;  Service: General;  Laterality: N/A;    HEMORRHOIDECTOMY      NASAL POLYP SURGERY        General Information       Row Name 10/23/23 1973          Physical Therapy Time and Intention    Document Type therapy note (daily note)  -ML     Mode of Treatment physical therapy  -ML       Row Name 10/23/23 1345          General Information    Patient Profile Reviewed yes  -ML     Existing Precautions/Restrictions fall;seizures  R sided weakness/coordination deficits, agitated with B soft wrist restraints, PEG with abdominal binder  -ML     Barriers to Rehab medically complex;cognitive status  -ML       Row Name 10/23/23 2908          Cognition    Orientation Status (Cognition) oriented to;person  -ML       Row Name 10/23/23  1345          Safety Issues, Functional Mobility    Safety Issues Affecting Function (Mobility) awareness of need for assistance;insight into deficits/self-awareness;safety precaution awareness;judgment;problem-solving;impulsivity;ability to follow commands;safety precautions follow-through/compliance;sequencing abilities  -ML     Impairments Affecting Function (Mobility) balance;cognition;coordination;endurance/activity tolerance;grasp;motor control;motor planning;muscle tone abnormal;pain;visual/perceptual;strength;sensation/sensory awareness;range of motion (ROM);postural/trunk control  -ML     Cognitive Impairments, Mobility Safety/Performance attention;awareness, need for assistance;impulsivity;insight into deficits/self-awareness;judgment;problem-solving/reasoning;safety precaution awareness;safety precaution follow-through;sequencing abilities  -ML               User Key  (r) = Recorded By, (t) = Taken By, (c) = Cosigned By      Initials Name Provider Type    ML Laura Rangel Physical Therapist                   Mobility       Row Name 10/23/23 1346          Bed Mobility    Bed Mobility supine-sit;sit-supine;scooting/bridging  -ML     Scooting/Bridging [place] (Bed Mobility) dependent (less than 25% patient effort);2 person assist;verbal cues  -ML     Supine-Sit [place] (Bed Mobility) verbal cues;nonverbal cues (demo/gesture);maximum assist (25% patient effort);2 person assist  -ML     Sit-Supine [place] (Bed Mobility) verbal cues;nonverbal cues (demo/gesture);dependent (less than 25% patient effort);2 person assist  -ML     Assistive Device (Bed Mobility) draw sheet;head of bed elevated  -ML     Comment, (Bed Mobility) verbal/tactile cues for sequencing  -ML       Row Name 10/23/23 1346          Bed-Chair Transfer    Bed-Chair [place] (Transfers) unable to assess  -ML       Row Name 10/23/23 1346          Sit-Stand Transfer    Sit-Stand [place] (Transfers) moderate assist (50% patient  effort);2 person assist;verbal cues  -ML     Assistive Device (Sit-Stand Transfers) other (see comments)  BUE support  -ML       Row Name 10/23/23 1346          Gait/Stairs (Locomotion)    [place] Level (Gait) verbal cues;nonverbal cues (demo/gesture);maximum assist (25% patient effort);2 person assist  -ML     Assistive Device (Gait) other (see comments)  BUE support  -ML     Distance in Feet (Gait) 2  -ML     Deviations/Abnormal Patterns (Gait) weight shifting decreased;bilateral deviations  -ML     Bilateral Gait Deviations forward flexed posture  -ML     Comment, (Gait/Stairs) Patient able to take 2 site steps up towards HOB  -ML               User Key  (r) = Recorded By, (t) = Taken By, (c) = Cosigned By      Initials Name Provider Type    ML Laura Rangel Physical Therapist                   Obj/Interventions       Row Name 10/23/23 1349          Motor Skills    Therapeutic Exercise hip;knee  -ML       Row Name 10/23/23 1349          Hip (Therapeutic Exercise)    Hip (Therapeutic Exercise) strengthening exercise  -ML     Hip Strengthening (Therapeutic Exercise) left;heel slides;15 repititions  -ML       Row Name 10/23/23 1349          Knee (Therapeutic Exercise)    Knee (Therapeutic Exercise) strengthening exercise  -ML     Knee Strengthening (Therapeutic Exercise) right;SLR (straight leg raise);5 repetitions  -ML       Row Name 10/23/23 1349          Balance    Balance Assessment sitting static balance;sitting dynamic balance;sit to stand dynamic balance;standing static balance;standing dynamic balance  -ML     Static Sitting Balance verbal cues;non-verbal cues (demo/gesture);moderate assist  -ML     Position, Sitting Balance supported;sitting edge of bed  -ML     Sit to Stand Dynamic Balance verbal cues;non-verbal cues (demo/gesture);moderate assist;2-person assist  -ML     Static Standing Balance verbal cues;non-verbal cues (demo/gesture);moderate assist;2-person assist  -ML     Dynamic Standing Balance  verbal cues;non-verbal cues (demo/gesture);maximum assist;2-person assist  -ML     Position/Device Used, Standing Balance supported;other (see comments)  BUE support  -ML     Balance Interventions sitting;standing;sit to stand;supported;weight shifting activity  -ML               User Key  (r) = Recorded By, (t) = Taken By, (c) = Cosigned By      Initials Name Provider Type    Laura Betancur Physical Therapist                   Goals/Plan    No documentation.                  Clinical Impression       Row Name 10/23/23 1351          Pain    Pretreatment Pain Rating 0/10 - no pain  -ML     Posttreatment Pain Rating 0/10 - no pain  -ML       Row Name 10/23/23 1351          Plan of Care Review    Plan of Care Reviewed With patient;spouse  -ML     Progress no change  -ML     Outcome Evaluation Patient orientated to person only, decreased command following, decreased safety awareness and impaired cognition. The patient continues to present below baseline for mobility and would continue to benefit from skilled PT to address strength, balance and activity tolerance deficits. Continue current PT POC.  -ML       Row Name 10/23/23 1351          Vital Signs    Pre Patient Position Supine  -ML     Intra Patient Position Standing  -ML     Post Patient Position Supine  -ML       Row Name 10/23/23 1351          Positioning and Restraints    Pre-Treatment Position in bed  -ML     Post Treatment Position bed  -ML     In Bed notified nsg;fowlers;call light within reach;encouraged to call for assist;exit alarm on;with family/caregiver  seizure precautions in place  -ML     Restraints released:;reapplied:;notified nsg:;soft limb  -ML               User Key  (r) = Recorded By, (t) = Taken By, (c) = Cosigned By      Initials Name Provider Type    Laura Betancur Physical Therapist                   Outcome Measures       Row Name 10/23/23 1353 10/23/23 0811       How much help from another person do you currently need...    Turning from  your back to your side while in flat bed without using bedrails? 3  -ML 3  -BR    Moving from lying on back to sitting on the side of a flat bed without bedrails? 2  -ML 2  -BR    Moving to and from a bed to a chair (including a wheelchair)? 2  -ML 2  -BR    Standing up from a chair using your arms (e.g., wheelchair, bedside chair)? 2  -ML 2  -BR    Climbing 3-5 steps with a railing? 1  -ML 1  -BR    To walk in hospital room? 1  -ML 1  -BR    AM-PAC 6 Clicks Score (PT) 11  -ML 11  -BR    Highest level of mobility 4 --> Transferred to chair/commode  -ML 4 --> Transferred to chair/commode  -BR      Row Name 10/23/23 1353 10/23/23 1335       Functional Assessment    Outcome Measure Options AM-PAC 6 Clicks Basic Mobility (PT)  -ML AM-PAC 6 Clicks Daily Activity (OT)  -              User Key  (r) = Recorded By, (t) = Taken By, (c) = Cosigned By      Initials Name Provider Type    Lisa Trimble, RN Registered Nurse    Jenny Black OT Occupational Therapist    Laura Betancur Physical Therapist                                 Physical Therapy Education       Title: PT OT SLP Therapies (In Progress)       Topic: Physical Therapy (In Progress)       Point: Mobility training (In Progress)       Learning Progress Summary             Patient Acceptance, E, NR by ML at 10/23/2023 1353    Acceptance, E,TB, NR by AY at 10/20/2023 1308    Acceptance, E, NR by KG at 10/15/2023 1401    Acceptance, E, NR,VU by KR at 10/12/2023 1453    Acceptance, E,D, NR by AB at 10/11/2023 1547    Acceptance, E, NR by CM at 10/10/2023 1517    Acceptance, E, NR,VU by KR at 10/6/2023 1550    Acceptance, E, NR by SD at 10/5/2023 1437    Acceptance, E, VU by CD at 10/2/2023 1502    Comment: SEE FLOWSHEET    Acceptance, E, NR by KR at 9/25/2023 1324    Acceptance, E, NR by KG1 at 9/18/2023 1403    Acceptance, E,TB, NR by AY at 9/15/2023 1254   Family Acceptance, E, NR by ML at 10/23/2023 1353    Acceptance, E, NR,VU by KR at  10/12/2023 1453    Acceptance, E, NR,VU by KR at 10/6/2023 1550    Acceptance, E, NR by SD at 10/5/2023 1437   Significant Other Acceptance, E, NR by CM at 10/10/2023 1517                         Point: Home exercise program (In Progress)       Learning Progress Summary             Patient Acceptance, E, NR by ML at 10/23/2023 1353    Acceptance, E, NR by KG at 10/15/2023 1401    Acceptance, E,D, NR by AB at 10/11/2023 1547    Acceptance, E, NR by CM at 10/10/2023 1517    Acceptance, E, NR by SD at 10/5/2023 1437    Acceptance, E, VU by CD at 10/2/2023 1502    Comment: SEE FLOWSHEET    Acceptance, E, NR by KG1 at 9/18/2023 1403   Family Acceptance, E, NR by ML at 10/23/2023 1353    Acceptance, E, NR by SD at 10/5/2023 1437   Significant Other Acceptance, E, NR by CM at 10/10/2023 1517                         Point: Body mechanics (In Progress)       Learning Progress Summary             Patient Acceptance, E,TB, NR by AY at 10/20/2023 1308    Acceptance, E, NR by KG at 10/15/2023 1401    Acceptance, E, NR,VU by KR at 10/12/2023 1453    Acceptance, E,D, NR by AB at 10/11/2023 1547    Acceptance, E, NR by CM at 10/10/2023 1517    Acceptance, E, NR,VU by KR at 10/6/2023 1550    Acceptance, E, NR by SD at 10/5/2023 1437    Acceptance, E, VU by CD at 10/2/2023 1502    Comment: SEE FLOWSHEET    Acceptance, E, NR by KR at 9/25/2023 1324    Acceptance, E, NR by KG1 at 9/18/2023 1403    Acceptance, E,TB, NR by AY at 9/15/2023 1254   Family Acceptance, E, NR,VU by KR at 10/12/2023 1453    Acceptance, E, NR,VU by KR at 10/6/2023 1550    Acceptance, E, NR by SD at 10/5/2023 1437   Significant Other Acceptance, E, NR by CM at 10/10/2023 1517                         Point: Precautions (In Progress)       Learning Progress Summary             Patient Acceptance, E, NR by ML at 10/23/2023 1353    Acceptance, E,TB, NR by AY at 10/20/2023 1308    Acceptance, E, NR by KG at 10/15/2023 1401    Acceptance, E, NR,VU by KR at 10/12/2023  1453    Acceptance, E,D, NR by AB at 10/11/2023 1547    Acceptance, E, NR by CM at 10/10/2023 1517    Acceptance, E, NR,VU by KR at 10/6/2023 1550    Acceptance, E, NR by SD at 10/5/2023 1437    Acceptance, E, VU by CD at 10/2/2023 1502    Comment: SEE FLOWSHEET    Acceptance, E, NR by KR at 9/25/2023 1324    Acceptance, E, NR by KG1 at 9/18/2023 1403    Acceptance, E,TB, NR by AY at 9/15/2023 1254   Family Acceptance, E, NR by ML at 10/23/2023 1353    Acceptance, E, NR,VU by KR at 10/12/2023 1453    Acceptance, E, NR,VU by KR at 10/6/2023 1550    Acceptance, E, NR by SD at 10/5/2023 1437   Significant Other Acceptance, E, NR by CM at 10/10/2023 1517                                         User Key       Initials Effective Dates Name Provider Type Discipline    CD 02/03/23 -  Claudia Duarte, PT Physical Therapist PT    SD 03/13/23 -  Chantel Scott, PT Physical Therapist PT    KG1 05/22/20 -  Mackenzie Mckay, PT Physical Therapist PT    KG 01/04/23 -  Monik Solorio Physical Therapist PT    AY 11/10/20 -  Aleshia Armstrong, PT Physical Therapist PT    ML 04/22/21 -  Laura Rangel Physical Therapist PT    AB 09/22/22 -  Aleshia Keita, PT Physical Therapist PT    CM 09/22/22 -  Yanira Shields, PT Physical Therapist PT    KR 12/30/22 -  Raissa Soria, PT Physical Therapist PT                  PT Recommendation and Plan     Plan of Care Reviewed With: patient, spouse  Progress: no change  Outcome Evaluation: Patient orientated to person only, decreased command following, decreased safety awareness and impaired cognition. The patient continues to present below baseline for mobility and would continue to benefit from skilled PT to address strength, balance and activity tolerance deficits. Continue current PT POC.     Time Calculation:         PT Charges       Row Name 10/23/23 1354             Time Calculation    Start Time 1300  -ML      PT Received On 10/23/23  -ML         Timed Charges    16176 - PT  Therapeutic Activity Minutes 23  -ML         Total Minutes    Timed Charges Total Minutes 23  -ML       Total Minutes 23  -ML                User Key  (r) = Recorded By, (t) = Taken By, (c) = Cosigned By      Initials Name Provider Type    Laura Betancur Physical Therapist                  Therapy Charges for Today       Code Description Service Date Service Provider Modifiers Qty    [id number]  PT THERAPEUTIC ACT EA 15 MIN 10/23/2023 Laura Rangel GP 2            PT G-Codes  Outcome Measure Options: AM-PAC 6 Clicks Basic Mobility (PT)  AM-PAC 6 Clicks Score (PT): 11  AM-PAC 6 Clicks Score (OT): 6  Modified Rae Scale: 4 - Moderately severe disability.  Unable to walk without assistance, and unable to attend to own bodily needs without assistance.  PT Discharge Summary  Anticipated Discharge Disposition (PT): skilled nursing facility    Laura Rangel  10/23/2023

## 2023-10-23 NOTE — PROGRESS NOTES
[facility] Medicine Services  PROGRESS NOTE    Patient Name: Kenneth Wen  : 1950  MRN: [id number]    Date of Admission: 9/15/2023  Primary Care Physician: Susan Powers APRN    Subjective   Subjective     CC: f/u CVA    HPI: Up in bed with nursing in room. Interactive and answers questions but still impulsive.      Objective   Objective     Vital Signs:   Temp:  [98.4 °F (36.9 °C)-98.5 °F (36.9 °C)] 98.5 °F (36.9 °C)  Heart Rate:  [] 80  Resp:  [16-17] 17  BP: (108-146)/(55-69) 146/69     Physical Exam:  Constitutional: No acute distress, awake, alert, restless  HENT: NCAT, mucous membranes moist  Respiratory: Clear to auscultation bilaterally, respiratory effort normal   Cardiovascular: RRR, no murmurs, rubs, or gallops  Gastrointestinal: Positive bowel sounds, soft, nontender, nondistended, PEG  Musculoskeletal: No bilateral ankle edema  Psychiatric: Appropriate affect, cooperative  Neurologic: Oriented x 3, strength symmetric in all extremities, Cranial Nerves grossly intact to confrontation, speech clear  Skin: No rashes     Results Reviewed:  LAB RESULTS:      Lab 10/23/23  0703   CRP <0.30         Lab 10/23/23  0703   SODIUM 137   POTASSIUM 4.4   CHLORIDE 101   CO2 27.0   ANION GAP 9.0   BUN 19   CREATININE 0.56*   EGFR 104.7   GLUCOSE 185*   CALCIUM 9.0   MAGNESIUM 2.2   PHOSPHORUS 3.3         Lab 10/23/23  0703   TOTAL PROTEIN 5.6*   ALBUMIN 3.5   GLOBULIN 2.1   ALT (SGPT) 44*   AST (SGOT) 29   BILIRUBIN 0.4   ALK PHOS 81             Lab 10/23/23  0703   CHOLESTEROL 80   TRIGLYCERIDES 48             Brief Urine Lab Results  (Last result in the past 365 days)        Color   Clarity   Blood   Leuk Est   Nitrite   Protein   CREAT   Urine HCG        09/15/23 1818 Yellow   Cloudy   Trace   Negative   Negative   Negative                   Microbiology Results Abnormal       Procedure Component Value - Date/Time    Blood Culture - Blood, Arm, Right [[id number]]   (Normal) Collected: 09/15/23 0212    Lab Status: Final result Specimen: Blood from Arm, Right Updated: 09/20/23 0230     Blood Culture No growth at 5 days    Blood Culture - Blood, Arm, Left [[id number]]  (Normal) Collected: 09/15/23 0212    Lab Status: Final result Specimen: Blood from Arm, Left Updated: 09/20/23 0230     Blood Culture No growth at 5 days            No radiology results from the last 24 hrs        Current medications:  Scheduled Meds:acetaminophen, 500 mg, Per PEG Tube, TID  atorvastatin, 80 mg, Per PEG Tube, Nightly  clopidogrel, 75 mg, Per PEG Tube, Daily  Diclofenac Sodium, 2 g, Topical, BID  donepezil, 10 mg, Per PEG Tube, Nightly  lansoprazole, 15 mg, Per PEG Tube, Q AM  Lidocaine, 1 patch, Transdermal, Q24H  melatonin, 10 mg, Per PEG Tube, Nightly  metoprolol tartrate, 25 mg, Per PEG Tube, Q12H  miconazole, 1 application , Topical, Q12H  mirtazapine, 30 mg, Per PEG Tube, Nightly  palliative care oral rinse, , Mouth/Throat, 4x Daily  ProSource No Carb, 30 mL, Per G Tube, Daily  QUEtiapine, 100 mg, Per PEG Tube, Daily  QUEtiapine, 200 mg, Per PEG Tube, Nightly  temazepam, 15 mg, Per PEG Tube, Nightly      Continuous Infusions:   PRN Meds:.  Calcium Replacement - Follow Nurse / BPA Driven Protocol    dextrose    glucagon (human recombinant)    ibuprofen    Magnesium Standard Dose Replacement - Follow Nurse / BPA Driven Protocol    ondansetron    Phosphorus Replacement - Follow Nurse / BPA Driven Protocol    Potassium Replacement - Follow Nurse / BPA Driven Protocol    QUEtiapine    ziprasidone    Assessment & Plan   Assessment & Plan     Active Hospital Problems    Diagnosis  POA    **Hemorrhagic CVA [I61.9]  Yes    Oropharyngeal dysphagia [R13.12]  Yes    Multiple bilateral CVAs [I63.9]  Yes    HFpEF [I50.30]  Yes    T2DM (type 2 diabetes mellitus) [E11.9]  Yes    HTN (hypertension) [I10]  Yes    GERD (gastroesophageal reflux disease) [K21.9]  Yes    ICH (intracerebral hemorrhage) [I61.9]  Yes     Dyslipidemia [E78.5]  Yes      Resolved Hospital Problems   No resolved problems to display.        Brief Hospital Course to date:  Kenneth Wen is a 73 y.o. male  with hx of HTN, HLD, T2DM, and GERD who presented to OSH with multiple acute ischemic infarcts of the bilateral cerebral and cerebellar hemispheres as well as left isaac. TTE/JOSIAS was negative PFO at OSH. On 9/13/23 he had worsening in mental status and new inability to move RLE, head CT showed evolution of the existing CVA with new development of petechial hemorrhage. DAPT was held for 24 hours per Neurology recommendations and repeat CT head that evening showed worsening effacement of the right quadrigeminal cistern with suspected increasing upward supratentorial pressure. He was then transferred to [facility] for [facility]surgery evaluation on 9/15/23. He was started on hypertonic saline 9/15/23 through 9/20/23 for cerebral edema. He had fevers with negative cultures but had worsening secretions and labored breathing so was started on Zosyn on 9/16/23. Transferred to the hospitalist service on 9/22/23. Pt demonstrated poor oral intake with elevated sodium levels; therefore, PEG tube was recommended & placed.         Goals of care  --The patient's wife is considering transition to hospice care, but wants to think about it over the weekend; will reassess on 10/23     Multiple bilateral posterior circulation strokes with hemorrhagic conversion  --Neurology has followed, suspect atheroembolic but cannot rule out cardioembolic given multiple vascular territories  --Plavix monotherapy, high intensity statin  --Needs Holter monitor at discharge  -- Patient remains very confused, agitated requiring two-point restraints.  Patient has fluctuations but for the most part he has not had any meaningful improvement.     Agitation  Encephalopathy  --appreciate general neurology input recs  --Donepezil 10 mg nightly, melatonin 10 mg nightly, mirtazapine 30 mg nightly,  Seroquel 100 mg daily, Seroquel 200 mg nightly, Restoril 15 mg nightly  --Continue as needed Geodon  -- Neurology is following and trying to help patient's sleep and decrease agitation     Dysphagia  Hypernatremia - resolved  --SLP recommends mechanical ground with honey thick liquids   --Risk of pulling out peg tube, abd binder to be in place at all times. --Surgery recommends to remain in restraints at all times or if off has to have sitter for next 2 weeks   -- dietary following for calorie count, adequate PO intake is limited by his mental status     HTN  --Continue Metoprolol 25 mg BID     GERD  --Continue PPI     COVID-19-resolved  --Overall asymptomatic and on room air  --No infiltrates seen and low procal  --Did not receive any treatment   --Off Isolation 9/30/2023      Leukocytosis-resolved  --Procalcitionin low at 0.05  --CXR and UA negative  --Blood cultures negative     Elevated LFT's, mild  --Possibly secondary to statin?  --Negative acute hepatitis panel  --repeat AST/ALT improved on 10/3    Expected Discharge Location and Transportation:   Expected Discharge   Expected Discharge Date: 10/27/2023; Expected Discharge Time:      DVT prophylaxis:  Mechanical DVT prophylaxis orders are present.     AM-PAC 6 Clicks Score (PT): 11 (10/23/23 0811)    CODE STATUS:   Code Status and Medical Interventions:   Ordered at: 09/29/23 0854     Medical Intervention Limits:    NO intubation (DNI)     Code Status (Patient has no pulse and is not breathing):    No CPR (Do Not Attempt to Resuscitate)     Medical Interventions (Patient has pulse or is breathing):    Limited Support       Ashley Avitia II, DO  10/23/23

## 2023-10-23 NOTE — DISCHARGE PLACEMENT REQUEST
"Louann Laboy (73 y.o. Male)       Date of Birth   1950    Social Security Number       Address   [address] [place] [id number]    Home Phone   [phone]    MRN   [id number]       [facility]    Marital Status                               Admission Date   9/15/23    Admission Type   Urgent    Admitting Provider   Ashley Avitia II, DO    Attending Provider   Ashley Avitia II, DO    Department, Room/Bed   [facility] 5B, N543/1       Discharge Date       Discharge Disposition       Discharge Destination                                 Attending Provider: Ashley Avitia II, DO    Allergies: Cefdinir    Isolation: None   Infection: COVID (History) (10/02/23)   Code Status: No CPR    Ht: 175.3 cm (69\")   Wt: 72.8 kg (160 lb 7.9 oz)    Admission Cmt: None   Principal Problem: Hemorrhagic CVA [I61.9]                   Active Insurance as of 9/15/2023       Primary Coverage       Payor Plan Insurance Group Employer/Plan Group    MEDICARE MEDICARE A ONLY        Payor Plan Address Payor Plan Phone Number Payor Plan Fax Number Effective Dates    [address] [phone]  3/1/2017 - None Entered    [facility] [address]         Subscriber Name Subscriber Birth Date Member ID       LOUANN LABOY 1950 [id number]               Secondary Coverage       Payor Plan Insurance Group Employer/Plan Group    [employer] - PHCS/MULTI [id number]       Payor Plan Address Payor Plan Phone Number Payor Plan Fax Number Effective Dates    [address] [phone]  7/1/2022 - None Entered    [facility] [id number]         Subscriber Name Subscriber Birth Date Member ID       LOUANN LABOY 1950 [id number]                     Emergency Contacts        (Rel.) Home Phone Work Phone Mobile Phone    BehzadReny (Spouse) [phone] -- [phone]    Steve Almonte (Son) [phone] -- [phone]      "         Emergency Contact Information       Name Relation Home Work Mobile    Reny Wen Spouse [phone]    Steve Almonte Son [phone]          Insurance Information                  MEDICARE/MEDICARE A ONLY Phone: [phone]    Subscriber: Kenneth Wen Subscriber#: [id number]    Group#: -- Precert#: --        [employer] - PHCS/MULTI Phone: [phone]    Subscriber: Kenneth Wen Subscriber#: [id number]    Group#: [id number] Precert#: --             History & Physical        Case, Lauren BRITO DO at 09/15/23 0523            INTENSIVIST   INITIAL HOSPITAL VISIT NOTE     Hospital:  LOS: 0 days     Mr. Kenneth Wen, 72 y.o. male is seen for a Chief Complaint of:      Hemorrhagic CVA    Dyslipidemia    Multiple bilateral CVAs    HFpEF    T2DM (type 2 diabetes mellitus)    HTN (hypertension)    GERD (gastroesophageal reflux disease)    ICH (intracerebral hemorrhage)    Subjective  S     Kenneth Wen is a 72 y.o. male who is admitted to [facility] as a transfer from [facility] 09/15/23 for higher level of care and management of hemorrhagic CVA.     Patient has a PMH of former smoker, HTN, T2DM, dyslipidemia, and GERD.     He was initially admitted to OSH on 9/10/23 after presenting to the ED for evaluation of AMS and slurred speech ultimately found to have multiple acute ischemic infarcts of the bilateral cerebral and cerebellar hemispheres as well as the left side of the isaac. 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 He was evaluated by Neurology was not a candidate for thrombolytics (2* being outside the timing window) nor endovascular intervention (no LVO).     Further workup at OSH included TTE with subsequent JOSIAS which were negative for thrombus or PFO, with preserved LVEF and  grade I diastolic dysfunction noted. EEG was negative for seizures and LP was benign.     Due to further alteration in mental status with new inability to move right leg CTH was repeated on 9/13 demonstrating evolution of existing CVA with new development of petechial hemorrhage. DAPT was held for 24 hrs per Neurology recommendations and repeat CTH obtained the following evening demonstrated worsening effacement of the right quadrigeminal cistern with upward supratentorial shift. Due to his further worsening with need for Neurosurgery evaluation our Stroke Navigator was contacted & patient was airlifted to [facility] for further management.     Time spent: 10 minutes  Electronically signed by Karyn Peters, TORY, APRN, 09/15/23, 1:31 AM EDT.      PMH: He  has a past medical history of Acid reflux, Cancer, Diabetes mellitus, GERD (gastroesophageal reflux disease) (09/15/2023), HTN (hypertension) (09/15/2023), Kidney disease, and T2DM (type 2 diabetes mellitus) (09/15/2023).   PSxH: He  has a past surgical history that includes Appendectomy; Nasal polyp surgery; and Hemorrhoid surgery.      Medications:  No current facility-administered medications on file prior to encounter.     Current Outpatient Medications on File Prior to Encounter   Medication Sig    aspirin 81 MG tablet Take 81 mg by mouth Daily.    fexofenadine-pseudoephedrine (ALLEGRA-D)  MG per 12 hr tablet Take 1 tablet by mouth 2 (Two) Times a Day.    lansoprazole (PREVACID) 15 MG capsule Take 15 mg by mouth Daily.    QNASL 80 MCG/ACT aerosol solution     RA ALLERGY RELIEF 180 MG tablet Take 180 mg by mouth Daily.       Allergies: He is allergic to cefdinir.   FH: His family history includes Heart attack in his father; No Known Problems in his brother, brother, mother, sister, and sister.   SH: He  reports that he quit smoking about 40 years ago. His smoking use included cigarettes. He has never used smokeless tobacco. He reports that he does not drink  alcohol and does not use drugs.     The patient's relevant past medical, surgical and social history were reviewed and updated in Epic as appropriate.     ROS (14):   Review of Systems   Unable to perform ROS: Mental status change     Objective  O     Vitals    No data recorded  BP  Min: 139/72  Max: 152/77  Pulse  Min: 89  Max: 97  No data recorded  SpO2  Min: 94 %  Max: 94 % No data recorded     I/O 24 hrs (7:00AM - 6:59 AM)  Intake/Output         09/14/23 0700 - 09/15/23 0659    Intake (ml) 340.3    Output (ml) 150    Net (ml) 190.3    Last Weight 79.2 kg (174 lb 9.7 oz)            Medications (drips):      Physical Examination    Telemetry: Normal sinus rhythm.   Constitutional:  No acute distress.  Sitting up in bed. NGT in place.    HEENT: Normocephalic and atraumatic.   Neck:  Normal range of motion. Neck supple.   No JVD present.   No thyromegaly.    Cardiovascular: Regular rate and rhythm.  No murmurs, rub or gallop.  No peripheral edema.   Respiratory: Normal respiratory effort.  Diminished.    Abdominal:  Soft. No masses.   Non-tender. No distension.   No hepatosplenomegaly.   MSK: Normal muscle strength and tone.   Extremities: No digital cyanosis or clubbing.   Skin: Skin is warm and dry.  No rashes, lesions or ulcers noted.    Neurological:   Arouses.    Dysarthria. Right sided weakness.    Psychiatric:  Unable to assess.      Interval: baseline  1a. Level of Consciousness: 1-->Not alert, but arousable by minor stimulation to obey, answer, or respond  1b. LOC Questions: 2-->Answers neither question correctly  1c. LOC Commands: 0-->Performs both tasks correctly  2. Best Gaze: 1-->Partial gaze palsy, gaze is abnormal in one or both eyes, but forced deviation or total gaze paresis is not present  3. Visual: 3-->Bilateral hemianopia (blind including cortical blindness)  4. Facial Palsy: 2-->Partial paralysis (total or near-total paralysis of lower face)  5a. Motor Arm, Left: 0-->No drift, limb holds 90 (or  45) degrees for full 10 secs  5b. Motor Arm, Right: 2-->Some effort against gravity, limb cannot get to or maintain (if cued) 90 (or 45) degrees, drifts down to bed, but has some effort against gravity  6a. Motor Leg, Left: 0-->No drift, leg holds 30 degree position for full 5 secs  6b. Motor Leg, Right: 2-->Some effort against gravity, leg falls to bed by 5 secs, but has some effort against gravity  7. Limb Ataxia: 0-->Absent  8. Sensory: 1-->Mild-to-moderate sensory loss, patient feels pinprick is less sharp or is dull on the affected side, or there is a loss of superficial pain with pinprick, but patient is aware of being touched  9. Best Language: 2-->Severe aphasia, all communication is through fragmentary expression, great need for inference, questioning, and guessing by the listener. Range of information that can be exchanged is limited, listener carries burden of. . . (see row details)  10. Dysarthria: 2-->Severe dysarthria, patients speech is so slurred as to be unintelligible in the absence of or out of proportion to any dysphasia, or is mute/anarthric  11. Extinction and Inattention (formerly Neglect): 0-->No abnormality    Total (NIH Stroke Scale): 18             Results from last 7 days   Lab Units 09/15/23  0212   SODIUM mmol/L 141   POTASSIUM mmol/L 3.2*   CO2 mmol/L 23.0   CREATININE mg/dL 0.76   MAGNESIUM mg/dL 2.0   PHOSPHORUS mg/dL 2.2*     Estimated Creatinine Clearance: 99.9 mL/min (by C-G formula based on SCr of 0.76 mg/dL).  Results from last 7 days   Lab Units 09/15/23  0212   ALK PHOS U/L 75   BILIRUBIN mg/dL 1.2   ALT (SGPT) U/L 27   AST (SGOT) U/L 34               Images:    Imaging Results (Last 24 Hours)       Procedure Component Value Units Date/Time    XR Abdomen KUB [[id number]] Collected: 09/15/23 0201     Updated: 09/15/23 0205    Narrative:      XR ABDOMEN KUB    Date of Exam: 9/15/2023 1:49 AM EDT    Indication: ng placement    Comparison: None available.    Findings:  There is a  nasogastric tube just into the stomach with the sideport at the GE junction. The bowel gas pattern is nonspecific.      Impression:      Impression:  NG tube in the stomach with the sideport at the GE junction.      Electronically Signed: Sp Light MD    9/15/2023 2:02 AM EDT    Workstation ID: [id number]    XR Chest 1 View [[id number]] Collected: 09/15/23 0200     Updated: 09/15/23 0204    Narrative:      XR CHEST 1 VW    Date of Exam: 9/15/2023 1:49 AM EDT    Indication: R/O Aspiration    Comparison: Chest radiograph dated February 3, 2011    Findings:  There is a nasogastric tube in the stomach with the sideport near the GE junction. There are no airspace consolidations. No pleural fluid. No pneumothorax. The pulmonary vasculature appears within normal limits. The cardiac and mediastinal silhouette   appear unremarkable. No acute osseous abnormality identified.      Impression:      Impression:  No active disease      Electronically Signed: Sp Light MD    9/15/2023 2:01 AM EDT    Workstation ID: [id number]    CT Head Without Contrast Stroke Protocol [[id number]] Collected: 09/15/23 0049     Updated: 09/15/23 0055    Narrative:      CT HEAD WO CONTRAST STROKE PROTOCOL    Date of Exam: 9/15/2023 12:43 AM EDT    Indication: Stroke, follow up.    Comparison: None available.    Technique: Axial CT images were obtained of the head without contrast administration.  Reconstructed coronal images were also obtained. Automated exposure control and iterative construction methods were used.    Scan Time: 12:46 a.m.    Findings:  There is low-attenuation in the left basal ganglia, the left aspect of the isaac, the bilateral occipital lobes and throughout the right greater than left aspect of the cerebellum extending into the right cerebellar peduncle. These findings are consistent   with extensive vasogenic edema/infarct.    There is no mass or mass effect. There are no abnormal extra-axial fluid collections or areas of acute  hemorrhage. There is bilateral ethmoid sinus disease. The mastoid air cells are clear.      Impression:      Impression:  Multiple areas of low-attenuation as described. Primarily this involves the posterior circulation including the occipital lobes bilaterally and the cerebellum greater on the right than the left as well as the left aspect of the isaac. Findings may be   secondary to infarction from the basilar artery given the multiple left and right vascular territories.        Electronically Signed: Sp Light MD    9/15/2023 12:52 AM EDT    Workstation ID: [id number]          ECG/EMG Results (last 24 hours)       ** No results found for the last 24 hours. **            I reviewed the patient's new laboratory and imaging results.  I independently reviewed the patient's new images.    Assessment & Plan  A / P     Mr. Wen is a 71yo M with a history of previous tobacco use, HTN, T2DM, HLD and GERD who presented to [facility] on 9/10/23 and was found to have multiple acute ischemic infarcts of the bilateral cerebral and cerebellar hemispheres as well as the left side of the isaac. He was outside the window for thrombolytics.     At the OSH, he underwent both a TTE and JOSIAS which were negative for PFO.     On 9/13/23, he had further decline in his mental status with a new inability to move his right leg and CT head was repeated and revealed evolution of the existing CVA with new development of petechial hemorrhage. DAPT was held for 24 hrs per Neurology recommendations and repeat CTH obtained the following evening demonstrated worsening effacement of the right quadrigeminal cistern with upward supratentorial shift. Due to his further worsening with need for Neurosurgery evaluation our Stroke Navigator was contacted & patient was airlifted to [facility] for further management.      He arrived to the Neuro ICU on the morning of 9/15/23. Current NIHSS is a 18.       Nutrition:   NPO Diet NPO Type: Strict  NPO  Advance Directives:   Code Status and Medical Interventions:   Ordered at: 09/15/23 0133     Code Status (Patient has no pulse and is not breathing):    CPR (Attempt to Resuscitate)     Medical Interventions (Patient has pulse or is breathing):    Full Support       Active Hospital Problems    Diagnosis     **Hemorrhagic CVA     Multiple bilateral CVAs     HFpEF     T2DM (type 2 diabetes mellitus)     HTN (hypertension)     GERD (gastroesophageal reflux disease)     ICH (intracerebral hemorrhage)     Dyslipidemia        Assessment / Plan:    Admit to Neuro ICU  Stroke orders per Neurology.   NPO. NGT in place.   Hold DAPT.  MRI Brain  Cardene for blood pressure control.  SSI for diabetes management  AM labs    High risk for decline secondary to CVA with resultant hemorrhage.     Plan of care and goals reviewed during interdisciplinary rounds.  I discussed the patient's findings and my recommendations with patient and nursing staff      Lauren Canada DO  Pulmonary and Critical Care Medicine    Electronically signed by Lauren Canada DO at 09/15/23 5727

## 2023-10-23 NOTE — PROGRESS NOTES
"Continued Stay Note  [facility]     Patient Name: Kenneth Wen  MRN: [id number]  Today's Date: 10/23/2023    Admit Date: 9/15/2023    Plan: TBD   Discharge Plan       Row Name 10/23/23 1805       Plan    Plan TBD    Plan Comments Patient resting peacefully at time of visit. Sitter at bedside. Hospice APRN and RN met with spouse outside of room to discuss GOC and hospice options. Discussed what outpatient hospice in a home or facility looks like. Also discussed inpatient hospice. Talked about tube feedings and she does not want to discontinue this at this time. She relays that he has confusion, but does have a lot of moments of clarity and lucidity where she says they talk about their lives and the places they have traveled. She states that she doesn't feel like \"he is there yet\" when it comes to hospice. Provided her with hospice number. Asked if she would like hospice team to follow up with her tomorrow or maybe the next day. She states that she will call us when she is ready. Hospice team will follow on the periphery. Have updated Dr. Avitia, palliative team, and nurse Daphne. For assist, please call ext [phone].                   Discharge Codes    No documentation.                 Expected Discharge Date and Time       Expected Discharge Date Expected Discharge Time    Oct 27, 2023               Chantel Das RN    "

## 2023-10-23 NOTE — CASE MANAGEMENT/SOCIAL WORK
Continued Stay Note  [facility]     Patient Name: Kenneth Wen  MRN: [id number]  Today's Date: 10/23/2023    Admit Date: 9/15/2023    Plan: Ongoing   Discharge Plan       Row Name 10/23/23 0822       Plan    Plan Ongoing    Plan Comments Pt continues to need 3 pt restraints due to agitation and trying to get out of bed.  Pt received PRN seroquel over the weekend.  SW continues to follow for dsicharge planning needs.  Pt must be out of restraints without the need of PRN medications for agitation for placement.    Final Discharge Disposition Code 30 - still a patient                   Discharge Codes    No documentation.                 Expected Discharge Date and Time       Expected Discharge Date Expected Discharge Time    Oct 27, 2023               JOEL Wiggins

## 2023-10-23 NOTE — PLAN OF CARE
Goal Outcome Evaluation:  Plan of Care Reviewed With: patient, spouse        Progress: no change  Outcome Evaluation: Pt continues with abdominal binder and wrist restraints to prevent harmful interference with lines/tubes; has been restless and agitated whenever not fairly sedated with parenteral interventions despite adjustments to enteral medications. Spouse tearful, verbalized understanding that this condition is likely not resolvable, per her discussion with Neuro earlier today. Wife agreeable to hospice consult, likely transition to comfort care. Wife unable to provide required level of care for pt at home, with her own recent health challenges. Hospice team to follow up regarding options. Palliative following for continued support to spouse in GOC/POC discussion.    1300 Palliative IDT meeting:  MD, APRN, RN,   After hours, weekends and holidays, contact Palliative Provider by calling [phone]     Problem: Palliative Care  Goal: Enhanced Quality of Life  Outcome: Ongoing, Progressing  Intervention: Promote Advance Care Planning  Flowsheets (Taken 10/23/2023 1515)  Life Transition/Adjustment: (Hospice consult per Dr. Rausch) other (see comments)  Intervention: Maximize Comfort  Flowsheets (Taken 10/23/2023 1515)  Pain Management Interventions: (medication adjustments for agitation per Neuro) other (see comments)  Intervention: Optimize Function  Flowsheets (Taken 10/23/2023 1515)  Sensory Stimulation Regulation: quiet environment promoted  Sleep/Rest Enhancement: family presence promoted  Intervention: Optimize Psychosocial Wellbeing  Flowsheets (Taken 10/23/2023 1515)  Spiritual Activities Assistance: ( visit for support to spouse) other (see comments)  Family/Support System Care:   caregiver stress acknowledged   involvement promoted   presence promoted   self-care encouraged   support provided

## 2023-10-23 NOTE — THERAPY TREATMENT NOTE
Patient Name: Kenneth Wen  : 1950    MRN: [id number]                              Today's Date: 10/23/2023       Admit Date: 9/15/2023    Visit Dx:     ICD-10-CM ICD-9-CM   1. Hemorrhagic CVA  I61.9 431   2. Aphasia  R47.01 784.3   3. Dysarthria  R47.1 784.51   4. Oropharyngeal dysphagia  R13.12 787.22     Patient Active Problem List   Diagnosis    Precordial pain    Elevated BP without diagnosis of hypertension    Dyslipidemia    Hyperglycemia    Multiple bilateral CVAs    Hemorrhagic CVA    HFpEF    T2DM (type 2 diabetes mellitus)    HTN (hypertension)    GERD (gastroesophageal reflux disease)    ICH (intracerebral hemorrhage)    Oropharyngeal dysphagia     Past Medical History:   Diagnosis Date    Acid reflux     Cancer     Skin    Diabetes mellitus     Prediabetes    GERD (gastroesophageal reflux disease) 09/15/2023    HTN (hypertension) 09/15/2023    Kidney disease     T2DM (type 2 diabetes mellitus) 09/15/2023     Past Surgical History:   Procedure Laterality Date    APPENDECTOMY      ENDOSCOPY W/ PEG TUBE PLACEMENT N/A 2023    Procedure: ESOPHAGOGASTRODUODENOSCOPY WITH PERCUTANEOUS ENDOSCOPIC GASTROSTOMY TUBE INSERTION;  Surgeon: Haseeb Carrillo MD;  [facility];  Service: General;  Laterality: N/A;    HEMORRHOIDECTOMY      NASAL POLYP SURGERY        General Information       Row Name 10/23/23 5230          OT Time and Intention    Document Type therapy note (daily note)  -     Mode of Treatment occupational therapy  -       Row Name 10/23/23 7496          General Information    Patient Profile Reviewed yes  -     Existing Precautions/Restrictions fall;seizures  R sided weakness/coordination deficits, agitated with B soft wrist restraints, PEG with abdominal binder  -     Barriers to Rehab medically complex;cognitive status  -       Row Name 10/23/23 2659          Cognition    Orientation Status (Cognition) oriented to;person  -       Row Name 10/23/23 6752           Safety Issues, Functional Mobility    Safety Issues Affecting Function (Mobility) awareness of need for assistance;insight into deficits/self-awareness;safety precaution awareness;safety precautions follow-through/compliance;sequencing abilities;ability to follow commands;judgment;problem-solving  -     Impairments Affecting Function (Mobility) balance;cognition;coordination;endurance/activity tolerance;grasp;motor control;motor planning;muscle tone abnormal;pain;visual/perceptual;strength;sensation/sensory awareness;range of motion (ROM);postural/trunk control  -     Cognitive Impairments, Mobility Safety/Performance attention;awareness, need for assistance;insight into deficits/self-awareness;impulsivity;judgment;problem-solving/reasoning;safety precaution awareness;safety precaution follow-through;sequencing abilities  -               User Key  (r) = Recorded By, (t) = Taken By, (c) = Cosigned By      Initials Name Provider Type     Jenny Rivera OT Occupational Therapist                     Mobility/ADL's       Row Name 10/23/23 1331          Bed Mobility    Bed Mobility sit-supine  -     Scooting/Bridging [place] (Bed Mobility) dependent (less than 25% patient effort);2 person assist;verbal cues  -     Sit-Supine [place] (Bed Mobility) maximum assist (25% patient effort);2 person assist;verbal cues  -     Bed Mobility, Safety Issues cognitive deficits limit understanding;decreased use of arms for pushing/pulling;decreased use of legs for bridging/pushing;impaired trunk control for bed mobility;unable to safely maintain weight bearing restrictions  -     Assistive Device (Bed Mobility) bed rails;draw sheet;head of bed elevated  -       Row Name 10/23/23 1331          Transfers    Transfers sit-stand transfer;stand-sit transfer  -       Row Name 10/23/23 1331          Sit-Stand Transfer    Sit-Stand [place] (Transfers) moderate assist (50% patient effort);2 person  assist;verbal cues  -     Assistive Device (Sit-Stand Transfers) other (see comments)  BUE support  -       Row Name 10/23/23 1331          Stand-Sit Transfer    Stand-Sit [place] (Transfers) moderate assist (50% patient effort);2 person assist;verbal cues  -     Assistive Device (Stand-Sit Transfers) other (see comments)  -       Row Name 10/23/23 1331          Activities of Daily Living    BADL Assessment/Intervention upper body dressing  -       Row Name 10/23/23 1331          Upper Body Dressing Assessment/Training    [place] Level (Upper Body Dressing) don;doff;other (see comments);maximum assist (25% patient effort);verbal cues  [facility] gown  -     Position (Upper Body Dressing) edge of bed sitting  -               User Key  (r) = Recorded By, (t) = Taken By, (c) = Cosigned By      Initials Name Provider Type     Jenny Rivera OT Occupational Therapist                   Obj/Interventions       [facility] Name 10/23/23 1332          Balance    Balance Assessment sitting static balance  -     Static Sitting Balance moderate assist;verbal cues  -     Position, Sitting Balance supported;sitting edge of bed  -     Static Standing Balance moderate assist;2-person assist;verbal cues  -     Position/Device Used, Standing Balance supported  -     Balance Interventions sitting;sit to stand;occupation based/functional task  -               User Key  (r) = Recorded By, (t) = Taken By, (c) = Cosigned By      Initials Name Provider Type     Jenny Rivera OT Occupational Therapist                   Goals/Plan    No documentation.                  Clinical Impression       [facility] Name 10/23/23 1333          Pain Assessment    Pretreatment Pain Rating 0/10 - no pain  -     Posttreatment Pain Rating 0/10 - no pain  -[facility] 10/23/23 1338          Plan of Care Review    Plan of Care Reviewed With patient;spouse  -     Progress no change  -     Outcome Evaluation Pt  presents w/ impaired cognition, generalized weakness (R>L), decreased functional nedurance, and balance deficits limiting his ADL independnece. Will continue to progress pt as tolerated per OT POC. Rec SNF at d/c.  -       Row Name 10/23/23 2550          Therapy Assessment/Plan (OT)    Rehab Potential (OT) fair, will monitor progress closely  -     Criteria for Skilled Therapeutic Interventions Met (OT) yes;skilled treatment is necessary  -     Therapy Frequency (OT) daily  -       Row Name 10/23/23 4485          Therapy Plan Review/Discharge Plan (OT)    Anticipated Discharge Disposition (OT) skilled nursing facility  -       Row Name 10/23/23 5973          Vital Signs    O2 Delivery Pre Treatment room air  -     O2 Delivery Intra Treatment room air  -     O2 Delivery Post Treatment room air  -     Pre Patient Position Sitting  -     Intra Patient Position Standing  -     Post Patient Position Supine  -       Row Name 10/23/23 1059          Positioning and Restraints    Pre-Treatment Position in bed  -     Post Treatment Position bed  -     In Bed supine;call light within reach;encouraged to call for assist;exit alarm on;side rails up x3  -     Restraints released:;reapplied:;notified nsg:;soft limb  -               User Key  (r) = Recorded By, (t) = Taken By, (c) = Cosigned By      Initials Name Provider Type    Jenny Black, LATIA Occupational Therapist                   Outcome Measures       Row Name 10/23/23 7520          How much help from another is currently needed...    Putting on and taking off regular lower body clothing? 1  -MC     Bathing (including washing, rinsing, and drying) 1  -MC     Toileting (which includes using toilet bed pan or urinal) 1  -MC     Putting on and taking off regular upper body clothing 1  -MC     Taking care of personal grooming (such as brushing teeth) 1  -MC     Eating meals 1  -MC     AM-PAC 6 Clicks Score (OT) 6  -       Row Name  10/23/23 0811          How much help from another person do you currently need...    Turning from your back to your side while in flat bed without using bedrails? 3  -BR     Moving from lying on back to sitting on the side of a flat bed without bedrails? 2  -BR     Moving to and from a bed to a chair (including a wheelchair)? 2  -BR     Standing up from a chair using your arms (e.g., wheelchair, bedside chair)? 2  -BR     Climbing 3-5 steps with a railing? 1  -BR     To walk in hospital room? 1  -BR     AM-PAC 6 Clicks Score (PT) 11  -BR     Highest level of mobility 4 --> Transferred to chair/commode  -BR       Row Name 10/23/23 1335          Functional Assessment    Outcome Measure Options AM-PAC 6 Clicks Daily Activity (OT)  -               User Key  (r) = Recorded By, (t) = Taken By, (c) = Cosigned By      Initials Name Provider Type    BR Lisa Whitten RN Registered Nurse    Jenny Black OT Occupational Therapist                    Occupational Therapy Education       Title: PT OT SLP Therapies (In Progress)       Topic: Occupational Therapy (In Progress)       Point: ADL training (In Progress)       Description:   Instruct learner(s) on proper safety adaptation and remediation techniques during self care or transfers.   Instruct in proper use of assistive devices.                  Learning Progress Summary             Patient Acceptance, E, NR by  at 10/23/2023 1335    Acceptance, E, NR by  at 10/19/2023 0939    Acceptance, E,D, NR by ORAL at 10/18/2023 0830    Acceptance, E, NR by MR at 10/12/2023 1508    Acceptance, E, NR by JG at 10/9/2023 1533    Acceptance, E, NR by MR at 10/6/2023 1549    Acceptance, E,D, NR,NL by CS at 10/4/2023 0948    Acceptance, E, NR by  at 9/28/2023 1556    Acceptance, E, NR by  at 9/25/2023 1045    Acceptance, E, NR by ERIN1 at 9/20/2023 1420    Acceptance, E, NR by  at 9/18/2023 1532    Acceptance, E, NR by  at 9/15/2023 1531   Family Acceptance, E,D,  NR by JY at 10/18/2023 0830    Acceptance, E, NR by MR at 10/12/2023 1508    Acceptance, E, NR by JG at 10/9/2023 1533    Acceptance, E, NR by MR at 10/6/2023 1549    Acceptance, E, NR by MR at 9/28/2023 1556                         Point: Home exercise program (In Progress)       Description:   Instruct learner(s) on appropriate technique for monitoring, assisting and/or progressing therapeutic exercises/activities.                  Learning Progress Summary             Patient Acceptance, E, NR by JR at 10/19/2023 0939    Acceptance, E,D, NR by JY at 10/18/2023 0830    Acceptance, E, NR by MR at 10/12/2023 1508    Acceptance, E, NR by MR at 10/6/2023 1549    Acceptance, E,D, NR,NL by  at 10/4/2023 0948    Acceptance, E, NR by MR at 9/28/2023 1556    Acceptance, E, NR by  at 9/25/2023 1045    Acceptance, E, NR by 1 at 9/20/2023 1420    Acceptance, E, NR by  at 9/18/2023 1532    Acceptance, E, NR by  at 9/15/2023 1531   Family Acceptance, E,D, NR by JY at 10/18/2023 0830    Acceptance, E, NR by MR at 10/12/2023 1508    Acceptance, E, NR by MR at 10/6/2023 1549    Acceptance, E, NR by MR at 9/28/2023 1556                         Point: Precautions (In Progress)       Description:   Instruct learner(s) on prescribed precautions during self-care and functional transfers.                  Learning Progress Summary             Patient Acceptance, E, NR by MC at 10/23/2023 1335    Acceptance, E,D, NR by JY at 10/18/2023 0830    Acceptance, E, NR by MR at 10/12/2023 1508    Acceptance, E, NR by JG at 10/9/2023 1533    Acceptance, E, NR by MR at 10/6/2023 1549    Acceptance, E,D, NR,NL by  at 10/4/2023 0948    Acceptance, E, NR by MR at 9/28/2023 1556    Acceptance, E, NR by CS1 at 9/20/2023 1420    Acceptance, E, NR by MC at 9/18/2023 1532    Acceptance, E, NR by MC at 9/15/2023 1531   Family Acceptance, E,D, NR by ORAL at 10/18/2023 0830    Acceptance, E, NR by  at 10/12/2023 1508    Acceptance, E, NR by ANATOLY at  10/9/2023 1533    Acceptance, E, NR by MR at 10/6/2023 1549    Acceptance, E, NR by MR at 9/28/2023 1556                         Point: Body mechanics (In Progress)       Description:   Instruct learner(s) on proper positioning and spine alignment during self-care, functional mobility activities and/or exercises.                  Learning Progress Summary             Patient Acceptance, E, NR by  at 10/23/2023 1335    Acceptance, E,D, NR by JY at 10/18/2023 0830    Acceptance, E, NR by MR at 10/12/2023 1508    Acceptance, E, NR by JG at 10/9/2023 1533    Acceptance, E, NR by MR at 10/6/2023 1549    Acceptance, E,D, NR,NL by  at 10/4/2023 0948    Acceptance, E, NR by MR at 9/28/2023 1556    Acceptance, E, NR by [facility] at 9/20/2023 1420    Acceptance, E, NR by  at 9/18/2023 1532    Acceptance, E, NR by  at 9/15/2023 1531   Family Acceptance, E,D, NR by JY at 10/18/2023 0830    Acceptance, E, NR by MR at 10/12/2023 1508    Acceptance, E, NR by JG at 10/9/2023 1533    Acceptance, E, NR by MR at 10/6/2023 1549    Acceptance, E, NR by MR at 9/28/2023 1556                                         User Key       Initials Effective Dates Name Provider Type Discipline     02/03/23 -  Jess Carrillo OT Occupational Therapist OT    CS1 09/02/21 -  Carmen Carr, OT Occupational Therapist OT    JY 06/16/21 -  Ofelia Armstrong OT Occupational Therapist OT     06/16/21 -  Jim Judge, OT Occupational Therapist OT     10/14/22 -  Jenny Rivera, OT Occupational Therapist OT    MR 09/22/22 -  Meera Doyle, OT Occupational Therapist OT    JG 08/30/23 -  Power Herman, OT Student OT Student OT                  OT Recommendation and Plan  Planned Therapy Interventions (OT): activity tolerance training, adaptive equipment training, BADL retraining, cognitive/visual perception retraining, edema control/reduction, functional balance retraining, IADL retraining, neuromuscular control/coordination retraining,  occupation/activity based interventions, passive ROM/stretching, patient/caregiver education/training, strengthening exercise, ROM/therapeutic exercise, transfer/mobility retraining, orthotic fabrication/fitting/training  Therapy Frequency (OT): daily  Plan of Care Review  Plan of Care Reviewed With: patient, spouse  Progress: no change  Outcome Evaluation: Pt presents w/ impaired cognition, generalized weakness (R>L), decreased functional nedurance, and balance deficits limiting his ADL independnece. Will continue to progress pt as tolerated per OT POC. Rec SNF at d/c.     Time Calculation:   Evaluation Complexity (OT)  Review Occupational Profile/Medical/Therapy History Complexity: expanded/moderate complexity  Assessment, Occupational Performance/Identification of Deficit Complexity: 3-5 performance deficits  Clinical Decision Making Complexity (OT): detailed assessment/moderate complexity  Overall Complexity of Evaluation (OT): moderate complexity     Time Calculation- OT       Row Name 10/23/23 1335             Time Calculation- OT    OT Start Time 1316  -MC      OT Received On 10/23/23  -      OT Goal Re-Cert Due Date 10/28/23  -         Timed Charges    51742 - OT Therapeutic Activity Minutes 6  -MC      89088 - OT Self Care/Mgmt Minutes 2  -MC         Total Minutes    Timed Charges Total Minutes 8  -MC       Total Minutes 8  -MC                User Key  (r) = Recorded By, (t) = Taken By, (c) = Cosigned By      Initials Name Provider Type     Jenny Rivera OT Occupational Therapist                  Therapy Charges for Today       Code Description Service Date Service Provider Modifiers Qty    [id number]  OT THERAPEUTIC ACT EA 15 MIN 10/23/2023 Jenny Rivera OT GO 1                 Jenny Rivera OT  10/23/2023

## 2023-10-23 NOTE — PLAN OF CARE
Goal Outcome Evaluation:  Plan of Care Reviewed With: patient, spouse        Progress: no change  Outcome Evaluation: VSS on RA. PRN seroquel given. Wife at bedside at beginning of shift. Pt remains occasionally restless and agitated during q2hr turns and incontinence care. Purewick applied, removed by pt. No oral intake, tolerating bolus feedings via PEG tube well, secured w/ x2 abd binders. Pt resting well in between care w/ 3pt (RUE, LUE, LLE) soft restraints. Sitter remains at bedside. Order to be renewed at 0637.

## 2023-10-23 NOTE — PROGRESS NOTES
Clinical Nutrition   Nutrition Support Assessment  Reason for Visit: Follow-up protocol, EN/PO    Patient Name: Kenneth Wen  YOB: 1950  MRN: [id number]  Date of Encounter: 10/23/23 10:08 EDT  Admission date: 9/15/2023    Comments:     Pt tolerating bolus feeds per RN. Continues with minimal PO acceptance. Pt continues to require restraints due to pulling at PEG tube.      Continue bolus feeds as ordered with PO comfort diet vs for nutritional intake.      Nutrition Assessment   Admission Diagnosis:  ICH (intracerebral hemorrhage) [I61.9]      Problem List:    Hemorrhagic CVA    Dyslipidemia    Multiple bilateral CVAs    HFpEF    T2DM (type 2 diabetes mellitus)    HTN (hypertension)    GERD (gastroesophageal reflux disease)    ICH (intracerebral hemorrhage)    Oropharyngeal dysphagia        PMH:  He  has a past medical history of Acid reflux, Cancer, Diabetes mellitus, GERD (gastroesophageal reflux disease) (09/15/2023), HTN (hypertension) (09/15/2023), Kidney disease, and T2DM (type 2 diabetes mellitus) (09/15/2023).    PSH: He  has a past surgical history that includes Appendectomy; Nasal polyp surgery; Hemorrhoid surgery; and Esophagogastroduodenoscopy w/ PEG (N/A, 9/29/2023).      Applicable Nutrition Concerns:   Skin:  Oral:  GI: PEG (9/29)      Applicable Interval History:   9/16 3% Hypertonic saline initiated  9/15 FEES:  SLP Swallowing Diagnosis: mod-severe, oral dysphagia, severe, pharyngeal dysphagia (09/15/23 1331)  SLP Diet Recommendation: NPO, temporary alternate methods of nutrition/hydration, other (see comments) (okay for 2-3 ice chips per hour as tolerated)   9/23-SLP Diet Recommendation: puree, honey thick liquids.  9/29- PEG tube placed. Consult for tube feeding assessment.   10/4: [facility] - SLP Diet Recommendation: puree, honey thick liquids, no mixed consistencies   10/16: SLP Diet Recommendation: puree, honey thick liquids, no mixed consistencies (10/16/23  2981)  Recommended Precautions and Strategies: upright posture during/after eating, small bites of food and sips of liquid, no straw, alternate between small bites of food and sips of liquid, general aspiration precautions, 1:1 supervision  Recommended Diagnostics: [place] [facility] [place]     Reported/Observed/Food/Nutrition Related History:     10/20  RN at bedside states pt didn't eat any of breakfast. Pt asking for fresh fruit but does not want it pureed. Tolerating bolus feeds per RN.     10/16  Pt tolerating bolus feeds per RN. Continues with minimal PO acceptance. Pt continues to require restraints due to pulling at PEG tube. Documented BM x2 in past 24hrs.      10/13  Pt continues to have poor PO intake regardless of nocturnal feeds and addition of Remeron. Currently requiring restraints again. Per spouse pt with difficult night and did not wish to wake for lunch. Discussed option to change to bolus feeds and meet 100% of needs and allow for PO intake for comfort only. Spouse agreeable.     10/9  Spouse wanting to cont nocturnal feeding to be able to try p.o feeding. Seemed improved from yesterday. Will change hrs of delivery to 16.    10/8  Pt with meal refusal x2 on 10/7. With refusal, nocturnal feeds inappropriate to meet estimated needs. Will continue calorie count through today and re-evaluate Monday if need to return to continuous feeds. Palliative continues following for GOC.      10/7  Pt with noted improved PO yesterday however remains inadequate to d/c nocturnal feeds at this time. Will continue calorie count for 2 more days.      10/6  Pt continues with inconsistent intake. Continues to require restraints to prevent pulling PEG regardless of abdominal binder. Spouse present at all meals to assist. Observed increased acceptance of lunch however was not alert enough for breakfast.     10/4  Pt with improved PO at lunch today however per spouse did not eat at breakfast. Suspect due to working  with therapies at breakfast time. Tolerated nocturnal regimen. MBS completed - SLP recs puree, honey thick liquids, no mixed consistencies.      10/3  Tolerating feeds at goal. Pt with ~25% at meals being fed by spouse - ? If able to improve PO intake with transition to nocturnal feeds. Discussed with spouse, agreeable to try.     10/1   EN ordered yesterday, started via PEG tube.  @ goal rate this morning and being tolerated. Patient also with order for a diet.  Able to reach patient wife over the phone to get a better sense of patient intake. Patient wife reports she was present for dinner meal yesterday and patient only ate bites of applesauce.  Overall not much intake. Reports patient has been a little sleepy past couple of days and no showing that much interest in eating.  We discussed current EN Rx.  We felt it would be best to keep EN continuous vs nocturnal at this time.  Can switch to nocturnal if the events intake improves.     9/30  Consult for tube feeding assessment.  Overall issues with sodium levels, fluid intake and confusion. Was getting IVF, however pulled out IV.  Wife asked for a PEG tube placement on 9/28.      Patient in COVID isolation, RD not able to visit in person.  No diet this morning (was made NPO for PEG tube placement). Discussed with RN re-order previous order. RD will start tube feeding.       9/25  Spoke w/RN who reports pt pulled out NGT Friday evening. RN states pt eating >/=75% of trays. Textures downgraded 9/23 to pureed.     9/22  Pt on diet though ate only sausage and some juice this am. RN states pt pocketing food still, had been happening in ICU per previous RD note. Discussed w/SLP.     9/18 RN reports pt on 3% saline therapy Na+ goal 145-155, Na+ w/I range, pt from OSH sent here d/t hemorrhagic conversion continues to have R weakness, confusion, tolerating TF. Uncertain if pt should have water flushes, these were dc'd after discussion w/ MD.     9/15  Per RN unclear if  "will start EN or if pt may progress to caden diet at this time.  No wt hx available today.     Anthropometrics     Admission Height 175.3 cm (69\") Documented at 09/15/2023 0122   Admission Weight 79.2 kg (174 lb 9.7 oz) Documented at 09/15/2023 0122     Height: Height: 175.3 cm (69\")  Last Filed Weight: Weight: 72.8 kg (160 lb 7.9 oz) (09/21/23 0500)  Method: Weight Method: Bed scale  BMI: BMI (Calculated): 23.7  BMI classification: Overweight: 25.0-29.9kg/m2      9/15/2023 9/17/2023   Weight     Weight (kg) 79.2 kg  82.5 kg    Weight (lbs) 174 lb 9.7 oz  181 lb 14.1 oz    Weight Method Bed scale  Bed scale        UBW: Per  lbs on 9/14/23  Note 172 lbs in 2018  Weight change: uncertain at this time    Labs    Labs Reviewed: Yes     Results from last 7 days   Lab Units 10/23/23  0703   GLUCOSE mg/dL 185*   BUN mg/dL 19   CREATININE mg/dL 0.56*   SODIUM mmol/L 137   CHLORIDE mmol/L 101   POTASSIUM mmol/L 4.4   PHOSPHORUS mg/dL 3.3   MAGNESIUM mg/dL 2.2   ALT (SGPT) U/L 44*       Results from last 7 days   Lab Units 10/23/23  0703   ALBUMIN g/dL 3.5   CRP mg/dL <0.30   CHOLESTEROL mg/dL 80   TRIGLYCERIDES mg/dL 48       Results from last 7 days   Lab Units 10/23/23  0549 10/22/23  2345 10/22/23  1753 10/22/23  1146 10/22/23  0558 10/21/23  2325   GLUCOSE mg/dL 137* 135* 100 157* 130 188*     Lab Results   Lab Value Date/Time    HGBA1C 6.40 (H) 09/15/2023 0212               Medications    Medications Reviewed: Yes  Pertinent: insulin, prevacid, remeron, protonix   Infusion:   PRN:     Needs Assessment   Date: 9/30  **CBW similar to last used for calculation    Height used:Height: 175.3 cm (69\")  Weights used: 160lb/72.7kg      Estimated Calorie needs: ~ 1900 Kcal/day  Method:  Kcals/KG 25= 1818  Method:  MSJ 1479 x 1.3= 1923    Estimated Protein needs: ~ 95g PRO/day  Method: 1.3g/Kg= 95    Estimated Fluid needs: ~ ml/day   Per clinical status    Current Nutrition Prescription      PO: Diet: Regular/House Diet; No " Straw, Feeding Assistance - Nursing, No Mixed Consistencies; Texture: Pureed (NDD 1); Fluid Consistency: Honey Thick   Oral Nutrition Supplement: Magic cup 2x daily   Intake: 37.5% x last 4 meals documented    EN: IsoSource 1.5  Total Cartons: 5  Bolus Regimen: 1 carton (250mL) 5x/d - ~q3hrs between 6a and 9p  Water Flush: 75mL before and after each bolus  Modular: Prosource -no carb 1/day  Route: PEG  Tube: Unknown     At goal over: bolus feeds     Rx will supply:   Goal Volume 1250 mL/day       Flush Volume 750 mL/day       Energy 1935 Kcal/day 102 % Est Need   Protein 100 g/day 100 % Est Need   Fiber 19 g/day       Water in   mL       Total Water 1700 mL       Meet DRI Yes            --------------------------------------------------------------------------  Product/Rate verified at bedside: No  Infusing Rate at time of visit: bolus feeds     Average Delivery from [place] Day:  Volume 1250 mL/day 100  % Goal Vol.   Flush Volume 750 mL/day     Energy  Kcal/day  % Est Need   Protein  g/day  % Est Need   Fiber  g/day     Water in  EN  mL     Total Water  mL     Meet DRI Yes        Intake/Ouptut 24 hrs (07 - 07)   I&O's Reviewed: Yes     Intake & Output (last day)         10/22 0701  10/23 0700 10/23 0701  10/24 07    P.O. 0     Other 750 150    NG/GT 1250 250    Total Intake(mL/kg) 2000 (27.5) 400 (5.5)    Urine (mL/kg/hr) 75 (0)     Total Output 75     Net +1925 +400          Urine Unmeasured Occurrence 5 x 1 x    Stool Unmeasured Occurrence  1 x            Nutrition Diagnosis   Date: 9/15 Updated: , , 10/9, 10/13  Problem Inadequate oral intake    Etiology stroke   Signs/Symptoms NPO w ice chips per SLP, confusion, +EN   Status: ongoing - plan to meet 100% of need via EN    Date:   Updated:  Problem Swallowing difficulty/chewing difficulty   Etiology Bilateral strokes   Signs/Symptoms Oral-pharyngeal dysphagia per SLP FEES eval   Status: ongoing    Goal:   General: Nutrition support  treatment  PO: Increase intake  EN/PN: Maintain EN, Deliver estimated needs      Nutrition Intervention      Follow treatment progress, Care plan reviewed    Continue bolus feeds as ordered with PO comfort diet vs for nutritional intake.     onitoring/Evaluation:   Per protocol, I&O, PO intake, Supplement intake, Pertinent labs, EN delivery/tolerance, Weight, Symptoms, Swallow function      Liset Webster, MS,RD,LD  Time Spent: 15 min

## 2023-10-23 NOTE — NURSING NOTE
Continues to have extended bouts of agitation and restlessness. Well able to hoist self to side and middle of bed in 3 point restraints. Geodon given early this am with moderate results. Bathed and changed after fecal incontinence.Continue to monitor 1/1.

## 2023-10-23 NOTE — PLAN OF CARE
Goal Outcome Evaluation:  Plan of Care Reviewed With: patient, spouse        Progress: no change  Outcome Evaluation: Patient orientated to person only, decreased command following, decreased safety awareness and impaired cognition. The patient continues to present below baseline for mobility and would continue to benefit from skilled PT to address strength, balance and activity tolerance deficits. Continue current PT POC.      Anticipated Discharge Disposition (PT): skilled nursing facility

## 2023-10-24 LAB
GLUCOSE BLDC GLUCOMTR-MCNC: 106 MG/DL (ref 70–130)
GLUCOSE BLDC GLUCOMTR-MCNC: 172 MG/DL (ref 70–130)
GLUCOSE BLDC GLUCOMTR-MCNC: 182 MG/DL (ref 70–130)

## 2023-10-24 PROCEDURE — 25010000002 ZIPRASIDONE MESYLATE PER 10 MG: Performed by: PSYCHIATRY & NEUROLOGY

## 2023-10-24 PROCEDURE — 99231 SBSQ HOSP IP/OBS SF/LOW 25: CPT | Performed by: PSYCHIATRY & NEUROLOGY

## 2023-10-24 PROCEDURE — 82948 REAGENT STRIP/BLOOD GLUCOSE: CPT

## 2023-10-24 PROCEDURE — 92526 ORAL FUNCTION THERAPY: CPT

## 2023-10-24 PROCEDURE — 99232 SBSQ HOSP IP/OBS MODERATE 35: CPT | Performed by: INTERNAL MEDICINE

## 2023-10-24 RX ORDER — PHENOBARBITAL 32.4 MG/1
32.4 TABLET ORAL EVERY 8 HOURS PRN
Status: DISCONTINUED | OUTPATIENT
Start: 2023-10-24 | End: 2023-10-25

## 2023-10-24 RX ORDER — LORAZEPAM 2 MG/ML
0.5 INJECTION INTRAMUSCULAR ONCE
Status: DISCONTINUED | OUTPATIENT
Start: 2023-10-24 | End: 2023-10-24

## 2023-10-24 RX ORDER — LORAZEPAM 0.5 MG/1
0.5 TABLET ORAL ONCE
Status: COMPLETED | OUTPATIENT
Start: 2023-10-24 | End: 2023-10-24

## 2023-10-24 RX ORDER — HYDROCODONE BITARTRATE AND ACETAMINOPHEN 5; 325 MG/1; MG/1
1 TABLET ORAL EVERY 12 HOURS
Status: DISCONTINUED | OUTPATIENT
Start: 2023-10-24 | End: 2023-10-25

## 2023-10-24 RX ADMIN — CLOPIDOGREL BISULFATE 75 MG: 75 TABLET ORAL at 10:50

## 2023-10-24 RX ADMIN — LIDOCAINE 1 PATCH: 4 PATCH TOPICAL at 10:53

## 2023-10-24 RX ADMIN — MICONAZOLE NITRATE 1 APPLICATION: 20 CREAM TOPICAL at 20:55

## 2023-10-24 RX ADMIN — QUETIAPINE FUMARATE 25 MG: 25 TABLET ORAL at 10:50

## 2023-10-24 RX ADMIN — ACETAMINOPHEN 500 MG: 650 SOLUTION ORAL at 10:49

## 2023-10-24 RX ADMIN — Medication 10 MG: at 18:31

## 2023-10-24 RX ADMIN — Medication 30 ML: at 10:53

## 2023-10-24 RX ADMIN — MINERAL OIL: 1000 LIQUID ORAL at 14:15

## 2023-10-24 RX ADMIN — MICONAZOLE NITRATE 1 APPLICATION: 20 CREAM TOPICAL at 10:53

## 2023-10-24 RX ADMIN — MIRTAZAPINE 30 MG: 15 TABLET, FILM COATED ORAL at 18:31

## 2023-10-24 RX ADMIN — IBUPROFEN 400 MG: 100 SUSPENSION ORAL at 01:43

## 2023-10-24 RX ADMIN — LANSOPRAZOLE 15 MG: 15 TABLET, ORALLY DISINTEGRATING ORAL at 05:25

## 2023-10-24 RX ADMIN — HYDROCODONE BITARTRATE AND ACETAMINOPHEN 1 TABLET: 5; 325 TABLET ORAL at 14:15

## 2023-10-24 RX ADMIN — METOPROLOL TARTRATE 25 MG: 25 TABLET, FILM COATED ORAL at 10:50

## 2023-10-24 RX ADMIN — LORAZEPAM 0.5 MG: 0.5 TABLET ORAL at 01:44

## 2023-10-24 RX ADMIN — MINERAL OIL: 1000 LIQUID ORAL at 10:50

## 2023-10-24 RX ADMIN — DONEPEZIL HYDROCHLORIDE 10 MG: 10 TABLET, FILM COATED ORAL at 18:31

## 2023-10-24 RX ADMIN — QUETIAPINE FUMARATE 100 MG: 100 TABLET ORAL at 05:26

## 2023-10-24 RX ADMIN — DICLOFENAC SODIUM 2 G: 9.3 GEL TOPICAL at 10:51

## 2023-10-24 RX ADMIN — FLUTICASONE PROPIONATE 2 SPRAY: 50 SPRAY, METERED NASAL at 10:51

## 2023-10-24 RX ADMIN — PHENOBARBITAL 32.4 MG: 32.4 TABLET ORAL at 15:20

## 2023-10-24 RX ADMIN — ATORVASTATIN CALCIUM 80 MG: 40 TABLET, FILM COATED ORAL at 20:56

## 2023-10-24 RX ADMIN — QUETIAPINE FUMARATE 200 MG: 100 TABLET ORAL at 18:31

## 2023-10-24 RX ADMIN — ZIPRASIDONE MESYLATE 10 MG: 20 INJECTION, POWDER, LYOPHILIZED, FOR SOLUTION INTRAMUSCULAR at 17:15

## 2023-10-24 RX ADMIN — DICLOFENAC SODIUM 2 G: 9.3 GEL TOPICAL at 20:56

## 2023-10-24 RX ADMIN — TEMAZEPAM 15 MG: 15 CAPSULE ORAL at 18:31

## 2023-10-24 RX ADMIN — MINERAL OIL: 1000 LIQUID ORAL at 17:06

## 2023-10-24 NOTE — THERAPY TREATMENT NOTE
Acute Care - Speech Language Pathology   Swallow Treatment Note [facility]     Patient Name: Kenneth Wen  : 1950  MRN: [id number]  Today's Date: 10/24/2023               Admit Date: 9/15/2023    Visit Dx:     ICD-10-CM ICD-9-CM   1. Hemorrhagic CVA  I61.9 431   2. Aphasia  R47.01 784.3   3. Dysarthria  R47.1 784.51   4. Oropharyngeal dysphagia  R13.12 787.22     Patient Active Problem List   Diagnosis    Precordial pain    Elevated BP without diagnosis of hypertension    Dyslipidemia    Hyperglycemia    Multiple bilateral CVAs    Hemorrhagic CVA    HFpEF    T2DM (type 2 diabetes mellitus)    HTN (hypertension)    GERD (gastroesophageal reflux disease)    ICH (intracerebral hemorrhage)    Oropharyngeal dysphagia     Past Medical History:   Diagnosis Date    Acid reflux     Cancer     Skin    Diabetes mellitus     Prediabetes    GERD (gastroesophageal reflux disease) 09/15/2023    HTN (hypertension) 09/15/2023    Kidney disease     T2DM (type 2 diabetes mellitus) 09/15/2023     Past Surgical History:   Procedure Laterality Date    APPENDECTOMY      ENDOSCOPY W/ PEG TUBE PLACEMENT N/A 2023    Procedure: ESOPHAGOGASTRODUODENOSCOPY WITH PERCUTANEOUS ENDOSCOPIC GASTROSTOMY TUBE INSERTION;  Surgeon: Haseeb Carrillo MD;  Location: [facility] ENDOSCOPY;  Service: General;  Laterality: N/A;    HEMORRHOIDECTOMY      NASAL POLYP SURGERY         SLP Recommendation and Plan     SLP Diet Recommendation: puree, honey thick liquids, no mixed consistencies (10/24/23 1540)  Recommended Precautions and Strategies: upright posture during/after eating, small bites of food and sips of liquid, no straw, alternate between small bites of food and sips of liquid, general aspiration precautions, 1:1 supervision (10/24/23 1540)  SLP Rec. for Method of Medication Administration: meds crushed, with puree (10/24/23 1540)     Monitor for Signs of Aspiration: notify SLP if any concerns (10/24/23 1540)        Anticipated Discharge  Disposition (SLP): skilled nursing facility (10/24/23 1540)     Therapy Frequency (Swallow): 3 days per week (10/24/23 1540)  Predicted Duration Therapy Intervention (Days): until discharge (10/24/23 1540)  Oral Care Recommendations: Oral Care BID/PRN, Suction toothbrush (10/24/23 1540)        Daily Summary of Progress (SLP): progress toward functional goals as expected (10/24/23 1540)               Treatment Assessment (SLP): no clinical signs of, aspiration (w/ modified diet) (10/24/23 1540)  Treatment Assessment Comments (SLP): Able to tolerate few bites of puree & sips of honey-thick w/o growing agitated. No overt clinical s/sxs aspiration w/ modified diet. Appears safe to continue. SLP will continue to follow for tx as able. (10/24/23 1540)  Plan for Continued Treatment (SLP): continue treatment per plan of care (10/24/23 1540)       Oral Care Recommendations: Oral Care BID/PRN, Suction toothbrush (10/24/23 1540)    Plan of Care Reviewed With: patient, spouse  Progress: no change      SWALLOW EVALUATION (last 72 hours)       SLP Adult Swallow Evaluation       Row Name 10/24/23 1540                   Rehab Evaluation    Document Type therapy note (daily note)  -MP        Subjective Information no complaints  -MP        Patient Observations alert  -MP        Patient/Family/Caregiver Comments/Observations Spouse present  -MP        Patient Effort adequate  -MP           Pain    Additional Documentation Pain Scale: FACES Pre/Post-Treatment (Group)  -MP           Pain Scale: FACES Pre/Post-Treatment    Pain: FACES Scale, Pretreatment 0-->no hurt  -MP        Posttreatment Pain Rating 0-->no hurt  -MP           SLP Treatment Clinical Impressions    Treatment Assessment (SLP) no clinical signs of;aspiration  w/ modified diet  -MP        Treatment Assessment Comments (SLP) Able to tolerate few bites of puree & sips of honey-thick w/o growing agitated. No overt clinical s/sxs aspiration w/ modified diet. Appears safe to  continue. SLP will continue to follow for tx as able.  -MP        Daily Summary of Progress (SLP) progress toward functional goals as expected  -MP        Barriers to Overall Progress (SLP) Cognitive status;Medically complex  -MP        Plan for Continued Treatment (SLP) continue treatment per plan of care  -MP        Care Plan Review care plan/treatment goals reviewed;patient/other agree to care plan  -MP        Care Plan Review, Other Participant(s) spouse  -MP           Recommendations    Therapy Frequency (Swallow) 3 days per week  -MP        Predicted Duration Therapy Intervention (Days) until discharge  -MP        SLP Diet Recommendation puree;honey thick liquids;no mixed consistencies  -MP        Recommended Precautions and Strategies upright posture during/after eating;small bites of food and sips of liquid;no straw;alternate between small bites of food and sips of liquid;general aspiration precautions;1:1 supervision  -MP        Oral Care Recommendations Oral Care BID/PRN;Suction toothbrush  -MP        SLP Rec. for Method of Medication Administration meds crushed;with puree  -MP        Monitor for Signs of Aspiration notify SLP if any concerns  -MP        Anticipated Discharge Disposition (SLP) skilled nursing facility  -MP                  User Key  (r) = Recorded By, (t) = Taken By, (c) = Cosigned By      Initials Name Effective Dates    MP Aldo Harris, MS CCC-SLP 12/28/21 -                     EDUCATION  The patient has been educated in the following areas:   Dysphagia (Swallowing Impairment) Modified Diet Instruction.        SLP GOALS       Row Name 10/24/23 5117             (LTG) Patient will demonstrate functional swallow for    Diet Texture (Demonstrate functional swallow) soft to chew (chopped) textures  -MP      Liquid viscosity (Demonstrate functional swallow) nectar/ mildly thick liquids  -MP      [place] (Demonstrate functional swallow) with minimal cues (75-90% accuracy)  -MP       Time Frame (Demonstrate functional swallow) by discharge  -MP      Barriers (Demonstrate functional swallow) cognition  -MP      Progress/Outcomes (Demonstrate functional swallow) progress slower than expected  -MP         (STG) Patient will tolerate trials of    Consistencies Trialed (Tolerate trials) pureed textures;honey/ moderately thick liquids  -MP      Desired Outcome (Tolerate trials) without signs/symptoms of aspiration;without signs of distress;with adequate oral prep/transit/clearance;with use of compensatory strategies (see comments)  -MP      [place] (Tolerate trials) with 1:1 assist/ supervision  -MP      Time Frame (Tolerate trials) by discharge  -MP      Progress/Outcomes (Tolerate trials) continuing progress toward goal  -MP      Comment (Tolerate trials) No overt clinical s/sxs w/ puree, honey-thick  -MP         (STG) Patient will tolerate therapeutic trials of    Consistencies Trialed (Tolerate therapeutic trials) thin liquids  -MP      Desired Outcome (Tolerate therapeutic trials) without signs/symptoms of aspiration;without signs of distress  -MP      [place] (Tolerate therapeutic trials) with minimal cues (75-90% accuracy)  -MP      Time Frame (Tolerate therapeutic trials) by discharge  -MP      Progress/Outcomes (Tolerate therapeutic trials) goal ongoing  -MP         (STG) Labial Strengthening Goal 1 (SLP)    Activity (Labial Strengthening Goal 1, SLP) increase labial tone  -MP      Increase Labial Tone labial resistance exercises;swallow trials  -MP      [place]/Accuracy (Labial Strengthening Goal 1, SLP) with moderate cues (50-74% accuracy)  -MP      Time Frame (Labial Strengthening Goal 1, SLP) short term goal (STG)  -MP      Progress/Outcomes (Labial Strengthening Goal 1, SLP) goal ongoing  -MP         (STG) Lingual Strengthening Goal 1 (SLP)    Activity (Lingual Strengthening Goal 1, SLP) increase lingual tone/sensation/control/coordination/movement;increase tongue back  strength  -MP      Increase Lingual Tone/Sensation/Control/Coordination/Movement swallow trials;lingual resistance exercises  -MP      Increase Tongue Back Strength lingual resistance exercises  -MP      [place]/Accuracy (Lingual Strengthening Goal 1, SLP) with moderate cues (50-74% accuracy)  -MP      Time Frame (Lingual Strengthening Goal 1, SLP) short term goal (STG)  -MP      Progress/Outcomes (Lingual Strengthening Goal 1, SLP) goal ongoing  -MP         (STG) Pharyngeal Strengthening Exercise Goal 1 (SLP)    Activity (Pharyngeal Strengthening Goal 1, SLP) increase superior movement of the hyolaryngeal complex;increase anterior movement of the hyolaryngeal complex;increase closure at entrance to airway/closure of airway at glottis;increase squeeze/positive pressure generation;increase tongue base retraction;increase timing  -MP      Increase Timing prepping - 3 second prep or suck swallow or 3-step swallow  -MP      Increase Superior Movement of the Hyolaryngeal Complex effortful pitch glide (falsetto + pharyngeal squeeze)  -MP      Increase Anterior Movement of the Hyolaryngeal Complex chin tuck against resistance (CTAR)  -MP      Increase Closure at Entrance to Airway/Closure of Airway at Glottis supraglottic swallow  -MP      Increase Squeeze/Positive Pressure Generation hard effortful swallow  -MP      Increase Tongue Base Retraction tamie  -MP      [place]/Accuracy (Pharyngeal Strengthening Goal 1, SLP) with moderate cues (50-74% accuracy)  -MP      Time Frame (Pharyngeal Strengthening Goal 1, SLP) short term goal (STG)  -MP      Progress/Outcomes (Pharyngeal Strengthening Goal 1, SLP) goal ongoing  -MP         Patient will demonstrate functional communication skills for return to discharge environment     [place] with moderate cues  -MP      Time frame by discharge  -MP      Progress/Outcomes goal ongoing  -MP         Comprehend Questions Goal 1 (SLP)    Improve Ability to Comprehend  Questions Goal 1 (SLP) complex yes/no questions;80%;with minimal cues (75-90%)  -MP      Time Frame (Comprehend Questions Goal 1, SLP) short term goal (STG)  -MP      Progress/Outcomes (Comprehend Questions Goal 1, SLP) goal ongoing  -MP         Follow Directions Goal 2 (SLP)    Improve Ability to Follow Directions Goal 1 (SLP) 2 step commands;80%;with minimal cues (75-90%)  -MP      Time Frame (Follow Directions Goal 1, SLP) short term goal (STG)  -MP      Progress/Outcomes (Follow Directions Goal 1, SLP) goal ongoing  -MP         Word Retrieval Skills Goal 1 (SLP)    Improve Word Retrieval Skills By Goal 1 (SLP) confrontational naming task;high frequency;responsive naming task;simple;80%;with minimal cues (75-90%)  -MP      Time Frame (Word Retrieval Goal 1, SLP) short term goal (STG)  -MP      Progress/Outcomes (Word Retrieval Goal 1, SLP) goal ongoing  -MP         Articulation Goal 1 (SLP)    Improve Articulation Goal 1 (SLP) by over-articulating at phrase level;80%;with moderate cues (50-74%)  -MP      Time Frame (Articulation Goal 1, SLP) short term goal (STG)  -MP      Progress/Outcomes (Articulation Goal 1, SLP) goal ongoing  -MP         Orientation Goal 1 (SLP)    Improve Orientation Through Goal 1 (SLP) demonstrating orientation to day;demonstrating orientation to month;demonstrating orientation to year;demonstrating orientation to place;demonstrating orientation to disease/impairment;use environmental aids to assist with orientation;80%;with moderate cues (50-74%)  -MP      Time Frame (Orientation Goal 1, SLP) short term goal (STG)  -MP      Progress/Outcomes (Orientation Goal 1, SLP) goal ongoing  -MP                User Key  (r) = Recorded By, (t) = Taken By, (c) = Cosigned By      Initials Name Provider Type    MP Aldo Harris MS CCC-SLP Speech and Language Pathologist                       Time Calculation:    Time Calculation- SLP       Row Name 10/24/23 2854             Time Calculation-  SLP    SLP Start Time 1540  -MP      SLP Received On 10/24/23  -MP         Untimed Charges    [id number] Treatment Swallow Minutes 24  -MP         Total Minutes    Untimed Charges Total Minutes 24  -MP       Total Minutes 24  -MP                User Key  (r) = Recorded By, (t) = Taken By, (c) = Cosigned By      Initials Name Provider Type    Aldo Snowden MS CCC-SLP Speech and Language Pathologist                    Therapy Charges for Today       Code Description Service Date Service Provider Modifiers Qty    [id number]  ST TREATMENT SWALLOW 2 10/24/2023 Aldo Harris MS CCC-SLP GN 1                 MS SRUTHI Hood  10/24/2023

## 2023-10-24 NOTE — PLAN OF CARE
Goal Outcome Evaluation:  Plan of Care Reviewed With: patient, spouse        Progress: no change  Outcome Evaluation: VSS on RA. PRN ibuprofen, seroquel, and IM geodon given. NP on call contacted for once time dose of ativan for continuous agitation, pulling at restraints, shouting and unable to stay asleep despite repositioning, attempting to console pt, adjusting abd binders, restraints, and pillows, and exhausting all other PRNs. Pt remains incontinent, continues to pull incontinence wraps off. Pt frequently reoriented, often asking about where he is and where his wife is, wife called later for update, was at bedside at beginning of shift. Attempted to give pt a couple spoonfulls of thickened water, coughing noted after. Pt resting well at this time. 3pt (LEEANNA BUNDY, LUCIANA) soft restraints maintained.

## 2023-10-24 NOTE — PROGRESS NOTES
"Palliative Care Daily Progress Note     C/C: Patient able to answer questions, intermittent confusion.     S: Medical record reviewed. Follow up visit for GOC in the context of complex medical decision making. Events noted. Patient continues to be agitated at times, currently on Seroquel 100mg qam, 200mg qhs and 25mg q12 hours prn. Also receiving Geodon 10mg IV x1, Lorazepam 0.5mg x1, and Restoril 15mg nightly. Wife does not want hospice services at this time. Patient remains in restraints with sitter. He denies pain but frequently changes position and able to state having low back pain to RN.      ROS: +anxiety. +decreased appetite. +pain, unable to quantify, low back. Denies nausea.      O: Code Status:   Code Status and Medical Interventions:   Ordered at: 09/29/23 0854     Medical Intervention Limits:    NO intubation (DNI)     Code Status (Patient has no pulse and is not breathing):    No CPR (Do Not Attempt to Resuscitate)     Medical Interventions (Patient has pulse or is breathing):    Limited Support      Advanced Directives: Advance Directive Status: Patient does not have advance directive   Goals of Care: Ongoing.   Palliative Performance Scale Score: 30%     /72 (BP Location: Left arm, Patient Position: Lying)   Pulse 109   Temp 98.3 °F (36.8 °C) (Temporal)   Resp 18   Ht 175.3 cm (69\")   Wt 72.8 kg (160 lb 7.9 oz)   SpO2 94%   BMI 23.70 kg/m²     Intake/Output Summary (Last 24 hours) at 10/24/2023 1102  Last data filed at 10/24/2023 0045  Gross per 24 hour   Intake 1650 ml   Output --   Net 1650 ml       PE:  General Appearance:    Patient laying in bed, restless, moving and pulling at restraints, awake, alert, A/C ill appearing,    HEENT:    NC/AT, EOMI, anicteric, MM dry, facial grimacing   Neck:   supple, trachea midline, no JVD   Lungs:     CTA bilat, diminished in bases; respirations regular, even and unlabored; RR 18-20 on exam    Heart:    RRR, normal S1 and S2, no M/R/G,  "   Abdomen:     Normal bowel sounds, soft, non-tender, non-distended, abd binder in place covering PEG   G/U:   Deferred   MSK/Extremities:   Wasting, no edema   Pulses:   Pulses palpable and equal bilaterally   Skin:   Warm, dry   Neurologic:   Oriented to self, pulling at restraints,    Psych:   Anxious, restless, agitated with all care     Meds: Reviewed and changes noted    Labs:         Results from last 7 days   Lab Units 10/23/23  0703   SODIUM mmol/L 137   POTASSIUM mmol/L 4.4   CHLORIDE mmol/L 101   CO2 mmol/L 27.0   BUN mg/dL 19   CREATININE mg/dL 0.56*   GLUCOSE mg/dL 185*   CALCIUM mg/dL 9.0     Results from last 7 days   Lab Units 10/23/23  0703   SODIUM mmol/L 137   POTASSIUM mmol/L 4.4   CHLORIDE mmol/L 101   CO2 mmol/L 27.0   BUN mg/dL 19   CREATININE mg/dL 0.56*   CALCIUM mg/dL 9.0   BILIRUBIN mg/dL 0.4   ALK PHOS U/L 81   ALT (SGPT) U/L 44*   AST (SGOT) U/L 29   GLUCOSE mg/dL 185*     Imaging Results (Last 72 Hours)       ** No results found for the last 72 hours. **                  Diagnostics: Reviewed    A:   Hemorrhagic CVA    Dyslipidemia    Multiple bilateral CVAs    HFpEF    T2DM (type 2 diabetes mellitus)    HTN (hypertension)    GERD (gastroesophageal reflux disease)    ICH (intracerebral hemorrhage)    Oropharyngeal dysphagia     73 y.o. male with hemorrhagic CVA, HFpEF, HTN.   S/S:   Pain -s/p stroke and arthritis, MSK, low back  -scheduled Hydrocodone 5-325mg PO BID  -scheduled Lidocaine patch to low back  -Voltaren gel  to bilateral knees  -continue Palliative oral rinse     2. Dysphagia -SLP with diet modifications  -PEG in place  -patient with bolus feeding via TF, minimal PO intake     3. Debility -PT/OT following  -placement complicated by restraints     4. Agitation -requiring restraints  -requested RN to bladder scan for PVR after incontinent to determine if source of agitation, PVR 48ml most likely not source of agitation  -Seroquel 100mg am, 200mg hs,   -started Phenobarb  32.4mg PO q 8 hours prn agitation  -if Phenobarb effective for agitation, will consider adjusting further     5. GOC -DNR/DNI -per discussion with wife  -continued full treatment   -hospice following in periphery    P: Follow up visit to discuss GOC with wife, Reny. Reviewed medication adjustments as above in an attempt to decrease agitation and remove restraints. Patient is far more talkative and interactive today. Wife in agreement with medication changes and continued full treatment. Primary team updated on medication adjustments.    Palliative Care Team will continue to follow patient. Please do not hesitate to contact us regarding further sx mgmt or GOC needs.  Dianelys Arriaga, APRN  10/24/2023  Time spent: 35 minutes

## 2023-10-24 NOTE — CASE MANAGEMENT/SOCIAL WORK
Continued Stay Note  [facility]     Patient Name: Kenneth Wen  MRN: [id number]  Today's Date: 10/24/2023    Admit Date: 9/15/2023    Plan: SNF/LTC   Discharge Plan       Row Name 10/24/23 1536       Plan    Plan SNF/LTC    Patient/Family in Agreement with Plan yes  Spouse    Plan Comments I met with spouse today, long discussion regarding Hospice, home with 24/7 caregivers and nursing facility placement.  Discussed that patient not currently appropriate for [facility]-unable to participate in acute level of rehab and no option if needs long term care, which appears likely situation.  Wife in agreement.  Discussed process of skilled placement transitioning into long term care and Medicaid pending vs. private pay at facility.  Wife would like referrals to [facility] and [facility] in [place].  Wife aware patient will not be able to transfer until out of restraints.  Will also f/u with patient insurance company-currently with [employer], which I am not sure has skilled benefits and Medicare part A only.  Wife reports appreciative of Palliative Care input and Dr. Tristan Rausch, not yet ready for Hospice but agreeable to re-evaluating if unable to keep him out of restraints.  Jess Ferrera, Ext. [phone]    Final Discharge Disposition Code 30 - still a patient                   Discharge Codes    No documentation.                 Expected Discharge Date and Time       Expected Discharge Date Expected Discharge Time    Oct 27, 2023               JOEL Mena

## 2023-10-24 NOTE — PROGRESS NOTES
[facility] Medicine Services  PROGRESS NOTE    Patient Name: Kenneth Wen  : 1950  MRN: [id number]    Date of Admission: 9/15/2023  Primary Care Physician: Susan Powers APRN    Subjective   Subjective     CC: f/u CVA    HPI: in bed.  Nursing gave tube feeds this morning.  Patient remains confused, unable to understand speech.      Objective   Objective     Vital Signs:   Temp:  [98.1 °F (36.7 °C)-98.5 °F (36.9 °C)] 98.5 °F (36.9 °C)  Heart Rate:  [80-91] 86  Resp:  [16-18] 18  BP: (119-146)/(60-81) 141/68     Physical Exam:  Constitutional - restless, in bed  HEENT-NCAT, mucous membranes moist  CV-RRR  Resp-grossly clear  Abd-soft, nontender, binder in place  Ext-No lower extremity cyanosis, clubbing or edema bilaterally  Neuro-somnolent, awakens easily, confused, unable to understand speech  Psych-flat affect   Skin- No rash on exposed UE or LE bilaterally      Results Reviewed:  LAB RESULTS:      Lab 10/23/23  0703   CRP <0.30         Lab 10/23/23  0703   SODIUM 137   POTASSIUM 4.4   CHLORIDE 101   CO2 27.0   ANION GAP 9.0   BUN 19   CREATININE 0.56*   EGFR 104.7   GLUCOSE 185*   CALCIUM 9.0   MAGNESIUM 2.2   PHOSPHORUS 3.3         Lab 10/23/23  0703   TOTAL PROTEIN 5.6*   ALBUMIN 3.5   GLOBULIN 2.1   ALT (SGPT) 44*   AST (SGOT) 29   BILIRUBIN 0.4   ALK PHOS 81             Lab 10/23/23  0703   CHOLESTEROL 80   TRIGLYCERIDES 48             Brief Urine Lab Results  (Last result in the past 365 days)        Color   Clarity   Blood   Leuk Est   Nitrite   Protein   CREAT   Urine HCG        09/15/23 181 Yellow   Cloudy   Trace   Negative   Negative   Negative                   Microbiology Results Abnormal       Procedure Component Value - Date/Time    Blood Culture - Blood, Arm, Right [[id number]]  (Normal) Collected: 09/15/23 0212    Lab Status: Final result Specimen: Blood from Arm, Right Updated: 23     Blood Culture No growth at 5 days    Blood Culture - Blood,  Arm, Left [[id number]]  (Normal) Collected: 09/15/23 0212    Lab Status: Final result Specimen: Blood from Arm, Left Updated: 09/20/23 0230     Blood Culture No growth at 5 days            No radiology results from the last 24 hrs        Current medications:  Scheduled Meds:acetaminophen, 500 mg, Per PEG Tube, TID  atorvastatin, 80 mg, Per PEG Tube, Nightly  clopidogrel, 75 mg, Per PEG Tube, Daily  Diclofenac Sodium, 2 g, Topical, BID  donepezil, 10 mg, Per PEG Tube, Nightly  fluticasone, 2 spray, Each Nare, Daily  lansoprazole, 15 mg, Per PEG Tube, Q AM  Lidocaine, 1 patch, Transdermal, Q24H  melatonin, 10 mg, Per PEG Tube, Nightly  metoprolol tartrate, 25 mg, Per PEG Tube, Q12H  miconazole, 1 application , Topical, Q12H  mirtazapine, 30 mg, Per PEG Tube, Nightly  palliative care oral rinse, , Mouth/Throat, 4x Daily  ProSource No Carb, 30 mL, Per G Tube, Daily  QUEtiapine, 100 mg, Per PEG Tube, Daily  QUEtiapine, 200 mg, Per PEG Tube, Nightly  temazepam, 15 mg, Per PEG Tube, Nightly      Continuous Infusions:   PRN Meds:.  Calcium Replacement - Follow Nurse / BPA Driven Protocol    dextrose    glucagon (human recombinant)    ibuprofen    Magnesium Standard Dose Replacement - Follow Nurse / BPA Driven Protocol    ondansetron    Phosphorus Replacement - Follow Nurse / BPA Driven Protocol    Potassium Replacement - Follow Nurse / BPA Driven Protocol    QUEtiapine    ziprasidone    Assessment & Plan   Assessment & Plan     Active Hospital Problems    Diagnosis  POA    **Hemorrhagic CVA [I61.9]  Yes    Oropharyngeal dysphagia [R13.12]  Yes    Multiple bilateral CVAs [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        Brief Hospital Course to date:  Kenneth  DIAMOND Wen is a 73 y.o. male  with hx of HTN, HLD, T2DM, and GERD who presented to OSH with multiple acute ischemic infarcts of the bilateral cerebral and cerebellar hemispheres as well as left isaac. TTE/JOSIAS was negative PFO at OSH. On 9/13/23 he had worsening in mental status and new inability to move RLE, head CT showed evolution of the existing CVA with new development of petechial hemorrhage. DAPT was held for 24 hours per Neurology recommendations and repeat CT head that evening showed worsening effacement of the right quadrigeminal cistern with suspected increasing upward supratentorial pressure. He was then transferred to [facility] for Neurosurgery evaluation on 9/15/23. He was started on hypertonic saline 9/15/23 through 9/20/23 for cerebral edema. He had fevers with negative cultures but had worsening secretions and labored breathing so was started on Zosyn on 9/16/23. Transferred to the hospitalist service on 9/22/23. Pt demonstrated poor oral intake with elevated sodium levels; therefore, PEG tube was recommended & placed.         Goals of care  --The patient's wife is considering transition to hospice care, but wants to think about it over the weekend; will reassess on 10/23     Multiple bilateral posterior circulation strokes with hemorrhagic conversion  --Neurology has followed, suspect atheroembolic but cannot rule out cardioembolic given multiple vascular territories  --Plavix monotherapy, high intensity statin  --Needs Holter monitor at discharge  -- Patient remains very confused, agitated requiring restraints.  Patient has fluctuations but for the most part he has not had any meaningful improvement.  -- Neurology follows, as does Palliative care and Hospice     Agitation  Encephalopathy  --appreciate general neurology input recs  --Donepezil 10 mg nightly, melatonin 10 mg nightly, mirtazapine 30 mg nightly, Seroquel 100 mg daily, Seroquel 200 mg nightly, Restoril 15 mg nightly  --Continue as needed  Geodon  -- Neurology is following and trying to help patient's sleep and decrease agitation     Dysphagia  Hypernatremia - resolved  --SLP recommends mechanical ground with honey thick liquids   --Risk of pulling out peg tube, abd binder to be in place at all times. --Surgery recommends to remain in restraints at all times or if off has to have sitter for next 2 weeks   -- dietary following for calorie count, adequate PO intake is limited by his mental status     HTN  --Continue Metoprolol 25 mg BID     GERD  --Continue PPI     COVID-19-resolved  --Overall asymptomatic and on room air  --No infiltrates seen and low procal  --Did not receive any treatment   --Off Isolation 9/30/2023      Leukocytosis-resolved  --Procalcitionin low at 0.05  --CXR and UA negative  --Blood cultures negative     Elevated LFT's, mild  --Possibly secondary to statin?  --Negative acute hepatitis panel  --repeat AST/ALT improved on 10/3    Expected Discharge Location and Transportation:   Expected Discharge   Expected Discharge Date: 10/27/2023; Expected Discharge Time:      DVT prophylaxis:  Mechanical DVT prophylaxis orders are present.     AM-PAC 6 Clicks Score (PT): 11 (10/23/23 2000)    CODE STATUS:   Code Status and Medical Interventions:   Ordered at: 09/29/23 0854     Medical Intervention Limits:    NO intubation (DNI)     Code Status (Patient has no pulse and is not breathing):    No CPR (Do Not Attempt to Resuscitate)     Medical Interventions (Patient has pulse or is breathing):    Limited Support       Kee Lovelace MD  10/24/23

## 2023-10-24 NOTE — PROGRESS NOTES
Neurology       Patient Care Team:  Susan Powers APRN as PCP - General (Family Medicine)    Chief complaint: Delirium    History: Patient is a somewhat better today.    He has not required as needed Geodon and has not been pulling at his feeding tube.      Past Medical History:   Diagnosis Date    Acid reflux     Cancer     Skin    Diabetes mellitus     Prediabetes    GERD (gastroesophageal reflux disease) 09/15/2023    HTN (hypertension) 09/15/2023    Kidney disease     T2DM (type 2 diabetes mellitus) 09/15/2023       Vital Signs   Vitals:    10/23/23 1500 10/23/23 1900 10/24/23 0120 10/24/23 1059   BP: 146/81 119/60 141/68 121/72   BP Location:  Left arm Left arm Left arm   Patient Position:  Lying Lying Lying   Pulse: 83 91 86 109   Resp: 18 16 18 18   Temp: 98.1 °F (36.7 °C) 98.1 °F (36.7 °C) 98.5 °F (36.9 °C) 98.3 °F (36.8 °C)   TempSrc: Oral Temporal Temporal Temporal   SpO2: 93% 93% 93% 94%   Weight:       Height:           Physical Exam:   General: Awake              Neuro: Talking sensibly.    Somewhat restless but not frantic.        Results Review:  Reviewed      Results from last 7 days   Lab Units 10/23/23  0703   SODIUM mmol/L 137   POTASSIUM mmol/L 4.4   CHLORIDE mmol/L 101   CO2 mmol/L 27.0   BUN mg/dL 19   CREATININE mg/dL 0.56*   CALCIUM mg/dL 9.0   BILIRUBIN mg/dL 0.4   ALK PHOS U/L 81   ALT (SGPT) U/L 44*   AST (SGOT) U/L 29   GLUCOSE mg/dL 185*       Imaging Results (Last 24 Hours)       ** No results found for the last 24 hours. **            Assessment:  Protracted delirium improved somewhat    Multiple strokes syndrome    Plan:  Guarded prognosis.    Wife is hesitant about hospice care.    Comment:  Patient likely needs placement.         I discussed the patients findings and my recommendations with patient and nursing staff    Toni Rausch MD  10/24/23  14:19 EDT

## 2023-10-24 NOTE — PLAN OF CARE
Goal Outcome Evaluation:  Plan of Care Reviewed With: patient, spouse        Progress: no change       SLP treatment completed. Will continue to address dysphagia & cognitive-communication. Please see note for further details and recommendations.

## 2023-10-24 NOTE — PROGRESS NOTES
Continued Stay Note  [facility]     Patient Name: Kenneth Wen  MRN: [id number]  Today's Date: 10/24/2023    Admit Date: 9/15/2023    Plan: TBD   Discharge Plan       Row Name 10/24/23 1706       Plan    Plan TBD    Plan Comments Confirmed with palliative team that spouse is not ready for hospice at this time. Hospice will sign off. Please reconsult if wife desires. Thank you.      Row Name 10/24/23 0430       Plan    Plan SNF/LTC    Patient/Family in Agreement with Plan yes  Spouse    Plan Comments I met with spouse today, long discussion regarding Hospice, home with 24/7 caregivers and nursing facility placement.  Discussed that patient not currently appropriate for [facility]-unable to participate in acute level of rehab and no option if needs long term care, which appears likely situation.  Wife in agreement.  Discussed process of skilled placement transitioning into long term care and Medicaid pending vs. private pay at facility.  Wife would like referrals to [facility] and [facility] in [place].  Wife aware patient will not be able to transfer until out of restraints.  Will also f/u with patient insurance company-currently with [employer], which I am not sure has skilled benefits and Medicare part A only.  Wife reports appreciative of Palliative Care input and Dr. Tristan Rausch, not yet ready for Hospice but agreeable to re-evaluating if unable to keep him out of restraints.    Final Discharge Disposition Code 30 - still a patient                   Discharge Codes    No documentation.                 Expected Discharge Date and Time       Expected Discharge Date Expected Discharge Time    Oct 27, 2023               Chantel Das RN

## 2023-10-25 LAB
GLUCOSE BLDC GLUCOMTR-MCNC: 117 MG/DL (ref 70–130)
GLUCOSE BLDC GLUCOMTR-MCNC: 138 MG/DL (ref 70–130)
GLUCOSE BLDC GLUCOMTR-MCNC: 163 MG/DL (ref 70–130)
GLUCOSE BLDC GLUCOMTR-MCNC: 174 MG/DL (ref 70–130)
QT INTERVAL: 370 MS
QTC INTERVAL: 464 MS

## 2023-10-25 PROCEDURE — 97110 THERAPEUTIC EXERCISES: CPT

## 2023-10-25 PROCEDURE — 97116 GAIT TRAINING THERAPY: CPT

## 2023-10-25 PROCEDURE — 25010000002 ZIPRASIDONE MESYLATE PER 10 MG: Performed by: PSYCHIATRY & NEUROLOGY

## 2023-10-25 PROCEDURE — 99232 SBSQ HOSP IP/OBS MODERATE 35: CPT | Performed by: PSYCHIATRY & NEUROLOGY

## 2023-10-25 PROCEDURE — 82948 REAGENT STRIP/BLOOD GLUCOSE: CPT

## 2023-10-25 PROCEDURE — 99232 SBSQ HOSP IP/OBS MODERATE 35: CPT | Performed by: INTERNAL MEDICINE

## 2023-10-25 PROCEDURE — 93010 ELECTROCARDIOGRAM REPORT: CPT | Performed by: INTERNAL MEDICINE

## 2023-10-25 PROCEDURE — 93005 ELECTROCARDIOGRAM TRACING: CPT

## 2023-10-25 RX ORDER — PHENOBARBITAL 32.4 MG/1
32.4 TABLET ORAL EVERY 8 HOURS PRN
Status: DISCONTINUED | OUTPATIENT
Start: 2023-10-25 | End: 2023-10-25

## 2023-10-25 RX ORDER — PHENOBARBITAL 32.4 MG/1
64.8 TABLET ORAL EVERY 8 HOURS PRN
Status: DISCONTINUED | OUTPATIENT
Start: 2023-10-25 | End: 2023-10-27

## 2023-10-25 RX ORDER — QUETIAPINE FUMARATE 100 MG/1
200 TABLET, FILM COATED ORAL EVERY 12 HOURS SCHEDULED
Status: DISCONTINUED | OUTPATIENT
Start: 2023-10-25 | End: 2023-11-10

## 2023-10-25 RX ORDER — HYDROCODONE BITARTRATE AND ACETAMINOPHEN 5; 325 MG/1; MG/1
1 TABLET ORAL EVERY 12 HOURS
Status: COMPLETED | OUTPATIENT
Start: 2023-10-25 | End: 2023-11-04

## 2023-10-25 RX ADMIN — DICLOFENAC SODIUM 2 G: 9.3 GEL TOPICAL at 08:44

## 2023-10-25 RX ADMIN — Medication 10 MG: at 18:07

## 2023-10-25 RX ADMIN — DONEPEZIL HYDROCHLORIDE 10 MG: 10 TABLET, FILM COATED ORAL at 18:07

## 2023-10-25 RX ADMIN — LIDOCAINE 1 PATCH: 4 PATCH TOPICAL at 08:45

## 2023-10-25 RX ADMIN — QUETIAPINE FUMARATE 100 MG: 100 TABLET ORAL at 06:15

## 2023-10-25 RX ADMIN — QUETIAPINE FUMARATE 200 MG: 100 TABLET ORAL at 20:45

## 2023-10-25 RX ADMIN — MIRTAZAPINE 30 MG: 15 TABLET, FILM COATED ORAL at 18:07

## 2023-10-25 RX ADMIN — LANSOPRAZOLE 15 MG: 15 TABLET, ORALLY DISINTEGRATING ORAL at 06:16

## 2023-10-25 RX ADMIN — DICLOFENAC SODIUM 2 G: 9.3 GEL TOPICAL at 20:45

## 2023-10-25 RX ADMIN — MINERAL OIL: 1000 LIQUID ORAL at 08:44

## 2023-10-25 RX ADMIN — METOPROLOL TARTRATE 25 MG: 25 TABLET, FILM COATED ORAL at 08:46

## 2023-10-25 RX ADMIN — ATORVASTATIN CALCIUM 80 MG: 40 TABLET, FILM COATED ORAL at 20:46

## 2023-10-25 RX ADMIN — TEMAZEPAM 15 MG: 15 CAPSULE ORAL at 18:08

## 2023-10-25 RX ADMIN — HYDROCODONE BITARTRATE AND ACETAMINOPHEN 1 TABLET: 5; 325 TABLET ORAL at 00:05

## 2023-10-25 RX ADMIN — ZIPRASIDONE MESYLATE 10 MG: 20 INJECTION, POWDER, LYOPHILIZED, FOR SOLUTION INTRAMUSCULAR at 02:20

## 2023-10-25 RX ADMIN — Medication 30 ML: at 08:46

## 2023-10-25 RX ADMIN — METOPROLOL TARTRATE 25 MG: 25 TABLET, FILM COATED ORAL at 21:20

## 2023-10-25 RX ADMIN — PHENOBARBITAL 64.8 MG: 32.4 TABLET ORAL at 12:12

## 2023-10-25 RX ADMIN — HYDROCODONE BITARTRATE AND ACETAMINOPHEN 1 TABLET: 5; 325 TABLET ORAL at 12:12

## 2023-10-25 RX ADMIN — MINERAL OIL: 1000 LIQUID ORAL at 18:08

## 2023-10-25 RX ADMIN — MICONAZOLE NITRATE 1 APPLICATION: 20 CREAM TOPICAL at 08:44

## 2023-10-25 RX ADMIN — MINERAL OIL: 1000 LIQUID ORAL at 20:45

## 2023-10-25 RX ADMIN — HYDROCODONE BITARTRATE AND ACETAMINOPHEN 1 TABLET: 5; 325 TABLET ORAL at 23:54

## 2023-10-25 RX ADMIN — CLOPIDOGREL BISULFATE 75 MG: 75 TABLET ORAL at 08:46

## 2023-10-25 RX ADMIN — MICONAZOLE NITRATE 1 APPLICATION: 20 CREAM TOPICAL at 20:45

## 2023-10-25 NOTE — PROGRESS NOTES
[facility] Medicine Services  PROGRESS NOTE    Patient Name: Kenneth Wen  : 1950  MRN: [id number]    Date of Admission: 9/15/2023  Primary Care Physician: Susan Powers APRN    Subjective   Subjective     CC: f/u CVA    HPI: agitated overnight, received geodon -- calm this morning.  Sitter at bedside      Objective   Objective     Vital Signs:   Temp:  [97.3 °F (36.3 °C)-98.9 °F (37.2 °C)] 98.9 °F (37.2 °C)  Heart Rate:  [] 93  Resp:  [16-18] 16  BP: ()/(51-72) 128/57     Physical Exam:  Constitutional - non toxic, in bed, somewhat deconditioned appearing  HEENT-NCAT, mucous membranes moist  CV-RRR  Resp-grossly clear  Abd-soft, nontender, abd binder in place  Ext-No lower extremity cyanosis, clubbing or edema bilaterally  Neuro-somnolent, awakens briefly to voice  Psych-flat affect   Skin- No rash on exposed UE or LE bilaterally        Results Reviewed:  LAB RESULTS:      Lab 10/23/23  0703   CRP <0.30         Lab 10/23/23  0703   SODIUM 137   POTASSIUM 4.4   CHLORIDE 101   CO2 27.0   ANION GAP 9.0   BUN 19   CREATININE 0.56*   EGFR 104.7   GLUCOSE 185*   CALCIUM 9.0   MAGNESIUM 2.2   PHOSPHORUS 3.3         Lab 10/23/23  0703   TOTAL PROTEIN 5.6*   ALBUMIN 3.5   GLOBULIN 2.1   ALT (SGPT) 44*   AST (SGOT) 29   BILIRUBIN 0.4   ALK PHOS 81             Lab 10/23/23  0703   CHOLESTEROL 80   TRIGLYCERIDES 48             Brief Urine Lab Results  (Last result in the past 365 days)        Color   Clarity   Blood   Leuk Est   Nitrite   Protein   CREAT   Urine HCG        09/15/23 181 Yellow   Cloudy   Trace   Negative   Negative   Negative                   Microbiology Results Abnormal       Procedure Component Value - Date/Time    Blood Culture - Blood, Arm, Right [[id number]]  (Normal) Collected: 09/15/23 0212    Lab Status: Final result Specimen: Blood from Arm, Right Updated: 23     Blood Culture No growth at 5 days    Blood Culture - Blood, Arm, Left  [[id number]]  (Normal) Collected: 09/15/23 0212    Lab Status: Final result Specimen: Blood from Arm, Left Updated: 09/20/23 0230     Blood Culture No growth at 5 days            No radiology results from the last 24 hrs        Current medications:  Scheduled Meds:atorvastatin, 80 mg, Per PEG Tube, Nightly  clopidogrel, 75 mg, Per PEG Tube, Daily  Diclofenac Sodium, 2 g, Topical, BID  donepezil, 10 mg, Per PEG Tube, Nightly  fluticasone, 2 spray, Each Nare, Daily  HYDROcodone-acetaminophen, 1 tablet, Oral, Q12H  lansoprazole, 15 mg, Per PEG Tube, Q AM  Lidocaine, 1 patch, Transdermal, Q24H  melatonin, 10 mg, Per PEG Tube, Nightly  metoprolol tartrate, 25 mg, Per PEG Tube, Q12H  miconazole, 1 application , Topical, Q12H  mirtazapine, 30 mg, Per PEG Tube, Nightly  palliative care oral rinse, , Mouth/Throat, 4x Daily  ProSource No Carb, 30 mL, Per G Tube, Daily  QUEtiapine, 100 mg, Per PEG Tube, Daily  QUEtiapine, 200 mg, Per PEG Tube, Nightly  temazepam, 15 mg, Per PEG Tube, Nightly      Continuous Infusions:   PRN Meds:.  Calcium Replacement - Follow Nurse / BPA Driven Protocol    dextrose    glucagon (human recombinant)    ibuprofen    Magnesium Standard Dose Replacement - Follow Nurse / BPA Driven Protocol    ondansetron    PHENobarbital    Phosphorus Replacement - Follow Nurse / BPA Driven Protocol    Potassium Replacement - Follow Nurse / BPA Driven Protocol    ziprasidone    Assessment & Plan   Assessment & Plan     Active Hospital Problems    Diagnosis  POA    **Hemorrhagic CVA [I61.9]  Yes    Oropharyngeal dysphagia [R13.12]  Yes    Multiple bilateral CVAs [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        Brief Hospital Course to date:  Kenneth  DIAMOND Wen is a 73 y.o. male  with hx of HTN, HLD, T2DM, and GERD who presented to OSH with multiple acute ischemic infarcts of the bilateral cerebral and cerebellar hemispheres as well as left isaac. TTE/JOSIAS was negative PFO at OSH. On 9/13/23 he had worsening in mental status and new inability to move RLE, head CT showed evolution of the existing CVA with new development of petechial hemorrhage. DAPT was held for 24 hours per Neurology recommendations and repeat CT head that evening showed worsening effacement of the right quadrigeminal cistern with suspected increasing upward supratentorial pressure. He was then transferred to [facility] for Neurosurgery evaluation on 9/15/23. He was started on hypertonic saline 9/15/23 through 9/20/23 for cerebral edema. He had fevers with negative cultures but had worsening secretions and labored breathing so was started on Zosyn on 9/16/23. Transferred to the hospitalist service on 9/22/23. Pt demonstrated poor oral intake with elevated sodium levels; therefore, PEG tube was recommended & placed.         Goals of care  --The patient's wife is considering transition to hospice care, but wants to think about it over the weekend; will reassess on 10/23     Multiple bilateral posterior circulation strokes with hemorrhagic conversion  --Neurology has followed, suspect atheroembolic but cannot rule out cardioembolic given multiple vascular territories  --Plavix monotherapy, high intensity statin  --Needs Holter monitor at discharge  -- Patient remains very confused, agitated requiring restraints.  Patient has fluctuations but for the most part he has not had any meaningful improvement.  -- Neurology follows, as does Palliative care and Hospice     Agitation  Encephalopathy  --appreciate general neurology input recs  --Donepezil 10 mg nightly, melatonin 10 mg nightly, mirtazapine 30 mg nightly, Seroquel 100 mg daily, Seroquel 200 mg nightly, Restoril 15 mg nightly  - trial phenobarbitol prn --  received one dose yesterday afternoon  --Continue as needed Geodon  -- Neurology is following and trying to help patient's sleep and decrease agitation     Dysphagia  Hypernatremia - resolved  --SLP recommends mechanical ground with honey thick liquids   --Risk of pulling out peg tube, abd binder to be in place at all times. --Surgery recommends to remain in restraints at all times or if off has to have sitter for next 2 weeks   -- dietary following for calorie count, adequate PO intake is limited by his mental status     HTN  --Continue Metoprolol 25 mg BID     GERD  --Continue PPI     COVID-19-resolved  --Overall asymptomatic and on room air  --No infiltrates seen and low procal  --Did not receive any treatment   --Off Isolation 9/30/2023      Leukocytosis-resolved  --Procalcitionin low at 0.05  --CXR and UA negative  --Blood cultures negative     Elevated LFT's, mild  --Possibly secondary to statin?  --Negative acute hepatitis panel  --repeat AST/ALT improved  Expected Discharge Location and Transportation:   Expected Discharge   Expected Discharge Date: 10/27/2023; Expected Discharge Time:      DVT prophylaxis:  Mechanical DVT prophylaxis orders are present.     AM-PAC 6 Clicks Score (PT): 11 (10/24/23 0800)    CODE STATUS:   Code Status and Medical Interventions:   Ordered at: 09/29/23 0854     Medical Intervention Limits:    NO intubation (DNI)     Code Status (Patient has no pulse and is not breathing):    No CPR (Do Not Attempt to Resuscitate)     Medical Interventions (Patient has pulse or is breathing):    Limited Support       Kee Lovelace MD  10/25/23

## 2023-10-25 NOTE — PROGRESS NOTES
"Palliative Care Daily Progress Note     C/C: Patient able to answer questions, intermittent confusion.     S: Medical record reviewed. Follow up visit for GOC in the context of complex medical decision making. Events noted. Patient continues to be agitated at times, currently on Seroquel 100mg qam, 200mg qhs. Receiving Geodon 10mg IV x2, Phenobarb 32.5mg per PEG x1, and Restoril 15mg nightly. Wife does not want hospice services at this time. Patient out of restraints with sitter. He asks what day it is and able to engage in conversation, reoriented easily and redirectable. RN reports took a few bites of breakfast.     ROS: +anxiety, decreasing. +decreased appetite. +pain, unable to quantify, low back, denies this visit. Denies nausea.      O: Code Status:   Code Status and Medical Interventions:   Ordered at: 09/29/23 0854     Medical Intervention Limits:    NO intubation (DNI)     Code Status (Patient has no pulse and is not breathing):    No CPR (Do Not Attempt to Resuscitate)     Medical Interventions (Patient has pulse or is breathing):    Limited Support      Advanced Directives: Advance Directive Status: Patient does not have advance directive   Goals of Care: Ongoing.   Palliative Performance Scale Score: 30%     /57 (BP Location: Left arm, Patient Position: Lying)   Pulse 93   Temp 98.9 °F (37.2 °C) (Axillary)   Resp 16   Ht 175.3 cm (69\")   Wt 72.8 kg (160 lb 7.9 oz)   SpO2 97%   BMI 23.70 kg/m²     Intake/Output Summary (Last 24 hours) at 10/25/2023 1027  Last data filed at 10/25/2023 0600  Gross per 24 hour   Intake 2710 ml   Output --   Net 2710 ml       PE:  General Appearance:    Patient laying in bed, restless, frequent repositioning, awake, alert, A/C ill appearing,    HEENT:    NC/AT, EOMI, anicteric, MMM, face calm   Neck:   supple, trachea midline, no JVD   Lungs:     CTA bilat, diminished in bases; respirations regular, even and unlabored; RR 18-20 on exam    Heart:    RRR, normal S1 " and S2, no M/R/G, HR 93   Abdomen:     Normal bowel sounds, soft, non-tender, non-distended, abd binder in place covering PEG   G/U:   Deferred   MSK/Extremities:   Wasting, no edema   Pulses:   Pulses palpable and equal bilaterally   Skin:   Warm, dry   Neurologic:   Oriented to self, alert   Psych:   Restless, frequent repositioning, calms easily     Meds: Reviewed and changes noted    Labs:         Results from last 7 days   Lab Units 10/23/23  0703   SODIUM mmol/L 137   POTASSIUM mmol/L 4.4   CHLORIDE mmol/L 101   CO2 mmol/L 27.0   BUN mg/dL 19   CREATININE mg/dL 0.56*   GLUCOSE mg/dL 185*   CALCIUM mg/dL 9.0     Results from last 7 days   Lab Units 10/23/23  0703   SODIUM mmol/L 137   POTASSIUM mmol/L 4.4   CHLORIDE mmol/L 101   CO2 mmol/L 27.0   BUN mg/dL 19   CREATININE mg/dL 0.56*   CALCIUM mg/dL 9.0   BILIRUBIN mg/dL 0.4   ALK PHOS U/L 81   ALT (SGPT) U/L 44*   AST (SGOT) U/L 29   GLUCOSE mg/dL 185*     Imaging Results (Last 72 Hours)       ** No results found for the last 72 hours. **                  Diagnostics: Reviewed    A:   Hemorrhagic CVA    Dyslipidemia    Multiple bilateral CVAs    HFpEF    T2DM (type 2 diabetes mellitus)    HTN (hypertension)    GERD (gastroesophageal reflux disease)    ICH (intracerebral hemorrhage)    Oropharyngeal dysphagia     73 y.o. male with hemorrhagic CVA, HFpEF, HTN.   S/S:   Pain -s/p stroke and arthritis, MSK, low back  -scheduled Hydrocodone 5-325mg PO BID  -scheduled Lidocaine patch to low back  -Voltaren gel  to bilateral knees  -continue Palliative oral rinse     2. Dysphagia -SLP with diet modifications  -PEG in place  -patient with bolus feeding via TF, minimal PO intake     3. Debility -PT/OT following  -placement complicated by restraints     4. Agitation -requiring restraints  -requested RN to bladder scan for PVR after incontinent to determine if source of agitation, PVR 48ml most likely not source of agitation  -Seroquel 100mg am, 200mg hs,   -continue  Phenobarb 32.4mg PO q 8 hours prn agitation, may require increase in dose, RN to call if agitated and Phenobarb 32.4mg ineffective, will repeat dose     5. GOC -DNR/DNI -per discussion with wife  -continued full treatment    P: Patient out of restraints, continuing to adjust medications in order to optimize awake and alert while decreasing agitation. Patient with confusion but reorients easily, frequent position changes. Continue to adjust medications as needed. CM working on placement.     Palliative Care Team will continue to follow patient. Please do not hesitate to contact us regarding further sx mgmt or GOC needs.  Dianelys Arriaga, APRN  10/25/2023  Time spent: 35 minutes

## 2023-10-25 NOTE — PLAN OF CARE
Goal Outcome Evaluation:  Pt somewhat oriented in the AM, able to hold a conversation. PT/OT worked with pt, able to ambulate short distance. Pt became tired and confused, and more restless afterwards, PRN medication given. Pt remained restless in the bed, unable to sit still. Labile but redirectable. Poor PO intake for breakfast and dinner, pt able to eat 50% of dinner tray with encouragement. Frequent episodes of incontinence. Wife and family at bedside, supportive of pt. Continue plan of care.

## 2023-10-25 NOTE — THERAPY TREATMENT NOTE
Patient Name: Kenneth Wen  : 1950    MRN: [id number]                              Today's Date: 10/25/2023       Admit Date: 9/15/2023    Visit Dx:     ICD-10-CM ICD-9-CM   1. Hemorrhagic CVA  I61.9 431   2. Aphasia  R47.01 784.3   3. Dysarthria  R47.1 784.51   4. Oropharyngeal dysphagia  R13.12 787.22     Patient Active Problem List   Diagnosis    Precordial pain    Elevated BP without diagnosis of hypertension    Dyslipidemia    Hyperglycemia    Multiple bilateral CVAs    Hemorrhagic CVA    HFpEF    T2DM (type 2 diabetes mellitus)    HTN (hypertension)    GERD (gastroesophageal reflux disease)    ICH (intracerebral hemorrhage)    Oropharyngeal dysphagia     Past Medical History:   Diagnosis Date    Acid reflux     Cancer     Skin    Diabetes mellitus     Prediabetes    GERD (gastroesophageal reflux disease) 09/15/2023    HTN (hypertension) 09/15/2023    Kidney disease     T2DM (type 2 diabetes mellitus) 09/15/2023     Past Surgical History:   Procedure Laterality Date    APPENDECTOMY      ENDOSCOPY W/ PEG TUBE PLACEMENT N/A 2023    Procedure: ESOPHAGOGASTRODUODENOSCOPY WITH PERCUTANEOUS ENDOSCOPIC GASTROSTOMY TUBE INSERTION;  Surgeon: Haseeb Carrillo MD;  Location: [facility] ENDOSCOPY;  Service: General;  Laterality: N/A;    HEMORRHOIDECTOMY      NASAL POLYP SURGERY        General Information       Row Name 10/25/23 1025          Physical Therapy Time and Intention    Document Type therapy note (daily note)  -ES     Mode of Treatment physical therapy;co-treatment  -ES       Row Name 10/25/23 1025          General Information    Patient Profile Reviewed yes  -ES     Existing Precautions/Restrictions fall;seizures  R sided weakness/coordination deficits, PEG with abdominal binder, visual deficits  -ES     Barriers to Rehab medically complex;cognitive status;visual deficit  -ES       Row Name 10/25/23 1025          Cognition    Orientation Status (Cognition) oriented to;person  -ES       Row Name  10/25/23 1025          Safety Issues, Functional Mobility    Safety Issues Affecting Function (Mobility) awareness of need for assistance;impulsivity;insight into deficits/self-awareness;judgment;safety precaution awareness;safety precautions follow-through/compliance;sequencing abilities  -ES     Impairments Affecting Function (Mobility) balance;cognition;coordination;endurance/activity tolerance;grasp;motor control;motor planning;muscle tone abnormal;visual/perceptual;strength;sensation/sensory awareness;range of motion (ROM);postural/trunk control  -ES     Cognitive Impairments, Mobility Safety/Performance attention;awareness, need for assistance;impulsivity;insight into deficits/self-awareness;judgment;problem-solving/reasoning;safety precaution awareness;safety precaution follow-through;sequencing abilities  -ES               User Key  (r) = Recorded By, (t) = Taken By, (c) = Cosigned By      Initials Name Provider Type    ES Lara Tijerina, PT Physical Therapist                   Mobility       Row Name 10/25/23 1028          Bed Mobility    Bed Mobility supine-sit;sit-supine  -ES     Supine-Sit [place] (Bed Mobility) minimum assist (75% patient effort);2 person assist;verbal cues;nonverbal cues (demo/gesture)  -ES     Sit-Supine [place] (Bed Mobility) moderate assist (50% patient effort);2 person assist;verbal cues;nonverbal cues (demo/gesture)  -ES     Assistive Device (Bed Mobility) draw sheet;head of bed elevated  -ES     Comment, (Bed Mobility) v/c and t/c for sequencing. Pt able to maintain sitting EOB with min-modA and t/c for hand placement. Demo'd R lateral lean while sitting EOB. Performed 10x A/P leans with modA to facilitate  -ES       Row Name 10/25/23 1028          Sit-Stand Transfer    Sit-Stand [place] (Transfers) minimum assist (75% patient effort);2 person assist;verbal cues;nonverbal cues (demo/gesture)  -ES     Assistive Device (Sit-Stand Transfers) other (see comments)   BUE support  -ES     Comment, (Sit-Stand Transfer) STS x2: from EOB, from chair. Demo'd R lateral lean but able to improve slightly with cueing. Required modA x2 to maintain upright posture.  -ES       Row Name 10/25/23 1028          Gait/Stairs (Locomotion)    [place] Level (Gait) verbal cues;nonverbal cues (demo/gesture);maximum assist (25% patient effort);2 person assist  -ES     Assistive Device (Gait) other (see comments)  BUE support  -ES     Distance in Feet (Gait) 4+4  -ES     Deviations/Abnormal Patterns (Gait) bilateral deviations;right sided deviations;tor decreased;gait speed decreased;stride length decreased  -ES     Bilateral Gait Deviations forward flexed posture  -ES     Right Sided Gait Deviations leans right;weight shift ability decreased  -ES     Comment, (Gait/Stairs) Pt amb 4+4' with maxA x2 and BUE support. Demo'd strong R lateral lean with R knee buckling noted. No overt LOB, further mobility limited by weakness and fatigue.  -ES               User Key  (r) = Recorded By, (t) = Taken By, (c) = Cosigned By      Initials Name Provider Type    ES Lara Tijerina, PT Physical Therapist                   Obj/Interventions       Row Name 10/25/23 1033          Motor Skills    Therapeutic Exercise knee;hip  -ES       Row Name 10/25/23 1033          Hip (Therapeutic Exercise)    Hip (Therapeutic Exercise) strengthening exercise  -ES     Hip Strengthening (Therapeutic Exercise) bilateral;marching while seated;10 repetitions  -ES       Row Name 10/25/23 1033          Knee (Therapeutic Exercise)    Knee (Therapeutic Exercise) strengthening exercise  -ES     Knee Strengthening (Therapeutic Exercise) bilateral;marching while seated;LAQ (long arc quad);10 repetitions  -ES       Row Name 10/25/23 1033          Balance    Balance Assessment sitting static balance;sitting dynamic balance;sit to stand dynamic balance;standing static balance;standing dynamic balance  -ES     Static Sitting Balance  minimal assist;verbal cues;non-verbal cues (demo/gesture)  -ES     Dynamic Sitting Balance moderate assist;verbal cues;non-verbal cues (demo/gesture)  -ES     Position, Sitting Balance supported;sitting in chair;sitting edge of bed  -ES     Sit to Stand Dynamic Balance minimal assist;2-person assist;verbal cues;non-verbal cues (demo/gesture)  -ES     Static Standing Balance moderate assist;verbal cues;non-verbal cues (demo/gesture)  -ES     Dynamic Standing Balance maximum assist;verbal cues;non-verbal cues (demo/gesture)  -ES     Position/Device Used, Standing Balance supported;other (see comments)  BUE support  -ES     Balance Interventions sitting;standing;sit to stand;supported;static;dynamic;occupation based/functional task;core stability exercise;minimal challenge  -ES     Comment, Balance Demo'd R lateral lean in static sitting EOB, performed 10x A/P leans with modA to facilitate. R knee buckling noted in standing but no overt LOB  -ES               User Key  (r) = Recorded By, (t) = Taken By, (c) = Cosigned By      Initials Name Provider Type    ES Lara Tijerina, PT Physical Therapist                   Goals/Plan    No documentation.                  Clinical Impression       Row Name 10/25/23 1034          Pain    Pretreatment Pain Rating 0/10 - no pain  -ES     Posttreatment Pain Rating 0/10 - no pain  -ES     Pre/Posttreatment Pain Comment tolerated  -ES     Pain Intervention(s) Repositioned;Ambulation/increased activity  -ES       Row Name 10/25/23 1034          Plan of Care Review    Plan of Care Reviewed With patient  -ES     Progress improving  -ES     Outcome Evaluation Pt demonstrated improved participation with therapy this date, ambulating 4' x2 with maxA x2 and BUE support. Pt also demonstrated improved command following and sequencing, but continues to be limited by generalized weakness (R>L), R sided inattention, and resultant balance deficits. Will continue to progress as able to promote  return to OF. PT rec SNF at d/c.  -ES       Row Name 10/25/23 1034          Therapy Assessment/Plan (PT)    Rehab Potential (PT) fair, will monitor progress closely  -ES     Criteria for Skilled Interventions Met (PT) yes;meets criteria;skilled treatment is necessary  -ES     Therapy Frequency (PT) daily  -ES       Row Name 10/25/23 1034          Vital Signs    Pre Systolic BP Rehab --  RN cleared for treatment  -ES     O2 Delivery Pre Treatment room air  -ES     O2 Delivery Intra Treatment room air  -ES     O2 Delivery Post Treatment room air  -ES     Pre Patient Position Supine  -ES     Intra Patient Position Standing  -ES     Post Patient Position Sitting  -ES       Row Name 10/25/23 1034          Positioning and Restraints    Pre-Treatment Position in bed  -ES     Post Treatment Position bed  -ES     In Bed notified nsg;side lying right;call light within reach;encouraged to call for assist;exit alarm on;with nsg  -ES               User Key  (r) = Recorded By, (t) = Taken By, (c) = Cosigned By      Initials Name Provider Type    ES Lara Tijerina, PT Physical Therapist                   Outcome Measures       Row Name 10/25/23 1036          How much help from another person do you currently need...    Turning from your back to your side while in flat bed without using bedrails? 3  -ES     Moving from lying on back to sitting on the side of a flat bed without bedrails? 2  -ES     Moving to and from a bed to a chair (including a wheelchair)? 2  -ES     Standing up from a chair using your arms (e.g., wheelchair, bedside chair)? 2  -ES     Climbing 3-5 steps with a railing? 1  -ES     To walk in hospital room? 2  -ES     AM-PAC 6 Clicks Score (PT) 12  -ES     Highest level of mobility 4 --> Transferred to chair/commode  -ES       Row Name 10/25/23 1036          Functional Assessment    Outcome Measure Options AM-PAC 6 Clicks Basic Mobility (PT)  -ES               User Key  (r) = Recorded By, (t) = Taken By, (c) =  Cosigned By      Initials Name Provider Type    Lara Booker, PIPER Physical Therapist                                 Physical Therapy Education       Title: PT OT SLP Therapies (In Progress)       Topic: Physical Therapy (In Progress)       Point: Mobility training (In Progress)       Learning Progress Summary             Patient Acceptance, E, NR by ML at 10/23/2023 1353    Acceptance, E,TB, NR by AY at 10/20/2023 1308    Acceptance, E, NR by KG at 10/15/2023 1401    Acceptance, E, NR,VU by KR at 10/12/2023 1453    Acceptance, E,D, NR by AB at 10/11/2023 1547    Acceptance, E, NR by CM at 10/10/2023 1517    Acceptance, E, NR,VU by KR at 10/6/2023 1550    Acceptance, E, NR by SD at 10/5/2023 1437    Acceptance, E, VU by CD at 10/2/2023 1502    Comment: SEE FLOWSHEET    Acceptance, E, NR by KR at 9/25/2023 1324    Acceptance, E, NR by KG1 at 9/18/2023 1403    Acceptance, E,TB, NR by AY at 9/15/2023 1254   Family Acceptance, E, NR by ML at 10/23/2023 1353    Acceptance, E, NR,VU by KR at 10/12/2023 1453    Acceptance, E, NR,VU by KR at 10/6/2023 1550    Acceptance, E, NR by SD at 10/5/2023 1437   Significant Other Acceptance, E, NR by CM at 10/10/2023 1517                         Point: Home exercise program (In Progress)       Learning Progress Summary             Patient Acceptance, E, NR by ML at 10/23/2023 1353    Acceptance, E, NR by KG at 10/15/2023 1401    Acceptance, E,D, NR by AB at 10/11/2023 1547    Acceptance, E, NR by CM at 10/10/2023 1517    Acceptance, E, NR by SD at 10/5/2023 1437    Acceptance, E, VU by CD at 10/2/2023 1502    Comment: SEE FLOWSHEET    Acceptance, E, NR by KG1 at 9/18/2023 1403   Family Acceptance, E, NR by ML at 10/23/2023 1353    Acceptance, E, NR by SD at 10/5/2023 1437   Significant Other Acceptance, E, NR by CM at 10/10/2023 1517                         Point: Body mechanics (In Progress)       Learning Progress Summary             Patient Acceptance, E,TB, NR by AY at  10/20/2023 1308    Acceptance, E, NR by KG at 10/15/2023 1401    Acceptance, E, NR,VU by KR at 10/12/2023 1453    Acceptance, E,D, NR by AB at 10/11/2023 1547    Acceptance, E, NR by CM at 10/10/2023 1517    Acceptance, E, NR,VU by KR at 10/6/2023 1550    Acceptance, E, NR by SD at 10/5/2023 1437    Acceptance, E, VU by CD at 10/2/2023 1502    Comment: SEE FLOWSHEET    Acceptance, E, NR by KR at 9/25/2023 1324    Acceptance, E, NR by KG1 at 9/18/2023 1403    Acceptance, E,TB, NR by AY at 9/15/2023 1254   Family Acceptance, E, NR,VU by KR at 10/12/2023 1453    Acceptance, E, NR,VU by KR at 10/6/2023 1550    Acceptance, E, NR by SD at 10/5/2023 1437   Significant Other Acceptance, E, NR by CM at 10/10/2023 1517                         Point: Precautions (In Progress)       Learning Progress Summary             Patient Acceptance, E, NR by ML at 10/23/2023 1353    Acceptance, E,TB, NR by AY at 10/20/2023 1308    Acceptance, E, NR by KG at 10/15/2023 1401    Acceptance, E, NR,VU by KR at 10/12/2023 1453    Acceptance, E,D, NR by AB at 10/11/2023 1547    Acceptance, E, NR by CM at 10/10/2023 1517    Acceptance, E, NR,VU by KR at 10/6/2023 1550    Acceptance, E, NR by SD at 10/5/2023 1437    Acceptance, E, VU by CD at 10/2/2023 1502    Comment: SEE FLOWSHEET    Acceptance, E, NR by KR at 9/25/2023 1324    Acceptance, E, NR by KG1 at 9/18/2023 1403    Acceptance, E,TB, NR by AY at 9/15/2023 1254   Family Acceptance, E, NR by ML at 10/23/2023 1353    Acceptance, E, NR,VU by KR at 10/12/2023 1453    Acceptance, E, NR,VU by KR at 10/6/2023 1550    Acceptance, E, NR by SD at 10/5/2023 1437   Significant Other Acceptance, E, NR by CM at 10/10/2023 1517                                         User Key       Initials Effective Dates Name Provider Type Discipline    CD 02/03/23 -  Claudia Duarte, PT Physical Therapist PT    SD 03/13/23 -  Chantel Scott, PT Physical Therapist PT    KG1 05/22/20 -  Mackenzie Mckay, PT  Physical Therapist PT    KG 01/04/23 -  Monik Solorio Physical Therapist PT    AY 11/10/20 -  Aleshia Armstrong, PT Physical Therapist PT    ML 04/22/21 -  Laura Rangel Physical Therapist PT    AB 09/22/22 -  Aleshia Keita, PT Physical Therapist PT    CM 09/22/22 -  Yanira Shields, PT Physical Therapist PT    KR 12/30/22 -  Raissa Soria, PT Physical Therapist PT                  PT Recommendation and Plan     Plan of Care Reviewed With: patient  Progress: improving  Outcome Evaluation: Pt demonstrated improved participation with therapy this date, ambulating 4' x2 with maxA x2 and BUE support. Pt also demonstrated improved command following and sequencing, but continues to be limited by generalized weakness (R>L), R sided inattention, and resultant balance deficits. Will continue to progress as able to promote return to PLOF. PT rec SNF at d/c.     Time Calculation:         PT Charges       Row Name 10/25/23 1037             Time Calculation    Start Time 0954  -ES      PT Received On 10/25/23  -ES      PT Goal Re-Cert Due Date 10/30/23  -ES         Time Calculation- PT    Total Timed Code Minutes- PT 15 minute(s)  -ES         Timed Charges    39888 - Gait Training Minutes  15  -ES         Total Minutes    Timed Charges Total Minutes 15  -ES       Total Minutes 15  -ES                User Key  (r) = Recorded By, (t) = Taken By, (c) = Cosigned By      Initials Name Provider Type    ES Lara Tijerina PT Physical Therapist                  Therapy Charges for Today       Code Description Service Date Service Provider Modifiers Qty    [id number] HC GAIT TRAINING EA 15 MIN 10/25/2023 Lara Tijerina PT GP 1            PT G-Codes  Outcome Measure Options: AM-PAC 6 Clicks Basic Mobility (PT)  AM-PAC 6 Clicks Score (PT): 12  AM-PAC 6 Clicks Score (OT): 6  Modified [place] Scale: 4 - Moderately severe disability.  Unable to walk without assistance, and unable to attend to own bodily needs without  assistance.  PT Discharge Summary  Anticipated Discharge Disposition (PT): skilled nursing facility    Lara Tijerina, PT  10/25/2023

## 2023-10-25 NOTE — THERAPY TREATMENT NOTE
Patient Name: Kenneth Wen  : 1950    MRN: [id number]                              Today's Date: 10/25/2023       Admit Date: 9/15/2023    Visit Dx:     ICD-10-CM ICD-9-CM   1. Hemorrhagic CVA  I61.9 431   2. Aphasia  R47.01 784.3   3. Dysarthria  R47.1 784.51   4. Oropharyngeal dysphagia  R13.12 787.22     Patient Active Problem List   Diagnosis    Precordial pain    Elevated BP without diagnosis of hypertension    Dyslipidemia    Hyperglycemia    Multiple bilateral CVAs    Hemorrhagic CVA    HFpEF    T2DM (type 2 diabetes mellitus)    HTN (hypertension)    GERD (gastroesophageal reflux disease)    ICH (intracerebral hemorrhage)    Oropharyngeal dysphagia     Past Medical History:   Diagnosis Date    Acid reflux     Cancer     Skin    Diabetes mellitus     Prediabetes    GERD (gastroesophageal reflux disease) 09/15/2023    HTN (hypertension) 09/15/2023    Kidney disease     T2DM (type 2 diabetes mellitus) 09/15/2023     Past Surgical History:   Procedure Laterality Date    APPENDECTOMY      ENDOSCOPY W/ PEG TUBE PLACEMENT N/A 2023    Procedure: ESOPHAGOGASTRODUODENOSCOPY WITH PERCUTANEOUS ENDOSCOPIC GASTROSTOMY TUBE INSERTION;  Surgeon: Haseeb Carrillo MD;  Location: [facility] ENDOSCOPY;  Service: General;  Laterality: N/A;    HEMORRHOIDECTOMY      NASAL POLYP SURGERY        General Information       Row Name 10/25/23 1041          OT Time and Intention    Document Type therapy note (daily note)  -     Mode of Treatment occupational therapy;co-treatment  -       Row Name 10/25/23 1041          General Information    Patient Profile Reviewed yes  -     Existing Precautions/Restrictions fall  R sided weakness/coordination deficits, PEG with abdominal binder, visual deficits  -     Barriers to Rehab medically complex;cognitive status;visual deficit  -       Row Name 10/25/23 1041          Cognition    Orientation Status (Cognition) oriented to;person;disoriented to;place;time  -       Row  Name 10/25/23 1041          Safety Issues, Functional Mobility    Safety Issues Affecting Function (Mobility) awareness of need for assistance;insight into deficits/self-awareness;safety precaution awareness;safety precautions follow-through/compliance;sequencing abilities  -     Impairments Affecting Function (Mobility) balance;cognition;coordination;endurance/activity tolerance;grasp;motor control;motor planning;muscle tone abnormal;visual/perceptual;strength;sensation/sensory awareness;range of motion (ROM);postural/trunk control  -     Cognitive Impairments, Mobility Safety/Performance awareness, need for assistance;insight into deficits/self-awareness;safety precaution awareness;safety precaution follow-through;sequencing abilities  -               User Key  (r) = Recorded By, (t) = Taken By, (c) = Cosigned By      Initials Name Provider Type     Jenny Rivera OT Occupational Therapist                     Mobility/ADL's       Row Name 10/25/23 1043          Bed Mobility    Bed Mobility supine-sit;sit-supine;scooting/bridging  -     Scooting/Bridging [place] (Bed Mobility) dependent (less than 25% patient effort);2 person assist;verbal cues  -     Supine-Sit [place] (Bed Mobility) minimum assist (75% patient effort);2 person assist;verbal cues;nonverbal cues (demo/gesture)  -     Sit-Supine [place] (Bed Mobility) moderate assist (50% patient effort);2 person assist;verbal cues;nonverbal cues (demo/gesture)  -     Sidelying-Sit [place] (Bed Mobility) 2 person assist  -     Bed Mobility, Safety Issues cognitive deficits limit understanding;decreased use of arms for pushing/pulling;decreased use of legs for bridging/pushing;impaired trunk control for bed mobility;unable to safely maintain weight bearing restrictions  -     Assistive Device (Bed Mobility) draw sheet;head of bed elevated;bed rails  -     Comment, (Bed Mobility) Pt w/ R lateral lean initially upon  sitting EOB, L hand placed on footboard bedrail and pt able to maintain static sitting balance w/ CGA  -       Row Name 10/25/23 1043          Transfers    Transfers sit-stand transfer;stand-sit transfer  -       Row Name 10/25/23 1043          Sit-Stand Transfer    Sit-Stand [place] (Transfers) minimum assist (75% patient effort);2 person assist;verbal cues;nonverbal cues (demo/gesture)  -     Assistive Device (Sit-Stand Transfers) other (see comments)  BUE support  -       Row Name 10/25/23 1043          Stand-Sit Transfer    Stand-Sit [place] (Transfers) moderate assist (50% patient effort);2 person assist;verbal cues  -     Assistive Device (Stand-Sit Transfers) other (see comments)  -       Row Name 10/25/23 1043          Functional Mobility    Functional Mobility- Ind. Level maximum assist (25% patient effort);2 person assist required;verbal cues required  -     Functional Mobility- Device other (see comments)  BUE support  -     Functional Mobility-Distance (Feet) 8  4+4  -     Functional Mobility- Safety Issues balance decreased during turns;sequencing ability decreased;step length decreased;weight-shifting ability decreased  -     Functional Mobility- Comment Pt w/ narrow FAUSTINA, unable to widen FAUSTINA despite verbal and tactile cueing, however, noted ability to advance BLE for short-distance functional mobility w/ max Ax2  -       Row Name 10/25/23 1043          Activities of Daily Living    BADL Assessment/Intervention lower body dressing  -       Row Name 10/25/23 1043          Lower Body Dressing Assessment/Training    [place] Level (Lower Body Dressing) don;socks;pants/bottoms;maximum assist (25% patient effort);verbal cues  -     Position (Lower Body Dressing) edge of bed sitting;supported standing  -               User Key  (r) = Recorded By, (t) = Taken By, (c) = Cosigned By      Initials Name Provider Type    Jenny Black OT Occupational Therapist                    Obj/Interventions       [facility] Name 10/25/23 1047          Shoulder (Therapeutic Exercise)    Shoulder (Therapeutic Exercise) PROM (passive range of motion)  -     Shoulder PROM (Therapeutic Exercise) right;flexion;extension;10 repetitions;supine  -[facility] Name 10/25/23 1047          Elbow/Forearm (Therapeutic Exercise)    Elbow/Forearm (Therapeutic Exercise) PROM (passive range of motion)  -     Elbow/Forearm PROM (Therapeutic Exercise) right;flexion;extension;10 repetitions;supine  -[facility] Name 10/25/23 1047          Wrist (Therapeutic Exercise)    Wrist (Therapeutic Exercise) PROM (passive range of motion)  -     Wrist AAROM (Therapeutic Exercise) right;flexion;extension;10 repetitions  -[facility] Name 10/25/23 1047          Hand (Therapeutic Exercise)    Hand (Therapeutic Exercise) PROM (passive range of motion)  -     Hand PROM (Therapeutic Exercise) right;finger flexion;finger extension;thumb palmar aBduction;10 repetitions  -[facility] Name 10/25/23 1047          Motor Skills    Therapeutic Exercise shoulder;elbow/forearm;wrist;hand  -[facility] Name 10/25/23 1047          Balance    Balance Assessment sitting static balance;sitting dynamic balance;sit to stand dynamic balance;standing static balance;standing dynamic balance  -     Static Sitting Balance contact guard;verbal cues  -     Dynamic Sitting Balance moderate assist;verbal cues  -     Position, Sitting Balance supported;sitting edge of bed  -     Sit to Stand Dynamic Balance minimal assist;2-person assist;verbal cues  -     Static Standing Balance 2-person assist;moderate assist  -     Dynamic Standing Balance maximum assist;2-person assist;verbal cues  -     Position/Device Used, Standing Balance supported  -     Balance Interventions sitting;sit to stand;occupation based/functional task  -               User Key  (r) = Recorded By, (t) = Taken By, (c) = Cosigned By      Initials Name Provider  Type     Jenny Rivera, LATIA Occupational Therapist                   Goals/Plan    No documentation.                  Clinical Impression       Row Name 10/25/23 1048          Pain Assessment    Pretreatment Pain Rating 0/10 - no pain  -     Posttreatment Pain Rating 0/10 - no pain  -       Row Name 10/25/23 1048          Plan of Care Review    Plan of Care Reviewed With patient  -     Progress improving  -     Outcome Evaluation Pt w/ noted improved active participation w/ ADLs and functional transfers this date, performing supine-to-sit & STSx2 w/ min Ax2, max Ax2 for 4ft x2 functional mobility, max A for LBD. Pt's ADL independence continues to be limited d/t generalized weakness (R>L), B peripheral vision deficits, decreased functional endurance, and balance deficits. Will continue to progress pt as tolerated per OT POC. Rec SNF at d/c.  -       Row Name 10/25/23 1048          Therapy Assessment/Plan (OT)    Rehab Potential (OT) good, to achieve stated therapy goals  -     Criteria for Skilled Therapeutic Interventions Met (OT) yes;skilled treatment is necessary  -     Therapy Frequency (OT) daily  -       Row Name 10/25/23 1048          Therapy Plan Review/Discharge Plan (OT)    Anticipated Discharge Disposition (OT) skilled nursing facility  -       Row Name 10/25/23 1048          Vital Signs    O2 Delivery Pre Treatment room air  -     O2 Delivery Intra Treatment room air  -     O2 Delivery Post Treatment room air  -     Pre Patient Position Supine  -     Intra Patient Position Standing  -     Post Patient Position Supine  -       Row Name 10/25/23 1048          Positioning and Restraints    Pre-Treatment Position in bed  -     Post Treatment Position bed  -     In Bed notified nsg;call light within reach;encouraged to call for assist;side rails up x3;exit alarm on;with nsg;side lying right  -               User Key  (r) = Recorded By, (t) = Taken By, (c) = Cosigned By       Initials Name Provider Type     Jenny Rivera OT Occupational Therapist                   Outcome Measures       Row Name 10/25/23 1051          How much help from another is currently needed...    Putting on and taking off regular lower body clothing? 2  -MC     Bathing (including washing, rinsing, and drying) 2  -MC     Toileting (which includes using toilet bed pan or urinal) 1  -MC     Putting on and taking off regular upper body clothing 2  -MC     Taking care of personal grooming (such as brushing teeth) 2  -MC     Eating meals 1  -     AM-PAC 6 Clicks Score (OT) 10  -       Row Name 10/25/23 1036          How much help from another person do you currently need...    Turning from your back to your side while in flat bed without using bedrails? 3  -ES     Moving from lying on back to sitting on the side of a flat bed without bedrails? 2  -ES     Moving to and from a bed to a chair (including a wheelchair)? 2  -ES     Standing up from a chair using your arms (e.g., wheelchair, bedside chair)? 2  -ES     Climbing 3-5 steps with a railing? 1  -ES     To walk in hospital room? 2  -ES     AM-PAC 6 Clicks Score (PT) 12  -ES     Highest level of mobility 4 --> Transferred to chair/commode  -ES       Row Name 10/25/23 1051 10/25/23 1036       Functional Assessment    Outcome Measure Options AM-PAC 6 Clicks Daily Activity (OT)  - AM-PAC 6 Clicks Basic Mobility (PT)  -ES              User Key  (r) = Recorded By, (t) = Taken By, (c) = Cosigned By      Initials Name Provider Type    Jenny Black OT Occupational Therapist    ES Lara Tijerina PT Physical Therapist                    Occupational Therapy Education       Title: PT OT SLP Therapies (In Progress)       Topic: Occupational Therapy (In Progress)       Point: ADL training (In Progress)       Description:   Instruct learner(s) on proper safety adaptation and remediation techniques during self care or transfers.   Instruct in proper  use of assistive devices.                  Learning Progress Summary             Patient Acceptance, E, NR by MC at 10/25/2023 1051    Acceptance, E, NR by MC at 10/23/2023 1335    Acceptance, E, NR by JR at 10/19/2023 0939    Acceptance, E,D, NR by JY at 10/18/2023 0830    Acceptance, E, NR by MR at 10/12/2023 1508    Acceptance, E, NR by JG at 10/9/2023 1533    Acceptance, E, NR by MR at 10/6/2023 1549    Acceptance, E,D, NR,NL by CS at 10/4/2023 0948    Acceptance, E, NR by MR at 9/28/2023 1556    Acceptance, E, NR by JR at 9/25/2023 1045    Acceptance, E, NR by CS at 9/20/2023 1420    Acceptance, E, NR by MC at 9/18/2023 1532    Acceptance, E, NR by MC at 9/15/2023 1531   Family Acceptance, E,D, NR by JY at 10/18/2023 0830    Acceptance, E, NR by MR at 10/12/2023 1508    Acceptance, E, NR by JG at 10/9/2023 1533    Acceptance, E, NR by MR at 10/6/2023 1549    Acceptance, E, NR by MR at 9/28/2023 1556                         Point: Home exercise program (In Progress)       Description:   Instruct learner(s) on appropriate technique for monitoring, assisting and/or progressing therapeutic exercises/activities.                  Learning Progress Summary             Patient Acceptance, E, NR by MC at 10/25/2023 1051    Acceptance, E, NR by JR at 10/19/2023 0939    Acceptance, E,D, NR by JY at 10/18/2023 0830    Acceptance, E, NR by MR at 10/12/2023 1508    Acceptance, E, NR by MR at 10/6/2023 1549    Acceptance, E,D, NR,NL by  at 10/4/2023 0948    Acceptance, E, NR by MR at 9/28/2023 1556    Acceptance, E, NR by  at 9/25/2023 1045    Acceptance, E, NR by CS at 9/20/2023 1420    Acceptance, E, NR by MC at 9/18/2023 1532    Acceptance, E, NR by MC at 9/15/2023 1531   Family Acceptance, E,D, NR by JY at 10/18/2023 0830    Acceptance, E, NR by MR at 10/12/2023 1508    Acceptance, E, NR by MR at 10/6/2023 1549    Acceptance, E, NR by MR at 9/28/2023 1556                         Point: Precautions (In Progress)        Description:   Instruct learner(s) on prescribed precautions during self-care and functional transfers.                  Learning Progress Summary             Patient Acceptance, E, NR by MC at 10/25/2023 1051    Acceptance, E, NR by MC at 10/23/2023 1335    Acceptance, E,D, NR by JY at 10/18/2023 0830    Acceptance, E, NR by MR at 10/12/2023 1508    Acceptance, E, NR by JG at 10/9/2023 1533    Acceptance, E, NR by MR at 10/6/2023 1549    Acceptance, E,D, NR,NL by CS at 10/4/2023 0948    Acceptance, E, NR by MR at 9/28/2023 1556    Acceptance, E, NR by CS1 at 9/20/2023 1420    Acceptance, E, NR by MC at 9/18/2023 1532    Acceptance, E, NR by MC at 9/15/2023 1531   Family Acceptance, E,D, NR by JY at 10/18/2023 0830    Acceptance, E, NR by MR at 10/12/2023 1508    Acceptance, E, NR by JG at 10/9/2023 1533    Acceptance, E, NR by MR at 10/6/2023 1549    Acceptance, E, NR by MR at 9/28/2023 1556                         Point: Body mechanics (In Progress)       Description:   Instruct learner(s) on proper positioning and spine alignment during self-care, functional mobility activities and/or exercises.                  Learning Progress Summary             Patient Acceptance, E, NR by MC at 10/25/2023 1051    Acceptance, E, NR by MC at 10/23/2023 1335    Acceptance, E,D, NR by JY at 10/18/2023 0830    Acceptance, E, NR by MR at 10/12/2023 1508    Acceptance, E, NR by JG at 10/9/2023 1533    Acceptance, E, NR by MR at 10/6/2023 1549    Acceptance, E,D, NR,NL by CS at 10/4/2023 0948    Acceptance, E, NR by MR at 9/28/2023 1556    Acceptance, E, NR by CS1 at 9/20/2023 1420    Acceptance, E, NR by MC at 9/18/2023 1532    Acceptance, E, NR by MC at 9/15/2023 1531   Family Acceptance, E,D, NR by ORAL at 10/18/2023 0830    Acceptance, E, NR by MR at 10/12/2023 1508    Acceptance, E, NR by ANATOLY at 10/9/2023 1533    Acceptance, E, NR by MR at 10/6/2023 1549    Acceptance, E, NR by MR at 9/28/2023 1556                                          User Key       Initials Effective Dates Name Provider Type Discipline     02/03/23 -  Lorena Jess R, OT Occupational Therapist OT    CS1 09/02/21 -  Carmen Carr, OT Occupational Therapist OT    JY 06/16/21 -  Ofelia Armstrong, OT Occupational Therapist OT    CS 06/16/21 -  Jim Judge, OT Occupational Therapist OT    MC 10/14/22 -  Jenny Rivera, OT Occupational Therapist OT    MR 09/22/22 -  Meera Doyle, OT Occupational Therapist OT    JG 08/30/23 -  Power Herman, OT Student OT Student OT                  OT Recommendation and Plan  Planned Therapy Interventions (OT): activity tolerance training, adaptive equipment training, BADL retraining, cognitive/visual perception retraining, edema control/reduction, functional balance retraining, IADL retraining, neuromuscular control/coordination retraining, occupation/activity based interventions, passive ROM/stretching, patient/caregiver education/training, strengthening exercise, ROM/therapeutic exercise, transfer/mobility retraining, orthotic fabrication/fitting/training  Therapy Frequency (OT): daily  Plan of Care Review  Plan of Care Reviewed With: patient  Progress: improving  Outcome Evaluation: Pt w/ noted improved active participation w/ ADLs and functional transfers this date, performing supine-to-sit & STSx2 w/ min Ax2, max Ax2 for 4ft x2 functional mobility, max A for LBD. Pt's ADL independence continues to be limited d/t generalized weakness (R>L), B peripheral vision deficits, decreased functional endurance, and balance deficits. Will continue to progress pt as tolerated per OT POC. Rec SNF at d/c.     Time Calculation:   Evaluation Complexity (OT)  Review Occupational Profile/Medical/Therapy History Complexity: expanded/moderate complexity  Assessment, Occupational Performance/Identification of Deficit Complexity: 3-5 performance deficits  Clinical Decision Making Complexity (OT): detailed assessment/moderate  complexity  Overall Complexity of Evaluation (OT): moderate complexity     Time Calculation- OT       Row Name 10/25/23 1052 10/25/23 1037          Time Calculation- OT    OT Start Time 0949  - --     OT Received On 10/25/23  - --     OT Goal Re-Cert Due Date 10/28/23  - --        Timed Charges    81163 - OT Therapeutic Exercise Minutes 6  - --     93504 - Gait Training Minutes  -- 15  -ES     52844 - OT Therapeutic Activity Minutes 3  - --     02821 - OT Self Care/Mgmt Minutes 4  - --        Total Minutes    Timed Charges Total Minutes 13  - 15  -ES      Total Minutes 13  - 15  -ES               User Key  (r) = Recorded By, (t) = Taken By, (c) = Cosigned By      Initials Name Provider Type     Jenny Rivera OT Occupational Therapist    ES Lara Tijerina PT Physical Therapist                  Therapy Charges for Today       Code Description Service Date Service Provider Modifiers Qty    [id number] HC OT THER PROC EA 15 MIN 10/25/2023 Jenny Rivera OT GO 1                 Jenny Rivera OT  10/25/2023

## 2023-10-25 NOTE — CASE MANAGEMENT/SOCIAL WORK
Continued Stay Note  [facility]     Patient Name: Kenneth Wen  MRN: [id number]  Today's Date: 10/25/2023    Admit Date: 9/15/2023    Plan: SNF/LTC   Discharge Plan       Row Name 10/25/23 1117       Plan    Plan SNF/LTC    Patient/Family in Agreement with Plan yes  Spouse    Plan Comments I spoke with Rosey/Admissions Coordinator at [place] [facility], facility would be able to accept Mr. Wen for skilled rehab utilizing his Medicare (Part A) with plan to transition him into LTC bed pending progress.  Faxed medical record to Rosey per request, made aware Palliative Care/Neurology currently adjusting medications.  Remains with sitter at bedside, however no restraints and did work with OT this morning.  Discussed with Palliative Care ARPN and in multidisciplinary rounds, will update Dr. Kee Lovelace and spouse.  Jess Ferrera, Ext. [phone]    Final Discharge Disposition Code 03 - skilled nursing facility (SNF)                   Discharge Codes    No documentation.                 Expected Discharge Date and Time       Expected Discharge Date Expected Discharge Time    Oct 27, 2023               JOEL Mena

## 2023-10-25 NOTE — PLAN OF CARE
Goal Outcome Evaluation:  Plan of Care Reviewed With: patient        Progress: no change  Outcome Evaluation: Pt rested intermittently throughout shift. PRN geodon given for increasing patient agitation. Restraints removed. Meds given through PEG tube. No other concerns noted at this time.

## 2023-10-25 NOTE — PLAN OF CARE
Goal Outcome Evaluation:  Plan of Care Reviewed With: patient        Progress: improving  Outcome Evaluation: Pt demonstrated improved participation with therapy this date, ambulating 4' x2 with maxA x2 and BUE support. Pt also demonstrated improved command following and sequencing, but continues to be limited by generalized weakness (R>L), R sided inattention, and resultant balance deficits. Will continue to progress as able to promote return to PLOF. PT rec SNF at d/c.      Anticipated Discharge Disposition (PT): skilled nursing facility

## 2023-10-25 NOTE — PROGRESS NOTES
Neurology       Patient Care Team:  Susan Powers APRN as PCP - General (Family Medicine)    Chief complaint: Ongoing delirium    History: Palliative thought that phenobarbital 32 mg might be helpful.    He had a dose of yesterday afternoon but did not prevent need for Geodon at 2 AM.    Patient is again restless.    Sitter and the wife are in the room.      Past Medical History:   Diagnosis Date    Acid reflux     Cancer     Skin    Diabetes mellitus     Prediabetes    GERD (gastroesophageal reflux disease) 09/15/2023    HTN (hypertension) 09/15/2023    Kidney disease     T2DM (type 2 diabetes mellitus) 09/15/2023       Vital Signs   Vitals:    10/24/23 0120 10/24/23 1059 10/24/23 2051 10/25/23 0842   BP: 141/68 121/72 96/51 128/57   BP Location: Left arm Left arm Left arm Left arm   Patient Position: Lying Lying Lying Lying   Pulse: 86 109 77 93   Resp: 18 18 18 16   Temp: 98.5 °F (36.9 °C) 98.3 °F (36.8 °C) 97.3 °F (36.3 °C) 98.9 °F (37.2 °C)   TempSrc: Temporal Temporal Temporal Axillary   SpO2: 93% 94% 97% 97%   Weight:       Height:           Physical Exam:   General: Restless              Neuro: Not pulling at lines but constantly reminded about the sitter and the wife.        Results Review:  QT QTc are 370/464      Results from last 7 days   Lab Units 10/23/23  0703   SODIUM mmol/L 137   POTASSIUM mmol/L 4.4   CHLORIDE mmol/L 101   CO2 mmol/L 27.0   BUN mg/dL 19   CREATININE mg/dL 0.56*   CALCIUM mg/dL 9.0   BILIRUBIN mg/dL 0.4   ALK PHOS U/L 81   ALT (SGPT) U/L 44*   AST (SGOT) U/L 29   GLUCOSE mg/dL 185*       Imaging Results (Last 24 Hours)       ** No results found for the last 24 hours. **            Assessment:  Protracted delirium    Plan:  Crease Seroquel to 200 mg twice daily.    Increase phenobarbital to 64 mg every 8 hours as needed for restlessness.    Geodon as a last resort.    Comment:  The goal is to keep the patient out of restraints and avoid injectable sedation.         I discussed the  patients findings and my recommendations with patient, family, nursing staff, and primary care team    Toni Rausch MD  10/25/23  12:02 EDT

## 2023-10-25 NOTE — PLAN OF CARE
Goal Outcome Evaluation:  Plan of Care Reviewed With: patient        Progress: improving  Outcome Evaluation: Pt w/ noted improved active participation w/ ADLs and functional transfers this date, performing supine-to-sit & STSx2 w/ min Ax2, max Ax2 for 4ft x2 functional mobility, max A for LBD. Pt's ADL independence continues to be limited d/t generalized weakness (R>L), B peripheral vision deficits, decreased functional endurance, and balance deficits. Will continue to progress pt as tolerated per OT POC. Rec SNF at d/c.      Anticipated Discharge Disposition (OT): skilled nursing facility

## 2023-10-26 LAB
GLUCOSE BLDC GLUCOMTR-MCNC: 118 MG/DL (ref 70–130)
GLUCOSE BLDC GLUCOMTR-MCNC: 125 MG/DL (ref 70–130)
GLUCOSE BLDC GLUCOMTR-MCNC: 133 MG/DL (ref 70–130)
GLUCOSE BLDC GLUCOMTR-MCNC: 150 MG/DL (ref 70–130)
GLUCOSE BLDC GLUCOMTR-MCNC: 155 MG/DL (ref 70–130)

## 2023-10-26 PROCEDURE — 82948 REAGENT STRIP/BLOOD GLUCOSE: CPT

## 2023-10-26 PROCEDURE — 99232 SBSQ HOSP IP/OBS MODERATE 35: CPT | Performed by: INTERNAL MEDICINE

## 2023-10-26 RX ADMIN — DICLOFENAC SODIUM 2 G: 9.3 GEL TOPICAL at 09:15

## 2023-10-26 RX ADMIN — MICONAZOLE NITRATE 1 APPLICATION: 20 CREAM TOPICAL at 20:15

## 2023-10-26 RX ADMIN — CLOPIDOGREL BISULFATE 75 MG: 75 TABLET ORAL at 09:14

## 2023-10-26 RX ADMIN — LANSOPRAZOLE 15 MG: 15 TABLET, ORALLY DISINTEGRATING ORAL at 06:10

## 2023-10-26 RX ADMIN — MICONAZOLE NITRATE 1 APPLICATION: 20 CREAM TOPICAL at 09:15

## 2023-10-26 RX ADMIN — DICLOFENAC SODIUM 2 G: 9.3 GEL TOPICAL at 20:16

## 2023-10-26 RX ADMIN — METOPROLOL TARTRATE 25 MG: 25 TABLET, FILM COATED ORAL at 09:14

## 2023-10-26 RX ADMIN — MINERAL OIL: 1000 LIQUID ORAL at 12:17

## 2023-10-26 RX ADMIN — MINERAL OIL: 1000 LIQUID ORAL at 09:15

## 2023-10-26 RX ADMIN — Medication 30 ML: at 09:16

## 2023-10-26 RX ADMIN — LIDOCAINE 1 PATCH: 4 PATCH TOPICAL at 09:14

## 2023-10-26 RX ADMIN — QUETIAPINE FUMARATE 200 MG: 100 TABLET ORAL at 09:14

## 2023-10-26 RX ADMIN — Medication 10 MG: at 17:44

## 2023-10-26 RX ADMIN — METOPROLOL TARTRATE 25 MG: 25 TABLET, FILM COATED ORAL at 20:15

## 2023-10-26 RX ADMIN — MINERAL OIL: 1000 LIQUID ORAL at 17:43

## 2023-10-26 RX ADMIN — MIRTAZAPINE 30 MG: 15 TABLET, FILM COATED ORAL at 17:44

## 2023-10-26 RX ADMIN — QUETIAPINE FUMARATE 200 MG: 100 TABLET ORAL at 20:15

## 2023-10-26 RX ADMIN — HYDROCODONE BITARTRATE AND ACETAMINOPHEN 1 TABLET: 5; 325 TABLET ORAL at 12:17

## 2023-10-26 RX ADMIN — DONEPEZIL HYDROCHLORIDE 10 MG: 10 TABLET, FILM COATED ORAL at 17:44

## 2023-10-26 RX ADMIN — TEMAZEPAM 15 MG: 15 CAPSULE ORAL at 17:43

## 2023-10-26 RX ADMIN — HYDROCODONE BITARTRATE AND ACETAMINOPHEN 1 TABLET: 5; 325 TABLET ORAL at 23:55

## 2023-10-26 RX ADMIN — ATORVASTATIN CALCIUM 80 MG: 40 TABLET, FILM COATED ORAL at 20:15

## 2023-10-26 RX ADMIN — MINERAL OIL: 1000 LIQUID ORAL at 20:15

## 2023-10-26 NOTE — CASE MANAGEMENT/SOCIAL WORK
Continued Stay Note  [facility]     Patient Name: Kenneth Wen  MRN: [id number]  Today's Date: 10/26/2023    Admit Date: 9/15/2023    Plan: SNF to LTC   Discharge Plan       Row Name 10/26/23 1138       Plan    Plan SNF to LTC    Plan Comments SW met with wife today.  SW provided update refarding facility in [place], which was pleasing to Mrs. Wen.  She is aware the use of a sitter is a barrier to pt being discharged.  She will work to find available family to help with visits to aid in the sitter being discontinued.  She said pt's neice is very helpful, but is ill today.  Mrs. Wen will work on getting family arranged to be present with patient when she isn't able to be here.  BARBARA will follow-up with pt and Mrs. Wen again tomorrow.  No additional needs at this time.    Final Discharge Disposition Code 03 - skilled nursing facility (SNF)                   Discharge Codes    No documentation.                 Expected Discharge Date and Time       Expected Discharge Date Expected Discharge Time    Oct 31, 2023               JOEL Wiggins

## 2023-10-26 NOTE — PLAN OF CARE
Problem: Palliative Care  Goal: Enhanced Quality of Life  Intervention: Optimize Psychosocial Wellbeing  Flowsheets (Taken 10/26/2023 1555)  Supportive Measures:   active listening utilized   verbalization of feelings encouraged     Problem: Pain Acute  Goal: Acceptable Pain Control and Functional Ability  Intervention: Optimize Psychosocial Wellbeing  Recent Flowsheet Documentation  Taken 10/26/2023 1555 by Raquel Garza RN  Supportive Measures:   active listening utilized   verbalization of feelings encouraged   Goal Outcome Evaluation:  Plan of Care Reviewed With: patient        Progress: no change  Outcome Evaluation: Pt is mildly restless in bed. He is able to keep his clothes on. Nursing reports pt didn't sleep well last night. Discussed with Dr Rausch. He is contemplating scheduling phenobarb at bedtime to promote rest. Family working on staying with pt for 48 hours to get rid of hospital sitter. Pt states he sleeps better when his wife is here.   Pt is tolerating tube feeds q 3 hours. Minimal oral intake.  Pt is answering appropriately.  working on placement.  Continue palliative support.    Palliative IDt: RN, APRN, MD, SW  After hours#[phone]

## 2023-10-26 NOTE — PLAN OF CARE
Goal Outcome Evaluation:  Plan of Care Reviewed With: patient, spouse           Outcome Evaluation: Patient restless at times but redirectable. Slept after scheduled pain medicine for around 4 hours. HINES. oriented x 2-3. UOP adequate with multiple incontinent episodes. Bolus feedingsgiven.

## 2023-10-26 NOTE — PROGRESS NOTES
"Palliative Care Daily Progress Note     C/C: Patient able to answer questions, intermittent confusion.     S: Medical record reviewed. Follow up visit for GOC in the context of complex medical decision making. Events noted. Patient continues to be agitated at times remains out of restraints. Seroquel 200mg [id number].  Phenobarb 64.8mg per PEG x1, and Restoril 15mg nightly. No doses of Geodon in the past 24 hours. Continues on scheduled Hydrocodone 5-325mg BID, denies pain at visit. Patient asking to go on a trip on an airplane, pleasantly confused, easily redirected.     ROS: +anxiety, decreasing. +decreased appetite. +pain, unable to quantify, low back, denies this visit. Denies nausea.      O: Code Status:   Code Status and Medical Interventions:   Ordered at: 09/29/23 0854     Medical Intervention Limits:    NO intubation (DNI)     Code Status (Patient has no pulse and is not breathing):    No CPR (Do Not Attempt to Resuscitate)     Medical Interventions (Patient has pulse or is breathing):    Limited Support      Advanced Directives: Advance Directive Status: Patient does not have advance directive   Goals of Care: Ongoing.   Palliative Performance Scale Score: 30%     /73   Pulse 95   Temp 98.1 °F (36.7 °C) (Temporal)   Resp 18   Ht 175.3 cm (69\")   Wt 72.8 kg (160 lb 7.9 oz)   SpO2 96% Comment: began at 88 %  BMI 23.70 kg/m²     Intake/Output Summary (Last 24 hours) at 10/26/2023 1048  Last data filed at 10/26/2023 0900  Gross per 24 hour   Intake 2715 ml   Output 175 ml   Net 2540 ml       PE:  General Appearance:    Patient laying in bed, restless, frequent repositioning, awake, alert, A/C ill appearing,    HEENT:    NC/AT, EOMI, anicteric, MMM, face calm   Neck:   supple, trachea midline, no JVD   Lungs:     CTA bilat, diminished in bases; respirations regular, even and unlabored; RR 18-20 on exam    Heart:    RRR, normal S1 and S2, no M/R/G, HR 95   Abdomen:     Normal bowel sounds, soft, " non-tender, non-distended, abd binder in place covering PEG   G/U:   Deferred   MSK/Extremities:   Wasting, no edema   Pulses:   Pulses palpable and equal bilaterally   Skin:   Warm, dry   Neurologic:   Oriented to self, alert   Psych:   Restless, frequent repositioning, calms easily     Meds: Reviewed and changes noted    Labs:         Results from last 7 days   Lab Units 10/23/23  0703   SODIUM mmol/L 137   POTASSIUM mmol/L 4.4   CHLORIDE mmol/L 101   CO2 mmol/L 27.0   BUN mg/dL 19   CREATININE mg/dL 0.56*   GLUCOSE mg/dL 185*   CALCIUM mg/dL 9.0     Results from last 7 days   Lab Units 10/23/23  0703   SODIUM mmol/L 137   POTASSIUM mmol/L 4.4   CHLORIDE mmol/L 101   CO2 mmol/L 27.0   BUN mg/dL 19   CREATININE mg/dL 0.56*   CALCIUM mg/dL 9.0   BILIRUBIN mg/dL 0.4   ALK PHOS U/L 81   ALT (SGPT) U/L 44*   AST (SGOT) U/L 29   GLUCOSE mg/dL 185*     Imaging Results (Last 72 Hours)       ** No results found for the last 72 hours. **                  Diagnostics: Reviewed    A:   Hemorrhagic CVA    Dyslipidemia    Multiple bilateral CVAs    HFpEF    T2DM (type 2 diabetes mellitus)    HTN (hypertension)    GERD (gastroesophageal reflux disease)    ICH (intracerebral hemorrhage)    Oropharyngeal dysphagia     73 y.o. male with hemorrhagic CVA, HFpEF, HTN.   S/S:   Pain -s/p stroke and arthritis, MSK, low back  -scheduled Hydrocodone 5-325mg PO BID  -scheduled Lidocaine patch to low back  -Voltaren gel  to bilateral knees  -continue Palliative oral rinse     2. Dysphagia -SLP with diet modifications  -PEG in place  -patient with bolus feeding via TF, minimal PO intake     3. Debility -PT/OT following     4. Agitation -requiring restraints  -requested RN to bladder scan for PVR after incontinent to determine if source of agitation, PVR 48ml most likely not source of agitation  - Seroquel 200mg BID  -Phenobarb 64.8mg per PEG q 8 hours prn   -appreciate Neurology titration     5. GOC -DNR/DNI -per discussion with  wife  -continued full treatment    P: Patient out of restraints, continuing to adjust medications in order to optimize awake and alert while decreasing agitation. Patient with confusion but reorients easily, frequent position changes. Continue to adjust medications as needed. CM working on placement.     Palliative Care Team will continue to follow patient. Please do not hesitate to contact us regarding further sx mgmt or GOC needs.  Dianelys Arriaga, APRN  10/26/2023  Time spent: 35 minutes

## 2023-10-26 NOTE — PLAN OF CARE
Goal Outcome Evaluation:              Outcome Evaluation: Restless in bed at times and needs redirection, denies any acute distress or pain, tolerating tube feeds.  Pt had large BM today and voiding per urinal with assistance.  Dr Rausch recommends Phenobarb at HS to help sleep.  Pt oriented to certain situations, can make wishes known.  Will continue with plan of care.

## 2023-10-26 NOTE — PROGRESS NOTES
[facility] Medicine Services  PROGRESS NOTE    Patient Name: Kenneth Wen  : 1950  MRN: [id number]    Date of Admission: 9/15/2023  Primary Care Physician: Susan Powers APRN    Subjective   Subjective     CC: f/u CVA    HPI: no new issues, sitter at bedside      Objective   Objective     Vital Signs:   Temp:  [98.1 °F (36.7 °C)-98.9 °F (37.2 °C)] 98.1 °F (36.7 °C)  Heart Rate:  [59-93] 59  Resp:  [16-18] 18  BP: (106-160)/(57-76) 106/67     Physical Exam:  Constitutional - appears deconditioned, in bed  HEENT-NCAT, mucous membranes moist  CV-RRR  Resp-CTAB  Abd-soft, nontender, binder  Ext-No lower extremity cyanosis, clubbing or edema bilaterally  Neuro-awake, speech at times understandable, sometimes nonsense words.  Patient confused but appears to be moving all extremities  Psych-flat affect   Skin- No rash on exposed UE or LE bilaterally          Results Reviewed:  LAB RESULTS:      Lab 10/23/23  0703   CRP <0.30         Lab 10/23/23  0703   SODIUM 137   POTASSIUM 4.4   CHLORIDE 101   CO2 27.0   ANION GAP 9.0   BUN 19   CREATININE 0.56*   EGFR 104.7   GLUCOSE 185*   CALCIUM 9.0   MAGNESIUM 2.2   PHOSPHORUS 3.3         Lab 10/23/23  0703   TOTAL PROTEIN 5.6*   ALBUMIN 3.5   GLOBULIN 2.1   ALT (SGPT) 44*   AST (SGOT) 29   BILIRUBIN 0.4   ALK PHOS 81             Lab 10/23/23  0703   CHOLESTEROL 80   TRIGLYCERIDES 48             Brief Urine Lab Results  (Last result in the past 365 days)        Color   Clarity   Blood   Leuk Est   Nitrite   Protein   CREAT   Urine HCG        09/15/23 1818 Yellow   Cloudy   Trace   Negative   Negative   Negative                   Microbiology Results Abnormal       Procedure Component Value - Date/Time    Blood Culture - Blood, Arm, Right [[id number]]  (Normal) Collected: 09/15/23 0212    Lab Status: Final result Specimen: Blood from Arm, Right Updated: 23     Blood Culture No growth at 5 days    Blood Culture - Blood, Arm, Left  [[id number]]  (Normal) Collected: 09/15/23 0212    Lab Status: Final result Specimen: Blood from Arm, Left Updated: 09/20/23 0230     Blood Culture No growth at 5 days            No radiology results from the last 24 hrs        Current medications:  Scheduled Meds:atorvastatin, 80 mg, Per PEG Tube, Nightly  clopidogrel, 75 mg, Per PEG Tube, Daily  Diclofenac Sodium, 2 g, Topical, BID  donepezil, 10 mg, Per PEG Tube, Nightly  fluticasone, 2 spray, Each Nare, Daily  HYDROcodone-acetaminophen, 1 tablet, Per PEG Tube, Q12H  lansoprazole, 15 mg, Per PEG Tube, Q AM  Lidocaine, 1 patch, Transdermal, Q24H  melatonin, 10 mg, Per PEG Tube, Nightly  metoprolol tartrate, 25 mg, Per PEG Tube, Q12H  miconazole, 1 application , Topical, Q12H  mirtazapine, 30 mg, Per PEG Tube, Nightly  palliative care oral rinse, , Mouth/Throat, 4x Daily  ProSource No Carb, 30 mL, Per G Tube, Daily  QUEtiapine, 200 mg, Per PEG Tube, Q12H  temazepam, 15 mg, Per PEG Tube, Nightly      Continuous Infusions:   PRN Meds:.  Calcium Replacement - Follow Nurse / BPA Driven Protocol    dextrose    glucagon (human recombinant)    ibuprofen    Magnesium Standard Dose Replacement - Follow Nurse / BPA Driven Protocol    ondansetron    PHENobarbital    Phosphorus Replacement - Follow Nurse / BPA Driven Protocol    Potassium Replacement - Follow Nurse / BPA Driven Protocol    ziprasidone    Assessment & Plan   Assessment & Plan     Active Hospital Problems    Diagnosis  POA    **Hemorrhagic CVA [I61.9]  Yes    Oropharyngeal dysphagia [R13.12]  Yes    Multiple bilateral CVAs [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        Brief Hospital Course to date:  Kenneth Wen is a 73 y.o. male  with hx  of HTN, HLD, T2DM, and GERD who presented to OSH with multiple acute ischemic infarcts of the bilateral cerebral and cerebellar hemispheres as well as left isaac. TTE/JOSIAS was negative PFO at OSH. On 9/13/23 he had worsening in mental status and new inability to move RLE, head CT showed evolution of the existing CVA with new development of petechial hemorrhage. DAPT was held for 24 hours per Neurology recommendations and repeat CT head that evening showed worsening effacement of the right quadrigeminal cistern with suspected increasing upward supratentorial pressure. He was then transferred to [facility] for Neurosurgery evaluation on 9/15/23. He was started on hypertonic saline 9/15/23 through 9/20/23 for cerebral edema. He had fevers with negative cultures but had worsening secretions and labored breathing so was started on Zosyn on 9/16/23. Transferred to the hospitalist service on 9/22/23. Pt demonstrated poor oral intake with elevated sodium levels; therefore, PEG tube was recommended & placed.         Goals of care  --Palliative and Hospice follow     Multiple bilateral posterior circulation strokes with hemorrhagic conversion  -- suspect atheroembolic but cannot rule out cardioembolic given multiple vascular territories  --Plavix monotherapy, high intensity statin  --Needs Holter monitor at discharge  --Neurology follows     Agitation  Encephalopathy  --Donepezil 10 mg nightly, melatonin 10 mg nightly, mirtazapine 30 mg nightly, Restoril 15 mg nightly  - increase Seroquel 200 mg BID  - increase phenobarbitol to 64 mg TID PRN for restlessness  --Continue as needed Geodon     Dysphagia  Hypernatremia - resolved  --SLP recommends mechanical ground with honey thick liquids   --Risk of pulling out peg tube, abd binder to be in place at all times. --Surgery recommends to remain in restraints at all times or if off has to have sitter for next 2 weeks   -- dietary following for calorie count, adequate PO intake is limited by  his mental status     HTN  --Continue Metoprolol 25 mg BID     GERD  --Continue PPI     COVID-19-resolved  --Overall asymptomatic and on room air  --No infiltrates seen and low procal  --Did not receive any treatment   --Off Isolation 9/30/2023      Leukocytosis-resolved  --Procalcitionin low at 0.05  --CXR and UA negative  --Blood cultures negative     Elevated LFT's, mild  --Possibly secondary to statin?  --Negative acute hepatitis panel  --repeat AST/ALT improved  Expected Discharge Location and Transportation:   Expected Discharge   Expected Discharge Date: 10/31/2023; Expected Discharge Time:      DVT prophylaxis:  Mechanical DVT prophylaxis orders are present.     AM-PAC 6 Clicks Score (PT): 12 (10/26/23 0800)    CODE STATUS:   Code Status and Medical Interventions:   Ordered at: 09/29/23 0854     Medical Intervention Limits:    NO intubation (DNI)     Code Status (Patient has no pulse and is not breathing):    No CPR (Do Not Attempt to Resuscitate)     Medical Interventions (Patient has pulse or is breathing):    Limited Support       Kee Lovelace MD  10/26/23

## 2023-10-27 LAB
GLUCOSE BLDC GLUCOMTR-MCNC: 108 MG/DL (ref 70–130)
GLUCOSE BLDC GLUCOMTR-MCNC: 134 MG/DL (ref 70–130)
GLUCOSE BLDC GLUCOMTR-MCNC: 159 MG/DL (ref 70–130)
GLUCOSE BLDC GLUCOMTR-MCNC: 194 MG/DL (ref 70–130)

## 2023-10-27 PROCEDURE — 92526 ORAL FUNCTION THERAPY: CPT

## 2023-10-27 PROCEDURE — 92507 TX SP LANG VOICE COMM INDIV: CPT

## 2023-10-27 PROCEDURE — 99232 SBSQ HOSP IP/OBS MODERATE 35: CPT | Performed by: PSYCHIATRY & NEUROLOGY

## 2023-10-27 PROCEDURE — 99232 SBSQ HOSP IP/OBS MODERATE 35: CPT | Performed by: INTERNAL MEDICINE

## 2023-10-27 PROCEDURE — 82948 REAGENT STRIP/BLOOD GLUCOSE: CPT

## 2023-10-27 RX ORDER — PHENOBARBITAL 64.8 MG/1
64.8 TABLET ORAL ONCE
Qty: 1 TABLET | Refills: 0 | Status: COMPLETED | OUTPATIENT
Start: 2023-10-27 | End: 2023-10-27

## 2023-10-27 RX ORDER — PHENOBARBITAL 32.4 MG/1
64.8 TABLET ORAL 2 TIMES DAILY
Qty: 120 TABLET | Refills: 0 | Status: DISCONTINUED | OUTPATIENT
Start: 2023-10-27 | End: 2023-10-27

## 2023-10-27 RX ORDER — PHENOBARBITAL 32.4 MG/1
32.4 TABLET ORAL EVERY 8 HOURS PRN
Status: DISCONTINUED | OUTPATIENT
Start: 2023-10-27 | End: 2023-10-30

## 2023-10-27 RX ORDER — PHENOBARBITAL 64.8 MG/1
64.8 TABLET ORAL 2 TIMES DAILY
Qty: 60 TABLET | Refills: 0 | Status: DISCONTINUED | OUTPATIENT
Start: 2023-10-27 | End: 2023-10-30

## 2023-10-27 RX ADMIN — ATORVASTATIN CALCIUM 80 MG: 40 TABLET, FILM COATED ORAL at 22:13

## 2023-10-27 RX ADMIN — DICLOFENAC SODIUM 2 G: 9.3 GEL TOPICAL at 09:24

## 2023-10-27 RX ADMIN — PHENOBARBITAL 64.8 MG: 64.8 TABLET ORAL at 22:13

## 2023-10-27 RX ADMIN — HYDROCODONE BITARTRATE AND ACETAMINOPHEN 1 TABLET: 5; 325 TABLET ORAL at 13:09

## 2023-10-27 RX ADMIN — FLUTICASONE PROPIONATE 2 SPRAY: 50 SPRAY, METERED NASAL at 09:24

## 2023-10-27 RX ADMIN — TEMAZEPAM 15 MG: 15 CAPSULE ORAL at 18:50

## 2023-10-27 RX ADMIN — PHENOBARBITAL 64.8 MG: 32.4 TABLET ORAL at 07:33

## 2023-10-27 RX ADMIN — METOPROLOL TARTRATE 25 MG: 25 TABLET, FILM COATED ORAL at 09:23

## 2023-10-27 RX ADMIN — QUETIAPINE FUMARATE 200 MG: 100 TABLET ORAL at 22:13

## 2023-10-27 RX ADMIN — MINERAL OIL: 1000 LIQUID ORAL at 18:51

## 2023-10-27 RX ADMIN — MIRTAZAPINE 30 MG: 15 TABLET, FILM COATED ORAL at 18:50

## 2023-10-27 RX ADMIN — DONEPEZIL HYDROCHLORIDE 10 MG: 10 TABLET, FILM COATED ORAL at 18:50

## 2023-10-27 RX ADMIN — Medication 30 ML: at 09:25

## 2023-10-27 RX ADMIN — LIDOCAINE 1 PATCH: 4 PATCH TOPICAL at 09:23

## 2023-10-27 RX ADMIN — MINERAL OIL: 1000 LIQUID ORAL at 22:14

## 2023-10-27 RX ADMIN — Medication 10 MG: at 18:50

## 2023-10-27 RX ADMIN — QUETIAPINE FUMARATE 200 MG: 100 TABLET ORAL at 09:23

## 2023-10-27 RX ADMIN — MICONAZOLE NITRATE 1 APPLICATION: 20 CREAM TOPICAL at 09:24

## 2023-10-27 RX ADMIN — LANSOPRAZOLE 15 MG: 15 TABLET, ORALLY DISINTEGRATING ORAL at 05:58

## 2023-10-27 RX ADMIN — DICLOFENAC SODIUM 2 G: 9.3 GEL TOPICAL at 22:13

## 2023-10-27 RX ADMIN — PHENOBARBITAL 64.8 MG: 64.8 TABLET ORAL at 13:09

## 2023-10-27 RX ADMIN — METOPROLOL TARTRATE 25 MG: 25 TABLET, FILM COATED ORAL at 22:13

## 2023-10-27 RX ADMIN — MICONAZOLE NITRATE 1 APPLICATION: 20 CREAM TOPICAL at 22:13

## 2023-10-27 RX ADMIN — MINERAL OIL: 1000 LIQUID ORAL at 07:33

## 2023-10-27 RX ADMIN — CLOPIDOGREL BISULFATE 75 MG: 75 TABLET ORAL at 09:23

## 2023-10-27 RX ADMIN — MINERAL OIL: 1000 LIQUID ORAL at 13:10

## 2023-10-27 NOTE — PLAN OF CARE
Goal Outcome Evaluation:  Plan of Care Reviewed With: patient        Progress: no change  Outcome Evaluation: nursing reports pt slept well last night and was restless at the beginning of the shift.  Discussed with Dr Rausch and he scheduled phenobarb bid and continue prn if between if restlessness persists.  Pt is eating and drinking small amts. Continue tube feeds bolus q 3 hours.  SW looking for rehab at [facility]. Current medications are not an issue for this facility. Pt  has been without restraints or sitter today and last night.    Palliative Care IDt: RN, APRN, SW, MD,   After hours#[phone]

## 2023-10-27 NOTE — PLAN OF CARE
Goal Outcome Evaluation:           Progress: improving  Outcome Evaluation: Pt was restless at beginning of shift and appeared slightly agitated, it seems to be frustration in not being able to do for himself. Mood has been good and pt has communicated well this shift and has bee easily redirected with calm reassurance. Pt is consuming small amounts of oral foods as well as tolerating tube boluses well. Good output noted. VSS on RA. No IV access and care team is okay with it. Wife has been at bedside several hours this shift. Speech worke with PT and determined that diet can be advance as he is adapting well to food switches and follows direction well. POC is to  continue and progression noted. No restraints this shift nor sitter and has been cooperative. Reporting as needed.

## 2023-10-27 NOTE — PROGRESS NOTES
Neurology       Patient Care Team:  Susan Powers APRN as PCP - General (Family Medicine)    Chief complaint: Restless    History: Patient is done quite well with no need for as needed Geodon.    He did get a dose of phenobarbital last night after being restless and is getting restless this morning has had another as needed dose.    He is easily redirected and is conversational.      Past Medical History:   Diagnosis Date    Acid reflux     Cancer     Skin    Diabetes mellitus     Prediabetes    GERD (gastroesophageal reflux disease) 09/15/2023    HTN (hypertension) 09/15/2023    Kidney disease     T2DM (type 2 diabetes mellitus) 09/15/2023       Vital Signs   Vitals:    10/26/23 0600 10/26/23 0914 10/26/23 1844 10/27/23 0720   BP:  118/73 131/87 105/55   BP Location:   Left arm Left arm   Patient Position:   Lying Lying   Pulse:  95 104 111   Resp: 18 18 18 18   Temp:   98.3 °F (36.8 °C) 98.7 °F (37.1 °C)   TempSrc:   Temporal Temporal   SpO2:  96% 95% 90%   Weight:       Height:           Physical Exam:   General: Awake and alert              Neuro: Conversational but obviously mildly impaired.    Moving the right side.    Results Review:  Reviewed      Results from last 7 days   Lab Units 10/23/23  0703   SODIUM mmol/L 137   POTASSIUM mmol/L 4.4   CHLORIDE mmol/L 101   CO2 mmol/L 27.0   BUN mg/dL 19   CREATININE mg/dL 0.56*   CALCIUM mg/dL 9.0   BILIRUBIN mg/dL 0.4   ALK PHOS U/L 81   ALT (SGPT) U/L 44*   AST (SGOT) U/L 29   GLUCOSE mg/dL 185*       Imaging Results (Last 24 Hours)       ** No results found for the last 24 hours. **            Assessment:  Persistent delirium.    Multiple severe strokes.        Plan:  Increase phenobarbital to 64 mg every 12 hours given at 8 AM and 7:30 PM.    Decrease as needed phenobarbital to 32 mg every 8 hours as needed.    Continue Seroquel 200 mg twice daily    Comment:  Doing better overall.  Think he probably needs regular dosing of phenobarbital         I discussed the  patients findings and my recommendations with patient, family, and nursing staff    Toni Rausch MD  10/27/23  11:53 EDT

## 2023-10-27 NOTE — PLAN OF CARE
Goal Outcome Evaluation:              Outcome Evaluation: SLP following.  Improved response/oral skills.  If pt continues to improve or at least current level of function, consider MBS.

## 2023-10-27 NOTE — PLAN OF CARE
Goal Outcome Evaluation:           Progress: no change           VSS on room air. Sleeping well throughout shift. No PRNs indicated this shift. PEG in place with abd binder 2x.

## 2023-10-27 NOTE — PROGRESS NOTES
[facility] Medicine Services  PROGRESS NOTE    Patient Name: Kenneth Wen  : 1950  MRN: [id number]    Date of Admission: 9/15/2023  Primary Care Physician: Susan Powers APRN    Subjective   Subjective     CC: f/u CVA    HPI:  Resting in bed in no acute distress and patient's wife is also present at the bedside.  Tells me that he is comfortable and does not have any pain or shortness of breath.      Objective   Objective     Vital Signs:   Temp:  [98.3 °F (36.8 °C)-98.7 °F (37.1 °C)] 98.7 °F (37.1 °C)  Heart Rate:  [104-111] 111  Resp:  [18] 18  BP: (105-131)/(55-87) 105/55     Physical Exam:  Constitutional: No acute distress  HENT: NCAT, mucous membranes moist  Respiratory: Clear to auscultation bilaterally, respiratory effort normal   Cardiovascular: RRR, no murmurs, rubs, or gallops  Gastrointestinal: Positive bowel sounds, soft, nontender, nondistended  Musculoskeletal: No bilateral ankle edema  Psychiatric: Looks anxious, cooperative  Neurologic: Awake, alert, somewhat restless, confused but follows commands  Skin: No rashes           Results Reviewed:  LAB RESULTS:      Lab 10/23/23  0703   CRP <0.30         Lab 10/23/23  0703   SODIUM 137   POTASSIUM 4.4   CHLORIDE 101   CO2 27.0   ANION GAP 9.0   BUN 19   CREATININE 0.56*   EGFR 104.7   GLUCOSE 185*   CALCIUM 9.0   MAGNESIUM 2.2   PHOSPHORUS 3.3         Lab 10/23/23  0703   TOTAL PROTEIN 5.6*   ALBUMIN 3.5   GLOBULIN 2.1   ALT (SGPT) 44*   AST (SGOT) 29   BILIRUBIN 0.4   ALK PHOS 81             Lab 10/23/23  0703   CHOLESTEROL 80   TRIGLYCERIDES 48             Brief Urine Lab Results  (Last result in the past 365 days)        Color   Clarity   Blood   Leuk Est   Nitrite   Protein   CREAT   Urine HCG        09/15/23 181 Yellow   Cloudy   Trace   Negative   Negative   Negative                   Microbiology Results Abnormal       Procedure Component Value - Date/Time    Blood Culture - Blood, Arm, Right [[id number]]   (Normal) Collected: 09/15/23 0212    Lab Status: Final result Specimen: Blood from Arm, Right Updated: 09/20/23 0230     Blood Culture No growth at 5 days    Blood Culture - Blood, Arm, Left [[id number]]  (Normal) Collected: 09/15/23 0212    Lab Status: Final result Specimen: Blood from Arm, Left Updated: 09/20/23 0230     Blood Culture No growth at 5 days            No radiology results from the last 24 hrs        Current medications:  Scheduled Meds:atorvastatin, 80 mg, Per PEG Tube, Nightly  clopidogrel, 75 mg, Per PEG Tube, Daily  Diclofenac Sodium, 2 g, Topical, BID  donepezil, 10 mg, Per PEG Tube, Nightly  fluticasone, 2 spray, Each Nare, Daily  HYDROcodone-acetaminophen, 1 tablet, Per PEG Tube, Q12H  lansoprazole, 15 mg, Per PEG Tube, Q AM  Lidocaine, 1 patch, Transdermal, Q24H  melatonin, 10 mg, Per PEG Tube, Nightly  metoprolol tartrate, 25 mg, Per PEG Tube, Q12H  miconazole, 1 application , Topical, Q12H  mirtazapine, 30 mg, Per PEG Tube, Nightly  palliative care oral rinse, , Mouth/Throat, 4x Daily  PHENobarbital, 64.8 mg, Per PEG Tube, BID  ProSource No Carb, 30 mL, Per G Tube, Daily  QUEtiapine, 200 mg, Per PEG Tube, Q12H  temazepam, 15 mg, Per PEG Tube, Nightly      Continuous Infusions:   PRN Meds:.  Calcium Replacement - Follow Nurse / BPA Driven Protocol    dextrose    glucagon (human recombinant)    ibuprofen    Magnesium Standard Dose Replacement - Follow Nurse / BPA Driven Protocol    ondansetron    PHENobarbital    Phosphorus Replacement - Follow Nurse / BPA Driven Protocol    Potassium Replacement - Follow Nurse / BPA Driven Protocol    ziprasidone    Assessment & Plan   Assessment & Plan     Active Hospital Problems    Diagnosis  POA    **Hemorrhagic CVA [I61.9]  Yes    Oropharyngeal dysphagia [R13.12]  Yes    Multiple bilateral CVAs [I63.9]  Yes    HFpEF [I50.30]  Yes    T2DM (type 2 diabetes mellitus) [E11.9]  Yes    HTN (hypertension) [I10]  Yes    GERD (gastroesophageal reflux disease)  [K21.9]  Yes    ICH (intracerebral hemorrhage) [I61.9]  Yes    Dyslipidemia [E78.5]  Yes      Resolved Hospital Problems   No resolved problems to display.        Brief Hospital Course to date:  Kenneth Wen is a 73 y.o. male  with hx of HTN, HLD, T2DM, and GERD who presented to OSH with multiple acute ischemic infarcts of the bilateral cerebral and cerebellar hemispheres as well as left isaac. TTE/JOSIAS was negative PFO at OSH. On 9/13/23 he had worsening in mental status and new inability to move RLE, head CT showed evolution of the existing CVA with new development of petechial hemorrhage. DAPT was held for 24 hours per Neurology recommendations and repeat CT head that evening showed worsening effacement of the right quadrigeminal cistern with suspected increasing upward supratentorial pressure. He was then transferred to [facility] for Neurosurgery evaluation on 9/15/23. He was started on hypertonic saline 9/15/23 through 9/20/23 for cerebral edema. He had fevers with negative cultures but had worsening secretions and labored breathing so was started on Zosyn on 9/16/23. Transferred to the hospitalist service on 9/22/23. Pt demonstrated poor oral intake with elevated sodium levels; therefore, PEG tube was recommended & placed.         Goals of care  --Palliative and Hospice follow     Multiple bilateral posterior circulation strokes with hemorrhagic conversion  -- suspect atheroembolic but cannot rule out cardioembolic given multiple vascular territories  --Plavix monotherapy, high intensity statin  --Needs Holter monitor at discharge  --Neurology follows     Agitation  Encephalopathy  --Donepezil 10 mg nightly, melatonin 10 mg nightly, mirtazapine 30 mg nightly, Restoril 15 mg nightly  - increase Seroquel 200 mg BID  - increase phenobarbitol to 64 mg TID PRN for restlessness  --Continue as needed Geodon  --Compared to last week patient has improved some.  He is much more calm and also more oriented.  Still very  confused and her behavior fluctuates.     Dysphagia  Hypernatremia - resolved  --SLP recommends mechanical ground with honey thick liquids   --Risk of pulling out peg tube, abd binder to be in place at all times. --Surgery recommends to remain in restraints at all times or if off has to have sitter for next 2 weeks   -- dietary following for calorie count, adequate PO intake is limited by his mental status     HTN  --Continue Metoprolol 25 mg BID     GERD  --Continue PPI     COVID-19-resolved  --Overall asymptomatic and on room air  --No infiltrates seen and low procal  --Did not receive any treatment   --Off Isolation 9/30/2023      Leukocytosis-resolved  --Procalcitionin low at 0.05  --CXR and UA negative  --Blood cultures negative     Elevated LFT's, mild  --Possibly secondary to statin?  --Negative acute hepatitis panel  --repeat AST/ALT improved  Expected Discharge Location and Transportation:   Expected Discharge   Expected Discharge Date: 11/1/2023; Expected Discharge Time:      DVT prophylaxis:  Mechanical DVT prophylaxis orders are present.     AM-PAC 6 Clicks Score (PT): 12 (10/27/23 0800)    CODE STATUS:   Code Status and Medical Interventions:   Ordered at: 09/29/23 0854     Medical Intervention Limits:    NO intubation (DNI)     Code Status (Patient has no pulse and is not breathing):    No CPR (Do Not Attempt to Resuscitate)     Medical Interventions (Patient has pulse or is breathing):    Limited Support       Bruce Du MD  10/27/23

## 2023-10-27 NOTE — CASE MANAGEMENT/SOCIAL WORK
"Continued Stay Note  [facility]     Patient Name: Kenneth Wen  MRN: [id number]  Today's Date: 10/27/2023    Admit Date: 9/15/2023    Plan: SNF to LTC   Discharge Plan       Row Name 10/27/23 1329       Plan    Plan SNF to LTC    Plan Comments BARBARA spoke with Rosey at [facility] in [place].  She confirmed they were \"looking at\" pt's referral but don't have a bed right now.  She asked updated clinical and rehab notes be sent/faxed on Monday.  BARBARA will ensure this is completed and will request pt be seen by rehab services over the weekend. BARBARA also asked if the facility would be able to potentially offer a bed with pt being prescribed phenobarbitol.  Rosey stated this shouldn't be a problem.  BARBARA will continue to follow and will send updated notes on Monday.    Final Discharge Disposition Code 03 - skilled nursing facility (SNF)                   Discharge Codes    No documentation.                 Expected Discharge Date and Time       Expected Discharge Date Expected Discharge Time    Nov 1, 2023               JOEL Wiggins    "

## 2023-10-27 NOTE — THERAPY TREATMENT NOTE
Acute Care - Speech Language Pathology Treatment Note  [facility]     Patient Name: Kenneth Wen  : 1950  MRN: [id number]  Today's Date: 10/27/2023               Admit Date: 9/15/2023     Visit Dx:    ICD-10-CM ICD-9-CM   1. Hemorrhagic CVA  I61.9 431   2. Aphasia  R47.01 784.3   3. Dysarthria  R47.1 784.51   4. Oropharyngeal dysphagia  R13.12 787.22     Patient Active Problem List   Diagnosis    Precordial pain    Elevated BP without diagnosis of hypertension    Dyslipidemia    Hyperglycemia    Multiple bilateral CVAs    Hemorrhagic CVA    HFpEF    T2DM (type 2 diabetes mellitus)    HTN (hypertension)    GERD (gastroesophageal reflux disease)    ICH (intracerebral hemorrhage)    Oropharyngeal dysphagia     Past Medical History:   Diagnosis Date    Acid reflux     Cancer     Skin    Diabetes mellitus     Prediabetes    GERD (gastroesophageal reflux disease) 09/15/2023    HTN (hypertension) 09/15/2023    Kidney disease     T2DM (type 2 diabetes mellitus) 09/15/2023     Past Surgical History:   Procedure Laterality Date    APPENDECTOMY      ENDOSCOPY W/ PEG TUBE PLACEMENT N/A 2023    Procedure: ESOPHAGOGASTRODUODENOSCOPY WITH PERCUTANEOUS ENDOSCOPIC GASTROSTOMY TUBE INSERTION;  Surgeon: Haseeb Carrillo MD;  Location: [facility] ENDOSCOPY;  Service: General;  Laterality: N/A;    HEMORRHOIDECTOMY      NASAL POLYP SURGERY         SLP Recommendation and Plan                 Anticipated Discharge Disposition (SLP): inpatient rehabilitation facility, skilled nursing facility (10/27/23 1130)     Therapy Frequency (Swallow): 5 days per week (10/27/23 1130)  Therapy Frequency (SLP SLC): 5 days per week (10/27/23 1130)  Predicted Duration Therapy Intervention (Days): until discharge (10/27/23 1130)  Oral Care Recommendations: Oral Care BID/PRN, Suction toothbrush (10/27/23 1130)     Daily Summary of Progress (SLP): progress toward functional goals is good (10/27/23 1130)           Treatment Assessment (SLP):  improved, oral dysphagia, cognitive-linguistic disorder, communication disorder (10/27/23 1130)  Treatment Assessment Comments (SLP): Improved participation. Improved oral manipulation and clearance.  Anterior loss on right with and without awareness.  Increased agitation when corrected/or when SLP explains  errored response.  If pt continues to improve and be able to cooperate/willing to take po, repeat MBS. (10/27/23 1130)  Plan for Continued Treatment (SLP): continue treatment per plan of care (10/27/23 1130)  Outcome Evaluation: SLP following.  Improved response/oral skills.  If pt continues to improve or at least current level of function, consider MBS. (10/27/23 5826)      SLP EVALUATION (last 72 hours)       SLP SLC Evaluation       Row Name 10/27/23 1130                   Communication Assessment/Intervention    Document Type therapy note (daily note)  -ML        Subjective Information no complaints  -ML        Patient Observations alert;cooperative  -ML        Patient/Family/Caregiver Comments/Observations Wife present  -ML        Patient Effort fair  -ML        Symptoms Noted During/After Treatment other (see comments)  Increased agitation with stimulation  -ML           General Information    Patient Profile Reviewed yes  -ML        Pertinent History Of Current Problem See previously documented hx.  -ML        Precautions/Limitations, Vision vision impairment, bilaterally  Per wife, neuro feels pt may only be able to see if right in front of face  -ML        Precautions/Limitations, Hearing WFL  -ML           Pain    Additional Documentation Pain Scale: FACES Pre/Post-Treatment (Group)  -ML           Pain Scale: Numbers Pre/Post-Treatment    Pretreatment Pain Rating 0/10 - no pain  -ML        Posttreatment Pain Rating --  Increased restlessness as therapy session continued  -ML           SLP Treatment Clinical Impressions    Treatment Assessment (SLP) improved;oral dysphagia;cognitive-linguistic  disorder;communication disorder  -ML        Treatment Assessment Comments (SLP) Improved participation. Improved oral manipulation and clearance.  Anterior loss on right with and without awareness.  Increased agitation when corrected/or when SLP explains  errored response.  If pt continues to improve and be able to cooperate/willing to take po, repeat MBS.  -ML        Daily Summary of Progress (SLP) progress toward functional goals is good  -ML        Barriers to Overall Progress (SLP) Cognitive status;Medically complex  -ML        Plan for Continued Treatment (SLP) continue treatment per plan of care  -ML        Care Plan Review care plan/treatment goals reviewed  -ML        Care Plan Review, Other Participant(s) spouse  -ML           Recommendations    Therapy Frequency (SLP SLC) 5 days per week  -ML        Predicted Duration Therapy Intervention (Days) until discharge  -ML        Anticipated Discharge Disposition (SLP) inpatient rehabilitation facility;skilled nursing facility  -ML                  User Key  (r) = Recorded By, (t) = Taken By, (c) = Cosigned By      Initials Name Effective Dates    ML Meera Abrams, CCC-SLP 06/16/21 -                        EDUCATION  The patient has been educated in the following areas:     Cognitive Impairment Communication Impairment.           SLP GOALS       Row Name 10/27/23 1130 10/24/23 3570          (LTG) Patient will demonstrate functional swallow for    Diet Texture (Demonstrate functional swallow) soft to chew (chopped) textures  -ML soft to chew (chopped) textures  -MP     Liquid viscosity (Demonstrate functional swallow) nectar/ mildly thick liquids  -ML nectar/ mildly thick liquids  -MP     [place] (Demonstrate functional swallow) with minimal cues (75-90% accuracy)  -ML with minimal cues (75-90% accuracy)  -MP     Time Frame (Demonstrate functional swallow) by discharge  -ML by discharge  -MP     Barriers (Demonstrate functional swallow) cognition  -ML  cognition  -MP     Progress/Outcomes (Demonstrate functional swallow) progress slower than expected  -ML progress slower than expected  -MP     Comment (Demonstrate functional swallow) Trialed soft to chew.  Able to comply with directions to place on left, clear right cheek, perform lingual and finger sweep, and verbalize awareness of remaining oral residue or clearance.  Still with anterior loss on the right.  -ML --        (STG) Patient will tolerate trials of    Consistencies Trialed (Tolerate trials) pureed textures;honey/ moderately thick liquids  -ML pureed textures;honey/ moderately thick liquids  -MP     Desired Outcome (Tolerate trials) without signs/symptoms of aspiration;without signs of distress;with adequate oral prep/transit/clearance;with use of compensatory strategies (see comments)  -ML without signs/symptoms of aspiration;without signs of distress;with adequate oral prep/transit/clearance;with use of compensatory strategies (see comments)  -MP     [place] (Tolerate trials) with 1:1 assist/ supervision  -ML with 1:1 assist/ supervision  -MP     Time Frame (Tolerate trials) by discharge  -ML by discharge  -MP     Progress/Outcomes (Tolerate trials) continuing progress toward goal  -ML continuing progress toward goal  -MP     Comment (Tolerate trials) Tolerating current diet- trialed soft to chew today.  Able to manipulate and clear soft to chew, however cognition impacts safety.  -ML No overt clinical s/sxs w/ puree, honey-thick  -MP        (STG) Patient will tolerate therapeutic trials of    Consistencies Trialed (Tolerate therapeutic trials) thin liquids  -ML thin liquids  -MP     Desired Outcome (Tolerate therapeutic trials) without signs/symptoms of aspiration;without signs of distress  -ML without signs/symptoms of aspiration;without signs of distress  -MP     [place] (Tolerate therapeutic trials) with minimal cues (75-90% accuracy)  -ML with minimal cues (75-90% accuracy)  -MP      Time Frame (Tolerate therapeutic trials) by discharge  -ML by discharge  -MP     Progress/Outcomes (Tolerate therapeutic trials) goal ongoing  -ML goal ongoing  -MP     Comment (Tolerate therapeutic trials) Trialed ice without overt pharyngeal signs. Did not trial thin water .  -ML --        (STG) Labial Strengthening Goal 1 (SLP)    Activity (Labial Strengthening Goal 1, SLP) increase labial tone  -ML increase labial tone  -MP     Increase Labial Tone labial resistance exercises;swallow trials  -ML labial resistance exercises;swallow trials  -MP     [place]/Accuracy (Labial Strengthening Goal 1, SLP) with moderate cues (50-74% accuracy)  -ML with moderate cues (50-74% accuracy)  -MP     Time Frame (Labial Strengthening Goal 1, SLP) short term goal (STG)  -ML short term goal (STG)  -MP     Progress/Outcomes (Labial Strengthening Goal 1, SLP) goal ongoing  -ML goal ongoing  -MP     Comment (Labial Strengthening Goal 1, SLP) Able to wipe right labial corner. Unable to prevent anterior loss.  Closes lips but reduced seal.  -ML --        (STG) Lingual Strengthening Goal 1 (SLP)    Activity (Lingual Strengthening Goal 1, SLP) increase lingual tone/sensation/control/coordination/movement;increase tongue back strength  -ML increase lingual tone/sensation/control/coordination/movement;increase tongue back strength  -MP     Increase Lingual Tone/Sensation/Control/Coordination/Movement swallow trials;lingual resistance exercises  -ML swallow trials;lingual resistance exercises  -MP     Increase Tongue Back Strength lingual resistance exercises  -ML lingual resistance exercises  -MP     [place]/Accuracy (Lingual Strengthening Goal 1, SLP) with moderate cues (50-74% accuracy)  -ML with moderate cues (50-74% accuracy)  -MP     Time Frame (Lingual Strengthening Goal 1, SLP) short term goal (STG)  -ML short term goal (STG)  -MP     Progress/Outcomes (Lingual Strengthening Goal 1, SLP) goal ongoing  -ML goal ongoing  -MP      Comment (Lingual Strengthening Goal 1, SLP) Improved lingual movement and strength with resistance.  -ML --        (STG) Pharyngeal Strengthening Exercise Goal 1 (SLP)    Activity (Pharyngeal Strengthening Goal 1, SLP) increase superior movement of the hyolaryngeal complex;increase anterior movement of the hyolaryngeal complex;increase closure at entrance to airway/closure of airway at glottis;increase squeeze/positive pressure generation;increase tongue base retraction;increase timing  -ML increase superior movement of the hyolaryngeal complex;increase anterior movement of the hyolaryngeal complex;increase closure at entrance to airway/closure of airway at glottis;increase squeeze/positive pressure generation;increase tongue base retraction;increase timing  -MP     Increase Timing prepping - 3 second prep or suck swallow or 3-step swallow  -ML prepping - 3 second prep or suck swallow or 3-step swallow  -MP     Increase Superior Movement of the Hyolaryngeal Complex effortful pitch glide (falsetto + pharyngeal squeeze)  -ML effortful pitch glide (falsetto + pharyngeal squeeze)  -MP     Increase Anterior Movement of the Hyolaryngeal Complex chin tuck against resistance (CTAR)  -ML chin tuck against resistance (CTAR)  -MP     Increase Closure at Entrance to Airway/Closure of Airway at Glottis supraglottic swallow  -ML supraglottic swallow  -MP     Increase Squeeze/Positive Pressure Generation hard effortful swallow  -ML hard effortful swallow  -MP     Increase Tongue Base Retraction tamie  -ML tamie  -MP     [place]/Accuracy (Pharyngeal Strengthening Goal 1, SLP) with moderate cues (50-74% accuracy)  -ML with moderate cues (50-74% accuracy)  -MP     Time Frame (Pharyngeal Strengthening Goal 1, SLP) short term goal (STG)  -ML short term goal (STG)  -MP     Progress/Outcomes (Pharyngeal Strengthening Goal 1, SLP) continuing progress toward goal  -ML goal ongoing  -MP     Comment (Pharyngeal Strengthening Goal  1, SLP) Increased agitation with exercise instruction.  No evidence of increased effort.  Unable to demonstrate a quick swallow with 3 second prep.  Cannot comply with directions for [place]a or supraglottic.  -ML --        Patient will demonstrate functional communication skills for return to discharge environment     [place] with moderate cues  -ML with moderate cues  -MP     Time frame by discharge  -ML by discharge  -MP     Progress/Outcomes goal ongoing  -ML goal ongoing  -MP     Comments Following simple directions. Repeating directions.  Quickly frustrated and refuses to interact but luckily easily distracted and willing to return to task.  -ML --        Comprehend Questions Goal 1 (SLP)    Improve Ability to Comprehend Questions Goal 1 (SLP) complex yes/no questions;80%;with minimal cues (75-90%)  -ML complex yes/no questions;80%;with minimal cues (75-90%)  -MP     Time Frame (Comprehend Questions Goal 1, SLP) short term goal (STG)  -ML short term goal (STG)  -MP     Progress (Ability to Comprehend Questions Goal 1, SLP) 30%;with maximum cues (25-49%)  -ML --     Progress/Outcomes (Comprehend Questions Goal 1, SLP) goal ongoing  -ML goal ongoing  -MP     Comment (Comprehend Questions Goal 1, SLP) Argumentative re: accuracy of his response.  States frequently, if you don't like my answer why are you asking me.  -ML --        Follow Directions Goal 2 (SLP)    Improve Ability to Follow Directions Goal 1 (SLP) 2 step commands;80%;with minimal cues (75-90%)  -ML 2 step commands;80%;with minimal cues (75-90%)  -MP     Time Frame (Follow Directions Goal 1, SLP) short term goal (STG)  -ML short term goal (STG)  -MP     Progress (Ability to Follow Directions Goal 1, SLP) 20%;with moderate cues (50-74%)  -ML --     Progress/Outcomes (Follow Directions Goal 1, SLP) goal ongoing  -ML goal ongoing  -MP     Comment (Follow Directions Goal 1, SLP) Able to follow 1 step but not two step except with hand over hand  assistance.  -ML --        Word Retrieval Skills Goal 1 (SLP)    Improve Word Retrieval Skills By Goal 1 (SLP) confrontational naming task;high frequency;responsive naming task;simple;80%;with minimal cues (75-90%)  -ML confrontational naming task;high frequency;responsive naming task;simple;80%;with minimal cues (75-90%)  -MP     Time Frame (Word Retrieval Goal 1, SLP) short term goal (STG)  -ML short term goal (STG)  -MP     Progress (Word Retrieval Skills Goal 1, SLP) 90%;with minimal cues (75-90%)  -ML --     Progress/Outcomes (Word Retrieval Goal 1, SLP) goal ongoing  -ML goal ongoing  -MP     Comment (Word Retrieval Goal 1, SLP) Able to name body parts and complete responsive naming. Simple divergent challenging and required increased cues.  -ML --        Articulation Goal 1 (SLP)    Improve Articulation Goal 1 (SLP) by over-articulating at phrase level;80%;with moderate cues (50-74%)  -ML by over-articulating at phrase level;80%;with moderate cues (50-74%)  -MP     Time Frame (Articulation Goal 1, SLP) short term goal (STG)  -ML short term goal (STG)  -MP     Progress (Articulation Goal 1, SLP) 0%;independently (over 90% accuracy)  Did not respond to cues for overarticulation.  -ML --     Progress/Outcomes (Articulation Goal 1, SLP) goal ongoing  -ML goal ongoing  -MP     Comment (Articulation Goal 1, SLP) Does not respond to directions for overarticulation.  Language is confused but when context known, speech intelligibility is improved.  -ML --        Orientation Goal 1 (SLP)    Improve Orientation Through Goal 1 (SLP) demonstrating orientation to day;demonstrating orientation to month;demonstrating orientation to year;demonstrating orientation to place;demonstrating orientation to disease/impairment;use environmental aids to assist with orientation;80%;with moderate cues (50-74%)  -ML demonstrating orientation to day;demonstrating orientation to month;demonstrating orientation to year;demonstrating  orientation to place;demonstrating orientation to disease/impairment;use environmental aids to assist with orientation;80%;with moderate cues (50-74%)  -MP     Time Frame (Orientation Goal 1, SLP) short term goal (STG)  -ML short term goal (STG)  -MP     Progress (Orientation Goal 1, SLP) 50%;with moderate cues (50-74%)  -ML --     Progress/Outcomes (Orientation Goal 1, SLP) goal ongoing  -ML goal ongoing  -MP     Comment (Orientation Goal 1, SLP) Oriented to self/wife/[place]/home address/job title but not company, number of children/year/month/birthday.  -ML --               User Key  (r) = Recorded By, (t) = Taken By, (c) = Cosigned By      Initials Name Provider Type    Meera Ricci CCC-SLP Speech and Language Pathologist    Aldo Snowden, MS CCC-SLP Speech and Language Pathologist                            Time Calculation:      Time Calculation- SLP       Row Name 10/27/23 1346             Time Calculation- SLP    SLP Start Time 1130  -ML      SLP Received On 10/27/23  -ML                User Key  (r) = Recorded By, (t) = Taken By, (c) = Cosigned By      Initials Name Provider Type    Meera Ricci CCC-SLP Speech and Language Pathologist                    Therapy Charges for Today       Code Description Service Date Service Provider Modifiers Qty    80671776281 HC ST TREATMENT SWALLOW 3 10/27/2023 Meera Abrams CCC-SLP GN 1    [id number] HC ST TREATMENT SPEECH 3 10/27/2023 Meera Abrams CCC-SLP GN 1                       SRUTHI Shah  10/27/2023   and Acute Care - Speech Language Pathology   Swallow Treatment Note [facility]     Patient Name: Kenneth Wen  : 1950  MRN: [id number]  Today's Date: 10/27/2023               Admit Date: 9/15/2023    Visit Dx:     ICD-10-CM ICD-9-CM   1. Hemorrhagic CVA  I61.9 431   2. Aphasia  R47.01 784.3   3. Dysarthria  R47.1 784.51   4. Oropharyngeal dysphagia  R13.12 787.22     Patient Active  Problem List   Diagnosis    Precordial pain    Elevated BP without diagnosis of hypertension    Dyslipidemia    Hyperglycemia    Multiple bilateral CVAs    Hemorrhagic CVA    HFpEF    T2DM (type 2 diabetes mellitus)    HTN (hypertension)    GERD (gastroesophageal reflux disease)    ICH (intracerebral hemorrhage)    Oropharyngeal dysphagia     Past Medical History:   Diagnosis Date    Acid reflux     Cancer     Skin    Diabetes mellitus     Prediabetes    GERD (gastroesophageal reflux disease) 09/15/2023    HTN (hypertension) 09/15/2023    Kidney disease     T2DM (type 2 diabetes mellitus) 09/15/2023     Past Surgical History:   Procedure Laterality Date    APPENDECTOMY      ENDOSCOPY W/ PEG TUBE PLACEMENT N/A 9/29/2023    Procedure: ESOPHAGOGASTRODUODENOSCOPY WITH PERCUTANEOUS ENDOSCOPIC GASTROSTOMY TUBE INSERTION;  Surgeon: Haseeb Carrillo MD;  Location: [facility] ENDOSCOPY;  Service: General;  Laterality: N/A;    HEMORRHOIDECTOMY      NASAL POLYP SURGERY         SLP Recommendation and Plan     SLP Diet Recommendation: puree, honey thick liquids, no mixed consistencies (10/27/23 1130)  Recommended Precautions and Strategies: upright posture during/after eating, small bites of food and sips of liquid, no straw, alternate between small bites of food and sips of liquid, general aspiration precautions, 1:1 supervision (10/27/23 1130)  SLP Rec. for Method of Medication Administration: meds crushed, with puree (10/27/23 1130)     Monitor for Signs of Aspiration: notify SLP if any concerns (10/27/23 1130)  Recommended Diagnostics: reassess via [facility] ([facility]) (10/27/23 1130)     Anticipated Discharge Disposition (SLP): inpatient rehabilitation facility, skilled nursing facility (10/27/23 1130)     Therapy Frequency (Swallow): 5 days per week (10/27/23 1130)  Predicted Duration Therapy Intervention (Days): until discharge (10/27/23 1130)  Oral Care Recommendations: Oral Care BID/PRN, Suction toothbrush (10/27/23 1130)         Daily Summary of Progress (SLP): progress toward functional goals is good (10/27/23 1130)               Treatment Assessment (SLP): improved, oral dysphagia, cognitive-linguistic disorder, communication disorder (10/27/23 1130)  Treatment Assessment Comments (SLP): Improved participation. Improved oral manipulation and clearance.  Anterior loss on right with and without awareness.  Increased agitation when corrected/or when SLP explains  errored response.  If pt continues to improve and be able to cooperate/willing to take po, repeat MBS. (10/27/23 1130)  Plan for Continued Treatment (SLP): continue treatment per plan of care (10/27/23 1130)       Oral Care Recommendations: Oral Care BID/PRN, Suction toothbrush (10/27/23 1130)    Outcome Evaluation: SLP following.  Improved response/oral skills.  If pt continues to improve or at least current level of function, consider MBS.      SWALLOW EVALUATION (last 72 hours)       SLP Adult Swallow Evaluation       Row Name 10/27/23 1130 10/24/23 1540                Rehab Evaluation    Document Type -- therapy note (daily note)  -MP       Subjective Information -- no complaints  -MP       Patient Observations -- alert  -MP       Patient/Family/Caregiver Comments/Observations -- Spouse present  -MP       Patient Effort -- adequate  -MP          Pain    Additional Documentation -- Pain Scale: FACES Pre/Post-Treatment (Group)  -MP          Pain Scale: FACES Pre/Post-Treatment    Pain: FACES Scale, Pretreatment -- 0-->no hurt  -MP       Posttreatment Pain Rating -- 0-->no hurt  -MP          SLP Treatment Clinical Impressions    Treatment Assessment (SLP) -- no clinical signs of;aspiration  w/ modified diet  -MP       Treatment Assessment Comments (SLP) -- Able to tolerate few bites of puree & sips of honey-thick w/o growing agitated. No overt clinical s/sxs aspiration w/ modified diet. Appears safe to continue. SLP will continue to follow for tx as able.  -MP       Daily Summary  of Progress (SLP) -- progress toward functional goals as expected  -MP       Barriers to Overall Progress (SLP) -- Cognitive status;Medically complex  -MP       Plan for Continued Treatment (SLP) -- continue treatment per plan of care  -MP       Care Plan Review -- care plan/treatment goals reviewed;patient/other agree to care plan  -MP       Care Plan Review, Other Participant(s) -- spouse  -MP          Recommendations    Therapy Frequency (Swallow) 5 days per week  -ML 3 days per week  -MP       Predicted Duration Therapy Intervention (Days) -- until discharge  -MP       SLP Diet Recommendation puree;honey thick liquids;no mixed consistencies  -ML puree;honey thick liquids;no mixed consistencies  -MP       Recommended Diagnostics reassess via VFSS (MBS)  -ML --       Recommended Precautions and Strategies upright posture during/after eating;small bites of food and sips of liquid;no straw;alternate between small bites of food and sips of liquid;general aspiration precautions;1:1 supervision  - upright posture during/after eating;small bites of food and sips of liquid;no straw;alternate between small bites of food and sips of liquid;general aspiration precautions;1:1 supervision  -MP       Oral Care Recommendations Oral Care BID/PRN;Suction toothbrush  -ML Oral Care BID/PRN;Suction toothbrush  -MP       SLP Rec. for Method of Medication Administration meds crushed;with puree  -ML meds crushed;with puree  -MP       Monitor for Signs of Aspiration notify SLP if any concerns  - notify SLP if any concerns  -MP       Anticipated Discharge Disposition (SLP) -- skilled nursing facility  -                 User Key  (r) = Recorded By, (t) = Taken By, (c) = Cosigned By      Initials Name Effective Dates     Meera Abrams CCC-SLP 06/16/21 -     MP Aldo Harris MS CCC-SLP 12/28/21 -                     EDUCATION  The patient has been educated in the following areas:   Dysphagia (Swallowing Impairment).         SLP GOALS       Row Name 10/27/23 1130 10/24/23 1218          (LTG) Patient will demonstrate functional swallow for    Diet Texture (Demonstrate functional swallow) soft to chew (chopped) textures  -ML soft to chew (chopped) textures  -MP     Liquid viscosity (Demonstrate functional swallow) nectar/ mildly thick liquids  -ML nectar/ mildly thick liquids  -MP     [place] (Demonstrate functional swallow) with minimal cues (75-90% accuracy)  -ML with minimal cues (75-90% accuracy)  -MP     Time Frame (Demonstrate functional swallow) by discharge  -ML by discharge  -MP     Barriers (Demonstrate functional swallow) cognition  -ML cognition  -MP     Progress/Outcomes (Demonstrate functional swallow) progress slower than expected  -ML progress slower than expected  -MP     Comment (Demonstrate functional swallow) Trialed soft to chew.  Able to comply with directions to place on left, clear right cheek, perform lingual and finger sweep, and verbalize awareness of remaining oral residue or clearance.  Still with anterior loss on the right.  -ML --        (STG) Patient will tolerate trials of    Consistencies Trialed (Tolerate trials) pureed textures;honey/ moderately thick liquids  -ML pureed textures;honey/ moderately thick liquids  -MP     Desired Outcome (Tolerate trials) without signs/symptoms of aspiration;without signs of distress;with adequate oral prep/transit/clearance;with use of compensatory strategies (see comments)  -ML without signs/symptoms of aspiration;without signs of distress;with adequate oral prep/transit/clearance;with use of compensatory strategies (see comments)  -MP     [place] (Tolerate trials) with 1:1 assist/ supervision  -ML with 1:1 assist/ supervision  -MP     Time Frame (Tolerate trials) by discharge  -ML by discharge  -MP     Progress/Outcomes (Tolerate trials) continuing progress toward goal  -ML continuing progress toward goal  -MP     Comment (Tolerate trials) Tolerating  current diet- trialed soft to chew today.  Able to manipulate and clear soft to chew, however cognition impacts safety.  -ML No overt clinical s/sxs w/ puree, honey-thick  -MP        (STG) Patient will tolerate therapeutic trials of    Consistencies Trialed (Tolerate therapeutic trials) thin liquids  -ML thin liquids  -MP     Desired Outcome (Tolerate therapeutic trials) without signs/symptoms of aspiration;without signs of distress  -ML without signs/symptoms of aspiration;without signs of distress  -MP     [place] (Tolerate therapeutic trials) with minimal cues (75-90% accuracy)  -ML with minimal cues (75-90% accuracy)  -MP     Time Frame (Tolerate therapeutic trials) by discharge  -ML by discharge  -MP     Progress/Outcomes (Tolerate therapeutic trials) goal ongoing  -ML goal ongoing  -MP     Comment (Tolerate therapeutic trials) Trialed ice without overt pharyngeal signs. Did not trial thin water .  -ML --        (STG) Labial Strengthening Goal 1 (SLP)    Activity (Labial Strengthening Goal 1, SLP) increase labial tone  -ML increase labial tone  -MP     Increase Labial Tone labial resistance exercises;swallow trials  -ML labial resistance exercises;swallow trials  -MP     [place]/Accuracy (Labial Strengthening Goal 1, SLP) with moderate cues (50-74% accuracy)  -ML with moderate cues (50-74% accuracy)  -MP     Time Frame (Labial Strengthening Goal 1, SLP) short term goal (STG)  -ML short term goal (STG)  -MP     Progress/Outcomes (Labial Strengthening Goal 1, SLP) goal ongoing  -ML goal ongoing  -MP     Comment (Labial Strengthening Goal 1, SLP) Able to wipe right labial corner. Unable to prevent anterior loss.  Closes lips but reduced seal.  -ML --        (STG) Lingual Strengthening Goal 1 (SLP)    Activity (Lingual Strengthening Goal 1, SLP) increase lingual tone/sensation/control/coordination/movement;increase tongue back strength  -ML increase lingual  tone/sensation/control/coordination/movement;increase tongue back strength  -MP     Increase Lingual Tone/Sensation/Control/Coordination/Movement swallow trials;lingual resistance exercises  -ML swallow trials;lingual resistance exercises  -MP     Increase Tongue Back Strength lingual resistance exercises  -ML lingual resistance exercises  -MP     [place]/Accuracy (Lingual Strengthening Goal 1, SLP) with moderate cues (50-74% accuracy)  -ML with moderate cues (50-74% accuracy)  -MP     Time Frame (Lingual Strengthening Goal 1, SLP) short term goal (STG)  -ML short term goal (STG)  -MP     Progress/Outcomes (Lingual Strengthening Goal 1, SLP) goal ongoing  -ML goal ongoing  -MP     Comment (Lingual Strengthening Goal 1, SLP) Improved lingual movement and strength with resistance.  -ML --        (STG) Pharyngeal Strengthening Exercise Goal 1 (SLP)    Activity (Pharyngeal Strengthening Goal 1, SLP) increase superior movement of the hyolaryngeal complex;increase anterior movement of the hyolaryngeal complex;increase closure at entrance to airway/closure of airway at glottis;increase squeeze/positive pressure generation;increase tongue base retraction;increase timing  -ML increase superior movement of the hyolaryngeal complex;increase anterior movement of the hyolaryngeal complex;increase closure at entrance to airway/closure of airway at glottis;increase squeeze/positive pressure generation;increase tongue base retraction;increase timing  -MP     Increase Timing prepping - 3 second prep or suck swallow or 3-step swallow  -ML prepping - 3 second prep or suck swallow or 3-step swallow  -MP     Increase Superior Movement of the Hyolaryngeal Complex effortful pitch glide (falsetto + pharyngeal squeeze)  -ML effortful pitch glide (falsetto + pharyngeal squeeze)  -MP     Increase Anterior Movement of the Hyolaryngeal Complex chin tuck against resistance (CTAR)  -ML chin tuck against resistance (CTAR)  -MP     Increase  Closure at Entrance to Airway/Closure of Airway at Glottis supraglottic swallow  -ML supraglottic swallow  -MP     Increase Squeeze/Positive Pressure Generation hard effortful swallow  -ML hard effortful swallow  -MP     Increase Tongue Base Retraction daniela  -ML daniela  -MP     [place]/Accuracy (Pharyngeal Strengthening Goal 1, SLP) with moderate cues (50-74% accuracy)  -ML with moderate cues (50-74% accuracy)  -MP     Time Frame (Pharyngeal Strengthening Goal 1, SLP) short term goal (STG)  -ML short term goal (STG)  -MP     Progress/Outcomes (Pharyngeal Strengthening Goal 1, SLP) continuing progress toward goal  -ML goal ongoing  -MP     Comment (Pharyngeal Strengthening Goal 1, SLP) Increased agitation with exercise instruction.  No evidence of increased effort.  Unable to demonstrate a quick swallow with 3 second prep.  Cannot comply with directions for Daniela or supraglottic.  -ML --        Patient will demonstrate functional communication skills for return to discharge environment     [place] with moderate cues  -ML with moderate cues  -MP     Time frame by discharge  -ML by discharge  -MP     Progress/Outcomes goal ongoing  -ML goal ongoing  -MP     Comments Following simple directions. Repeating directions.  Quickly frustrated and refuses to interact but luckily easily distracted and willing to return to task.  -ML --        Comprehend Questions Goal 1 (SLP)    Improve Ability to Comprehend Questions Goal 1 (SLP) complex yes/no questions;80%;with minimal cues (75-90%)  -ML complex yes/no questions;80%;with minimal cues (75-90%)  -MP     Time Frame (Comprehend Questions Goal 1, SLP) short term goal (STG)  -ML short term goal (STG)  -MP     Progress (Ability to Comprehend Questions Goal 1, SLP) 30%;with maximum cues (25-49%)  -ML --     Progress/Outcomes (Comprehend Questions Goal 1, SLP) goal ongoing  -ML goal ongoing  -MP     Comment (Comprehend Questions Goal 1, SLP) Argumentative re: accuracy of  his response.  States frequently, if you don't like my answer why are you asking me.  -ML --        Follow Directions Goal 2 (SLP)    Improve Ability to Follow Directions Goal 1 (SLP) 2 step commands;80%;with minimal cues (75-90%)  -ML 2 step commands;80%;with minimal cues (75-90%)  -MP     Time Frame (Follow Directions Goal 1, SLP) short term goal (STG)  -ML short term goal (STG)  -MP     Progress (Ability to Follow Directions Goal 1, SLP) 20%;with moderate cues (50-74%)  -ML --     Progress/Outcomes (Follow Directions Goal 1, SLP) goal ongoing  -ML goal ongoing  -MP     Comment (Follow Directions Goal 1, SLP) Able to follow 1 step but not two step except with hand over hand assistance.  -ML --        Word Retrieval Skills Goal 1 (SLP)    Improve Word Retrieval Skills By Goal 1 (SLP) confrontational naming task;high frequency;responsive naming task;simple;80%;with minimal cues (75-90%)  -ML confrontational naming task;high frequency;responsive naming task;simple;80%;with minimal cues (75-90%)  -MP     Time Frame (Word Retrieval Goal 1, SLP) short term goal (STG)  -ML short term goal (STG)  -MP     Progress (Word Retrieval Skills Goal 1, SLP) 90%;with minimal cues (75-90%)  -ML --     Progress/Outcomes (Word Retrieval Goal 1, SLP) goal ongoing  -ML goal ongoing  -MP     Comment (Word Retrieval Goal 1, SLP) Able to name body parts and complete responsive naming. Simple divergent challenging and required increased cues.  -ML --        Articulation Goal 1 (SLP)    Improve Articulation Goal 1 (SLP) by over-articulating at phrase level;80%;with moderate cues (50-74%)  -ML by over-articulating at phrase level;80%;with moderate cues (50-74%)  -MP     Time Frame (Articulation Goal 1, SLP) short term goal (STG)  -ML short term goal (STG)  -MP     Progress (Articulation Goal 1, SLP) 0%;independently (over 90% accuracy)  Did not respond to cues for overarticulation.  -ML --     Progress/Outcomes (Articulation Goal 1, SLP) goal  ongoing  -ML goal ongoing  -MP     Comment (Articulation Goal 1, SLP) Does not respond to directions for overarticulation.  Language is confused but when context known, speech intelligibility is improved.  -ML --        Orientation Goal 1 (SLP)    Improve Orientation Through Goal 1 (SLP) demonstrating orientation to day;demonstrating orientation to month;demonstrating orientation to year;demonstrating orientation to place;demonstrating orientation to disease/impairment;use environmental aids to assist with orientation;80%;with moderate cues (50-74%)  -ML demonstrating orientation to day;demonstrating orientation to month;demonstrating orientation to year;demonstrating orientation to place;demonstrating orientation to disease/impairment;use environmental aids to assist with orientation;80%;with moderate cues (50-74%)  -MP     Time Frame (Orientation Goal 1, SLP) short term goal (STG)  -ML short term goal (STG)  -MP     Progress (Orientation Goal 1, SLP) 50%;with moderate cues (50-74%)  -ML --     Progress/Outcomes (Orientation Goal 1, SLP) goal ongoing  -ML goal ongoing  -MP     Comment (Orientation Goal 1, SLP) Oriented to self/wife/[place]/home address/job title but not company, number of children/year/month/birthday.  -ML --               User Key  (r) = Recorded By, (t) = Taken By, (c) = Cosigned By      Initials Name Provider Type    Meera Ricci CCC-SLP Speech and Language Pathologist    Aldo Snowden, MS CCC-SLP Speech and Language Pathologist                       Time Calculation:    Time Calculation- SLP       Row Name 10/27/23 1346             Time Calculation- SLP    SLP Start Time 1130  -ML      SLP Received On 10/27/23  -                User Key  (r) = Recorded By, (t) = Taken By, (c) = Cosigned By      Initials Name Provider Type    Meera Ricci CCC-SLP Speech and Language Pathologist                    Therapy Charges for Today       Code Description Service Date  Service Provider Modifiers Qty    22970763958 HC ST TREATMENT SWALLOW 3 10/27/2023 Meera Abrams, CCC-SLP GN 1    96236688408 HC ST TREATMENT SPEECH 3 10/27/2023 Meera Abrams, KAMARI-SLP GN 1                 SRUHTI Shah  10/27/2023

## 2023-10-28 LAB
GLUCOSE BLDC GLUCOMTR-MCNC: 108 MG/DL (ref 70–130)
GLUCOSE BLDC GLUCOMTR-MCNC: 119 MG/DL (ref 70–130)
GLUCOSE BLDC GLUCOMTR-MCNC: 155 MG/DL (ref 70–130)

## 2023-10-28 PROCEDURE — 82948 REAGENT STRIP/BLOOD GLUCOSE: CPT

## 2023-10-28 PROCEDURE — 99232 SBSQ HOSP IP/OBS MODERATE 35: CPT | Performed by: INTERNAL MEDICINE

## 2023-10-28 RX ADMIN — Medication 30 ML: at 13:07

## 2023-10-28 RX ADMIN — DONEPEZIL HYDROCHLORIDE 10 MG: 10 TABLET, FILM COATED ORAL at 19:56

## 2023-10-28 RX ADMIN — PHENOBARBITAL 64.8 MG: 64.8 TABLET ORAL at 20:00

## 2023-10-28 RX ADMIN — MICONAZOLE NITRATE 1 APPLICATION: 20 CREAM TOPICAL at 09:29

## 2023-10-28 RX ADMIN — CLOPIDOGREL BISULFATE 75 MG: 75 TABLET ORAL at 09:29

## 2023-10-28 RX ADMIN — TEMAZEPAM 15 MG: 15 CAPSULE ORAL at 19:56

## 2023-10-28 RX ADMIN — HYDROCODONE BITARTRATE AND ACETAMINOPHEN 1 TABLET: 5; 325 TABLET ORAL at 00:06

## 2023-10-28 RX ADMIN — Medication 10 MG: at 19:56

## 2023-10-28 RX ADMIN — MICONAZOLE NITRATE 1 APPLICATION: 20 CREAM TOPICAL at 20:00

## 2023-10-28 RX ADMIN — MINERAL OIL: 1000 LIQUID ORAL at 09:29

## 2023-10-28 RX ADMIN — DICLOFENAC SODIUM 2 G: 9.3 GEL TOPICAL at 20:02

## 2023-10-28 RX ADMIN — MIRTAZAPINE 30 MG: 15 TABLET, FILM COATED ORAL at 19:56

## 2023-10-28 RX ADMIN — QUETIAPINE FUMARATE 200 MG: 100 TABLET ORAL at 20:00

## 2023-10-28 RX ADMIN — HYDROCODONE BITARTRATE AND ACETAMINOPHEN 1 TABLET: 5; 325 TABLET ORAL at 23:32

## 2023-10-28 RX ADMIN — METOPROLOL TARTRATE 25 MG: 25 TABLET, FILM COATED ORAL at 20:09

## 2023-10-28 RX ADMIN — PHENOBARBITAL 64.8 MG: 64.8 TABLET ORAL at 09:29

## 2023-10-28 RX ADMIN — ATORVASTATIN CALCIUM 80 MG: 40 TABLET, FILM COATED ORAL at 20:00

## 2023-10-28 RX ADMIN — LIDOCAINE 1 PATCH: 4 PATCH TOPICAL at 09:29

## 2023-10-28 RX ADMIN — METOPROLOL TARTRATE 25 MG: 25 TABLET, FILM COATED ORAL at 09:29

## 2023-10-28 RX ADMIN — MINERAL OIL: 1000 LIQUID ORAL at 20:00

## 2023-10-28 RX ADMIN — HYDROCODONE BITARTRATE AND ACETAMINOPHEN 1 TABLET: 5; 325 TABLET ORAL at 13:03

## 2023-10-28 RX ADMIN — QUETIAPINE FUMARATE 200 MG: 100 TABLET ORAL at 09:29

## 2023-10-28 RX ADMIN — LANSOPRAZOLE 15 MG: 15 TABLET, ORALLY DISINTEGRATING ORAL at 06:17

## 2023-10-28 RX ADMIN — PHENOBARBITAL 32.4 MG: 32.4 TABLET ORAL at 16:02

## 2023-10-28 NOTE — PLAN OF CARE
Goal Outcome Evaluation:  Plan of Care Reviewed With: patient        Progress: improving          VSS on room air. Resting well throughout shift. Follows commands. PEG tube and abd binder 2x in place. No sitter at bedside. Pleasant and calm.

## 2023-10-28 NOTE — PROGRESS NOTES
[facility] Medicine Services  PROGRESS NOTE    Patient Name: Kenneth Wen  : 1950  MRN: [id number]    Date of Admission: 9/15/2023  Primary Care Physician: Susan Powers APRN    Subjective   Subjective     CC: f/u CVA    HPI:  wife is pleased with his recent progress.  He is eating a little PO, is lucid, is resting well.        Objective   Objective     Vital Signs:   Temp:  [98 °F (36.7 °C)-98.8 °F (37.1 °C)] 98 °F (36.7 °C)  Heart Rate:  [86-87] 86  Resp:  [18] 18  BP: (108-120)/(64-72) 120/72     Physical Exam:  Constitutional: No acute distress, getting a bath, wife prsent   HENT: NCAT, mucous membranes moist  Respiratory: Clear to auscultation bilaterally, respiratory effort normal   Cardiovascular: RRR, no murmurs, rubs, or gallops  Gastrointestinal: Positive bowel sounds, soft, nontender, nondistended  Musculoskeletal: No bilateral ankle edema  Psychiatric: Looks anxious, cooperative  Neurologic: Awake, alert, dysarthric, low vision.   Skin: No rashes           Results Reviewed:  LAB RESULTS:      Lab 10/23/23  0703   CRP <0.30         Lab 10/23/23  0703   SODIUM 137   POTASSIUM 4.4   CHLORIDE 101   CO2 27.0   ANION GAP 9.0   BUN 19   CREATININE 0.56*   EGFR 104.7   GLUCOSE 185*   CALCIUM 9.0   MAGNESIUM 2.2   PHOSPHORUS 3.3         Lab 10/23/23  0703   TOTAL PROTEIN 5.6*   ALBUMIN 3.5   GLOBULIN 2.1   ALT (SGPT) 44*   AST (SGOT) 29   BILIRUBIN 0.4   ALK PHOS 81             Lab 10/23/23  0703   CHOLESTEROL 80   TRIGLYCERIDES 48             Brief Urine Lab Results  (Last result in the past 365 days)        Color   Clarity   Blood   Leuk Est   Nitrite   Protein   CREAT   Urine HCG        09/15/23 1818 Yellow   Cloudy   Trace   Negative   Negative   Negative                   Microbiology Results Abnormal       Procedure Component Value - Date/Time    Blood Culture - Blood, Arm, Right [[id number]]  (Normal) Collected: 09/15/23 0212    Lab Status: Final result Specimen:  Blood from Arm, Right Updated: 09/20/23 0230     Blood Culture No growth at 5 days    Blood Culture - Blood, Arm, Left [[id number]]  (Normal) Collected: 09/15/23 0212    Lab Status: Final result Specimen: Blood from Arm, Left Updated: 09/20/23 0230     Blood Culture No growth at 5 days            No radiology results from the last 24 hrs        Current medications:  Scheduled Meds:atorvastatin, 80 mg, Per PEG Tube, Nightly  clopidogrel, 75 mg, Per PEG Tube, Daily  Diclofenac Sodium, 2 g, Topical, BID  donepezil, 10 mg, Per PEG Tube, Nightly  fluticasone, 2 spray, Each Nare, Daily  HYDROcodone-acetaminophen, 1 tablet, Per PEG Tube, Q12H  lansoprazole, 15 mg, Per PEG Tube, Q AM  Lidocaine, 1 patch, Transdermal, Q24H  melatonin, 10 mg, Per PEG Tube, Nightly  metoprolol tartrate, 25 mg, Per PEG Tube, Q12H  miconazole, 1 application , Topical, Q12H  mirtazapine, 30 mg, Per PEG Tube, Nightly  palliative care oral rinse, , Mouth/Throat, 4x Daily  PHENobarbital, 64.8 mg, Per PEG Tube, BID  ProSource No Carb, 30 mL, Per G Tube, Daily  QUEtiapine, 200 mg, Per PEG Tube, Q12H  temazepam, 15 mg, Per PEG Tube, Nightly      Continuous Infusions:   PRN Meds:.  Calcium Replacement - Follow Nurse / BPA Driven Protocol    dextrose    glucagon (human recombinant)    ibuprofen    Magnesium Standard Dose Replacement - Follow Nurse / BPA Driven Protocol    ondansetron    PHENobarbital    Phosphorus Replacement - Follow Nurse / BPA Driven Protocol    Potassium Replacement - Follow Nurse / BPA Driven Protocol    ziprasidone    Assessment & Plan   Assessment & Plan     Active Hospital Problems    Diagnosis  POA    **Hemorrhagic CVA [I61.9]  Yes    Oropharyngeal dysphagia [R13.12]  Yes    Multiple bilateral CVAs [I63.9]  Yes    HFpEF [I50.30]  Yes    T2DM (type 2 diabetes mellitus) [E11.9]  Yes    HTN (hypertension) [I10]  Yes    GERD (gastroesophageal reflux disease) [K21.9]  Yes    ICH (intracerebral hemorrhage) [I61.9]  Yes    Dyslipidemia  [E78.5]  Yes      Resolved Hospital Problems   No resolved problems to display.        Brief Hospital Course to date:  Kenneth Wen is a 73 y.o. male  with hx of HTN, HLD, T2DM, and GERD who presented to OSH with multiple acute ischemic infarcts of the bilateral cerebral and cerebellar hemispheres as well as left isaac. TTE/JOSIAS was negative PFO at OSH. On 9/13/23 he had worsening in mental status and new inability to move RLE, head CT showed evolution of the existing CVA with new development of petechial hemorrhage. DAPT was held for 24 hours per Neurology recommendations and repeat CT head that evening showed worsening effacement of the right quadrigeminal cistern with suspected increasing upward supratentorial pressure. He was then transferred to [facility] for Neurosurgery evaluation on 9/15/23. He was started on hypertonic saline 9/15/23 through 9/20/23 for cerebral edema. He had fevers with negative cultures but had worsening secretions and labored breathing so was started on Zosyn on 9/16/23. Transferred to the hospitalist service on 9/22/23. Pt demonstrated poor oral intake with elevated sodium levels; therefore, PEG tube was recommended & placed.         Goals of care  --Palliative and Hospice follow     Multiple bilateral posterior circulation strokes with hemorrhagic conversion  -- suspect atheroembolic but cannot rule out cardioembolic given multiple vascular territories  --Plavix monotherapy, high intensity statin  --Needs Holter monitor at discharge  --Neurology follows     Agitation  Encephalopathy  --Donepezil 10 mg nightly, melatonin 10 mg nightly, mirtazapine 30 mg nightly, Restoril 15 mg nightly  - increase Seroquel 200 mg BID  - increase phenobarbitol to 64 mg TID PRN for restlessness  --Continue as needed Geodon  --Compared to last week patient has improved some.  He is much more calm and also more oriented.  Still very confused and her behavior fluctuates.     Dysphagia  Hypernatremia - resolved  -  PEG tube placed 9/29, Dr Carrillo   --SLP recommends mechanical ground with honey thick liquids   --Risk of pulling out peg tube, abd binder to be in place at all times.   - out of restraints recently.   -- dietary following for calorie count, adequate PO intake is limited by his mental status     HTN  --Continue Metoprolol 25 mg BID     GERD  --Continue PPI     COVID-19-resolved  --Overall asymptomatic and on room air  --No infiltrates seen and low procal  --Did not receive any treatment   --Off Isolation 9/30/2023      Leukocytosis-resolved  --Procalcitionin low at 0.05  --CXR and UA negative  --Blood cultures negative     Elevated LFT's, mild  --Possibly secondary to statin?  --Negative acute hepatitis panel  --repeat AST/ALT improved  Expected Discharge Location and Transportation:   looking into SNF in [facility]   Expected Discharge   Expected Discharge Date: 11/1/2023; Expected Discharge Time:      DVT prophylaxis:  Mechanical DVT prophylaxis orders are present.     AM-PAC 6 Clicks Score (PT): 12 (10/27/23 2000)    CODE STATUS:   Code Status and Medical Interventions:   Ordered at: 09/29/23 0854     Medical Intervention Limits:    NO intubation (DNI)     Code Status (Patient has no pulse and is not breathing):    No CPR (Do Not Attempt to Resuscitate)     Medical Interventions (Patient has pulse or is breathing):    Limited Support       Olga Maldonado MD  10/28/23

## 2023-10-29 LAB
GLUCOSE BLDC GLUCOMTR-MCNC: 125 MG/DL (ref 70–130)
GLUCOSE BLDC GLUCOMTR-MCNC: 164 MG/DL (ref 70–130)
GLUCOSE BLDC GLUCOMTR-MCNC: 187 MG/DL (ref 70–130)

## 2023-10-29 PROCEDURE — 25010000002 ZIPRASIDONE MESYLATE PER 10 MG: Performed by: PSYCHIATRY & NEUROLOGY

## 2023-10-29 PROCEDURE — 82948 REAGENT STRIP/BLOOD GLUCOSE: CPT

## 2023-10-29 PROCEDURE — 99232 SBSQ HOSP IP/OBS MODERATE 35: CPT | Performed by: INTERNAL MEDICINE

## 2023-10-29 RX ADMIN — PHENOBARBITAL 64.8 MG: 64.8 TABLET ORAL at 21:03

## 2023-10-29 RX ADMIN — DICLOFENAC SODIUM 2 G: 9.3 GEL TOPICAL at 09:03

## 2023-10-29 RX ADMIN — METOPROLOL TARTRATE 25 MG: 25 TABLET, FILM COATED ORAL at 21:04

## 2023-10-29 RX ADMIN — PHENOBARBITAL 32.4 MG: 32.4 TABLET ORAL at 23:14

## 2023-10-29 RX ADMIN — DICLOFENAC SODIUM 2 G: 9.3 GEL TOPICAL at 21:26

## 2023-10-29 RX ADMIN — MICONAZOLE NITRATE 1 APPLICATION: 20 CREAM TOPICAL at 21:05

## 2023-10-29 RX ADMIN — MINERAL OIL: 1000 LIQUID ORAL at 21:04

## 2023-10-29 RX ADMIN — HYDROCODONE BITARTRATE AND ACETAMINOPHEN 1 TABLET: 5; 325 TABLET ORAL at 12:13

## 2023-10-29 RX ADMIN — MICONAZOLE NITRATE 1 APPLICATION: 20 CREAM TOPICAL at 09:03

## 2023-10-29 RX ADMIN — LIDOCAINE 1 PATCH: 4 PATCH TOPICAL at 09:04

## 2023-10-29 RX ADMIN — PHENOBARBITAL 64.8 MG: 64.8 TABLET ORAL at 09:02

## 2023-10-29 RX ADMIN — CLOPIDOGREL BISULFATE 75 MG: 75 TABLET ORAL at 09:02

## 2023-10-29 RX ADMIN — ATORVASTATIN CALCIUM 80 MG: 40 TABLET, FILM COATED ORAL at 21:04

## 2023-10-29 RX ADMIN — TEMAZEPAM 15 MG: 15 CAPSULE ORAL at 18:35

## 2023-10-29 RX ADMIN — MIRTAZAPINE 30 MG: 15 TABLET, FILM COATED ORAL at 18:35

## 2023-10-29 RX ADMIN — DONEPEZIL HYDROCHLORIDE 10 MG: 10 TABLET, FILM COATED ORAL at 18:35

## 2023-10-29 RX ADMIN — QUETIAPINE FUMARATE 200 MG: 100 TABLET ORAL at 09:02

## 2023-10-29 RX ADMIN — HYDROCODONE BITARTRATE AND ACETAMINOPHEN 1 TABLET: 5; 325 TABLET ORAL at 23:10

## 2023-10-29 RX ADMIN — MINERAL OIL: 1000 LIQUID ORAL at 17:41

## 2023-10-29 RX ADMIN — Medication 30 ML: at 09:02

## 2023-10-29 RX ADMIN — PHENOBARBITAL 32.4 MG: 32.4 TABLET ORAL at 12:13

## 2023-10-29 RX ADMIN — QUETIAPINE FUMARATE 200 MG: 100 TABLET ORAL at 21:03

## 2023-10-29 RX ADMIN — MINERAL OIL: 1000 LIQUID ORAL at 18:36

## 2023-10-29 RX ADMIN — LANSOPRAZOLE 15 MG: 15 TABLET, ORALLY DISINTEGRATING ORAL at 06:03

## 2023-10-29 RX ADMIN — METOPROLOL TARTRATE 25 MG: 25 TABLET, FILM COATED ORAL at 09:02

## 2023-10-29 RX ADMIN — ZIPRASIDONE MESYLATE 10 MG: 20 INJECTION, POWDER, LYOPHILIZED, FOR SOLUTION INTRAMUSCULAR at 23:23

## 2023-10-29 RX ADMIN — Medication 10 MG: at 18:35

## 2023-10-29 NOTE — PROGRESS NOTES
"    [facility] Medicine Services  PROGRESS NOTE    Patient Name: Kenneth Wen  : 1950  MRN: [id number]    Date of Admission: 9/15/2023  Primary Care Physician: Susan Powers APRN    Subjective   Subjective     CC: f/u CVA    HPI:  currently tossing restelessly, voids in bed, getting phenobarb. His wife says he has periods of restlessness but phenobarb helps a lot.       Objective   Objective     Vital Signs:   Temp:  [97.9 °F (36.6 °C)-98.9 °F (37.2 °C)] 98.9 °F (37.2 °C)  Heart Rate:  [66-91] 66  Resp:  [17-18] 17  BP: (113-131)/(66-80) 113/66     Physical Exam:  Constitutional: tossing in bed, reaches for my hand,  restless   HENT: NCAT, mucous membranes moist  Respiratory: Clear to auscultation bilaterally, respiratory effort normal   Cardiovascular: RRR, no murmurs, rubs, or gallops  Gastrointestinal: Positive bowel sounds, soft, nontender, nondistended  Musculoskeletal: No bilateral ankle edema  Psychiatric: Looks anxious, cooperative  Neurologic: Awake, alert, speaking but confused at times , does show some understanding of situation - \"Are they going to give me the medicine?\"  Skin: No rashes           Results Reviewed:  LAB RESULTS:      Lab 10/23/23  0703   CRP <0.30         Lab 10/23/23  0703   SODIUM 137   POTASSIUM 4.4   CHLORIDE 101   CO2 27.0   ANION GAP 9.0   BUN 19   CREATININE 0.56*   EGFR 104.7   GLUCOSE 185*   CALCIUM 9.0   MAGNESIUM 2.2   PHOSPHORUS 3.3         Lab 10/23/23  0703   TOTAL PROTEIN 5.6*   ALBUMIN 3.5   GLOBULIN 2.1   ALT (SGPT) 44*   AST (SGOT) 29   BILIRUBIN 0.4   ALK PHOS 81             Lab 10/23/23  0703   CHOLESTEROL 80   TRIGLYCERIDES 48             Brief Urine Lab Results  (Last result in the past 365 days)        Color   Clarity   Blood   Leuk Est   Nitrite   Protein   CREAT   Urine HCG        09/15/23 1818 Yellow   Cloudy   Trace   Negative   Negative   Negative                   Microbiology Results Abnormal       Procedure Component " Value - Date/Time    Blood Culture - Blood, Arm, Right [[id number]]  (Normal) Collected: 09/15/23 0212    Lab Status: Final result Specimen: Blood from Arm, Right Updated: 09/20/23 0230     Blood Culture No growth at 5 days    Blood Culture - Blood, Arm, Left [[id number]]  (Normal) Collected: 09/15/23 0212    Lab Status: Final result Specimen: Blood from Arm, Left Updated: 09/20/23 0230     Blood Culture No growth at 5 days            No radiology results from the last 24 hrs        Current medications:  Scheduled Meds:atorvastatin, 80 mg, Per PEG Tube, Nightly  clopidogrel, 75 mg, Per PEG Tube, Daily  Diclofenac Sodium, 2 g, Topical, BID  donepezil, 10 mg, Per PEG Tube, Nightly  fluticasone, 2 spray, Each Nare, Daily  HYDROcodone-acetaminophen, 1 tablet, Per PEG Tube, Q12H  lansoprazole, 15 mg, Per PEG Tube, Q AM  Lidocaine, 1 patch, Transdermal, Q24H  melatonin, 10 mg, Per PEG Tube, Nightly  metoprolol tartrate, 25 mg, Per PEG Tube, Q12H  miconazole, 1 application , Topical, Q12H  mirtazapine, 30 mg, Per PEG Tube, Nightly  palliative care oral rinse, , Mouth/Throat, 4x Daily  PHENobarbital, 64.8 mg, Per PEG Tube, BID  ProSource No Carb, 30 mL, Per G Tube, Daily  QUEtiapine, 200 mg, Per PEG Tube, Q12H  temazepam, 15 mg, Per PEG Tube, Nightly      Continuous Infusions:   PRN Meds:.  Calcium Replacement - Follow Nurse / BPA Driven Protocol    dextrose    glucagon (human recombinant)    ibuprofen    Magnesium Standard Dose Replacement - Follow Nurse / BPA Driven Protocol    ondansetron    PHENobarbital    Phosphorus Replacement - Follow Nurse / BPA Driven Protocol    Potassium Replacement - Follow Nurse / BPA Driven Protocol    ziprasidone    Assessment & Plan   Assessment & Plan     Active Hospital Problems    Diagnosis  POA    **Hemorrhagic CVA [I61.9]  Yes    Oropharyngeal dysphagia [R13.12]  Yes    Multiple bilateral CVAs [I63.9]  Yes    HFpEF [I50.30]  Yes    T2DM (type 2 diabetes mellitus) [E11.9]  Yes    HTN  (hypertension) [I10]  Yes    GERD (gastroesophageal reflux disease) [K21.9]  Yes    ICH (intracerebral hemorrhage) [I61.9]  Yes    Dyslipidemia [E78.5]  Yes      Resolved Hospital Problems   No resolved problems to display.        Brief Hospital Course to date:  Kenneth Wen is a 73 y.o. male  with hx of HTN, HLD, T2DM, and GERD who presented to OSH with multiple acute ischemic infarcts of the bilateral cerebral and cerebellar hemispheres as well as left isaac. TTE/JOSIAS was negative PFO at OSH. On 9/13/23 he had worsening in mental status and new inability to move RLE, head CT showed evolution of the existing CVA with new development of petechial hemorrhage. DAPT was held for 24 hours per Neurology recommendations and repeat CT head that evening showed worsening effacement of the right quadrigeminal cistern with suspected increasing upward supratentorial pressure. He was then transferred to [facility] for Neurosurgery evaluation on 9/15/23. He was started on hypertonic saline 9/15/23 through 9/20/23 for cerebral edema. He had fevers with negative cultures but had worsening secretions and labored breathing so was started on Zosyn on 9/16/23. Transferred to the hospitalist service on 9/22/23. Pt demonstrated poor oral intake with elevated sodium levels; therefore, PEG tube was recommended & placed.         Goals of care  --Palliative and Hospice follow     Multiple bilateral posterior circulation strokes with hemorrhagic conversion  -- suspect atheroembolic but cannot rule out cardioembolic given multiple vascular territories  --Plavix monotherapy, high intensity statin  --Needs Holter monitor at discharge  --Neurology follows     Agitation  Encephalopathy  --Donepezil 10 mg nightly, melatonin 10 mg nightly, mirtazapine 30 mg nightly, Restoril 15 mg nightly  - increase Seroquel 200 mg BID  - increase phenobarbitol to 64 mg TID PRN for restlessness  --Continue as needed Geodon  --Compared to last week patient has improved  some.  He is much more calm and also more oriented.  Still very confused and her behavior fluctuates.     Dysphagia  Hypernatremia - resolved  - PEG tube placed 9/29, Dr Carrillo   --SLP recommends mechanical ground with honey thick liquids   --Risk of pulling out peg tube, abd binder to be in place at all times.   -- dietary following , adequate PO intake is limited by his mental status     HTN  --Continue Metoprolol 25 mg BID     GERD  --Continue PPI     COVID-19-resolved  --Overall asymptomatic and on room air  --No infiltrates seen and low procal  --Did not receive any treatment   --Off Isolation 9/30/2023      Leukocytosis-resolved  --Procalcitionin low at 0.05  --CXR and UA negative  --Blood cultures negative     Elevated LFT's, mild  --Possibly secondary to statin?  --Negative acute hepatitis panel  --repeat AST/ALT improved  Expected Discharge Location and Transportation:   looking into SNF in [facility]   Expected Discharge   Expected Discharge Date: 11/1/2023; Expected Discharge Time:      DVT prophylaxis:  Mechanical DVT prophylaxis orders are present.     AM-PAC 6 Clicks Score (PT): 16 (10/28/23 2000)    CODE STATUS:   Code Status and Medical Interventions:   Ordered at: 09/29/23 0854     Medical Intervention Limits:    NO intubation (DNI)     Code Status (Patient has no pulse and is not breathing):    No CPR (Do Not Attempt to Resuscitate)     Medical Interventions (Patient has pulse or is breathing):    Limited Support       Olga Maldonado MD  10/29/23

## 2023-10-29 NOTE — PLAN OF CARE
Goal Outcome Evaluation:  Plan of Care Reviewed With: patient        Progress: improving          VSS on room air. Family at bedside at beginning of shift. Patient rested well tonight; no PRNs indicated. PEG in place w/ 2x abdominal binders. Tolerating feeds.

## 2023-10-30 LAB
BILIRUB UR QL STRIP: NEGATIVE
CLARITY UR: CLEAR
COLOR UR: YELLOW
GLUCOSE BLDC GLUCOMTR-MCNC: 132 MG/DL (ref 70–130)
GLUCOSE BLDC GLUCOMTR-MCNC: 139 MG/DL (ref 70–130)
GLUCOSE BLDC GLUCOMTR-MCNC: 157 MG/DL (ref 70–130)
GLUCOSE BLDC GLUCOMTR-MCNC: 211 MG/DL (ref 70–130)
GLUCOSE UR STRIP-MCNC: NEGATIVE MG/DL
HGB UR QL STRIP.AUTO: NEGATIVE
KETONES UR QL STRIP: NEGATIVE
LEUKOCYTE ESTERASE UR QL STRIP.AUTO: NEGATIVE
NITRITE UR QL STRIP: NEGATIVE
PH UR STRIP.AUTO: 6.5 [PH] (ref 5–8)
PROT UR QL STRIP: NEGATIVE
SP GR UR STRIP: 1.02 (ref 1–1.03)
UROBILINOGEN UR QL STRIP: ABNORMAL

## 2023-10-30 PROCEDURE — 92610 EVALUATE SWALLOWING FUNCTION: CPT

## 2023-10-30 PROCEDURE — 99232 SBSQ HOSP IP/OBS MODERATE 35: CPT | Performed by: PSYCHIATRY & NEUROLOGY

## 2023-10-30 PROCEDURE — 99232 SBSQ HOSP IP/OBS MODERATE 35: CPT | Performed by: INTERNAL MEDICINE

## 2023-10-30 PROCEDURE — 81003 URINALYSIS AUTO W/O SCOPE: CPT | Performed by: INTERNAL MEDICINE

## 2023-10-30 PROCEDURE — 82948 REAGENT STRIP/BLOOD GLUCOSE: CPT

## 2023-10-30 RX ORDER — PHENOBARBITAL 64.8 MG/1
64.8 TABLET ORAL EVERY 8 HOURS PRN
Status: DISCONTINUED | OUTPATIENT
Start: 2023-10-30 | End: 2023-11-06

## 2023-10-30 RX ORDER — PHENOBARBITAL 64.8 MG/1
64.8 TABLET ORAL EVERY 8 HOURS SCHEDULED
Status: DISCONTINUED | OUTPATIENT
Start: 2023-10-30 | End: 2023-10-31

## 2023-10-30 RX ADMIN — DONEPEZIL HYDROCHLORIDE 10 MG: 10 TABLET, FILM COATED ORAL at 18:13

## 2023-10-30 RX ADMIN — MINERAL OIL: 1000 LIQUID ORAL at 11:47

## 2023-10-30 RX ADMIN — HYDROCODONE BITARTRATE AND ACETAMINOPHEN 1 TABLET: 5; 325 TABLET ORAL at 11:47

## 2023-10-30 RX ADMIN — PHENOBARBITAL 64.8 MG: 64.8 TABLET ORAL at 23:01

## 2023-10-30 RX ADMIN — TEMAZEPAM 15 MG: 15 CAPSULE ORAL at 18:12

## 2023-10-30 RX ADMIN — MICONAZOLE NITRATE 1 APPLICATION: 20 CREAM TOPICAL at 09:47

## 2023-10-30 RX ADMIN — DICLOFENAC SODIUM 2 G: 9.3 GEL TOPICAL at 09:47

## 2023-10-30 RX ADMIN — PHENOBARBITAL 64.8 MG: 64.8 TABLET ORAL at 14:52

## 2023-10-30 RX ADMIN — PHENOBARBITAL 64.8 MG: 64.8 TABLET ORAL at 11:47

## 2023-10-30 RX ADMIN — DICLOFENAC SODIUM 2 G: 9.3 GEL TOPICAL at 23:02

## 2023-10-30 RX ADMIN — FLUTICASONE PROPIONATE 2 SPRAY: 50 SPRAY, METERED NASAL at 09:47

## 2023-10-30 RX ADMIN — PHENOBARBITAL 64.8 MG: 64.8 TABLET ORAL at 09:47

## 2023-10-30 RX ADMIN — ATORVASTATIN CALCIUM 80 MG: 40 TABLET, FILM COATED ORAL at 23:01

## 2023-10-30 RX ADMIN — METOPROLOL TARTRATE 25 MG: 25 TABLET, FILM COATED ORAL at 23:07

## 2023-10-30 RX ADMIN — QUETIAPINE FUMARATE 200 MG: 100 TABLET ORAL at 09:47

## 2023-10-30 RX ADMIN — MICONAZOLE NITRATE 1 APPLICATION: 20 CREAM TOPICAL at 23:01

## 2023-10-30 RX ADMIN — CLOPIDOGREL BISULFATE 75 MG: 75 TABLET ORAL at 09:47

## 2023-10-30 RX ADMIN — MINERAL OIL: 1000 LIQUID ORAL at 09:48

## 2023-10-30 RX ADMIN — Medication 10 MG: at 18:13

## 2023-10-30 RX ADMIN — LIDOCAINE 1 PATCH: 4 PATCH TOPICAL at 09:47

## 2023-10-30 RX ADMIN — Medication 30 ML: at 09:49

## 2023-10-30 RX ADMIN — MINERAL OIL: 1000 LIQUID ORAL at 23:01

## 2023-10-30 RX ADMIN — METOPROLOL TARTRATE 25 MG: 25 TABLET, FILM COATED ORAL at 09:47

## 2023-10-30 RX ADMIN — HYDROCODONE BITARTRATE AND ACETAMINOPHEN 1 TABLET: 5; 325 TABLET ORAL at 23:01

## 2023-10-30 RX ADMIN — MINERAL OIL: 1000 LIQUID ORAL at 18:13

## 2023-10-30 RX ADMIN — MIRTAZAPINE 30 MG: 15 TABLET, FILM COATED ORAL at 18:13

## 2023-10-30 RX ADMIN — QUETIAPINE FUMARATE 200 MG: 100 TABLET ORAL at 23:01

## 2023-10-30 RX ADMIN — LANSOPRAZOLE 15 MG: 15 TABLET, ORALLY DISINTEGRATING ORAL at 05:27

## 2023-10-30 NOTE — PLAN OF CARE
Goal Outcome Evaluation:              Outcome Evaluation: pt has been stable since this RN assumed care. Bolus tube feeds given per order, meds given per mar. no other concerns noted.       Pt remains in bilat. Wrist and lt. Ankle restraint, see restraint flowsheets.

## 2023-10-30 NOTE — PLAN OF CARE
Goal Outcome Evaluation:  Plan of Care Reviewed With: patient, spouse        Progress: no change  Outcome Evaluation: VSS on RA. PRN geodon and phenobarbital given. Pt occasionally restless and trying to climb out of bed. Turns, skin assessments, and skin care performed. Wife at bedside at beginning of shift. Pt tolerating tube feeds well. x2 abd binders in place. Remains incontinent requiring more frequent bed changes. Resting well in between care at this time.

## 2023-10-30 NOTE — PROGRESS NOTES
"Palliative Care Daily Progress Note     C/C: Patient able to answer questions, very agitated and anxious, in restraints.     S: Medical record reviewed. Follow up visit for GOC in the context of complex medical decision making. Events noted. Patient continues to be agitated at times, restraints replaced. Seroquel 200mg [id number].  Phenobarb 64.8mg per BID, and Restoril 15mg nightly. One dose Geodon and two doses Phenobarb 32.4mg in the past 24 hours. Continues on scheduled Hydrocodone 5-325mg BID, denies pain at visit. Patient requiring restraints for increased agitation, not able to be redirected. Ate a few bites of breakfast.    ROS: +anxiety, increased. +decreased appetite. +pain, unable to quantify, low back, denies this visit. Denies nausea.      O: Code Status:   Code Status and Medical Interventions:   Ordered at: 09/29/23 0854     Medical Intervention Limits:    NO intubation (DNI)     Code Status (Patient has no pulse and is not breathing):    No CPR (Do Not Attempt to Resuscitate)     Medical Interventions (Patient has pulse or is breathing):    Limited Support      Advanced Directives: Advance Directive Status: Patient does not have advance directive   Goals of Care: Ongoing.   Palliative Performance Scale Score: 40%     /78 (BP Location: Right arm, Patient Position: Lying)   Pulse 86   Temp 97.3 °F (36.3 °C) (Temporal)   Resp 18   Ht 175.3 cm (69\")   Wt 72.8 kg (160 lb 7.9 oz)   SpO2 92%   BMI 23.70 kg/m²     Intake/Output Summary (Last 24 hours) at 10/30/2023 0955  Last data filed at 10/30/2023 0900  Gross per 24 hour   Intake 2015 ml   Output 170 ml   Net 1845 ml       PE:  General Appearance:    Patient laying in bed, restless, frequent repositioning, awake, alert, A/C ill appearing,    HEENT:    NC/AT, EOMI, anicteric, MMM, face calm   Neck:   supple, trachea midline, no JVD   Lungs:     CTA bilat, diminished in bases; respirations regular, even and unlabored; RR 18-20 on exam    Heart:   " " RRR, normal S1 and S2, no M/R/G, HR 95   Abdomen:     Normal bowel sounds, soft, non-tender, non-distended, abd binder in place covering PEG   G/U:   Deferred   MSK/Extremities:   Wasting, no edema   Pulses:   Pulses palpable and equal bilaterally   Skin:   Warm, dry   Neurologic:   Oriented to self, alert   Psych:   Restless, frequent repositioning, calms easily     Meds: Reviewed and changes noted    Labs:                     Invalid input(s): \"LABALBU\", \"PROT\"    Imaging Results (Last 72 Hours)       ** No results found for the last 72 hours. **                  Diagnostics: Reviewed    A:   Hemorrhagic CVA    Dyslipidemia    Multiple bilateral CVAs    HFpEF    T2DM (type 2 diabetes mellitus)    HTN (hypertension)    GERD (gastroesophageal reflux disease)    ICH (intracerebral hemorrhage)    Oropharyngeal dysphagia     73 y.o. male with hemorrhagic CVA, HFpEF, HTN.   S/S:   Pain -s/p stroke and arthritis, MSK, low back  -scheduled Hydrocodone 5-325mg PO BID  -scheduled Lidocaine patch to low back  -Voltaren gel  to bilateral knees  -continue Palliative oral rinse     2. Dysphagia -SLP with diet modifications  -PEG in place  -patient with bolus feeding via TF, minimal PO intake     3. Debility -PT/OT following     4. Agitation -requiring restraints today  - Seroquel 200mg BID  -Phenobarb 64.8mg per PEG q 12 hours,   -increase Phenobarb to 64.8mg per PEG q 8 hours prn  -appreciate Neurology titration     5. GOC -DNR/DNI -per discussion with wife  -continued full treatment    P: Follow up visit for symptoms. Patient with significant agitation requiring restraints. Medications adjusted as above. Goal for continued full treatment. CM working on placement.     Palliative Care Team will continue to follow patient. Please do not hesitate to contact us regarding further sx mgmt or GOC needs.  Dianelys Arriaga, APRN  10/30/2023  Time spent: 35 minutes    "

## 2023-10-30 NOTE — PROGRESS NOTES
"Neurology       Patient Care Team:  Susan Powers APRN as PCP - General (Family Medicine)    Chief complaint: Delirium    History: Patient was doing well until last night.  He was on phenobarbital 64 mg twice daily received a 32 mg dose at midnight but within the hour received 10 mg of Geodon because of agitation combativeness.      Past Medical History:   Diagnosis Date    Acid reflux     Cancer     Skin    Diabetes mellitus     Prediabetes    GERD (gastroesophageal reflux disease) 09/15/2023    HTN (hypertension) 09/15/2023    Kidney disease     T2DM (type 2 diabetes mellitus) 09/15/2023       Vital Signs   Vitals:    10/28/23 2005 10/29/23 0735 10/29/23 1930 10/30/23 1051   BP: 131/80 113/66 132/78 158/99   BP Location: Right arm Left arm Right arm Right leg   Patient Position: Lying Lying Lying Lying   Pulse: 91 66 86 72   Resp: 18 17 18 18   Temp: 97.9 °F (36.6 °C) 98.9 °F (37.2 °C) 97.3 °F (36.3 °C) 98.2 °F (36.8 °C)   TempSrc: Temporal Temporal Temporal Temporal   SpO2: 95% 92% 92% 94%   Weight:       Height:           Physical Exam:   General: Calm and alert              Neuro: Conversational following commands.  Clearly cognitive impairment    Results Review:  No new result            Invalid input(s): \"LABALBU\", \"PROT\"    Imaging Results (Last 24 Hours)       ** No results found for the last 24 hours. **            Assessment:  Continue delirium    Plan:  Increase phenobarbital to 64 milligrams every 8 hours.    Increase as needed phenobarbital to 64 mg every 8 hours as needed.    Phenobarb level in the morning.    Comment:  Patient clearly has high tolerance for sedating medications.         I discussed the patients findings and my recommendations with patient, family, and nursing staff    Toni Rausch MD  10/30/23  14:44 EDT        "

## 2023-10-30 NOTE — THERAPY RE-EVALUATION
Acute Care - Speech Language Pathology   Swallow Re-Evaluation [facility]  Clinical Swallow Evaluation       Patient Name: Kenneth Wen  : 1950  MRN: [id number]  Today's Date: 10/30/2023               Admit Date: 9/15/2023    Visit Dx:     ICD-10-CM ICD-9-CM   1. Hemorrhagic CVA  I61.9 431   2. Aphasia  R47.01 784.3   3. Dysarthria  R47.1 784.51   4. Oropharyngeal dysphagia  R13.12 787.22     Patient Active Problem List   Diagnosis    Precordial pain    Elevated BP without diagnosis of hypertension    Dyslipidemia    Hyperglycemia    Multiple bilateral CVAs    Hemorrhagic CVA    HFpEF    T2DM (type 2 diabetes mellitus)    HTN (hypertension)    GERD (gastroesophageal reflux disease)    ICH (intracerebral hemorrhage)    Oropharyngeal dysphagia     Past Medical History:   Diagnosis Date    Acid reflux     Cancer     Skin    Diabetes mellitus     Prediabetes    GERD (gastroesophageal reflux disease) 09/15/2023    HTN (hypertension) 09/15/2023    Kidney disease     T2DM (type 2 diabetes mellitus) 09/15/2023     Past Surgical History:   Procedure Laterality Date    APPENDECTOMY      ENDOSCOPY W/ PEG TUBE PLACEMENT N/A 2023    Procedure: ESOPHAGOGASTRODUODENOSCOPY WITH PERCUTANEOUS ENDOSCOPIC GASTROSTOMY TUBE INSERTION;  Surgeon: Haseeb Carrillo MD;  Location: [facility] ENDOSCOPY;  Service: General;  Laterality: N/A;    HEMORRHOIDECTOMY      NASAL POLYP SURGERY         SLP Recommendation and Plan  SLP Swallowing Diagnosis: oral dysphagia, suspected pharyngeal dysphagia (10/30/23 1025)  SLP Diet Recommendation: puree, honey thick liquids, no mixed consistencies (10/30/23 1025)  Recommended Precautions and Strategies: upright posture during/after eating, small bites of food and sips of liquid, no straw, alternate between small bites of food and sips of liquid, general aspiration precautions, 1:1 supervision (10/30/23 1025)  SLP Rec. for Method of Medication Administration: meds crushed, with puree  (10/30/23 1025)     Monitor for Signs of Aspiration: notify SLP if any concerns (10/30/23 1025)     Swallow Criteria for Skilled Therapeutic Interventions Met: demonstrates skilled criteria (10/30/23 1025)  Anticipated Discharge Disposition (SLP): inpatient rehabilitation facility, skilled nursing facility (10/30/23 1025)  Rehab Potential/Prognosis, Swallowing: re-evaluate goals as necessary (10/30/23 1025)  Therapy Frequency (Swallow): 5 days per week (10/30/23 1025)  Predicted Duration Therapy Intervention (Days): until discharge (10/30/23 1025)  Oral Care Recommendations: Oral Care BID/PRN, Suction toothbrush (10/30/23 1025)                                      Oral Care Recommendations: Oral Care BID/PRN, Suction toothbrush (10/30/23 1025)           SWALLOW EVALUATION (last 72 hours)       SLP Adult Swallow Evaluation       Row Name 10/30/23 1025                   Rehab Evaluation    Document Type re-evaluation  -        Subjective Information no complaints  -        Patient Observations poorly cooperative  -        Patient/Family/Caregiver Comments/Observations Family present  -        Patient Effort fair  -        Symptoms Noted During/After Treatment none  -           General Information    Patient Profile Reviewed yes  -        Pertinent History Of Current Problem See previous eval  -        Current Method of Nutrition pureed;honey-thick liquids;gastrostomy feedings  -        Precautions/Limitations, Vision vision impairment, bilaterally  -        Precautions/Limitations, Hearing WFL  -        Prior Level of Function-Communication unknown  -        Prior Level of Function-Swallowing no diet consistency restrictions  -        Plans/Goals Discussed with patient;spouse/S.O.  -        Barriers to Rehab medically complex;cognitive status  -        Patient's Goals for Discharge patient did not state  -        Family Goals for Discharge family did not state  -           Pain     Additional Documentation Pain Scale: FACES Pre/Post-Treatment (Group)  -           Pain Scale: FACES Pre/Post-Treatment    Pain: FACES Scale, Pretreatment 0-->no hurt  -        Posttreatment Pain Rating 0-->no hurt  -           Oral Motor Structure and Function    Dentition Assessment natural, present and adequate  -        Secretion Management WNL/WFL  -        Mucosal Quality moist, healthy  -           Oral Musculature and Cranial Nerve Assessment    Oral Motor General Assessment unable to assess;other (see comments)  Pt u/a to follow OM commands  -           General Eating/Swallowing Observations    Respiratory Support Currently in Use room air  -        Eating/Swallowing Skills fed by SLP;unable to perform self-feeding  -        Positioning During Eating upright in bed  -        Utensils Used spoon  -        Consistencies Trialed honey-thick liquids  -           Clinical Swallow Eval    Oral Prep Phase impaired  -        Oral Transit impaired  -        Oral Residue impaired  -        Clinical Swallow Evaluation Summary Pt in BL wrist restaints 2' increased agitation this AM. Pt accepted minimal PO trials, no overt s/s of aspiration w/ HTL via tsp. Increased agitation w/ further PO presentations. Pt not appropriate for [facility] this date. SLP will f/u as appropriate  -           Oral Prep Concerns    Oral Prep Concerns incomplete or weak lip closure around spoon;anterior loss  -        Incomplete or Weak Lip Closure Around Spoon honey  -        Anterior Loss honey  -           Oral Transit Concerns    Oral Transit Concerns delayed initiation of bolus transit  -        Delayed Intiation of Bolus Transit honey  -           Oral Residue Concerns    Oral Residue Concerns diffuse residue throughout oral cavity  -        Diffuse Residue Throughout Oral Cavity honey  -           SLP Evaluation Clinical Impression    SLP Swallowing Diagnosis oral dysphagia;suspected pharyngeal  dysphagia  -        Functional Impact risk of aspiration/pneumonia  -        Rehab Potential/Prognosis, Swallowing re-evaluate goals as necessary  -        Swallow Criteria for Skilled Therapeutic Interventions Met demonstrates skilled criteria  -           Recommendations    Therapy Frequency (Swallow) 5 days per week  -        Predicted Duration Therapy Intervention (Days) until discharge  -        SLP Diet Recommendation puree;honey thick liquids;no mixed consistencies  -        Recommended Precautions and Strategies upright posture during/after eating;small bites of food and sips of liquid;no straw;alternate between small bites of food and sips of liquid;general aspiration precautions;1:1 supervision  -        Oral Care Recommendations Oral Care BID/PRN;Suction toothbrush  -        SLP Rec. for Method of Medication Administration meds crushed;with puree  -        Monitor for Signs of Aspiration notify SLP if any concerns  -        Anticipated Discharge Disposition (SLP) inpatient rehabilitation facility;skilled nursing facility  -                  User Key  (r) = Recorded By, (t) = Taken By, (c) = Cosigned By      Initials Name Effective Dates     Brenda Petty, MS CCC-SLP 05/12/23 -                     EDUCATION  The patient has been educated in the following areas:   Dysphagia (Swallowing Impairment) Modified Diet Instruction.        SLP GOALS       Row Name 10/30/23 1025             (LTG) Patient will demonstrate functional swallow for    Diet Texture (Demonstrate functional swallow) soft to chew (chopped) textures  -      Liquid viscosity (Demonstrate functional swallow) nectar/ mildly thick liquids  -      [place] (Demonstrate functional swallow) with minimal cues (75-90% accuracy)  -      Time Frame (Demonstrate functional swallow) by discharge  -      Progress/Outcomes (Demonstrate functional swallow) goal ongoing  -         (STG) Patient will tolerate trials  of    Consistencies Trialed (Tolerate trials) pureed textures;honey/ moderately thick liquids  -      Desired Outcome (Tolerate trials) without signs/symptoms of aspiration;without signs of distress;with adequate oral prep/transit/clearance;with use of compensatory strategies (see comments)  -      [place] (Tolerate trials) with 1:1 assist/ supervision  -      Time Frame (Tolerate trials) by discharge  -      Progress/Outcomes (Tolerate trials) continuing progress toward goal  -      Comment (Tolerate trials) No overt s/s of aspiration w/ minimal trials of HTL via tsp  -         (STG) Patient will tolerate therapeutic trials of    Consistencies Trialed (Tolerate therapeutic trials) thin liquids  -      Desired Outcome (Tolerate therapeutic trials) without signs/symptoms of aspiration;without signs of distress  -      [place] (Tolerate therapeutic trials) with minimal cues (75-90% accuracy)  -      Time Frame (Tolerate therapeutic trials) by discharge  -      Progress/Outcomes (Tolerate therapeutic trials) goal ongoing  -         ([facility]) Labial Strengthening Goal 1 (SLP)    Activity (Labial Strengthening Goal 1, SLP) increase labial tone  -      Increase Labial Tone labial resistance exercises;swallow trials  -      [place]/Accuracy (Labial Strengthening Goal 1, SLP) with moderate cues (50-74% accuracy)  -      Time Frame (Labial Strengthening Goal 1, SLP) short term goal (STG)  -      Progress/Outcomes (Labial Strengthening Goal 1, SLP) goal ongoing  -         ([facility]) Lingual Strengthening Goal 1 (SLP)    Activity (Lingual Strengthening Goal 1, SLP) increase lingual tone/sensation/control/coordination/movement;increase tongue back strength  -      Increase Lingual Tone/Sensation/Control/Coordination/Movement swallow trials;lingual resistance exercises  -      Increase Tongue Back Strength lingual resistance exercises  -      [place]/Accuracy (Lingual  Strengthening Goal 1, SLP) with moderate cues (50-74% accuracy)  -      Time Frame (Lingual Strengthening Goal 1, SLP) short term goal (STG)  -      Progress/Outcomes (Lingual Strengthening Goal 1, SLP) goal ongoing  -         (STG) Pharyngeal Strengthening Exercise Goal 1 (SLP)    Activity (Pharyngeal Strengthening Goal 1, SLP) increase superior movement of the hyolaryngeal complex;increase anterior movement of the hyolaryngeal complex;increase closure at entrance to airway/closure of airway at glottis;increase squeeze/positive pressure generation;increase tongue base retraction;increase timing  -      Increase Timing prepping - 3 second prep or suck swallow or 3-step swallow  -      Increase Superior Movement of the Hyolaryngeal Complex effortful pitch glide (falsetto + pharyngeal squeeze)  -      Increase Anterior Movement of the Hyolaryngeal Complex chin tuck against resistance (CTAR)  -      Increase Closure at Entrance to Airway/Closure of Airway at Glottis supraglottic swallow  -      Increase Squeeze/Positive Pressure Generation hard effortful swallow  -      Increase Tongue Base Retraction tamie  -      [place]/Accuracy (Pharyngeal Strengthening Goal 1, SLP) with moderate cues (50-74% accuracy)  -      Time Frame (Pharyngeal Strengthening Goal 1, SLP) short term goal (STG)  -      Progress/Outcomes (Pharyngeal Strengthening Goal 1, SLP) goal ongoing  -         Patient will demonstrate functional communication skills for return to discharge environment     [place] with moderate cues  -      Time frame by discharge  -      Progress/Outcomes goal ongoing  -         Comprehend Questions Goal 1 (SLP)    Improve Ability to Comprehend Questions Goal 1 (SLP) complex yes/no questions;80%;with minimal cues (75-90%)  -      Time Frame (Comprehend Questions Goal 1, SLP) short term goal (STG)  -      Progress/Outcomes (Comprehend Questions Goal 1, SLP) goal ongoing  -          Follow Directions Goal 2 (SLP)    Improve Ability to Follow Directions Goal 1 (SLP) 2 step commands;80%;with minimal cues (75-90%)  -      Time Frame (Follow Directions Goal 1, SLP) short term goal (STG)  -MH      Progress/Outcomes (Follow Directions Goal 1, SLP) goal ongoing  -         Word Retrieval Skills Goal 1 (SLP)    Improve Word Retrieval Skills By Goal 1 (SLP) confrontational naming task;high frequency;responsive naming task;simple;80%;with minimal cues (75-90%)  -      Time Frame (Word Retrieval Goal 1, SLP) short term goal (STG)  -MH      Progress/Outcomes (Word Retrieval Goal 1, SLP) goal ongoing  -         Articulation Goal 1 (SLP)    Improve Articulation Goal 1 (SLP) by over-articulating at phrase level;80%;with moderate cues (50-74%)  -      Time Frame (Articulation Goal 1, SLP) short term goal (STG)  -MH      Progress/Outcomes (Articulation Goal 1, SLP) goal ongoing  -         Orientation Goal 1 (SLP)    Improve Orientation Through Goal 1 (SLP) demonstrating orientation to day;demonstrating orientation to month;demonstrating orientation to year;demonstrating orientation to place;demonstrating orientation to disease/impairment;use environmental aids to assist with orientation;80%;with moderate cues (50-74%)  -      Time Frame (Orientation Goal 1, SLP) short term goal (STG)  -MH      Progress/Outcomes (Orientation Goal 1, SLP) goal ongoing  -                User Key  (r) = Recorded By, (t) = Taken By, (c) = Cosigned By      Initials Name Provider Type    Brenda Lombardo MS CCC-SLP Speech and Language Pathologist                       Time Calculation:    Time Calculation- SLP       Row Name 10/30/23 1202             Time Calculation- SLP    SLP Start Time 1025  -MH      SLP Received On 10/30/23  -         Untimed Charges    53301-TR Eval Oral Pharyng Swallow Minutes 40  -MH         Total Minutes    Untimed Charges Total Minutes 40  -MH       Total Minutes 40  -MH                 User Key  (r) = Recorded By, (t) = Taken By, (c) = Cosigned By      Initials Name Provider Type     Brenda Petty, MS CCC-SLP Speech and Language Pathologist                    Therapy Charges for Today       Code Description Service Date Service Provider Modifiers Qty    [id number] HC ST EVAL ORAL PHARYNG SWALLOW 3 10/30/2023 Brenda Petty MS CCC-SLP GN 1                 MS SRUTHI Vaughn  10/30/2023

## 2023-10-30 NOTE — PLAN OF CARE
Goal Outcome Evaluation:   SLP re-evaluation completed. Will continue to address dysphagia and communication. Please see note for further details and recommendations.

## 2023-10-30 NOTE — CASE MANAGEMENT/SOCIAL WORK
Continued Stay Note  [facility]     Patient Name: Kenneth Wen  MRN: [id number]  Today's Date: 10/30/2023    Admit Date: 9/15/2023    Plan: SNF to LTC   Discharge Plan       Row Name 10/30/23 0823       Plan    Plan SNF to LTC    Plan Comments SW met with pt and spouse at bedside this morning.  Pt slept throughout the visit.  Pt unfortunately required IM Geodon yesterday and three point restraints were restarted this morning at approximately 0700 due to increased agitation.  Palliative care is following and will adjust pt's phenobarbitol.  Pt's wife is aware of the plan for medication adjustments.  SW and wife discussed that placement is once again on hold until pt's agitation is better managed.  SW continues to follow for discharge planning needs.    Final Discharge Disposition Code 03 - skilled nursing facility (SNF)                   Discharge Codes    No documentation.                 Expected Discharge Date and Time       Expected Discharge Date Expected Discharge Time    Nov 1, 2023               JOEL Wiggins

## 2023-10-30 NOTE — PROGRESS NOTES
Clinical Nutrition   Nutrition Support Assessment  Reason for Visit: Follow-up protocol, EN/PO    Patient Name: Kenneth Wen  YOB: 1950  MRN: [id number]  Date of Encounter: 10/30/23 18:25 EDT  Admission date: 9/15/2023    Comments:     Per RN taking little p.o ,but tolerating bolus EN well. Last 3 days avg goal delivery.    Nutrition Assessment   Admission Diagnosis:  ICH (intracerebral hemorrhage) [I61.9]      Problem List:    Hemorrhagic CVA    Dyslipidemia    Multiple bilateral CVAs    HFpEF    T2DM (type 2 diabetes mellitus)    HTN (hypertension)    GERD (gastroesophageal reflux disease)    ICH (intracerebral hemorrhage)    Oropharyngeal dysphagia        PMH:  He  has a past medical history of Acid reflux, Cancer, Diabetes mellitus, GERD (gastroesophageal reflux disease) (09/15/2023), HTN (hypertension) (09/15/2023), Kidney disease, and T2DM (type 2 diabetes mellitus) (09/15/2023).    PSH: He  has a past surgical history that includes Appendectomy; Nasal polyp surgery; Hemorrhoid surgery; and Esophagogastroduodenoscopy w/ PEG (N/A, 9/29/2023).      Applicable Nutrition Concerns:   Skin:  Oral:  GI: PEG (9/29)      Applicable Interval History:   9/16 3% Hypertonic saline initiated  9/15 FEES:  SLP Swallowing Diagnosis: mod-severe, oral dysphagia, severe, pharyngeal dysphagia (09/15/23 1331)  SLP Diet Recommendation: NPO, temporary alternate methods of nutrition/hydration, other (see comments) (okay for 2-3 ice chips per hour as tolerated)   9/23-SLP Diet Recommendation: puree, honey thick liquids.  9/29- PEG tube placed. Consult for tube feeding assessment.   10/4: [facility] [place] SLP Diet Recommendation: puree, honey thick liquids, no mixed consistencies   10/16: SLP Diet Recommendation: puree, honey thick liquids, no mixed consistencies (10/16/23 0930)  Recommended Precautions and Strategies: upright posture during/after eating, small bites of food and sips of liquid, no straw,  alternate between small bites of food and sips of liquid, general aspiration precautions, 1:1 supervision  Recommended Diagnostics: reassess via [facility] ([facility] [place] (10/17)     Reported/Observed/Food/Nutrition Related History:     10/30  Per RN taking little p.o but tolerating bolus EN well. Last 3 days avg goal delivery.    10/20  RN at bedside states pt didn't eat any of breakfast. Pt asking for fresh fruit but does not want it pureed. Tolerating bolus feeds per RN.     10/16  Pt tolerating bolus feeds per RN. Continues with minimal PO acceptance. Pt continues to require restraints due to pulling at PEG tube. Documented BM x2 in past 24hrs.      10/13  Pt continues to have poor PO intake regardless of nocturnal feeds and addition of Remeron. Currently requiring restraints again. Per spouse pt with difficult night and did not wish to wake for lunch. Discussed option to change to bolus feeds and meet 100% of needs and allow for PO intake for comfort only. Spouse agreeable.     10/9  Spouse wanting to cont nocturnal feeding to be able to try p.o feeding. Seemed improved from yesterday. Will change hrs of delivery to 16.    10/8  Pt with meal refusal x2 on 10/7. With refusal, nocturnal feeds inappropriate to meet estimated needs. Will continue calorie count through today and re-evaluate Monday if need to return to continuous feeds. Palliative continues following for GOC.      10/7  Pt with noted improved PO yesterday however remains inadequate to d/c nocturnal feeds at this time. Will continue calorie count for 2 more days.      10/6  Pt continues with inconsistent intake. Continues to require restraints to prevent pulling PEG regardless of abdominal binder. Spouse present at all meals to assist. Observed increased acceptance of lunch however was not alert enough for breakfast.     10/4  Pt with improved PO at lunch today however per spouse did not eat at breakfast. Suspect due to working with therapies at breakfast time.  Tolerated nocturnal regimen. MBS completed - SLP recs puree, honey thick liquids, no mixed consistencies.      10/3  Tolerating feeds at goal. Pt with ~25% at meals being fed by spouse - ? If able to improve PO intake with transition to nocturnal feeds. Discussed with spouse, agreeable to try.     10/1   EN ordered yesterday, started via PEG tube.  @ goal rate this morning and being tolerated. Patient also with order for a diet.  Able to reach patient wife over the phone to get a better sense of patient intake. Patient wife reports she was present for dinner meal yesterday and patient only ate bites of applesauce.  Overall not much intake. Reports patient has been a little sleepy past couple of days and no showing that much interest in eating.  We discussed current EN Rx.  We felt it would be best to keep EN continuous vs nocturnal at this time.  Can switch to nocturnal if the events intake improves.     9/30  Consult for tube feeding assessment.  Overall issues with sodium levels, fluid intake and confusion. Was getting IVF, however pulled out IV.  Wife asked for a PEG tube placement on 9/28.      Patient in COVID isolation, RD not able to visit in person.  No diet this morning (was made NPO for PEG tube placement). Discussed with RN re-order previous order. RD will start tube feeding.       9/25  Spoke w/RN who reports pt pulled out NGT Friday evening. RN states pt eating >/=75% of trays. Textures downgraded 9/23 to pureed.     9/22  Pt on diet though ate only sausage and some juice this am. RN states pt pocketing food still, had been happening in ICU per previous RD note. Discussed w/SLP.     9/18 RN reports pt on 3% saline therapy Na+ goal 145-155, Na+ w/I range, pt from OSH sent here d/t hemorrhagic conversion continues to have R weakness, confusion, tolerating TF. Uncertain if pt should have water flushes, these were dc'd after discussion w/ MD.     9/15  Per RN unclear if will start EN or if pt may progress  "to caden diet at this time.  No wt hx available today.     Anthropometrics     Admission Height 175.3 cm (69\") Documented at 09/15/2023 0122   Admission Weight 79.2 kg (174 lb 9.7 oz) Documented at 09/15/2023 0122     Height: Height: 175.3 cm (69\")  Last Filed Weight: Weight: 72.8 kg (160 lb 7.9 oz) (09/21/23 0500)  Method: Weight Method: Bed scale  BMI: BMI (Calculated): 23.7  BMI classification: Overweight: 25.0-29.9kg/m2      9/15/2023 9/17/2023   Weight     Weight (kg) 79.2 kg  82.5 kg    Weight (lbs) 174 lb 9.7 oz  181 lb 14.1 oz    Weight Method Bed scale  Bed scale        UBW: Per  lbs on 9/14/23  Note 172 lbs in 2018  Weight change: uncertain at this time    Labs    Labs Reviewed: Yes                   Invalid input(s): \"PLAT\"      Results from last 7 days   Lab Units 10/30/23  1700 10/30/23  1225 10/30/23  0602 10/30/23  0005 10/29/23  1810 10/29/23  1159   GLUCOSE mg/dL 132* 211* 139* 157* 164* 187*     Lab Results   Lab Value Date/Time    HGBA1C 6.40 (H) 09/15/2023 0212               Medications    Medications Reviewed: Yes  Pertinent:  prevacid, remeron   Infusion:   PRN:     Needs Assessment   Date: 9/30  **CBW similar to last used for calculation    Height used:Height: 175.3 cm (69\")  Weights used: 160lb/72.7kg      Estimated Calorie needs: ~ 1900 Kcal/day  Method:  Kcals/KG 25= 1818  Method:  MSJ 1479 x 1.3= 1923    Estimated Protein needs: ~ 95g PRO/day  Method: 1.3g/Kg= 95    Estimated Fluid needs: ~ ml/day   Per clinical status    Current Nutrition Prescription      PO: Diet: Regular/House Diet; No Straw, Feeding Assistance - Nursing, No Mixed Consistencies; Texture: Pureed (NDD 1); Fluid Consistency: Honey Thick   Oral Nutrition Supplement: Magic cup 2x daily   Intake: 6 DAYS 25% x last 10 meals documented    EN: IsoSource 1.5  Total Cartons: 5  Bolus Regimen: 1 carton (250mL) 5x/d - ~q3hrs between 6a and 9p  Water Flush: 75mL before and after each bolus  Modular: Prosource -no carb " 1/day  Route: PEG  Tube: Unknown     At goal over: bolus feeds     Rx will supply:   Goal Volume 1250 mL/day       Flush Volume 750 mL/day       Energy 1935 Kcal/day 102 % Est Need   Protein 100 g/day 105 % Est Need   Fiber 19 g/day       Water in   mL       Total Water 1700 mL       Meet DRI Yes            --------------------------------------------------------------------------  Product/Rate verified at bedside: No  Infusing Rate at time of visit: bolus feeds     Average Delivery from Charting: 3 Days: w Prosource  Volume 1250 mL/day 100  % Goal Vol.   Flush Volume 750 mL/day     Energy 1935 Kcal/day 102 % Est Need   Protein 100 g/day 105 % Est Need   Fiber 19 g/day     Water in   mL     Total Water 1700 mL     Meet DRI Yes        Intake/Ouptut 24 hrs (0701 - 0700)   I&O's Reviewed: Yes     Intake & Output (last day)         10/29 0701  10/30 0700 10/30 0701  10/31 0700    P.O. 0 240    Other 600 300    NG/GT 1500 250    Total Intake(mL/kg) 2100 (28.8) 790 (10.9)    Urine (mL/kg/hr) 170 (0.1)     Stool      Total Output 170     Net +1930 +790          Urine Unmeasured Occurrence 3 x 1 x    Stool Unmeasured Occurrence 2 x             Nutrition Diagnosis   Date: 9/15 Updated: 9/18, 9/30, 10/9, 10/13, 10/30  Problem Inadequate oral intake    Etiology stroke   Signs/Symptoms NPO w ice chips per SLP, confusion, +EN   Status: resolved meeting 100% of need via EN    Date:  9/18 Updated:  Problem Swallowing difficulty/chewing difficulty   Etiology Bilateral strokes   Signs/Symptoms Oral-pharyngeal dysphagia per SLP FEES eval   Status: ongoing    Goal:   General: Nutrition support treatment  PO: Increase intake as feasilbe   EN/PN: Maintain EN      Nutrition Intervention      Follow treatment progress, Care plan reviewed    Continue bolus feeds as ordered with PO comfort diet vs for nutritional intake.     onitoring/Evaluation:   Per protocol, I&O, PO intake, Supplement intake, Pertinent labs, EN  delivery/tolerance, Weight, Symptoms, Swallow function      Bernarda Adhikari RD  Time Spent: 30 min

## 2023-10-31 LAB
GLUCOSE BLDC GLUCOMTR-MCNC: 112 MG/DL (ref 70–130)
GLUCOSE BLDC GLUCOMTR-MCNC: 124 MG/DL (ref 70–130)
GLUCOSE BLDC GLUCOMTR-MCNC: 138 MG/DL (ref 70–130)
GLUCOSE BLDC GLUCOMTR-MCNC: 186 MG/DL (ref 70–130)
PHENOBARB SERPL-MCNC: 12.7 MCG/ML (ref 10–30)

## 2023-10-31 PROCEDURE — 99232 SBSQ HOSP IP/OBS MODERATE 35: CPT | Performed by: PSYCHIATRY & NEUROLOGY

## 2023-10-31 PROCEDURE — 99232 SBSQ HOSP IP/OBS MODERATE 35: CPT | Performed by: INTERNAL MEDICINE

## 2023-10-31 PROCEDURE — 82948 REAGENT STRIP/BLOOD GLUCOSE: CPT

## 2023-10-31 PROCEDURE — 97530 THERAPEUTIC ACTIVITIES: CPT

## 2023-10-31 PROCEDURE — 80184 ASSAY OF PHENOBARBITAL: CPT | Performed by: PSYCHIATRY & NEUROLOGY

## 2023-10-31 PROCEDURE — 97535 SELF CARE MNGMENT TRAINING: CPT

## 2023-10-31 RX ORDER — PHENOBARBITAL 32.4 MG/1
81 TABLET ORAL EVERY 8 HOURS SCHEDULED
Status: DISCONTINUED | OUTPATIENT
Start: 2023-10-31 | End: 2023-11-07

## 2023-10-31 RX ADMIN — TEMAZEPAM 15 MG: 15 CAPSULE ORAL at 18:40

## 2023-10-31 RX ADMIN — CLOPIDOGREL BISULFATE 75 MG: 75 TABLET ORAL at 08:46

## 2023-10-31 RX ADMIN — PHENOBARBITAL 64.8 MG: 64.8 TABLET ORAL at 06:23

## 2023-10-31 RX ADMIN — DICLOFENAC SODIUM 2 G: 9.3 GEL TOPICAL at 08:47

## 2023-10-31 RX ADMIN — ATORVASTATIN CALCIUM 80 MG: 40 TABLET, FILM COATED ORAL at 22:57

## 2023-10-31 RX ADMIN — QUETIAPINE FUMARATE 200 MG: 100 TABLET ORAL at 22:58

## 2023-10-31 RX ADMIN — MICONAZOLE NITRATE 1 APPLICATION: 20 CREAM TOPICAL at 08:46

## 2023-10-31 RX ADMIN — METOPROLOL TARTRATE 25 MG: 25 TABLET, FILM COATED ORAL at 10:12

## 2023-10-31 RX ADMIN — Medication 30 ML: at 08:46

## 2023-10-31 RX ADMIN — MICONAZOLE NITRATE 1 APPLICATION: 20 CREAM TOPICAL at 22:59

## 2023-10-31 RX ADMIN — HYDROCODONE BITARTRATE AND ACETAMINOPHEN 1 TABLET: 5; 325 TABLET ORAL at 13:12

## 2023-10-31 RX ADMIN — Medication 10 MG: at 18:40

## 2023-10-31 RX ADMIN — METOPROLOL TARTRATE 25 MG: 25 TABLET, FILM COATED ORAL at 22:58

## 2023-10-31 RX ADMIN — PHENOBARBITAL 64.8 MG: 64.8 TABLET ORAL at 13:11

## 2023-10-31 RX ADMIN — FLUTICASONE PROPIONATE 2 SPRAY: 50 SPRAY, METERED NASAL at 08:47

## 2023-10-31 RX ADMIN — MIRTAZAPINE 30 MG: 15 TABLET, FILM COATED ORAL at 18:40

## 2023-10-31 RX ADMIN — LIDOCAINE 1 PATCH: 4 PATCH TOPICAL at 08:47

## 2023-10-31 RX ADMIN — PHENOBARBITAL 81 MG: 32.4 TABLET ORAL at 22:58

## 2023-10-31 RX ADMIN — MINERAL OIL: 1000 LIQUID ORAL at 08:46

## 2023-10-31 RX ADMIN — DONEPEZIL HYDROCHLORIDE 10 MG: 10 TABLET, FILM COATED ORAL at 18:40

## 2023-10-31 RX ADMIN — MINERAL OIL: 1000 LIQUID ORAL at 22:58

## 2023-10-31 RX ADMIN — MINERAL OIL: 1000 LIQUID ORAL at 16:19

## 2023-10-31 RX ADMIN — MINERAL OIL: 1000 LIQUID ORAL at 13:12

## 2023-10-31 RX ADMIN — DICLOFENAC SODIUM 2 G: 9.3 GEL TOPICAL at 22:59

## 2023-10-31 RX ADMIN — PHENOBARBITAL 64.8 MG: 64.8 TABLET ORAL at 16:19

## 2023-10-31 RX ADMIN — QUETIAPINE FUMARATE 200 MG: 100 TABLET ORAL at 08:46

## 2023-10-31 RX ADMIN — LANSOPRAZOLE 15 MG: 15 TABLET, ORALLY DISINTEGRATING ORAL at 06:23

## 2023-10-31 NOTE — SIGNIFICANT NOTE
10/31/23 1425   SLP Deferred Reason   SLP Deferred Reason Routine  (Patient not appropriate this am for dysphagia and speech tx. Will reattempt this pm as schedule permits.)

## 2023-10-31 NOTE — THERAPY PROGRESS REPORT/RE-CERT
Patient Name: Kenneth Wen  : 1950    MRN: [id number]                              Today's Date: 10/31/2023       Admit Date: 9/15/2023    Visit Dx:     ICD-10-CM ICD-9-CM   1. Hemorrhagic CVA  I61.9 431   2. Aphasia  R47.01 784.3   3. Dysarthria  R47.1 784.51   4. Oropharyngeal dysphagia  R13.12 787.22     Patient Active Problem List   Diagnosis    Precordial pain    Elevated BP without diagnosis of hypertension    Dyslipidemia    Hyperglycemia    Multiple bilateral CVAs    Hemorrhagic CVA    HFpEF    T2DM (type 2 diabetes mellitus)    HTN (hypertension)    GERD (gastroesophageal reflux disease)    ICH (intracerebral hemorrhage)    Oropharyngeal dysphagia     Past Medical History:   Diagnosis Date    Acid reflux     Cancer     Skin    Diabetes mellitus     Prediabetes    GERD (gastroesophageal reflux disease) 09/15/2023    HTN (hypertension) 09/15/2023    Kidney disease     T2DM (type 2 diabetes mellitus) 09/15/2023     Past Surgical History:   Procedure Laterality Date    APPENDECTOMY      ENDOSCOPY W/ PEG TUBE PLACEMENT N/A 2023    Procedure: ESOPHAGOGASTRODUODENOSCOPY WITH PERCUTANEOUS ENDOSCOPIC GASTROSTOMY TUBE INSERTION;  Surgeon: Haseeb Carrillo MD;  Location: [facility] ENDOSCOPY;  Service: General;  Laterality: N/A;    HEMORRHOIDECTOMY      NASAL POLYP SURGERY        General Information       Row Name 10/31/23 2552          Physical Therapy Time and Intention    Document Type progress note/recertification  -ML     Mode of Treatment co-treatment;physical therapy  -ML       Row Name 10/31/23 1308          General Information    Patient Profile Reviewed yes  -ML     Existing Precautions/Restrictions fall  R sided weakness/coordination deficits, PEG with abdominal binder, visual deficits  -ML     Barriers to Rehab medically complex;cognitive status;visual deficit  -ML       Row Name 10/31/23 8554          Cognition    Orientation Status (Cognition) oriented to;person;disoriented to;time  -ML        Row Name 10/31/23 1304          Safety Issues, Functional Mobility    Safety Issues Affecting Function (Mobility) awareness of need for assistance;impulsivity;insight into deficits/self-awareness;judgment;problem-solving;safety precaution awareness;safety precautions follow-through/compliance;sequencing abilities  -ML     Impairments Affecting Function (Mobility) balance;cognition;coordination;endurance/activity tolerance;grasp;motor control;motor planning;muscle tone abnormal;visual/perceptual;strength;sensation/sensory awareness;range of motion (ROM);postural/trunk control  -ML     Cognitive Impairments, Mobility Safety/Performance awareness, need for assistance;impulsivity;insight into deficits/self-awareness;judgment;problem-solving/reasoning;safety precaution awareness;safety precaution follow-through;attention;sequencing abilities  -ML               User Key  (r) = Recorded By, (t) = Taken By, (c) = Cosigned By      Initials Name Provider Type    ML Laura Rangel Physical Therapist                   Mobility       Row Name 10/31/23 1306          Bed Mobility    Bed Mobility rolling left;rolling right;scooting/bridging;supine-sit;sit-supine  -ML     Rolling Left [place] (Bed Mobility) verbal cues;nonverbal cues (demo/gesture);maximum assist (25% patient effort);1 person assist  -ML     Rolling Right [place] (Bed Mobility) verbal cues;nonverbal cues (demo/gesture);minimum assist (75% patient effort);1 person assist  -ML     Scooting/Bridging [place] (Bed Mobility) dependent (less than 25% patient effort);2 person assist;verbal cues  -ML     Supine-Sit [place] (Bed Mobility) verbal cues;nonverbal cues (demo/gesture);moderate assist (50% patient effort);2 person assist  -ML     Sit-Supine [place] (Bed Mobility) verbal cues;nonverbal cues (demo/gesture);moderate assist (50% patient effort);2 person assist  -ML     Assistive Device (Bed Mobility) draw sheet;head of bed elevated;bed rails   -ML       Row Name 10/31/23 1306          Bed-Chair Transfer    Bed-Chair [place] (Transfers) unable to assess  -ML       Row Name 10/31/23 1306          Sit-Stand Transfer    Sit-Stand [place] (Transfers) verbal cues;nonverbal cues (demo/gesture);moderate assist (50% patient effort);2 person assist  -ML     Assistive Device (Sit-Stand Transfers) other (see comments)  UE support  -ML     Comment, (Sit-Stand Transfer) Patient completed x2 STS transfers from EOB, therpist blocking R knee, patient leaning to the right in standing, difficulty maintaining uprigth standing posture  -ML       Row Name 10/31/23 1306          Gait/Stairs (Locomotion)    [place] Level (Gait) unable to assess  -ML     Comment, (Gait/Stairs) Patient tolerated static standing for approx 20-30 seconds while leaning to the right, not safe to attempt ambulation today.  -ML               User Key  (r) = Recorded By, (t) = Taken By, (c) = Cosigned By      Initials Name Provider Type     Laura Rangel Physical Therapist                   Obj/Interventions       Row Name 10/31/23 1308          Range of Motion Comprehensive    General Range of Motion bilateral lower extremity ROM WFL  -ML       Row Name 10/31/23 1308          Strength Comprehensive (MMT)    General Manual Muscle Testing (MMT) Assessment lower extremity strength deficits identified  -ML     Comment, General Manual Muscle Testing (MMT) Assessment LLE>RLE, however, unable to complete formal MMT due to cogntivie status  -ML       Row Name 10/31/23 1308          Balance    Balance Assessment sitting static balance;sitting dynamic balance;sit to stand dynamic balance;standing static balance  -ML     Static Sitting Balance verbal cues;non-verbal cues (demo/gesture);minimal assist  -ML     Dynamic Sitting Balance verbal cues;non-verbal cues (demo/gesture);moderate assist  -ML     Position, Sitting Balance unsupported;sitting edge of bed  -ML     Sit to Stand Dynamic Balance  verbal cues;non-verbal cues (demo/gesture);moderate assist;2-person assist  -ML     Static Standing Balance verbal cues;non-verbal cues (demo/gesture);moderate assist;2-person assist  -ML     Position/Device Used, Standing Balance supported  -ML     Balance Interventions sitting;standing;sit to stand;supported  -ML     Comment, Balance patient leaning to the R in standing, tolerated 2 static standing trials approx 20-30 seconds each  -ML       Row Name 10/31/23 1982          Sensory Assessment (Somatosensory)    Sensory Assessment (Somatosensory) unable/difficult to assess  -ML               User Key  (r) = Recorded By, (t) = Taken By, (c) = Cosigned By      Initials Name Provider Type     Laura Rangel Physical Therapist                   Goals/Plan       Row Name 10/31/23 8485          Bed Mobility Goal 1 (PT)    Activity/Assistive Device (Bed Mobility Goal 1, PT) sit to supine/supine to sit  -ML     [place] Level/Cues Needed (Bed Mobility Goal 1, PT) minimum assist (75% or more patient effort)  -ML     Time Frame (Bed Mobility Goal 1, PT) long term goal (LTG);10 days  -ML     Progress/Outcomes (Bed Mobility Goal 1, PT) goal ongoing  -ML       Row Name 10/31/23 1315          Transfer Goal 1 (PT)    Activity/Assistive Device (Transfer Goal 1, PT) sit-to-stand/stand-to-sit;bed-to-chair/chair-to-bed  -ML     [place] Level/Cues Needed (Transfer Goal 1, PT) minimum assist (75% or more patient effort)  -ML     Time Frame (Transfer Goal 1, PT) long term goal (LTG);10 days  -ML     Progress/Outcome (Transfer Goal 1, PT) goal met;goal revised this date  -       Row Name 10/31/23 1317          Gait Training Goal 1 (PT)    Activity/Assistive Device (Gait Training Goal 1, PT) gait (walking locomotion);assistive device use;walker, rolling  -ML     [place] Level (Gait Training Goal 1, PT) maximum assist (25-49% patient effort)  -ML     Distance (Gait Training Goal 1, PT) 10  -ML     Time Frame (Gait Training  Goal 1, PT) long term goal (LTG);10 days  -ML     Progress/Outcome (Gait Training Goal 1, PT) goal ongoing  -ML       Row Name 10/31/23 1315          Problem Specific Goal 1 (PT)    Problem Specific Goal 1 (PT) Pt will sit unsupported at EOB/EOC for 5 minutes with SBA for improved trunk control to facilitate independence with transfers.  -ML     Time Frame (Problem Specific Goal 1, PT) 2 weeks  -ML     Progress/Outcome (Problem Specific Goal 1, PT) goal ongoing  -ML       Row Name 10/31/23 1315          Therapy Assessment/Plan (PT)    Planned Therapy Interventions (PT) balance training;bed mobility training;gait training;home exercise program;neuromuscular re-education;patient/family education;postural re-education;ROM (range of motion);strengthening;stretching;transfer training  -ML               User Key  (r) = Recorded By, (t) = Taken By, (c) = Cosigned By      Initials Name Provider Type     Laura Rangel Physical Therapist                   Clinical Impression       Row Name 10/31/23 1311          Pain    Additional Documentation Pain Scale: FACES Pre/Post-Treatment (Group)  -[facility] Name 10/31/23 1311          Pain Scale: FACES Pre/Post-Treatment    Pain: FACES Scale, Pretreatment 0-->no hurt  -ML     Posttreatment Pain Rating 0-->no hurt  -ML       Row Name 10/31/23 1311          Plan of Care Review    Plan of Care Reviewed With patient;spouse  -ML     Progress no change  -ML     Outcome Evaluation Physical therapy re-certification complete. The patient able to follow commands to participate in PT treatment. Patient demonstrates RUE/RLE strength deficits, impaired cognition, balance and activity tolerance deficits affecting functional mobility. The patient would continue to benefit from skilled PT to address mobility deficits. Continue current PT POC.  -ML       Row Name 10/31/23 1311          Therapy Assessment/Plan (PT)    Rehab Potential (PT) fair, will monitor progress closely  -ML     Criteria for  Skilled Interventions Met (PT) yes;meets criteria;skilled treatment is necessary  -ML     Therapy Frequency (PT) daily  -ML       Row Name 10/31/23 1311          Vital Signs    Pre Patient Position Supine  -ML     Intra Patient Position Standing  -ML     Post Patient Position Supine  -ML       Row Name 10/31/23 1311          Positioning and Restraints    Pre-Treatment Position in bed  -ML     Post Treatment Position bed  -ML     In Bed notified nsg;fowlers;call light within reach;encouraged to call for assist;exit alarm on;side rails up x3;patient within staff view;with family/caregiver  -ML     Restraints released:;reapplied:;notified nsg:;soft limb  -ML               User Key  (r) = Recorded By, (t) = Taken By, (c) = Cosigned By      Initials Name Provider Type    Laura Betancur Physical Therapist                   Outcome Measures       Row Name 10/31/23 1316 10/31/23 0800       How much help from another person do you currently need...    Turning from your back to your side while in flat bed without using bedrails? 2  -ML 3  -MS    Moving from lying on back to sitting on the side of a flat bed without bedrails? 2  -ML 2  -MS    Moving to and from a bed to a chair (including a wheelchair)? 2  -ML 2  -MS    Standing up from a chair using your arms (e.g., wheelchair, bedside chair)? 2  -ML 2  -MS    Climbing 3-5 steps with a railing? 1  -ML 2  -MS    To walk in hospital room? 1  -ML 2  -MS    AM-PAC 6 Clicks Score (PT) 10  -ML 13  -MS    Highest level of mobility 4 --> Transferred to chair/commode  -ML 4 --> Transferred to chair/commode  -MS      Row Name 10/31/23 1316          Functional Assessment    Outcome Measure Options AM-PAC 6 Clicks Basic Mobility (PT)  -ML               User Key  (r) = Recorded By, (t) = Taken By, (c) = Cosigned By      Initials Name Provider Type    Jenny Ventura RN Registered Nurse    Laura Betancur Physical Therapist                                 Physical Therapy Education        Title: PT OT SLP Therapies (In Progress)       Topic: Physical Therapy (In Progress)       Point: Mobility training (In Progress)       Learning Progress Summary             Patient Acceptance, E, VU,NR by ML at 10/31/2023 1317    Acceptance, E, NR by KP at 10/26/2023 0256    Acceptance, E, NR by ML at 10/23/2023 1353    Acceptance, E,TB, NR by AY at 10/20/2023 1308    Acceptance, E, NR by KG at 10/15/2023 1401    Acceptance, E, NR,VU by KR at 10/12/2023 1453    Acceptance, E,D, NR by AB at 10/11/2023 1547    Acceptance, E, NR by CM at 10/10/2023 1517    Acceptance, E, NR,VU by KR at 10/6/2023 1550    Acceptance, E, NR by SD at 10/5/2023 1437    Acceptance, E, VU by CD at 10/2/2023 1502    Comment: SEE FLOWSHEET    Acceptance, E, NR by KR at 9/25/2023 1324    Acceptance, E, NR by KG1 at 9/18/2023 1403    Acceptance, E,TB, NR by AY at 9/15/2023 1254   Family Acceptance, E, VU,NR by ML at 10/31/2023 1317    Acceptance, E, NR by KP at 10/26/2023 0256    Acceptance, E, NR by ML at 10/23/2023 1353    Acceptance, E, NR,VU by KR at 10/12/2023 1453    Acceptance, E, NR,VU by KR at 10/6/2023 1550    Acceptance, E, NR by SD at 10/5/2023 1437   Significant Other Acceptance, E, NR by CM at 10/10/2023 1517                         Point: Home exercise program (In Progress)       Learning Progress Summary             Patient Acceptance, E, NR by KP at 10/26/2023 0256    Acceptance, E, NR by ML at 10/23/2023 1353    Acceptance, E, NR by KG at 10/15/2023 1401    Acceptance, E,D, NR by AB at 10/11/2023 1547    Acceptance, E, NR by CM at 10/10/2023 1517    Acceptance, E, NR by SD at 10/5/2023 1437    Acceptance, E, VU by CD at 10/2/2023 1502    Comment: SEE FLOWSHEET    Acceptance, E, NR by KG1 at 9/18/2023 1403   Family Acceptance, E, NR by KP at 10/26/2023 0256    Acceptance, E, NR by ML at 10/23/2023 1353    Acceptance, E, NR by SD at 10/5/2023 1437   Significant Other Acceptance, E, NR by CM at 10/10/2023 1517                          Point: Body mechanics (In Progress)       Learning Progress Summary             Patient Acceptance, E, VU,NR by ML at 10/31/2023 1317    Acceptance, E, NR by KP at 10/26/2023 0256    Acceptance, E,TB, NR by AY at 10/20/2023 1308    Acceptance, E, NR by KG at 10/15/2023 1401    Acceptance, E, NR,VU by KR at 10/12/2023 1453    Acceptance, E,D, NR by AB at 10/11/2023 1547    Acceptance, E, NR by CM at 10/10/2023 1517    Acceptance, E, NR,VU by KR at 10/6/2023 1550    Acceptance, E, NR by SD at 10/5/2023 1437    Acceptance, E, VU by CD at 10/2/2023 1502    Comment: SEE FLOWSHEET    Acceptance, E, NR by KR at 9/25/2023 1324    Acceptance, E, NR by KG1 at 9/18/2023 1403    Acceptance, E,TB, NR by AY at 9/15/2023 1254   Family Acceptance, E, VU,NR by ML at 10/31/2023 1317    Acceptance, E, NR by KP at 10/26/2023 0256    Acceptance, E, NR,VU by KR at 10/12/2023 1453    Acceptance, E, NR,VU by KR at 10/6/2023 1550    Acceptance, E, NR by SD at 10/5/2023 1437   Significant Other Acceptance, E, NR by CM at 10/10/2023 1517                         Point: Precautions (In Progress)       Learning Progress Summary             Patient Acceptance, E, VU,NR by ML at 10/31/2023 1317    Acceptance, E, NR by KP at 10/26/2023 0256    Acceptance, E, NR by ML at 10/23/2023 1353    Acceptance, E,TB, NR by AY at 10/20/2023 1308    Acceptance, E, NR by KG at 10/15/2023 1401    Acceptance, E, NR,VU by KR at 10/12/2023 1453    Acceptance, E,D, NR by AB at 10/11/2023 1547    Acceptance, E, NR by CM at 10/10/2023 1517    Acceptance, E, NR,VU by KR at 10/6/2023 1550    Acceptance, E, NR by SD at 10/5/2023 1437    Acceptance, E, VU by CD at 10/2/2023 1502    Comment: SEE FLOWSHEET    Acceptance, E, NR by KR at 9/25/2023 1324    Acceptance, E, NR by KG1 at 9/18/2023 1403    Acceptance, E,TB, NR by AY at 9/15/2023 1254   Family Acceptance, E, VU,NR by ML at 10/31/2023 1317    Acceptance, E, NR by KP at 10/26/2023 0256    Acceptance, E, NR  by ML at 10/23/2023 1353    Acceptance, E, NR,VU by KR at 10/12/2023 1453    Acceptance, E, NR,VU by KR at 10/6/2023 1550    Acceptance, E, NR by SD at 10/5/2023 1437   Significant Other Acceptance, E, NR by CM at 10/10/2023 1517                                         User Key       Initials Effective Dates Name Provider Type Discipline    CD 02/03/23 -  Claudia Duarte, PT Physical Therapist PT    SD 03/13/23 -  Chantel Scott, PT Physical Therapist PT    KP 06/16/21 -  Ivis Lau, RN Registered Nurse Nurse    KG1 05/22/20 -  Mackenzie Mckay, PT Physical Therapist PT    KG 01/04/23 -  Monik Solorio Physical Therapist PT    AY 11/10/20 -  Aleshia Armstrong, PT Physical Therapist PT    ML 04/22/21 -  Laura Rangel Physical Therapist PT    AB 09/22/22 -  Aleshia Keita, PT Physical Therapist PT    CM 09/22/22 -  Yanira Shields, PT Physical Therapist PT    KR 12/30/22 -  Raissa Soria, PT Physical Therapist PT                  PT Recommendation and Plan  Planned Therapy Interventions (PT): balance training, bed mobility training, gait training, home exercise program, neuromuscular re-education, patient/family education, postural re-education, ROM (range of motion), strengthening, stretching, transfer training  Plan of Care Reviewed With: patient, spouse  Progress: no change  Outcome Evaluation: Physical therapy re-certification complete. The patient able to follow commands to participate in PT treatment. Patient demonstrates RUE/RLE strength deficits, impaired cognition, balance and activity tolerance deficits affecting functional mobility. The patient would continue to benefit from skilled PT to address mobility deficits. Continue current PT POC.     Time Calculation:         PT Charges       Row Name 10/31/23 1317             Time Calculation    Start Time 1117  -ML      PT Received On 10/31/23  -ML      PT Goal Re-Cert Due Date 11/10/23  -ML         Timed Charges    03084 - PT Therapeutic  Activity Minutes 15  -ML         Total Minutes    Timed Charges Total Minutes 15  -ML       Total Minutes 15  -ML                User Key  (r) = Recorded By, (t) = Taken By, (c) = Cosigned By      Initials Name Provider Type    Laura Betancur Physical Therapist                  Therapy Charges for Today       Code Description Service Date Service Provider Modifiers Qty    [id number]  PT THERAPEUTIC ACT EA 15 MIN 10/31/2023 Laura Rangel GP 1            PT G-Codes  Outcome Measure Options: AM-PAC 6 Clicks Basic Mobility (PT)  AM-PAC 6 Clicks Score (PT): 10  AM-PAC 6 Clicks Score (OT): 10  Modified [place] Scale: 4 - Moderately severe disability.  Unable to walk without assistance, and unable to attend to own bodily needs without assistance.  PT Discharge Summary  Anticipated Discharge Disposition (PT): skilled nursing facility    Laura Rangel  10/31/2023

## 2023-10-31 NOTE — PLAN OF CARE
Goal Outcome Evaluation:  Plan of Care Reviewed With: patient        Progress: no change  Outcome Evaluation: VSS. no c/o pain. Pt slept well. restraints in place. Will continue to montior.

## 2023-10-31 NOTE — THERAPY TREATMENT NOTE
Patient Name: Kenneth Wen  : 1950    MRN: [id number]                              Today's Date: 10/31/2023       Admit Date: 9/15/2023    Visit Dx:     ICD-10-CM ICD-9-CM   1. Hemorrhagic CVA  I61.9 431   2. Aphasia  R47.01 784.3   3. Dysarthria  R47.1 784.51   4. Oropharyngeal dysphagia  R13.12 787.22     Patient Active Problem List   Diagnosis    Precordial pain    Elevated BP without diagnosis of hypertension    Dyslipidemia    Hyperglycemia    Multiple bilateral CVAs    Hemorrhagic CVA    HFpEF    T2DM (type 2 diabetes mellitus)    HTN (hypertension)    GERD (gastroesophageal reflux disease)    ICH (intracerebral hemorrhage)    Oropharyngeal dysphagia     Past Medical History:   Diagnosis Date    Acid reflux     Cancer     Skin    Diabetes mellitus     Prediabetes    GERD (gastroesophageal reflux disease) 09/15/2023    HTN (hypertension) 09/15/2023    Kidney disease     T2DM (type 2 diabetes mellitus) 09/15/2023     Past Surgical History:   Procedure Laterality Date    APPENDECTOMY      ENDOSCOPY W/ PEG TUBE PLACEMENT N/A 2023    Procedure: ESOPHAGOGASTRODUODENOSCOPY WITH PERCUTANEOUS ENDOSCOPIC GASTROSTOMY TUBE INSERTION;  Surgeon: Haseeb Carrillo MD;  Location: [facility] ENDOSCOPY;  Service: General;  Laterality: N/A;    HEMORRHOIDECTOMY      NASAL POLYP SURGERY        General Information       Row Name 10/31/23 1115          OT Time and Intention    Document Type progress note/recertification  -SW     Mode of Treatment co-treatment;occupational therapy  -       Row Name 10/31/23 1115          General Information    Patient Profile Reviewed yes  -SW     Existing Precautions/Restrictions fall  R sided weakness/coordination deficits, PEG with abdominal binder, visual deficits  -       Row Name 10/31/23 1115          Cognition    Orientation Status (Cognition) oriented to;person  -       Row Name 10/31/23 1115          Safety Issues, Functional Mobility    Impairments Affecting Function  Pt will [facility] to [facility]. Pt will return to room number 100. Report can be called to [phone] ext [phone] and given to Nurse Mounika.        wheelchair transportation requested for 1145am. Requested time is not a guarantee of arrival time. Nurse can assess scheduled time by accessing Chart Review--> Intake tab . This allows Nurse to review accurate information of scheduled transportation time. If transportation is delayed outside of that window, Floor CM can be contacted for additional assistance.     Nurse Notified of the Above.        09/29/20 1041   Final Note   Assessment Type Final Discharge Note   Anticipated Discharge Disposition [facility] care [facility]   What phone number can be called within the next 1-3 days to see how you are doing after discharge? [phone]   Discharge plans and expectations educations in teach back method with documentation complete? Yes   Right Care Referral Info   Post Acute Recommendation Other   Referral Type senior care Nursing Home   Facility Name [facility]   Post-Acute Status   Post-Acute Authorization Placement   Post-Acute Placement Status Set-up Complete        (Mobility) balance;cognition;coordination;endurance/activity tolerance;grasp;motor control;motor planning;muscle tone abnormal;visual/perceptual;strength;sensation/sensory awareness;range of motion (ROM);postural/trunk control  -               User Key  (r) = Recorded By, (t) = Taken By, (c) = Cosigned By      Initials Name Provider Type    Cara De Santiago OT Occupational Therapist                     Mobility/ADL's       Row Name 10/31/23 1115          Bed Mobility    Bed Mobility rolling left;rolling right;supine-sit;sit-supine;scooting/bridging  -     Rolling Left [place] (Bed Mobility) verbal cues;nonverbal cues (demo/gesture);maximum assist (25% patient effort);1 person assist  -SW     Rolling Right [place] (Bed Mobility) verbal cues;nonverbal cues (demo/gesture);minimum assist (75% patient effort);1 person assist  -SW     Scooting/Bridging [place] (Bed Mobility) 2 person assist;verbal cues;dependent (less than 25% patient effort)  -     Supine-Sit [place] (Bed Mobility) verbal cues;nonverbal cues (demo/gesture);moderate assist (50% patient effort);2 person assist  -SW     Sit-Supine [place] (Bed Mobility) verbal cues;nonverbal cues (demo/gesture);moderate assist (50% patient effort);2 person assist  -SW     Bed Mobility, Safety Issues cognitive deficits limit understanding;decreased use of arms for pushing/pulling;decreased use of legs for bridging/pushing;impaired trunk control for bed mobility;unable to safely maintain weight bearing restrictions  -     Assistive Device (Bed Mobility) bed rails;draw sheet;head of bed elevated  -       Row Name 10/31/23 1115          Transfers    Transfers sit-stand transfer  -       Row Name 10/31/23 1115          Sit-Stand Transfer    Sit-Stand [place] (Transfers) verbal cues;nonverbal cues (demo/gesture);moderate assist (50% patient effort);2 person assist  -       Row Name 10/31/23 1115          Activities of Daily Living     BADL Assessment/Intervention lower body dressing;grooming  -       Row Name 10/31/23 1115          Lower Body Dressing Assessment/Training    [place] Level (Lower Body Dressing) don;socks;pants/bottoms;maximum assist (25% patient effort);verbal cues  -     Position (Lower Body Dressing) supine  -       Row Name 10/31/23 1115          Grooming Assessment/Training    [place] Level (Grooming) grooming skills;wash face, hands;verbal cues;minimum assist (75% patient effort)  -     Position (Grooming) supine  -               User Key  (r) = Recorded By, (t) = Taken By, (c) = Cosigned By      Initials Name Provider Type    Cara De Santiago OT Occupational Therapist                   Obj/Interventions       Row Name 10/31/23 1115          Vision Assessment/Intervention    Vision Assessment Comment Visual impairments noted - can see directly in front of him although it may be double vision.  -       Row Name 10/31/23 1115          Balance    Balance Assessment sitting static balance;sitting dynamic balance;sit to stand dynamic balance;standing static balance;standing dynamic balance  -     Static Sitting Balance minimal assist;verbal cues;non-verbal cues (demo/gesture)  -     Dynamic Sitting Balance non-verbal cues (demo/gesture);verbal cues;moderate assist  -     Position, Sitting Balance supported  -     Sit to Stand Dynamic Balance verbal cues;non-verbal cues (demo/gesture);moderate assist;2-person assist  -     Static Standing Balance moderate assist;non-verbal cues (demo/gesture);verbal cues;2-person assist  -     Dynamic Standing Balance maximum assist;2-person assist;verbal cues;non-verbal cues (demo/gesture)  -     Position/Device Used, Standing Balance supported  -     Balance Interventions sitting;standing;sit to stand;supported;static;dynamic;minimal challenge  -               User Key  (r) = Recorded By, (t) = Taken By, (c) = Cosigned By      Initials Name Provider Type     Cara De Santiago OT Occupational Therapist                   Goals/Plan       Row Name 10/31/23 1350          Bed Mobility Goal 1 (OT)    Activity/Assistive Device (Bed Mobility Goal 1, OT) sit to supine;supine to sit;rolling to left;rolling to right  -     [place] Level/Cues Needed (Bed Mobility Goal 1, OT) minimum assist (75% or more patient effort)  -     Time Frame (Bed Mobility Goal 1, OT) long term goal (LTG);by discharge  -     Progress/Outcomes (Bed Mobility Goal 1, OT) progress slower than expected;goal revised this date;goal ongoing  -       Row Name 10/31/23 1350          Transfer Goal 1 (OT)    Activity/Assistive Device (Transfer Goal 1, OT) sit-to-stand/stand-to-sit;bed-to-chair/chair-to-bed  -SW     [place] Level/Cues Needed (Transfer Goal 1, OT) moderate assist (50-74% patient effort);verbal cues required  -     Time Frame (Transfer Goal 1, OT) long term goal (LTG);by discharge  -SW     Progress/Outcome (Transfer Goal 1, OT) progress slower than expected;goal ongoing  -       Row Name 10/31/23 1357          Grooming Goal 1 (OT)    Activity/Device (Grooming Goal 1, OT) hair care;oral care;wash face, hands;other (see comments)  -     [place] (Grooming Goal 1, OT) verbal cues required;minimum assist (75% or more patient effort)  -     Time Frame (Grooming Goal 1, OT) long term goal (LTG);by discharge  -     Progress/Outcome (Grooming Goal 1, OT) goal ongoing;goal revised this date;continuing progress toward goal  -               User Key  (r) = Recorded By, (t) = Taken By, (c) = Cosigned By      Initials Name Provider Type    Cara De Santiago OT Occupational Therapist                   Clinical Impression       Row Name 10/31/23 2007          Pain Scale: FACES Pre/Post-Treatment    Pain: FACES Scale, Pretreatment 0-->no hurt  -SW     Posttreatment Pain Rating 0-->no hurt  -SW     Pre/Posttreatment Pain Comment Pt had pain to R shoulder with movement and was upset with  movement of RUE.  -       Row Name 10/31/23 1115          Plan of Care Review    Plan of Care Reviewed With patient;spouse  -     Progress no change  -     Outcome Evaluation OT cotx with PT d/t pt impulsivity and at risk for fall/injury d/t inability to follow directions consistently and sporadic movements. Pt presents with improved communication skills and continues to be mod to max assist of 2 for mobility and transitional movements. He was min assist for washing hands/face and dep for donning socks.  Continue recommend snf.  -       Row Name 10/31/23 1115          Therapy Plan Review/Discharge Plan (OT)    Anticipated Discharge Disposition (OT) skilled nursing facility  -       Row Name 10/31/23 1115          Vital Signs    O2 Delivery Pre Treatment room air  -SW     O2 Delivery Intra Treatment room air  -SW     O2 Delivery Post Treatment room air  -SW     Pre Patient Position Supine  -SW     Intra Patient Position Standing  -SW     Post Patient Position Supine  -       Row Name 10/31/23 1115          Positioning and Restraints    Pre-Treatment Position in bed  -SW     Post Treatment Position bed  -SW     In Bed notified nsg;supine;call light within reach;encouraged to call for assist;exit alarm on;side rails up x3;with family/caregiver  -     Restraints released:;reapplied:;soft limb;notified nsg:  -               User Key  (r) = Recorded By, (t) = Taken By, (c) = Cosigned By      Initials Name Provider Type    Cara De Santiago, OT Occupational Therapist                   Outcome Measures       Row Name 10/31/23 2551          How much help from another is currently needed...    Putting on and taking off regular lower body clothing? 2  -SW     Bathing (including washing, rinsing, and drying) 2  -SW     Toileting (which includes using toilet bed pan or urinal) 1  -SW     Putting on and taking off regular upper body clothing 2  -SW     Taking care of personal grooming (such as brushing teeth) 3  -SW      Eating meals 1  -SW     AM-PAC 6 Clicks Score (OT) 11  -SW       Row Name 10/31/23 1316 10/31/23 0800       How much help from another person do you currently need...    Turning from your back to your side while in flat bed without using bedrails? 2  -ML 3  -MS    Moving from lying on back to sitting on the side of a flat bed without bedrails? 2  -ML 2  -MS    Moving to and from a bed to a chair (including a wheelchair)? 2  -ML 2  -MS    Standing up from a chair using your arms (e.g., wheelchair, bedside chair)? 2  -ML 2  -MS    Climbing 3-5 steps with a railing? 1  -ML 2  -MS    To walk in hospital room? 1  -ML 2  -MS    AM-PAC 6 Clicks Score (PT) 10  -ML 13  -MS    Highest level of mobility 4 --> Transferred to chair/commode  -ML 4 --> Transferred to chair/commode  -MS      Row Name 10/31/23 1351 10/31/23 1316       Functional Assessment    Outcome Measure Options AM-PAC 6 Clicks Daily Activity (OT)  - AM-PAC 6 Clicks Basic Mobility (PT)  -ML              User Key  (r) = Recorded By, (t) = Taken By, (c) = Cosigned By      Initials Name Provider Type    Jenny Ventura RN Registered Nurse    Cara De Santiago, OT Occupational Therapist    Laura Betancur Physical Therapist                    Occupational Therapy Education       Title: PT OT SLP Therapies (In Progress)       Topic: Occupational Therapy (In Progress)       Point: ADL training (In Progress)       Description:   Instruct learner(s) on proper safety adaptation and remediation techniques during self care or transfers.   Instruct in proper use of assistive devices.                  Learning Progress Summary             Patient Acceptance, E, NR,NL by BARBARA at 10/31/2023 1352    Acceptance, E, NR by JAJA at 10/26/2023 0256    Acceptance, E, NR by BLAINE at 10/25/2023 1051    Acceptance, E, NR by BLAINE at 10/23/2023 1335    Acceptance, E, NR by JR at 10/19/2023 0939    Acceptance, E,D, NR by ORAL at 10/18/2023 0830    Acceptance, E, NR by MR at 10/12/2023 8538     Acceptance, E, NR by JG at 10/9/2023 1533    Acceptance, E, NR by MR at 10/6/2023 1549    Acceptance, E,D, NR,NL by CS at 10/4/2023 0948    Acceptance, E, NR by MR at 9/28/2023 1556    Acceptance, E, NR by JR at 9/25/2023 1045    Acceptance, E, NR by CS1 at 9/20/2023 1420    Acceptance, E, NR by MC at 9/18/2023 1532    Acceptance, E, NR by MC at 9/15/2023 1531   Family Acceptance, E, NR,NL by  at 10/31/2023 1352    Acceptance, E, NR by JAJA at 10/26/2023 0256    Acceptance, E,D, NR by MONALISAY at 10/18/2023 0830    Acceptance, E, NR by MR at 10/12/2023 1508    Acceptance, E, NR by MONALISAG at 10/9/2023 1533    Acceptance, E, NR by MR at 10/6/2023 1549    Acceptance, E, NR by MR at 9/28/2023 1556                         Point: Home exercise program (In Progress)       Description:   Instruct learner(s) on appropriate technique for monitoring, assisting and/or progressing therapeutic exercises/activities.                  Learning Progress Summary             Patient Acceptance, E, NR by JAJA at 10/26/2023 0256    Acceptance, E, NR by BLAINE at 10/25/2023 1051    Acceptance, E, NR by  at 10/19/2023 0939    Acceptance, E,D, NR by MONALISAY at 10/18/2023 0830    Acceptance, E, NR by MR at 10/12/2023 1508    Acceptance, E, NR by MR at 10/6/2023 1549    Acceptance, E,D, NR,NL by  at 10/4/2023 0948    Acceptance, E, NR by MR at 9/28/2023 1556    Acceptance, E, NR by  at 9/25/2023 1045    Acceptance, E, NR by ERIN at 9/20/2023 1420    Acceptance, E, NR by MC at 9/18/2023 1532    Acceptance, E, NR by MC at 9/15/2023 1531   Family Acceptance, E, NR by KP at 10/26/2023 0256    Acceptance, E,D, NR by ORAL at 10/18/2023 0830    Acceptance, E, NR by MR at 10/12/2023 1508    Acceptance, E, NR by MR at 10/6/2023 1549    Acceptance, E, NR by MR at 9/28/2023 1556                         Point: Precautions (In Progress)       Description:   Instruct learner(s) on prescribed precautions during self-care and functional transfers.                  Learning  Progress Summary             Patient Acceptance, E, NR,NL by SW at 10/31/2023 1352    Acceptance, E, NR by KP at 10/26/2023 0256    Acceptance, E, NR by MC at 10/25/2023 1051    Acceptance, E, NR by MC at 10/23/2023 1335    Acceptance, E,D, NR by JY at 10/18/2023 0830    Acceptance, E, NR by MR at 10/12/2023 1508    Acceptance, E, NR by JG at 10/9/2023 1533    Acceptance, E, NR by MR at 10/6/2023 1549    Acceptance, E,D, NR,NL by CS at 10/4/2023 0948    Acceptance, E, NR by MR at 9/28/2023 1556    Acceptance, E, NR by CS1 at 9/20/2023 1420    Acceptance, E, NR by MC at 9/18/2023 1532    Acceptance, E, NR by MC at 9/15/2023 1531   Family Acceptance, E, NR,NL by  at 10/31/2023 1352    Acceptance, E, NR by KP at 10/26/2023 0256    Acceptance, E,D, NR by JY at 10/18/2023 0830    Acceptance, E, NR by MR at 10/12/2023 1508    Acceptance, E, NR by JG at 10/9/2023 1533    Acceptance, E, NR by MR at 10/6/2023 1549    Acceptance, E, NR by MR at 9/28/2023 1556                         Point: Body mechanics (In Progress)       Description:   Instruct learner(s) on proper positioning and spine alignment during self-care, functional mobility activities and/or exercises.                  Learning Progress Summary             Patient Acceptance, E, NR by KP at 10/26/2023 0256    Acceptance, E, NR by MC at 10/25/2023 1051    Acceptance, E, NR by MC at 10/23/2023 1335    Acceptance, E,D, NR by JY at 10/18/2023 0830    Acceptance, E, NR by MR at 10/12/2023 1508    Acceptance, E, NR by JG at 10/9/2023 1533    Acceptance, E, NR by MR at 10/6/2023 1549    Acceptance, E,D, NR,NL by CS at 10/4/2023 0948    Acceptance, E, NR by MR at 9/28/2023 1556    Acceptance, E, NR by CS1 at 9/20/2023 1420    Acceptance, E, NR by MC at 9/18/2023 1532    Acceptance, E, NR by MC at 9/15/2023 1531   Family Acceptance, E, NR by KP at 10/26/2023 0256    Acceptance, E,D, NR by ORAL at 10/18/2023 0830    Acceptance, E, NR by MR at 10/12/2023 1508    Acceptance,  E, NR by JG at 10/9/2023 1533    Acceptance, E, NR by MR at 10/6/2023 1549    Acceptance, E, NR by MR at 9/28/2023 1556                                         User Key       Initials Effective Dates Name Provider Type Discipline    JR 02/03/23 -  Jess Carrillo, OT Occupational Therapist OT     06/16/21 -  Ivis Lau, RN Registered Nurse Nurse    CS 09/02/21 -  Carmen Carr, OT Occupational Therapist OT    JY 06/16/21 -  Ofelia Armstrong, OT Occupational Therapist OT     06/16/21 -  Cara Ledesma, OT Occupational Therapist OT     06/16/21 -  Jim Judge, OT Occupational Therapist OT     10/14/22 -  Jenny Rivera, OT Occupational Therapist OT    MR 09/22/22 -  Meera Doyle, OT Occupational Therapist OT    JG 08/30/23 -  Power Herman OT Student OT Student OT                  OT Recommendation and Plan     Plan of Care Review  Plan of Care Reviewed With: patient, spouse  Progress: no change  Outcome Evaluation: OT cotx with PT d/t pt impulsivity and at risk for fall/injury d/t inability to follow directions consistently and sporadic movements. Pt presents with improved communication skills and continues to be mod to max assist of 2 for mobility and transitional movements. He was min assist for washing hands/face and dep for donning socks.  Continue recommend snf.     Time Calculation:         Time Calculation- OT       Row Name 10/31/23 1115             Time Calculation- OT    OT Start Time 1115  -SW      OT Received On 10/31/23  -SW      OT Goal Re-Cert Due Date 11/10/23  -SW         Timed Charges    24714 - OT Self Care/Mgmt Minutes 15  -SW         Total Minutes    Timed Charges Total Minutes 15  -SW       Total Minutes 15  -SW                User Key  (r) = Recorded By, (t) = Taken By, (c) = Cosigned By      Initials Name Provider Type     Cara Ledesma, OT Occupational Therapist                  Therapy Charges for Today       Code Description Service Date Service Provider Modifiers  Qty    [id number] HC OT SELF CARE/MGMT/TRAIN EA 15 MIN 10/31/2023 Cara Ledesma OT GO 1                 Cara Ledesma OT  10/31/2023

## 2023-10-31 NOTE — NURSING NOTE
Patient had an expiring order for restraints at 8AM. Patient is still requiring restraints at this time. Dr. Maldonado was paged for renewal. Telephone order was given at 0949. Order was actually put in by RN at 1236 but timed the order for 0949 when the telephone order was actually given.

## 2023-10-31 NOTE — PROGRESS NOTES
[facility] Medicine Services  PROGRESS NOTE    Patient Name: Kenneth Wen  : 1950  MRN: [id number]    Date of Admission: 9/15/2023  Primary Care Physician: Susan Powers APRN    Subjective   Subjective     CC: f/u CVA    HPI:  Remains restless and therefore in restraints.  He could not tell me what the restraints were for but expressed understanding when I explained they are due to his tendency to throw his legs out of the bed and potentially fall.  I explained to him and his wife that restless movements are fine in the bed but climbing out is not - he said he understood.      The UA from yesterday was clear.  His wife notes no other complaints.      Last BM recorded 10/29       Objective   Objective     Vital Signs:   Temp:  [98.1 °F (36.7 °C)-98.4 °F (36.9 °C)] 98.1 °F (36.7 °C)  Heart Rate:  [85-94] 87  Resp:  [18] 18  BP: (115-143)/(75-89) 129/89     Physical Exam:  Constitutional: tossing nd turning, but answers me appropriately.  Wife prsesent   HENT: NCAT, mucous membranes moist  Respiratory: Clear to auscultation bilaterally, respiratory effort normal   Cardiovascular: RRR,  Gastrointestinal: Positive bowel sounds, soft, nontender, nondistended  Musculoskeletal: No bilateral ankle edema  Psychiatric: restless movements x 4 extrems  Neurologic: Awake, alert, dysarthric   Skin: No rashes           Results Reviewed:  LAB RESULTS:                                      Brief Urine Lab Results  (Last result in the past 365 days)        Color   Clarity   Blood   Leuk Est   Nitrite   Protein   CREAT   Urine HCG        10/30/23 1636 Yellow   Clear   Negative   Negative   Negative   Negative                   Microbiology Results Abnormal       Procedure Component Value - Date/Time    Blood Culture - Blood, Arm, Right [[id number]]  (Normal) Collected: 09/15/23 0212    Lab Status: Final result Specimen: Blood from Arm, Right Updated: 23 023     Blood Culture No growth at 5  days    Blood Culture - Blood, Arm, Left [[id number]]  (Normal) Collected: 09/15/23 0212    Lab Status: Final result Specimen: Blood from Arm, Left Updated: 09/20/23 0230     Blood Culture No growth at 5 days            No radiology results from the last 24 hrs        Current medications:  Scheduled Meds:atorvastatin, 80 mg, Per PEG Tube, Nightly  clopidogrel, 75 mg, Per PEG Tube, Daily  Diclofenac Sodium, 2 g, Topical, BID  donepezil, 10 mg, Per PEG Tube, Nightly  fluticasone, 2 spray, Each Nare, Daily  HYDROcodone-acetaminophen, 1 tablet, Per PEG Tube, Q12H  lansoprazole, 15 mg, Per PEG Tube, Q AM  Lidocaine, 1 patch, Transdermal, Q24H  melatonin, 10 mg, Per PEG Tube, Nightly  metoprolol tartrate, 25 mg, Per PEG Tube, Q12H  miconazole, 1 application , Topical, Q12H  mirtazapine, 30 mg, Per PEG Tube, Nightly  palliative care oral rinse, , Mouth/Throat, 4x Daily  PHENobarbital, 64.8 mg, Per PEG Tube, Q8H  ProSource No Carb, 30 mL, Per G Tube, Daily  QUEtiapine, 200 mg, Per PEG Tube, Q12H  temazepam, 15 mg, Per PEG Tube, Nightly      Continuous Infusions:   PRN Meds:.  Calcium Replacement - Follow Nurse / BPA Driven Protocol    dextrose    glucagon (human recombinant)    ibuprofen    Magnesium Standard Dose Replacement - Follow Nurse / BPA Driven Protocol    ondansetron    PHENobarbital    Phosphorus Replacement - Follow Nurse / BPA Driven Protocol    Potassium Replacement - Follow Nurse / BPA Driven Protocol    ziprasidone    Assessment & Plan   Assessment & Plan     Active Hospital Problems    Diagnosis  POA    **Hemorrhagic CVA [I61.9]  Yes    Oropharyngeal dysphagia [R13.12]  Yes    Multiple bilateral CVAs [I63.9]  Yes    HFpEF [I50.30]  Yes    T2DM (type 2 diabetes mellitus) [E11.9]  Yes    HTN (hypertension) [I10]  Yes    GERD (gastroesophageal reflux disease) [K21.9]  Yes    ICH (intracerebral hemorrhage) [I61.9]  Yes    Dyslipidemia [E78.5]  Yes      Resolved Hospital Problems   No resolved problems to  display.        Brief Hospital Course to date:  Kenneth Wen is a 73 y.o. male  with hx of HTN, HLD, T2DM, and GERD who presented to OSH with multiple acute ischemic infarcts of the bilateral cerebral and cerebellar hemispheres as well as left isaac. TTE/JOSIAS was negative PFO at OSH. On 9/13/23 he had worsening in mental status and new inability to move RLE, head CT showed evolution of the existing CVA with new development of petechial hemorrhage. DAPT was held for 24 hours per Neurology recommendations and repeat CT head that evening showed worsening effacement of the right quadrigeminal cistern with suspected increasing upward supratentorial pressure. He was then transferred to [facility] for Neurosurgery evaluation on 9/15/23. He was started on hypertonic saline 9/15/23 through 9/20/23 for cerebral edema. He had fevers with negative cultures but had worsening secretions and labored breathing so was started on Zosyn on 9/16/23. Transferred to the hospitalist service on 9/22/23. Pt demonstrated poor oral intake with elevated sodium levels; therefore, PEG tube was recommended & placed.         Goals of care  --Palliative and Hospice follow     Multiple bilateral posterior circulation strokes with hemorrhagic conversion  -- suspect atheroembolic but cannot rule out cardioembolic given multiple vascular territories  --Plavix monotherapy, high intensity statin  --Needs Holter monitor at discharge  --Neurology follows     Agitation  Encephalopathy  --Donepezil 10 mg nightly, melatonin 10 mg nightly, mirtazapine 30 mg nightly, Restoril 15 mg nightly  - iSeroquel 200 mg BID  - increased phenobarbitol tSunday   --Continue as needed Geodon  -- remains restless   - check CBC nd CMP tomorrow      Dysphagia  Hypernatremia - resolved  - PEG tube placed 9/29, Dr Carrillo   --SLP recommends mechanical ground with honey thick liquids   --Risk of pulling out peg tube, abd binder to be in place at all times.   -- dietary following ,  adequate PO intake is limited by his mental status     HTN  --Continue Metoprolol 25 mg BID     GERD  --Continue PPI     COVID-19-resolved  --Overall asymptomatic and on room air  --No infiltrates seen and low procal  --Did not receive any treatment   --Off Isolation 9/30/2023      Leukocytosis-resolved  --Procalcitionin low at 0.05  --CXR and UA negative  --Blood cultures negative     Elevated LFT's, mild  --Possibly secondary to statin?  --Negative acute hepatitis panel  --repeat AST/ALT improved  Expected Discharge Location and Transportation:   looking into SNF in [facility]   Expected Discharge   Expected Discharge Date: 11/1/2023; Expected Discharge Time:      DVT prophylaxis:  Mechanical DVT prophylaxis orders are present.     AM-PAC 6 Clicks Score (PT): 17 (10/30/23 2000)    CODE STATUS:   Code Status and Medical Interventions:   Ordered at: 09/29/23 0854     Medical Intervention Limits:    NO intubation (DNI)     Code Status (Patient has no pulse and is not breathing):    No CPR (Do Not Attempt to Resuscitate)     Medical Interventions (Patient has pulse or is breathing):    Limited Support       Olga Maldonado MD  10/31/23

## 2023-10-31 NOTE — PLAN OF CARE
Goal Outcome Evaluation:  Plan of Care Reviewed With: spouse        Progress: no change  Outcome Evaluation: Pt observed with some restless movement at time of Pallliative RN encounter; per documentation, Neuro adjusting phenobarb for management of agitation. Spouse at bedside, support provided. Palliative following for continued support, ongoing GOC/POC.    1300 Palliative IDT meeting:  ROLANDO CALIX, RN,   After hours, weekends and holidays, contact Palliative Provider by calling [phone]     Problem: Palliative Care  Goal: Enhanced Quality of Life  Outcome: Ongoing, Progressing  Intervention: Maximize Comfort  Flowsheets (Taken 10/31/2023 1525)  Pain Management Interventions: (medication changes by Neuro, reviewed with pt's spouse) other (see comments)  Intervention: Optimize Function  Flowsheets (Taken 10/31/2023 1526)  Sensory Stimulation Regulation: quiet environment promoted  Sleep/Rest Enhancement: family presence promoted  Intervention: Optimize Psychosocial Wellbeing  Flowsheets (Taken 10/31/2023 1526)  Family/Support System Care:   caregiver stress acknowledged   involvement promoted   presence promoted   self-care encouraged   support provided

## 2023-10-31 NOTE — CASE MANAGEMENT/SOCIAL WORK
Continued Stay Note  [facility]     Patient Name: Kenneth Wen  MRN: [id number]  Today's Date: 10/31/2023    Admit Date: 9/15/2023    Plan: ongoing   Discharge Plan       Row Name 10/31/23 1310       Plan    Plan ongoing    Plan Comments Pt remains in restraints today.  Pt's wife is at beside.  SW will continue to follow for discharge planning needs; hopefully SNF to LTC.    Final Discharge Disposition Code 03 - skilled nursing facility (SNF)                   Discharge Codes    No documentation.                 Expected Discharge Date and Time       Expected Discharge Date Expected Discharge Time    Nov 6, 2023               JOEL Wiggins

## 2023-10-31 NOTE — PROGRESS NOTES
"Neurology       Patient Care Team:  Susan Powers APRN as PCP - General (Family Medicine)    Chief complaint: Restless    History: Patient apparently became quite agitated yesterday got next to 64 mg of phenobarbital.    He is currently in restraints he has not had any as needed's today.      Past Medical History:   Diagnosis Date    Acid reflux     Cancer     Skin    Diabetes mellitus     Prediabetes    GERD (gastroesophageal reflux disease) 09/15/2023    HTN (hypertension) 09/15/2023    Kidney disease     T2DM (type 2 diabetes mellitus) 09/15/2023       Vital Signs   Vitals:    10/30/23 1051 10/30/23 1928 10/30/23 2307 10/31/23 1002   BP: 158/99 115/75 143/88 129/89   BP Location: Right leg Right arm  Right arm   Patient Position: Lying Lying  Lying   Pulse: 72 94 85 87   Resp: 18 18  18   Temp: 98.2 °F (36.8 °C) 98.4 °F (36.9 °C)  98.1 °F (36.7 °C)   TempSrc: Temporal Temporal     SpO2: 94% 94%  93%   Weight:       Height:           Physical Exam:   General: Restless              Neuro: Mumbling incoherently    Results Review:  Phenobarb level is 12.7            Invalid input(s): \"LABALBU\", \"PROT\"    Imaging Results (Last 24 Hours)       ** No results found for the last 24 hours. **            Assessment:  Protracted delirium.    Multiple stroke syndrome    Plan: Increase scheduled phenobarbital to 80 mg  Phenobarb level in the morning    Comment:  Patient appears to be a fairly rapid metabolizer         I discussed the patients findings and my recommendations with patient, family, and nursing staff    Toni Rausch MD  10/31/23  13:10 EDT        "

## 2023-10-31 NOTE — PLAN OF CARE
Goal Outcome Evaluation:  Plan of Care Reviewed With: patient, spouse        Progress: no change  Outcome Evaluation: Physical therapy re-certification complete. The patient able to follow commands to participate in PT treatment. Patient demonstrates RUE/RLE strength deficits, impaired cognition, balance and activity tolerance deficits affecting functional mobility. The patient would continue to benefit from skilled PT to address mobility deficits. Continue current PT POC.      Anticipated Discharge Disposition (PT): skilled nursing facility

## 2023-10-31 NOTE — PLAN OF CARE
Goal Outcome Evaluation:  Plan of Care Reviewed With: patient, spouse        Progress: no change  Outcome Evaluation: OT cotx with PT d/t pt impulsivity and at risk for fall/injury d/t inability to follow directions consistently and sporadic movements. Pt presents with improved communication skills and continues to be mod to max assist of 2 for mobility and transitional movements. He was min assist for washing hands/face and dep for donning socks.  Continue recommend snf.      Anticipated Discharge Disposition (OT): skilled nursing facility

## 2023-11-01 LAB
ALBUMIN SERPL-MCNC: 3.7 G/DL (ref 3.5–5.2)
ALBUMIN/GLOB SERPL: 2.5 G/DL
ALP SERPL-CCNC: 84 U/L (ref 39–117)
ALT SERPL W P-5'-P-CCNC: 42 U/L (ref 1–41)
ANION GAP SERPL CALCULATED.3IONS-SCNC: 10 MMOL/L (ref 5–15)
AST SERPL-CCNC: 27 U/L (ref 1–40)
BILIRUB SERPL-MCNC: 0.3 MG/DL (ref 0–1.2)
BUN SERPL-MCNC: 22 MG/DL (ref 8–23)
BUN/CREAT SERPL: 31.4 (ref 7–25)
CALCIUM SPEC-SCNC: 9.1 MG/DL (ref 8.6–10.5)
CHLORIDE SERPL-SCNC: 101 MMOL/L (ref 98–107)
CO2 SERPL-SCNC: 27 MMOL/L (ref 22–29)
CREAT SERPL-MCNC: 0.7 MG/DL (ref 0.76–1.27)
DEPRECATED RDW RBC AUTO: 49 FL (ref 37–54)
EGFRCR SERPLBLD CKD-EPI 2021: 97.9 ML/MIN/1.73
ERYTHROCYTE [DISTWIDTH] IN BLOOD BY AUTOMATED COUNT: 13.9 % (ref 12.3–15.4)
GLOBULIN UR ELPH-MCNC: 1.5 GM/DL
GLUCOSE BLDC GLUCOMTR-MCNC: 141 MG/DL (ref 70–130)
GLUCOSE BLDC GLUCOMTR-MCNC: 96 MG/DL (ref 70–130)
GLUCOSE SERPL-MCNC: 189 MG/DL (ref 65–99)
HCT VFR BLD AUTO: 36.8 % (ref 37.5–51)
HGB BLD-MCNC: 12.1 G/DL (ref 13–17.7)
MCH RBC QN AUTO: 31.8 PG (ref 26.6–33)
MCHC RBC AUTO-ENTMCNC: 32.9 G/DL (ref 31.5–35.7)
MCV RBC AUTO: 96.6 FL (ref 79–97)
PHENOBARB SERPL-MCNC: 15 MCG/ML (ref 10–30)
PLATELET # BLD AUTO: 313 10*3/MM3 (ref 140–450)
PMV BLD AUTO: 10.9 FL (ref 6–12)
POTASSIUM SERPL-SCNC: 4.3 MMOL/L (ref 3.5–5.2)
PROT SERPL-MCNC: 5.2 G/DL (ref 6–8.5)
RBC # BLD AUTO: 3.81 10*6/MM3 (ref 4.14–5.8)
SODIUM SERPL-SCNC: 138 MMOL/L (ref 136–145)
WBC NRBC COR # BLD: 6.45 10*3/MM3 (ref 3.4–10.8)

## 2023-11-01 PROCEDURE — 82948 REAGENT STRIP/BLOOD GLUCOSE: CPT

## 2023-11-01 PROCEDURE — 92507 TX SP LANG VOICE COMM INDIV: CPT

## 2023-11-01 PROCEDURE — 92526 ORAL FUNCTION THERAPY: CPT

## 2023-11-01 PROCEDURE — 80053 COMPREHEN METABOLIC PANEL: CPT | Performed by: INTERNAL MEDICINE

## 2023-11-01 PROCEDURE — 80184 ASSAY OF PHENOBARBITAL: CPT | Performed by: PSYCHIATRY & NEUROLOGY

## 2023-11-01 PROCEDURE — 85027 COMPLETE CBC AUTOMATED: CPT | Performed by: INTERNAL MEDICINE

## 2023-11-01 PROCEDURE — 99231 SBSQ HOSP IP/OBS SF/LOW 25: CPT | Performed by: PSYCHIATRY & NEUROLOGY

## 2023-11-01 PROCEDURE — 99232 SBSQ HOSP IP/OBS MODERATE 35: CPT | Performed by: FAMILY MEDICINE

## 2023-11-01 RX ADMIN — MICONAZOLE NITRATE 1 APPLICATION: 20 CREAM TOPICAL at 21:04

## 2023-11-01 RX ADMIN — MINERAL OIL: 1000 LIQUID ORAL at 21:04

## 2023-11-01 RX ADMIN — PHENOBARBITAL 81 MG: 32.4 TABLET ORAL at 05:58

## 2023-11-01 RX ADMIN — MINERAL OIL: 1000 LIQUID ORAL at 11:55

## 2023-11-01 RX ADMIN — LIDOCAINE 1 PATCH: 4 PATCH TOPICAL at 08:39

## 2023-11-01 RX ADMIN — HYDROCODONE BITARTRATE AND ACETAMINOPHEN 1 TABLET: 5; 325 TABLET ORAL at 01:20

## 2023-11-01 RX ADMIN — MICONAZOLE NITRATE 1 APPLICATION: 20 CREAM TOPICAL at 08:41

## 2023-11-01 RX ADMIN — MINERAL OIL: 1000 LIQUID ORAL at 07:55

## 2023-11-01 RX ADMIN — Medication 30 ML: at 08:41

## 2023-11-01 RX ADMIN — QUETIAPINE FUMARATE 200 MG: 100 TABLET ORAL at 21:03

## 2023-11-01 RX ADMIN — MIRTAZAPINE 30 MG: 15 TABLET, FILM COATED ORAL at 18:00

## 2023-11-01 RX ADMIN — Medication 10 MG: at 18:00

## 2023-11-01 RX ADMIN — LANSOPRAZOLE 15 MG: 15 TABLET, ORALLY DISINTEGRATING ORAL at 05:58

## 2023-11-01 RX ADMIN — TEMAZEPAM 15 MG: 15 CAPSULE ORAL at 18:00

## 2023-11-01 RX ADMIN — DICLOFENAC SODIUM 2 G: 9.3 GEL TOPICAL at 21:04

## 2023-11-01 RX ADMIN — DICLOFENAC SODIUM 2 G: 9.3 GEL TOPICAL at 08:40

## 2023-11-01 RX ADMIN — CLOPIDOGREL BISULFATE 75 MG: 75 TABLET ORAL at 08:39

## 2023-11-01 RX ADMIN — METOPROLOL TARTRATE 25 MG: 25 TABLET, FILM COATED ORAL at 08:39

## 2023-11-01 RX ADMIN — ATORVASTATIN CALCIUM 80 MG: 40 TABLET, FILM COATED ORAL at 21:03

## 2023-11-01 RX ADMIN — DONEPEZIL HYDROCHLORIDE 10 MG: 10 TABLET, FILM COATED ORAL at 18:00

## 2023-11-01 RX ADMIN — FLUTICASONE PROPIONATE 2 SPRAY: 50 SPRAY, METERED NASAL at 08:40

## 2023-11-01 RX ADMIN — MINERAL OIL: 1000 LIQUID ORAL at 17:14

## 2023-11-01 RX ADMIN — PHENOBARBITAL 81 MG: 32.4 TABLET ORAL at 13:59

## 2023-11-01 RX ADMIN — METOPROLOL TARTRATE 25 MG: 25 TABLET, FILM COATED ORAL at 21:03

## 2023-11-01 RX ADMIN — PHENOBARBITAL 81 MG: 32.4 TABLET ORAL at 21:03

## 2023-11-01 RX ADMIN — QUETIAPINE FUMARATE 200 MG: 100 TABLET ORAL at 08:39

## 2023-11-01 RX ADMIN — HYDROCODONE BITARTRATE AND ACETAMINOPHEN 1 TABLET: 5; 325 TABLET ORAL at 11:55

## 2023-11-01 NOTE — PROGRESS NOTES
Neurology       Patient Care Team:  Susan Powers APRN as PCP - General (Family Medicine)    Chief complaint: Altered mental state    History: Patient is currently sleeping.    He was awake earlier and reasonably calm.  Gets mildly restless but falls back to sleep and seems to do well with that.    He had no as needed Geodon or as needed phenobarbital since increasing to 80 mg every 8 hours.  Past Medical History:   Diagnosis Date    Acid reflux     Cancer     Skin    Diabetes mellitus     Prediabetes    GERD (gastroesophageal reflux disease) 09/15/2023    HTN (hypertension) 09/15/2023    Kidney disease     T2DM (type 2 diabetes mellitus) 09/15/2023       Vital Signs   Vitals:    10/30/23 2307 10/31/23 1002 10/31/23 1900 11/01/23 0753   BP: 143/88 129/89 125/85 115/74   BP Location:  Right arm Right arm Left arm   Patient Position:  Lying Lying Lying   Pulse: 85 87 94 89   Resp:  18 16 20   Temp:  98.1 °F (36.7 °C) 99.5 °F (37.5 °C) 99 °F (37.2 °C)   TempSrc:   Temporal Temporal   SpO2:  93% 92% 95%   Weight:       Height:           Physical Exam:   General: Sleeping              Neuro: Not awake    Results Review:  Phenobarbital level this morning pending  Results from last 7 days   Lab Units 11/01/23  0715   WBC 10*3/mm3 6.45   HEMOGLOBIN g/dL 12.1*   HEMATOCRIT % 36.8*   PLATELETS 10*3/mm3 313     Results from last 7 days   Lab Units 11/01/23  0715   SODIUM mmol/L 138   POTASSIUM mmol/L 4.3   CHLORIDE mmol/L 101   CO2 mmol/L 27.0   BUN mg/dL 22   CREATININE mg/dL 0.70*   CALCIUM mg/dL 9.1   BILIRUBIN mg/dL 0.3   ALK PHOS U/L 84   ALT (SGPT) U/L 42*   AST (SGOT) U/L 27   GLUCOSE mg/dL 189*       Imaging Results (Last 24 Hours)       ** No results found for the last 24 hours. **            Assessment:  Altered mental status improved with increase phenobarbital    Plan:  Hopefully we can keep him calm enough to avoid restraints and Geodon in the next 72 hours.    Comment:  Discussed with the wife the likelihood  that he will need to be sleeping most of the time since he wakes, does reasonably well for while, and then becomes agitated.    He may end up sleeping 75% of the day which wife says is acceptable.         I discussed the patients findings and my recommendations with family and primary care team    Toni Rausch MD  11/01/23  12:01 EDT

## 2023-11-01 NOTE — PLAN OF CARE
Goal Outcome Evaluation:  Plan of Care Reviewed With: patient, spouse        Progress: improving     SLP treatment completed. Will continue to address swallowing, and cog-comm. Please see note for further details and recommendations.

## 2023-11-01 NOTE — PLAN OF CARE
Problem: Palliative Care  Goal: Enhanced Quality of Life  Intervention: Optimize Psychosocial Wellbeing  Flowsheets (Taken 11/1/2023 1711)  Supportive Measures:   active listening utilized   verbalization of feelings encouraged  Family/Support System Care:   caregiver stress acknowledged   self-care encouraged   support provided   involvement promoted   presence promoted   Goal Outcome Evaluation:  Plan of Care Reviewed With: spouse        Progress: no change  Outcome Evaluation: Pt is resting with eyes closed tapping his foot.  Pt's wife states he is used to being busy all of his life and was hard for him to sit still.  Phenobarb q 8 and prn for agitation.  Support to wife.  Pt is out of restraints at present.  Palliative IDT: Rn, sW, APRN, MD,   After hours#[phone]

## 2023-11-01 NOTE — CASE MANAGEMENT/SOCIAL WORK
Continued Stay Note  [facility]     Patient Name: Kenneth Wen  MRN: [id number]  Today's Date: 11/1/2023    Admit Date: 9/15/2023    Plan: Ongoing   Discharge Plan       Row Name 11/01/23 0857       Plan    Plan Ongoing    Plan Comments Restraints and Phenobarb are both continued due to pt's agitation.  Phenobarb dosing is being adjusted by Neurology.  Palliative Care also continues to follow.  SW remains availble to assist with discharge planning when appropriate.  Pt's spouse's preference for SNF to LTC placement is [facility] in [place], [place].    Final Discharge Disposition Code 03 - skilled nursing facility (SNF)                   Discharge Codes    No documentation.                 Expected Discharge Date and Time       Expected Discharge Date Expected Discharge Time    Nov 6, 2023               JOEL Wiggins

## 2023-11-01 NOTE — THERAPY TREATMENT NOTE
Acute Care - Speech Language Pathology Treatment Note  [facility]       Patient Name: Kenneth Wen  : 1950  MRN: [id number]  Today's Date: 2023                   Admit Date: 9/15/2023       Visit Dx:      ICD-10-CM ICD-9-CM   1. Hemorrhagic CVA  I61.9 431   2. Aphasia  R47.01 784.3   3. Dysarthria  R47.1 784.51   4. Oropharyngeal dysphagia  R13.12 787.22       Patient Active Problem List   Diagnosis    Precordial pain    Elevated BP without diagnosis of hypertension    Dyslipidemia    Hyperglycemia    Multiple bilateral CVAs    Hemorrhagic CVA    HFpEF    T2DM (type 2 diabetes mellitus)    HTN (hypertension)    GERD (gastroesophageal reflux disease)    ICH (intracerebral hemorrhage)    Oropharyngeal dysphagia        Past Medical History:   Diagnosis Date    Acid reflux     Cancer     Skin    Diabetes mellitus     Prediabetes    GERD (gastroesophageal reflux disease) 09/15/2023    HTN (hypertension) 09/15/2023    Kidney disease     T2DM (type 2 diabetes mellitus) 09/15/2023        Past Surgical History:   Procedure Laterality Date    APPENDECTOMY      ENDOSCOPY W/ PEG TUBE PLACEMENT N/A 2023    Procedure: ESOPHAGOGASTRODUODENOSCOPY WITH PERCUTANEOUS ENDOSCOPIC GASTROSTOMY TUBE INSERTION;  Surgeon: Haseeb Carrillo MD;  Location: [facility] ENDOSCOPY;  Service: General;  Laterality: N/A;    HEMORRHOIDECTOMY      NASAL POLYP SURGERY         SLP Recommendation and Plan                 Therapy Frequency (Swallow): 5 days per week (23)  Predicted Duration Therapy Intervention (Days): until discharge (23)  Oral Care Recommendations: Oral Care BID/PRN, Suction toothbrush (23)     Treatment Assessment (SLP): improved, oral dysphagia, continued, clinical signs of, aspiration, cognitive-linguistic disorder (23)     Plan for Continued Treatment (SLP): continue treatment per plan of care (23)    Plan of Care Review  Plan of Care Reviewed With: patient,  spouse (11/01/23 1524)   Progress: improving (11/01/23 1524)       Daily Summary of Progress (SLP): progress toward functional goals is gradual (11/01/23 1430)             EDUCATION  Education completed in the following areas:   Cognitive Impairment Communication Impairment Dysphagia (Swallowing Impairment).         SLP GOALS       Row Name 11/01/23 1430 10/30/23 1025          (LTG) Patient will demonstrate functional swallow for    Diet Texture (Demonstrate functional swallow) soft to chew (chopped) textures  -EN soft to chew (chopped) textures  -     Liquid viscosity (Demonstrate functional swallow) nectar/ mildly thick liquids  -EN nectar/ mildly thick liquids  -     [place] (Demonstrate functional swallow) with minimal cues (75-90% accuracy)  -EN with minimal cues (75-90% accuracy)  -     Time Frame (Demonstrate functional swallow) by discharge  -EN by discharge  -     Barriers (Demonstrate functional swallow) cognition  -EN --     Progress/Outcomes (Demonstrate functional swallow) continuing progress toward goal  -EN goal ongoing  -        ([facility]) Patient will tolerate trials of    Consistencies Trialed (Tolerate trials) pureed textures;honey/ moderately thick liquids  -EN pureed textures;honey/ moderately thick liquids  -     Desired Outcome (Tolerate trials) without signs/symptoms of aspiration;without signs of distress;with adequate oral prep/transit/clearance;with use of compensatory strategies (see comments)  -EN without signs/symptoms of aspiration;without signs of distress;with adequate oral prep/transit/clearance;with use of compensatory strategies (see comments)  -     [place] (Tolerate trials) with 1:1 assist/ supervision  -EN with 1:1 assist/ supervision  -     Time Frame (Tolerate trials) by discharge  -EN by discharge  -     Progress/Outcomes (Tolerate trials) continuing progress toward goal  -EN continuing progress toward goal  -     Comment (Tolerate trials) RN reports  occ pocketing but clears w cues.  -EN No overt s/s of aspiration w/ minimal trials of HTL via tsp  -        (STG) Patient will tolerate therapeutic trials of    Consistencies Trialed (Tolerate therapeutic trials) thin liquids  -EN thin liquids  -     Desired Outcome (Tolerate therapeutic trials) without signs/symptoms of aspiration;without signs of distress  -EN without signs/symptoms of aspiration;without signs of distress  -     [place] (Tolerate therapeutic trials) with minimal cues (75-90% accuracy)  -EN with minimal cues (75-90% accuracy)  -     Time Frame (Tolerate therapeutic trials) by discharge  -EN by discharge  -     Progress/Outcomes (Tolerate therapeutic trials) progress slower than expected  -EN goal ongoing  -     Comment (Tolerate therapeutic trials) no s/s ice; cough 100% trials thin H2O  -EN --        (STG) Labial Strengthening Goal 1 (SLP)    Activity (Labial Strengthening Goal 1, SLP) increase labial tone  -EN increase labial tone  -     Increase Labial Tone labial resistance exercises;swallow trials  -EN labial resistance exercises;swallow trials  -     [place]/Accuracy (Labial Strengthening Goal 1, SLP) with moderate cues (50-74% accuracy)  -EN with moderate cues (50-74% accuracy)  -     Time Frame (Labial Strengthening Goal 1, SLP) -- short term goal (STG)  -     Progress/Outcomes (Labial Strengthening Goal 1, SLP) continuing progress toward goal  -EN goal ongoing  -        (STG) Lingual Strengthening Goal 1 (SLP)    Activity (Lingual Strengthening Goal 1, SLP) increase lingual tone/sensation/control/coordination/movement;increase tongue back strength  -EN increase lingual tone/sensation/control/coordination/movement;increase tongue back strength  -     Increase Lingual Tone/Sensation/Control/Coordination/Movement swallow trials;lingual resistance exercises  -EN swallow trials;lingual resistance exercises  -     Increase Tongue Back Strength lingual  resistance exercises  -EN lingual resistance exercises  -     [place]/Accuracy (Lingual Strengthening Goal 1, SLP) with moderate cues (50-74% accuracy)  -EN with moderate cues (50-74% accuracy)  -     Time Frame (Lingual Strengthening Goal 1, SLP) short term goal (STG)  -EN short term goal (STG)  -     Progress/Outcomes (Lingual Strengthening Goal 1, SLP) continuing progress toward goal  -EN goal ongoing  -        (STG) Pharyngeal Strengthening Exercise Goal 1 (SLP)    Activity (Pharyngeal Strengthening Goal 1, SLP) increase superior movement of the hyolaryngeal complex;increase anterior movement of the hyolaryngeal complex;increase closure at entrance to airway/closure of airway at glottis;increase squeeze/positive pressure generation;increase tongue base retraction;increase timing  -EN increase superior movement of the hyolaryngeal complex;increase anterior movement of the hyolaryngeal complex;increase closure at entrance to airway/closure of airway at glottis;increase squeeze/positive pressure generation;increase tongue base retraction;increase timing  -     Increase Timing prepping - 3 second prep or suck swallow or 3-step swallow  -EN prepping - 3 second prep or suck swallow or 3-step swallow  -     Increase Superior Movement of the Hyolaryngeal Complex effortful pitch glide (falsetto + pharyngeal squeeze)  -EN effortful pitch glide (falsetto + pharyngeal squeeze)  -MH     Increase Anterior Movement of the Hyolaryngeal Complex chin tuck against resistance (CTAR)  -EN chin tuck against resistance (CTAR)  -     Increase Closure at Entrance to Airway/Closure of Airway at Glottis supraglottic swallow  -EN supraglottic swallow  -     Increase Squeeze/Positive Pressure Generation hard effortful swallow  -EN hard effortful swallow  -MH     Increase Tongue Base Retraction tamie  -EN tamie  -     [place]/Accuracy (Pharyngeal Strengthening Goal 1, SLP) with moderate cues (50-74% accuracy)   -EN with moderate cues (50-74% accuracy)  -     Time Frame (Pharyngeal Strengthening Goal 1, SLP) short term goal (STG)  -EN short term goal (STG)  -MH     Progress/Outcomes (Pharyngeal Strengthening Goal 1, SLP) continuing progress toward goal  -EN goal ongoing  -MH        Patient will demonstrate functional communication skills for return to discharge environment     [place] with moderate cues  -EN with moderate cues  -MH     Time frame by discharge  -EN by discharge  -MH     Progress/Outcomes continuing progress toward goal  -EN goal ongoing  -MH        Comprehend Questions Goal 1 (SLP)    Improve Ability to Comprehend Questions Goal 1 (SLP) complex yes/no questions;80%;with minimal cues (75-90%)  -EN complex yes/no questions;80%;with minimal cues (75-90%)  -     Time Frame (Comprehend Questions Goal 1, SLP) short term goal (STG)  -EN short term goal (STG)  -MH     Progress (Ability to Comprehend Questions Goal 1, SLP) 30%;with maximum cues (25-49%)  -EN --     Progress/Outcomes (Comprehend Questions Goal 1, SLP) continuing progress toward goal  -EN goal ongoing  -MH        Follow Directions Goal 2 (SLP)    Improve Ability to Follow Directions Goal 1 (SLP) 2 step commands;80%;with minimal cues (75-90%)  -EN 2 step commands;80%;with minimal cues (75-90%)  -     Time Frame (Follow Directions Goal 1, SLP) short term goal (STG)  -EN short term goal (STG)  -MH     Progress (Ability to Follow Directions Goal 1, SLP) 20%;with moderate cues (50-74%)  -EN --     Progress/Outcomes (Follow Directions Goal 1, SLP) continuing progress toward goal  -EN goal ongoing  -MH        Word Retrieval Skills Goal 1 (SLP)    Improve Word Retrieval Skills By Goal 1 (SLP) confrontational naming task;high frequency;responsive naming task;simple;80%;with minimal cues (75-90%)  -EN confrontational naming task;high frequency;responsive naming task;simple;80%;with minimal cues (75-90%)  -     Time Frame (Word Retrieval Goal 1, SLP)  short term goal (STG)  -EN short term goal (STG)  -MH     Progress/Outcomes (Word Retrieval Goal 1, SLP) goal ongoing  -EN goal ongoing  -MH        Articulation Goal 1 (SLP)    Improve Articulation Goal 1 (SLP) by over-articulating at phrase level;80%;with moderate cues (50-74%)  -EN by over-articulating at phrase level;80%;with moderate cues (50-74%)  -MH     Time Frame (Articulation Goal 1, SLP) short term goal (STG)  -EN short term goal (STG)  -MH     Progress (Articulation Goal 1, SLP) 30%;with minimal cues (75-90%)  -EN --     Progress/Outcomes (Articulation Goal 1, SLP) continuing progress toward goal  -EN goal ongoing  -MH        Orientation Goal 1 (SLP)    Improve Orientation Through Goal 1 (SLP) demonstrating orientation to day;demonstrating orientation to month;demonstrating orientation to year;demonstrating orientation to place;demonstrating orientation to disease/impairment;use environmental aids to assist with orientation;80%;with moderate cues (50-74%)  -EN demonstrating orientation to day;demonstrating orientation to month;demonstrating orientation to year;demonstrating orientation to place;demonstrating orientation to disease/impairment;use environmental aids to assist with orientation;80%;with moderate cues (50-74%)  -MH     Time Frame (Orientation Goal 1, SLP) short term goal (STG)  -EN short term goal (STG)  -MH     Progress/Outcomes (Orientation Goal 1, SLP) goal ongoing  -EN goal ongoing  -MH               User Key  (r) = Recorded By, (t) = Taken By, (c) = Cosigned By      Initials Name Provider Type    EN Milagros Carlton MS CCC-SLP Speech and Language Pathologist    Brenda Lombardo, MS CCC-SLP Speech and Language Pathologist                             Time Calculation:    Time Calculation- SLP       Row Name 11/01/23 1523             Time Calculation- SLP    SLP Start Time 1430  -EN      SLP Received On 11/01/23  -EN         Untimed Charges    23385-GA Treatment/ST Modification Prosth  Aug Alter  25  -EN      [id number]-ZM Treatment Swallow Minutes 25  -EN         Total Minutes    Untimed Charges Total Minutes 50  -EN       Total Minutes 50  -EN                User Key  (r) = Recorded By, (t) = Taken By, (c) = Cosigned By      Initials Name Provider Type    Milagros Locke, MS CCC-SLP Speech and Language Pathologist                      Therapy Charges for Today       Code Description Service Date Service Provider Modifiers Qty    74896587301 HC ST TREATMENT SWALLOW 2 11/1/2023 Milagros Carlton MS CCC-SLP GN 1    53150855645 HC ST TREATMENT SPEECH 2 11/1/2023 Milagros Carlton, MS KAMARI-SLP GN 1                        Milagros Carlton MS CCC-NIELS  11/1/2023

## 2023-11-01 NOTE — PLAN OF CARE
Goal Outcome Evaluation:  Plan of Care Reviewed With: patient           Outcome Evaluation: vss. restless/agitated at times but restraints have remained off this shift. no prns given. adequate urine. 1bm.  tolerating tube feeds and eating about 25% of trays.  sitter at bedside.

## 2023-11-01 NOTE — PLAN OF CARE
Goal Outcome Evaluation:  Plan of Care Reviewed With: spouse        Progress: no change  Outcome Evaluation: VSS on RA. Pt not answering questions. Unable to assess orientation. Pt restless and agitated when woken. No PRns given. Pt slept well. Restraints continue for pt safety.

## 2023-11-01 NOTE — PROGRESS NOTES
[facility] Medicine Services  PROGRESS NOTE    Patient Name: Kenneth Wen  : 1950  MRN: [id number]    Date of Admission: 9/15/2023  Primary Care Physician: Susan Powers APRN    Subjective   Subjective     CC:  cva    HPI:  Patient doing better today out of restraints.  Wife is at bedside.  He is asking for something to drink.  Neurology saw him and has adjusted meds      Objective   Objective     Vital Signs:   Temp:  [99 °F (37.2 °C)-99.5 °F (37.5 °C)] 99 °F (37.2 °C)  Heart Rate:  [89-94] 89  Resp:  [16-20] 20  BP: (115-125)/(74-85) 115/74     Physical Exam:  Constitutional: No acute distress, awake, alert  HENT: NCAT, mucous membranes moist  Respiratory: Clear to auscultation bilaterally, respiratory effort normal   Cardiovascular: RRR  Gastrointestinal: Positive bowel sounds, soft, nontender, nondistended  Musculoskeletal: Lying in bed  Psychiatric: Appropriate affect, cooperative  Neurologic: Oriented to person and wife, generally weak, speech mumbled but comprehensible  Skin: No rashes      Results Reviewed:  LAB RESULTS:      Lab 23  0715   WBC 6.45   HEMOGLOBIN 12.1*   HEMATOCRIT 36.8*   PLATELETS 313   MCV 96.6         Lab 23  0715   SODIUM 138   POTASSIUM 4.3   CHLORIDE 101   CO2 27.0   ANION GAP 10.0   BUN 22   CREATININE 0.70*   EGFR 97.9   GLUCOSE 189*   CALCIUM 9.1         Lab 23  0715   TOTAL PROTEIN 5.2*   ALBUMIN 3.7   GLOBULIN 1.5   ALT (SGPT) 42*   AST (SGOT) 27   BILIRUBIN 0.3   ALK PHOS 84                     Brief Urine Lab Results  (Last result in the past 365 days)        Color   Clarity   Blood   Leuk Est   Nitrite   Protein   CREAT   Urine HCG        10/30/23 1636 Yellow   Clear   Negative   Negative   Negative   Negative                   Microbiology Results Abnormal       Procedure Component Value - Date/Time    Blood Culture - Blood, Arm, Right [[id number]]  (Normal) Collected: 09/15/23 0212    Lab Status: Final result Specimen:  Blood from Arm, Right Updated: 09/20/23 0230     Blood Culture No growth at 5 days    Blood Culture - Blood, Arm, Left [[id number]]  (Normal) Collected: 09/15/23 0212    Lab Status: Final result Specimen: Blood from Arm, Left Updated: 09/20/23 0230     Blood Culture No growth at 5 days            No radiology results from the last 24 hrs        Current medications:  Scheduled Meds:atorvastatin, 80 mg, Per PEG Tube, Nightly  clopidogrel, 75 mg, Per PEG Tube, Daily  Diclofenac Sodium, 2 g, Topical, BID  donepezil, 10 mg, Per PEG Tube, Nightly  fluticasone, 2 spray, Each Nare, Daily  HYDROcodone-acetaminophen, 1 tablet, Per PEG Tube, Q12H  lansoprazole, 15 mg, Per PEG Tube, Q AM  Lidocaine, 1 patch, Transdermal, Q24H  melatonin, 10 mg, Per PEG Tube, Nightly  metoprolol tartrate, 25 mg, Per PEG Tube, Q12H  miconazole, 1 application , Topical, Q12H  mirtazapine, 30 mg, Per PEG Tube, Nightly  palliative care oral rinse, , Mouth/Throat, 4x Daily  PHENobarbital, 81 mg, Per PEG Tube, Q8H  ProSource No Carb, 30 mL, Per G Tube, Daily  QUEtiapine, 200 mg, Per PEG Tube, Q12H  temazepam, 15 mg, Per PEG Tube, Nightly      Continuous Infusions:   PRN Meds:.  Calcium Replacement - Follow Nurse / BPA Driven Protocol    dextrose    glucagon (human recombinant)    ibuprofen    Magnesium Standard Dose Replacement - Follow Nurse / BPA Driven Protocol    ondansetron    PHENobarbital    Phosphorus Replacement - Follow Nurse / BPA Driven Protocol    Potassium Replacement - Follow Nurse / BPA Driven Protocol    ziprasidone    Assessment & Plan   Assessment & Plan     Active Hospital Problems    Diagnosis  POA    **Hemorrhagic CVA [I61.9]  Yes    Oropharyngeal dysphagia [R13.12]  Yes    Multiple bilateral CVAs [I63.9]  Yes    HFpEF [I50.30]  Yes    T2DM (type 2 diabetes mellitus) [E11.9]  Yes    HTN (hypertension) [I10]  Yes    GERD (gastroesophageal reflux disease) [K21.9]  Yes    ICH (intracerebral hemorrhage) [I61.9]  Yes    Dyslipidemia  [E78.5]  Yes      Resolved Hospital Problems   No resolved problems to display.        Brief Hospital Course to date:  Kenneth Wen is a 73 y.o. male with hx of HTN, HLD, T2DM, and GERD who presented to OSH with multiple acute ischemic infarcts of the bilateral cerebral and cerebellar hemispheres as well as left isaac. TTE/JOSIAS was negative PFO at OSH. On 9/13/23 he had worsening in mental status and new inability to move RLE, head CT showed evolution of the existing CVA with new development of petechial hemorrhage. DAPT was held for 24 hours per Neurology recommendations and repeat CT head that evening showed worsening effacement of the right quadrigeminal cistern with suspected increasing upward supratentorial pressure. He was then transferred to [facility] for Neurosurgery evaluation on 9/15/23. He was started on hypertonic saline 9/15/23 through 9/20/23 for cerebral edema. He had fevers with negative cultures but had worsening secretions and labored breathing so was started on Zosyn on 9/16/23. Transferred to the hospitalist service on 9/22/23. Pt demonstrated poor oral intake with elevated sodium levels; therefore, PEG tube was recommended & placed.        Goals of care  --Palliative and Hospice follow     Multiple bilateral posterior circulation strokes with hemorrhagic conversion  -- suspect atheroembolic but cannot rule out cardioembolic given multiple vascular territories  --Plavix monotherapy, high intensity statin  --Needs Holter monitor at discharge  --Neurology follows     Agitation, improved  Encephalopathy, improving  --Donepezil 10 mg nightly, melatonin 10 mg nightly, mirtazapine 30 mg nightly, Restoril 15 mg nightly  - iSeroquel 200 mg BID  - increased phenobarbitol tSunday   --Continue as needed Geodon  -- remains restless but improved on 11/1/2023     Dysphagia  Hypernatremia - resolved  - PEG tube placed 9/29, Dr Carrillo   --SLP recommends mechanical ground with honey thick liquids   --Risk of pulling  out peg tube, abd binder to be in place at all times.   -- dietary following , adequate PO intake is limited by his mental status     HTN  --Continue Metoprolol 25 mg BID     GERD  --Continue PPI     COVID-19-resolved  --Overall asymptomatic and on room air  --No infiltrates seen and low procal  --Did not receive any treatment   --Off Isolation 9/30/2023      Leukocytosis-resolved  --Procalcitionin low at 0.05  --CXR and UA negative  --Blood cultures negative     Elevated LFT's, mild  --Possibly secondary to statin?  --Negative acute hepatitis panel  --repeat AST/ALT improved        Expected Discharge Location and Transportation: Rehab  Expected Discharge   Expected Discharge Date: 11/6/2023; Expected Discharge Time:      DVT prophylaxis:  Mechanical DVT prophylaxis orders are present.     AM-PAC 6 Clicks Score (PT): 10 (10/31/23 2000)    CODE STATUS:   Code Status and Medical Interventions:   Ordered at: 09/29/23 0854     Medical Intervention Limits:    NO intubation (DNI)     Code Status (Patient has no pulse and is not breathing):    No CPR (Do Not Attempt to Resuscitate)     Medical Interventions (Patient has pulse or is breathing):    Limited Support       Trina Mclaughlin MD  11/01/23

## 2023-11-02 LAB
GLUCOSE BLDC GLUCOMTR-MCNC: 100 MG/DL (ref 70–130)
GLUCOSE BLDC GLUCOMTR-MCNC: 166 MG/DL (ref 70–130)
GLUCOSE BLDC GLUCOMTR-MCNC: 169 MG/DL (ref 70–130)
GLUCOSE BLDC GLUCOMTR-MCNC: 91 MG/DL (ref 70–130)

## 2023-11-02 PROCEDURE — 82948 REAGENT STRIP/BLOOD GLUCOSE: CPT

## 2023-11-02 PROCEDURE — 99232 SBSQ HOSP IP/OBS MODERATE 35: CPT | Performed by: FAMILY MEDICINE

## 2023-11-02 PROCEDURE — 99231 SBSQ HOSP IP/OBS SF/LOW 25: CPT | Performed by: PSYCHIATRY & NEUROLOGY

## 2023-11-02 PROCEDURE — 97530 THERAPEUTIC ACTIVITIES: CPT

## 2023-11-02 RX ADMIN — MINERAL OIL: 1000 LIQUID ORAL at 17:11

## 2023-11-02 RX ADMIN — ATORVASTATIN CALCIUM 80 MG: 40 TABLET, FILM COATED ORAL at 20:32

## 2023-11-02 RX ADMIN — METOPROLOL TARTRATE 25 MG: 25 TABLET, FILM COATED ORAL at 08:39

## 2023-11-02 RX ADMIN — HYDROCODONE BITARTRATE AND ACETAMINOPHEN 1 TABLET: 5; 325 TABLET ORAL at 11:47

## 2023-11-02 RX ADMIN — TEMAZEPAM 15 MG: 15 CAPSULE ORAL at 18:00

## 2023-11-02 RX ADMIN — QUETIAPINE FUMARATE 200 MG: 100 TABLET ORAL at 08:39

## 2023-11-02 RX ADMIN — FLUTICASONE PROPIONATE 2 SPRAY: 50 SPRAY, METERED NASAL at 08:39

## 2023-11-02 RX ADMIN — DONEPEZIL HYDROCHLORIDE 10 MG: 10 TABLET, FILM COATED ORAL at 18:00

## 2023-11-02 RX ADMIN — LIDOCAINE 1 PATCH: 4 PATCH TOPICAL at 08:38

## 2023-11-02 RX ADMIN — PHENOBARBITAL 81 MG: 32.4 TABLET ORAL at 05:35

## 2023-11-02 RX ADMIN — MICONAZOLE NITRATE 1 APPLICATION: 20 CREAM TOPICAL at 08:39

## 2023-11-02 RX ADMIN — Medication 30 ML: at 08:40

## 2023-11-02 RX ADMIN — MINERAL OIL: 1000 LIQUID ORAL at 11:47

## 2023-11-02 RX ADMIN — PHENOBARBITAL 81 MG: 32.4 TABLET ORAL at 14:30

## 2023-11-02 RX ADMIN — PHENOBARBITAL 81 MG: 32.4 TABLET ORAL at 20:31

## 2023-11-02 RX ADMIN — DICLOFENAC SODIUM 2 G: 9.3 GEL TOPICAL at 08:39

## 2023-11-02 RX ADMIN — CLOPIDOGREL BISULFATE 75 MG: 75 TABLET ORAL at 08:39

## 2023-11-02 RX ADMIN — Medication 10 MG: at 18:00

## 2023-11-02 RX ADMIN — METOPROLOL TARTRATE 25 MG: 25 TABLET, FILM COATED ORAL at 20:32

## 2023-11-02 RX ADMIN — MICONAZOLE NITRATE 1 APPLICATION: 20 CREAM TOPICAL at 20:33

## 2023-11-02 RX ADMIN — MINERAL OIL: 1000 LIQUID ORAL at 20:33

## 2023-11-02 RX ADMIN — QUETIAPINE FUMARATE 200 MG: 100 TABLET ORAL at 20:30

## 2023-11-02 RX ADMIN — MINERAL OIL: 1000 LIQUID ORAL at 07:20

## 2023-11-02 RX ADMIN — HYDROCODONE BITARTRATE AND ACETAMINOPHEN 1 TABLET: 5; 325 TABLET ORAL at 00:10

## 2023-11-02 RX ADMIN — LANSOPRAZOLE 15 MG: 15 TABLET, ORALLY DISINTEGRATING ORAL at 05:35

## 2023-11-02 RX ADMIN — MIRTAZAPINE 30 MG: 15 TABLET, FILM COATED ORAL at 18:00

## 2023-11-02 NOTE — THERAPY TREATMENT NOTE
Patient Name: Kenneth Wen  : 1950    MRN: [id number]                              Today's Date: 2023       Admit Date: 9/15/2023    Visit Dx:     ICD-10-CM ICD-9-CM   1. Hemorrhagic CVA  I61.9 431   2. Aphasia  R47.01 784.3   3. Dysarthria  R47.1 784.51   4. Oropharyngeal dysphagia  R13.12 787.22     Patient Active Problem List   Diagnosis    Precordial pain    Elevated BP without diagnosis of hypertension    Dyslipidemia    Hyperglycemia    Multiple bilateral CVAs    Hemorrhagic CVA    HFpEF    T2DM (type 2 diabetes mellitus)    HTN (hypertension)    GERD (gastroesophageal reflux disease)    ICH (intracerebral hemorrhage)    Oropharyngeal dysphagia     Past Medical History:   Diagnosis Date    Acid reflux     Cancer     Skin    Diabetes mellitus     Prediabetes    GERD (gastroesophageal reflux disease) 09/15/2023    HTN (hypertension) 09/15/2023    Kidney disease     T2DM (type 2 diabetes mellitus) 09/15/2023     Past Surgical History:   Procedure Laterality Date    APPENDECTOMY      ENDOSCOPY W/ PEG TUBE PLACEMENT N/A 2023    Procedure: ESOPHAGOGASTRODUODENOSCOPY WITH PERCUTANEOUS ENDOSCOPIC GASTROSTOMY TUBE INSERTION;  Surgeon: Haseeb Carrillo MD;  Location: [facility] ENDOSCOPY;  Service: General;  Laterality: N/A;    HEMORRHOIDECTOMY      NASAL POLYP SURGERY        General Information       Row Name 23 1503          OT Time and Intention    Document Type therapy note (daily note)  -MR     Mode of Treatment occupational therapy;co-treatment  -MR       Row Name 23 1503          General Information    Patient Profile Reviewed yes  -MR     Existing Precautions/Restrictions fall;other (see comments)  R sided weakness/coordination deficits, PEG with abdominal binder, visual deficits  -MR     Barriers to Rehab medically complex;cognitive status;visual deficit  -MR       Row Name 23 150          Cognition    Orientation Status (Cognition) oriented to;person;disoriented to;time   -MR       Row Name 11/02/23 1503          Safety Issues, Functional Mobility    Safety Issues Affecting Function (Mobility) awareness of need for assistance;impulsivity;judgment;safety precaution awareness;safety precautions follow-through/compliance;sequencing abilities  -MR     Impairments Affecting Function (Mobility) balance;cognition;coordination;endurance/activity tolerance;grasp;motor control;motor planning;muscle tone abnormal;visual/perceptual;strength;sensation/sensory awareness;range of motion (ROM);postural/trunk control  -MR     Cognitive Impairments, Mobility Safety/Performance awareness, need for assistance;impulsivity;insight into deficits/self-awareness;judgment;safety precaution awareness;safety precaution follow-through;sequencing abilities  -MR               User Key  (r) = Recorded By, (t) = Taken By, (c) = Cosigned By      Initials Name Provider Type     Vivek MeeraLATIA Occupational Therapist                     Mobility/ADL's       Row Name 11/02/23 1503          Bed Mobility    Bed Mobility scooting/bridging;supine-sit;sit-supine  -MR     Scooting/Bridging [place] (Bed Mobility) contact guard;verbal cues  -MR     Supine-Sit [place] (Bed Mobility) maximum assist (25% patient effort);verbal cues;nonverbal cues (demo/gesture)  -MR     Sit-Supine [place] (Bed Mobility) maximum assist (25% patient effort);verbal cues;nonverbal cues (demo/gesture)  -MR     Bed Mobility, Safety Issues cognitive deficits limit understanding;decreased use of arms for pushing/pulling;decreased use of legs for bridging/pushing;impaired trunk control for bed mobility;unable to safely maintain weight bearing restrictions  -MR     Assistive Device (Bed Mobility) bed rails;head of bed elevated;draw sheet  -MR     Comment, (Bed Mobility) Pt requiring v/c and t/c for initiation of bed mobility. Pt demostrating initiation of advancing BLE toward EOBs. Once at EOB pt demonstrating a posterior and R lateral  lean able to improve w/ significant v/c and t/c.  -MR       Row Name 11/02/23 1503          Transfers    Transfers sit-stand transfer  -MR       Row Name 11/02/23 1503          Sit-Stand Transfer    Sit-Stand [place] (Transfers) maximum assist (25% patient effort);2 person assist;verbal cues;nonverbal cues (demo/gesture)  -MR     Assistive Device (Sit-Stand Transfers) other (see comments)  BUE support  -MR     Comment, (Sit-Stand Transfer) Pt completed x 2 STS from EOB to upright. Pt requiring v/c to maintain B feet on floor and B knee blocking when transitioning to standing.  -MR       Row Name 11/02/23 1503          Functional Mobility    Functional Mobility- Ind. Level maximum assist (25% patient effort);2 person assist required;verbal cues required  -MR     Functional Mobility- Device other (see comments)  BUE support  -MR     Functional Mobility-Distance (Feet) --  3 ft laterally and approx 2 steps forward and then back towards bed.  -MR       Row Name 11/02/23 1503          Activities of Daily Living    BADL Assessment/Intervention upper body dressing;lower body dressing;feeding  -MR       Row Name 11/02/23 1503          Lower Body Dressing Assessment/Training    [place] Level (Lower Body Dressing) don;socks;maximum assist (25% patient effort)  -MR     Position (Lower Body Dressing) supine  -MR       Row Name 11/02/23 1503          Upper Body Dressing Assessment/Training    [place] Level (Upper Body Dressing) don;maximum assist (25% patient effort)  -MR     Position (Upper Body Dressing) edge of bed sitting  -MR       Row Name 11/02/23 1503          Self-Feeding Assessment/Training    [place] Level (Feeding) scoop food and bring to mouth;maximum assist (25% patient effort)  -MR     Position (Self-Feeding) sitting up in bed  -MR               User Key  (r) = Recorded By, (t) = Taken By, (c) = Cosigned By      Initials Name Provider Type    Meera Juarez, OT Occupational Therapist                    Obj/Interventions       Row Name 11/02/23 1507          Balance    Balance Assessment sitting static balance;sitting dynamic balance;standing static balance;standing dynamic balance  -MR     Static Sitting Balance contact guard;verbal cues;non-verbal cues (demo/gesture)  -MR     Dynamic Sitting Balance moderate assist;verbal cues;non-verbal cues (demo/gesture)  -MR     Position, Sitting Balance unsupported;sitting edge of bed  -MR     Static Standing Balance maximum assist;2-person assist  -MR     Dynamic Standing Balance maximum assist;2-person assist  -MR     Position/Device Used, Standing Balance supported;other (see comments)  BUE support  -MR     Comment, Balance Pt demonstrating posterior pushing and R lateral lean when sitting at EOB. Pt able to improve to CGA w/ v/c and t/c for sequencing and hand placement. Posterior lean noted in standing as well. Pt unable to improve despite cueing.  -MR               User Key  (r) = Recorded By, (t) = Taken By, (c) = Cosigned By      Initials Name Provider Type    MR Meera Doyle, OT Occupational Therapist                   Goals/Plan    No documentation.                  Clinical Impression       Row Name 11/02/23 1509          Pain Scale: FACES Pre/Post-Treatment    Pain: FACES Scale, Pretreatment 0-->no hurt  -MR     Posttreatment Pain Rating 0-->no hurt  -MR       Row Name 11/02/23 1509          Plan of Care Review    Plan of Care Reviewed With patient;spouse  -MR     Progress improving  -MR     Outcome Evaluation Pt demonstrating improvement w/ command following and activity tolerance this date. Pt demonstrated good effort w/ bed mobility, transfers and functional mobility but continues to require significant assist. Continue to progress as able per current POC.  -MR       Row Name 11/02/23 1509          Therapy Plan Review/Discharge Plan (OT)    Anticipated Discharge Disposition (OT) skilled nursing facility  -MR       Row Name 11/02/23 1501           Vital Signs    Pre Patient Position Supine  -MR     Intra Patient Position Standing  -MR     Post Patient Position Supine  -MR       Row Name 11/02/23 1509          Positioning and Restraints    Pre-Treatment Position in bed  -MR     Post Treatment Position bed  -MR     In Bed notified nsg;supine;call light within reach;encouraged to call for assist;exit alarm on;with family/caregiver;fowlers;side rails up x3;patient within staff view  -MR               User Key  (r) = Recorded By, (t) = Taken By, (c) = Cosigned By      Initials Name Provider Type    Meera Juarez OT Occupational Therapist                   Outcome Measures       Row Name 11/02/23 1511          How much help from another is currently needed...    Putting on and taking off regular lower body clothing? 2  -MR     Bathing (including washing, rinsing, and drying) 2  -MR     Toileting (which includes using toilet bed pan or urinal) 1  -MR     Putting on and taking off regular upper body clothing 2  -MR     Taking care of personal grooming (such as brushing teeth) 3  -MR     Eating meals 2  -MR     AM-PAC 6 Clicks Score (OT) 12  -MR       Row Name 11/02/23 1511          Functional Assessment    Outcome Measure Options AM-PAC 6 Clicks Daily Activity (OT)  -MR               User Key  (r) = Recorded By, (t) = Taken By, (c) = Cosigned By      Initials Name Provider Type    Meera Juarez OT Occupational Therapist                    Occupational Therapy Education       Title: PT OT SLP Therapies (In Progress)       Topic: Occupational Therapy (In Progress)       Point: ADL training (In Progress)       Description:   Instruct learner(s) on proper safety adaptation and remediation techniques during self care or transfers.   Instruct in proper use of assistive devices.                  Learning Progress Summary             Patient Acceptance, E, NR,NL by MR at 11/2/2023 1511    Acceptance, E, NR,NL by BARBARA at 10/31/2023 1352    Acceptance, E, NR by KP at  10/26/2023 0256    Acceptance, E, NR by MC at 10/25/2023 1051    Acceptance, E, NR by MC at 10/23/2023 1335    Acceptance, E, NR by JR at 10/19/2023 0939    Acceptance, E,D, NR by JY at 10/18/2023 0830    Acceptance, E, NR by MR at 10/12/2023 1508    Acceptance, E, NR by JG at 10/9/2023 1533    Acceptance, E, NR by MR at 10/6/2023 1549    Acceptance, E,D, NR,NL by CS at 10/4/2023 0948    Acceptance, E, NR by MR at 9/28/2023 1556    Acceptance, E, NR by JR at 9/25/2023 1045    Acceptance, E, NR by CS at 9/20/2023 1420    Acceptance, E, NR by MC at 9/18/2023 1532    Acceptance, E, NR by MC at 9/15/2023 1531   Family Acceptance, E, NR,NL by  at 10/31/2023 1352    Acceptance, E, NR by  at 10/26/2023 0256    Acceptance, E,D, NR by JY at 10/18/2023 0830    Acceptance, E, NR by MR at 10/12/2023 1508    Acceptance, E, NR by JG at 10/9/2023 1533    Acceptance, E, NR by MR at 10/6/2023 1549    Acceptance, E, NR by MR at 9/28/2023 1556                         Point: Home exercise program (In Progress)       Description:   Instruct learner(s) on appropriate technique for monitoring, assisting and/or progressing therapeutic exercises/activities.                  Learning Progress Summary             Patient Acceptance, E, NR,NL by MR at 11/2/2023 1511    Acceptance, E, NR by  at 10/26/2023 0256    Acceptance, E, NR by MC at 10/25/2023 1051    Acceptance, E, NR by JR at 10/19/2023 0939    Acceptance, E,D, NR by JY at 10/18/2023 0830    Acceptance, E, NR by MR at 10/12/2023 1508    Acceptance, E, NR by MR at 10/6/2023 1549    Acceptance, E,D, NR,NL by CS at 10/4/2023 0948    Acceptance, E, NR by MR at 9/28/2023 1556    Acceptance, E, NR by JR at 9/25/2023 1045    Acceptance, E, NR by CS1 at 9/20/2023 1420    Acceptance, E, NR by MC at 9/18/2023 1532    Acceptance, E, NR by MC at 9/15/2023 1531   Family Acceptance, E, NR by KP at 10/26/2023 0256    Acceptance, E,D, NR by JY at 10/18/2023 0830    Acceptance, E, NR by MR at  10/12/2023 1508    Acceptance, E, NR by MR at 10/6/2023 1549    Acceptance, E, NR by MR at 9/28/2023 1556                         Point: Precautions (In Progress)       Description:   Instruct learner(s) on prescribed precautions during self-care and functional transfers.                  Learning Progress Summary             Patient Acceptance, E, NR,NL by MR at 11/2/2023 1511    Acceptance, E, NR,NL by  at 10/31/2023 1352    Acceptance, E, NR by  at 10/26/2023 0256    Acceptance, E, NR by  at 10/25/2023 1051    Acceptance, E, NR by  at 10/23/2023 1335    Acceptance, E,D, NR by JY at 10/18/2023 0830    Acceptance, E, NR by MR at 10/12/2023 1508    Acceptance, E, NR by JG at 10/9/2023 1533    Acceptance, E, NR by MR at 10/6/2023 1549    Acceptance, E,D, NR,NL by  at 10/4/2023 0948    Acceptance, E, NR by MR at 9/28/2023 1556    Acceptance, E, NR by [facility] at 9/20/2023 1420    Acceptance, E, NR by  at 9/18/2023 1532    Acceptance, E, NR by  at 9/15/2023 1531   Family Acceptance, E, NR,NL by  at 10/31/2023 1352    Acceptance, E, NR by  at 10/26/2023 0256    Acceptance, E,D, NR by JY at 10/18/2023 0830    Acceptance, E, NR by MR at 10/12/2023 1508    Acceptance, E, NR by JG at 10/9/2023 1533    Acceptance, E, NR by MR at 10/6/2023 1549    Acceptance, E, NR by MR at 9/28/2023 1556                         Point: Body mechanics (In Progress)       Description:   Instruct learner(s) on proper positioning and spine alignment during self-care, functional mobility activities and/or exercises.                  Learning Progress Summary             Patient Acceptance, E, NR,NL by MR at 11/2/2023 1511    Acceptance, E, NR by  at 10/26/2023 0256    Acceptance, E, NR by MC at 10/25/2023 1051    Acceptance, E, NR by MC at 10/23/2023 1335    Acceptance, E,D, NR by ORAL at 10/18/2023 0830    Acceptance, E, NR by MR at 10/12/2023 1508    Acceptance, E, NR by ANATOLY at 10/9/2023 1533    Acceptance, E, NR by MR at 10/6/2023  1549    Acceptance, E,D, NR,NL by  at 10/4/2023 0948    Acceptance, E, NR by MR at 9/28/2023 1556    Acceptance, E, NR by CS1 at 9/20/2023 1420    Acceptance, E, NR by  at 9/18/2023 1532    Acceptance, E, NR by  at 9/15/2023 1531   Family Acceptance, E, NR by  at 10/26/2023 0256    Acceptance, E,D, NR by JY at 10/18/2023 0830    Acceptance, E, NR by MR at 10/12/2023 1508    Acceptance, E, NR by JG at 10/9/2023 1533    Acceptance, E, NR by MR at 10/6/2023 1549    Acceptance, E, NR by MR at 9/28/2023 1556                                         User Key       Initials Effective Dates Name Provider Type Discipline     02/03/23 -  Jess Carrillo, OT Occupational Therapist OT     06/16/21 -  Ivis Lau, RN Registered Nurse Nurse    [facility] 09/02/21 -  Carmen Carr, OT Occupational Therapist OT    JY 06/16/21 -  Ofelia Armstrong, OT Occupational Therapist OT     06/16/21 -  Cara Ledesma, OT Occupational Therapist OT     06/16/21 -  Jim Judge, OT Occupational Therapist OT     10/14/22 -  Jenny Rivera, OT Occupational Therapist OT     09/22/22 -  Meera Doyle, OT Occupational Therapist OT    J 08/30/23 -  Power Herman, OT Student OT Student OT                  OT Recommendation and Plan  Planned Therapy Interventions (OT): activity tolerance training, adaptive equipment training, BADL retraining, functional balance retraining, occupation/activity based interventions, ROM/therapeutic exercise, strengthening exercise, patient/caregiver education/training, neuromuscular control/coordination retraining, cognitive/visual perception retraining, transfer/mobility retraining, passive ROM/stretching, IADL retraining  Plan of Care Review  Plan of Care Reviewed With: patient, spouse  Progress: improving  Outcome Evaluation: Pt demonstrating improvement w/ command following and activity tolerance this date. Pt demonstrated good effort w/ bed mobility, transfers and functional mobility but  continues to require significant assist. Continue to progress as able per current POC.     Time Calculation:         Time Calculation- OT       Row Name 11/02/23 1512             Time Calculation- OT    OT Start Time 1401  -MR      OT Received On 11/02/23  -MR         Timed Charges    24975 - OT Therapeutic Activity Minutes 8  -MR      32743 - OT Self Care/Mgmt Minutes 2  -MR         Total Minutes    Timed Charges Total Minutes 10  -MR       Total Minutes 10  -MR                User Key  (r) = Recorded By, (t) = Taken By, (c) = Cosigned By      Initials Name Provider Type    Meera Juarez OT Occupational Therapist                  Therapy Charges for Today       Code Description Service Date Service Provider Modifiers Qty    45383057217 HC OT THERAPEUTIC ACT EA 15 MIN 11/2/2023 Meera Doyle OT GO 1                 Meera Doyle OT  11/2/2023

## 2023-11-02 NOTE — PLAN OF CARE
Goal Outcome Evaluation:  Plan of Care Reviewed With: patient           Outcome Evaluation: vss. more agitated today, minimal sleeping. only scheduled meds given, remains out of restraints. tolerating tube feed, minimal po intake. adequate urine.3 Bms this shift

## 2023-11-02 NOTE — THERAPY TREATMENT NOTE
Patient Name: Kenneth Wen  : 1950    MRN: [id number]                              Today's Date: 2023       Admit Date: 9/15/2023    Visit Dx:     ICD-10-CM ICD-9-CM   1. Hemorrhagic CVA  I61.9 431   2. Aphasia  R47.01 784.3   3. Dysarthria  R47.1 784.51   4. Oropharyngeal dysphagia  R13.12 787.22     Patient Active Problem List   Diagnosis    Precordial pain    Elevated BP without diagnosis of hypertension    Dyslipidemia    Hyperglycemia    Multiple bilateral CVAs    Hemorrhagic CVA    HFpEF    T2DM (type 2 diabetes mellitus)    HTN (hypertension)    GERD (gastroesophageal reflux disease)    ICH (intracerebral hemorrhage)    Oropharyngeal dysphagia     Past Medical History:   Diagnosis Date    Acid reflux     Cancer     Skin    Diabetes mellitus     Prediabetes    GERD (gastroesophageal reflux disease) 09/15/2023    HTN (hypertension) 09/15/2023    Kidney disease     T2DM (type 2 diabetes mellitus) 09/15/2023     Past Surgical History:   Procedure Laterality Date    APPENDECTOMY      ENDOSCOPY W/ PEG TUBE PLACEMENT N/A 2023    Procedure: ESOPHAGOGASTRODUODENOSCOPY WITH PERCUTANEOUS ENDOSCOPIC GASTROSTOMY TUBE INSERTION;  Surgeon: Haseeb Carrillo MD;  Location: [facility] ENDOSCOPY;  Service: General;  Laterality: N/A;    HEMORRHOIDECTOMY      NASAL POLYP SURGERY        General Information       Row Name 23 1601          Physical Therapy Time and Intention    Document Type therapy note (daily note)  -ML     Mode of Treatment physical therapy;co-treatment  -ML       Row Name 23 1601          General Information    Patient Profile Reviewed yes  -ML     Existing Precautions/Restrictions fall;other (see comments)  R sided weakness/coordination deficits, PEG with abdominal binder, visual deficits  -ML     Barriers to Rehab medically complex;cognitive status;visual deficit  -ML       Row Name 23 1601          Cognition    Orientation Status (Cognition) oriented to;person;disoriented  to;time;place  -       Row Name 11/02/23 1601          Safety Issues, Functional Mobility    Safety Issues Affecting Function (Mobility) awareness of need for assistance;impulsivity;judgment;problem-solving;safety precaution awareness;safety precautions follow-through/compliance;sequencing abilities  -     Impairments Affecting Function (Mobility) balance;cognition;coordination;endurance/activity tolerance;grasp;motor control;motor planning;muscle tone abnormal;visual/perceptual;strength;sensation/sensory awareness;range of motion (ROM);postural/trunk control  -     Cognitive Impairments, Mobility Safety/Performance awareness, need for assistance;impulsivity;insight into deficits/self-awareness;judgment;problem-solving/reasoning;safety precaution awareness;safety precaution follow-through;sequencing abilities  -               User Key  (r) = Recorded By, (t) = Taken By, (c) = Cosigned By      Initials Name Provider Type     Laura Rangel Physical Therapist                   Mobility       Row Name 11/02/23 1602          Bed Mobility    Bed Mobility scooting/bridging;supine-sit;sit-supine  -     Scooting/Bridging [place] (Bed Mobility) contact guard;verbal cues  -     Supine-Sit [place] (Bed Mobility) maximum assist (25% patient effort);verbal cues;nonverbal cues (demo/gesture)  -     Sit-Supine [place] (Bed Mobility) maximum assist (25% patient effort);verbal cues;nonverbal cues (demo/gesture)  -     Assistive Device (Bed Mobility) bed rails;head of bed elevated;draw sheet  -       Row Name 11/02/23 1602          Sit-Stand Transfer    Sit-Stand [place] (Transfers) maximum assist (25% patient effort);2 person assist;verbal cues;nonverbal cues (demo/gesture)  -     Assistive Device (Sit-Stand Transfers) other (see comments)  BUE support  -       Row Name 11/02/23 1602          Gait/Stairs (Locomotion)    [place] Level (Gait) verbal cues;nonverbal cues  (demo/gesture);maximum assist (25% patient effort);2 person assist  -ML     Assistive Device (Gait) other (see comments)  BUE support  -ML     Distance in Feet (Gait) 3 side steps; 2 steps forward/backwards  -ML     Deviations/Abnormal Patterns (Gait) bilateral deviations;right sided deviations;tor decreased;gait speed decreased;stride length decreased;base of support, narrow  -ML     Bilateral Gait Deviations --  patient pushing posteriorly  -ML     Right Sided Gait Deviations leans right;weight shift ability decreased  -ML               User Key  (r) = Recorded By, (t) = Taken By, (c) = Cosigned By      Initials Name Provider Type    ML Laura Rangel Physical Therapist                   Obj/Interventions       Row Name 11/02/23 160          Balance    Balance Assessment sitting static balance;sitting dynamic balance;sit to stand dynamic balance;standing static balance;standing dynamic balance  -ML     Static Sitting Balance contact guard;verbal cues;non-verbal cues (demo/gesture)  -ML     Dynamic Sitting Balance verbal cues;non-verbal cues (demo/gesture);moderate assist  -ML     Position, Sitting Balance unsupported;sitting edge of bed  -ML     Sit to Stand Dynamic Balance verbal cues;non-verbal cues (demo/gesture);maximum assist;2-person assist  -ML     Static Standing Balance maximum assist;2-person assist  -ML     Dynamic Standing Balance verbal cues;non-verbal cues (demo/gesture);maximum assist;2-person assist  -ML     Position/Device Used, Standing Balance supported;other (see comments)  BUE support  -ML     Balance Interventions sitting;standing;sit to stand;supported;weight shifting activity  -ML               User Key  (r) = Recorded By, (t) = Taken By, (c) = Cosigned By      Initials Name Provider Type    ML Laura Rangel Physical Therapist                   Goals/Plan    No documentation.                  Clinical Impression       Row Name 11/02/23 1606          Pain    Additional Documentation Pain  Scale: FACES Pre/Post-Treatment (Group)  -ML       Row Name 11/02/23 1606          Pain Scale: FACES Pre/Post-Treatment    Pain: FACES Scale, Pretreatment 0-->no hurt  -ML     Posttreatment Pain Rating 0-->no hurt  -ML       Row Name 11/02/23 1606          Plan of Care Review    Plan of Care Reviewed With patient;spouse  -ML     Progress improving  -ML     Outcome Evaluation Patient able to attempting side stepping and forward/backwards stepping at EOB. Patient with improvement in command following, however, frequent cues to increase safety awareness and remain on task. The patient continues to present beloww baseline for mobility and would continue to benefit from skilled PT to address functional mobiltiy deficits.  -ML       Row Name 11/02/23 1606          Vital Signs    Pre Patient Position Supine  -ML     Intra Patient Position Standing  -ML     Post Patient Position Supine  -ML       Row Name 11/02/23 1606          Positioning and Restraints    Pre-Treatment Position in bed  -ML     Post Treatment Position bed  -ML     In Bed notified nsg;supine;call light within reach;encouraged to call for assist;exit alarm on;with family/caregiver;fowlers;side rails up x3;patient within staff view  -ML               User Key  (r) = Recorded By, (t) = Taken By, (c) = Cosigned By      Initials Name Provider Type    Laura Betancur Physical Therapist                   Outcome Measures       Row Name 11/02/23 1607          How much help from another person do you currently need...    Turning from your back to your side while in flat bed without using bedrails? 3  -ML     Moving from lying on back to sitting on the side of a flat bed without bedrails? 2  -ML     Moving to and from a bed to a chair (including a wheelchair)? 2  -ML     Standing up from a chair using your arms (e.g., wheelchair, bedside chair)? 2  -ML     Climbing 3-5 steps with a railing? 1  -ML     To walk in hospital room? 2  -ML     AM-PAC 6 Clicks Score (PT) 12   -ML     Highest level of mobility 4 --> Transferred to chair/commode  -ML       Row Name 11/02/23 1607 11/02/23 1511       Functional Assessment    Outcome Measure Options AM-PAC 6 Clicks Basic Mobility (PT)  -ML AM-PAC 6 Clicks Daily Activity (OT)  -MR              User Key  (r) = Recorded By, (t) = Taken By, (c) = Cosigned By      Initials Name Provider Type    ML Laura Rangel Physical Therapist    MR VivekMeera, OT Occupational Therapist                                 Physical Therapy Education       Title: PT OT SLP Therapies (In Progress)       Topic: Physical Therapy (In Progress)       Point: Mobility training (In Progress)       Learning Progress Summary             Patient Acceptance, E, NR by ML at 11/2/2023 1608    Acceptance, E, VU,NR by ML at 10/31/2023 1317    Acceptance, E, NR by KP at 10/26/2023 0256    Acceptance, E, NR by ML at 10/23/2023 1353    Acceptance, E,TB, NR by AY at 10/20/2023 1308    Acceptance, E, NR by KG at 10/15/2023 1401    Acceptance, E, NR,VU by KR at 10/12/2023 1453    Acceptance, E,D, NR by AB at 10/11/2023 1547    Acceptance, E, NR by CM at 10/10/2023 1517    Acceptance, E, NR,VU by KR at 10/6/2023 1550    Acceptance, E, NR by SD at 10/5/2023 1437    Acceptance, E, VU by CD at 10/2/2023 1502    Comment: SEE FLOWSHEET    Acceptance, E, NR by KR at 9/25/2023 1324    Acceptance, E, NR by KG1 at 9/18/2023 1403    Acceptance, E,TB, NR by AY at 9/15/2023 1254   Family Acceptance, E, VU,NR by ML at 10/31/2023 1317    Acceptance, E, NR by KP at 10/26/2023 0256    Acceptance, E, NR by ML at 10/23/2023 1353    Acceptance, E, NR,VU by KR at 10/12/2023 1453    Acceptance, E, NR,VU by KR at 10/6/2023 1550    Acceptance, E, NR by SD at 10/5/2023 1437   Significant Other Acceptance, E, NR by CM at 10/10/2023 1517                         Point: Home exercise program (In Progress)       Learning Progress Summary             Patient Acceptance, E, NR by KP at 10/26/2023 0256    Acceptance,  E, NR by ML at 10/23/2023 1353    Acceptance, E, NR by KG at 10/15/2023 1401    Acceptance, E,D, NR by AB at 10/11/2023 1547    Acceptance, E, NR by CM at 10/10/2023 1517    Acceptance, E, NR by SD at 10/5/2023 1437    Acceptance, E, VU by CD at 10/2/2023 1502    Comment: SEE FLOWSHEET    Acceptance, E, NR by KG1 at 9/18/2023 1403   Family Acceptance, E, NR by KP at 10/26/2023 0256    Acceptance, E, NR by ML at 10/23/2023 1353    Acceptance, E, NR by SD at 10/5/2023 1437   Significant Other Acceptance, E, NR by CM at 10/10/2023 1517                         Point: Body mechanics (In Progress)       Learning Progress Summary             Patient Acceptance, E, NR by ML at 11/2/2023 1608    Acceptance, E, VU,NR by ML at 10/31/2023 1317    Acceptance, E, NR by KP at 10/26/2023 0256    Acceptance, E,TB, NR by AY at 10/20/2023 1308    Acceptance, E, NR by KG at 10/15/2023 1401    Acceptance, E, NR,VU by KR at 10/12/2023 1453    Acceptance, E,D, NR by AB at 10/11/2023 1547    Acceptance, E, NR by CM at 10/10/2023 1517    Acceptance, E, NR,VU by KR at 10/6/2023 1550    Acceptance, E, NR by SD at 10/5/2023 1437    Acceptance, E, VU by CD at 10/2/2023 1502    Comment: SEE FLOWSHEET    Acceptance, E, NR by KR at 9/25/2023 1324    Acceptance, E, NR by KG1 at 9/18/2023 1403    Acceptance, E,TB, NR by AY at 9/15/2023 1254   Family Acceptance, E, VU,NR by ML at 10/31/2023 1317    Acceptance, E, NR by KP at 10/26/2023 0256    Acceptance, E, NR,VU by KR at 10/12/2023 1453    Acceptance, E, NR,VU by KR at 10/6/2023 1550    Acceptance, E, NR by SD at 10/5/2023 1437   Significant Other Acceptance, E, NR by CM at 10/10/2023 1517                         Point: Precautions (In Progress)       Learning Progress Summary             Patient Acceptance, E, NR by ML at 11/2/2023 1608    Acceptance, E, VU,NR by ML at 10/31/2023 1317    Acceptance, E, NR by KP at 10/26/2023 0256    Acceptance, E, NR by ML at 10/23/2023 1353    Acceptance, E,TB,  NR by AY at 10/20/2023 1308    Acceptance, E, NR by KG at 10/15/2023 1401    Acceptance, E, NR,VU by KR at 10/12/2023 1453    Acceptance, E,D, NR by AB at 10/11/2023 1547    Acceptance, E, NR by CM at 10/10/2023 1517    Acceptance, E, NR,VU by KR at 10/6/2023 1550    Acceptance, E, NR by SD at 10/5/2023 1437    Acceptance, E, VU by CD at 10/2/2023 1502    Comment: SEE FLOWSHEET    Acceptance, E, NR by KR at 9/25/2023 1324    Acceptance, E, NR by KG1 at 9/18/2023 1403    Acceptance, E,TB, NR by AY at 9/15/2023 1254   Family Acceptance, E, VU,NR by ML at 10/31/2023 1317    Acceptance, E, NR by KP at 10/26/2023 0256    Acceptance, E, NR by ML at 10/23/2023 1353    Acceptance, E, NR,VU by KR at 10/12/2023 1453    Acceptance, E, NR,VU by KR at 10/6/2023 1550    Acceptance, E, NR by SD at 10/5/2023 1437   Significant Other Acceptance, E, NR by CM at 10/10/2023 1517                                         User Key       Initials Effective Dates Name Provider Type Discipline    CD 02/03/23 -  Claudia Duarte, PT Physical Therapist PT    SD 03/13/23 -  Chantel Scott, PT Physical Therapist PT    KP 06/16/21 -  Ivis Lau, RN Registered Nurse Nurse    KG1 05/22/20 -  Mackenzie Mckay, PT Physical Therapist PT    KG 01/04/23 -  Monik Solorio Physical Therapist PT    AY 11/10/20 -  Aleshia Armstrong, PT Physical Therapist PT    ML 04/22/21 -  Laura Rangel Physical Therapist PT    AB 09/22/22 -  Aleshia Keita, PT Physical Therapist PT    CM 09/22/22 -  Yanira Shields, PT Physical Therapist PT    KR 12/30/22 -  Raissa Soria, PT Physical Therapist PT                  PT Recommendation and Plan  Planned Therapy Interventions (PT): balance training, bed mobility training, gait training, home exercise program, neuromuscular re-education, patient/family education, postural re-education, ROM (range of motion), strengthening, stretching, transfer training  Plan of Care Reviewed With: patient, spouse  Progress:  improving  Outcome Evaluation: Patient able to attempting side stepping and forward/backwards stepping at EOB. Patient with improvement in command following, however, frequent cues to increase safety awareness and remain on task. The patient continues to present beloww baseline for mobility and would continue to benefit from skilled PT to address functional mobiltiy deficits.     Time Calculation:         PT Charges       Row Name 11/02/23 1608             Time Calculation    Start Time 1402  -ML      PT Received On 11/02/23  -ML         Timed Charges    18932 - PT Therapeutic Activity Minutes 15  -ML         Total Minutes    Timed Charges Total Minutes 15  -ML       Total Minutes 15  -ML                User Key  (r) = Recorded By, (t) = Taken By, (c) = Cosigned By      Initials Name Provider Type    Laura Betancur Physical Therapist                  Therapy Charges for Today       Code Description Service Date Service Provider Modifiers Qty    [id number]  PT THERAPEUTIC ACT EA 15 MIN 11/2/2023 Laura Rangel GP 1            PT G-Codes  Outcome Measure Options: AM-PAC 6 Clicks Basic Mobility (PT)  AM-PAC 6 Clicks Score (PT): 12  AM-PAC 6 Clicks Score (OT): 12  Modified [place] Scale: 4 - Moderately severe disability.  Unable to walk without assistance, and unable to attend to own bodily needs without assistance.  PT Discharge Summary  Anticipated Discharge Disposition (PT): skilled nursing facility    Laura Rangel  11/2/2023

## 2023-11-02 NOTE — PLAN OF CARE
Goal Outcome Evaluation:  Plan of Care Reviewed With: patient, spouse        Progress: improving  Outcome Evaluation: Pt demonstrating improvement w/ command following and activity tolerance this date. Pt demonstrated good effort w/ bed mobility, transfers and functional mobility but continues to require significant assist. Continue to progress as able per current POC.      Anticipated Discharge Disposition (OT): skilled nursing facility

## 2023-11-02 NOTE — PROGRESS NOTES
Neurology       Patient Care Team:  Susan Powers APRN as PCP - General (Family Medicine)    Chief complaint: Altered mental state    History: Patient has been relatively calm.  He is restless and easily redirected.    Gets a little worse when he is bowel movement.      Past Medical History:   Diagnosis Date    Acid reflux     Cancer     Skin    Diabetes mellitus     Prediabetes    GERD (gastroesophageal reflux disease) 09/15/2023    HTN (hypertension) 09/15/2023    Kidney disease     T2DM (type 2 diabetes mellitus) 09/15/2023       Vital Signs   Vitals:    10/31/23 1900 11/01/23 0753 11/01/23 2053 11/02/23 0732   BP: 125/85 115/74 121/78 111/66   BP Location: Right arm Left arm Right arm Left arm   Patient Position: Lying Lying Lying Lying   Pulse: 94 89 95 100   Resp: 16 20 18 20   Temp: 99.5 °F (37.5 °C) 99 °F (37.2 °C) 97.2 °F (36.2 °C) 98.5 °F (36.9 °C)   TempSrc: Temporal Temporal Temporal Temporal   SpO2: 92% 95% 95% 94%   Weight:       Height:           Physical Exam:   General: Awake              Neuro: Moves about the bed but talking coherently.        Results Review:  Phenobarbital level recently was 15.      Results from last 7 days   Lab Units 11/01/23  0715   WBC 10*3/mm3 6.45   HEMOGLOBIN g/dL 12.1*   HEMATOCRIT % 36.8*   PLATELETS 10*3/mm3 313     Results from last 7 days   Lab Units 11/01/23  0715   SODIUM mmol/L 138   POTASSIUM mmol/L 4.3   CHLORIDE mmol/L 101   CO2 mmol/L 27.0   BUN mg/dL 22   CREATININE mg/dL 0.70*   CALCIUM mg/dL 9.1   BILIRUBIN mg/dL 0.3   ALK PHOS U/L 84   ALT (SGPT) U/L 42*   AST (SGOT) U/L 27   GLUCOSE mg/dL 189*       Imaging Results (Last 24 Hours)       ** No results found for the last 24 hours. **            Assessment:  Altered mental state showing continued improvement    Plan:  Phenobarb level tomorrow    Comment:  Hopefully he can stay calm enough to avoid as needed sedation and/or Geodon and or restraints.         I discussed the patients findings and my  recommendations with patient, family, and primary care team    Toni Rausch MD  11/02/23  11:39 EDT

## 2023-11-02 NOTE — PLAN OF CARE
Goal Outcome Evaluation:  Plan of Care Reviewed With: patient, spouse        Progress: improving  Outcome Evaluation: VSS on RA. No PRNs necessary so far this shift. Pt has been out of restraints since day shift, NP on call contacted to evaluate need for renewing, order discontinued at 2315. Pt occasionally agitated/restless following q2hr turns, otherwise resting well in between care at this time. Spouse at bedside at beginning of shift.

## 2023-11-02 NOTE — CASE MANAGEMENT/SOCIAL WORK
Continued Stay Note  [facility]     Patient Name: Kenneth Wen  MRN: [id number]  Today's Date: 11/2/2023    Admit Date: 9/15/2023    Plan: SNF/LTC   Discharge Plan       Row Name 11/02/23 1937       Plan    Plan SNF/LTC    Patient/Family in Agreement with Plan yes    Plan Comments Met with spouse at bedside today, continues to hope patient will be able to transfer to [facility] once medically stable-either skilled or long term care depending on progress.  Case Management continues to follow, discussed in unit multidisciplinary rounds and with Dr. Rausch this morning.  Jess Ferrera, Ext. [phone]    Final Discharge Disposition Code 03 - skilled nursing facility (SNF)                   Discharge Codes    No documentation.                 Expected Discharge Date and Time       Expected Discharge Date Expected Discharge Time    Nov 7, 2023               JOEL Mena

## 2023-11-02 NOTE — PROGRESS NOTES
[facility] Medicine Services  PROGRESS NOTE    Patient Name: Kenneth Wen  : 1950  MRN: [id number]    Date of Admission: 9/15/2023  Primary Care Physician: Susan Powers APRN    Subjective   Subjective     CC:  cva    HPI:   - Patient doing better today out of restraints.  Wife is at bedside.  He is asking for something to drink.  Neurology saw him and has adjusted meds     -patient is more restless today.  Nursing reports he did not sleep well last night.  He remains out of restraints but is fidgety, hanging his legs over the bed.  He is tolerating p.o. and denies pain when I asked him.    Objective   Objective     Vital Signs:   Temp:  [97.2 °F (36.2 °C)-98.5 °F (36.9 °C)] 98.5 °F (36.9 °C)  Heart Rate:  [] 100  Resp:  [18-20] 20  BP: (111-121)/(66-78) 111/66     Physical Exam:  Constitutional: No acute distress, awake, alert  HENT: NCAT, mucous membranes moist  Respiratory: Clear to auscultation bilaterally, respiratory effort normal   Cardiovascular: RRR  Gastrointestinal: Positive bowel sounds, soft, nontender, nondistended  Musculoskeletal: Lying in bed  Psychiatric: Appropriate affect, mostly cooperative but fidgety  Neurologic: Oriented to person, generally weak, speech mumbled but comprehensible  Skin: No rashes      Results Reviewed:  LAB RESULTS:      Lab 23  0715   WBC 6.45   HEMOGLOBIN 12.1*   HEMATOCRIT 36.8*   PLATELETS 313   MCV 96.6         Lab 23  0715   SODIUM 138   POTASSIUM 4.3   CHLORIDE 101   CO2 27.0   ANION GAP 10.0   BUN 22   CREATININE 0.70*   EGFR 97.9   GLUCOSE 189*   CALCIUM 9.1         Lab 23  0715   TOTAL PROTEIN 5.2*   ALBUMIN 3.7   GLOBULIN 1.5   ALT (SGPT) 42*   AST (SGOT) 27   BILIRUBIN 0.3   ALK PHOS 84                     Brief Urine Lab Results  (Last result in the past 365 days)        Color   Clarity   Blood   Leuk Est   Nitrite   Protein   CREAT   Urine HCG        10/30/23 1636 Yellow   Clear    Negative   Negative   Negative   Negative                   Microbiology Results Abnormal       Procedure Component Value - Date/Time    Blood Culture - Blood, Arm, Right [[id number]]  (Normal) Collected: 09/15/23 0212    Lab Status: Final result Specimen: Blood from Arm, Right Updated: 09/20/23 0230     Blood Culture No growth at 5 days    Blood Culture - Blood, Arm, Left [[id number]]  (Normal) Collected: 09/15/23 0212    Lab Status: Final result Specimen: Blood from Arm, Left Updated: 09/20/23 0230     Blood Culture No growth at 5 days            No radiology results from the last 24 hrs        Current medications:  Scheduled Meds:atorvastatin, 80 mg, Per PEG Tube, Nightly  clopidogrel, 75 mg, Per PEG Tube, Daily  Diclofenac Sodium, 2 g, Topical, BID  donepezil, 10 mg, Per PEG Tube, Nightly  fluticasone, 2 spray, Each Nare, Daily  HYDROcodone-acetaminophen, 1 tablet, Per PEG Tube, Q12H  lansoprazole, 15 mg, Per PEG Tube, Q AM  Lidocaine, 1 patch, Transdermal, Q24H  melatonin, 10 mg, Per PEG Tube, Nightly  metoprolol tartrate, 25 mg, Per PEG Tube, Q12H  miconazole, 1 application , Topical, Q12H  mirtazapine, 30 mg, Per PEG Tube, Nightly  palliative care oral rinse, , Mouth/Throat, 4x Daily  PHENobarbital, 81 mg, Per PEG Tube, Q8H  ProSource No Carb, 30 mL, Per G Tube, Daily  QUEtiapine, 200 mg, Per PEG Tube, Q12H  temazepam, 15 mg, Per PEG Tube, Nightly      Continuous Infusions:   PRN Meds:.  Calcium Replacement - Follow Nurse / BPA Driven Protocol    dextrose    glucagon (human recombinant)    ibuprofen    Magnesium Standard Dose Replacement - Follow Nurse / BPA Driven Protocol    ondansetron    PHENobarbital    Phosphorus Replacement - Follow Nurse / BPA Driven Protocol    Potassium Replacement - Follow Nurse / BPA Driven Protocol    ziprasidone    Assessment & Plan   Assessment & Plan     Active Hospital Problems    Diagnosis  POA    **Hemorrhagic CVA [I61.9]  Yes    Oropharyngeal dysphagia [R13.12]  Yes     Multiple bilateral CVAs [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        Brief Hospital Course to date:  Kenneth Wen is a 73 y.o. male with hx of HTN, HLD, T2DM, and GERD who presented to OSH with multiple acute ischemic infarcts of the bilateral cerebral and cerebellar hemispheres as well as left isaac. TTE/JOSIAS was negative PFO at OSH. On 9/13/23 he had worsening in mental status and new inability to move RLE, head CT showed evolution of the existing CVA with new development of petechial hemorrhage. DAPT was held for 24 hours per Neurology recommendations and repeat CT head that evening showed worsening effacement of the right quadrigeminal cistern with suspected increasing upward supratentorial pressure. He was then transferred to [facility] for Neurosurgery evaluation on 9/15/23. He was started on hypertonic saline 9/15/23 through 9/20/23 for cerebral edema. He had fevers with negative cultures but had worsening secretions and labored breathing so was started on Zosyn on 9/16/23. Transferred to the hospitalist service on 9/22/23. Pt demonstrated poor oral intake with elevated sodium levels; therefore, PEG tube was recommended & placed.        Goals of care  --Palliative and Hospice follow     Multiple bilateral posterior circulation strokes with hemorrhagic conversion  -- suspect atheroembolic but cannot rule out cardioembolic given multiple vascular territories  --Plavix monotherapy, high intensity statin  --Needs Holter monitor at discharge  --Neurology follows     Agitation, improved  Encephalopathy, improving  --Donepezil 10 mg nightly, melatonin 10 mg nightly, mirtazapine 30 mg nightly, Restoril 15 mg nightly  - iSeroquel 200 mg BID  -  increased phenobarbitol tSunday   --Continue as needed Geodon  -- remains restless but improved on 11/1/2023     Dysphagia  Hypernatremia - resolved  - PEG tube placed 9/29, Dr Carrillo   --SLP recommends mechanical ground with honey thick liquids   --Risk of pulling out peg tube, abd binder to be in place at all times.   -- dietary following , adequate PO intake is limited by his mental status     HTN  --Continue Metoprolol 25 mg BID     GERD  --Continue PPI     COVID-19-resolved  --Overall asymptomatic and on room air  --No infiltrates seen and low procal  --Did not receive any treatment   --Off Isolation 9/30/2023      Leukocytosis-resolved  --Procalcitionin low at 0.05  --CXR and UA negative  --Blood cultures negative     Elevated LFT's, mild  --Possibly secondary to statin?  --Negative acute hepatitis panel  --repeat AST/ALT improved        Expected Discharge Location and Transportation: Rehab  Expected Discharge   Expected Discharge Date: 11/6/2023; Expected Discharge Time:      DVT prophylaxis:  Mechanical DVT prophylaxis orders are present.     AM-PAC 6 Clicks Score (PT): 10 (11/01/23 2000)    CODE STATUS:   Code Status and Medical Interventions:   Ordered at: 09/29/23 0854     Medical Intervention Limits:    NO intubation (DNI)     Code Status (Patient has no pulse and is not breathing):    No CPR (Do Not Attempt to Resuscitate)     Medical Interventions (Patient has pulse or is breathing):    Limited Support       Trina Mclaughlin MD  11/02/23

## 2023-11-02 NOTE — PROGRESS NOTES
"Palliative Care Daily Progress Note     C/C: Patient agitated with personal care.     S: Medical record reviewed. Follow up visit for GOC in the context of complex medical decision making. Events noted. Patient with sitter at bedside, out of restraints. Seroquel 200mg [id number].  Phenobarb 81mg per TIDand Restoril 15mg nightly. No Geodon or Phenobarb 64.8mg in the past 24 hours. Continues on scheduled Hydrocodone 5-325mg BID, denies pain at visit. Sitter at bedside.     ROS: +anxiety, increased. +decreased appetite. +pain, unable to quantify, low back, denies this visit. Denies nausea.      O: Code Status:   Code Status and Medical Interventions:   Ordered at: 09/29/23 0854     Medical Intervention Limits:    NO intubation (DNI)     Code Status (Patient has no pulse and is not breathing):    No CPR (Do Not Attempt to Resuscitate)     Medical Interventions (Patient has pulse or is breathing):    Limited Support      Advanced Directives: Advance Directive Status: Patient does not have advance directive   Goals of Care: Ongoing.   Palliative Performance Scale Score: 30%     /66 (BP Location: Left arm, Patient Position: Lying)   Pulse 100   Temp 98.5 °F (36.9 °C) (Temporal)   Resp 20   Ht 175.3 cm (69\")   Wt 72.8 kg (160 lb 7.9 oz)   SpO2 94%   BMI 23.70 kg/m²     Intake/Output Summary (Last 24 hours) at 11/2/2023 1112  Last data filed at 11/2/2023 0900  Gross per 24 hour   Intake 2600 ml   Output 300 ml   Net 2300 ml       PE:  General Appearance:    Patient laying in bed, restless, frequent repositioning, awake, alert, A/C ill appearing,    HEENT:    NC/AT, EOMI, anicteric, MMM, face calm   Neck:   supple, trachea midline, no JVD   Lungs:     CTA bilat, diminished in bases; respirations regular, even and unlabored; RR 18-20 on exam    Heart:    RRR, normal S1 and S2, no M/R/G,    Abdomen:     Normal bowel sounds, soft, non-tender, non-distended, abd binder in place covering PEG   G/U:   Deferred "   MSK/Extremities:   Wasting, no edema   Pulses:   Pulses palpable and equal bilaterally   Skin:   Warm, dry   Neurologic:   Oriented to self, alert   Psych:   Restless, frequent repositioning, calms easily     Meds: Reviewed and changes noted    Labs:   Results from last 7 days   Lab Units 11/01/23  0715   WBC 10*3/mm3 6.45   HEMOGLOBIN g/dL 12.1*   HEMATOCRIT % 36.8*   PLATELETS 10*3/mm3 313       Results from last 7 days   Lab Units 11/01/23  0715   SODIUM mmol/L 138   POTASSIUM mmol/L 4.3   CHLORIDE mmol/L 101   CO2 mmol/L 27.0   BUN mg/dL 22   CREATININE mg/dL 0.70*   GLUCOSE mg/dL 189*   CALCIUM mg/dL 9.1       Results from last 7 days   Lab Units 11/01/23  0715   SODIUM mmol/L 138   POTASSIUM mmol/L 4.3   CHLORIDE mmol/L 101   CO2 mmol/L 27.0   BUN mg/dL 22   CREATININE mg/dL 0.70*   CALCIUM mg/dL 9.1   BILIRUBIN mg/dL 0.3   ALK PHOS U/L 84   ALT (SGPT) U/L 42*   AST (SGOT) U/L 27   GLUCOSE mg/dL 189*       Imaging Results (Last 72 Hours)       ** No results found for the last 72 hours. **                  Diagnostics: Reviewed    A:   Hemorrhagic CVA    Dyslipidemia    Multiple bilateral CVAs    HFpEF    T2DM (type 2 diabetes mellitus)    HTN (hypertension)    GERD (gastroesophageal reflux disease)    ICH (intracerebral hemorrhage)    Oropharyngeal dysphagia     73 y.o. male with hemorrhagic CVA, HFpEF, HTN.   S/S:   Pain -s/p stroke and arthritis, MSK, low back  -scheduled Hydrocodone 5-325mg PO BID  -scheduled Lidocaine patch to low back  -Voltaren gel  to bilateral knees  -continue Palliative oral rinse     2. Dysphagia -SLP with diet modifications  -PEG in place  -patient with bolus feeding via TF, minimal PO intake     3. Debility -PT/OT following     4. Agitation -requiring restraints today  - Seroquel 200mg BID  -Phenobarb 81mg per PEG q 8 hours,   -Phenobarb to 64.8mg per PEG q 8 hours prn  -appreciate Neurology titration     5. GOC -DNR/DNI -per discussion with wife  -continued full treatment    P:  Follow up visit for symptoms. Patient with sitter at bedside. Goal for continued full treatment. CM working on placement.     Palliative Care Team will continue to follow patient. Please do not hesitate to contact us regarding further sx mgmt or GOC needs.  Dianelys Arriaga, APRN  11/2/2023  Time spent: 25 minutes

## 2023-11-02 NOTE — PLAN OF CARE
Goal Outcome Evaluation:  Plan of Care Reviewed With: patient, spouse        Progress: improving  Outcome Evaluation: Patient able to attempting side stepping and forward/backwards stepping at EOB. Patient with improvement in command following, however, frequent cues to increase safety awareness and remain on task. The patient continues to present below baseline for mobility and would continue to benefit from skilled PT to address functional mobility deficits.      Anticipated Discharge Disposition (PT): skilled nursing facility

## 2023-11-03 LAB
GLUCOSE BLDC GLUCOMTR-MCNC: 122 MG/DL (ref 70–130)
GLUCOSE BLDC GLUCOMTR-MCNC: 133 MG/DL (ref 70–130)
GLUCOSE BLDC GLUCOMTR-MCNC: 144 MG/DL (ref 70–130)
GLUCOSE BLDC GLUCOMTR-MCNC: 173 MG/DL (ref 70–130)
PHENOBARB SERPL-MCNC: 22.3 MCG/ML (ref 10–30)

## 2023-11-03 PROCEDURE — 82948 REAGENT STRIP/BLOOD GLUCOSE: CPT

## 2023-11-03 PROCEDURE — 99231 SBSQ HOSP IP/OBS SF/LOW 25: CPT | Performed by: PSYCHIATRY & NEUROLOGY

## 2023-11-03 PROCEDURE — 92526 ORAL FUNCTION THERAPY: CPT

## 2023-11-03 PROCEDURE — 92507 TX SP LANG VOICE COMM INDIV: CPT

## 2023-11-03 PROCEDURE — 80184 ASSAY OF PHENOBARBITAL: CPT | Performed by: PSYCHIATRY & NEUROLOGY

## 2023-11-03 PROCEDURE — 99232 SBSQ HOSP IP/OBS MODERATE 35: CPT | Performed by: FAMILY MEDICINE

## 2023-11-03 RX ADMIN — Medication 30 ML: at 07:55

## 2023-11-03 RX ADMIN — MIRTAZAPINE 30 MG: 15 TABLET, FILM COATED ORAL at 18:03

## 2023-11-03 RX ADMIN — FLUTICASONE PROPIONATE 2 SPRAY: 50 SPRAY, METERED NASAL at 07:42

## 2023-11-03 RX ADMIN — CLOPIDOGREL BISULFATE 75 MG: 75 TABLET ORAL at 07:30

## 2023-11-03 RX ADMIN — PHENOBARBITAL 64.8 MG: 64.8 TABLET ORAL at 07:41

## 2023-11-03 RX ADMIN — MICONAZOLE NITRATE 1 APPLICATION: 20 CREAM TOPICAL at 21:44

## 2023-11-03 RX ADMIN — MINERAL OIL: 1000 LIQUID ORAL at 21:57

## 2023-11-03 RX ADMIN — PHENOBARBITAL 81 MG: 32.4 TABLET ORAL at 12:06

## 2023-11-03 RX ADMIN — MICONAZOLE NITRATE 1 APPLICATION: 20 CREAM TOPICAL at 07:42

## 2023-11-03 RX ADMIN — PHENOBARBITAL 81 MG: 32.4 TABLET ORAL at 21:28

## 2023-11-03 RX ADMIN — ATORVASTATIN CALCIUM 80 MG: 40 TABLET, FILM COATED ORAL at 21:29

## 2023-11-03 RX ADMIN — TEMAZEPAM 15 MG: 15 CAPSULE ORAL at 18:02

## 2023-11-03 RX ADMIN — Medication 10 MG: at 18:03

## 2023-11-03 RX ADMIN — METOPROLOL TARTRATE 25 MG: 25 TABLET, FILM COATED ORAL at 07:30

## 2023-11-03 RX ADMIN — LIDOCAINE 1 PATCH: 4 PATCH TOPICAL at 07:30

## 2023-11-03 RX ADMIN — HYDROCODONE BITARTRATE AND ACETAMINOPHEN 1 TABLET: 5; 325 TABLET ORAL at 12:08

## 2023-11-03 RX ADMIN — QUETIAPINE FUMARATE 200 MG: 100 TABLET ORAL at 07:30

## 2023-11-03 RX ADMIN — METOPROLOL TARTRATE 25 MG: 25 TABLET, FILM COATED ORAL at 21:29

## 2023-11-03 RX ADMIN — DICLOFENAC SODIUM 2 G: 9.3 GEL TOPICAL at 21:43

## 2023-11-03 RX ADMIN — LANSOPRAZOLE 15 MG: 15 TABLET, ORALLY DISINTEGRATING ORAL at 05:51

## 2023-11-03 RX ADMIN — MINERAL OIL: 1000 LIQUID ORAL at 18:03

## 2023-11-03 RX ADMIN — DONEPEZIL HYDROCHLORIDE 10 MG: 10 TABLET, FILM COATED ORAL at 18:02

## 2023-11-03 RX ADMIN — PHENOBARBITAL 81 MG: 32.4 TABLET ORAL at 05:50

## 2023-11-03 RX ADMIN — HYDROCODONE BITARTRATE AND ACETAMINOPHEN 1 TABLET: 5; 325 TABLET ORAL at 00:08

## 2023-11-03 RX ADMIN — MINERAL OIL: 1000 LIQUID ORAL at 12:47

## 2023-11-03 RX ADMIN — QUETIAPINE FUMARATE 200 MG: 100 TABLET ORAL at 21:29

## 2023-11-03 NOTE — THERAPY TREATMENT NOTE
Acute Care - Speech Language Pathology Treatment Note  [facility]     Patient Name: Kenneth Wen  : 1950  MRN: [id number]  Today's Date: 11/3/2023               Admit Date: 9/15/2023     Visit Dx:    ICD-10-CM ICD-9-CM   1. Hemorrhagic CVA  I61.9 431   2. Aphasia  R47.01 784.3   3. Dysarthria  R47.1 784.51   4. Oropharyngeal dysphagia  R13.12 787.22     Patient Active Problem List   Diagnosis    Precordial pain    Elevated BP without diagnosis of hypertension    Dyslipidemia    Hyperglycemia    Multiple bilateral CVAs    Hemorrhagic CVA    HFpEF    T2DM (type 2 diabetes mellitus)    HTN (hypertension)    GERD (gastroesophageal reflux disease)    ICH (intracerebral hemorrhage)    Oropharyngeal dysphagia     Past Medical History:   Diagnosis Date    Acid reflux     Cancer     Skin    Diabetes mellitus     Prediabetes    GERD (gastroesophageal reflux disease) 09/15/2023    HTN (hypertension) 09/15/2023    Kidney disease     T2DM (type 2 diabetes mellitus) 09/15/2023     Past Surgical History:   Procedure Laterality Date    APPENDECTOMY      ENDOSCOPY W/ PEG TUBE PLACEMENT N/A 2023    Procedure: ESOPHAGOGASTRODUODENOSCOPY WITH PERCUTANEOUS ENDOSCOPIC GASTROSTOMY TUBE INSERTION;  Surgeon: aHseeb Carrillo MD;  Location: [facility] ENDOSCOPY;  Service: General;  Laterality: N/A;    HEMORRHOIDECTOMY      NASAL POLYP SURGERY         SLP Recommendation and Plan  SLP Diagnosis: moderate, aphasia, dysarthria (23 1300)           Swallow Criteria for Skilled Therapeutic Interventions Met: demonstrates skilled criteria (23 1300)  SLC Criteria for Skilled Therapy Interventions Met: yes (23)  Anticipated Discharge Disposition (SLP): inpatient rehabilitation facility, skilled nursing facility (23 1300)     Therapy Frequency (Swallow): 5 days per week (23 1300)  Therapy Frequency (SLP SLC): 5 days per week (23 1300)  Predicted Duration Therapy Intervention (Days): until  discharge (11/03/23 1300)  Oral Care Recommendations: Oral Care BID/PRN, Suction toothbrush (11/03/23 1300)     Daily Summary of Progress (SLP): progress toward functional goals as expected (11/03/23 1300)           Treatment Assessment (SLP): continued, oral dysphagia, pharyngeal dysphagia, clinical signs of, aspiration, cognitive-linguistic disorder, dysarthria (11/03/23 1300)  Treatment Assessment Comments (SLP): Patient tolerated trials of honey thick liquids and pureed without s/s of aspiration. Anterior loss with liquids and delay of oral transit observed. Patient continues with cognitive linguistic deficits and dysarthria. Participation improved this date. (11/03/23 1300)  Plan for Continued Treatment (SLP): continue treatment per plan of care (11/03/23 1300)         SLP EVALUATION (last 72 hours)       SLP SLC Evaluation       Row Name 11/03/23 1300                   Communication Assessment/Intervention    Document Type therapy note (daily note)  -CH        Subjective Information no complaints  -CH        Patient Observations alert;cooperative;agree to therapy  -CH        Patient/Family/Caregiver Comments/Observations spouse and staff/sitter  -CH        Patient Effort good  -CH        Symptoms Noted During/After Treatment none  -CH           General Information    Patient Profile Reviewed yes  -CH           Pain    Additional Documentation Pain Scale: FACES Pre/Post-Treatment (Group)  -           Pain Scale: Numbers Pre/Post-Treatment    Pretreatment Pain Rating 0/10 - no pain  -CH        Posttreatment Pain Rating 0/10 - no pain  -           Pain Scale: Word Pre/Post-Treatment    Pain: Word Scale, Pretreatment 0 - no pain  -CH        Posttreatment Pain Rating 0 - no pain  -CH           SLP Evaluation Clinical Impressions    SLP Diagnosis moderate;aphasia;dysarthria  -CH        Rehab Potential/Prognosis good  -        SLC Criteria for Skilled Therapy Interventions Met yes  -CH        Functional Impact  difficulty communicating wants, needs;difficulty communicating in an emergency;difficulty in expressing complex messages;functional impact in ADLs  -           SLP Treatment Clinical Impressions    Treatment Assessment (SLP) continued;oral dysphagia;pharyngeal dysphagia;clinical signs of;aspiration;cognitive-linguistic disorder;dysarthria  -        Treatment Assessment Comments (SLP) Patient tolerated trials of honey thick liquids and pureed without s/s of aspiration. Anterior loss with liquids and delay of oral transit observed. Patient continues with cognitive linguistic deficits and dysarthria. Participation improved this date.  -        Daily Summary of Progress (SLP) progress toward functional goals as expected  -CH        Barriers to Overall Progress (SLP) Medically complex  -        Plan for Continued Treatment (SLP) continue treatment per plan of care  -        Care Plan Review care plan/treatment goals reviewed;risks/benefits reviewed;current/potential barriers reviewed;patient/other agree to care plan  -        Care Plan Review, Other Participant(s) spouse  -           Recommendations    Therapy Frequency (SLP SLC) 5 days per week  -        Predicted Duration Therapy Intervention (Days) until discharge  -        Anticipated Discharge Disposition (SLP) inpatient rehabilitation facility;skilled nursing facility  -                  User Key  (r) = Recorded By, (t) = Taken By, (c) = Cosigned By      Initials Name Effective Dates     Vi Diop MS CCC-SLP 06/16/21 -                        EDUCATION  The patient has been educated in the following areas:     Cognitive Impairment Communication Impairment.           SLP GOALS       Row Name 11/03/23 1300 11/01/23 1430          (LTG) Patient will demonstrate functional swallow for    Diet Texture (Demonstrate functional swallow) soft to chew (chopped) textures  - soft to chew (chopped) textures  -EN     Liquid viscosity (Demonstrate  functional swallow) nectar/ mildly thick liquids  -CH nectar/ mildly thick liquids  -EN     [place] (Demonstrate functional swallow) with minimal cues (75-90% accuracy)  -CH with minimal cues (75-90% accuracy)  -EN     Time Frame (Demonstrate functional swallow) by discharge  -CH by discharge  -EN     Barriers (Demonstrate functional swallow) cognition  -CH cognition  -EN     Progress/Outcomes (Demonstrate functional swallow) continuing progress toward goal  -CH continuing progress toward goal  -EN     Comment (Demonstrate functional swallow) Anterior loss with HT liquids. Delayed oral transit with pureed trials.  -CH --        (STG) Patient will tolerate trials of    Consistencies Trialed (Tolerate trials) pureed textures;honey/ moderately thick liquids  -CH pureed textures;honey/ moderately thick liquids  -EN     Desired Outcome (Tolerate trials) without signs/symptoms of aspiration;without signs of distress;with adequate oral prep/transit/clearance;with use of compensatory strategies (see comments)  -CH without signs/symptoms of aspiration;without signs of distress;with adequate oral prep/transit/clearance;with use of compensatory strategies (see comments)  -EN     [place] (Tolerate trials) with 1:1 assist/ supervision  -CH with 1:1 assist/ supervision  -EN     Time Frame (Tolerate trials) by discharge  -CH by discharge  -EN     Progress/Outcomes (Tolerate trials) continuing progress toward goal  -CH continuing progress toward goal  -EN     Comment (Tolerate trials) Anterior loss with HT liquids. Delayed oral transit with pureed trials. Mild oral residue after the swallow. Cleared with cues to swallow again  -CH RN reports occ pocketing but clears w cues.  -EN        (STG) Patient will tolerate therapeutic trials of    Consistencies Trialed (Tolerate therapeutic trials) thin liquids  -CH thin liquids  -EN     Desired Outcome (Tolerate therapeutic trials) without signs/symptoms of aspiration;without  signs of distress  -CH without signs/symptoms of aspiration;without signs of distress  -EN     [place] (Tolerate therapeutic trials) with minimal cues (75-90% accuracy)  -CH with minimal cues (75-90% accuracy)  -EN     Time Frame (Tolerate therapeutic trials) by discharge  -CH by discharge  -EN     Progress/Outcomes (Tolerate therapeutic trials) progress slower than expected  -CH progress slower than expected  -EN     Comment (Tolerate therapeutic trials) no s/s ice; cough 100% trials thin H2O  -CH no s/s ice; cough 100% trials thin H2O  -EN        (STG) Labial Strengthening Goal 1 (SLP)    Activity (Labial Strengthening Goal 1, SLP) increase labial tone  -CH increase labial tone  -EN     Increase Labial Tone labial resistance exercises;swallow trials  -CH labial resistance exercises;swallow trials  -EN     [place]/Accuracy (Labial Strengthening Goal 1, SLP) with moderate cues (50-74% accuracy)  -CH with moderate cues (50-74% accuracy)  -EN     Time Frame (Labial Strengthening Goal 1, SLP) short term goal (STG)  -CH --     Progress/Outcomes (Labial Strengthening Goal 1, SLP) goal ongoing  -CH continuing progress toward goal  -EN        (STG) Lingual Strengthening Goal 1 (SLP)    Activity (Lingual Strengthening Goal 1, SLP) increase lingual tone/sensation/control/coordination/movement;increase tongue back strength  -CH increase lingual tone/sensation/control/coordination/movement;increase tongue back strength  -EN     Increase Lingual Tone/Sensation/Control/Coordination/Movement swallow trials;lingual resistance exercises  -CH swallow trials;lingual resistance exercises  -EN     Increase Tongue Back Strength lingual resistance exercises  -CH lingual resistance exercises  -EN     [place]/Accuracy (Lingual Strengthening Goal 1, SLP) with moderate cues (50-74% accuracy)  -CH with moderate cues (50-74% accuracy)  -EN     Time Frame (Lingual Strengthening Goal 1, SLP) short term goal (STG)  -CH short term  goal (STG)  -EN     Progress/Outcomes (Lingual Strengthening Goal 1, SLP) continuing progress toward goal  -CH continuing progress toward goal  -EN     Comment (Lingual Strengthening Goal 1, SLP) Completed swallow trials  -CH --        (STG) Pharyngeal Strengthening Exercise Goal 1 (SLP)    Activity (Pharyngeal Strengthening Goal 1, SLP) increase superior movement of the hyolaryngeal complex;increase anterior movement of the hyolaryngeal complex;increase closure at entrance to airway/closure of airway at glottis;increase squeeze/positive pressure generation;increase tongue base retraction;increase timing  -CH increase superior movement of the hyolaryngeal complex;increase anterior movement of the hyolaryngeal complex;increase closure at entrance to airway/closure of airway at glottis;increase squeeze/positive pressure generation;increase tongue base retraction;increase timing  -EN     Increase Timing prepping - 3 second prep or suck swallow or 3-step swallow  -CH prepping - 3 second prep or suck swallow or 3-step swallow  -EN     Increase Superior Movement of the Hyolaryngeal Complex effortful pitch glide (falsetto + pharyngeal squeeze)  -CH effortful pitch glide (falsetto + pharyngeal squeeze)  -EN     Increase Anterior Movement of the Hyolaryngeal Complex chin tuck against resistance (CTAR)  -CH chin tuck against resistance (CTAR)  -EN     Increase Closure at Entrance to Airway/Closure of Airway at Glottis supraglottic swallow  -CH supraglottic swallow  -EN     Increase Squeeze/Positive Pressure Generation hard effortful swallow  -CH hard effortful swallow  -EN     Increase Tongue Base Retraction tamie  -CH tamie  -EN     [place]/Accuracy (Pharyngeal Strengthening Goal 1, SLP) with moderate cues (50-74% accuracy)  -CH with moderate cues (50-74% accuracy)  -EN     Time Frame (Pharyngeal Strengthening Goal 1, SLP) short term goal (STG)  -CH short term goal (STG)  -EN     Progress/Outcomes (Pharyngeal  Strengthening Goal 1, SLP) goal ongoing  -CH continuing progress toward goal  -EN        Patient will demonstrate functional communication skills for return to discharge environment     [place] with moderate cues  -CH with moderate cues  -EN     Time frame by discharge  -CH by discharge  -EN     Progress/Outcomes continuing progress toward goal  -CH continuing progress toward goal  -EN     Comments Following simple directives and able to answer personal information questions with min cues and 75% acc  -CH --        Comprehend Questions Goal 1 (SLP)    Improve Ability to Comprehend Questions Goal 1 (SLP) complex yes/no questions;80%;with minimal cues (75-90%)  -CH complex yes/no questions;80%;with minimal cues (75-90%)  -EN     Time Frame (Comprehend Questions Goal 1, SLP) short term goal (STG)  -CH short term goal (STG)  -EN     Progress (Ability to Comprehend Questions Goal 1, SLP) 40%;with moderate cues (50-74%)  -CH 30%;with maximum cues (25-49%)  -EN     Progress/Outcomes (Comprehend Questions Goal 1, SLP) continuing progress toward goal  -CH continuing progress toward goal  -EN        Follow Directions Goal 2 (SLP)    Improve Ability to Follow Directions Goal 1 (SLP) 2 step commands;80%;with minimal cues (75-90%)  -CH 2 step commands;80%;with minimal cues (75-90%)  -EN     Time Frame (Follow Directions Goal 1, SLP) short term goal (STG)  -CH short term goal (STG)  -EN     Progress (Ability to Follow Directions Goal 1, SLP) 20%;with moderate cues (50-74%)  -CH 20%;with moderate cues (50-74%)  -EN     Progress/Outcomes (Follow Directions Goal 1, SLP) continuing progress toward goal  -CH continuing progress toward goal  -EN        Word Retrieval Skills Goal 1 (SLP)    Improve Word Retrieval Skills By Goal 1 (SLP) confrontational naming task;high frequency;responsive naming task;simple;80%;with minimal cues (75-90%)  -CH confrontational naming task;high frequency;responsive naming task;simple;80%;with minimal  cues (75-90%)  -EN     Time Frame (Word Retrieval Goal 1, SLP) short term goal (STG)  -CH short term goal (STG)  -EN     Progress (Word Retrieval Skills Goal 1, SLP) 90%;with minimal cues (75-90%)  -CH --     Progress/Outcomes (Word Retrieval Goal 1, SLP) goal ongoing  -CH goal ongoing  -EN        Articulation Goal 1 (SLP)    Improve Articulation Goal 1 (SLP) by over-articulating at phrase level;80%;with moderate cues (50-74%)  -CH by over-articulating at phrase level;80%;with moderate cues (50-74%)  -EN     Time Frame (Articulation Goal 1, SLP) short term goal (STG)  -CH short term goal (STG)  -EN     Progress (Articulation Goal 1, SLP) 30%;with minimal cues (75-90%)  -CH 30%;with minimal cues (75-90%)  -EN     Progress/Outcomes (Articulation Goal 1, SLP) continuing progress toward goal  -CH continuing progress toward goal  -EN        Orientation Goal 1 (SLP)    Improve Orientation Through Goal 1 (SLP) demonstrating orientation to day;demonstrating orientation to month;demonstrating orientation to year;demonstrating orientation to place;demonstrating orientation to disease/impairment;use environmental aids to assist with orientation;80%;with moderate cues (50-74%)  -CH demonstrating orientation to day;demonstrating orientation to month;demonstrating orientation to year;demonstrating orientation to place;demonstrating orientation to disease/impairment;use environmental aids to assist with orientation;80%;with moderate cues (50-74%)  -EN     Time Frame (Orientation Goal 1, SLP) short term goal (STG)  -CH short term goal (STG)  -EN     Progress (Orientation Goal 1, SLP) 50%;with moderate cues (50-74%)  -CH --     Progress/Outcomes (Orientation Goal 1, SLP) continuing progress toward goal  -CH goal ongoing  -EN     Comment (Orientation Goal 1, SLP) Oriented to self, situation, grossly to place, not to time  -CH --               User Key  (r) = Recorded By, (t) = Taken By, (c) = Cosigned By      Initials Name Provider  Type    Vi Mcdermott, MS CCC-SLP Speech and Language Pathologist    EN Milagros Carlton, MS CCC-SLP Speech and Language Pathologist                            Time Calculation:      Time Calculation- SLP       Row Name 23 1521             Time Calculation- SLP    SLP Start Time 1300  -CH      SLP Received On 23  -CH         Untimed Charges    89398-IM Treatment/ST Modification Prosth Aug Alter  39  -CH      07919-WZ Treatment Swallow Minutes 40  -CH         Total Minutes    Untimed Charges Total Minutes 79  -CH       Total Minutes 79  -CH                User Key  (r) = Recorded By, (t) = Taken By, (c) = Cosigned By      Initials Name Provider Type    Vi Mcdermott, MS CCC-SLP Speech and Language Pathologist                    Therapy Charges for Today       Code Description Service Date Service Provider Modifiers Qty    54899482868 HC ST TREATMENT SWALLOW 3 11/3/2023 Vi Diop MS CCC-SLP GN 1    [id number] HC ST TREATMENT SPEECH 3 11/3/2023 Vi Diop MS CCC-SLP GN 1                       Vi Diop MS CCC-SLP  11/3/2023   and Acute Care - Speech Language Pathology   Swallow Treatment Note [facility]     Patient Name: Kenneth Wen  : 1950  MRN: [id number]  Today's Date: 11/3/2023               Admit Date: 9/15/2023    Visit Dx:     ICD-10-CM ICD-9-CM   1. Hemorrhagic CVA  I61.9 431   2. Aphasia  R47.01 784.3   3. Dysarthria  R47.1 784.51   4. Oropharyngeal dysphagia  R13.12 787.22     Patient Active Problem List   Diagnosis    Precordial pain    Elevated BP without diagnosis of hypertension    Dyslipidemia    Hyperglycemia    Multiple bilateral CVAs    Hemorrhagic CVA    HFpEF    T2DM (type 2 diabetes mellitus)    HTN (hypertension)    GERD (gastroesophageal reflux disease)    ICH (intracerebral hemorrhage)    Oropharyngeal dysphagia     Past Medical History:   Diagnosis Date    Acid reflux     Cancer     Skin    Diabetes mellitus     Prediabetes     GERD (gastroesophageal reflux disease) 09/15/2023    HTN (hypertension) 09/15/2023    Kidney disease     T2DM (type 2 diabetes mellitus) 09/15/2023     Past Surgical History:   Procedure Laterality Date    APPENDECTOMY      ENDOSCOPY W/ PEG TUBE PLACEMENT N/A 9/29/2023    Procedure: ESOPHAGOGASTRODUODENOSCOPY WITH PERCUTANEOUS ENDOSCOPIC GASTROSTOMY TUBE INSERTION;  Surgeon: Haseeb Carrillo MD;  Location: [facility] ENDOSCOPY;  Service: General;  Laterality: N/A;    HEMORRHOIDECTOMY      NASAL POLYP SURGERY         SLP Recommendation and Plan  SLP Swallowing Diagnosis: oral dysphagia, suspected pharyngeal dysphagia (11/03/23 1300)  SLP Diet Recommendation: puree, honey thick liquids, no mixed consistencies (11/03/23 1300)  Recommended Precautions and Strategies: upright posture during/after eating, small bites of food and sips of liquid, no straw, alternate between small bites of food and sips of liquid, general aspiration precautions, 1:1 supervision (11/03/23 1300)  SLP Rec. for Method of Medication Administration: meds crushed, with puree (11/03/23 1300)     Monitor for Signs of Aspiration: notify SLP if any concerns (11/03/23 1300)     Swallow Criteria for Skilled Therapeutic Interventions Met: demonstrates skilled criteria (11/03/23 1300)  Anticipated Discharge Disposition (SLP): inpatient rehabilitation facility, skilled nursing facility (11/03/23 1300)  Rehab Potential/Prognosis, Swallowing: re-evaluate goals as necessary (11/03/23 1300)  Therapy Frequency (Swallow): 5 days per week (11/03/23 1300)  Predicted Duration Therapy Intervention (Days): until discharge (11/03/23 1300)  Oral Care Recommendations: Oral Care BID/PRN, Suction toothbrush (11/03/23 1300)        Daily Summary of Progress (SLP): progress toward functional goals as expected (11/03/23 1300)               Treatment Assessment (SLP): continued, oral dysphagia, pharyngeal dysphagia, clinical signs of, aspiration, cognitive-linguistic disorder,  dysarthria (11/03/23 1300)  Treatment Assessment Comments (SLP): Patient tolerated trials of honey thick liquids and pureed without s/s of aspiration. Anterior loss with liquids and delay of oral transit observed. Patient continues with cognitive linguistic deficits and dysarthria. Participation improved this date. (11/03/23 1300)  Plan for Continued Treatment (SLP): continue treatment per plan of care (11/03/23 1300)       Oral Care Recommendations: Oral Care BID/PRN, Suction toothbrush (11/03/23 1300)           SWALLOW EVALUATION (last 72 hours)       SLP Adult Swallow Evaluation       Row Name 11/03/23 1300 11/01/23 3860                Rehab Evaluation    Document Type -- therapy note (daily note)  -EN       Subjective Information -- no complaints  -EN       Patient Observations -- alert;cooperative;agree to therapy  -EN       Patient/Family/Caregiver Comments/Observations -- spouse  -EN       Patient Effort -- good  -EN       Symptoms Noted During/After Treatment -- none  -EN          General Information    Patient Profile Reviewed -- yes  -EN          Pain    Additional Documentation -- Pain Scale: FACES Pre/Post-Treatment (Group)  -EN          Pain Scale: Word Pre/Post-Treatment    Pain: Word Scale, Pretreatment -- 0 - no pain  -EN       Posttreatment Pain Rating -- 0 - no pain  -EN          SLP Evaluation Clinical Impression    SLP Swallowing Diagnosis oral dysphagia;suspected pharyngeal dysphagia  -CH oral dysphagia;suspected pharyngeal dysphagia  -EN       Functional Impact risk of aspiration/pneumonia  -CH risk of aspiration/pneumonia  -EN       Rehab Potential/Prognosis, Swallowing re-evaluate goals as necessary  -CH re-evaluate goals as necessary  -EN       Swallow Criteria for Skilled Therapeutic Interventions Met demonstrates skilled criteria  -CH demonstrates skilled criteria  -EN          SLP Treatment Clinical Impressions    Treatment Assessment (SLP) -- improved;oral dysphagia;continued;clinical  signs of;aspiration;cognitive-linguistic disorder  -EN       Daily Summary of Progress (SLP) -- progress toward functional goals is gradual  -EN       Barriers to Overall Progress (SLP) -- Medically complex  -EN       Plan for Continued Treatment (SLP) -- continue treatment per plan of care  -EN       Care Plan Review -- care plan/treatment goals reviewed  -EN       Care Plan Review, Other Participant(s) -- spouse  -EN          Recommendations    Therapy Frequency (Swallow) 5 days per week  -CH 5 days per week  -EN       Predicted Duration Therapy Intervention (Days) -- until discharge  -EN       SLP Diet Recommendation puree;honey thick liquids;no mixed consistencies  - puree;honey thick liquids;no mixed consistencies  -EN       Recommended Precautions and Strategies upright posture during/after eating;small bites of food and sips of liquid;no straw;alternate between small bites of food and sips of liquid;general aspiration precautions;1:1 supervision  - upright posture during/after eating;small bites of food and sips of liquid;no straw;alternate between small bites of food and sips of liquid;general aspiration precautions;1:1 supervision  -EN       Oral Care Recommendations Oral Care BID/PRN;Suction toothbrush  - Oral Care BID/PRN;Suction toothbrush  -EN       SLP Rec. for Method of Medication Administration meds crushed;with puree  -CH meds crushed;with puree  -EN       Monitor for Signs of Aspiration notify SLP if any concerns  - notify SLP if any concerns  -EN       Anticipated Discharge Disposition (SLP) -- inpatient rehabilitation facility;skilled nursing facility  -EN                 User Key  (r) = Recorded By, (t) = Taken By, (c) = Cosigned By      Initials Name Effective Dates     Vi Diop MS CCC-SLP 06/16/21 -     EN Milagros Carlton MS CCC-SLP 06/22/22 -                     EDUCATION  The patient has been educated in the following areas:   Home Exercise Program (HEP) Dysphagia  (Swallowing Impairment) Oral Care/Hydration Modified Diet Instruction.        SLP GOALS       Row Name 11/03/23 1300 11/01/23 1430          (LTG) Patient will demonstrate functional swallow for    Diet Texture (Demonstrate functional swallow) soft to chew (chopped) textures  -CH soft to chew (chopped) textures  -EN     Liquid viscosity (Demonstrate functional swallow) nectar/ mildly thick liquids  -CH nectar/ mildly thick liquids  -EN     [place] (Demonstrate functional swallow) with minimal cues (75-90% accuracy)  -CH with minimal cues (75-90% accuracy)  -EN     Time Frame (Demonstrate functional swallow) by discharge  -CH by discharge  -EN     Barriers (Demonstrate functional swallow) cognition  -CH cognition  -EN     Progress/Outcomes (Demonstrate functional swallow) continuing progress toward goal  -CH continuing progress toward goal  -EN     Comment (Demonstrate functional swallow) Anterior loss with HT liquids. Delayed oral transit with pureed trials.  -CH --        (STG) Patient will tolerate trials of    Consistencies Trialed (Tolerate trials) pureed textures;honey/ moderately thick liquids  -CH pureed textures;honey/ moderately thick liquids  -EN     Desired Outcome (Tolerate trials) without signs/symptoms of aspiration;without signs of distress;with adequate oral prep/transit/clearance;with use of compensatory strategies (see comments)  -CH without signs/symptoms of aspiration;without signs of distress;with adequate oral prep/transit/clearance;with use of compensatory strategies (see comments)  -EN     [place] (Tolerate trials) with 1:1 assist/ supervision  -CH with 1:1 assist/ supervision  -EN     Time Frame (Tolerate trials) by discharge  -CH by discharge  -EN     Progress/Outcomes (Tolerate trials) continuing progress toward goal  -CH continuing progress toward goal  -EN     Comment (Tolerate trials) Anterior loss with HT liquids. Delayed oral transit with pureed trials. Mild oral residue  after the swallow. Cleared with cues to swallow again  -CH RN reports occ pocketing but clears w cues.  -EN        (STG) Patient will tolerate therapeutic trials of    Consistencies Trialed (Tolerate therapeutic trials) thin liquids  -CH thin liquids  -EN     Desired Outcome (Tolerate therapeutic trials) without signs/symptoms of aspiration;without signs of distress  -CH without signs/symptoms of aspiration;without signs of distress  -EN     [place] (Tolerate therapeutic trials) with minimal cues (75-90% accuracy)  -CH with minimal cues (75-90% accuracy)  -EN     Time Frame (Tolerate therapeutic trials) by discharge  -CH by discharge  -EN     Progress/Outcomes (Tolerate therapeutic trials) progress slower than expected  -CH progress slower than expected  -EN     Comment (Tolerate therapeutic trials) no s/s ice; cough 100% trials thin H2O  -CH no s/s ice; cough 100% trials thin H2O  -EN        (STG) Labial Strengthening Goal 1 (SLP)    Activity (Labial Strengthening Goal 1, SLP) increase labial tone  -CH increase labial tone  -EN     Increase Labial Tone labial resistance exercises;swallow trials  -CH labial resistance exercises;swallow trials  -EN     [place]/Accuracy (Labial Strengthening Goal 1, SLP) with moderate cues (50-74% accuracy)  -CH with moderate cues (50-74% accuracy)  -EN     Time Frame (Labial Strengthening Goal 1, SLP) short term goal (STG)  -CH --     Progress/Outcomes (Labial Strengthening Goal 1, SLP) goal ongoing  -CH continuing progress toward goal  -EN        (STG) Lingual Strengthening Goal 1 (SLP)    Activity (Lingual Strengthening Goal 1, SLP) increase lingual tone/sensation/control/coordination/movement;increase tongue back strength  -CH increase lingual tone/sensation/control/coordination/movement;increase tongue back strength  -EN     Increase Lingual Tone/Sensation/Control/Coordination/Movement swallow trials;lingual resistance exercises  -CH swallow trials;lingual resistance  exercises  -EN     Increase Tongue Back Strength lingual resistance exercises  -CH lingual resistance exercises  -EN     [place]/Accuracy (Lingual Strengthening Goal 1, SLP) with moderate cues (50-74% accuracy)  -CH with moderate cues (50-74% accuracy)  -EN     Time Frame (Lingual Strengthening Goal 1, SLP) short term goal (STG)  -CH short term goal (STG)  -EN     Progress/Outcomes (Lingual Strengthening Goal 1, SLP) continuing progress toward goal  -CH continuing progress toward goal  -EN     Comment (Lingual Strengthening Goal 1, SLP) Completed swallow trials  -CH --        (STG) Pharyngeal Strengthening Exercise Goal 1 (SLP)    Activity (Pharyngeal Strengthening Goal 1, SLP) increase superior movement of the hyolaryngeal complex;increase anterior movement of the hyolaryngeal complex;increase closure at entrance to airway/closure of airway at glottis;increase squeeze/positive pressure generation;increase tongue base retraction;increase timing  -CH increase superior movement of the hyolaryngeal complex;increase anterior movement of the hyolaryngeal complex;increase closure at entrance to airway/closure of airway at glottis;increase squeeze/positive pressure generation;increase tongue base retraction;increase timing  -EN     Increase Timing prepping - 3 second prep or suck swallow or 3-step swallow  -CH prepping - 3 second prep or suck swallow or 3-step swallow  -EN     Increase Superior Movement of the Hyolaryngeal Complex effortful pitch glide (falsetto + pharyngeal squeeze)  -CH effortful pitch glide (falsetto + pharyngeal squeeze)  -EN     Increase Anterior Movement of the Hyolaryngeal Complex chin tuck against resistance (CTAR)  -CH chin tuck against resistance (CTAR)  -EN     Increase Closure at Entrance to Airway/Closure of Airway at Glottis supraglottic swallow  -CH supraglottic swallow  -EN     Increase Squeeze/Positive Pressure Generation hard effortful swallow  -CH hard effortful swallow  -EN      Increase Tongue Base Retraction tamie  -CH tamie  -EN     [place]/Accuracy (Pharyngeal Strengthening Goal 1, SLP) with moderate cues (50-74% accuracy)  -CH with moderate cues (50-74% accuracy)  -EN     Time Frame (Pharyngeal Strengthening Goal 1, SLP) short term goal (STG)  -CH short term goal (STG)  -EN     Progress/Outcomes (Pharyngeal Strengthening Goal 1, SLP) goal ongoing  -CH continuing progress toward goal  -EN        Patient will demonstrate functional communication skills for return to discharge environment     [place] with moderate cues  -CH with moderate cues  -EN     Time frame by discharge  -CH by discharge  -EN     Progress/Outcomes continuing progress toward goal  -CH continuing progress toward goal  -EN     Comments Following simple directives and able to answer personal information questions with min cues and 75% acc  -CH --        Comprehend Questions Goal 1 (SLP)    Improve Ability to Comprehend Questions Goal 1 (SLP) complex yes/no questions;80%;with minimal cues (75-90%)  -CH complex yes/no questions;80%;with minimal cues (75-90%)  -EN     Time Frame (Comprehend Questions Goal 1, SLP) short term goal (STG)  -CH short term goal (STG)  -EN     Progress (Ability to Comprehend Questions Goal 1, SLP) 40%;with moderate cues (50-74%)  -CH 30%;with maximum cues (25-49%)  -EN     Progress/Outcomes (Comprehend Questions Goal 1, SLP) continuing progress toward goal  -CH continuing progress toward goal  -EN        Follow Directions Goal 2 (SLP)    Improve Ability to Follow Directions Goal 1 (SLP) 2 step commands;80%;with minimal cues (75-90%)  - 2 step commands;80%;with minimal cues (75-90%)  -EN     Time Frame (Follow Directions Goal 1, SLP) short term goal (STG)  -CH short term goal (STG)  -EN     Progress (Ability to Follow Directions Goal 1, SLP) 20%;with moderate cues (50-74%)  -CH 20%;with moderate cues (50-74%)  -EN     Progress/Outcomes (Follow Directions Goal 1, SLP) continuing  progress toward goal  -CH continuing progress toward goal  -EN        Word Retrieval Skills Goal 1 (SLP)    Improve Word Retrieval Skills By Goal 1 (SLP) confrontational naming task;high frequency;responsive naming task;simple;80%;with minimal cues (75-90%)  -CH confrontational naming task;high frequency;responsive naming task;simple;80%;with minimal cues (75-90%)  -EN     Time Frame (Word Retrieval Goal 1, SLP) short term goal (STG)  -CH short term goal (STG)  -EN     Progress (Word Retrieval Skills Goal 1, SLP) 90%;with minimal cues (75-90%)  -CH --     Progress/Outcomes (Word Retrieval Goal 1, SLP) goal ongoing  -CH goal ongoing  -EN        Articulation Goal 1 (SLP)    Improve Articulation Goal 1 (SLP) by over-articulating at phrase level;80%;with moderate cues (50-74%)  -CH by over-articulating at phrase level;80%;with moderate cues (50-74%)  -EN     Time Frame (Articulation Goal 1, SLP) short term goal (STG)  -CH short term goal (STG)  -EN     Progress (Articulation Goal 1, SLP) 30%;with minimal cues (75-90%)  -CH 30%;with minimal cues (75-90%)  -EN     Progress/Outcomes (Articulation Goal 1, SLP) continuing progress toward goal  -CH continuing progress toward goal  -EN        Orientation Goal 1 (SLP)    Improve Orientation Through Goal 1 (SLP) demonstrating orientation to day;demonstrating orientation to month;demonstrating orientation to year;demonstrating orientation to place;demonstrating orientation to disease/impairment;use environmental aids to assist with orientation;80%;with moderate cues (50-74%)  -CH demonstrating orientation to day;demonstrating orientation to month;demonstrating orientation to year;demonstrating orientation to place;demonstrating orientation to disease/impairment;use environmental aids to assist with orientation;80%;with moderate cues (50-74%)  -EN     Time Frame (Orientation Goal 1, SLP) short term goal (STG)  -CH short term goal (STG)  -EN     Progress (Orientation Goal 1, SLP)  50%;with moderate cues (50-74%)  - --     Progress/Outcomes (Orientation Goal 1, SLP) continuing progress toward goal  -CH goal ongoing  -EN     Comment (Orientation Goal 1, SLP) Oriented to self, situation, grossly to place, not to time  - --               User Key  (r) = Recorded By, (t) = Taken By, (c) = Cosigned By      Initials Name Provider Type    Vi Mcdermott MS CCC-SLP Speech and Language Pathologist    Milagros Locke MS CCC-SLP Speech and Language Pathologist                       Time Calculation:    Time Calculation- SLP       Row Name 11/03/23 1521             Time Calculation- SLP    SLP Start Time 1300  -CH      SLP Received On 11/03/23  -CH         Untimed Charges    50287-OV Treatment/ST Modification Prosth Aug Alter  39  -CH      74822-MF Treatment Swallow Minutes 40  -CH         Total Minutes    Untimed Charges Total Minutes 79  -CH       Total Minutes 79  -CH                User Key  (r) = Recorded By, (t) = Taken By, (c) = Cosigned By      Initials Name Provider Type    Vi Mcdermott MS CCC-SLP Speech and Language Pathologist                    Therapy Charges for Today       Code Description Service Date Service Provider Modifiers Qty    15294609689 HC ST TREATMENT SWALLOW 3 11/3/2023 Vi Diop MS CCC-SLP GN 1    74063915432 HC ST TREATMENT SPEECH 3 11/3/2023 Vi Diop MS CCC-SLP GN 1                 Vi Diop MS CCC-NIELS  11/3/2023

## 2023-11-03 NOTE — PROGRESS NOTES
Neurology       Patient Care Team:  Susan Powers APRN as PCP - General (Family Medicine)    Chief complaint: Altered mental state    History: Patient had a very good day yesterday.  He was awake all day required 1 as needed dose of the phenobarbital 64 mg.    He is not in restraints and has not had any injectables.      Past Medical History:   Diagnosis Date    Acid reflux     Cancer     Skin    Diabetes mellitus     Prediabetes    GERD (gastroesophageal reflux disease) 09/15/2023    HTN (hypertension) 09/15/2023    Kidney disease     T2DM (type 2 diabetes mellitus) 09/15/2023       Vital Signs   Vitals:    11/01/23 2053 11/02/23 0732 11/02/23 1900 11/03/23 0730   BP: 121/78 111/66 126/66 130/62   BP Location: Right arm Left arm Left arm Left arm   Patient Position: Lying Lying Lying Lying   Pulse: 95 100 89 105   Resp: 18 20 18 17   Temp: 97.2 °F (36.2 °C) 98.5 °F (36.9 °C) 99.4 °F (37.4 °C) 98.6 °F (37 °C)   TempSrc: Temporal Temporal Temporal Temporal   SpO2: 95% 94% 96% 94%   Weight:       Height:           Physical Exam:   General: Mildly restless but calm              Neuro: Interactive    Results Review:  Phenobarb level is 22  Results from last 7 days   Lab Units 11/01/23  0715   WBC 10*3/mm3 6.45   HEMOGLOBIN g/dL 12.1*   HEMATOCRIT % 36.8*   PLATELETS 10*3/mm3 313     Results from last 7 days   Lab Units 11/01/23  0715   SODIUM mmol/L 138   POTASSIUM mmol/L 4.3   CHLORIDE mmol/L 101   CO2 mmol/L 27.0   BUN mg/dL 22   CREATININE mg/dL 0.70*   CALCIUM mg/dL 9.1   BILIRUBIN mg/dL 0.3   ALK PHOS U/L 84   ALT (SGPT) U/L 42*   AST (SGOT) U/L 27   GLUCOSE mg/dL 189*       Imaging Results (Last 24 Hours)       ** No results found for the last 24 hours. **            Assessment:  Mental state has improved dramatically.  The patient is stable on phenobarbital    Plan:  Okay to discharge anytime beds available on current medication right    Comment:  Doing well finally         I discussed the patients findings and  my recommendations with patient, family, and nursing staff    Toni Rausch MD  11/03/23  12:07 EDT

## 2023-11-03 NOTE — PLAN OF CARE
Goal Outcome Evaluation:            VSS, on RA. Alert to self. Denies SOB. No c/o of pain. Patient has slept off and on throughout the night. When awake patient appears restless. Restraints remain off. Patient received tube feeds and tolerated them well. This RN at bedside,bed in lowest position,  patient has no further needs.

## 2023-11-03 NOTE — CASE MANAGEMENT/SOCIAL WORK
Continued Stay Note  [facility]     Patient Name: Kenneth Wen  MRN: [id number]  Today's Date: 11/3/2023    Admit Date: 9/15/2023    Plan: placement with transportation provided.   Discharge Plan       Row Name 11/03/23 0954       Plan    Plan placement with transportation provided.    Plan Comments SW'er met with patient at bedside. CM will continue to monitor hospital course for medical stability. Prefers [facility] in [place]. Plan is placement with transportation provided.                   Discharge Codes    No documentation.                 Expected Discharge Date and Time       Expected Discharge Date Expected Discharge Time    Nov 7, 2023               JOEL Navarro (Kay)

## 2023-11-03 NOTE — PLAN OF CARE
Goal Outcome Evaluation:  Plan of Care Reviewed With: patient, spouse        Progress: improving  Outcome Evaluation: Pt VSS, minimal complaints of pain; only to right upper extremity- voltaren gel applied, scheduled pain medication given, pillow support to extremity provided, resting promoted- pain controlled. Pt confused, alert only to self and only at times. Pt restless most of the shift. Pt became somewhat combative and irrtitable with staff and wife at times (grabbing and not wanting to let go, trying to hit when changing pt and at times when giving bolus feeds. kicking as well) Dose of prn phenobarbitol given once this shift. Pt needs continual redirection to stay in bed, keep extremities in bed, as well as to not pull at tubing and equipment. Singing and listening to hymnal music helpful with calming and redirection of patient this shift. Scheduled Phenobarb and seroquel, as well as other medications, given via PEG tube. Tube Feeding and water flushes given per order throughout the shift, pt tolerating well. Very minimal appetite, pt taking one to two bites of each meal with encouragement. Pt taking some sips of honey thickened liquids throughout the shift with encouragement. Oral care completed as needed throughout the shift. Pt coughing up thick white/clear phlegm, oral suctioning assistance provided as needed. Pt incontinent of bowel and bladder, changed and pat-care provided as needed. Minimal skin redness in areas, yet blanchable. Specialty bed utilized. Wife at bedside most of the day- assisting staff with care and redirection of pt when able. Sitter at bedside throughout the shift. Will cont to monitor.

## 2023-11-03 NOTE — PROGRESS NOTES
[facility] Medicine Services  PROGRESS NOTE    Patient Name: Kenneth Wen  : 1950  MRN: [id number]    Date of Admission: 9/15/2023  Primary Care Physician: Susan Powers APRN    Subjective   Subjective     CC:  cva    HPI:   - Patient doing better today out of restraints.  Wife is at bedside.  He is asking for something to drink.  Neurology saw him and has adjusted meds     -patient is more restless today.  Nursing reports he did not sleep well last night.  He remains out of restraints but is fidgety, hanging his legs over the bed.  He is tolerating p.o. and denies pain when I asked him.    11/3 -seems to be sleeping better with medication adjustments.  Remains out of restraints and is redirectable.  He is in quite a good mood today laughing and joking but was also tearful when discussing how long he has been with his wife.    Objective   Objective     Vital Signs:   Temp:  [98.6 °F (37 °C)-99.4 °F (37.4 °C)] 98.6 °F (37 °C)  Heart Rate:  [] 105  Resp:  [17-18] 17  BP: (126-130)/(62-66) 130/62     Physical Exam:  Constitutional: No acute distress, awake, alert  HENT: NCAT, mucous membranes moist  Respiratory: Clear to auscultation bilaterally, respiratory effort normal   Cardiovascular: RRR  Gastrointestinal: Positive bowel sounds, soft, nontender, nondistended  Musculoskeletal: Lying in bed  Psychiatric: Appropriate affect, mostly cooperative but fidgety  Neurologic: Oriented to person, generally weak, speech mumbled but comprehensible  Skin: No rashes      Results Reviewed:  LAB RESULTS:      Lab 23  0715   WBC 6.45   HEMOGLOBIN 12.1*   HEMATOCRIT 36.8*   PLATELETS 313   MCV 96.6         Lab 23  0715   SODIUM 138   POTASSIUM 4.3   CHLORIDE 101   CO2 27.0   ANION GAP 10.0   BUN 22   CREATININE 0.70*   EGFR 97.9   GLUCOSE 189*   CALCIUM 9.1         Lab 23  0715   TOTAL PROTEIN 5.2*   ALBUMIN 3.7   GLOBULIN 1.5   ALT (SGPT) 42*   AST (SGOT) 27    BILIRUBIN 0.3   ALK PHOS 84                     Brief Urine Lab Results  (Last result in the past 365 days)        Color   Clarity   Blood   Leuk Est   Nitrite   Protein   CREAT   Urine HCG        10/30/23 1636 Yellow   Clear   Negative   Negative   Negative   Negative                   Microbiology Results Abnormal       Procedure Component Value - Date/Time    Blood Culture - Blood, Arm, Right [[id number]]  (Normal) Collected: 09/15/23 0212    Lab Status: Final result Specimen: Blood from Arm, Right Updated: 09/20/23 0230     Blood Culture No growth at 5 days    Blood Culture - Blood, Arm, Left [[id number]]  (Normal) Collected: 09/15/23 0212    Lab Status: Final result Specimen: Blood from Arm, Left Updated: 09/20/23 0230     Blood Culture No growth at 5 days            No radiology results from the last 24 hrs        Current medications:  Scheduled Meds:atorvastatin, 80 mg, Per PEG Tube, Nightly  clopidogrel, 75 mg, Per PEG Tube, Daily  Diclofenac Sodium, 2 g, Topical, BID  donepezil, 10 mg, Per PEG Tube, Nightly  fluticasone, 2 spray, Each Nare, Daily  HYDROcodone-acetaminophen, 1 tablet, Per PEG Tube, Q12H  lansoprazole, 15 mg, Per PEG Tube, Q AM  Lidocaine, 1 patch, Transdermal, Q24H  melatonin, 10 mg, Per PEG Tube, Nightly  metoprolol tartrate, 25 mg, Per PEG Tube, Q12H  miconazole, 1 application , Topical, Q12H  mirtazapine, 30 mg, Per PEG Tube, Nightly  palliative care oral rinse, , Mouth/Throat, 4x Daily  PHENobarbital, 81 mg, Per PEG Tube, Q8H  ProSource No Carb, 30 mL, Per G Tube, Daily  QUEtiapine, 200 mg, Per PEG Tube, Q12H  temazepam, 15 mg, Per PEG Tube, Nightly      Continuous Infusions:   PRN Meds:.  Calcium Replacement - Follow Nurse / BPA Driven Protocol    dextrose    glucagon (human recombinant)    ibuprofen    Magnesium Standard Dose Replacement - Follow Nurse / BPA Driven Protocol    ondansetron    PHENobarbital    Phosphorus Replacement - Follow Nurse / BPA Driven Protocol    Potassium  Replacement - Follow Nurse / BPA Driven Protocol    ziprasidone    Assessment & Plan   Assessment & Plan     Active Hospital Problems    Diagnosis  POA    **Hemorrhagic CVA [I61.9]  Yes    Oropharyngeal dysphagia [R13.12]  Yes    Multiple bilateral CVAs [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        Brief Hospital Course to date:  Kenneth Wen is a 73 y.o. male with hx of HTN, HLD, T2DM, and GERD who presented to OSH with multiple acute ischemic infarcts of the bilateral cerebral and cerebellar hemispheres as well as left isaac. TTE/JOSIAS was negative PFO at OSH. On 9/13/23 he had worsening in mental status and new inability to move RLE, head CT showed evolution of the existing CVA with new development of petechial hemorrhage. DAPT was held for 24 hours per Neurology recommendations and repeat CT head that evening showed worsening effacement of the right quadrigeminal cistern with suspected increasing upward supratentorial pressure. He was then transferred to [facility] for Neurosurgery evaluation on 9/15/23. He was started on hypertonic saline 9/15/23 through 9/20/23 for cerebral edema. He had fevers with negative cultures but had worsening secretions and labored breathing so was started on Zosyn on 9/16/23. Transferred to the hospitalist service on 9/22/23. Pt demonstrated poor oral intake with elevated sodium levels; therefore, PEG tube was recommended & placed.        Goals of care  --Palliative and Hospice follow     Multiple bilateral posterior circulation strokes with hemorrhagic conversion  -- suspect atheroembolic but cannot rule out cardioembolic given multiple vascular territories  --Plavix monotherapy, high intensity statin  --Needs Holter  monitor at discharge  --Neurology follows     Agitation, improved  Encephalopathy, improving  --Donepezil 10 mg nightly, melatonin 10 mg nightly, mirtazapine 30 mg nightly, Restoril 15 mg nightly  - iSeroquel 200 mg BID  - increased phenobarbitol tSunday   --Continue as needed Geodon  -- remains restless but improved on 11/1/2023 through 11/3/2023     Dysphagia  Hypernatremia - resolved  - PEG tube placed 9/29, Dr Carrillo   --SLP recommends mechanical ground with honey thick liquids   --Risk of pulling out peg tube, abd binder to be in place at all times.   -- dietary following , adequate PO intake is limited by his mental status     HTN  --Continue Metoprolol 25 mg BID     GERD  --Continue PPI     COVID-19-resolved  --Overall asymptomatic and on room air  --No infiltrates seen and low procal  --Did not receive any treatment   --Off Isolation 9/30/2023      Leukocytosis-resolved  --Procalcitionin low at 0.05  --CXR and UA negative  --Blood cultures negative     Elevated LFT's, mild  --Possibly secondary to statin?  --Negative acute hepatitis panel  --repeat AST/ALT improved        Expected Discharge Location and Transportation: Rehab  Expected Discharge   Expected Discharge Date: 11/7/2023; Expected Discharge Time:      DVT prophylaxis:  Mechanical DVT prophylaxis orders are present.     AM-PAC 6 Clicks Score (PT): 12 (11/02/23 2011)    CODE STATUS:   Code Status and Medical Interventions:   Ordered at: 09/29/23 0854     Medical Intervention Limits:    NO intubation (DNI)     Code Status (Patient has no pulse and is not breathing):    No CPR (Do Not Attempt to Resuscitate)     Medical Interventions (Patient has pulse or is breathing):    Limited Support       Trina Mclaughlin MD  11/03/23

## 2023-11-03 NOTE — PLAN OF CARE
Goal Outcome Evaluation:  Plan of Care Reviewed With: spouse        Progress: improving  Outcome Evaluation: Pt out of restraints today, tolerating PO meds, has not required injectable prn for agitation. Pt smiling, appeared calmed by sitter singing at bedside. Requested  to provide CD player and CD with vocal music. CM working on possible placement when medically ready for discharge. Palliative following for continued support to pt and spouse.    1300 Palliative IDT meeting:  MD, APRN, RN,   After hours, weekends and holidays, contact Palliative Provider by calling [phone]     Problem: Palliative Care  Goal: Enhanced Quality of Life  Outcome: Ongoing, Progressing  Intervention: Maximize Comfort  Flowsheets (Taken 11/3/2023 1455)  Pain Management Interventions: (calmer with medication changes, out of restraints today, no injectable prns given today)   pain management plan reviewed with patient/caregiver   other (see comments)  Intervention: Optimize Function  Flowsheets (Taken 11/3/2023 1455)  Sensory Stimulation Regulation: ( to provide CD player, vocal music CD) other (see comments)  Sleep/Rest Enhancement: family presence promoted  Intervention: Optimize Psychosocial Wellbeing  Flowsheets (Taken 11/3/2023 1455)  Family/Support System Care:   caregiver stress acknowledged   self-care encouraged   support provided

## 2023-11-04 LAB
GLUCOSE BLDC GLUCOMTR-MCNC: 105 MG/DL (ref 70–130)
GLUCOSE BLDC GLUCOMTR-MCNC: 136 MG/DL (ref 70–130)
GLUCOSE BLDC GLUCOMTR-MCNC: 164 MG/DL (ref 70–130)
GLUCOSE BLDC GLUCOMTR-MCNC: 174 MG/DL (ref 70–130)
GLUCOSE BLDC GLUCOMTR-MCNC: 213 MG/DL (ref 70–130)

## 2023-11-04 PROCEDURE — 82948 REAGENT STRIP/BLOOD GLUCOSE: CPT

## 2023-11-04 PROCEDURE — 25010000002 ZIPRASIDONE MESYLATE PER 10 MG: Performed by: PSYCHIATRY & NEUROLOGY

## 2023-11-04 PROCEDURE — 99232 SBSQ HOSP IP/OBS MODERATE 35: CPT | Performed by: FAMILY MEDICINE

## 2023-11-04 RX ADMIN — DONEPEZIL HYDROCHLORIDE 10 MG: 10 TABLET, FILM COATED ORAL at 17:19

## 2023-11-04 RX ADMIN — Medication 30 ML: at 08:31

## 2023-11-04 RX ADMIN — DICLOFENAC SODIUM 2 G: 9.3 GEL TOPICAL at 20:00

## 2023-11-04 RX ADMIN — MIRTAZAPINE 30 MG: 15 TABLET, FILM COATED ORAL at 17:20

## 2023-11-04 RX ADMIN — LANSOPRAZOLE 15 MG: 15 TABLET, ORALLY DISINTEGRATING ORAL at 05:28

## 2023-11-04 RX ADMIN — QUETIAPINE FUMARATE 200 MG: 100 TABLET ORAL at 20:02

## 2023-11-04 RX ADMIN — METOPROLOL TARTRATE 25 MG: 25 TABLET, FILM COATED ORAL at 08:29

## 2023-11-04 RX ADMIN — CLOPIDOGREL BISULFATE 75 MG: 75 TABLET ORAL at 08:29

## 2023-11-04 RX ADMIN — HYDROCODONE BITARTRATE AND ACETAMINOPHEN 1 TABLET: 5; 325 TABLET ORAL at 11:44

## 2023-11-04 RX ADMIN — MINERAL OIL: 1000 LIQUID ORAL at 08:30

## 2023-11-04 RX ADMIN — QUETIAPINE FUMARATE 200 MG: 100 TABLET ORAL at 08:29

## 2023-11-04 RX ADMIN — TEMAZEPAM 15 MG: 15 CAPSULE ORAL at 17:20

## 2023-11-04 RX ADMIN — MINERAL OIL: 1000 LIQUID ORAL at 20:00

## 2023-11-04 RX ADMIN — PHENOBARBITAL 81 MG: 32.4 TABLET ORAL at 22:23

## 2023-11-04 RX ADMIN — LIDOCAINE 1 PATCH: 4 PATCH TOPICAL at 08:29

## 2023-11-04 RX ADMIN — DICLOFENAC SODIUM 2 G: 9.3 GEL TOPICAL at 08:29

## 2023-11-04 RX ADMIN — MICONAZOLE NITRATE 1 APPLICATION: 20 CREAM TOPICAL at 20:00

## 2023-11-04 RX ADMIN — MINERAL OIL: 1000 LIQUID ORAL at 11:43

## 2023-11-04 RX ADMIN — PHENOBARBITAL 64.8 MG: 64.8 TABLET ORAL at 14:30

## 2023-11-04 RX ADMIN — ZIPRASIDONE MESYLATE 10 MG: 20 INJECTION, POWDER, LYOPHILIZED, FOR SOLUTION INTRAMUSCULAR at 13:50

## 2023-11-04 RX ADMIN — MINERAL OIL: 1000 LIQUID ORAL at 17:21

## 2023-11-04 RX ADMIN — HYDROCODONE BITARTRATE AND ACETAMINOPHEN 1 TABLET: 5; 325 TABLET ORAL at 23:46

## 2023-11-04 RX ADMIN — MICONAZOLE NITRATE 1 APPLICATION: 20 CREAM TOPICAL at 08:29

## 2023-11-04 RX ADMIN — Medication 10 MG: at 17:19

## 2023-11-04 RX ADMIN — HYDROCODONE BITARTRATE AND ACETAMINOPHEN 1 TABLET: 5; 325 TABLET ORAL at 00:30

## 2023-11-04 RX ADMIN — FLUTICASONE PROPIONATE 2 SPRAY: 50 SPRAY, METERED NASAL at 08:30

## 2023-11-04 RX ADMIN — ATORVASTATIN CALCIUM 80 MG: 40 TABLET, FILM COATED ORAL at 20:01

## 2023-11-04 RX ADMIN — PHENOBARBITAL 81 MG: 32.4 TABLET ORAL at 11:43

## 2023-11-04 RX ADMIN — PHENOBARBITAL 81 MG: 32.4 TABLET ORAL at 05:27

## 2023-11-04 NOTE — PROGRESS NOTES
[facility] Medicine Services  PROGRESS NOTE    Patient Name: Kenneth Wen  : 1950  MRN: [id number]    Date of Admission: 9/15/2023  Primary Care Physician: Susan Powers APRN    Subjective   Subjective     CC:  cva    HPI:   - Patient doing better today out of restraints.  Wife is at bedside.  He is asking for something to drink.  Neurology saw him and has adjusted meds     -patient is more restless today.  Nursing reports he did not sleep well last night.  He remains out of restraints but is fidgety, hanging his legs over the bed.  He is tolerating p.o. and denies pain when I asked him.    11/3 -seems to be sleeping better with medication adjustments.  Remains out of restraints and is redirectable.  He is in quite a good mood today laughing and joking but was also tearful when discussing how long he has been with his wife.     -continues to improve.  Slept better.  Still has periods where he gets restless.    Objective   Objective     Vital Signs:   Temp:  [97 °F (36.1 °C)-98.9 °F (37.2 °C)] 98.9 °F (37.2 °C)  Heart Rate:  [] 91  Resp:  [16-18] 18  BP: (120-158)/(73-90) 120/73     Physical Exam:  Constitutional: No acute distress, awake, alert  HENT: NCAT, mucous membranes moist  Respiratory: Clear to auscultation bilaterally, respiratory effort normal   Cardiovascular: RRR  Gastrointestinal: Positive bowel sounds, soft, nontender, nondistended  Musculoskeletal: Lying in bed  Psychiatric: A little restless today  Neurologic: Oriented to person, generally weak, speech mumbled but comprehensible  Skin: No rashes      Results Reviewed:  LAB RESULTS:      Lab 2315   WBC 6.45   HEMOGLOBIN 12.1*   HEMATOCRIT 36.8*   PLATELETS 313   MCV 96.6         Lab 2315   SODIUM 138   POTASSIUM 4.3   CHLORIDE 101   CO2 27.0   ANION GAP 10.0   BUN 22   CREATININE 0.70*   EGFR 97.9   GLUCOSE 189*   CALCIUM 9.1         Lab 2315   TOTAL PROTEIN 5.2*    ALBUMIN 3.7   GLOBULIN 1.5   ALT (SGPT) 42*   AST (SGOT) 27   BILIRUBIN 0.3   ALK PHOS 84                     Brief Urine Lab Results  (Last result in the past 365 days)        Color   Clarity   Blood   Leuk Est   Nitrite   Protein   CREAT   Urine HCG        10/30/23 1636 Yellow   Clear   Negative   Negative   Negative   Negative                   Microbiology Results Abnormal       Procedure Component Value - Date/Time    Blood Culture - Blood, Arm, Right [[id number]]  (Normal) Collected: 09/15/23 0212    Lab Status: Final result Specimen: Blood from Arm, Right Updated: 09/20/23 0230     Blood Culture No growth at 5 days    Blood Culture - Blood, Arm, Left [[id number]]  (Normal) Collected: 09/15/23 0212    Lab Status: Final result Specimen: Blood from Arm, Left Updated: 09/20/23 0230     Blood Culture No growth at 5 days            No radiology results from the last 24 hrs        Current medications:  Scheduled Meds:atorvastatin, 80 mg, Per PEG Tube, Nightly  clopidogrel, 75 mg, Per PEG Tube, Daily  Diclofenac Sodium, 2 g, Topical, BID  donepezil, 10 mg, Per PEG Tube, Nightly  fluticasone, 2 spray, Each Nare, Daily  HYDROcodone-acetaminophen, 1 tablet, Per PEG Tube, Q12H  lansoprazole, 15 mg, Per PEG Tube, Q AM  Lidocaine, 1 patch, Transdermal, Q24H  melatonin, 10 mg, Per PEG Tube, Nightly  metoprolol tartrate, 25 mg, Per PEG Tube, Q12H  miconazole, 1 application , Topical, Q12H  mirtazapine, 30 mg, Per PEG Tube, Nightly  palliative care oral rinse, , Mouth/Throat, 4x Daily  PHENobarbital, 81 mg, Per PEG Tube, Q8H  ProSource No Carb, 30 mL, Per G Tube, Daily  QUEtiapine, 200 mg, Per PEG Tube, Q12H  temazepam, 15 mg, Per PEG Tube, Nightly      Continuous Infusions:   PRN Meds:.  Calcium Replacement - Follow Nurse / BPA Driven Protocol    dextrose    glucagon (human recombinant)    ibuprofen    Magnesium Standard Dose Replacement - Follow Nurse / BPA Driven Protocol    ondansetron    PHENobarbital    Phosphorus  Replacement - Follow Nurse / BPA Driven Protocol    Potassium Replacement - Follow Nurse / BPA Driven Protocol    ziprasidone    Assessment & Plan   Assessment & Plan     Active Hospital Problems    Diagnosis  POA    **Hemorrhagic CVA [I61.9]  Yes    Oropharyngeal dysphagia [R13.12]  Yes    Multiple bilateral CVAs [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        Brief Hospital Course to date:  Kenneth Wen is a 73 y.o. male with hx of HTN, HLD, T2DM, and GERD who presented to OSH with multiple acute ischemic infarcts of the bilateral cerebral and cerebellar hemispheres as well as left isaac. TTE/JOSIAS was negative PFO at OSH. On 9/13/23 he had worsening in mental status and new inability to move RLE, head CT showed evolution of the existing CVA with new development of petechial hemorrhage. DAPT was held for 24 hours per Neurology recommendations and repeat CT head that evening showed worsening effacement of the right quadrigeminal cistern with suspected increasing upward supratentorial pressure. He was then transferred to [facility] for Neurosurgery evaluation on 9/15/23. He was started on hypertonic saline 9/15/23 through 9/20/23 for cerebral edema. He had fevers with negative cultures but had worsening secretions and labored breathing so was started on Zosyn on 9/16/23. Transferred to the hospitalist service on 9/22/23. Pt demonstrated poor oral intake with elevated sodium levels; therefore, PEG tube was recommended & placed.        Goals of care  --Palliative follows, patient has improved     Multiple bilateral posterior circulation strokes with hemorrhagic conversion  -- suspect atheroembolic but cannot rule out cardioembolic given multiple vascular  territories  --Plavix monotherapy, high intensity statin  --Needs Holter monitor at discharge  --Neurology follows  -PT follows and he is making improvements     Agitation, improved  Encephalopathy, improving  --Donepezil 10 mg nightly, melatonin 10 mg nightly, mirtazapine 30 mg nightly, Restoril 15 mg nightly  - Seroquel 200 mg BID  -Continue phenobarbitol  --Continue as needed Geodon  -- Continues to improve since 11/1/2023 and has not required restraints since prior to that     Dysphagia  Hypernatremia - resolved  - PEG tube placed 9/29, Dr Carrillo   --SLP recommends mechanical ground with honey thick liquids   --Risk of pulling out peg tube, abd binder to be in place at all times.   -- dietary following , adequate PO intake is limited by his mental status     HTN  --Continue Metoprolol 25 mg BID     GERD  --Continue PPI     COVID-19-resolved  --Overall asymptomatic and on room air  --No infiltrates seen and low procal  --Did not receive any treatment   --Off Isolation 9/30/2023      Leukocytosis-resolved  --Procalcitionin low at 0.05  --CXR and UA negative  --Blood cultures negative     Elevated LFT's, mild  --Possibly secondary to statin?  --Negative acute hepatitis panel  --repeat AST/ALT improved        Expected Discharge Location and Transportation: Rehab  Expected Discharge   Expected Discharge Date: 11/7/2023; Expected Discharge Time:      DVT prophylaxis:  Mechanical DVT prophylaxis orders are present.     AM-PAC 6 Clicks Score (PT): 9 (11/04/23 0728)    CODE STATUS:   Code Status and Medical Interventions:   Ordered at: 09/29/23 0854     Medical Intervention Limits:    NO intubation (DNI)     Code Status (Patient has no pulse and is not breathing):    No CPR (Do Not Attempt to Resuscitate)     Medical Interventions (Patient has pulse or is breathing):    Limited Support       Trina Mclaughlin MD  11/04/23

## 2023-11-04 NOTE — PLAN OF CARE
Goal Outcome Evaluation:  Plan of Care Reviewed With: patient        Progress: declining  Outcome Evaluation: Pt VSS. Pt more irritable and combative today. At beginning of the shift, pt was able to answer person, place, and situation appropriately without prompting and was only disoriented to time- this orientation lasted for about 45 minutes and then pt was confused to place, time and situation, only alert to self and has continued thus far this shift. Pt also hallucinating people and things in the room. Pt needing continued redirection throughout the shift to remain in bed and to not pull at/out tubing, dressings, and equipment.  Pt become combative with staff, trying to hit, punch and kick. At that time, pt was able to be calmed down with music and redirection and scheduled medication. Later on in the shift however, pt became combative again- trying to grab and hit wife who was at bedside and belligerent  again with staff as well. This time, Music, redirection, calm environment did not work. PRN Geodon needed, later prn phenobarb needed as well for continued agitation. Pt now resting peacefully, continous pulse ox showing 99% on room air. Pt changed and repositioned as needed thorughout the shift. Miconizole cream utilized on groin and chest area. When at bedside, patients wife assisting staff with pt care and calming. Pt suctioned orally with yankauer as needed for thick clear/white phlegm. Pt low appetite today. Only took one bite of breakfast today and refused to take anymore of other meals though asked and encouraged. Pt would only take some sips of honey thickened liquid this shift. Oral care provided. Pt tolerating Tube feedings through PEG tube. Sitter at bedside. Will cont to monitor.

## 2023-11-04 NOTE — PLAN OF CARE
Goal Outcome Evaluation:  Plan of Care Reviewed With: patient        Progress: improving  Outcome Evaluation: VSS on RA. Pt has slept majority of night. Restrains has not been needed or PRN medication. Pt shifts porfirio moves independently in bed. Staff helped with turning while pt was sleeping. Pt tolerating tube feeds. Redness and small amount of drainage noted around feeding tube insertion. Sutures remain intact. Site cleaned with normal saline and new split gauze applied. Pt also has red rash noted under abd binder. Area cleaned with foam cleanser and blue wipes and antifungal cream applied. Continuing to wait placement.

## 2023-11-05 LAB
GLUCOSE BLDC GLUCOMTR-MCNC: 114 MG/DL (ref 70–130)
GLUCOSE BLDC GLUCOMTR-MCNC: 137 MG/DL (ref 70–130)

## 2023-11-05 PROCEDURE — 82948 REAGENT STRIP/BLOOD GLUCOSE: CPT

## 2023-11-05 PROCEDURE — 99232 SBSQ HOSP IP/OBS MODERATE 35: CPT | Performed by: FAMILY MEDICINE

## 2023-11-05 RX ORDER — HYDROCODONE BITARTRATE AND ACETAMINOPHEN 5; 325 MG/1; MG/1
1 TABLET ORAL EVERY 12 HOURS
Qty: 1000 TABLET | Refills: 0 | Status: DISCONTINUED | OUTPATIENT
Start: 2023-11-05 | End: 2023-11-08

## 2023-11-05 RX ORDER — HYDROCODONE BITARTRATE AND ACETAMINOPHEN 5; 325 MG/1; MG/1
1 TABLET ORAL EVERY 12 HOURS
Status: CANCELLED | OUTPATIENT
Start: 2023-11-05 | End: 2023-11-12

## 2023-11-05 RX ADMIN — MINERAL OIL: 1000 LIQUID ORAL at 12:54

## 2023-11-05 RX ADMIN — Medication 30 ML: at 09:15

## 2023-11-05 RX ADMIN — TEMAZEPAM 15 MG: 15 CAPSULE ORAL at 18:21

## 2023-11-05 RX ADMIN — MINERAL OIL: 1000 LIQUID ORAL at 09:14

## 2023-11-05 RX ADMIN — METOPROLOL TARTRATE 25 MG: 25 TABLET, FILM COATED ORAL at 09:13

## 2023-11-05 RX ADMIN — LANSOPRAZOLE 15 MG: 15 TABLET, ORALLY DISINTEGRATING ORAL at 05:36

## 2023-11-05 RX ADMIN — CLOPIDOGREL BISULFATE 75 MG: 75 TABLET ORAL at 09:13

## 2023-11-05 RX ADMIN — MINERAL OIL: 1000 LIQUID ORAL at 18:21

## 2023-11-05 RX ADMIN — PHENOBARBITAL 81 MG: 32.4 TABLET ORAL at 05:35

## 2023-11-05 RX ADMIN — QUETIAPINE FUMARATE 200 MG: 100 TABLET ORAL at 21:09

## 2023-11-05 RX ADMIN — DICLOFENAC SODIUM 2 G: 9.3 GEL TOPICAL at 21:10

## 2023-11-05 RX ADMIN — HYDROCODONE BITARTRATE AND ACETAMINOPHEN 1 TABLET: 5; 325 TABLET ORAL at 13:18

## 2023-11-05 RX ADMIN — MINERAL OIL: 1000 LIQUID ORAL at 21:09

## 2023-11-05 RX ADMIN — MIRTAZAPINE 30 MG: 15 TABLET, FILM COATED ORAL at 18:21

## 2023-11-05 RX ADMIN — LIDOCAINE 1 PATCH: 4 PATCH TOPICAL at 09:15

## 2023-11-05 RX ADMIN — FLUTICASONE PROPIONATE 2 SPRAY: 50 SPRAY, METERED NASAL at 09:14

## 2023-11-05 RX ADMIN — PHENOBARBITAL 81 MG: 32.4 TABLET ORAL at 12:55

## 2023-11-05 RX ADMIN — DICLOFENAC SODIUM 2 G: 9.3 GEL TOPICAL at 09:14

## 2023-11-05 RX ADMIN — QUETIAPINE FUMARATE 200 MG: 100 TABLET ORAL at 09:13

## 2023-11-05 RX ADMIN — PHENOBARBITAL 81 MG: 32.4 TABLET ORAL at 21:09

## 2023-11-05 RX ADMIN — DONEPEZIL HYDROCHLORIDE 10 MG: 10 TABLET, FILM COATED ORAL at 18:21

## 2023-11-05 RX ADMIN — MICONAZOLE NITRATE 1 APPLICATION: 20 CREAM TOPICAL at 09:14

## 2023-11-05 RX ADMIN — Medication 10 MG: at 18:21

## 2023-11-05 RX ADMIN — ATORVASTATIN CALCIUM 80 MG: 40 TABLET, FILM COATED ORAL at 21:09

## 2023-11-05 RX ADMIN — MICONAZOLE NITRATE 1 APPLICATION: 20 CREAM TOPICAL at 21:10

## 2023-11-05 RX ADMIN — METOPROLOL TARTRATE 25 MG: 25 TABLET, FILM COATED ORAL at 21:09

## 2023-11-05 NOTE — PROGRESS NOTES
[facility] Medicine Services  PROGRESS NOTE    Patient Name: Kenneth Wen  : 1950  MRN: [id number]    Date of Admission: 9/15/2023  Primary Care Physician: Susan Powers APRN    Subjective   Subjective     CC:  cva    HPI:   - Patient doing better today out of restraints.  Wife is at bedside.  He is asking for something to drink.  Neurology saw him and has adjusted meds     -patient is more restless today.  Nursing reports he did not sleep well last night.  He remains out of restraints but is fidgety, hanging his legs over the bed.  He is tolerating p.o. and denies pain when I asked him.    11/3 -seems to be sleeping better with medication adjustments.  Remains out of restraints and is redirectable.  He is in quite a good mood today laughing and joking but was also tearful when discussing how long he has been with his wife.     -continues to improve.  Slept better.  Still has periods where he gets restless.     -yesterday after rounds patient became increasingly agitated and was acting out towards staff and his wife.  Geodon and phenobarb was given and he improved.  Overnight he had no additional meds as needed.  He did not eat very much yesterday.    Objective   Objective     Vital Signs:   Temp:  [97.7 °F (36.5 °C)-99.1 °F (37.3 °C)] 99.1 °F (37.3 °C)  Heart Rate:  [] 113  Resp:  [14-18] 18  BP: (104-131)/(66-68) 131/68     Physical Exam:  Constitutional: No acute distress, awake, alert  HENT: NCAT, mucous membranes moist  Respiratory: Clear to auscultation bilaterally, respiratory effort normal   Cardiovascular: RRR  Gastrointestinal: Positive bowel sounds, soft, nontender, nondistended  Musculoskeletal: Lying in bed  Psychiatric: Mostly cooperative  Neurologic: Oriented to person, generally weak, speech mumbled but comprehensible  Skin: No rashes      Results Reviewed:  LAB RESULTS:      Lab 23  0715   WBC 6.45   HEMOGLOBIN 12.1*   HEMATOCRIT  36.8*   PLATELETS 313   MCV 96.6         Lab 11/01/23  0715   SODIUM 138   POTASSIUM 4.3   CHLORIDE 101   CO2 27.0   ANION GAP 10.0   BUN 22   CREATININE 0.70*   EGFR 97.9   GLUCOSE 189*   CALCIUM 9.1         Lab 11/01/23  0715   TOTAL PROTEIN 5.2*   ALBUMIN 3.7   GLOBULIN 1.5   ALT (SGPT) 42*   AST (SGOT) 27   BILIRUBIN 0.3   ALK PHOS 84                     Brief Urine Lab Results  (Last result in the past 365 days)        Color   Clarity   Blood   Leuk Est   Nitrite   Protein   CREAT   Urine HCG        10/30/23 1636 Yellow   Clear   Negative   Negative   Negative   Negative                   Microbiology Results Abnormal       Procedure Component Value - Date/Time    Blood Culture - Blood, Arm, Right [[id number]]  (Normal) Collected: 09/15/23 0212    Lab Status: Final result Specimen: Blood from Arm, Right Updated: 09/20/23 0230     Blood Culture No growth at 5 days    Blood Culture - Blood, Arm, Left [[id number]]  (Normal) Collected: 09/15/23 0212    Lab Status: Final result Specimen: Blood from Arm, Left Updated: 09/20/23 0230     Blood Culture No growth at 5 days            No radiology results from the last 24 hrs        Current medications:  Scheduled Meds:atorvastatin, 80 mg, Per PEG Tube, Nightly  clopidogrel, 75 mg, Per PEG Tube, Daily  Diclofenac Sodium, 2 g, Topical, BID  donepezil, 10 mg, Per PEG Tube, Nightly  fluticasone, 2 spray, Each Nare, Daily  HYDROcodone-acetaminophen, 1 tablet, Per PEG Tube, Q12H  lansoprazole, 15 mg, Per PEG Tube, Q AM  Lidocaine, 1 patch, Transdermal, Q24H  melatonin, 10 mg, Per PEG Tube, Nightly  metoprolol tartrate, 25 mg, Per PEG Tube, Q12H  miconazole, 1 application , Topical, Q12H  mirtazapine, 30 mg, Per PEG Tube, Nightly  palliative care oral rinse, , Mouth/Throat, 4x Daily  PHENobarbital, 81 mg, Per PEG Tube, Q8H  ProSource No Carb, 30 mL, Per G Tube, Daily  QUEtiapine, 200 mg, Per PEG Tube, Q12H  temazepam, 15 mg, Per PEG Tube, Nightly      Continuous Infusions:   PRN  Meds:.  Calcium Replacement - Follow Nurse / BPA Driven Protocol    dextrose    glucagon (human recombinant)    ibuprofen    Magnesium Standard Dose Replacement - Follow Nurse / BPA Driven Protocol    ondansetron    PHENobarbital    Phosphorus Replacement - Follow Nurse / BPA Driven Protocol    Potassium Replacement - Follow Nurse / BPA Driven Protocol    ziprasidone    Assessment & Plan   Assessment & Plan     Active Hospital Problems    Diagnosis  POA    **Hemorrhagic CVA [I61.9]  Yes    Oropharyngeal dysphagia [R13.12]  Yes    Multiple bilateral CVAs [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        Brief Hospital Course to date:  Kenneth Wen is a 73 y.o. male with hx of HTN, HLD, T2DM, and GERD who presented to OSH with multiple acute ischemic infarcts of the bilateral cerebral and cerebellar hemispheres as well as left isaac. TTE/JOSIAS was negative PFO at OSH. On 9/13/23 he had worsening in mental status and new inability to move RLE, head CT showed evolution of the existing CVA with new development of petechial hemorrhage. DAPT was held for 24 hours per Neurology recommendations and repeat CT head that evening showed worsening effacement of the right quadrigeminal cistern with suspected increasing upward supratentorial pressure. He was then transferred to [facility] for Neurosurgery evaluation on 9/15/23. He was started on hypertonic saline 9/15/23 through 9/20/23 for cerebral edema. He had fevers with negative cultures but had worsening secretions and labored breathing so was started on Zosyn on 9/16/23. Transferred to the hospitalist service on 9/22/23. Pt demonstrated poor oral intake with elevated sodium levels; therefore, PEG tube was  recommended & placed.        Goals of care  --Palliative follows, patient has improved     Multiple bilateral posterior circulation strokes with hemorrhagic conversion  -- suspect atheroembolic but cannot rule out cardioembolic given multiple vascular territories  --Plavix monotherapy, high intensity statin  --Needs Holter monitor at discharge  --Neurology follows  -PT follows and he is making improvements     Agitation, improved  Encephalopathy, improving  --Donepezil 10 mg nightly, melatonin 10 mg nightly, mirtazapine 30 mg nightly, Restoril 15 mg nightly  - Seroquel 200 mg BID  -Continue phenobarbitol  --Continue as needed Geodon  -- Continues to improve since 11/1/2023 and has not required restraints since prior to that  -We will check labs in a.m. given increasing agitation yesterday     Dysphagia  Hypernatremia - resolved  - PEG tube placed 9/29, Dr Carrillo   --SLP recommends mechanical ground with honey thick liquids   --Risk of pulling out peg tube, abd binder to be in place at all times.   -- dietary following , adequate PO intake is still a challenge  --hold tube feeds 24 hrs to see if oral intake will improve     HTN  --Continue Metoprolol 25 mg BID     GERD  --Continue PPI     COVID-19-resolved  --Overall asymptomatic and on room air  --No infiltrates seen and low procal  --Did not receive any treatment   --Off Isolation 9/30/2023      Leukocytosis-resolved  --Procalcitionin low at 0.05  --CXR and UA negative  --Blood cultures negative     Elevated LFT's, mild  --Possibly secondary to statin?  --Negative acute hepatitis panel  --repeat AST/ALT improved        Expected Discharge Location and Transportation: Rehab  Expected Discharge   Expected Discharge Date: 11/7/2023; Expected Discharge Time:      DVT prophylaxis:  Mechanical DVT prophylaxis orders are present.     AM-PAC 6 Clicks Score (PT): 11 (11/05/23 7064)    CODE STATUS:   Code Status and Medical Interventions:   Ordered at: 09/29/23 3500      Medical Intervention Limits:    NO intubation (DNI)     Code Status (Patient has no pulse and is not breathing):    No CPR (Do Not Attempt to Resuscitate)     Medical Interventions (Patient has pulse or is breathing):    Limited Support       Trina Mclaughlin MD  11/05/23

## 2023-11-05 NOTE — PLAN OF CARE
Goal Outcome Evaluation:      Pt able to rest comfortably throughout shift, no PRN meds needed. Remains on room air. 1 large urinary occurrence. Restless this am, able to accept redirection.

## 2023-11-05 NOTE — PLAN OF CARE
"Goal Outcome Evaluation:  Plan of Care Reviewed With: patient, spouse        Progress: improving  Outcome Evaluation: Pt VSS, continues on room air. Restless throughout the shift with bouts of rest. Still requiring continual redirection to remain in bed and not pull at lines and equipment. No need for prn medications so far this shift. Redirection, music, quiet environment, prayer with patient, - effective in aiding to calm pt. Tube feeding has been held since this afternoon for 24 hours per order, to see if appetite increases. PEG site still red with minimal brown drainage and crusting at insertion site. Area cleaned and split gauze applied. Incontinent of bowel and bladder most of the time, but did utililize the urinal several times today. Movement in right lower extremity increasing since yesterday, more lifting of extremity off the bed throughout the day. Changed and position adjusted as needed. More restlessness, anxiety, and agitation noted when hallucinating today: shouting \"Go away!\" and \"be quiet!\", and at times tearfully crying out \"make them go away!\" Pt requested to listen to music and then asked his wife to sing to him at bedside, after a while pt calmed. Attentive wife at bedside, assisting with care and redirection. Oral care provided throughout the shift. Pain controlled by scheduled medication and repositioning. Pt resting at this time. Sitter at bedside. Will cont to monitor.         "

## 2023-11-06 LAB
ANION GAP SERPL CALCULATED.3IONS-SCNC: 13 MMOL/L (ref 5–15)
BASOPHILS # BLD AUTO: 0.09 10*3/MM3 (ref 0–0.2)
BASOPHILS NFR BLD AUTO: 1.3 % (ref 0–1.5)
BUN SERPL-MCNC: 22 MG/DL (ref 8–23)
BUN/CREAT SERPL: 30.6 (ref 7–25)
CALCIUM SPEC-SCNC: 9.6 MG/DL (ref 8.6–10.5)
CHLORIDE SERPL-SCNC: 101 MMOL/L (ref 98–107)
CO2 SERPL-SCNC: 27 MMOL/L (ref 22–29)
CREAT SERPL-MCNC: 0.72 MG/DL (ref 0.76–1.27)
DEPRECATED RDW RBC AUTO: 48 FL (ref 37–54)
EGFRCR SERPLBLD CKD-EPI 2021: 97.1 ML/MIN/1.73
EOSINOPHIL # BLD AUTO: 1.02 10*3/MM3 (ref 0–0.4)
EOSINOPHIL NFR BLD AUTO: 14.2 % (ref 0.3–6.2)
ERYTHROCYTE [DISTWIDTH] IN BLOOD BY AUTOMATED COUNT: 13.7 % (ref 12.3–15.4)
GLUCOSE BLDC GLUCOMTR-MCNC: 121 MG/DL (ref 70–130)
GLUCOSE BLDC GLUCOMTR-MCNC: 126 MG/DL (ref 70–130)
GLUCOSE BLDC GLUCOMTR-MCNC: 128 MG/DL (ref 70–130)
GLUCOSE SERPL-MCNC: 116 MG/DL (ref 65–99)
HCT VFR BLD AUTO: 40.3 % (ref 37.5–51)
HGB BLD-MCNC: 13.1 G/DL (ref 13–17.7)
IMM GRANULOCYTES # BLD AUTO: 0.02 10*3/MM3 (ref 0–0.05)
IMM GRANULOCYTES NFR BLD AUTO: 0.3 % (ref 0–0.5)
LYMPHOCYTES # BLD AUTO: 1.48 10*3/MM3 (ref 0.7–3.1)
LYMPHOCYTES NFR BLD AUTO: 20.6 % (ref 19.6–45.3)
MCH RBC QN AUTO: 31.3 PG (ref 26.6–33)
MCHC RBC AUTO-ENTMCNC: 32.5 G/DL (ref 31.5–35.7)
MCV RBC AUTO: 96.4 FL (ref 79–97)
MONOCYTES # BLD AUTO: 0.67 10*3/MM3 (ref 0.1–0.9)
MONOCYTES NFR BLD AUTO: 9.3 % (ref 5–12)
NEUTROPHILS NFR BLD AUTO: 3.89 10*3/MM3 (ref 1.7–7)
NEUTROPHILS NFR BLD AUTO: 54.3 % (ref 42.7–76)
NRBC BLD AUTO-RTO: 0 /100 WBC (ref 0–0.2)
PLATELET # BLD AUTO: 333 10*3/MM3 (ref 140–450)
PMV BLD AUTO: 11.1 FL (ref 6–12)
POTASSIUM SERPL-SCNC: 4.4 MMOL/L (ref 3.5–5.2)
RBC # BLD AUTO: 4.18 10*6/MM3 (ref 4.14–5.8)
SODIUM SERPL-SCNC: 141 MMOL/L (ref 136–145)
WBC NRBC COR # BLD: 7.17 10*3/MM3 (ref 3.4–10.8)

## 2023-11-06 PROCEDURE — 82948 REAGENT STRIP/BLOOD GLUCOSE: CPT

## 2023-11-06 PROCEDURE — 99232 SBSQ HOSP IP/OBS MODERATE 35: CPT | Performed by: INTERNAL MEDICINE

## 2023-11-06 PROCEDURE — 99232 SBSQ HOSP IP/OBS MODERATE 35: CPT | Performed by: PSYCHIATRY & NEUROLOGY

## 2023-11-06 PROCEDURE — 85025 COMPLETE CBC W/AUTO DIFF WBC: CPT | Performed by: FAMILY MEDICINE

## 2023-11-06 PROCEDURE — 80048 BASIC METABOLIC PNL TOTAL CA: CPT | Performed by: FAMILY MEDICINE

## 2023-11-06 RX ORDER — PHENOBARBITAL 32.4 MG/1
32.4 TABLET ORAL ONCE
Status: DISCONTINUED | OUTPATIENT
Start: 2023-11-06 | End: 2023-11-08

## 2023-11-06 RX ADMIN — PHENOBARBITAL 81 MG: 32.4 TABLET ORAL at 21:02

## 2023-11-06 RX ADMIN — MICONAZOLE NITRATE 1 APPLICATION: 20 CREAM TOPICAL at 21:03

## 2023-11-06 RX ADMIN — METOPROLOL TARTRATE 25 MG: 25 TABLET, FILM COATED ORAL at 21:03

## 2023-11-06 RX ADMIN — QUETIAPINE FUMARATE 200 MG: 100 TABLET ORAL at 08:17

## 2023-11-06 RX ADMIN — HYDROCODONE BITARTRATE AND ACETAMINOPHEN 1 TABLET: 5; 325 TABLET ORAL at 12:16

## 2023-11-06 RX ADMIN — METOPROLOL TARTRATE 25 MG: 25 TABLET, FILM COATED ORAL at 08:17

## 2023-11-06 RX ADMIN — HYDROCODONE BITARTRATE AND ACETAMINOPHEN 1 TABLET: 5; 325 TABLET ORAL at 00:02

## 2023-11-06 RX ADMIN — DICLOFENAC SODIUM 2 G: 9.3 GEL TOPICAL at 21:03

## 2023-11-06 RX ADMIN — Medication 10 MG: at 18:26

## 2023-11-06 RX ADMIN — ATORVASTATIN CALCIUM 80 MG: 40 TABLET, FILM COATED ORAL at 21:03

## 2023-11-06 RX ADMIN — LIDOCAINE 1 PATCH: 4 PATCH TOPICAL at 08:17

## 2023-11-06 RX ADMIN — PHENOBARBITAL 64.8 MG: 64.8 TABLET ORAL at 04:07

## 2023-11-06 RX ADMIN — MINERAL OIL: 1000 LIQUID ORAL at 21:04

## 2023-11-06 RX ADMIN — FLUTICASONE PROPIONATE 2 SPRAY: 50 SPRAY, METERED NASAL at 08:16

## 2023-11-06 RX ADMIN — TEMAZEPAM 15 MG: 15 CAPSULE ORAL at 18:26

## 2023-11-06 RX ADMIN — QUETIAPINE FUMARATE 200 MG: 100 TABLET ORAL at 21:03

## 2023-11-06 RX ADMIN — MICONAZOLE NITRATE 1 APPLICATION: 20 CREAM TOPICAL at 08:16

## 2023-11-06 RX ADMIN — LANSOPRAZOLE 15 MG: 15 TABLET, ORALLY DISINTEGRATING ORAL at 05:37

## 2023-11-06 RX ADMIN — PHENOBARBITAL 81 MG: 32.4 TABLET ORAL at 05:35

## 2023-11-06 RX ADMIN — PHENOBARBITAL 81 MG: 32.4 TABLET ORAL at 13:47

## 2023-11-06 RX ADMIN — CLOPIDOGREL BISULFATE 75 MG: 75 TABLET ORAL at 08:17

## 2023-11-06 RX ADMIN — MIRTAZAPINE 30 MG: 15 TABLET, FILM COATED ORAL at 18:26

## 2023-11-06 RX ADMIN — DICLOFENAC SODIUM 2 G: 9.3 GEL TOPICAL at 08:16

## 2023-11-06 RX ADMIN — DONEPEZIL HYDROCHLORIDE 10 MG: 10 TABLET, FILM COATED ORAL at 18:27

## 2023-11-06 RX ADMIN — PHENOBARBITAL 64.8 MG: 64.8 TABLET ORAL at 12:15

## 2023-11-06 NOTE — CASE MANAGEMENT/SOCIAL WORK
Continued Stay Note  [facility]     Patient Name: Kenneth Wen  MRN: [id number]  Today's Date: 11/6/2023    Admit Date: 9/15/2023    Plan: Ongoing   Discharge Plan       Row Name 11/06/23 1230       Plan    Plan Ongoing    Plan Comments 'er met with patient and wife at bedside. Patient was seen by Neurology who has noted, increase as needed phenobarbital to 80 mg. Will continue to monitor hospital course for medical stability. Family prefers [facility] in [place]. Plan is placement with transportation provided.                   Discharge Codes    No documentation.                 Expected Discharge Date and Time       Expected Discharge Date Expected Discharge Time    Nov 7, 2023               JOEL Navarro (Kay)

## 2023-11-06 NOTE — PLAN OF CARE
Goal Outcome Evaluation:  Plan of Care Reviewed With: spouse        Progress: no change  Outcome Evaluation: Pt still a little restless at time of Palliative RN encounter; received additional dose of prn phenobarbital, prn dose increased per Dr. Rausch. Support provided to spouse at bedside. Spouse hoping for SNF placement near their home county when pt symptoms/restlessness consistently managed. Palliative following for continued support to family in ongoing GOC/POC.    1300 Palliative IDT meeting:  MD, APRN, RN,   After hours, weekends and holidays, contact Palliative Provider by calling [phone]     Problem: Palliative Care  Goal: Enhanced Quality of Life  Outcome: Ongoing, Progressing  Intervention: Maximize Comfort  Flowsheets (Taken 11/6/2023 1431)  Pain Management Interventions: (restlessness management refimen reviewed with spouse at bedside)   pain management plan reviewed with patient/caregiver   other (see comments)  Intervention: Optimize Function  Flowsheets (Taken 11/6/2023 1431)  Sensory Stimulation Regulation:   lighting decreased   quiet environment promoted  Sleep/Rest Enhancement:   music provided   family presence promoted  Intervention: Optimize Psychosocial Wellbeing  Flowsheets (Taken 11/6/2023 1431)  Family/Support System Care:   caregiver stress acknowledged   self-care encouraged   support provided

## 2023-11-06 NOTE — PROGRESS NOTES
[facility] Medicine Services  PROGRESS NOTE    Patient Name: Kenneth Wen  : 1950  MRN: [id number]    Date of Admission: 9/15/2023  Primary Care Physician: Susan Powers APRN    Subjective   Subjective     CC:  cva    HPI:  currently restless but his wife says this is better than he has been lately.  Sitter at bedside.     Objective   Objective     Vital Signs:   Temp:  [97.5 °F (36.4 °C)-99.4 °F (37.4 °C)] 99.4 °F (37.4 °C)  Heart Rate:  [] 114  Resp:  [16-20] 20  BP: (111-114)/(65-69) 114/65     Physical Exam:  Constitutional: No acute distress, awake, restlessly moving legs.  Says a few words, dysarthric   HENT: NCAT, mucous membranes moist  Respiratory: Clear to auscultation bilaterally, respiratory effort normal   Cardiovascular: RRR  Gastrointestinal: Positive bowel sounds, soft, nontender, nondistended  Musculoskeletal: Lying in bed  Psychiatric: resltelss   Neurologic: Oriented to person, generally weak, speech mumbled but comprehensible  Skin: No rashes      Results Reviewed:  LAB RESULTS:      Lab 23  0437 23  0715   WBC 7.17 6.45   HEMOGLOBIN 13.1 12.1*   HEMATOCRIT 40.3 36.8*   PLATELETS 333 313   NEUTROS ABS 3.89  --    IMMATURE GRANS (ABS) 0.02  --    LYMPHS ABS 1.48  --    MONOS ABS 0.67  --    EOS ABS 1.02*  --    MCV 96.4 96.6         Lab 23  0437 23  0715   SODIUM 141 138   POTASSIUM 4.4 4.3   CHLORIDE 101 101   CO2 27.0 27.0   ANION GAP 13.0 10.0   BUN 22 22   CREATININE 0.72* 0.70*   EGFR 97.1 97.9   GLUCOSE 116* 189*   CALCIUM 9.6 9.1         Lab 23  0715   TOTAL PROTEIN 5.2*   ALBUMIN 3.7   GLOBULIN 1.5   ALT (SGPT) 42*   AST (SGOT) 27   BILIRUBIN 0.3   ALK PHOS 84                     Brief Urine Lab Results  (Last result in the past 365 days)        Color   Clarity   Blood   Leuk Est   Nitrite   Protein   CREAT   Urine HCG        10/30/23 1636 Yellow   Clear   Negative   Negative   Negative   Negative                    Microbiology Results Abnormal       Procedure Component Value - Date/Time    Blood Culture - Blood, Arm, Right [[id number]]  (Normal) Collected: 09/15/23 0212    Lab Status: Final result Specimen: Blood from Arm, Right Updated: 09/20/23 0230     Blood Culture No growth at 5 days    Blood Culture - Blood, Arm, Left [[id number]]  (Normal) Collected: 09/15/23 0212    Lab Status: Final result Specimen: Blood from Arm, Left Updated: 09/20/23 0230     Blood Culture No growth at 5 days            No radiology results from the last 24 hrs        Current medications:  Scheduled Meds:atorvastatin, 80 mg, Per PEG Tube, Nightly  clopidogrel, 75 mg, Per PEG Tube, Daily  Diclofenac Sodium, 2 g, Topical, BID  donepezil, 10 mg, Per PEG Tube, Nightly  fluticasone, 2 spray, Each Nare, Daily  HYDROcodone-acetaminophen, 1 tablet, Per PEG Tube, Q12H  lansoprazole, 15 mg, Per PEG Tube, Q AM  Lidocaine, 1 patch, Transdermal, Q24H  melatonin, 10 mg, Per PEG Tube, Nightly  metoprolol tartrate, 25 mg, Per PEG Tube, Q12H  miconazole, 1 application , Topical, Q12H  mirtazapine, 30 mg, Per PEG Tube, Nightly  palliative care oral rinse, , Mouth/Throat, 4x Daily  PHENobarbital, 32.4 mg, Oral, Once  PHENobarbital, 81 mg, Per PEG Tube, Q8H  ProSource No Carb, 30 mL, Per G Tube, Daily  QUEtiapine, 200 mg, Per PEG Tube, Q12H  temazepam, 15 mg, Per PEG Tube, Nightly      Continuous Infusions:   PRN Meds:.  Calcium Replacement - Follow Nurse / BPA Driven Protocol    dextrose    glucagon (human recombinant)    ibuprofen    Magnesium Standard Dose Replacement - Follow Nurse / BPA Driven Protocol    ondansetron    PHENobarbital    Phosphorus Replacement - Follow Nurse / BPA Driven Protocol    Potassium Replacement - Follow Nurse / BPA Driven Protocol    ziprasidone    Assessment & Plan   Assessment & Plan     Active Hospital Problems    Diagnosis  POA    **Hemorrhagic CVA [I61.9]  Yes    Oropharyngeal dysphagia [R13.12]  Yes    Multiple bilateral CVAs  [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        Brief Hospital Course to date:  Kenneth Wen is a 73 y.o. male with hx of HTN, HLD, T2DM, and GERD who presented to OSH with multiple acute ischemic infarcts of the bilateral cerebral and cerebellar hemispheres as well as left isaac. TTE/JOSIAS was negative PFO at OSH. On 9/13/23 he had worsening in mental status and new inability to move RLE, head CT showed evolution of the existing CVA with new development of petechial hemorrhage. DAPT was held for 24 hours per Neurology recommendations and repeat CT head that evening showed worsening effacement of the right quadrigeminal cistern with suspected increasing upward supratentorial pressure. He was then transferred to [facility] for Neurosurgery evaluation on 9/15/23. He was started on hypertonic saline 9/15/23 through 9/20/23 for cerebral edema. He had fevers with negative cultures but had worsening secretions and labored breathing so was started on Zosyn on 9/16/23. Transferred to the hospitalist service on 9/22/23. Pt demonstrated poor oral intake with elevated sodium levels; therefore, PEG tube was recommended & placed.        Goals of care  --Palliative follows, patient has improved     Multiple bilateral posterior circulation strokes with hemorrhagic conversion  -- suspect atheroembolic but cannot rule out cardioembolic given multiple vascular territories  --Plavix monotherapy, high intensity statin  --Needs Holter monitor at discharge  --Neurology follows  -PT follows and he is making improvements     Agitation, improved  Encephalopathy, improving  --Donepezil 10 mg nightly, melatonin 10 mg nightly, mirtazapine 30 mg nightly, Restoril 15 mg nightly  -  Seroquel 200 mg BID  -Continue phenobarbitol  --Continue as needed Geodon  -- Continues to improve since 11/1/2023 and has not required restraints since prior to that  -We will check labs in a.m. given increasing agitation yesterday     Dysphagia  Hypernatremia - resolved  - PEG tube placed 9/29, Dr Carrillo   --SLP recommends mechanical ground with honey thick liquids   --Risk of pulling out peg tube, abd binder to be in place at all times.   -- dietary following , adequate PO intake is still a challenge  --hold tube feeds 24 hrs to see if oral intake will improve     HTN  --Continue Metoprolol 25 mg BID     GERD  --Continue PPI     COVID-19-resolved  --Overall asymptomatic and on room air  --No infiltrates seen and low procal  --Did not receive any treatment   --Off Isolation 9/30/2023      Leukocytosis-resolved  --Procalcitionin low at 0.05  --CXR and UA negative  --Blood cultures negative     Elevated LFT's, mild  --Possibly secondary to statin?  --Negative acute hepatitis panel  --repeat AST/ALT improved    GOC - I spoke w his wife about his/her right to change goals of care if at some point they decide he would want a different level of interventions.     Expected Discharge Location and Transportation: Rehab  Expected Discharge   Expected Discharge Date: 11/7/2023; Expected Discharge Time:      DVT prophylaxis:  Mechanical DVT prophylaxis orders are present.     AM-PAC 6 Clicks Score (PT): 11 (11/06/23 0800)    CODE STATUS:   Code Status and Medical Interventions:   Ordered at: 09/29/23 0854     Medical Intervention Limits:    NO intubation (DNI)     Code Status (Patient has no pulse and is not breathing):    No CPR (Do Not Attempt to Resuscitate)     Medical Interventions (Patient has pulse or is breathing):    Limited Support       Olga Maldonado MD  11/06/23

## 2023-11-06 NOTE — PROGRESS NOTES
"Palliative Care Daily Progress Note     C/C: Patient agitated with personal care.     S: Medical record reviewed. Follow up visit for GOC in the context of complex medical decision making. Events noted. Patient with sitter at bedside today.  Spouse present, reports pt continues to have good days and bad.  Increased symptoms last evening and continued today.  Phenobarbital dose increased per neurology today.  Continues to require restraints at times.      ROS: +anxiety, increased. +decreased appetite.  Denies pain    O: Code Status:   Code Status and Medical Interventions:   Ordered at: 09/29/23 0854     Medical Intervention Limits:    NO intubation (DNI)     Code Status (Patient has no pulse and is not breathing):    No CPR (Do Not Attempt to Resuscitate)     Medical Interventions (Patient has pulse or is breathing):    Limited Support      Advanced Directives: Advance Directive Status: Patient does not have advance directive   Goals of Care: Ongoing.   Palliative Performance Scale Score: 30%     /65 (BP Location: Left arm, Patient Position: Lying)   Pulse 114   Temp 99.4 °F (37.4 °C) (Temporal)   Resp 20   Ht 175.3 cm (69\")   Wt 72.8 kg (160 lb 7.9 oz)   SpO2 95%   BMI 23.70 kg/m²     Intake/Output Summary (Last 24 hours) at 11/6/2023 1557  Last data filed at 11/6/2023 1400  Gross per 24 hour   Intake 620 ml   Output 130 ml   Net 490 ml       PE:  General Appearance:    Patient laying in bed, restless, frequent repositioning, awake, alert, A/C ill appearing,    HEENT:    NC/AT, EOMI, anicteric, MMM, face calm   Neck:   supple, trachea midline, no JVD   Lungs:     CTA bilat, diminished in bases; respirations regular, even and unlabored; RR 18-20 on exam    Heart:    RRR, normal S1 and S2, no M/R/G,    Abdomen:     Normal bowel sounds, soft, non-tender, non-distended, abd binder in place covering PEG   G/U:   Deferred   MSK/Extremities:   Wasting, no edema   Pulses:   Pulses palpable and equal " bilaterally   Skin:   Warm, dry   Neurologic:   Oriented to self, alert   Psych:   Restless, frequent repositioning, calms easily     Meds: Reviewed and changes noted    Labs:   Results from last 7 days   Lab Units 11/06/23  0437   WBC 10*3/mm3 7.17   HEMOGLOBIN g/dL 13.1   HEMATOCRIT % 40.3   PLATELETS 10*3/mm3 333       Results from last 7 days   Lab Units 11/06/23  0437   SODIUM mmol/L 141   POTASSIUM mmol/L 4.4   CHLORIDE mmol/L 101   CO2 mmol/L 27.0   BUN mg/dL 22   CREATININE mg/dL 0.72*   GLUCOSE mg/dL 116*   CALCIUM mg/dL 9.6       Results from last 7 days   Lab Units 11/06/23  0437 11/01/23  0715   SODIUM mmol/L 141 138   POTASSIUM mmol/L 4.4 4.3   CHLORIDE mmol/L 101 101   CO2 mmol/L 27.0 27.0   BUN mg/dL 22 22   CREATININE mg/dL 0.72* 0.70*   CALCIUM mg/dL 9.6 9.1   BILIRUBIN mg/dL  --  0.3   ALK PHOS U/L  --  84   ALT (SGPT) U/L  --  42*   AST (SGOT) U/L  --  27   GLUCOSE mg/dL 116* 189*       Imaging Results (Last 72 Hours)       ** No results found for the last 72 hours. **                  Diagnostics: Reviewed    A:   Hemorrhagic CVA    Dyslipidemia    Multiple bilateral CVAs    HFpEF    T2DM (type 2 diabetes mellitus)    HTN (hypertension)    GERD (gastroesophageal reflux disease)    ICH (intracerebral hemorrhage)    Oropharyngeal dysphagia     73 y.o. male with hemorrhagic CVA, HFpEF, HTN.   S/S:   Pain -s/p stroke and arthritis, MSK, low back  -scheduled Hydrocodone 5-325mg PO BID  -scheduled Lidocaine patch to low back  -Voltaren gel  to bilateral knees  -continue Palliative oral rinse     2. Dysphagia -SLP with diet modifications  -PEG in place  -patient with bolus feeding via TF, minimal PO intake     3. Debility -PT/OT following     4. Agitation -requiring restraints today  - Seroquel 200mg BID  -Phenobarb 81mg per PEG q 8 hours,   -Phenobarb to 80 mg per PEG q 8 hours scheduled and PRN      5. GOC -DNR/DNI -per discussion with wife  -continued full treatment  -Continues to decline hospice  services at this time. Spouse goal continues to be transition him home when medically stable.     P: Follow up visit for symptoms. Patient with sitter at bedside. Goal for continued full treatment. CM working on placement.     Palliative Care Team will continue to follow patient. Please do not hesitate to contact us regarding further sx mgmt or GOC needs.    Lou Petty, APRN  11/6/2023  Time spent: 20 minutes

## 2023-11-06 NOTE — PROGRESS NOTES
Clinical Nutrition   Nutrition Support Assessment  Reason for Visit: Follow-up protocol, EN/PO    Patient Name: Kenneth Wen  YOB: 1950  MRN: [id number]  Date of Encounter: 11/06/23 12:21 EST  Admission date: 9/15/2023    Pt tolerating EN, continue per order.    MD ordered to hold EN X 24 hr on 11/5, pt continued not eating, RD asked RN to restart today.    RD following pt t/o admission, see several past RD notes for full hx, have tried several different interventions including supplemental feeds and calorie count/optimizing oral intake as able. Pt has demonstrated t/o month long admission extremely limited interest in PO intake regardless of EN regimen. Pt will likely continue not eating due to mentation, not lack of hunger. Diet is basically for comfort at this point, RD continues to recommend EN meeting 100% needs.     Nutrition Assessment   Admission Diagnosis:  ICH (intracerebral hemorrhage) [I61.9]      Problem List:    Hemorrhagic CVA    Dyslipidemia    Multiple bilateral CVAs    HFpEF    T2DM (type 2 diabetes mellitus)    HTN (hypertension)    GERD (gastroesophageal reflux disease)    ICH (intracerebral hemorrhage)    Oropharyngeal dysphagia        PMH:  He  has a past medical history of Acid reflux, Cancer, Diabetes mellitus, GERD (gastroesophageal reflux disease) (09/15/2023), HTN (hypertension) (09/15/2023), Kidney disease, and T2DM (type 2 diabetes mellitus) (09/15/2023).    PSH: He  has a past surgical history that includes Appendectomy; Nasal polyp surgery; Hemorrhoid surgery; and Esophagogastroduodenoscopy w/ PEG (N/A, 9/29/2023).      Applicable Nutrition Concerns:   Skin:  Oral:  GI: PEG (9/29)      Applicable Interval History:   9/16 3% Hypertonic saline initiated  9/15 FEES:  SLP Swallowing Diagnosis: mod-severe, oral dysphagia, severe, pharyngeal dysphagia (09/15/23 1331)  SLP Diet Recommendation: NPO, temporary alternate methods of nutrition/hydration, other  (see comments) (okay for 2-3 ice chips per hour as tolerated)   9/23-SLP Diet Recommendation: puree, honey thick liquids.  9/29- PEG tube placed. Consult for tube feeding assessment.   [place] [facility] [place]: puree, honey thick liquids, no mixed consistencies   10/16: [facility] Diet Recommendation: puree, honey thick liquids, no mixed consistencies (10/16/23 0930)  Recommended Precautions and Strategies: upright posture during/after eating, small bites of food and sips of liquid, no straw, alternate between small bites of food and sips of liquid, general aspiration precautions, 1:1 supervision  Recommended Diagnostics: reassess via [facility] [place] [place] (10/17)     Reported/Observed/Food/Nutrition Related History:     11/6  Continues tolerating EN. MD ordered to hold EN X 24 hr on 11/5, pt continued not eating, took a total of 3 bites that day. RD asked RN to restart EN.     10/30  Per RN taking little p.o but tolerating bolus EN well. Last 3 days avg goal delivery.    10/20  RN at bedside states pt didn't eat any of breakfast. Pt asking for fresh fruit but does not want it pureed. Tolerating bolus feeds per RN.     10/16  Pt tolerating bolus feeds per RN. Continues with minimal PO acceptance. Pt continues to require restraints due to pulling at PEG tube. Documented BM x2 in past 24hrs.      10/13  Pt continues to have poor PO intake regardless of nocturnal feeds and addition of Remeron. Currently requiring restraints again. Per spouse pt with difficult night and did not wish to wake for lunch. Discussed option to change to bolus feeds and meet 100% of needs and allow for PO intake for comfort only. Spouse agreeable.     10/9  Spouse wanting to cont nocturnal feeding to be able to try p.o feeding. Seemed improved from yesterday. Will change hrs of delivery to 16.    10/8  Pt with meal refusal x2 on 10/7. With refusal, nocturnal feeds inappropriate to meet estimated needs. Will continue calorie count through today and  re-evaluate Monday if need to return to continuous feeds. Palliative continues following for GOC.      10/7  Pt with noted improved PO yesterday however remains inadequate to d/c nocturnal feeds at this time. Will continue calorie count for 2 more days.      10/6  Pt continues with inconsistent intake. Continues to require restraints to prevent pulling PEG regardless of abdominal binder. Spouse present at all meals to assist. Observed increased acceptance of lunch however was not alert enough for breakfast.     10/4  Pt with improved PO at lunch today however per spouse did not eat at breakfast. Suspect due to working with therapies at breakfast time. Tolerated nocturnal regimen. MBS completed - SLP recs puree, honey thick liquids, no mixed consistencies.      10/3  Tolerating feeds at goal. Pt with ~25% at meals being fed by spouse - ? If able to improve PO intake with transition to nocturnal feeds. Discussed with spouse, agreeable to try.     10/1   EN ordered yesterday, started via PEG tube.  @ goal rate this morning and being tolerated. Patient also with order for a diet.  Able to reach patient wife over the phone to get a better sense of patient intake. Patient wife reports she was present for dinner meal yesterday and patient only ate bites of applesauce.  Overall not much intake. Reports patient has been a little sleepy past couple of days and no showing that much interest in eating.  We discussed current EN Rx.  We felt it would be best to keep EN continuous vs nocturnal at this time.  Can switch to nocturnal if the events intake improves.     9/30  Consult for tube feeding assessment.  Overall issues with sodium levels, fluid intake and confusion. Was getting IVF, however pulled out IV.  Wife asked for a PEG tube placement on 9/28.      Patient in COVID isolation, RD not able to visit in person.  No diet this morning (was made NPO for PEG tube placement). Discussed with RN re-order previous order. RD will  "start tube feeding.       9/25  Spoke w/RN who reports pt pulled out NGT Friday evening. RN states pt eating >/=75% of trays. Textures downgraded 9/23 to pureed.     9/22  Pt on diet though ate only sausage and some juice this am. RN states pt pocketing food still, had been happening in ICU per previous RD note. Discussed w/SLP.     9/18 RN reports pt on 3% saline therapy Na+ goal 145-155, Na+ w/I range, pt from OSH sent here d/t hemorrhagic conversion continues to have R weakness, confusion, tolerating TF. Uncertain if pt should have water flushes, these were dc'd after discussion w/ MD.     9/15  Per RN unclear if will start EN or if pt may progress to caden diet at this time.  No wt hx available today.     Anthropometrics     Admission Height 175.3 cm (69\") Documented at 09/15/2023 0122   Admission Weight 79.2 kg (174 lb 9.7 oz) Documented at 09/15/2023 0122     Height: Height: 175.3 cm (69\")  Last Filed Weight: Weight: 72.8 kg (160 lb 7.9 oz) (09/21/23 0500)  Method: Weight Method: Bed scale  BMI: BMI (Calculated): 23.7  BMI classification: Overweight: 25.0-29.9kg/m2      9/15/2023 9/17/2023   Weight     Weight (kg) 79.2 kg  82.5 kg    Weight (lbs) 174 lb 9.7 oz  181 lb 14.1 oz    Weight Method Bed scale  Bed scale        UBW: Per  lbs on 9/14/23  Note 172 lbs in 2018  Weight change: uncertain at this time    Labs    Labs Reviewed: Yes     Results from last 7 days   Lab Units 11/06/23  0437 11/01/23  0715   GLUCOSE mg/dL 116* 189*   BUN mg/dL 22 22   CREATININE mg/dL 0.72* 0.70*   SODIUM mmol/L 141 138   CHLORIDE mmol/L 101 101   POTASSIUM mmol/L 4.4 4.3   ALT (SGPT) U/L  --  42*         Results from last 7 days   Lab Units 11/01/23  0715   ALBUMIN g/dL 3.7         Results from last 7 days   Lab Units 11/06/23  0740 11/06/23  0544 11/06/23  0047 11/05/23  1203 11/05/23  0535 11/04/23  2343   GLUCOSE mg/dL 121 128 126 137* 114 174*     Lab Results   Lab Value Date/Time    HGBA1C 6.40 (H) 09/15/2023 0212      " "         Medications    Medications Reviewed: Yes  Pertinent:  prevacid, remeron, prosource  Infusion:   PRN:     Needs Assessment   Date:   **CBW similar to last used for calculation    Height used:Height: 175.3 cm (69\")  Weights used: 160lb/72.7kg      Estimated Calorie needs: ~ 1900 Kcal/day  Method:  Kcals/KG 25= 1818  Method:  MSJ 1479 x 1.3= 1923    Estimated Protein needs: ~ 95g PRO/day  Method: 1.3g/Kg= 95    Estimated Fluid needs: ~ ml/day   Per clinical status    Current Nutrition Prescription      PO: Diet: Regular/House Diet; No Straw, Feeding Assistance - Nursing, No Mixed Consistencies; Texture: Pureed (NDD 1); Fluid Consistency: Honey Thick   Oral Nutrition Supplement: Magic cup 2x daily   Intake: 6 DAYS 25% x last 10 meals documented    EN: IsoSource 1.5  Total Cartons: 5  Bolus Regimen: 1 carton (250mL) 5x/d - ~q3hrs between 6a and 9p  Water Flush: 75mL before and after each bolus  Modular: Prosource -no carb 1/day  Route: PEG  Tube: Unknown     At goal over: bolus feeds     Rx will supply:   Goal Volume 1250 mL/day       Flush Volume 750 mL/day       Energy 1935 Kcal/day 102 % Est Need   Protein 100 g/day 105 % Est Need   Fiber 19 g/day       Water in   mL       Total Water 1700 mL       Meet DRI Yes            --------------------------------------------------------------------------  Product/Rate verified at bedside: No  Infusing Rate at time of visit: bolus feeds     Average Delivery from Chartin Day: held per MD   Volume  mL/day   % Goal Vol.   Flush Volume  mL/day     Energy  Kcal/day  % Est Need   Protein  g/day  % Est Need   Fiber  g/day     Water in  EN  mL     Total Water  mL     Meet DRI Yes        Intake/Ouptut 24 hrs (701 - 700)   I&O's Reviewed: Yes     Intake & Output (last day)         11/05 0701  11/06 0700 11/06 0701  11/07 0700    P.O. 0     Other 770     NG/     Total Intake(mL/kg) 1320 (18.1)     Urine (mL/kg/hr) 550 (0.3)     Stool 0     Total Output 550  "    Net +770           Urine Unmeasured Occurrence 5 x     Stool Unmeasured Occurrence 3 x             Nutrition Diagnosis   Date: 9/15 Updated: 9/18, 9/30, 10/9, 10/13, 10/30, 11/6  Problem Inadequate oral intake    Etiology stroke   Signs/Symptoms NPO w ice chips per SLP, confusion, +EN   Status: resolved meeting 100% of need via EN    Date:  9/18 Updated:  Problem Swallowing difficulty/chewing difficulty   Etiology Bilateral strokes   Signs/Symptoms Oral-pharyngeal dysphagia per SLP FEES eval   Status: ongoing    Goal:   General: Nutrition support treatment  PO: Increase intake as feasilbe   EN/PN: Maintain EN      Nutrition Intervention      Follow treatment progress, Care plan reviewed    Continue bolus feeds as ordered with PO comfort diet     onitoring/Evaluation:   Per protocol, I&O, PO intake, Supplement intake, Pertinent labs, EN delivery/tolerance, Weight, Symptoms, Swallow function      Heidi Betancourt RD  Time Spent: 30 min

## 2023-11-06 NOTE — PROGRESS NOTES
Neurology       Patient Care Team:  Susan Powers APRN as PCP - General (Family Medicine)    Chief complaint: Protracted delirium    History: Patient is agitated even after as needed dose of 60 mg phenobarbital.      Past Medical History:   Diagnosis Date    Acid reflux     Cancer     Skin    Diabetes mellitus     Prediabetes    GERD (gastroesophageal reflux disease) 09/15/2023    HTN (hypertension) 09/15/2023    Kidney disease     T2DM (type 2 diabetes mellitus) 09/15/2023       Vital Signs   Vitals:    11/04/23 1946 11/05/23 0913 11/05/23 1913 11/06/23 0700   BP: 104/66 131/68 111/69 114/65   BP Location: Left arm Left arm Left arm Left arm   Patient Position: Lying Lying Lying Lying   Pulse: 76 113 70 114   Resp: 14 18 16 20   Temp: 97.7 °F (36.5 °C) 99.1 °F (37.3 °C) 97.5 °F (36.4 °C) 99.4 °F (37.4 °C)   TempSrc: Axillary Axillary Temporal Temporal   SpO2: 94% 93% 94% 95%   Weight:       Height:           Physical Exam:   General: Restless              Neuro: Agitated    Results Review:  Reviewed  Results from last 7 days   Lab Units 11/06/23  0437   WBC 10*3/mm3 7.17   HEMOGLOBIN g/dL 13.1   HEMATOCRIT % 40.3   PLATELETS 10*3/mm3 333     Results from last 7 days   Lab Units 11/06/23  0437 11/01/23  0715   SODIUM mmol/L 141 138   POTASSIUM mmol/L 4.4 4.3   CHLORIDE mmol/L 101 101   CO2 mmol/L 27.0 27.0   BUN mg/dL 22 22   CREATININE mg/dL 0.72* 0.70*   CALCIUM mg/dL 9.6 9.1   BILIRUBIN mg/dL  --  0.3   ALK PHOS U/L  --  84   ALT (SGPT) U/L  --  42*   AST (SGOT) U/L  --  27   GLUCOSE mg/dL 116* 189*       Imaging Results (Last 24 Hours)       ** No results found for the last 24 hours. **            Assessment:  Protracted delirium.    Multiple stroke syndrome    Plan:  Phenobarbital 32 mg now and repeat in an hour if needed.    Increase as needed phenobarbital to 80 mg.    Phenobarb level in the morning    Comment:  Guarded prognosis         I discussed the patients findings and my recommendations with patient,  family, and nursing staff    Toni Rausch MD  11/06/23  12:16 EST

## 2023-11-06 NOTE — PLAN OF CARE
Goal Outcome Evaluation:  Plan of Care Reviewed With: patient, spouse        Progress: no change  Outcome Evaluation: VSS on RA. PRN phenobarbital given once for increased agitation and restlessness in the middle of the night. Gospel music played, pt consoled, reoriented. Pt remains A&O to self only. Pt rested well for the beginning of the shift then was intermittently tearful and agitated. Experiencing auditory and visual hallucinations r/t family and Spiritism. Dinner offered, pt refused, voiced no interest in breakfast when mentioned, no oral intake this shift. x1 incontinence. Oral care and skin care/assessments performed. x2 abd binders and gauze in place around PEG tube. Wife at bedside at beginning of shift. Sitter remains at bedside.

## 2023-11-06 NOTE — PLAN OF CARE
Goal Outcome Evaluation:  Plan of Care Reviewed With: patient, spouse        Progress: improving  Outcome Evaluation: VSS on RA. Pt has been restless this shift requiring medication changes as well as PRN phenobarbital. Tube feeding via bolus Q3 hrs was resumed and tolerated well by patient. Support provided to spouse who is at bedside. Continue POC and report as needed.

## 2023-11-07 LAB
GLUCOSE BLDC GLUCOMTR-MCNC: 107 MG/DL (ref 70–130)
GLUCOSE BLDC GLUCOMTR-MCNC: 108 MG/DL (ref 70–130)
PHENOBARB SERPL-MCNC: 32.5 MCG/ML (ref 10–30)

## 2023-11-07 PROCEDURE — 82948 REAGENT STRIP/BLOOD GLUCOSE: CPT

## 2023-11-07 PROCEDURE — 97530 THERAPEUTIC ACTIVITIES: CPT

## 2023-11-07 PROCEDURE — 99232 SBSQ HOSP IP/OBS MODERATE 35: CPT | Performed by: INTERNAL MEDICINE

## 2023-11-07 PROCEDURE — 97110 THERAPEUTIC EXERCISES: CPT

## 2023-11-07 PROCEDURE — 80184 ASSAY OF PHENOBARBITAL: CPT | Performed by: PSYCHIATRY & NEUROLOGY

## 2023-11-07 PROCEDURE — 99232 SBSQ HOSP IP/OBS MODERATE 35: CPT | Performed by: PSYCHIATRY & NEUROLOGY

## 2023-11-07 RX ADMIN — MICONAZOLE NITRATE 1 APPLICATION: 20 CREAM TOPICAL at 08:00

## 2023-11-07 RX ADMIN — MICONAZOLE NITRATE 1 APPLICATION: 20 CREAM TOPICAL at 20:28

## 2023-11-07 RX ADMIN — Medication 10 MG: at 18:57

## 2023-11-07 RX ADMIN — MINERAL OIL: 1000 LIQUID ORAL at 20:28

## 2023-11-07 RX ADMIN — FLUTICASONE PROPIONATE 2 SPRAY: 50 SPRAY, METERED NASAL at 08:00

## 2023-11-07 RX ADMIN — DICLOFENAC SODIUM 2 G: 9.3 GEL TOPICAL at 20:27

## 2023-11-07 RX ADMIN — DONEPEZIL HYDROCHLORIDE 10 MG: 10 TABLET, FILM COATED ORAL at 18:57

## 2023-11-07 RX ADMIN — PHENOBARBITAL 81 MG: 64.8 TABLET ORAL at 23:58

## 2023-11-07 RX ADMIN — HYDROCODONE BITARTRATE AND ACETAMINOPHEN 1 TABLET: 5; 325 TABLET ORAL at 00:31

## 2023-11-07 RX ADMIN — QUETIAPINE FUMARATE 200 MG: 100 TABLET ORAL at 20:26

## 2023-11-07 RX ADMIN — CLOPIDOGREL BISULFATE 75 MG: 75 TABLET ORAL at 08:00

## 2023-11-07 RX ADMIN — METOPROLOL TARTRATE 25 MG: 25 TABLET, FILM COATED ORAL at 20:27

## 2023-11-07 RX ADMIN — HYDROCODONE BITARTRATE AND ACETAMINOPHEN 1 TABLET: 5; 325 TABLET ORAL at 13:31

## 2023-11-07 RX ADMIN — DICLOFENAC SODIUM 2 G: 9.3 GEL TOPICAL at 08:00

## 2023-11-07 RX ADMIN — PHENOBARBITAL 81 MG: 32.4 TABLET ORAL at 13:31

## 2023-11-07 RX ADMIN — QUETIAPINE FUMARATE 200 MG: 100 TABLET ORAL at 08:00

## 2023-11-07 RX ADMIN — LANSOPRAZOLE 15 MG: 15 TABLET, ORALLY DISINTEGRATING ORAL at 05:38

## 2023-11-07 RX ADMIN — PHENOBARBITAL 81 MG: 32.4 TABLET ORAL at 21:24

## 2023-11-07 RX ADMIN — MINERAL OIL: 1000 LIQUID ORAL at 08:01

## 2023-11-07 RX ADMIN — MIRTAZAPINE 30 MG: 15 TABLET, FILM COATED ORAL at 18:57

## 2023-11-07 RX ADMIN — TEMAZEPAM 15 MG: 15 CAPSULE ORAL at 18:57

## 2023-11-07 RX ADMIN — METOPROLOL TARTRATE 25 MG: 25 TABLET, FILM COATED ORAL at 08:00

## 2023-11-07 RX ADMIN — PHENOBARBITAL 81 MG: 32.4 TABLET ORAL at 05:38

## 2023-11-07 RX ADMIN — LIDOCAINE 1 PATCH: 4 PATCH TOPICAL at 12:50

## 2023-11-07 RX ADMIN — MINERAL OIL: 1000 LIQUID ORAL at 12:50

## 2023-11-07 RX ADMIN — ATORVASTATIN CALCIUM 80 MG: 40 TABLET, FILM COATED ORAL at 20:27

## 2023-11-07 NOTE — PROGRESS NOTES
Neurology       Patient Care Team:  Susan Powers APRN as PCP - General (Family Medicine)    Chief complaint: Altered mental state    History: Patient is active in bed moving his leg but is not combative and is calm.    Seems to be doing reasonably well with phenobarbital and has not needed any as needed since yesterday.  Past Medical History:   Diagnosis Date    Acid reflux     Cancer     Skin    Diabetes mellitus     Prediabetes    GERD (gastroesophageal reflux disease) 09/15/2023    HTN (hypertension) 09/15/2023    Kidney disease     T2DM (type 2 diabetes mellitus) 09/15/2023       Vital Signs   Vitals:    11/05/23 0913 11/05/23 1913 11/06/23 0700 11/07/23 0100   BP: 131/68 111/69 114/65 123/86   BP Location: Left arm Left arm Left arm Left arm   Patient Position: Lying Lying Lying Lying   Pulse: 113 70 114 69   Resp: 18 16 20 16   Temp: 99.1 °F (37.3 °C) 97.5 °F (36.4 °C) 99.4 °F (37.4 °C) 97.3 °F (36.3 °C)   TempSrc: Axillary Temporal Temporal Temporal   SpO2: 93% 94% 95% 94%   Weight:       Height:           Physical Exam:   General: Awake and alert              Neuro: Right hemiparesis is noted    Results Review:  Reviewed phenobarbital level 32.5  Results from last 7 days   Lab Units 11/06/23  0437   WBC 10*3/mm3 7.17   HEMOGLOBIN g/dL 13.1   HEMATOCRIT % 40.3   PLATELETS 10*3/mm3 333     Results from last 7 days   Lab Units 11/06/23  0437 11/01/23  0715   SODIUM mmol/L 141 138   POTASSIUM mmol/L 4.4 4.3   CHLORIDE mmol/L 101 101   CO2 mmol/L 27.0 27.0   BUN mg/dL 22 22   CREATININE mg/dL 0.72* 0.70*   CALCIUM mg/dL 9.6 9.1   BILIRUBIN mg/dL  --  0.3   ALK PHOS U/L  --  84   ALT (SGPT) U/L  --  42*   AST (SGOT) U/L  --  27   GLUCOSE mg/dL 116* 189*       Imaging Results (Last 24 Hours)       ** No results found for the last 24 hours. **            Assessment:  Altered mental status secondary to multiple strokes, stable    Plan:  Continue current regimen    Comment:  Discharge plans in the works         I  discussed the patients findings and my recommendations with patient, family, nursing staff, and primary care team    Toni Rausch MD  11/07/23  12:26 EST

## 2023-11-07 NOTE — THERAPY TREATMENT NOTE
Patient Name: Kenneth Wen  : 1950    MRN: [id number]                              Today's Date: 2023       Admit Date: 9/15/2023    Visit Dx:     ICD-10-CM ICD-9-CM   1. Hemorrhagic CVA  I61.9 431   2. Aphasia  R47.01 784.3   3. Dysarthria  R47.1 784.51   4. Oropharyngeal dysphagia  R13.12 787.22     Patient Active Problem List   Diagnosis    Precordial pain    Elevated BP without diagnosis of hypertension    Dyslipidemia    Hyperglycemia    Multiple bilateral CVAs    Hemorrhagic CVA    HFpEF    T2DM (type 2 diabetes mellitus)    HTN (hypertension)    GERD (gastroesophageal reflux disease)    ICH (intracerebral hemorrhage)    Oropharyngeal dysphagia     Past Medical History:   Diagnosis Date    Acid reflux     Cancer     Skin    Diabetes mellitus     Prediabetes    GERD (gastroesophageal reflux disease) 09/15/2023    HTN (hypertension) 09/15/2023    Kidney disease     T2DM (type 2 diabetes mellitus) 09/15/2023     Past Surgical History:   Procedure Laterality Date    APPENDECTOMY      ENDOSCOPY W/ PEG TUBE PLACEMENT N/A 2023    Procedure: ESOPHAGOGASTRODUODENOSCOPY WITH PERCUTANEOUS ENDOSCOPIC GASTROSTOMY TUBE INSERTION;  Surgeon: Haseeb Carrillo MD;  Location: [facility] ENDOSCOPY;  Service: General;  Laterality: N/A;    HEMORRHOIDECTOMY      NASAL POLYP SURGERY        General Information       Row Name 23 1450          OT Time and Intention    Document Type therapy note (daily note)  -     Mode of Treatment occupational therapy;co-treatment  -       Row Name 23 1450          General Information    Patient Profile Reviewed yes  -     Existing Precautions/Restrictions fall;other (see comments)  R sided weakness/coordination deficits, PEG with abdominal binder, visual deficits  -     Barriers to Rehab medically complex;cognitive status;visual deficit  -       Row Name 23 1450          Occupational Profile    Patient Goals (Occupational Profile) Pt provided w/ R  resting hand splint to prevent R hand contractures, however, pt immediately removed splint w/ L hand, brief rolled and placed in pt's L hand for more comfortable stretching to progress to resting hand splint  -       Row Name 11/07/23 1459          Cognition    Orientation Status (Cognition) oriented to;person;disoriented to;time;place  -       Row Name 11/07/23 1455          Safety Issues, Functional Mobility    Safety Issues Affecting Function (Mobility) awareness of need for assistance;insight into deficits/self-awareness;safety precaution awareness;safety precautions follow-through/compliance;sequencing abilities  -     Impairments Affecting Function (Mobility) balance;cognition;coordination;endurance/activity tolerance;grasp;motor control;motor planning;muscle tone abnormal;visual/perceptual;strength;sensation/sensory awareness;range of motion (ROM);postural/trunk control  -     Cognitive Impairments, Mobility Safety/Performance attention;awareness, need for assistance;insight into deficits/self-awareness;judgment;problem-solving/reasoning;safety precaution awareness;safety precaution follow-through;sequencing abilities  -               User Key  (r) = Recorded By, (t) = Taken By, (c) = Cosigned By      Initials Name Provider Type     Jenny Rivera OT Occupational Therapist                     Mobility/ADL's       Row Name 11/07/23 4978          Bed Mobility    Bed Mobility supine-sit  -     Supine-Sit [place] (Bed Mobility) maximum assist (25% patient effort);verbal cues;nonverbal cues (demo/gesture);1 person assist  -     Bed Mobility, Safety Issues cognitive deficits limit understanding;decreased use of arms for pushing/pulling;decreased use of legs for bridging/pushing;impaired trunk control for bed mobility;unable to safely maintain weight bearing restrictions  -     Assistive Device (Bed Mobility) bed rails;head of bed elevated;draw sheet  -       Row Name 11/07/23 3015           Transfers    Transfers sit-stand transfer;stand-sit transfer;bed-chair transfer  -     Comment, (Transfers) Pt initially mod Ax2 for STS, progressed to min Ax2  -       Row Name 11/07/23 1453          Bed-Chair Transfer    Bed-Chair [place] (Transfers) moderate assist (50% patient effort);2 person assist  -     Assistive Device (Bed-Chair Transfers) other (see comments)  BUE support  -       Row Name 11/07/23 1453          Sit-Stand Transfer    Sit-Stand [place] (Transfers) moderate assist (50% patient effort);2 person assist;verbal cues  -     Assistive Device (Sit-Stand Transfers) other (see comments)  -       Row Name 11/07/23 1453          Stand-Sit Transfer    Stand-Sit [place] (Transfers) moderate assist (50% patient effort);2 person assist;verbal cues  -     Assistive Device (Stand-Sit Transfers) other (see comments)  -       Row Name 11/07/23 1453          Activities of Daily Living    BADL Assessment/Intervention lower body dressing  -       Row Name 11/07/23 1453          Lower Body Dressing Assessment/Training    [place] Level (Lower Body Dressing) don;socks;maximum assist (25% patient effort)  -     Position (Lower Body Dressing) edge of bed sitting  -               User Key  (r) = Recorded By, (t) = Taken By, (c) = Cosigned By      Initials Name Provider Type     Jenny Rivera OT Occupational Therapist                   Obj/Interventions       Row Name 11/07/23 1454          Shoulder (Therapeutic Exercise)    Shoulder (Therapeutic Exercise) PROM (passive range of motion);AAROM (active assistive range of motion)  -     Shoulder AAROM (Therapeutic Exercise) left;flexion;extension;10 repetitions;sitting  -     Shoulder PROM (Therapeutic Exercise) right;flexion;extension;5 repetitions;sitting  Pt only able to tolerate to ~90% shoulder flexion d/t pain and stiffness  -       Row Name 11/07/23 1454          Elbow/Forearm (Therapeutic Exercise)     Elbow/Forearm (Therapeutic Exercise) AAROM (active assistive range of motion);PROM (passive range of motion)  -     Elbow/Forearm AAROM (Therapeutic Exercise) left;flexion;extension;10 repetitions  -     Elbow/Forearm PROM (Therapeutic Exercise) right;flexion;extension;10 repetitions;sitting  -       Row Name 11/07/23 1454          Wrist (Therapeutic Exercise)    Wrist (Therapeutic Exercise) PROM (passive range of motion)  -     Wrist PROM (Therapeutic Exercise) right;flexion;extension;10 repetitions  -       Row Name 11/07/23 1454          Hand (Therapeutic Exercise)    Hand (Therapeutic Exercise) PROM (passive range of motion)  -     Hand PROM (Therapeutic Exercise) right;finger flexion;finger extension;10 repetitions  Pt w/ noted R hand contractures, limited extension  -       Row Name 11/07/23 1454          Motor Skills    Therapeutic Exercise shoulder;elbow/forearm;wrist;hand  -       Row Name 11/07/23 1454          Balance    Balance Assessment sitting static balance;sitting dynamic balance;sit to stand dynamic balance;standing static balance;standing dynamic balance  -     Static Sitting Balance contact guard  -     Dynamic Sitting Balance minimal assist;verbal cues  -     Position, Sitting Balance unsupported;sitting edge of bed;sitting in chair  -     Sit to Stand Dynamic Balance moderate assist;2-person assist;verbal cues  -     Static Standing Balance moderate assist;2-person assist;verbal cues  -     Dynamic Standing Balance moderate assist;2-person assist;verbal cues  -     Position/Device Used, Standing Balance supported  -     Balance Interventions sitting;sit to stand;occupation based/functional task  -               User Key  (r) = Recorded By, (t) = Taken By, (c) = Cosigned By      Initials Name Provider Type     Jenny Rivera OT Occupational Therapist                   Goals/Plan    No documentation.                  Clinical Impression       Row Name  11/07/23 1504          Pain Assessment    Pretreatment Pain Rating 0/10 - no pain  -     Posttreatment Pain Rating 0/10 - no pain  -       Row Name 11/07/23 1504          Plan of Care Review    Plan of Care Reviewed With patient;family  -     Progress no change  -     Outcome Evaluation Pt presents w/ R side weakness, vision deficits, balance deficits, and impaired cognition limiting his ADL independence. Will continue to progress pt as tolerated per OT POC. Rec SNF at d/c.  -       Row Name 11/07/23 1502          Therapy Assessment/Plan (OT)    Rehab Potential (OT) good, to achieve stated therapy goals  -     Criteria for Skilled Therapeutic Interventions Met (OT) yes;skilled treatment is necessary  -     Therapy Frequency (OT) daily  -       Row Name 11/07/23 1506          Therapy Plan Review/Discharge Plan (OT)    Anticipated Discharge Disposition (OT) skilled nursing facility  -       Row Name 11/07/23 1508          Vital Signs    O2 Delivery Pre Treatment room air  -     O2 Delivery Intra Treatment room air  -     O2 Delivery Post Treatment room air  -     Pre Patient Position Supine  -     Intra Patient Position Standing  -     Post Patient Position Sitting  -[facility] Name 11/07/23 1506          Positioning and Restraints    Pre-Treatment Position in bed  -     Post Treatment Position chair  -     In Chair notified nsg;reclined;call light within reach;encouraged to call for assist;exit alarm on;with family/caregiver;legs elevated;heels elevated;with nsg  -               User Key  (r) = Recorded By, (t) = Taken By, (c) = Cosigned By      Initials Name Provider Type     Jenny Rivera, LATIA Occupational Therapist                   Outcome Measures       Row Name 11/07/23 1501          How much help from another is currently needed...    Putting on and taking off regular lower body clothing? 2  -     Bathing (including washing, rinsing, and drying) 2  -     Toileting  (which includes using toilet bed pan or urinal) 1  -MC     Putting on and taking off regular upper body clothing 2  -MC     Taking care of personal grooming (such as brushing teeth) 2  -MC     Eating meals 2  -     AM-PAC 6 Clicks Score (OT) 11  -       Row Name 11/07/23 1456 11/07/23 0800       How much help from another person do you currently need...    Turning from your back to your side while in flat bed without using bedrails? 4  -AY 4  -TK    Moving from lying on back to sitting on the side of a flat bed without bedrails? 2  -AY 3  -TK    Moving to and from a bed to a chair (including a wheelchair)? 2  -AY 2  -TK    Standing up from a chair using your arms (e.g., wheelchair, bedside chair)? 2  -AY 2  -TK    Climbing 3-5 steps with a railing? 1  -AY 2  -TK    To walk in hospital room? 1  -AY 2  -TK    AM-PAC 6 Clicks Score (PT) 12  -AY 15  -TK    Highest level of mobility 4 --> Transferred to chair/commode  -AY 4 --> Transferred to chair/commode  -TK      Row Name 11/07/23 1507 11/07/23 1456       Functional Assessment    Outcome Measure Options AM-PAC 6 Clicks Daily Activity (OT)  - AM-PAC 6 Clicks Basic Mobility (PT)  -              User Key  (r) = Recorded By, (t) = Taken By, (c) = Cosigned By      Initials Name Provider Type    TK Ross Avila RN Registered Nurse    Aleshia Mendes, PT Physical Therapist    Jenny Black OT Occupational Therapist                    Occupational Therapy Education       Title: PT OT SLP Therapies (In Progress)       Topic: Occupational Therapy (In Progress)       Point: ADL training (In Progress)       Description:   Instruct learner(s) on proper safety adaptation and remediation techniques during self care or transfers.   Instruct in proper use of assistive devices.                  Learning Progress Summary             Patient Acceptance, E, NR by  at 11/7/2023 1508    Acceptance, E, NR,NL by MR at 11/2/2023 1511    Acceptance, E, NR,NL by  at  10/31/2023 1352    Acceptance, E, NR by KP at 10/26/2023 0256    Acceptance, E, NR by MC at 10/25/2023 1051    Acceptance, E, NR by MC at 10/23/2023 1335    Acceptance, E, NR by JR at 10/19/2023 0939    Acceptance, E,D, NR by JY at 10/18/2023 0830    Acceptance, E, NR by MR at 10/12/2023 1508    Acceptance, E, NR by JG at 10/9/2023 1533    Acceptance, E, NR by MR at 10/6/2023 1549    Acceptance, E,D, NR,NL by CS at 10/4/2023 0948    Acceptance, E, NR by MR at 9/28/2023 1556    Acceptance, E, NR by JR at 9/25/2023 1045    Acceptance, E, NR by CS1 at 9/20/2023 1420    Acceptance, E, NR by MC at 9/18/2023 1532    Acceptance, E, NR by MC at 9/15/2023 1531   Family Acceptance, E, NR,NL by  at 10/31/2023 1352    Acceptance, E, NR by KP at 10/26/2023 0256    Acceptance, E,D, NR by JY at 10/18/2023 0830    Acceptance, E, NR by MR at 10/12/2023 1508    Acceptance, E, NR by JG at 10/9/2023 1533    Acceptance, E, NR by MR at 10/6/2023 1549    Acceptance, E, NR by MR at 9/28/2023 1556                         Point: Home exercise program (In Progress)       Description:   Instruct learner(s) on appropriate technique for monitoring, assisting and/or progressing therapeutic exercises/activities.                  Learning Progress Summary             Patient Acceptance, E, NR by MC at 11/7/2023 1508    Acceptance, E, NR,NL by MR at 11/2/2023 1511    Acceptance, E, NR by  at 10/26/2023 0256    Acceptance, E, NR by MC at 10/25/2023 1051    Acceptance, E, NR by JR at 10/19/2023 0939    Acceptance, E,D, NR by JY at 10/18/2023 0830    Acceptance, E, NR by MR at 10/12/2023 1508    Acceptance, E, NR by MR at 10/6/2023 1549    Acceptance, E,D, NR,NL by CS at 10/4/2023 0948    Acceptance, E, NR by MR at 9/28/2023 1556    Acceptance, E, NR by JR at 9/25/2023 1045    Acceptance, E, NR by CS1 at 9/20/2023 1420    Acceptance, E, NR by MC at 9/18/2023 1532    Acceptance, E, NR by MC at 9/15/2023 1531   Family Acceptance, E, NR by  at  10/26/2023 0256    Acceptance, E,D, NR by JY at 10/18/2023 0830    Acceptance, E, NR by MR at 10/12/2023 1508    Acceptance, E, NR by MR at 10/6/2023 1549    Acceptance, E, NR by MR at 9/28/2023 1556                         Point: Precautions (In Progress)       Description:   Instruct learner(s) on prescribed precautions during self-care and functional transfers.                  Learning Progress Summary             Patient Acceptance, E, NR by MC at 11/7/2023 1508    Acceptance, E, NR,NL by MR at 11/2/2023 1511    Acceptance, E, NR,NL by SW at 10/31/2023 1352    Acceptance, E, NR by KP at 10/26/2023 0256    Acceptance, E, NR by MC at 10/25/2023 1051    Acceptance, E, NR by MC at 10/23/2023 1335    Acceptance, E,D, NR by JY at 10/18/2023 0830    Acceptance, E, NR by MR at 10/12/2023 1508    Acceptance, E, NR by JG at 10/9/2023 1533    Acceptance, E, NR by MR at 10/6/2023 1549    Acceptance, E,D, NR,NL by  at 10/4/2023 0948    Acceptance, E, NR by MR at 9/28/2023 1556    Acceptance, E, NR by CS1 at 9/20/2023 1420    Acceptance, E, NR by MC at 9/18/2023 1532    Acceptance, E, NR by MC at 9/15/2023 1531   Family Acceptance, E, NR,NL by  at 10/31/2023 1352    Acceptance, E, NR by  at 10/26/2023 0256    Acceptance, E,D, NR by JY at 10/18/2023 0830    Acceptance, E, NR by MR at 10/12/2023 1508    Acceptance, E, NR by JG at 10/9/2023 1533    Acceptance, E, NR by MR at 10/6/2023 1549    Acceptance, E, NR by MR at 9/28/2023 1556                         Point: Body mechanics (In Progress)       Description:   Instruct learner(s) on proper positioning and spine alignment during self-care, functional mobility activities and/or exercises.                  Learning Progress Summary             Patient Acceptance, E, NR by MC at 11/7/2023 1508    Acceptance, E, NR,NL by MR at 11/2/2023 1511    Acceptance, E, NR by  at 10/26/2023 0256    Acceptance, E, NR by MC at 10/25/2023 1051    Acceptance, E, NR by MC at 10/23/2023  1335    Acceptance, E,D, NR by JY at 10/18/2023 0830    Acceptance, E, NR by MR at 10/12/2023 1508    Acceptance, E, NR by JG at 10/9/2023 1533    Acceptance, E, NR by MR at 10/6/2023 1549    Acceptance, E,D, NR,NL by  at 10/4/2023 0948    Acceptance, E, NR by MR at 9/28/2023 1556    Acceptance, E, NR by [facility] at 9/20/2023 1420    Acceptance, E, NR by  at 9/18/2023 1532    Acceptance, E, NR by  at 9/15/2023 1531   Family Acceptance, E, NR by  at 10/26/2023 0256    Acceptance, E,D, NR by JY at 10/18/2023 0830    Acceptance, E, NR by MR at 10/12/2023 1508    Acceptance, E, NR by JG at 10/9/2023 1533    Acceptance, E, NR by MR at 10/6/2023 1549    Acceptance, E, NR by MR at 9/28/2023 1556                                         User Key       Initials Effective Dates Name Provider Type Discipline     02/03/23 -  Jess Carrillo, OT Occupational Therapist OT     06/16/21 -  Ivis Lau RN Registered Nurse Nurse    [facility] 09/02/21 -  Carmen Carr, OT Occupational Therapist OT    JY 06/16/21 -  Ofelia Armstrong, OT Occupational Therapist OT     06/16/21 -  Cara Ledesma, OT Occupational Therapist OT     06/16/21 -  Jim Judge, OT Occupational Therapist OT     10/14/22 -  Jenny Rivera, OT Occupational Therapist OT     09/22/22 -  Meera Doyle, OT Occupational Therapist OT    J 08/30/23 -  Power Herman, OT Student OT Student OT                  OT Recommendation and Plan  Planned Therapy Interventions (OT): activity tolerance training, adaptive equipment training, BADL retraining, cognitive/visual perception retraining, edema control/reduction, functional balance retraining, IADL retraining, neuromuscular control/coordination retraining, occupation/activity based interventions, passive ROM/stretching, patient/caregiver education/training, strengthening exercise, ROM/therapeutic exercise, transfer/mobility retraining, orthotic fabrication/fitting/training  Therapy Frequency (OT):  daily  Plan of Care Review  Plan of Care Reviewed With: patient, family  Progress: no change  Outcome Evaluation: Pt presents w/ R side weakness, vision deficits, balance deficits, and impaired cognition limiting his ADL independence. Will continue to progress pt as tolerated per OT POC. Rec SNF at d/c.     Time Calculation:   Evaluation Complexity (OT)  Review Occupational Profile/Medical/Therapy History Complexity: expanded/moderate complexity  Assessment, Occupational Performance/Identification of Deficit Complexity: 3-5 performance deficits  Clinical Decision Making Complexity (OT): detailed assessment/moderate complexity  Overall Complexity of Evaluation (OT): moderate complexity     Time Calculation- OT       Row Name 11/07/23 1508             Time Calculation- OT    OT Start Time 1302  -MC      OT Received On 11/07/23  -      OT Goal Re-Cert Due Date 11/10/23  -         Timed Charges    56795 - OT Therapeutic Exercise Minutes 15  -MC      86810 - OT Therapeutic Activity Minutes 7  -MC      45687 - OT Self Care/Mgmt Minutes 5  -MC         Total Minutes    Timed Charges Total Minutes 27  -MC       Total Minutes 27  -MC                User Key  (r) = Recorded By, (t) = Taken By, (c) = Cosigned By      Initials Name Provider Type    Jenny Black, OT Occupational Therapist                  Therapy Charges for Today       Code Description Service Date Service Provider Modifiers Qty    81961988007 HC OT THER PROC EA 15 MIN 11/7/2023 Jenny Rivera OT GO 1    73818725607 HC OT THERAPEUTIC ACT EA 15 MIN 11/7/2023 Jenny Rivera OT GO 1                 Jenny Rivera OT  11/7/2023

## 2023-11-07 NOTE — SIGNIFICANT NOTE
11/07/23 1602   SLP Deferred Reason   SLP Deferred Reason Patient unavailable for treatment  (Attempted to see pt for tx this pm, pt restless not appropriate to participate. Will defer and f/u for tx as pt is able to participate.)

## 2023-11-07 NOTE — THERAPY TREATMENT NOTE
Patient Name: Kenneth Wen  : 1950    MRN: [id number]                              Today's Date: 2023       Admit Date: 9/15/2023    Visit Dx:     ICD-10-CM ICD-9-CM   1. Hemorrhagic CVA  I61.9 431   2. Aphasia  R47.01 784.3   3. Dysarthria  R47.1 784.51   4. Oropharyngeal dysphagia  R13.12 787.22     Patient Active Problem List   Diagnosis    Precordial pain    Elevated BP without diagnosis of hypertension    Dyslipidemia    Hyperglycemia    Multiple bilateral CVAs    Hemorrhagic CVA    HFpEF    T2DM (type 2 diabetes mellitus)    HTN (hypertension)    GERD (gastroesophageal reflux disease)    ICH (intracerebral hemorrhage)    Oropharyngeal dysphagia     Past Medical History:   Diagnosis Date    Acid reflux     Cancer     Skin    Diabetes mellitus     Prediabetes    GERD (gastroesophageal reflux disease) 09/15/2023    HTN (hypertension) 09/15/2023    Kidney disease     T2DM (type 2 diabetes mellitus) 09/15/2023     Past Surgical History:   Procedure Laterality Date    APPENDECTOMY      ENDOSCOPY W/ PEG TUBE PLACEMENT N/A 2023    Procedure: ESOPHAGOGASTRODUODENOSCOPY WITH PERCUTANEOUS ENDOSCOPIC GASTROSTOMY TUBE INSERTION;  Surgeon: Haseeb Carrillo MD;  Location: [facility] ENDOSCOPY;  Service: General;  Laterality: N/A;    HEMORRHOIDECTOMY      NASAL POLYP SURGERY        General Information       Row Name 23 1444          Physical Therapy Time and Intention    Document Type therapy note (daily note)  -AY     Mode of Treatment co-treatment;physical therapy  -AY       Row Name 23 1444          General Information    Patient Profile Reviewed yes  -AY     Existing Precautions/Restrictions fall;other (see comments)  R sided weakness/coordination deficits, PEG with abdominal binder, visual deficits  -AY     Barriers to Rehab medically complex;cognitive status;visual deficit  -AY       Row Name 23 1444          Cognition    Orientation Status (Cognition) oriented to;person;disoriented  to;time;place  -AY       Row Name 11/07/23 1444          Safety Issues, Functional Mobility    Safety Issues Affecting Function (Mobility) awareness of need for assistance;safety precautions follow-through/compliance;safety precaution awareness;insight into deficits/self-awareness;sequencing abilities  -AY     Impairments Affecting Function (Mobility) balance;cognition;coordination;endurance/activity tolerance;grasp;motor control;motor planning;muscle tone abnormal;visual/perceptual;strength;sensation/sensory awareness;range of motion (ROM);postural/trunk control  -AY     Cognitive Impairments, Mobility Safety/Performance awareness, need for assistance;safety precaution awareness;safety precaution follow-through;insight into deficits/self-awareness;sequencing abilities  -AY               User Key  (r) = Recorded By, (t) = Taken By, (c) = Cosigned By      Initials Name Provider Type    AY Aleshia Armstrong, PT Physical Therapist                   Mobility       Row Name 11/07/23 1445          Bed Mobility    Bed Mobility supine-sit  -AY     Supine-Sit [place] (Bed Mobility) maximum assist (25% patient effort);verbal cues;nonverbal cues (demo/gesture);1 person assist  -AY     Assistive Device (Bed Mobility) bed rails;head of bed elevated;draw sheet  -AY       Row Name 11/07/23 1445          Bed-Chair Transfer    Bed-Chair [place] (Transfers) moderate assist (50% patient effort);2 person assist  -AY     Assistive Device (Bed-Chair Transfers) other (see comments)  BUE support  -AY       Row Name 11/07/23 1445          Sit-Stand Transfer    Sit-Stand [place] (Transfers) moderate assist (50% patient effort);2 person assist;verbal cues  -AY     Assistive Device (Sit-Stand Transfers) other (see comments)  -AY     Comment, (Sit-Stand Transfer) performed x6 reps. progressed to min Ax2 to stand.  -AY       Row Name 11/07/23 1445          Gait/Stairs (Locomotion)    [place] Level (Gait) not tested  -AY      Comment, (Gait/Stairs) deferred d/t poor standing balance.  -AY               User Key  (r) = Recorded By, (t) = Taken By, (c) = Cosigned By      Initials Name Provider Type    Aleshia Mendes PT Physical Therapist                   Obj/Interventions       Row Name 11/07/23 1449          Motor Skills    Therapeutic Exercise hip;knee;ankle  -AY       Row Name 11/07/23 1449          Hip (Therapeutic Exercise)    Hip (Therapeutic Exercise) AAROM (active assistive range of motion)  -AY     Hip AAROM (Therapeutic Exercise) right;flexion;extension;10 repetitions;sitting  -AY       Row Name 11/07/23 1449          Knee (Therapeutic Exercise)    Knee (Therapeutic Exercise) AAROM (active assistive range of motion)  -AY     Knee AAROM (Therapeutic Exercise) right;flexion;extension;sitting;10 repetitions  -AY       Row Name 11/07/23 1449          Ankle (Therapeutic Exercise)    Ankle (Therapeutic Exercise) PROM (passive range of motion)  -AY     Ankle PROM (Therapeutic Exercise) right;dorsiflexion;plantarflexion;10 repetitions;sitting  -AY       Row Name 11/07/23 1449          Balance    Balance Assessment sitting static balance;sitting dynamic balance;sit to stand dynamic balance;standing static balance;standing dynamic balance  -AY     Static Sitting Balance contact guard  -AY     Dynamic Sitting Balance minimal assist  -AY     Position, Sitting Balance unsupported;sitting edge of bed;sitting in chair  -AY     Sit to Stand Dynamic Balance moderate assist  -AY     Static Standing Balance moderate assist  -AY     Dynamic Standing Balance maximum assist  -AY     Position/Device Used, Standing Balance supported  -AY     Comment, Balance posterior and R lateral lean noted with upright mobility  -AY               User Key  (r) = Recorded By, (t) = Taken By, (c) = Cosigned By      Initials Name Provider Type    Aleshia Mendes PT Physical Therapist                   Goals/Plan    No documentation.                  Clinical  Impression       Row Name 11/07/23 1450          Pain    Additional Documentation Pain Scale: FACES Pre/Post-Treatment (Group)  -AY       Row Name 11/07/23 1450          Pain Scale: FACES Pre/Post-Treatment    Pain: FACES Scale, Pretreatment 0-->no hurt  -AY     Posttreatment Pain Rating 0-->no hurt  -AY       Row Name 11/07/23 1450          Plan of Care Review    Plan of Care Reviewed With patient;family  -AY     Progress improving  -AY     Outcome Evaluation Pt showing improvement as he progressed to min Ax2 to stand after multiple attempts. Tolerated AAROM to RLE. Rec continued skilled PT to increase indep with mobility; limited by R weakness, balance deficit, and impaired cognition compared to baseline. d/c rec for SNF.  -AY       Row Name 11/07/23 1450          Vital Signs    O2 Delivery Pre Treatment room air  -AY     O2 Delivery Intra Treatment room air  -AY     O2 Delivery Post Treatment room air  -AY     Pre Patient Position Supine  -AY     Intra Patient Position Standing  -AY     Post Patient Position Sitting  -AY       Row Name 11/07/23 1450          Positioning and Restraints    Pre-Treatment Position in bed  -AY     Post Treatment Position chair  -AY     In Chair notified nsg;reclined;sitting;call light within reach;encouraged to call for assist;exit alarm on;RUE elevated;LUE elevated;with family/caregiver;on mechanical lift sling;waffle cushion;with other staff;legs elevated  -AY               User Key  (r) = Recorded By, (t) = Taken By, (c) = Cosigned By      Initials Name Provider Type    AY Aleshia Armstrong, PT Physical Therapist                   Outcome Measures       Row Name 11/07/23 1456 11/07/23 0800       How much help from another person do you currently need...    Turning from your back to your side while in flat bed without using bedrails? 4  -AY 4  -TK    Moving from lying on back to sitting on the side of a flat bed without bedrails? 2  -AY 3  -TK    Moving to and from a bed to a chair  (including a wheelchair)? 2  -AY 2  -TK    Standing up from a chair using your arms (e.g., wheelchair, bedside chair)? 2  -AY 2  -TK    Climbing 3-5 steps with a railing? 1  -AY 2  -TK    To walk in hospital room? 1  -AY 2  -TK    AM-PAC 6 Clicks Score (PT) 12  -AY 15  -TK    Highest level of mobility 4 --> Transferred to chair/commode  -AY 4 --> Transferred to chair/commode  -TK      Row Name 11/07/23 1456          Functional Assessment    Outcome Measure Options AM-PAC 6 Clicks Basic Mobility (PT)  -AY               User Key  (r) = Recorded By, (t) = Taken By, (c) = Cosigned By      Initials Name Provider Type    TK Ross Avila RN Registered Nurse    Aleshia Mendes, PT Physical Therapist                                 Physical Therapy Education       Title: PT OT SLP Therapies (In Progress)       Topic: Physical Therapy (In Progress)       Point: Mobility training (In Progress)       Learning Progress Summary             Patient Acceptance, E,TB, NR by AY at 11/7/2023 1456    Acceptance, E, NR by ML at 11/2/2023 1608    Acceptance, E, VU,NR by ML at 10/31/2023 1317    Acceptance, E, NR by KP at 10/26/2023 0256    Acceptance, E, NR by ML at 10/23/2023 1353    Acceptance, E,TB, NR by AY at 10/20/2023 1308    Acceptance, E, NR by KG at 10/15/2023 1401    Acceptance, E, NR,VU by KR at 10/12/2023 1453    Acceptance, E,D, NR by AB at 10/11/2023 1547    Acceptance, E, NR by CM at 10/10/2023 1517    Acceptance, E, NR,VU by KR at 10/6/2023 1550    Acceptance, E, NR by SD at 10/5/2023 1437    Acceptance, E, VU by CD at 10/2/2023 1502    Comment: SEE FLOWSHEET    Acceptance, E, NR by KR at 9/25/2023 1324    Acceptance, E, NR by KG1 at 9/18/2023 1403    Acceptance, E,TB, NR by AY at 9/15/2023 1254   Family Acceptance, E,TB, NR by AY at 11/7/2023 1456    Acceptance, E, VU,NR by ML at 10/31/2023 1317    Acceptance, E, NR by KP at 10/26/2023 0256    Acceptance, E, NR by ML at 10/23/2023 1353    Acceptance, E, NR,VU by  KR at 10/12/2023 1453    Acceptance, E, NR,VU by KR at 10/6/2023 1550    Acceptance, E, NR by SD at 10/5/2023 1437   Significant Other Acceptance, E, NR by CM at 10/10/2023 1517                         Point: Home exercise program (In Progress)       Learning Progress Summary             Patient Acceptance, E,TB, NR by AY at 11/7/2023 1456    Acceptance, E, NR by KP at 10/26/2023 0256    Acceptance, E, NR by ML at 10/23/2023 1353    Acceptance, E, NR by KG at 10/15/2023 1401    Acceptance, E,D, NR by AB at 10/11/2023 1547    Acceptance, E, NR by CM at 10/10/2023 1517    Acceptance, E, NR by SD at 10/5/2023 1437    Acceptance, E, VU by CD at 10/2/2023 1502    Comment: SEE FLOWSHEET    Acceptance, E, NR by KG1 at 9/18/2023 1403   Family Acceptance, E,TB, NR by AY at 11/7/2023 1456    Acceptance, E, NR by KP at 10/26/2023 0256    Acceptance, E, NR by ML at 10/23/2023 1353    Acceptance, E, NR by SD at 10/5/2023 1437   Significant Other Acceptance, E, NR by CM at 10/10/2023 1517                         Point: Body mechanics (In Progress)       Learning Progress Summary             Patient Acceptance, E,TB, NR by AY at 11/7/2023 1456    Acceptance, E, NR by ML at 11/2/2023 1608    Acceptance, E, VU,NR by ML at 10/31/2023 1317    Acceptance, E, NR by KP at 10/26/2023 0256    Acceptance, E,TB, NR by AY at 10/20/2023 1308    Acceptance, E, NR by KG at 10/15/2023 1401    Acceptance, E, NR,VU by KR at 10/12/2023 1453    Acceptance, E,D, NR by AB at 10/11/2023 1547    Acceptance, E, NR by CM at 10/10/2023 1517    Acceptance, E, NR,VU by KR at 10/6/2023 1550    Acceptance, E, NR by SD at 10/5/2023 1437    Acceptance, E, VU by CD at 10/2/2023 1502    Comment: SEE FLOWSHEET    Acceptance, E, NR by KR at 9/25/2023 1324    Acceptance, E, NR by KG1 at 9/18/2023 1403    Acceptance, E,TB, NR by AY at 9/15/2023 1254   Family Acceptance, E,TB, NR by AY at 11/7/2023 1456    Acceptance, E, VU,NR by ML at 10/31/2023 1317    Acceptance, E,  NR by KP at 10/26/2023 0256    Acceptance, E, NR,VU by KR at 10/12/2023 1453    Acceptance, E, NR,VU by KR at 10/6/2023 1550    Acceptance, E, NR by SD at 10/5/2023 1437   Significant Other Acceptance, E, NR by CM at 10/10/2023 1517                         Point: Precautions (In Progress)       Learning Progress Summary             Patient Acceptance, E,TB, NR by AY at 11/7/2023 1456    Acceptance, E, NR by ML at 11/2/2023 1608    Acceptance, E, VU,NR by ML at 10/31/2023 1317    Acceptance, E, NR by KP at 10/26/2023 0256    Acceptance, E, NR by ML at 10/23/2023 1353    Acceptance, E,TB, NR by AY at 10/20/2023 1308    Acceptance, E, NR by KG at 10/15/2023 1401    Acceptance, E, NR,VU by KR at 10/12/2023 1453    Acceptance, E,D, NR by AB at 10/11/2023 1547    Acceptance, E, NR by CM at 10/10/2023 1517    Acceptance, E, NR,VU by KR at 10/6/2023 1550    Acceptance, E, NR by SD at 10/5/2023 1437    Acceptance, E, VU by CD at 10/2/2023 1502    Comment: SEE FLOWSHEET    Acceptance, E, NR by KR at 9/25/2023 1324    Acceptance, E, NR by KG1 at 9/18/2023 1403    Acceptance, E,TB, NR by AY at 9/15/2023 1254   Family Acceptance, E,TB, NR by AY at 11/7/2023 1456    Acceptance, E, VU,NR by ML at 10/31/2023 1317    Acceptance, E, NR by KP at 10/26/2023 0256    Acceptance, E, NR by ML at 10/23/2023 1353    Acceptance, E, NR,VU by KR at 10/12/2023 1453    Acceptance, E, NR,VU by KR at 10/6/2023 1550    Acceptance, E, NR by SD at 10/5/2023 1437   Significant Other Acceptance, E, NR by CM at 10/10/2023 1517                                         User Key       Initials Effective Dates Name Provider Type Discipline    CD 02/03/23 -  Claudia Duarte, PT Physical Therapist PT    SD 03/13/23 -  Chantel Scott, PT Physical Therapist PT    KP 06/16/21 -  Ivis Lau, RN Registered Nurse Nurse    KG1 05/22/20 -  Mackenzie Mckay, PT Physical Therapist PT    KG 01/04/23 -  Monik Solorio Physical Therapist PT    AY 11/10/20  -  Aleshia Armstrong, PT Physical Therapist PT    ML 04/22/21 -  Laura Rangel Physical Therapist PT    AB 09/22/22 -  Aleshia Keita, PT Physical Therapist PT    CM 09/22/22 -  Yanira Shields, PT Physical Therapist PT    KR 12/30/22 -  Raissa Soria, PT Physical Therapist PT                  PT Recommendation and Plan  Planned Therapy Interventions (PT): balance training, bed mobility training, home exercise program, gait training, postural re-education, transfer training, patient/family education, strengthening, neuromuscular re-education  Plan of Care Reviewed With: patient, family  Progress: improving  Outcome Evaluation: Pt showing improvement as he progressed to min Ax2 to stand after multiple attempts. Tolerated AAROM to RLE. Rec continued skilled PT to increase indep with mobility; limited by R weakness, balance deficit, and impaired cognition compared to baseline. d/c rec for SNF.     Time Calculation:   PT Evaluation Complexity  History, PT Evaluation Complexity: 1-2 personal factors and/or comorbidities  Examination of Body Systems (PT Eval Complexity): total of 3 or more elements  Clinical Presentation (PT Evaluation Complexity): evolving  Clinical Decision Making (PT Evaluation Complexity): moderate complexity  Overall Complexity (PT Evaluation Complexity): moderate complexity     PT Charges       Row Name 11/07/23 1457             Time Calculation    Start Time 1329  -AY      PT Received On 11/07/23  -AY         Timed Charges    22480 - PT Therapeutic Exercise Minutes 5  -AY      00799 - PT Therapeutic Activity Minutes 6  -AY         Total Minutes    Timed Charges Total Minutes 11  -AY       Total Minutes 11  -AY                User Key  (r) = Recorded By, (t) = Taken By, (c) = Cosigned By      Initials Name Provider Type    AY Aleshia Armstrong, PT Physical Therapist                  Therapy Charges for Today       Code Description Service Date Service Provider Modifiers Qty    [id number]  PT  THERAPEUTIC ACT EA 15 MIN 11/7/2023 Aleshia Armstrong, PT GP 1            PT G-Codes  Outcome Measure Options: AM-PAC 6 Clicks Basic Mobility (PT)  AM-PAC 6 Clicks Score (PT): 12  AM-PAC 6 Clicks Score (OT): 12  Modified Rae Scale: 4 - Moderately severe disability.  Unable to walk without assistance, and unable to attend to own bodily needs without assistance.  PT Discharge Summary  Anticipated Discharge Disposition (PT): skilled nursing facility    Aleshia Armstrong PT  11/7/2023

## 2023-11-07 NOTE — PLAN OF CARE
Goal Outcome Evaluation:  Plan of Care Reviewed With: spouse, patient        Progress: no change  Outcome Evaluation: Pt less restless than yesterday at time of Palliative RN encounter today; still moving his right leg almost constantly, but not combative or aggressive. Reaches for his wife frequently, holds her hand and appears comforted by her presence. Continue support to pt and family in GOC/POC..    1300 Palliative IDT meeting:  MD, ROLANOD, RN, , SW  After hours, weekends and holidays, contact Palliative Provider by calling [phone]     Problem: Palliative Care  Goal: Enhanced Quality of Life  Outcome: Ongoing, Progressing  Intervention: Promote Advance Care Planning  Flowsheets (Taken 11/7/2023 1512)  Life Transition/Adjustment: (wife planning for SNF placement close to home) other (see comments)  Intervention: Maximize Comfort  Flowsheets (Taken 11/7/2023 1512)  Pain Management Interventions: pain management plan reviewed with patient/caregiver  Intervention: Optimize Function  Flowsheets (Taken 11/7/2023 1512)  Sleep/Rest Enhancement:   music provided   consistent schedule promoted   family presence promoted  Intervention: Optimize Psychosocial Wellbeing  Flowsheets (Taken 11/7/2023 1512)  Supportive Measures: positive reinforcement provided  Family/Support System Care:   caregiver stress acknowledged   involvement promoted   presence promoted   self-care encouraged   support provided

## 2023-11-07 NOTE — PLAN OF CARE
Goal Outcome Evaluation:  Plan of Care Reviewed With: patient, family        Progress: no change  Outcome Evaluation: Pt presents w/ R side weakness, vision deficits, balance deficits, and impaired cognition limiting his ADL independence. Will continue to progress pt as tolerated per OT POC. Rec SNF at d/c.      Anticipated Discharge Disposition (OT): skilled nursing facility

## 2023-11-07 NOTE — PLAN OF CARE
Goal Outcome Evaluation:  Plan of Care Reviewed With: patient, family        Progress: improving  Outcome Evaluation: Pt showing improvement as he progressed to min Ax2 to stand after multiple attempts. Tolerated AAROM to RLE. Rec continued skilled PT to increase indep with mobility; limited by R weakness, balance deficit, and impaired cognition compared to baseline. d/c rec for SNF.      Anticipated Discharge Disposition (PT): skilled nursing facility

## 2023-11-07 NOTE — PLAN OF CARE
Goal Outcome Evaluation:  Plan of Care Reviewed With: patient        Progress: improving          VSS on room air. Resting well this shift. PEG and abdominal binders 2x in place. Wife at bedside at beginning of shift. No PRNs required.

## 2023-11-07 NOTE — PROGRESS NOTES
[facility] Medicine Services  PROGRESS NOTE    Patient Name: Kenneth Wen  : 1950  MRN: [id number]    Date of Admission: 9/15/2023  Primary Care Physician: Susan Powers APRN    Subjective   Subjective     CC:  cva    HPI:  tolerating tube feeds.  He has gotten no prn doses of phenobarb since noon yesterday.   Continues to be restless and require a sitter.  Wife notes he does pull at everything, is sitting up a little at times.     Objective   Objective     Vital Signs:   Temp:  [97.3 °F (36.3 °C)] 97.3 °F (36.3 °C)  Heart Rate:  [69] 69  Resp:  [16] 16  BP: (123)/(86) 123/86     Physical Exam:  Constitutional: No acute distress, awake, restlessly moving legs.  Says a few words, dysarthric but attends to my conversation w his wife    HENT: NCAT, mucous membranes moist  Respiratory: Clear to auscultation bilaterally, respiratory effort normal   Cardiovascular: RRR  Gastrointestinal: Positive bowel sounds, soft, nontender, nondistended  Musculoskeletal: Lying in bed  Psychiatric: resltelss   Neurologic: Oriented to person, generally weak, speech mumbled but comprehensible  Skin: No rashes      Results Reviewed:  LAB RESULTS:      Lab 23  0437 23  0715   WBC 7.17 6.45   HEMOGLOBIN 13.1 12.1*   HEMATOCRIT 40.3 36.8*   PLATELETS 333 313   NEUTROS ABS 3.89  --    IMMATURE GRANS (ABS) 0.02  --    LYMPHS ABS 1.48  --    MONOS ABS 0.67  --    EOS ABS 1.02*  --    MCV 96.4 96.6         Lab 23  0437 23  0715   SODIUM 141 138   POTASSIUM 4.4 4.3   CHLORIDE 101 101   CO2 27.0 27.0   ANION GAP 13.0 10.0   BUN 22 22   CREATININE 0.72* 0.70*   EGFR 97.1 97.9   GLUCOSE 116* 189*   CALCIUM 9.6 9.1         Lab 23  0715   TOTAL PROTEIN 5.2*   ALBUMIN 3.7   GLOBULIN 1.5   ALT (SGPT) 42*   AST (SGOT) 27   BILIRUBIN 0.3   ALK PHOS 84                     Brief Urine Lab Results  (Last result in the past 365 days)        Color   Clarity   Blood   Leuk Est   Nitrite    Protein   CREAT   Urine HCG        10/30/23 1636 Yellow   Clear   Negative   Negative   Negative   Negative                   Microbiology Results Abnormal       Procedure Component Value - Date/Time    Blood Culture - Blood, Arm, Right [[id number]]  (Normal) Collected: 09/15/23 0212    Lab Status: Final result Specimen: Blood from Arm, Right Updated: 09/20/23 0230     Blood Culture No growth at 5 days    Blood Culture - Blood, Arm, Left [[id number]]  (Normal) Collected: 09/15/23 0212    Lab Status: Final result Specimen: Blood from Arm, Left Updated: 09/20/23 0230     Blood Culture No growth at 5 days            No radiology results from the last 24 hrs        Current medications:  Scheduled Meds:atorvastatin, 80 mg, Per PEG Tube, Nightly  clopidogrel, 75 mg, Per PEG Tube, Daily  Diclofenac Sodium, 2 g, Topical, BID  donepezil, 10 mg, Per PEG Tube, Nightly  fluticasone, 2 spray, Each Nare, Daily  HYDROcodone-acetaminophen, 1 tablet, Per PEG Tube, Q12H  lansoprazole, 15 mg, Per PEG Tube, Q AM  Lidocaine, 1 patch, Transdermal, Q24H  melatonin, 10 mg, Per PEG Tube, Nightly  metoprolol tartrate, 25 mg, Per PEG Tube, Q12H  miconazole, 1 application , Topical, Q12H  mirtazapine, 30 mg, Per PEG Tube, Nightly  palliative care oral rinse, , Mouth/Throat, 4x Daily  PHENobarbital, 32.4 mg, Oral, Once  PHENobarbital, 81 mg, Per PEG Tube, Q8H  ProSource No Carb, 30 mL, Per G Tube, Daily  QUEtiapine, 200 mg, Per PEG Tube, Q12H  temazepam, 15 mg, Per PEG Tube, Nightly      Continuous Infusions:   PRN Meds:.  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        Brief Hospital Course to date:  Kenneth Wen is a 73 y.o. male with hx of HTN, HLD, T2DM, and GERD who presented to OSH with multiple acute ischemic infarcts of the bilateral cerebral and cerebellar hemispheres as well as left isaac. TTE/JOSIAS was negative PFO at OSH. On 9/13/23 he had worsening in mental status and new inability to move RLE, head CT showed evolution of the existing CVA with new development of petechial hemorrhage. DAPT was held for 24 hours per Neurology recommendations and repeat CT head that evening showed worsening effacement of the right quadrigeminal cistern with suspected increasing upward supratentorial pressure. He was then transferred to [facility] for Neurosurgery evaluation on 9/15/23. He was started on hypertonic saline 9/15/23 through 9/20/23 for cerebral edema. He had fevers with negative cultures but had worsening secretions and labored breathing so was started on Zosyn on 9/16/23. Transferred to the hospitalist service on 9/22/23. Pt demonstrated poor oral intake with elevated sodium levels; therefore, PEG tube was recommended & placed.        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 given increasing agitation yesterday     Dysphagia  Hypernatremia - resolved  - PEG tube placed 9/29, Dr Carrillo   --SLP recommends mechanical ground with honey thick liquids   --Risk of pulling out peg tube, abd binder to be in place at all times.   -- dietary following , adequate PO intake is still a challenge  --hold tube feeds 24 hrs to see if oral intake will improve     HTN  --Continue Metoprolol 25 mg BID     GERD  --Continue PPI     COVID-19-resolved  --Overall asymptomatic and on room air  --No infiltrates seen and low procal  --Did not receive any treatment   --Off Isolation 9/30/2023      Leukocytosis-resolved  --Procalcitionin low at 0.05  --CXR and UA negative  --Blood cultures negative     Elevated LFT's, mild  --Possibly secondary to statin?  --Negative acute hepatitis panel  --repeat AST/ALT improved    GOC - I hve spoken w his wife about his/her right to change goals of care if at some point they decide he would want a different level of interventions.     Expected Discharge Location and Transportation: Rehab  Expected Discharge   Expected Discharge Date: 11/7/2023; Expected Discharge Time:      DVT prophylaxis:  Mechanical DVT prophylaxis orders are present.     AM-PAC 6 Clicks Score (PT): 15 (11/07/23 0800)    CODE STATUS:   Code Status and Medical Interventions:   Ordered at: 09/29/23 0854     Medical Intervention Limits:    NO intubation (DNI)     Code Status (Patient has no pulse and is not breathing):    No CPR (Do Not Attempt to Resuscitate)     Medical Interventions (Patient has pulse or is breathing):    Limited Support       Olga Maldonado MD  11/07/23

## 2023-11-08 LAB
GLUCOSE BLDC GLUCOMTR-MCNC: 140 MG/DL (ref 70–130)
GLUCOSE BLDC GLUCOMTR-MCNC: 150 MG/DL (ref 70–130)
GLUCOSE BLDC GLUCOMTR-MCNC: 151 MG/DL (ref 70–130)

## 2023-11-08 PROCEDURE — 25010000002 ZIPRASIDONE MESYLATE PER 10 MG: Performed by: PSYCHIATRY & NEUROLOGY

## 2023-11-08 PROCEDURE — 82948 REAGENT STRIP/BLOOD GLUCOSE: CPT

## 2023-11-08 PROCEDURE — 99232 SBSQ HOSP IP/OBS MODERATE 35: CPT | Performed by: NURSE PRACTITIONER

## 2023-11-08 PROCEDURE — 99232 SBSQ HOSP IP/OBS MODERATE 35: CPT | Performed by: PSYCHIATRY & NEUROLOGY

## 2023-11-08 PROCEDURE — 92526 ORAL FUNCTION THERAPY: CPT

## 2023-11-08 PROCEDURE — 92507 TX SP LANG VOICE COMM INDIV: CPT

## 2023-11-08 RX ORDER — HYDROCODONE BITARTRATE AND ACETAMINOPHEN 5; 325 MG/1; MG/1
1 TABLET ORAL EVERY 6 HOURS
Status: DISCONTINUED | OUTPATIENT
Start: 2023-11-08 | End: 2023-11-16 | Stop reason: HOSPADM

## 2023-11-08 RX ORDER — TEMAZEPAM 15 MG/1
30 CAPSULE ORAL NIGHTLY
Status: DISCONTINUED | OUTPATIENT
Start: 2023-11-08 | End: 2023-11-16 | Stop reason: HOSPADM

## 2023-11-08 RX ADMIN — MICONAZOLE NITRATE 1 APPLICATION: 20 CREAM TOPICAL at 13:44

## 2023-11-08 RX ADMIN — MICONAZOLE NITRATE 1 APPLICATION: 20 CREAM TOPICAL at 20:24

## 2023-11-08 RX ADMIN — MIRTAZAPINE 30 MG: 15 TABLET, FILM COATED ORAL at 18:10

## 2023-11-08 RX ADMIN — Medication 10 MG: at 18:09

## 2023-11-08 RX ADMIN — LANSOPRAZOLE 15 MG: 15 TABLET, ORALLY DISINTEGRATING ORAL at 05:35

## 2023-11-08 RX ADMIN — PHENOBARBITAL 81 MG: 64.8 TABLET ORAL at 22:30

## 2023-11-08 RX ADMIN — HYDROCODONE BITARTRATE AND ACETAMINOPHEN 1 TABLET: 5; 325 TABLET ORAL at 18:10

## 2023-11-08 RX ADMIN — DONEPEZIL HYDROCHLORIDE 10 MG: 10 TABLET, FILM COATED ORAL at 18:10

## 2023-11-08 RX ADMIN — CLOPIDOGREL BISULFATE 75 MG: 75 TABLET ORAL at 09:22

## 2023-11-08 RX ADMIN — PHENOBARBITAL 81 MG: 64.8 TABLET ORAL at 05:35

## 2023-11-08 RX ADMIN — DICLOFENAC SODIUM 2 G: 9.3 GEL TOPICAL at 13:44

## 2023-11-08 RX ADMIN — DICLOFENAC SODIUM 2 G: 9.3 GEL TOPICAL at 20:24

## 2023-11-08 RX ADMIN — QUETIAPINE FUMARATE 200 MG: 100 TABLET ORAL at 09:22

## 2023-11-08 RX ADMIN — PHENOBARBITAL 81 MG: 64.8 TABLET ORAL at 13:45

## 2023-11-08 RX ADMIN — MINERAL OIL: 1000 LIQUID ORAL at 20:24

## 2023-11-08 RX ADMIN — QUETIAPINE FUMARATE 200 MG: 100 TABLET ORAL at 20:23

## 2023-11-08 RX ADMIN — FLUTICASONE PROPIONATE 2 SPRAY: 50 SPRAY, METERED NASAL at 09:57

## 2023-11-08 RX ADMIN — HYDROCODONE BITARTRATE AND ACETAMINOPHEN 1 TABLET: 5; 325 TABLET ORAL at 13:44

## 2023-11-08 RX ADMIN — METOPROLOL TARTRATE 25 MG: 25 TABLET, FILM COATED ORAL at 20:23

## 2023-11-08 RX ADMIN — ZIPRASIDONE MESYLATE 10 MG: 20 INJECTION, POWDER, LYOPHILIZED, FOR SOLUTION INTRAMUSCULAR at 01:16

## 2023-11-08 RX ADMIN — TEMAZEPAM 30 MG: 15 CAPSULE ORAL at 20:23

## 2023-11-08 RX ADMIN — ATORVASTATIN CALCIUM 80 MG: 40 TABLET, FILM COATED ORAL at 20:23

## 2023-11-08 RX ADMIN — METOPROLOL TARTRATE 25 MG: 25 TABLET, FILM COATED ORAL at 09:56

## 2023-11-08 RX ADMIN — HYDROCODONE BITARTRATE AND ACETAMINOPHEN 1 TABLET: 5; 325 TABLET ORAL at 22:31

## 2023-11-08 RX ADMIN — HYDROCODONE BITARTRATE AND ACETAMINOPHEN 1 TABLET: 5; 325 TABLET ORAL at 00:54

## 2023-11-08 RX ADMIN — Medication 30 ML: at 09:33

## 2023-11-08 NOTE — CASE MANAGEMENT/SOCIAL WORK
Continued Stay Note  [facility]     Patient Name: Kenneth Wen  MRN: [id number]  Today's Date: 11/8/2023    Admit Date: 9/15/2023    Plan: Ongoing   Discharge Plan       Row Name 11/08/23 1847       Plan    Plan Ongoing    Patient/Family in Agreement with Plan yes    Plan Comments Mr. Wen with restraints today, discussed in unit multidisciplinary rounds and with Hospitalist ROLANDO this morning and with Dr. Tristan Rausch yesterday.  I met with spouse yesterday, she stated she did not feel going home would be an option and did not feel there were many in home caregivers available in [facility].  Continues to hope patient will be able to eventually transfer to [facility] for rehab.  Will discuss with Palliative Care Team in am and try to arrange a meeting with spouse to re-address goals of care.  Jess Ferrera, Ext. [phone]    Final Discharge Disposition Code 30 - still a patient                   Discharge Codes    No documentation.                 Expected Discharge Date and Time       Expected Discharge Date Expected Discharge Time    Nov 10, 2023               JOEL Mena

## 2023-11-08 NOTE — PLAN OF CARE
Goal Outcome Evaluation:  Plan of Care Reviewed With: patient        Progress: no change  Outcome Evaluation: On RA. Oriented to self only. Very restless this shift. PRN phenobarb and Geodon given. Poor PO intake. TF boluses given per order. Adequate UOP.

## 2023-11-08 NOTE — PROGRESS NOTES
[facility] Medicine Services  PROGRESS NOTE    Patient Name: Kenneth Wen  : 1950  MRN: [id number]    Date of Admission: 9/15/2023  Primary Care Physician: Susan Powers APRN    Subjective   Subjective     CC:  cva    HPI:  Patient in bed. Restless. States he hurts all over. Back in restraints this morning  Received prn phenobarb/ geodon    Objective   Objective     Vital Signs:   Temp:  [97.2 °F (36.2 °C)-98.5 °F (36.9 °C)] 98.5 °F (36.9 °C)  Heart Rate:  [] 103  Resp:  [16-22] 22  BP: (123-144)/(76-81) 144/81     Physical Exam:  Constitutional: No acute distress, awake, restlessly moving legs/ body.   HENT: NCAT, mucous membranes moist  Respiratory: Clear to auscultation bilaterally, respiratory effort normal   Cardiovascular: RRR  Gastrointestinal: Positive bowel sounds, soft, nontender, nondistended PEG clamped with binder  Musculoskeletal: HINES- no edema  Psychiatric: resltelss   Neurologic: Oriented to person- answers ROS, generally weak, speech soft but clear  Skin: No rashes      Results Reviewed:  LAB RESULTS:      Lab 23  0437   WBC 7.17   HEMOGLOBIN 13.1   HEMATOCRIT 40.3   PLATELETS 333   NEUTROS ABS 3.89   IMMATURE GRANS (ABS) 0.02   LYMPHS ABS 1.48   MONOS ABS 0.67   EOS ABS 1.02*   MCV 96.4         Lab 23  0437   SODIUM 141   POTASSIUM 4.4   CHLORIDE 101   CO2 27.0   ANION GAP 13.0   BUN 22   CREATININE 0.72*   EGFR 97.1   GLUCOSE 116*   CALCIUM 9.6                           Brief Urine Lab Results  (Last result in the past 365 days)        Color   Clarity   Blood   Leuk Est   Nitrite   Protein   CREAT   Urine HCG        10/30/23 1636 Yellow   Clear   Negative   Negative   Negative   Negative                   Microbiology Results Abnormal       Procedure Component Value - Date/Time    Blood Culture - Blood, Arm, Right [[id number]]  (Normal) Collected: 09/15/23 0212    Lab Status: Final result Specimen: Blood from Arm, Right Updated: 23  0230     Blood Culture No growth at 5 days    Blood Culture - Blood, Arm, Left [[id number]]  (Normal) Collected: 09/15/23 0212    Lab Status: Final result Specimen: Blood from Arm, Left Updated: 09/20/23 0230     Blood Culture No growth at 5 days            No radiology results from the last 24 hrs        Current medications:  Scheduled Meds:atorvastatin, 80 mg, Per PEG Tube, Nightly  clopidogrel, 75 mg, Per PEG Tube, Daily  Diclofenac Sodium, 2 g, Topical, BID  donepezil, 10 mg, Per PEG Tube, Nightly  fluticasone, 2 spray, Each Nare, Daily  HYDROcodone-acetaminophen, 1 tablet, Per PEG Tube, Q6H  lansoprazole, 15 mg, Per PEG Tube, Q AM  Lidocaine, 1 patch, Transdermal, Q24H  melatonin, 10 mg, Per PEG Tube, Nightly  metoprolol tartrate, 25 mg, Per PEG Tube, Q12H  miconazole, 1 application , Topical, Q12H  mirtazapine, 30 mg, Per PEG Tube, Nightly  palliative care oral rinse, , Mouth/Throat, 4x Daily  PHENobarbital, 81 mg, Per PEG Tube, Q8H  ProSource No Carb, 30 mL, Per G Tube, Daily  QUEtiapine, 200 mg, Per PEG Tube, Q12H  temazepam, 30 mg, Per PEG Tube, Nightly      Continuous Infusions:   PRN Meds:.  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        Brief Hospital Course to date:  Kenneth Wen is a 73 y.o. male with hx of HTN, HLD, T2DM, and GERD who presented to OSH with multiple acute ischemic infarcts of the bilateral cerebral and cerebellar hemispheres as well as left isaac. TTE/JOSIAS was negative PFO at OSH. On 9/13/23 he had worsening in mental status and new inability to move RLE, head CT showed evolution of the existing CVA with new development of petechial hemorrhage. DAPT was held for 24 hours per Neurology recommendations and repeat CT head that evening showed worsening effacement of the right quadrigeminal cistern with suspected increasing upward supratentorial pressure. He was then transferred to [facility] for Neurosurgery evaluation on 9/15/23. He was started on hypertonic saline 9/15/23 through 9/20/23 for cerebral edema. He had fevers with negative cultures but had worsening secretions and labored breathing so was started on Zosyn on 9/16/23. Transferred to the hospitalist service on 9/22/23. Pt demonstrated poor oral intake with elevated sodium levels; therefore, PEG tube was recommended & placed.     This patient's problems and plans were partially entered by my partner and updated as appropriate by me 11/08/23.        Multiple bilateral posterior circulation strokes with hemorrhagic conversion  -- suspect atheroembolic but cannot rule out cardioembolic given multiple vascular territories  --Plavix monotherapy, high intensity statin  --Needs Holter monitor at discharge  --stroke followed  -PT follows and he is making improvements     Agitation, improved  Encephalopathy, improving  --Donepezil 10 mg nightly, melatonin 10 mg nightly, mirtazapine 30 mg nightly, Restoril increased to 30mg nightly  - Seroquel 200 mg BID  -Continue phenobarbitol scheduled and prn  --Continue as needed Geodon  -- restless overnight requiring prns and restraints this morning. States he is hurting with increase scheduled  norco and see if this helps  -am EKG to assess QT     Dysphagia  Hypernatremia - resolved  - PEG tube placed 9/29, Dr Carrillo   --SLP recommends mechanical ground with honey thick liquids but not eating  --Risk of pulling out peg tube, abd binder to be in place at all times.   --has daytime TF- will ask RD to change to nocturnal and see if this helps with daytime intake     HTN  --Continue Metoprolol 25 mg BID     GERD  --Continue PPI     COVID-19-resolved  --Overall asymptomatic and on room air  --No infiltrates seen and low procal  --Did not receive any treatment   --Off Isolation 9/30/2023      Leukocytosis-resolved  --Procalcitionin low at 0.05  --CXR and UA negative  --Blood cultures negative     Elevated LFT's, mild  --Possibly secondary to statin?  --Negative acute hepatitis panel  --repeat AST/ALT improved         Expected Discharge Location and Transportation: [facility]  Expected Discharge   Expected Discharge Date: 11/10/2023; Expected Discharge Time:      DVT prophylaxis:  Mechanical DVT prophylaxis orders are present.     AM-PAC 6 Clicks Score (PT): 12 (11/07/23 2000)    CODE STATUS:   Code Status and Medical Interventions:   Ordered at: 09/29/23 0854     Medical Intervention Limits:    NO intubation (DNI)     Code Status (Patient has no pulse and is not breathing):    No CPR (Do Not Attempt to Resuscitate)     Medical Interventions (Patient has pulse or is breathing):    Limited Support       Vero Almodovar, APRN  11/08/23

## 2023-11-08 NOTE — PLAN OF CARE
Problem: Pain Acute  Goal: Acceptable Pain Control and Functional Ability  Intervention: Optimize Psychosocial Wellbeing  Recent Flowsheet Documentation  Taken 11/8/2023 1436 by Raquel Garza RN  Supportive Measures:   active listening utilized   verbalization of feelings encouraged   decision-making supported   goal-setting facilitated     Problem: Adjustment to Illness (Stroke, Hemorrhagic)  Goal: Optimal Coping  Intervention: Support Psychosocial Response to Stroke  Recent Flowsheet Documentation  Taken 11/8/2023 1436 by Raquel Garza RN  Supportive Measures:   active listening utilized   verbalization of feelings encouraged   decision-making supported   goal-setting facilitated  Family/Support System Care:   involvement promoted   presence promoted   self-care encouraged   support provided     Problem: Adjustment to Illness (Sepsis/Septic Shock)  Goal: Optimal Coping  Intervention: Optimize Psychosocial Adjustment to Illness  Recent Flowsheet Documentation  Taken 11/8/2023 1436 by Raquel Garza RN  Supportive Measures:   active listening utilized   verbalization of feelings encouraged   decision-making supported   goal-setting facilitated  Family/Support System Care:   involvement promoted   presence promoted   self-care encouraged   support provided   Goal Outcome Evaluation:  Plan of Care Reviewed With: spouse        Progress: no change  Outcome Evaluation: Pt is very restless this morning. Pt's wife arrived and pt said that's my wife.  Restraints removed from 3 extremeties.  Pt states he wants to go home.  Continue phenobarb q 8 hours and prn. Norco increased to q 6.  Tube feeds changed to hs and continue meals during the day.  Pt may benefit from tube feeds if pt eats less that 50% during the day and hs tube feed.  Goal is still rehab but pt will need to be out of restraints and without sitter.  Palliative IDT: Rn, SW, APRN, MD\  After hours# [phone]

## 2023-11-08 NOTE — PLAN OF CARE
Goal Outcome Evaluation:  Plan of Care Reviewed With: patient, spouse        Progress: no change          SLP treatment completed. Will continue to address feeding difficulty. Please see note for further details and recommendations.

## 2023-11-08 NOTE — PROGRESS NOTES
Neurology       Patient Care Team:  Susan Powers APRN as PCP - General (Family Medicine)    Chief complaint: Restless and agitated    History: Patient had a bad day yesterday.    At that point he was complaining of pain.    Today he is awake talking and is delusional.  He is talking about being at his place of employment.  He has no pain complaints.    Review continues to be restless with more or less constant movement of his left side.      Past Medical History:   Diagnosis Date    Acid reflux     Cancer     Skin    Diabetes mellitus     Prediabetes    GERD (gastroesophageal reflux disease) 09/15/2023    HTN (hypertension) 09/15/2023    Kidney disease     T2DM (type 2 diabetes mellitus) 09/15/2023       Vital Signs   Vitals:    11/06/23 0700 11/07/23 0100 11/07/23 1939 11/08/23 0652   BP: 114/65 123/86 123/76 144/81   BP Location: Left arm Left arm Right arm Right arm   Patient Position: Lying Lying Lying Lying   Pulse: 114 69 97 103   Resp: 20 16 16 22   Temp: 99.4 °F (37.4 °C) 97.3 °F (36.3 °C) 97.2 °F (36.2 °C) 98.5 °F (36.9 °C)   TempSrc: Temporal Temporal Temporal Temporal   SpO2: 95% 94% 92% 97%   Weight:       Height:           Physical Exam:   General: Awake              Neuro: Talking nonstop.  Delusional.  Right MI plegia noted.        Results Review:  Labs reviewed  Results from last 7 days   Lab Units 11/06/23  0437   WBC 10*3/mm3 7.17   HEMOGLOBIN g/dL 13.1   HEMATOCRIT % 40.3   PLATELETS 10*3/mm3 333     Results from last 7 days   Lab Units 11/06/23  0437   SODIUM mmol/L 141   POTASSIUM mmol/L 4.4   CHLORIDE mmol/L 101   CO2 mmol/L 27.0   BUN mg/dL 22   CREATININE mg/dL 0.72*   CALCIUM mg/dL 9.6   GLUCOSE mg/dL 116*       Imaging Results (Last 24 Hours)       ** No results found for the last 24 hours. **            Assessment:  Ongoing encephalopathy    Plan:  Spoke to the wife about possibility of taking him home with help.    Comment:  Nothing seems to be able to get him to calm down sufficiently  to be left alone.  Poor long-term prognosis         I discussed the patients findings and my recommendations with patient, family, and nursing staff    Toni Rausch MD  11/08/23  13:45 EST

## 2023-11-08 NOTE — CONSULTS
Brief EN Review Note    Patient Name: Kenneth Wen  Date of Encounter: 11/08/23 13:12 EST  MRN: [id number]  Admission date: 9/15/2023    Reason for visit: EN review . RD to continue to follow per protocol.     Information Obtained: Consult received for consideration change TF regimen to nocturnal/supplemental. This has been considered and trialed multiple times t/o admission. RD discussed the following with ROLANDO Reyes. RD following pt t/o admission, see several past RD notes for full hx, have tried several different interventions including supplemental feeds and calorie count/optimizing oral intake as able. Pt has unfortunately demonstrated t/o 1.5 month long admission extremely limited interest in PO intake regardless of EN regimen. Pt will likely continue not eating due to mentation, not lack of hunger from EN as has continued with minimal intakes both with supplemental EN and with EN completely off/on hold. At this point continuing to attempt supplemental EN will lead to pt being undernourished. Diet is basically for comfort/pleasure, RD continues to recommend EN meeting 100% needs as long as within POC/GOC.  If desired and within POC/GOC, could consider liberalizing texture modifications/consider full comfort diet to have PEG removed. However, even in this case RD still doubtful pt would consume adequate intakes due to mentation thus would likely need to be a change in GOC.     EMR reviewed: yes  Medication reviewed: yes  Labs reviewed:  yes    Current diet: Diet: Regular/House Diet; No Straw, Feeding Assistance - Nursing, No Mixed Consistencies; Texture: Pureed (NDD 1); Fluid Consistency: Honey Thick  Supplement:  Intake:     EN: IsoSource 1.5  Total Cartons: 5  Bolus Regimen: 1 carton (250mL) 5x/d - ~q3hrs between 6a and 9p  Water Flush: 75mL before and after each bolus  Modular: Prosource -no carb 1/day  Route: PEG  Tube: Unknown     At goal over: bolus feeds     Rx will supply:   Goal Volume  1250 mL/day       Flush Volume 750 mL/day       Energy 1935 Kcal/day 102 % Est Need   Protein 100 g/day 105 % Est Need   Fiber 19 g/day       Water in   mL       Total Water 1700 mL       Meet DRI Yes              Intervention:  Follow treatment plan  Care plan reviewed      Follow up:   Per protocol      Heidi Betancourt RD, [facility]  13:12 EST  Time: 25min

## 2023-11-08 NOTE — THERAPY TREATMENT NOTE
Acute Care - Speech Language Pathology Progress Note  [facility]     Patient Name: Kenneth Wen  : 1950  MRN: [id number]  Today's Date: 2023               Admit Date: 9/15/2023     Visit Dx:    ICD-10-CM ICD-9-CM   1. Hemorrhagic CVA  I61.9 431   2. Aphasia  R47.01 784.3   3. Dysarthria  R47.1 784.51   4. Oropharyngeal dysphagia  R13.12 787.22     Patient Active Problem List   Diagnosis    Precordial pain    Elevated BP without diagnosis of hypertension    Dyslipidemia    Hyperglycemia    Multiple bilateral CVAs    Hemorrhagic CVA    HFpEF    T2DM (type 2 diabetes mellitus)    HTN (hypertension)    GERD (gastroesophageal reflux disease)    ICH (intracerebral hemorrhage)    Oropharyngeal dysphagia     Past Medical History:   Diagnosis Date    Acid reflux     Cancer     Skin    Diabetes mellitus     Prediabetes    GERD (gastroesophageal reflux disease) 09/15/2023    HTN (hypertension) 09/15/2023    Kidney disease     T2DM (type 2 diabetes mellitus) 09/15/2023     Past Surgical History:   Procedure Laterality Date    APPENDECTOMY      ENDOSCOPY W/ PEG TUBE PLACEMENT N/A 2023    Procedure: ESOPHAGOGASTRODUODENOSCOPY WITH PERCUTANEOUS ENDOSCOPIC GASTROSTOMY TUBE INSERTION;  Surgeon: Haseeb Carrillo MD;  Location: On license of [facility] ENDOSCOPY;  Service: General;  Laterality: N/A;    HEMORRHOIDECTOMY      NASAL POLYP SURGERY         SLP Recommendation and Plan  SLP Diagnosis: moderate, aphasia, dysarthria (23)           SLC Criteria for Skilled Therapy Interventions Met: yes (23)  Anticipated Discharge Disposition (SLP): inpatient rehabilitation facility, skilled nursing facility (23)        Therapy Frequency (SLP SLC): 5 days per week (23)  Predicted Duration Therapy Intervention (Days): until discharge (23)        Daily Summary of Progress (SLP): progress toward functional goals is gradual (23)           Treatment Assessment (SLP): continued  (11/08/23 1430)  Treatment Assessment Comments (SLP): patient wife present, restless this afternoon. Asked for ice but only accepted one trial.  Did try a few converstaion replies. (11/08/23 1430)  Plan for Continued Treatment (SLP): continue treatment per plan of care (11/08/23 1430)  Plan of Care Reviewed With: patient, spouse (11/08/23 1550)  Progress: no change (11/08/23 1550)      SLP EVALUATION (last 72 hours)       SLP SLC Evaluation       Row Name 11/08/23 1430                   Communication Assessment/Intervention    Document Type therapy note (daily note)  -AV        Patient Observations alert  -AV        Patient/Family/Caregiver Comments/Observations spouse  -AV        Patient Effort adequate  -AV           Pain    Additional Documentation Pain Scale: FACES Pre/Post-Treatment (Group)  -AV           Pain Scale: FACES Pre/Post-Treatment    Pain: FACES Scale, Pretreatment 0-->no hurt  -AV        Posttreatment Pain Rating 0-->no hurt  -AV           SLP Evaluation Clinical Impressions    SLP Diagnosis moderate;aphasia;dysarthria  -AV        Rehab Potential/Prognosis fair  -AV        SLC Criteria for Skilled Therapy Interventions Met yes  -AV        Functional Impact difficulty communicating wants, needs;difficulty communicating in an emergency;difficulty in expressing complex messages;functional impact in ADLs  -AV           SLP Treatment Clinical Impressions    Treatment Assessment (SLP) continued  -AV        Treatment Assessment Comments (SLP) patient wife present, restless this afternoon. Asked for ice but only accepted one trial.  Did try a few converstaion replies.  -AV        Daily Summary of Progress (SLP) progress toward functional goals is gradual  -AV        Barriers to Overall Progress (SLP) Cognitive status;Medically complex  -AV        Plan for Continued Treatment (SLP) continue treatment per plan of care  -AV        Care Plan Review care plan/treatment goals reviewed  -AV        Care Plan  Review, Other Participant(s) spouse  -AV           Recommendations    Therapy Frequency (SLP SLC) 5 days per week  -AV        Predicted Duration Therapy Intervention (Days) until discharge  -AV        Anticipated Discharge Disposition (SLP) inpatient rehabilitation facility;skilled nursing facility  -AV                  User Key  (r) = Recorded By, (t) = Taken By, (c) = Cosigned By      Initials Name Effective Dates    AV Stephens Arun Gina, MS CCC-SLP 06/16/21 -                        EDUCATION  The patient has been educated in the following areas:     Cognitive Impairment Communication Impairment.           SLP GOALS       Row Name 11/08/23 1430             (LTG) Patient will demonstrate functional swallow for    Diet Texture (Demonstrate functional swallow) soft to chew (chopped) textures  -AV      Liquid viscosity (Demonstrate functional swallow) nectar/ mildly thick liquids  -AV      [place] (Demonstrate functional swallow) with minimal cues (75-90% accuracy)  -AV      Time Frame (Demonstrate functional swallow) by discharge  -AV      Barriers (Demonstrate functional swallow) cognition  -AV      Progress/Outcomes (Demonstrate functional swallow) progress slower than expected  -AV      Comment (Demonstrate functional swallow) anterior loss with ice trials. patient refused other trials.  -AV         (STG) Patient will tolerate trials of    Consistencies Trialed (Tolerate trials) pureed textures;honey/ moderately thick liquids  -AV      Desired Outcome (Tolerate trials) without signs/symptoms of aspiration;without signs of distress;with adequate oral prep/transit/clearance;with use of compensatory strategies (see comments)  -AV      [place] (Tolerate trials) with 1:1 assist/ supervision  -AV      Time Frame (Tolerate trials) by discharge  -AV      Progress/Outcomes (Tolerate trials) progress slower than expected  -AV         (STG) Patient will tolerate therapeutic trials of    Consistencies Trialed  (Tolerate therapeutic trials) thin liquids  -AV      Desired Outcome (Tolerate therapeutic trials) without signs/symptoms of aspiration;without signs of distress  -AV      [place] (Tolerate therapeutic trials) with minimal cues (75-90% accuracy)  -AV      Time Frame (Tolerate therapeutic trials) by discharge  -AV      Progress/Outcomes (Tolerate therapeutic trials) progress slower than expected  -AV         Patient will demonstrate functional communication skills for return to discharge environment     [place] with moderate cues  -AV      Time frame by discharge  -AV      Barriers cognitive status;medically complex  -AV      Progress/Outcomes progress slower than expected  -AV         Comprehend Questions Goal 1 (SLP)    Improve Ability to Comprehend Questions Goal 1 (SLP) complex yes/no questions;80%;with minimal cues (75-90%)  -AV      Time Frame (Comprehend Questions Goal 1, SLP) short term goal (STG)  -AV      Progress (Ability to Comprehend Questions Goal 1, SLP) 20%;with moderate cues (50-74%)  -AV      Progress/Outcomes (Comprehend Questions Goal 1, SLP) progress slower than expected  -AV         Follow Directions Goal 2 (SLP)    Improve Ability to Follow Directions Goal 1 (SLP) 2 step commands;80%;with minimal cues (75-90%)  -AV      Time Frame (Follow Directions Goal 1, SLP) short term goal (STG)  -AV      Progress (Ability to Follow Directions Goal 1, SLP) 20%;with moderate cues (50-74%)  -AV      Progress/Outcomes (Follow Directions Goal 1, SLP) continuing progress toward goal  -AV         Word Retrieval Skills Goal 1 (SLP)    Improve Word Retrieval Skills By Goal 1 (SLP) confrontational naming task;high frequency;responsive naming task;simple;80%;with minimal cues (75-90%)  -AV      Time Frame (Word Retrieval Goal 1, SLP) short term goal (STG)  -AV      Progress (Word Retrieval Skills Goal 1, SLP) 90%;with minimal cues (75-90%)  -AV      Progress/Outcomes (Word Retrieval Goal 1, SLP) continuing  progress toward goal  -AV                User Key  (r) = Recorded By, (t) = Taken By, (c) = Cosigned By      Initials Name Provider Type    AV Gina Lopez, MS CCC-SLP Speech and Language Pathologist                            Time Calculation:      Time Calculation- SLP       Row Name 23 1551             Time Calculation- SLP    SLP Start Time 1430  -AV      SLP Received On 23  -AV         Untimed Charges    28555-SQ Treatment/ST Modification Prosth Aug Alter  23  -AV      19817-BU Treatment Swallow Minutes 15  -AV         Total Minutes    Untimed Charges Total Minutes 38  -AV       Total Minutes 38  -AV                User Key  (r) = Recorded By, (t) = Taken By, (c) = Cosigned By      Initials Name Provider Type    AV Gina Lopez, MS CCC-SLP Speech and Language Pathologist                    Therapy Charges for Today       Code Description Service Date Service Provider Modifiers Qty    98484725407 HC ST TREATMENT SPEECH 2 2023 Gina Lopez MS CCC-SLP GN 1    [id number] HC ST TREATMENT SWALLOW 1 2023 Gina Lopez MS CCC-SLP GN 1                       Gina D Kat Dias MS CCC-SLP  2023   and Acute Care - Speech Language Pathology   Swallow Progress Note  [place]     Patient Name: Kenneth Wen  : 1950  MRN: [id number]  Today's Date: 2023               Admit Date: 9/15/2023    Visit Dx:     ICD-10-CM ICD-9-CM   1. Hemorrhagic CVA  I61.9 431   2. Aphasia  R47.01 784.3   3. Dysarthria  R47.1 784.51   4. Oropharyngeal dysphagia  R13.12 787.22     Patient Active Problem List   Diagnosis    Precordial pain    Elevated BP without diagnosis of hypertension    Dyslipidemia    Hyperglycemia    Multiple bilateral CVAs    Hemorrhagic CVA    HFpEF    T2DM (type 2 diabetes mellitus)    HTN (hypertension)    GERD (gastroesophageal reflux disease)    ICH (intracerebral hemorrhage)    Oropharyngeal dysphagia     Past Medical  History:   Diagnosis Date    Acid reflux     Cancer     Skin    Diabetes mellitus     Prediabetes    GERD (gastroesophageal reflux disease) 09/15/2023    HTN (hypertension) 09/15/2023    Kidney disease     T2DM (type 2 diabetes mellitus) 09/15/2023     Past Surgical History:   Procedure Laterality Date    APPENDECTOMY      ENDOSCOPY W/ PEG TUBE PLACEMENT N/A 9/29/2023    Procedure: ESOPHAGOGASTRODUODENOSCOPY WITH PERCUTANEOUS ENDOSCOPIC GASTROSTOMY TUBE INSERTION;  Surgeon: Haseeb Carrillo MD;  Location: [facility] ENDOSCOPY;  Service: General;  Laterality: N/A;    HEMORRHOIDECTOMY      NASAL POLYP SURGERY         SLP Recommendation and Plan                          Anticipated Discharge Disposition (SLP): inpatient rehabilitation facility, skilled nursing facility (11/08/23 1430)        Predicted Duration Therapy Intervention (Days): until discharge (11/08/23 1430)           Daily Summary of Progress (SLP): progress toward functional goals is gradual (11/08/23 1430)               Treatment Assessment (SLP): continued (11/08/23 1430)  Treatment Assessment Comments (SLP): patient wife present, restless this afternoon. Asked for ice but only accepted one trial.  Did try a few converstaion replies. (11/08/23 1430)  Plan for Continued Treatment (SLP): continue treatment per plan of care (11/08/23 1430)            Plan of Care Reviewed With: patient, spouse  Progress: no change          EDUCATION  The patient has been educated in the following areas:   Dysphagia (Swallowing Impairment) Oral Care/Hydration.        SLP GOALS       Row Name 11/08/23 1430             (LTG) Patient will demonstrate functional swallow for    Diet Texture (Demonstrate functional swallow) soft to chew (chopped) textures  -AV      Liquid viscosity (Demonstrate functional swallow) nectar/ mildly thick liquids  -AV      [place] (Demonstrate functional swallow) with minimal cues (75-90% accuracy)  -AV      Time Frame (Demonstrate functional  swallow) by discharge  -AV      Barriers (Demonstrate functional swallow) cognition  -AV      Progress/Outcomes (Demonstrate functional swallow) progress slower than expected  -AV      Comment (Demonstrate functional swallow) anterior loss with ice trials. patient refused other trials.  -AV         (STG) Patient will tolerate trials of    Consistencies Trialed (Tolerate trials) pureed textures;honey/ moderately thick liquids  -AV      Desired Outcome (Tolerate trials) without signs/symptoms of aspiration;without signs of distress;with adequate oral prep/transit/clearance;with use of compensatory strategies (see comments)  -AV      [place] (Tolerate trials) with 1:1 assist/ supervision  -AV      Time Frame (Tolerate trials) by discharge  -AV      Progress/Outcomes (Tolerate trials) progress slower than expected  -AV         (STG) Patient will tolerate therapeutic trials of    Consistencies Trialed (Tolerate therapeutic trials) thin liquids  -AV      Desired Outcome (Tolerate therapeutic trials) without signs/symptoms of aspiration;without signs of distress  -AV      [place] (Tolerate therapeutic trials) with minimal cues (75-90% accuracy)  -AV      Time Frame (Tolerate therapeutic trials) by discharge  -AV      Progress/Outcomes (Tolerate therapeutic trials) progress slower than expected  -AV         Patient will demonstrate functional communication skills for return to discharge environment     [place] with moderate cues  -AV      Time frame by discharge  -AV      Barriers cognitive status;medically complex  -AV      Progress/Outcomes progress slower than expected  -AV         Comprehend Questions Goal 1 (SLP)    Improve Ability to Comprehend Questions Goal 1 (SLP) complex yes/no questions;80%;with minimal cues (75-90%)  -AV      Time Frame (Comprehend Questions Goal 1, SLP) short term goal (STG)  -AV      Progress (Ability to Comprehend Questions Goal 1, SLP) 20%;with moderate cues (50-74%)  -AV       Progress/Outcomes (Comprehend Questions Goal 1, SLP) progress slower than expected  -AV         Follow Directions Goal 2 (SLP)    Improve Ability to Follow Directions Goal 1 (SLP) 2 step commands;80%;with minimal cues (75-90%)  -AV      Time Frame (Follow Directions Goal 1, SLP) short term goal (STG)  -AV      Progress (Ability to Follow Directions Goal 1, SLP) 20%;with moderate cues (50-74%)  -AV      Progress/Outcomes (Follow Directions Goal 1, SLP) continuing progress toward goal  -AV         Word Retrieval Skills Goal 1 (SLP)    Improve Word Retrieval Skills By Goal 1 (SLP) confrontational naming task;high frequency;responsive naming task;simple;80%;with minimal cues (75-90%)  -AV      Time Frame (Word Retrieval Goal 1, SLP) short term goal (STG)  -AV      Progress (Word Retrieval Skills Goal 1, SLP) 90%;with minimal cues (75-90%)  -AV      Progress/Outcomes (Word Retrieval Goal 1, SLP) continuing progress toward goal  -AV                User Key  (r) = Recorded By, (t) = Taken By, (c) = Cosigned By      Initials Name Provider Type    AV Gina Lopez MS CCC-SLP Speech and Language Pathologist                       Time Calculation:    Time Calculation- SLP       Row Name 11/08/23 1551             Time Calculation- SLP    SLP Start Time 1430  -AV      SLP Received On 11/08/23  -AV         Untimed Charges    89232-WG Treatment/ST Modification Prosth Aug Alter  23  -AV      36980-QA Treatment Swallow Minutes 15  -AV         Total Minutes    Untimed Charges Total Minutes 38  -AV       Total Minutes 38  -AV                User Key  (r) = Recorded By, (t) = Taken By, (c) = Cosigned By      Initials Name Provider Type    AV Gina Lopez MS CCC-SLP Speech and Language Pathologist                    Therapy Charges for Today       Code Description Service Date Service Provider Modifiers Qty    66814273929  ST TREATMENT SPEECH 2 11/8/2023 Gina Lopez MS CCC-SLP GN 1    [id number]  HC ST TREATMENT SWALLOW 1 11/8/2023 Gina Lopez, MS CCC-SLP GN 1                 Gina Dias, MS KAMARI-SLP  11/8/2023

## 2023-11-09 LAB
GLUCOSE BLDC GLUCOMTR-MCNC: 102 MG/DL (ref 70–130)
GLUCOSE BLDC GLUCOMTR-MCNC: 115 MG/DL (ref 70–130)
GLUCOSE BLDC GLUCOMTR-MCNC: 117 MG/DL (ref 70–130)
GLUCOSE BLDC GLUCOMTR-MCNC: 132 MG/DL (ref 70–130)

## 2023-11-09 PROCEDURE — 97530 THERAPEUTIC ACTIVITIES: CPT

## 2023-11-09 PROCEDURE — 97535 SELF CARE MNGMENT TRAINING: CPT

## 2023-11-09 PROCEDURE — 25010000002 ZIPRASIDONE MESYLATE PER 10 MG: Performed by: PSYCHIATRY & NEUROLOGY

## 2023-11-09 PROCEDURE — 82948 REAGENT STRIP/BLOOD GLUCOSE: CPT

## 2023-11-09 PROCEDURE — 99232 SBSQ HOSP IP/OBS MODERATE 35: CPT | Performed by: NURSE PRACTITIONER

## 2023-11-09 RX ADMIN — MICONAZOLE NITRATE 1 APPLICATION: 20 CREAM TOPICAL at 10:27

## 2023-11-09 RX ADMIN — HYDROCODONE BITARTRATE AND ACETAMINOPHEN 1 TABLET: 5; 325 TABLET ORAL at 17:58

## 2023-11-09 RX ADMIN — TEMAZEPAM 30 MG: 15 CAPSULE ORAL at 21:17

## 2023-11-09 RX ADMIN — MINERAL OIL: 1000 LIQUID ORAL at 17:59

## 2023-11-09 RX ADMIN — Medication 10 MG: at 17:58

## 2023-11-09 RX ADMIN — MINERAL OIL: 1000 LIQUID ORAL at 08:05

## 2023-11-09 RX ADMIN — MINERAL OIL: 1000 LIQUID ORAL at 21:17

## 2023-11-09 RX ADMIN — CLOPIDOGREL BISULFATE 75 MG: 75 TABLET ORAL at 07:58

## 2023-11-09 RX ADMIN — ATORVASTATIN CALCIUM 80 MG: 40 TABLET, FILM COATED ORAL at 21:17

## 2023-11-09 RX ADMIN — QUETIAPINE FUMARATE 200 MG: 100 TABLET ORAL at 21:16

## 2023-11-09 RX ADMIN — QUETIAPINE FUMARATE 200 MG: 100 TABLET ORAL at 07:57

## 2023-11-09 RX ADMIN — HYDROCODONE BITARTRATE AND ACETAMINOPHEN 1 TABLET: 5; 325 TABLET ORAL at 04:34

## 2023-11-09 RX ADMIN — PHENOBARBITAL 81 MG: 64.8 TABLET ORAL at 21:16

## 2023-11-09 RX ADMIN — PHENOBARBITAL 81 MG: 64.8 TABLET ORAL at 14:17

## 2023-11-09 RX ADMIN — ZIPRASIDONE MESYLATE 10 MG: 20 INJECTION, POWDER, LYOPHILIZED, FOR SOLUTION INTRAMUSCULAR at 04:39

## 2023-11-09 RX ADMIN — MINERAL OIL: 1000 LIQUID ORAL at 14:18

## 2023-11-09 RX ADMIN — MICONAZOLE NITRATE 1 APPLICATION: 20 CREAM TOPICAL at 21:17

## 2023-11-09 RX ADMIN — MIRTAZAPINE 30 MG: 15 TABLET, FILM COATED ORAL at 17:58

## 2023-11-09 RX ADMIN — HYDROCODONE BITARTRATE AND ACETAMINOPHEN 1 TABLET: 5; 325 TABLET ORAL at 12:36

## 2023-11-09 RX ADMIN — LIDOCAINE 1 PATCH: 4 PATCH TOPICAL at 07:58

## 2023-11-09 RX ADMIN — METOPROLOL TARTRATE 25 MG: 25 TABLET, FILM COATED ORAL at 21:27

## 2023-11-09 RX ADMIN — PHENOBARBITAL 81 MG: 64.8 TABLET ORAL at 05:51

## 2023-11-09 RX ADMIN — DONEPEZIL HYDROCHLORIDE 10 MG: 10 TABLET, FILM COATED ORAL at 17:58

## 2023-11-09 RX ADMIN — METOPROLOL TARTRATE 25 MG: 25 TABLET, FILM COATED ORAL at 07:58

## 2023-11-09 RX ADMIN — HYDROCODONE BITARTRATE AND ACETAMINOPHEN 1 TABLET: 5; 325 TABLET ORAL at 23:25

## 2023-11-09 RX ADMIN — LANSOPRAZOLE 15 MG: 15 TABLET, ORALLY DISINTEGRATING ORAL at 05:52

## 2023-11-09 RX ADMIN — DICLOFENAC SODIUM 2 G: 9.3 GEL TOPICAL at 08:00

## 2023-11-09 NOTE — PLAN OF CARE
Goal Outcome Evaluation:  Plan of Care Reviewed With: patient, family        Progress: declining  Outcome Evaluation: Pt with decline since last OT treatment session as he was emotionally labile, restless and confused. Pt remains below baseline with ADL's and mobility and would benefit from continued skilled OT services to assist in returning pt to his PLOF. Recommend SNF at d/c if deemed medically appropriate.      Anticipated Discharge Disposition (OT): skilled nursing facility

## 2023-11-09 NOTE — PROGRESS NOTES
[facility] Medicine Services  PROGRESS NOTE    Patient Name: eKnneth Wen  : 1950  MRN: [id number]    Date of Admission: 9/15/2023  Primary Care Physician: Susan Powers APRN    Subjective   Subjective     CC:  cva    HPI:    Seen resting back in bed awake, confused and fidgeting in the bed.  Has completely undressed himself.  No visitors at bedside.  Will to tell me his name only.  No new issues per staff.  Awaiting placement.      Objective   Objective     Vital Signs:   Temp:  [98.5 °F (36.9 °C)-99.3 °F (37.4 °C)] 99.3 °F (37.4 °C)  Heart Rate:  [83-94] 94  Resp:  [16-18] 18  BP: (113-137)/(65-86) 113/65     Physical Exam:  Constitutional: No acute distress, awake, restlessly moving legs/ body in the bed.  Chronically ill-appearing.   HENT: NCAT, mucous membranes moist  Respiratory: Clear to auscultation bilaterally, respiratory effort normal on room air  Cardiovascular: RRR  Gastrointestinal: Positive bowel sounds, soft, nontender, nondistended.  PEG clamped with binder in place.  Musculoskeletal: HINES continuously- no BLE edema  Psychiatric: restless   Neurologic: Oriented to person only-answers no when asked if he is in pain, generally weak, speech soft but clear.  Skin: No rashes      Results Reviewed:  LAB RESULTS:      Lab 23  0437   WBC 7.17   HEMOGLOBIN 13.1   HEMATOCRIT 40.3   PLATELETS 333   NEUTROS ABS 3.89   IMMATURE GRANS (ABS) 0.02   LYMPHS ABS 1.48   MONOS ABS 0.67   EOS ABS 1.02*   MCV 96.4         Lab 23  0437   SODIUM 141   POTASSIUM 4.4   CHLORIDE 101   CO2 27.0   ANION GAP 13.0   BUN 22   CREATININE 0.72*   EGFR 97.1   GLUCOSE 116*   CALCIUM 9.6                           Brief Urine Lab Results  (Last result in the past 365 days)        Color   Clarity   Blood   Leuk Est   Nitrite   Protein   CREAT   Urine HCG        10/30/23 1636 Yellow   Clear   Negative   Negative   Negative   Negative                   Microbiology Results Abnormal        Procedure Component Value - Date/Time    Blood Culture - Blood, Arm, Right [[id number]]  (Normal) Collected: 09/15/23 0212    Lab Status: Final result Specimen: Blood from Arm, Right Updated: 09/20/23 0230     Blood Culture No growth at 5 days    Blood Culture - Blood, Arm, Left [[id number]]  (Normal) Collected: 09/15/23 0212    Lab Status: Final result Specimen: Blood from Arm, Left Updated: 09/20/23 0230     Blood Culture No growth at 5 days            No radiology results from the last 24 hrs        Current medications:  Scheduled Meds:atorvastatin, 80 mg, Per PEG Tube, Nightly  clopidogrel, 75 mg, Per PEG Tube, Daily  Diclofenac Sodium, 2 g, Topical, BID  donepezil, 10 mg, Per PEG Tube, Nightly  fluticasone, 2 spray, Each Nare, Daily  HYDROcodone-acetaminophen, 1 tablet, Per PEG Tube, Q6H  lansoprazole, 15 mg, Per PEG Tube, Q AM  Lidocaine, 1 patch, Transdermal, Q24H  melatonin, 10 mg, Per PEG Tube, Nightly  metoprolol tartrate, 25 mg, Per PEG Tube, Q12H  miconazole, 1 application , Topical, Q12H  mirtazapine, 30 mg, Per PEG Tube, Nightly  palliative care oral rinse, , Mouth/Throat, 4x Daily  PHENobarbital, 81 mg, Per PEG Tube, Q8H  ProSource No Carb, 30 mL, Per G Tube, Daily  QUEtiapine, 200 mg, Per PEG Tube, Q12H  temazepam, 30 mg, Per PEG Tube, Nightly      Continuous Infusions:   PRN Meds:.  Calcium Replacement - Follow Nurse / BPA Driven Protocol    dextrose    glucagon (human recombinant)    ibuprofen    Magnesium Standard Dose Replacement - Follow Nurse / BPA Driven Protocol    ondansetron    PHENobarbital    Phosphorus Replacement - Follow Nurse / BPA Driven Protocol    Potassium Replacement - Follow Nurse / BPA Driven Protocol    ziprasidone    Assessment & Plan   Assessment & Plan     Active Hospital Problems    Diagnosis  POA    **Hemorrhagic CVA [I61.9]  Yes    Oropharyngeal dysphagia [R13.12]  Yes    Multiple bilateral CVAs [I63.9]  Yes    HFpEF [I50.30]  Yes    T2DM (type 2 diabetes mellitus) [E11.9]   Yes    HTN (hypertension) [I10]  Yes    GERD (gastroesophageal reflux disease) [K21.9]  Yes    ICH (intracerebral hemorrhage) [I61.9]  Yes    Dyslipidemia [E78.5]  Yes      Resolved Hospital Problems   No resolved problems to display.        Brief Hospital Course to date:  Kenneth Wen is a 73 y.o. male with hx of HTN, HLD, T2DM, and GERD who presented to OSH with multiple acute ischemic infarcts of the bilateral cerebral and cerebellar hemispheres as well as left isaac. TTE/JOSIAS was negative PFO at OSH. On 9/13/23 he had worsening in mental status and new inability to move RLE, head CT showed evolution of the existing CVA with new development of petechial hemorrhage. DAPT was held for 24 hours per Neurology recommendations and repeat CT head that evening showed worsening effacement of the right quadrigeminal cistern with suspected increasing upward supratentorial pressure. He was then transferred to [facility] for Neurosurgery evaluation on 9/15/23. He was started on hypertonic saline 9/15/23 through 9/20/23 for cerebral edema. He had fevers with negative cultures but had worsening secretions and labored breathing so was started on Zosyn on 9/16/23. Transferred to the hospitalist service on 9/22/23. Pt demonstrated poor oral intake with elevated sodium levels; therefore, PEG tube was recommended & placed.     This patient's problems and plans were partially entered by my partner and updated as appropriate by me 11/09/23.    Assessment/plan:  Patient is new to me today     Multiple bilateral posterior circulation strokes with hemorrhagic conversion  -- suspect atheroembolic but cannot rule out cardioembolic given multiple vascular territories  --Plavix monotherapy, high intensity statin  --Needs Holter monitor at discharge  --stroke followed  -PT following      Agitation, improved  Encephalopathy, improving  --Donepezil 10 mg nightly, melatonin 10 mg nightly, mirtazapine 30 mg nightly, Restoril increased to 30mg  nightly  - Seroquel 200 mg BID  -Continue phenobarbitol scheduled and prn  --Continue as needed Geodon  -- Continues to be restless.  -am EKG to assess QT ordered however too restless to obtain at this time.  Patient is AND likely not for coming off medications at this time due to significant confusion and agitation.  Monitor periodically if able.     Dysphagia  Hypernatremia - resolved  - PEG tube placed 9/29, Dr Carrillo   --SLP recommends mechanical ground with honey thick liquids but not eating  --Risk of pulling out peg tube, abd binder to be in place at all times.   -- Daytime tube feed was discontinued apparently and started on modified diet by mouth and nocturnal tube feeds.  In rounds today, noted patient not taking in almost anything by mouth.  Likely back on 24/7 tube feed.  Social work to discuss further with family.     HTN  --Continue Metoprolol 25 mg BID, stable     GERD  --Continue PPI     COVID-19-resolved  --Overall asymptomatic and on room air  --No infiltrates seen and low procal  --Did not receive any treatment   --Off Isolation 9/30/2023      Leukocytosis-resolved  --Procalcitionin low at 0.05  --CXR and UA negative  --Blood cultures negative     Elevated LFT's, mild  --Possibly secondary to statin?  --Negative acute hepatitis panel  --repeat AST/ALT improved       Expected Discharge Location and Transportation: SNF pending placement.  Expected Discharge   Expected Discharge Date: 11/10/2023; Expected Discharge Time:      DVT prophylaxis:  Mechanical DVT prophylaxis orders are present.     AM-PAC 6 Clicks Score (PT): (P) 10 (11/09/23 0800)    CODE STATUS:   Code Status and Medical Interventions:   Ordered at: 09/29/23 0854     Medical Intervention Limits:    NO intubation (DNI)     Code Status (Patient has no pulse and is not breathing):    No CPR (Do Not Attempt to Resuscitate)     Medical Interventions (Patient has pulse or is breathing):    Limited Support       Simi Mendez,  APRN  11/09/23

## 2023-11-09 NOTE — PLAN OF CARE
Problem: Palliative Care  Goal: Enhanced Quality of Life  Intervention: Optimize Psychosocial Wellbeing  Flowsheets (Taken 11/9/2023 1352)  Supportive Measures:   active listening utilized   verbalization of feelings encouraged     Problem: Pain Acute  Goal: Acceptable Pain Control and Functional Ability  Intervention: Optimize Psychosocial Wellbeing  Recent Flowsheet Documentation  Taken 11/9/2023 1352 by Raquel Garza RN  Supportive Measures:   active listening utilized   verbalization of feelings encouraged   Goal Outcome Evaluation:  Plan of Care Reviewed With: spouse        Progress: no change  Outcome Evaluation: Pt is extremely restless.  Changed depends and repositioned. Oral care performed.  Dianelys MARSHALL and Hayley Miller LCSW talked with wife about options.  She is considering options.  She will talk with son and niece.  continue palliative support.  Palliative IDT: RN, SW, MD ROLANDO,   Afterhours# [phone]

## 2023-11-09 NOTE — PROGRESS NOTES
"Palliative Care Daily Progress Note     C/C: Patient agitated with personal care.     S: Medical record reviewed. Follow up visit for GOC in the context of complex medical decision making. Events noted. Patient with no sitter at bedside, out of restraints. Seroquel 200mg [id number].  Phenobarb 81mg per TIDand Restoril 15mg nightly. Geodon 10mg IM x1 in the past 24 hours. Continues on scheduled Hydrocodone 5-325mg [id number], denies pain at visit.      ROS: +anxiety, increased. +decreased appetite. +pain, unable to quantify, low back, denies this visit. Denies nausea.      O: Code Status:   Code Status and Medical Interventions:   Ordered at: 09/29/23 0854     Medical Intervention Limits:    NO intubation (DNI)     Code Status (Patient has no pulse and is not breathing):    No CPR (Do Not Attempt to Resuscitate)     Medical Interventions (Patient has pulse or is breathing):    Limited Support      Advanced Directives: Advance Directive Status: Patient does not have advance directive   Goals of Care: Ongoing.   Palliative Performance Scale Score: 40%     /65 (BP Location: Left arm, Patient Position: Lying)   Pulse 94   Temp 99.3 °F (37.4 °C) (Temporal)   Resp 18   Ht 175.3 cm (69\")   Wt 72.8 kg (160 lb 7.9 oz)   SpO2 92%   BMI 23.70 kg/m²     Intake/Output Summary (Last 24 hours) at 11/9/2023 1013  Last data filed at 11/9/2023 0906  Gross per 24 hour   Intake 2135 ml   Output --   Net 2135 ml       PE:  General Appearance:    Patient laying in bed, restless, frequent repositioning, awake, alert, A/C ill appearing,    HEENT:    NC/AT, EOMI, anicteric, MMM, face calm   Neck:   supple, trachea midline, no JVD   Lungs:     CTA bilat, diminished in bases; respirations regular, even and unlabored; RR 18-20 on exam    Heart:    RRR, normal S1 and S2, no M/R/G,    Abdomen:     Normal bowel sounds, soft, non-tender, non-distended, abd binder in place covering PEG   G/U:   Deferred   MSK/Extremities:   Wasting, no " edema   Pulses:   Pulses palpable and equal bilaterally   Skin:   Warm, dry   Neurologic:   Oriented to self, alert   Psych:   Restless, frequent repositioning, calms easily     Meds: Reviewed and changes noted    Labs:   Results from last 7 days   Lab Units 11/06/23  0437   WBC 10*3/mm3 7.17   HEMOGLOBIN g/dL 13.1   HEMATOCRIT % 40.3   PLATELETS 10*3/mm3 333       Results from last 7 days   Lab Units 11/06/23  0437   SODIUM mmol/L 141   POTASSIUM mmol/L 4.4   CHLORIDE mmol/L 101   CO2 mmol/L 27.0   BUN mg/dL 22   CREATININE mg/dL 0.72*   GLUCOSE mg/dL 116*   CALCIUM mg/dL 9.6       Results from last 7 days   Lab Units 11/06/23  0437   SODIUM mmol/L 141   POTASSIUM mmol/L 4.4   CHLORIDE mmol/L 101   CO2 mmol/L 27.0   BUN mg/dL 22   CREATININE mg/dL 0.72*   CALCIUM mg/dL 9.6   GLUCOSE mg/dL 116*       Imaging Results (Last 72 Hours)       ** No results found for the last 72 hours. **                  Diagnostics: Reviewed    A:   Hemorrhagic CVA    Dyslipidemia    Multiple bilateral CVAs    HFpEF    T2DM (type 2 diabetes mellitus)    HTN (hypertension)    GERD (gastroesophageal reflux disease)    ICH (intracerebral hemorrhage)    Oropharyngeal dysphagia     73 y.o. male with hemorrhagic CVA, HFpEF, HTN.   S/S:   Pain -s/p stroke and arthritis, MSK, low back  -continue Hydrocodone 5-325mg PO q6 hours  -scheduled Lidocaine patch to low back  -Voltaren gel  to bilateral knees  -continue Palliative oral rinse     2. Dysphagia -SLP with diet modifications  -PEG in place  -patient with TF, minimal PO intake     3. Debility -PT/OT following     4. Agitation -requiring restraints today  - Seroquel 200mg BID  -Phenobarb 81mg per PEG q 8 hours,   -Phenobarb to 81mg per PEG q 8 hours prn  -appreciate Neurology titration     5. GOC -DNR/DNI -per discussion with wife  -continued full treatment  11/9: long discussion with wife regarding patient's condition and continued agitation, reviewed Neurology's prognosis, reviewed options  for a more comfort focused plan of care in hospital and at discharge, reviewed hospice services in home which would require 24/7 caregivers, plan to contact  regarding potential for patient to go to [facility] with hospice. Wife has not made any decisions but would like CM to reach out to her if [facility] is able to take patient as LTC with hospice.     P: Follow up visit for symptoms. Patient continues to be restless, frequent movement in bed. Goal for continued full treatment. CM working on placement. Long discussion, please see above. Ultimately wife still deciding on GOC, reviewing options for either home with hospice or LTC with hospice.     Palliative Care Team will continue to follow patient. Please do not hesitate to contact us regarding further sx mgmt or GOC needs.  Dianelys Arriaga, APRN  11/9/2023  Time spent: 45 minutes

## 2023-11-09 NOTE — PLAN OF CARE
Goal Outcome Evaluation:  Plan of Care Reviewed With: patient, family        Progress: no change          Restraints d/c once wife w/ present and requested them removed. Patent PEG, meds crushed. Generally repositions self in bed. Constant confusion and impulsive tendencies. Little to no PO intake. TF bolus feeds continues per orders. Awaiting orders.

## 2023-11-09 NOTE — PLAN OF CARE
Goal Outcome Evaluation:  Plan of Care Reviewed With: patient, spouse        Progress: no change  Outcome Evaluation: Pt has been restless and trying to climb out of bed. No sitter today. When wife arrived, she stayed with pt and kept him calm.9am tube feed held because pt would not sit up still to administer feed even with 2 people.  Noon and 3pm tube feeds held per wife. Wife spoke with Palliative team about discharge planning and considering full comfort goal for care. Able to redirect pt but constant supervision and direction needed. Pt also becomes frustrated and yells at times, upset that he cannot move (as he wants). No IV access. Per wife, pt used urinal for her x1; otherwise pt has been using brief. Pt aware enough to pull his brief to the side to urinate, causing a full bed change. No BM today. Per wife, pt took bites of different foods and sips of water/drinks.

## 2023-11-09 NOTE — THERAPY TREATMENT NOTE
Patient Name: Kenneth Wen  : 1950    MRN: [id number]                              Today's Date: 2023       Admit Date: 9/15/2023    Visit Dx:     ICD-10-CM ICD-9-CM   1. Hemorrhagic CVA  I61.9 431   2. Aphasia  R47.01 784.3   3. Dysarthria  R47.1 784.51   4. Oropharyngeal dysphagia  R13.12 787.22     Patient Active Problem List   Diagnosis    Precordial pain    Elevated BP without diagnosis of hypertension    Dyslipidemia    Hyperglycemia    Multiple bilateral CVAs    Hemorrhagic CVA    HFpEF    T2DM (type 2 diabetes mellitus)    HTN (hypertension)    GERD (gastroesophageal reflux disease)    ICH (intracerebral hemorrhage)    Oropharyngeal dysphagia     Past Medical History:   Diagnosis Date    Acid reflux     Cancer     Skin    Diabetes mellitus     Prediabetes    GERD (gastroesophageal reflux disease) 09/15/2023    HTN (hypertension) 09/15/2023    Kidney disease     T2DM (type 2 diabetes mellitus) 09/15/2023     Past Surgical History:   Procedure Laterality Date    APPENDECTOMY      ENDOSCOPY W/ PEG TUBE PLACEMENT N/A 2023    Procedure: ESOPHAGOGASTRODUODENOSCOPY WITH PERCUTANEOUS ENDOSCOPIC GASTROSTOMY TUBE INSERTION;  Surgeon: Haseeb Carrillo MD;  Location: [facility] ENDOSCOPY;  Service: General;  Laterality: N/A;    HEMORRHOIDECTOMY      NASAL POLYP SURGERY        General Information       Row Name 23 1040          OT Time and Intention    Document Type therapy note (daily note)  -JR     Mode of Treatment occupational therapy  -JR       Row Name 23 1040          General Information    Patient Profile Reviewed yes  -JR     Existing Precautions/Restrictions fall;other (see comments)  PEG with abdominal binder  -JR     Barriers to Rehab medically complex;previous functional deficit;cognitive status;visual deficit  -JR       Row Name 23 1040          Cognition    Orientation Status (Cognition) oriented to;person  -JR       Row Name 23 1040          Safety Issues,  Functional Mobility    Safety Issues Affecting Function (Mobility) ability to follow commands;awareness of need for assistance;insight into deficits/self-awareness;judgment;problem-solving;safety precaution awareness;safety precautions follow-through/compliance;impulsivity  -     Impairments Affecting Function (Mobility) balance;cognition;coordination;endurance/activity tolerance;grasp;motor control;motor planning;muscle tone abnormal;pain;visual/perceptual;sensation/sensory awareness;range of motion (ROM);postural/trunk control  -     Cognitive Impairments, Mobility Safety/Performance attention;awareness, need for assistance;insight into deficits/self-awareness;judgment;problem-solving/reasoning;safety precaution awareness;safety precaution follow-through;sequencing abilities;impulsivity  -               User Key  (r) = Recorded By, (t) = Taken By, (c) = Cosigned By      Initials Name Provider Type     Jess Carrillo OT Occupational Therapist                     Mobility/ADL's       Row Name 11/09/23 1041          Bed Mobility    Bed Mobility rolling left;rolling right  -     Rolling Left [place] (Bed Mobility) maximum assist (25% patient effort);2 person assist;verbal cues  -     Rolling Right [place] (Bed Mobility) maximum assist (25% patient effort);2 person assist;verbal cues  -     Bed Mobility, Safety Issues cognitive deficits limit understanding;decreased use of arms for pushing/pulling;decreased use of legs for bridging/pushing;impaired trunk control for bed mobility  -     Comment, (Bed Mobility) Upon arrival, pt very restless and emotional. Pt incontinent of urine, assisted with hygiene and to change linens.  -       Row Name 11/09/23 1041          Transfers    Comment, (Transfers) Defer-not appropriate this date  -       Row Name 11/09/23 1041          Activities of Daily Living    BADL Assessment/Intervention lower body dressing;grooming;toileting  -       Row Name  11/09/23 1041          Lower Body Dressing Assessment/Training    [place] Level (Lower Body Dressing) don;doff;pants/bottoms;dependent (less than 25% patient effort)  -     Position (Lower Body Dressing) supine  -     Comment, (Lower Body Dressing) Assisted pt with changing depends  -       Row Name 11/09/23 1041          Toileting Assessment/Training    [place] Level (Toileting) change pad/brief;perform perineal hygiene;dependent (less than 25% patient effort)  -     Position (Toileting) supine  -     Comment, (Toileting) Pt incontinent of urine  -       Row Name 11/09/23 1041          Grooming Assessment/Training    [place] Level (Grooming) wash face, hands;minimum assist (75% patient effort);verbal cues  -     Position (Grooming) supine  -               User Key  (r) = Recorded By, (t) = Taken By, (c) = Cosigned By      Initials Name Provider Type    Jess Medley, OT Occupational Therapist                   Obj/Interventions       Row Name 11/09/23 1044          Shoulder (Therapeutic Exercise)    Shoulder AAROM (Therapeutic Exercise) left;flexion;extension;10 repetitions  Pt c/o pain with R UE ROM this date  -     Shoulder PROM (Therapeutic Exercise) right;flexion;extension;10 repetitions  -       Row Name 11/09/23 1044          Elbow/Forearm (Therapeutic Exercise)    Elbow/Forearm AAROM (Therapeutic Exercise) left;flexion;extension;supination;pronation;10 repetitions  -     Elbow/Forearm PROM (Therapeutic Exercise) right;flexion;extension;supination;pronation;10 repetitions  -       Row Name 11/09/23 1044          Wrist (Therapeutic Exercise)    Wrist AAROM (Therapeutic Exercise) left;flexion;extension;10 repetitions  -     Wrist PROM (Therapeutic Exercise) right;flexion;extension;10 repetitions  -       Row Name 11/09/23 1044          Hand (Therapeutic Exercise)    Hand AROM/AAROM (Therapeutic Exercise) left;AAROM (active assistive range of motion);finger  flexion;finger extension;10 repetitions  -     Hand PROM (Therapeutic Exercise) right;finger flexion;finger extension  -       Row Name 11/09/23 1044          Motor Skills    Therapeutic Exercise shoulder;elbow/forearm;wrist;hand  -               User Key  (r) = Recorded By, (t) = Taken By, (c) = Cosigned By      Initials Name Provider Type    Jess Medley, OT Occupational Therapist                   Goals/Plan    No documentation.                  Clinical Impression       Row Name 11/09/23 1046          Pain Assessment    Pain Intervention(s) Repositioned  -       Row Name 11/09/23 1046          Pain Scale: FACES Pre/Post-Treatment    Pain: FACES Scale, Pretreatment 0-->no hurt  -     Posttreatment Pain Rating 4-->hurts little more  -     Pain Location - Side/Orientation Right  -     Pain Location upper  -     Pain Location - extremity  -       Row Name 11/09/23 1046          Plan of Care Review    Plan of Care Reviewed With patient;family  -     Progress declining  -     Outcome Evaluation Pt with decline since last OT treatment session as he was emotionally labile, restless and confused. Pt remains below baseline with ADL's and mobility and would benefit from continued skilled OT services to assist in returning pt to his PLOF. Recommend SNF at d/c if deemed medically appropriate.  -       Row Name 11/09/23 1046          Therapy Assessment/Plan (OT)    Patient/Family Therapy Goal Statement (OT) pt unable to state goal this date  -       Row Name 11/09/23 1046          Positioning and Restraints    Pre-Treatment Position in bed  -     Post Treatment Position bed  -JR     In Bed notified nsg;supine;call light within reach;encouraged to call for assist;with family/caregiver  -               User Key  (r) = Recorded By, (t) = Taken By, (c) = Cosigned By      Initials Name Provider Type     Jess Carrillo, OT Occupational Therapist                   Outcome Measures       [facility]  Name 11/09/23 1048          How much help from another is currently needed...    Putting on and taking off regular lower body clothing? 1  -JR     Bathing (including washing, rinsing, and drying) 1  -JR     Toileting (which includes using toilet bed pan or urinal) 1  -JR     Putting on and taking off regular upper body clothing 1  -JR     Taking care of personal grooming (such as brushing teeth) 2  -JR     Eating meals 1  -JR     AM-PAC 6 Clicks Score (OT) 7  -JR       Row Name 11/09/23 1048          Functional Assessment    Outcome Measure Options AM-PAC 6 Clicks Daily Activity (OT)  -               User Key  (r) = Recorded By, (t) = Taken By, (c) = Cosigned By      Initials Name Provider Type    Jess Medley OT Occupational Therapist                    Occupational Therapy Education       Title: PT OT SLP Therapies (In Progress)       Topic: Occupational Therapy (In Progress)       Point: ADL training (In Progress)       Description:   Instruct learner(s) on proper safety adaptation and remediation techniques during self care or transfers.   Instruct in proper use of assistive devices.                  Learning Progress Summary             Patient Acceptance, E, NR by  at 11/7/2023 1508    Acceptance, E, NR,NL by MR at 11/2/2023 1511    Acceptance, E, NR,NL by  at 10/31/2023 1352    Acceptance, E, NR by  at 10/26/2023 0256    Acceptance, E, NR by  at 10/25/2023 1051    Acceptance, E, NR by  at 10/23/2023 1335    Acceptance, E, NR by  at 10/19/2023 0939    Acceptance, E,D, NR by ORAL at 10/18/2023 0830    Acceptance, E, NR by  at 10/12/2023 1508    Acceptance, E, NR by ANATOLY at 10/9/2023 1533    Acceptance, E, NR by  at 10/6/2023 1549    Acceptance, E,D, NR,NL by  at 10/4/2023 0948    Acceptance, E, NR by  at 9/28/2023 1556    Acceptance, E, NR by  at 9/25/2023 1045    Acceptance, E, NR by ERIN at 9/20/2023 1420    Acceptance, E, NR by  at 9/18/2023 1532    Acceptance, E, NR by MC at  9/15/2023 1531   Family Acceptance, E, NR,NL by  at 10/31/2023 1352    Acceptance, E, NR by KP at 10/26/2023 0256    Acceptance, E,D, NR by JY at 10/18/2023 0830    Acceptance, E, NR by MR at 10/12/2023 1508    Acceptance, E, NR by J at 10/9/2023 1533    Acceptance, E, NR by MR at 10/6/2023 1549    Acceptance, E, NR by MR at 9/28/2023 1556                         Point: Home exercise program (In Progress)       Description:   Instruct learner(s) on appropriate technique for monitoring, assisting and/or progressing therapeutic exercises/activities.                  Learning Progress Summary             Patient Acceptance, E, NR by JR at 11/9/2023 1015    Acceptance, E, NR by MC at 11/7/2023 1508    Acceptance, E, NR,NL by MR at 11/2/2023 1511    Acceptance, E, NR by JAJA at 10/26/2023 0256    Acceptance, E, NR by MC at 10/25/2023 1051    Acceptance, E, NR by JR at 10/19/2023 0939    Acceptance, E,D, NR by ORAL at 10/18/2023 0830    Acceptance, E, NR by MR at 10/12/2023 1508    Acceptance, E, NR by MR at 10/6/2023 1549    Acceptance, E,D, NR,NL by  at 10/4/2023 0948    Acceptance, E, NR by MR at 9/28/2023 1556    Acceptance, E, NR by JR at 9/25/2023 1045    Acceptance, E, NR by [facility] at 9/20/2023 1420    Acceptance, E, NR by MC at 9/18/2023 1532    Acceptance, E, NR by MC at 9/15/2023 1531   Family Acceptance, E, NR by  at 10/26/2023 0256    Acceptance, E,D, NR by JY at 10/18/2023 0830    Acceptance, E, NR by MR at 10/12/2023 1508    Acceptance, E, NR by MR at 10/6/2023 1549    Acceptance, E, NR by MR at 9/28/2023 1556                         Point: Precautions (In Progress)       Description:   Instruct learner(s) on prescribed precautions during self-care and functional transfers.                  Learning Progress Summary             Patient Acceptance, E, NR by MC at 11/7/2023 1508    Acceptance, E, NR,NL by MR at 11/2/2023 1511    Acceptance, E, NR,NL by SW at 10/31/2023 1352    Acceptance, E, NR by KP at  10/26/2023 0256    Acceptance, E, NR by MC at 10/25/2023 1051    Acceptance, E, NR by MC at 10/23/2023 1335    Acceptance, E,D, NR by JY at 10/18/2023 0830    Acceptance, E, NR by MR at 10/12/2023 1508    Acceptance, E, NR by JG at 10/9/2023 1533    Acceptance, E, NR by MR at 10/6/2023 1549    Acceptance, E,D, NR,NL by CS at 10/4/2023 0948    Acceptance, E, NR by MR at 9/28/2023 1556    Acceptance, E, NR by CS1 at 9/20/2023 1420    Acceptance, E, NR by MC at 9/18/2023 1532    Acceptance, E, NR by MC at 9/15/2023 1531   Family Acceptance, E, NR,NL by  at 10/31/2023 1352    Acceptance, E, NR by  at 10/26/2023 0256    Acceptance, E,D, NR by JY at 10/18/2023 0830    Acceptance, E, NR by MR at 10/12/2023 1508    Acceptance, E, NR by JG at 10/9/2023 1533    Acceptance, E, NR by MR at 10/6/2023 1549    Acceptance, E, NR by MR at 9/28/2023 1556                         Point: Body mechanics (In Progress)       Description:   Instruct learner(s) on proper positioning and spine alignment during self-care, functional mobility activities and/or exercises.                  Learning Progress Summary             Patient Acceptance, E, NR by MC at 11/7/2023 1508    Acceptance, E, NR,NL by MR at 11/2/2023 1511    Acceptance, E, NR by  at 10/26/2023 0256    Acceptance, E, NR by MC at 10/25/2023 1051    Acceptance, E, NR by MC at 10/23/2023 1335    Acceptance, E,D, NR by JY at 10/18/2023 0830    Acceptance, E, NR by MR at 10/12/2023 1508    Acceptance, E, NR by JG at 10/9/2023 1533    Acceptance, E, NR by MR at 10/6/2023 1549    Acceptance, E,D, NR,NL by CS at 10/4/2023 0948    Acceptance, E, NR by MR at 9/28/2023 1556    Acceptance, E, NR by CS1 at 9/20/2023 1420    Acceptance, E, NR by MC at 9/18/2023 1532    Acceptance, E, NR by MC at 9/15/2023 1531   Family Acceptance, E, NR by KP at 10/26/2023 0256    Acceptance, E,D, NR by JANY at 10/18/2023 0830    Acceptance, E, NR by MR at 10/12/2023 1508    Acceptance, E, NR by ANATOLY at  10/9/2023 1533    Acceptance, E, NR by MR at 10/6/2023 1549    Acceptance, E, NR by MR at 9/28/2023 1556                                         User Key       Initials Effective Dates Name Provider Type Discipline     02/03/23 -  Jess Carrillo, OT Occupational Therapist OT     06/16/21 -  Ivis Lau, RN Registered Nurse Nurse    [facility] 09/02/21 -  Carmen Carr, OT Occupational Therapist OT    JY 06/16/21 -  Ofelia Armstrong, OT Occupational Therapist OT     06/16/21 -  Cara Ledesma, OT Occupational Therapist OT     06/16/21 -  Jim Judge, OT Occupational Therapist OT     10/14/22 -  Jenny Rivera, OT Occupational Therapist OT     09/22/22 -  Meera Doyle, OT Occupational Therapist OT    J 08/30/23 -  Power Herman, OT Student OT Student OT                  OT Recommendation and Plan  Planned Therapy Interventions (OT): activity tolerance training, adaptive equipment training, BADL retraining, functional balance retraining, occupation/activity based interventions, ROM/therapeutic exercise, strengthening exercise, transfer/mobility retraining, patient/caregiver education/training, neuromuscular control/coordination retraining, cognitive/visual perception retraining  Therapy Frequency (OT): daily  Plan of Care Review  Plan of Care Reviewed With: patient, family  Progress: declining  Outcome Evaluation: Pt with decline since last OT treatment session as he was emotionally labile, restless and confused. Pt remains below baseline with ADL's and mobility and would benefit from continued skilled OT services to assist in returning pt to his PLOF. Recommend SNF at d/c if deemed medically appropriate.     Time Calculation:         Time Calculation- OT       Row Name 11/09/23 1049             Time Calculation- OT    OT Start Time 1015  -JR      OT Received On 11/09/23  -         Timed Charges    16586 - OT Therapeutic Activity Minutes 8  -JR      87454 - OT Self Care/Mgmt Minutes 15  -JR          Total Minutes    Timed Charges Total Minutes 23  -JR       Total Minutes 23  -JR                User Key  (r) = Recorded By, (t) = Taken By, (c) = Cosigned By      Initials Name Provider Type    Jess Medley OT Occupational Therapist                  Therapy Charges for Today       Code Description Service Date Service Provider Modifiers Qty    [id number]  OT THERAPEUTIC ACT EA 15 MIN 11/9/2023 Jess Carrillo OT GO 1    [id number]  OT SELF CARE/MGMT/TRAIN EA 15 MIN 11/9/2023 Jess Carrillo OT GO 1                 Jess Carrillo OT  11/9/2023

## 2023-11-10 LAB — GLUCOSE BLDC GLUCOMTR-MCNC: 174 MG/DL (ref 70–130)

## 2023-11-10 PROCEDURE — 99232 SBSQ HOSP IP/OBS MODERATE 35: CPT | Performed by: PSYCHIATRY & NEUROLOGY

## 2023-11-10 PROCEDURE — 99232 SBSQ HOSP IP/OBS MODERATE 35: CPT | Performed by: NURSE PRACTITIONER

## 2023-11-10 PROCEDURE — 82948 REAGENT STRIP/BLOOD GLUCOSE: CPT

## 2023-11-10 PROCEDURE — 25010000002 ZIPRASIDONE MESYLATE PER 10 MG: Performed by: PSYCHIATRY & NEUROLOGY

## 2023-11-10 RX ORDER — ZIPRASIDONE MESYLATE 20 MG/ML
20 INJECTION, POWDER, LYOPHILIZED, FOR SOLUTION INTRAMUSCULAR 2 TIMES DAILY PRN
Status: DISCONTINUED | OUTPATIENT
Start: 2023-11-10 | End: 2023-11-15

## 2023-11-10 RX ORDER — VALPROIC ACID 250 MG/5ML
250 SOLUTION ORAL EVERY 8 HOURS SCHEDULED
Status: DISCONTINUED | OUTPATIENT
Start: 2023-11-10 | End: 2023-11-16 | Stop reason: HOSPADM

## 2023-11-10 RX ORDER — VALPROIC ACID 250 MG/5ML
1000 SOLUTION ORAL ONCE
Status: COMPLETED | OUTPATIENT
Start: 2023-11-10 | End: 2023-11-10

## 2023-11-10 RX ORDER — HALOPERIDOL 1 MG/1
2 TABLET ORAL EVERY 6 HOURS PRN
Status: DISCONTINUED | OUTPATIENT
Start: 2023-11-10 | End: 2023-11-11

## 2023-11-10 RX ORDER — RISPERIDONE 1 MG/ML
0.5 SOLUTION ORAL EVERY 12 HOURS SCHEDULED
Status: DISCONTINUED | OUTPATIENT
Start: 2023-11-10 | End: 2023-11-16 | Stop reason: HOSPADM

## 2023-11-10 RX ADMIN — MINERAL OIL: 1000 LIQUID ORAL at 20:03

## 2023-11-10 RX ADMIN — MINERAL OIL: 1000 LIQUID ORAL at 08:22

## 2023-11-10 RX ADMIN — DONEPEZIL HYDROCHLORIDE 10 MG: 10 TABLET, FILM COATED ORAL at 18:13

## 2023-11-10 RX ADMIN — VALPROIC ACID 250 MG: 250 SOLUTION ORAL at 21:29

## 2023-11-10 RX ADMIN — HYDROCODONE BITARTRATE AND ACETAMINOPHEN 1 TABLET: 5; 325 TABLET ORAL at 05:50

## 2023-11-10 RX ADMIN — TEMAZEPAM 30 MG: 15 CAPSULE ORAL at 20:02

## 2023-11-10 RX ADMIN — HALOPERIDOL 2 MG: 1 TABLET ORAL at 20:31

## 2023-11-10 RX ADMIN — MICONAZOLE NITRATE 1 APPLICATION: 20 CREAM TOPICAL at 20:27

## 2023-11-10 RX ADMIN — VALPROIC ACID 1000 MG: 250 SOLUTION ORAL at 17:31

## 2023-11-10 RX ADMIN — MIRTAZAPINE 30 MG: 15 TABLET, FILM COATED ORAL at 18:13

## 2023-11-10 RX ADMIN — PHENOBARBITAL 81 MG: 64.8 TABLET ORAL at 10:39

## 2023-11-10 RX ADMIN — DICLOFENAC SODIUM 2 G: 9.3 GEL TOPICAL at 19:41

## 2023-11-10 RX ADMIN — HYDROCODONE BITARTRATE AND ACETAMINOPHEN 1 TABLET: 5; 325 TABLET ORAL at 10:37

## 2023-11-10 RX ADMIN — ATORVASTATIN CALCIUM 80 MG: 40 TABLET, FILM COATED ORAL at 20:01

## 2023-11-10 RX ADMIN — RISPERIDONE 0.5 MG: 1 SOLUTION ORAL at 22:00

## 2023-11-10 RX ADMIN — HYDROCODONE BITARTRATE AND ACETAMINOPHEN 1 TABLET: 5; 325 TABLET ORAL at 17:30

## 2023-11-10 RX ADMIN — MICONAZOLE NITRATE 1 APPLICATION: 20 CREAM TOPICAL at 08:22

## 2023-11-10 RX ADMIN — ZIPRASIDONE MESYLATE 20 MG: 20 INJECTION, POWDER, LYOPHILIZED, FOR SOLUTION INTRAMUSCULAR at 15:15

## 2023-11-10 RX ADMIN — PHENOBARBITAL 81 MG: 64.8 TABLET ORAL at 05:50

## 2023-11-10 RX ADMIN — PHENOBARBITAL 81 MG: 64.8 TABLET ORAL at 13:18

## 2023-11-10 RX ADMIN — LANSOPRAZOLE 15 MG: 15 TABLET, ORALLY DISINTEGRATING ORAL at 05:53

## 2023-11-10 RX ADMIN — MINERAL OIL: 1000 LIQUID ORAL at 11:11

## 2023-11-10 RX ADMIN — DICLOFENAC SODIUM 2 G: 9.3 GEL TOPICAL at 00:20

## 2023-11-10 RX ADMIN — PHENOBARBITAL 81 MG: 64.8 TABLET ORAL at 20:01

## 2023-11-10 RX ADMIN — QUETIAPINE FUMARATE 200 MG: 100 TABLET ORAL at 08:21

## 2023-11-10 RX ADMIN — HALOPERIDOL 2 MG: 1 TABLET ORAL at 13:25

## 2023-11-10 RX ADMIN — HYDROCODONE BITARTRATE AND ACETAMINOPHEN 1 TABLET: 5; 325 TABLET ORAL at 22:31

## 2023-11-10 RX ADMIN — CLOPIDOGREL BISULFATE 75 MG: 75 TABLET ORAL at 08:21

## 2023-11-10 RX ADMIN — LIDOCAINE 1 PATCH: 4 PATCH TOPICAL at 08:21

## 2023-11-10 RX ADMIN — METOPROLOL TARTRATE 25 MG: 25 TABLET, FILM COATED ORAL at 08:21

## 2023-11-10 RX ADMIN — MINERAL OIL: 1000 LIQUID ORAL at 17:31

## 2023-11-10 RX ADMIN — ZIPRASIDONE MESYLATE 10 MG: 20 INJECTION, POWDER, LYOPHILIZED, FOR SOLUTION INTRAMUSCULAR at 06:45

## 2023-11-10 RX ADMIN — METOPROLOL TARTRATE 25 MG: 25 TABLET, FILM COATED ORAL at 20:01

## 2023-11-10 RX ADMIN — Medication 30 ML: at 11:44

## 2023-11-10 RX ADMIN — DICLOFENAC SODIUM 2 G: 9.3 GEL TOPICAL at 08:21

## 2023-11-10 NOTE — SIGNIFICANT NOTE
11/10/23 1409   SLP Deferred Reason   SLP Deferred Reason Routine  (Patient not appropriate this pm for tx will follow up as appropriate.)

## 2023-11-10 NOTE — PLAN OF CARE
"  Problem: Palliative Care  Goal: Enhanced Quality of Life  Intervention: Optimize Psychosocial Wellbeing  Recent Flowsheet Documentation  Taken 11/9/2023 1225 by Meera Miller \"Hayley\" TIMO MALDONADO  Supportive Measures: (d/w patient's wife - palliative family conference with pts wife, ANNE-MARIESarah Bartshanna MARSHALL, and Palliative LCSW)   active listening utilized   counseling provided   positive reinforcement provided   self-care encouraged   verbalization of feelings encouraged  Spiritual Activities Assistance: affirmation provided  Family/Support System Care:   support provided   self-care encouraged   caregiver stress acknowledged  Palliative Care-Goals of care conference with patient's wife, ANNE-MARIESarah MARSHALL, and ROZINA Miller LCSW.  Discussed patient's status/prognosis, hospice care home or facility vs inpatient hospice.  Discussed allowing patient to be as natural and comfortable as possible in terminal decline, without prolonging the dying process with artificial nutrition & allowing patient to have p.o. intake as possible and desired.  Patient's wife would like to continue tube feeding - she understands that as her  continues to progressively decline there will be a point when artificial nutrition will present more symptom burden than benefit. She is considering home with hospice vs LTC with hospice - she plans to reach out to her niece to see if possibly she would allow them to stay with her initially until she can rearrange their home to accommodate patient needs.  She also requests information on LTC private pay with hospice with tube feed - CORY MALDONADO has reached out to facility in [place] for possibility of admission/acceptance.  Palliative Care continues to follow for support and assist with plan of care and symptom management.     "

## 2023-11-10 NOTE — PROGRESS NOTES
[facility] Medicine Services  PROGRESS NOTE    Patient Name: Kenneth Wen  : 1950  MRN: [id number]    Date of Admission: 9/15/2023  Primary Care Physician: Susan Powers APRN    Subjective   Subjective     CC:  cva    HPI:    Patient seen resting in bed in no acute distress.  Awake and alert.  Confused.  Crying.  Sitter at bedside.  Awaiting placement.      Objective   Objective     Vital Signs:   Temp:  [97.8 °F (36.6 °C)-98.9 °F (37.2 °C)] 98.9 °F (37.2 °C)  Heart Rate:  [] 100  Resp:  [18] 18  BP: (119-143)/(55-74) 143/74     Physical Exam:  Constitutional: No acute distress, awake, restlessly moving legs/ body in the bed.  Chronic per his wife.  Chronically ill-appearing.  Sitter at bedside.  HENT: NCAT, mucous membranes moist  Respiratory: Clear to auscultation bilaterally, respiratory effort normal on room air  Cardiovascular: RRR/mild sinus tach.  Gastrointestinal: Positive bowel sounds, soft, nontender, nondistended.  PEG clamped with abdominal binder in place.  Musculoskeletal: HINES continuously- no BLE edema  Psychiatric: restless, crying.  Neurologic: Oriented to person only-able to answer questions regarding what his wife's name is only.  Tells me her name is Emmanuelle.  Otherwise completely confused and crying. generally weak, speech soft but clear.  Skin: No rashes      Results Reviewed:  LAB RESULTS:      Lab 23  0437   WBC 7.17   HEMOGLOBIN 13.1   HEMATOCRIT 40.3   PLATELETS 333   NEUTROS ABS 3.89   IMMATURE GRANS (ABS) 0.02   LYMPHS ABS 1.48   MONOS ABS 0.67   EOS ABS 1.02*   MCV 96.4         Lab 23  0437   SODIUM 141   POTASSIUM 4.4   CHLORIDE 101   CO2 27.0   ANION GAP 13.0   BUN 22   CREATININE 0.72*   EGFR 97.1   GLUCOSE 116*   CALCIUM 9.6                           Brief Urine Lab Results  (Last result in the past 365 days)        Color   Clarity   Blood   Leuk Est   Nitrite   Protein   CREAT   Urine HCG        10/30/23 1636 Yellow   Clear    Negative   Negative   Negative   Negative                   Microbiology Results Abnormal       Procedure Component Value - Date/Time    Blood Culture - Blood, Arm, Right [[id number]]  (Normal) Collected: 09/15/23 0212    Lab Status: Final result Specimen: Blood from Arm, Right Updated: 09/20/23 0230     Blood Culture No growth at 5 days    Blood Culture - Blood, Arm, Left [[id number]]  (Normal) Collected: 09/15/23 0212    Lab Status: Final result Specimen: Blood from Arm, Left Updated: 09/20/23 0230     Blood Culture No growth at 5 days            No radiology results from the last 24 hrs        Current medications:  Scheduled Meds:atorvastatin, 80 mg, Per PEG Tube, Nightly  clopidogrel, 75 mg, Per PEG Tube, Daily  Diclofenac Sodium, 2 g, Topical, BID  donepezil, 10 mg, Per PEG Tube, Nightly  fluticasone, 2 spray, Each Nare, Daily  HYDROcodone-acetaminophen, 1 tablet, Per PEG Tube, Q6H  lansoprazole, 15 mg, Per PEG Tube, Q AM  Lidocaine, 1 patch, Transdermal, Q24H  melatonin, 10 mg, Per PEG Tube, Nightly  metoprolol tartrate, 25 mg, Per PEG Tube, Q12H  miconazole, 1 application , Topical, Q12H  mirtazapine, 30 mg, Per PEG Tube, Nightly  palliative care oral rinse, , Mouth/Throat, 4x Daily  PHENobarbital, 81 mg, Per PEG Tube, Q8H  ProSource No Carb, 30 mL, Per G Tube, Daily  QUEtiapine, 200 mg, Per PEG Tube, Q12H  temazepam, 30 mg, Per PEG Tube, Nightly      Continuous Infusions:   PRN Meds:.  Calcium Replacement - Follow Nurse / BPA Driven Protocol    dextrose    glucagon (human recombinant)    ibuprofen    Magnesium Standard Dose Replacement - Follow Nurse / BPA Driven Protocol    ondansetron    PHENobarbital    Phosphorus Replacement - Follow Nurse / BPA Driven Protocol    Potassium Replacement - Follow Nurse / BPA Driven Protocol    ziprasidone    Assessment & Plan   Assessment & Plan     Active Hospital Problems    Diagnosis  POA    **Hemorrhagic CVA [I61.9]  Yes    Oropharyngeal dysphagia [R13.12]  Yes     Multiple bilateral CVAs [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        Brief Hospital Course to date:  Kenneth Wen is a 73 y.o. male with hx of HTN, HLD, T2DM, and GERD who presented to OSH with multiple acute ischemic infarcts of the bilateral cerebral and cerebellar hemispheres as well as left isaac. TTE/JOSIAS was negative PFO at OSH. On 9/13/23 he had worsening in mental status and new inability to move RLE, head CT showed evolution of the existing CVA with new development of petechial hemorrhage. DAPT was held for 24 hours per Neurology recommendations and repeat CT head that evening showed worsening effacement of the right quadrigeminal cistern with suspected increasing upward supratentorial pressure. He was then transferred to [facility] for Neurosurgery evaluation on 9/15/23. He was started on hypertonic saline 9/15/23 through 9/20/23 for cerebral edema. He had fevers with negative cultures but had worsening secretions and labored breathing so was started on Zosyn on 9/16/23. Transferred to the hospitalist service on 9/22/23. Pt demonstrated poor oral intake with elevated sodium levels; therefore, PEG tube was recommended & placed.     This patient's problems and plans were partially entered by my partner and updated as appropriate by me 11/10/23.    Assessment/plan:     Multiple bilateral posterior circulation strokes with hemorrhagic conversion  -- suspect atheroembolic but cannot rule out cardioembolic given multiple vascular territories  --Plavix monotherapy, high intensity statin  --Needs Holter monitor at discharge  --stroke followed  -PT following   --awaiting placement      Agitation, improved  Encephalopathy, improving  --Donepezil 10  mg nightly, melatonin 10 mg nightly, mirtazapine 30 mg nightly, Restoril increased to 30mg nightly  - Seroquel 200 mg BID  -Continue phenobarbitol scheduled and prn  --Continue as needed Geodon  -- Continues to be restless.  -EKG prior ordered to assess QT ordered however too restless to obtain at this time.  Patient is AND likely not for coming off medications at this time due to significant confusion and agitation.  Monitor periodically if able.  --More agitated requiring as needed medicine overnight.  Today has sitter at bedside.     Dysphagia  Hypernatremia - resolved  - PEG tube placed 9/29, Dr Carrillo   --SLP recommends mechanical ground with honey thick liquids but not eating  --Risk of pulling out peg tube, abd binder to be in place at all times.   -- Daytime tube feed was discontinued apparently and started on modified diet by mouth and nocturnal tube feeds.  In rounds today, noted patient not taking in almost anything by mouth.  Likely back on 24/7 tube feed.  Awaiting further recommendations from dietitian.   -- Social work to discuss further with family.     HTN  --Continue Metoprolol 25 mg BID, stable     GERD  --Continue PPI     COVID-19-resolved  --Overall asymptomatic and on room air  --No infiltrates seen and low procal  --Did not receive any treatment   --Off Isolation 9/30/2023      Leukocytosis-resolved  --Procalcitionin low at 0.05  --CXR and UA negative  --Blood cultures negative     Elevated LFT's, mild  --Possibly secondary to statin?  --Negative acute hepatitis panel  --repeat AST/ALT improved       Expected Discharge Location and Transportation: SNF pending placement.  Expected Discharge   Expected Discharge Date: 11/13/2023; Expected Discharge Time:      DVT prophylaxis:  Mechanical DVT prophylaxis orders are present.     AM-PAC 6 Clicks Score (PT): 10 (11/09/23 0800)    CODE STATUS:   Code Status and Medical Interventions:   Ordered at: 09/29/23 0854     Medical Intervention Limits:    NO  intubation (DNI)     Code Status (Patient has no pulse and is not breathing):    No CPR (Do Not Attempt to Resuscitate)     Medical Interventions (Patient has pulse or is breathing):    Limited Support       Simi Mendez, APRN  11/10/23

## 2023-11-10 NOTE — PLAN OF CARE
Problem: Pain Acute  Goal: Acceptable Pain Control and Functional Ability  Intervention: Optimize Psychosocial Wellbeing  Recent Flowsheet Documentation  Taken 11/10/2023 1727 by Raquel Garza, RN  Supportive Measures:   active listening utilized   verbalization of feelings encouraged     Problem: Adjustment to Illness (Stroke, Hemorrhagic)  Goal: Optimal Coping  Intervention: Support Psychosocial Response to Stroke  Recent Flowsheet Documentation  Taken 11/10/2023 1727 by Raquel Garza, RN  Supportive Measures:   active listening utilized   verbalization of feelings encouraged  Family/Support System Care:   caregiver stress acknowledged   self-care encouraged   support provided   involvement promoted   presence promoted   Goal Outcome Evaluation:  Plan of Care Reviewed With: spouse        Progress: declining  Outcome Evaluation: Pt has been extremely restless and emotionally labile today. Pt's wife at the bedside trying to console him.  He states he is talking to God.  Pt has had several medications for agitation including haldol and phenobarb without relief.  Seroquel d/c, haldol. Start risperdal, depakene.  Pt is now comfort measures. Continue tube feeds for now until pt is unable to tolerate. Support to wife.

## 2023-11-10 NOTE — PROGRESS NOTES
"Palliative Care Daily Progress Note     C/C: Patient increasingly agitated.     S: Medical record reviewed. Follow up visit for GOC in the context of complex medical decision making. Events noted. Patient with  sitter at bedside, out of restraints, but continues with increased agitation and restlessness. Continues on scheduled Hydrocodone 5-325mg [id number], denies pain at visit. Wife confirms goal for comfort focused plan of care with goal to continue tubefeeds. Multiple medication adjustments in line with a comfort focused plan of care.       ROS: +anxiety, increased. +decreased appetite. +pain, unable to quantify, low back, denies this visit. Denies nausea.      O: Code Status:   Code Status and Medical Interventions:   Ordered at: 11/10/23 1455     Level Of Support Discussed With:    Next of Kin (If No Surrogate)     Code Status (Patient has no pulse and is not breathing):    No CPR (Do Not Attempt to Resuscitate)     Medical Interventions (Patient has pulse or is breathing):    Comfort Measures      Advanced Directives: Advance Directive Status: Patient does not have advance directive   Goals of Care: Ongoing.   Palliative Performance Scale Score: 40%     /74 (BP Location: Left arm, Patient Position: Lying)   Pulse 100   Temp 98.9 °F (37.2 °C) (Temporal)   Resp 18   Ht 175.3 cm (69\")   Wt 72.8 kg (160 lb 7.9 oz)   SpO2 93%   BMI 23.70 kg/m²     Intake/Output Summary (Last 24 hours) at 11/10/2023 1524  Last data filed at 11/10/2023 0500  Gross per 24 hour   Intake 975 ml   Output --   Net 975 ml       PE:  General Appearance:    Patient laying in bed, restless, frequent repositioning, awake, alert, A/C ill appearing,    HEENT:    NC/AT, EOMI, anicteric, MMM, facial grimacing   Neck:   supple, trachea midline, no JVD   Lungs:     CTA bilat, diminished in bases; respirations regular, even and unlabored; RR 18-20 on exam    Heart:    RRR, normal S1 and S2, no M/R/G,    Abdomen:     Normal bowel " sounds, soft, non-tender, non-distended, abd binder in place covering PEG   G/U:   Deferred   MSK/Extremities:   Wasting, no edema   Pulses:   Pulses palpable and equal bilaterally   Skin:   Warm, dry   Neurologic:   Oriented to self, alert   Psych:   Restless, frequent repositioning,      Meds: Reviewed and changes noted    Labs:   Results from last 7 days   Lab Units 11/06/23  0437   WBC 10*3/mm3 7.17   HEMOGLOBIN g/dL 13.1   HEMATOCRIT % 40.3   PLATELETS 10*3/mm3 333       Results from last 7 days   Lab Units 11/06/23  0437   SODIUM mmol/L 141   POTASSIUM mmol/L 4.4   CHLORIDE mmol/L 101   CO2 mmol/L 27.0   BUN mg/dL 22   CREATININE mg/dL 0.72*   GLUCOSE mg/dL 116*   CALCIUM mg/dL 9.6       Results from last 7 days   Lab Units 11/06/23  0437   SODIUM mmol/L 141   POTASSIUM mmol/L 4.4   CHLORIDE mmol/L 101   CO2 mmol/L 27.0   BUN mg/dL 22   CREATININE mg/dL 0.72*   CALCIUM mg/dL 9.6   GLUCOSE mg/dL 116*       Imaging Results (Last 72 Hours)       ** No results found for the last 72 hours. **                  Diagnostics: Reviewed    A:   Hemorrhagic CVA    Dyslipidemia    Multiple bilateral CVAs    HFpEF    T2DM (type 2 diabetes mellitus)    HTN (hypertension)    GERD (gastroesophageal reflux disease)    ICH (intracerebral hemorrhage)    Oropharyngeal dysphagia     73 y.o. male with hemorrhagic CVA, HFpEF, HTN.   S/S:   Pain -s/p stroke and arthritis, MSK, low back  -continue Hydrocodone 5-325mg PO q6 hours  -scheduled Lidocaine patch to low back  -Voltaren gel  to bilateral knees  -continue Palliative oral rinse     2. Dysphagia -SLP with diet modifications  -PEG in place  -patient with TF, minimal PO intake     3. Debility -PT/OT following     4. Agitation -requiring sitter  -discontinued Seroquel 200mg BID  -discontinued Melatonin 10mg nightly  -started Risperidone 0.5mg per PEG BID   -one time dose Valproic Acid 1000mg loading dose  -Valproic acid 250mg per PEG TID, will titrate up if needed  -Phenobarb 81mg  per PEG q 8 hours,   -Phenobarb to 81mg per PEG q 8 hours prn  -comfort measures     5. GOC -DNR/DNI/comfort measures -per discussion with wife  -continued full treatment  11/9: long discussion with wife regarding patient's condition and continued agitation, reviewed Neurology's prognosis, reviewed options for a more comfort focused plan of care in hospital and at discharge, reviewed hospice services in home which would require 24/7 caregivers, plan to contact  regarding potential for patient to go to [facility] with hospice. Wife has not made any decisions but would like  to reach out to her if [facility] is able to take patient as LTC with hospice.   11/10: Wife requesting comfort focused plan of care with continuation of tube feeding. Reviewed use of medications for agitation. No further labs, testing. Goal is for patient's comfort. Goal is to discharge either home with hospice or facility with hospice. Medications adjusted as above.    P: Follow up visit for symptoms. Patient continues to be restless, frequent movement in bed. Goal for comfort focused plan of care. Goal for either home with hospice or LTC with hospice.     Palliative Care Team will continue to follow patient. Please do not hesitate to contact us regarding further sx mgmt or GOC needs.  Dianelys Arriaga, APRN  11/10/2023  Time spent: 25 minutes

## 2023-11-10 NOTE — CASE MANAGEMENT/SOCIAL WORK
Continued Stay Note  [facility]     Patient Name: Kenneth Wen  MRN: [id number]  Today's Date: 11/9/2023    Admit Date: 9/15/2023    Plan: Ongoing   Discharge Plan       Row Name 11/09/23 1932       Plan    Plan Ongoing    Patient/Family in Agreement with Plan yes  Spouse    Plan Comments Palliative Care met with spouse today to re-assess GOC.  Wife wishes to speak with her family and consider options, (see Palliative Care note).  I did speak with [facility] in [place], they would be able to accept patient in LTC bed with Hospice, however would still need to be restraint free.  Updated spouse and discussed with Palliative Care Team.  Will continue to follow.  Jess Ferrera, Ext. [phone]    Final Discharge Disposition Code 30 - still a patient                   Discharge Codes    No documentation.                 Expected Discharge Date and Time       Expected Discharge Date Expected Discharge Time    Nov 13, 2023               JOEL Mena

## 2023-11-10 NOTE — PROGRESS NOTES
Neurology       Patient Care Team:  Susan Powers APRN as PCP - General (Family Medicine)    Chief complaint: Protracted delirium    History: The patient is as bad as ever.    He is not responded to Geodon as needed phenobarbital is now getting a dose of Haldol.    Has been restless and oppositional.      Past Medical History:   Diagnosis Date    Acid reflux     Cancer     Skin    Diabetes mellitus     Prediabetes    GERD (gastroesophageal reflux disease) 09/15/2023    HTN (hypertension) 09/15/2023    Kidney disease     T2DM (type 2 diabetes mellitus) 09/15/2023       Vital Signs   Vitals:    11/08/23 1917 11/09/23 0734 11/09/23 2127 11/10/23 0900   BP: 137/86 113/65 119/55 143/74   BP Location: Right arm Left arm  Left arm   Patient Position: Lying Lying Lying Lying   Pulse: 83 94 94 100   Resp: 16 18 18 18   Temp: 98.5 °F (36.9 °C) 99.3 °F (37.4 °C) 97.8 °F (36.6 °C) 98.9 °F (37.2 °C)   TempSrc: Temporal Temporal Axillary Temporal   SpO2: 93% 92% 94% 93%   Weight:       Height:           Physical Exam:   General: Constant movement of her left arm and leg              Neuro: Talking in complete sentences but extremely restless.    Results Review:  Reviewed  Results from last 7 days   Lab Units 11/06/23  0437   WBC 10*3/mm3 7.17   HEMOGLOBIN g/dL 13.1   HEMATOCRIT % 40.3   PLATELETS 10*3/mm3 333     Results from last 7 days   Lab Units 11/06/23  0437   SODIUM mmol/L 141   POTASSIUM mmol/L 4.4   CHLORIDE mmol/L 101   CO2 mmol/L 27.0   BUN mg/dL 22   CREATININE mg/dL 0.72*   CALCIUM mg/dL 9.6   GLUCOSE mg/dL 116*       Imaging Results (Last 24 Hours)       ** No results found for the last 24 hours. **            Assessment:  Ongoing delirium.    Severe multi stroke brain damage.        Plan:  Increase Geodon as needed to 20 mg.    Hospice appropriate    Comment:  Spoke to the wife at shows the idea that this patient is not likely to survive in his current state.    He does not from fall at home exhaustion or  oversedation seem to be highly probable.    Extremely difficult management issue.         I discussed the patients findings and my recommendations with patient, family, and nursing staff    Toni Rausch MD  11/10/23  13:53 EST

## 2023-11-10 NOTE — PLAN OF CARE
"  Problem: Palliative Care  Goal: Enhanced Quality of Life  Intervention: Promote Advance Care Planning  Recent Flowsheet Documentation  Taken 11/10/2023 1430 by Meera Miller \"Hayley\" TIMO MALDONADO  Life Transition/Adjustment: decision-making facilitated  Intervention: Optimize Psychosocial Wellbeing  Recent Flowsheet Documentation  Taken 11/10/2023 1430 by Meera Miller \"Hayley\" ERICA, MARYW  Family/Support System Care:   support provided   self-care encouraged   caregiver stress acknowledged  Patient with continued restlessness and agitation, haldol added-pt seen approximately 1hour following administration no relief.  Discussed with Palliative NP and MD then with wife, outside of TF - patient is essentially comfort measures, oral medications at this time are not working to quell agitation and restlessness - confirmed that wife wants patient comfortable regardless of how much time he has - Palliative APRN to change to comfort measures, continue TF so long as patient is tolerating, and focus on aggressive symptom management not geared to disposition of facility placement but for comfort in EOL.  Suspect that patient's agitation is worsening due to terminal decline.  Wife is having difficulty stopping TF but understands we will continue to discuss and will hold/stop depending on rapidity of decline and burden.  After speaking with patient's wife, discussed with 5B Unit Charge RN, 5B Nursing Director, DARIUS MARSHALL, as well as notification to HEMALATHA Rausch MD, CORY Maldonado MD, Stephanie APRN.     "

## 2023-11-11 PROCEDURE — 99232 SBSQ HOSP IP/OBS MODERATE 35: CPT | Performed by: NURSE PRACTITIONER

## 2023-11-11 PROCEDURE — 25010000002 ZIPRASIDONE MESYLATE PER 10 MG: Performed by: PSYCHIATRY & NEUROLOGY

## 2023-11-11 RX ORDER — WATER 10 ML/10ML
INJECTION INTRAMUSCULAR; INTRAVENOUS; SUBCUTANEOUS
Status: COMPLETED
Start: 2023-11-11 | End: 2023-11-11

## 2023-11-11 RX ORDER — HALOPERIDOL 1 MG/1
5 TABLET ORAL EVERY 6 HOURS PRN
Status: DISCONTINUED | OUTPATIENT
Start: 2023-11-11 | End: 2023-11-15

## 2023-11-11 RX ADMIN — LIDOCAINE 1 PATCH: 4 PATCH TOPICAL at 08:35

## 2023-11-11 RX ADMIN — DONEPEZIL HYDROCHLORIDE 10 MG: 10 TABLET, FILM COATED ORAL at 17:02

## 2023-11-11 RX ADMIN — LANSOPRAZOLE 15 MG: 15 TABLET, ORALLY DISINTEGRATING ORAL at 05:58

## 2023-11-11 RX ADMIN — Medication 30 ML: at 09:11

## 2023-11-11 RX ADMIN — HYDROCODONE BITARTRATE AND ACETAMINOPHEN 1 TABLET: 5; 325 TABLET ORAL at 05:58

## 2023-11-11 RX ADMIN — VALPROIC ACID 250 MG: 250 SOLUTION ORAL at 13:11

## 2023-11-11 RX ADMIN — DICLOFENAC SODIUM 2 G: 9.3 GEL TOPICAL at 21:48

## 2023-11-11 RX ADMIN — MINERAL OIL: 1000 LIQUID ORAL at 08:35

## 2023-11-11 RX ADMIN — HYDROCODONE BITARTRATE AND ACETAMINOPHEN 1 TABLET: 5; 325 TABLET ORAL at 17:01

## 2023-11-11 RX ADMIN — VALPROIC ACID 250 MG: 250 SOLUTION ORAL at 05:55

## 2023-11-11 RX ADMIN — ZIPRASIDONE MESYLATE 20 MG: 20 INJECTION, POWDER, LYOPHILIZED, FOR SOLUTION INTRAMUSCULAR at 03:38

## 2023-11-11 RX ADMIN — HALOPERIDOL 5 MG: 5 TABLET ORAL at 11:56

## 2023-11-11 RX ADMIN — DICLOFENAC SODIUM 2 G: 9.3 GEL TOPICAL at 08:36

## 2023-11-11 RX ADMIN — PHENOBARBITAL 81 MG: 64.8 TABLET ORAL at 05:57

## 2023-11-11 RX ADMIN — VALPROIC ACID 250 MG: 250 SOLUTION ORAL at 21:47

## 2023-11-11 RX ADMIN — PHENOBARBITAL 81 MG: 64.8 TABLET ORAL at 09:17

## 2023-11-11 RX ADMIN — ATORVASTATIN CALCIUM 80 MG: 40 TABLET, FILM COATED ORAL at 21:47

## 2023-11-11 RX ADMIN — RISPERIDONE 0.5 MG: 1 SOLUTION ORAL at 08:36

## 2023-11-11 RX ADMIN — ZIPRASIDONE MESYLATE 20 MG: 20 INJECTION, POWDER, LYOPHILIZED, FOR SOLUTION INTRAMUSCULAR at 13:19

## 2023-11-11 RX ADMIN — PHENOBARBITAL 81 MG: 64.8 TABLET ORAL at 13:10

## 2023-11-11 RX ADMIN — CLOPIDOGREL BISULFATE 75 MG: 75 TABLET ORAL at 08:36

## 2023-11-11 RX ADMIN — METOPROLOL TARTRATE 25 MG: 25 TABLET, FILM COATED ORAL at 21:47

## 2023-11-11 RX ADMIN — METOPROLOL TARTRATE 25 MG: 25 TABLET, FILM COATED ORAL at 08:36

## 2023-11-11 RX ADMIN — MINERAL OIL: 1000 LIQUID ORAL at 11:09

## 2023-11-11 RX ADMIN — MINERAL OIL: 1000 LIQUID ORAL at 21:48

## 2023-11-11 RX ADMIN — MIRTAZAPINE 30 MG: 15 TABLET, FILM COATED ORAL at 17:01

## 2023-11-11 RX ADMIN — FLUTICASONE PROPIONATE 2 SPRAY: 50 SPRAY, METERED NASAL at 08:37

## 2023-11-11 RX ADMIN — WATER 10 ML: 1 INJECTION INTRAMUSCULAR; INTRAVENOUS; SUBCUTANEOUS at 11:58

## 2023-11-11 RX ADMIN — HYDROCODONE BITARTRATE AND ACETAMINOPHEN 1 TABLET: 5; 325 TABLET ORAL at 11:09

## 2023-11-11 RX ADMIN — MICONAZOLE NITRATE 1 APPLICATION: 20 CREAM TOPICAL at 21:49

## 2023-11-11 RX ADMIN — MICONAZOLE NITRATE 1 APPLICATION: 20 CREAM TOPICAL at 08:35

## 2023-11-11 RX ADMIN — MINERAL OIL: 1000 LIQUID ORAL at 17:02

## 2023-11-11 RX ADMIN — TEMAZEPAM 30 MG: 15 CAPSULE ORAL at 21:47

## 2023-11-11 RX ADMIN — RISPERIDONE 0.5 MG: 1 SOLUTION ORAL at 21:48

## 2023-11-11 RX ADMIN — PHENOBARBITAL 81 MG: 64.8 TABLET ORAL at 21:47

## 2023-11-11 NOTE — PROGRESS NOTES
"Palliative Care Daily Progress Note     C/C: Patient agitated.     S: Medical record reviewed. Follow up visit for [facility] in the context of complex medical decision making. Events noted. Patient with  sitter at bedside, out of restraints, but continues with increased agitation and restlessness. Continues on scheduled Hydrocodone 5-325mg [id number], denies pain at visit. Multiple medication adjustments in line with a comfort focused plan of care. Patient with two doses [place] 20mg IM, three doses Phenobarb 81mg PRN and scheduled in addition.     ROS: +anxiety, increased. +decreased appetite. +pain, unable to quantify, low back, denies this visit. Denies nausea.      O: Code Status:   Code Status and Medical Interventions:   Ordered at: 11/10/23 1455     Level Of Support Discussed With:    Next of Kin (If No Surrogate)     Code Status (Patient has no pulse and is not breathing):    No CPR (Do Not Attempt to Resuscitate)     Medical Interventions (Patient has pulse or is breathing):    Comfort Measures      Advanced Directives: Advance Directive Status: Patient does not have advance directive   Goals of Care: Ongoing.   Palliative Performance Scale Score: 40%     /95   Pulse 91   Temp 98.2 °F (36.8 °C)   Resp 18   Ht 175.3 cm (69\")   Wt 72.8 kg (160 lb 7.9 oz)   SpO2 90%   BMI 23.70 kg/m²     Intake/Output Summary (Last 24 hours) at 11/11/2023 1135  Last data filed at 11/11/2023 0904  Gross per 24 hour   Intake 1080 ml   Output --   Net 1080 ml       PE:  General Appearance:    Patient laying in bed, restless, frequent repositioning, awake, alert, A/C ill appearing,    HEENT:    NC/AT, EOMI, anicteric, MMM, facial grimacing   Neck:   supple, trachea midline, no JVD   Lungs:     CTA bilat, diminished in bases; respirations regular, even and unlabored; RR 18-20 on exam    Heart:    RRR, normal S1 and S2, no M/R/G,    Abdomen:     Normal bowel sounds, soft, non-tender, non-distended, abd binder in place " covering PEG   G/U:   Deferred   MSK/Extremities:   Wasting, no edema   Pulses:   Pulses palpable and equal bilaterally   Skin:   Warm, dry   Neurologic:   Oriented to self, alert   Psych:   Restless, frequent repositioning,      Meds: Reviewed and changes noted    Labs:   Results from last 7 days   Lab Units 11/06/23  0437   WBC 10*3/mm3 7.17   HEMOGLOBIN g/dL 13.1   HEMATOCRIT % 40.3   PLATELETS 10*3/mm3 333       Results from last 7 days   Lab Units 11/06/23  0437   SODIUM mmol/L 141   POTASSIUM mmol/L 4.4   CHLORIDE mmol/L 101   CO2 mmol/L 27.0   BUN mg/dL 22   CREATININE mg/dL 0.72*   GLUCOSE mg/dL 116*   CALCIUM mg/dL 9.6       Results from last 7 days   Lab Units 11/06/23  0437   SODIUM mmol/L 141   POTASSIUM mmol/L 4.4   CHLORIDE mmol/L 101   CO2 mmol/L 27.0   BUN mg/dL 22   CREATININE mg/dL 0.72*   CALCIUM mg/dL 9.6   GLUCOSE mg/dL 116*       Imaging Results (Last 72 Hours)       ** No results found for the last 72 hours. **                  Diagnostics: Reviewed    A:   Hemorrhagic CVA    Dyslipidemia    Multiple bilateral CVAs    HFpEF    T2DM (type 2 diabetes mellitus)    HTN (hypertension)    GERD (gastroesophageal reflux disease)    ICH (intracerebral hemorrhage)    Oropharyngeal dysphagia     73 y.o. male with hemorrhagic CVA, HFpEF, HTN.   S/S:   Pain -s/p stroke and arthritis, MSK, low back  -continue Hydrocodone 5-325mg PO q6 hours  -scheduled Lidocaine patch to low back  -Voltaren gel  to bilateral knees  -continue Palliative oral rinse     2. Dysphagia -SLP with diet modifications  -PEG in place  -patient with TF, minimal PO intake     3. Debility -PT/OT following     4. Agitation -requiring sitter  -discontinued Seroquel 200mg BID  -discontinued Melatonin 10mg nightly  -continue Risperidone 0.5mg per PEG BID   -one time dose Valproic Acid 1000mg loading dose  -Valproic acid 250mg per PEG TID, will titrate up if needed  -Phenobarb 81mg per PEG q 8 hours,   -Phenobarb to 81mg per PEG q 8 hours  prn  -Haldol increased to 5mg per PEG [id number] prn agitation  -Geodon increased to 20mg IM BID prn  -comfort measures    5. Dry MM -continue oral Palliative rinse     6. GOC -DNR/DNI/comfort measures -per discussion with wife  -continued full treatment  11/9: long discussion with wife regarding patient's condition and continued agitation, reviewed Neurology's prognosis, reviewed options for a more comfort focused plan of care in hospital and at discharge, reviewed hospice services in home which would require 24/7 caregivers, plan to contact  regarding potential for patient to go to [facility] with hospice. Wife has not made any decisions but would like  to reach out to her if [facility] is able to take patient as LTC with hospice.   11/10: Wife requesting comfort focused plan of care with continuation of tube feeding. Reviewed use of medications for agitation. No further labs, testing. Goal is for patient's comfort. Goal is to discharge either home with hospice or facility with hospice. Medications adjusted as above.  11/11: Patient continues to be restless, continuing to adjust medications and dosages. Haldol increased. No signs of thrush but mouth appears dry. Palliative oral rinse continued.     P: Follow up visit for symptoms. Patient continues to be restless, frequent movement in bed. Goal for comfort focused plan of care. Goal for either home with hospice or LTC with hospice.     Palliative Care Team will continue to follow patient. Please do not hesitate to contact us regarding further sx mgmt or GOC needs.  Dianelys Arriaga, APRN  11/11/2023  Time spent: 25 minutes

## 2023-11-11 NOTE — PROGRESS NOTES
[facility] Medicine Services  PROGRESS NOTE    Patient Name: Kenneth Wen  : 1950  MRN: [id number]    Date of Admission: 9/15/2023  Primary Care Physician: Susan Powers APRN    Subjective   Subjective     CC:  Follow-up CVA    HPI:  Patient seen resting in bed.  He appears restless.  Nurse at bedside.  Reports he has been attempting to get out of bed.  Bed alarm in place.  Patient remains confused.  No new complaints at this time.      Objective   Objective     Vital Signs:   Temp:  [98.1 °F (36.7 °C)-98.9 °F (37.2 °C)] 98.1 °F (36.7 °C)  Heart Rate:  [] 55  Resp:  [18-20] 20  BP: (139-143)/(65-74) 139/65     Constitutional: Restless, fidgety, awake, alert, chronically ill-appearing  HENT: NCAT, mucous membranes moist  Respiratory: Clear to auscultation bilaterally, respiratory effort normal   Cardiovascular: RRR, no murmurs, palpable pedal pulses bilaterally, cap refill brisk   Gastrointestinal: Positive bowel sounds, soft, PEG tube, abdominal binder in place  Musculoskeletal: No BLE edema   Psychiatric: Restless, anxious appearing  Neurologic: Confused, moves all extremities but weak, no attempt to communicate  Skin: warm, dry, no visible rash     Results Reviewed:  LAB RESULTS:      Lab 23  0437   WBC 7.17   HEMOGLOBIN 13.1   HEMATOCRIT 40.3   PLATELETS 333   NEUTROS ABS 3.89   IMMATURE GRANS (ABS) 0.02   LYMPHS ABS 1.48   MONOS ABS 0.67   EOS ABS 1.02*   MCV 96.4         Lab 23  0437   SODIUM 141   POTASSIUM 4.4   CHLORIDE 101   CO2 27.0   ANION GAP 13.0   BUN 22   CREATININE 0.72*   EGFR 97.1   GLUCOSE 116*   CALCIUM 9.6                         Brief Urine Lab Results  (Last result in the past 365 days)        Color   Clarity   Blood   Leuk Est   Nitrite   Protein   CREAT   Urine HCG        10/30/23 1636 Yellow   Clear   Negative   Negative   Negative   Negative                   Microbiology Results Abnormal       Procedure Component Value - Date/Time     Blood Culture - Blood, Arm, Right [[id number]]  (Normal) Collected: 09/15/23 0212    Lab Status: Final result Specimen: Blood from Arm, Right Updated: 09/20/23 0230     Blood Culture No growth at 5 days    Blood Culture - Blood, Arm, Left [[id number]]  (Normal) Collected: 09/15/23 0212    Lab Status: Final result Specimen: Blood from Arm, Left Updated: 09/20/23 0230     Blood Culture No growth at 5 days            No radiology results from the last 24 hrs        Current medications:  Scheduled Meds:atorvastatin, 80 mg, Per PEG Tube, Nightly  clopidogrel, 75 mg, Per PEG Tube, Daily  Diclofenac Sodium, 2 g, Topical, BID  donepezil, 10 mg, Per PEG Tube, Nightly  fluticasone, 2 spray, Each Nare, Daily  HYDROcodone-acetaminophen, 1 tablet, Per PEG Tube, Q6H  lansoprazole, 15 mg, Per PEG Tube, Q AM  Lidocaine, 1 patch, Transdermal, Q24H  metoprolol tartrate, 25 mg, Per PEG Tube, Q12H  miconazole, 1 application , Topical, Q12H  mirtazapine, 30 mg, Per PEG Tube, Nightly  palliative care oral rinse, , Mouth/Throat, 4x Daily  PHENobarbital, 81 mg, Per PEG Tube, Q8H  ProSource No Carb, 30 mL, Per G Tube, Daily  risperiDONE, 0.5 mg, Per PEG Tube, Q12H  temazepam, 30 mg, Per PEG Tube, Nightly  Valproic Acid, 250 mg, Per PEG Tube, Q8H      Continuous Infusions:   PRN Meds:.  haloperidol    ibuprofen    ondansetron    PHENobarbital    ziprasidone    Assessment & Plan   Assessment & Plan     Active Hospital Problems    Diagnosis  POA    **Hemorrhagic CVA [I61.9]  Yes    Oropharyngeal dysphagia [R13.12]  Yes    Multiple bilateral CVAs [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        Brief Hospital Course to date:  Kenneth Wen is a 73 y.o. male with hx of HTN,  HLD, T2DM, and GERD who presented to OSH with multiple acute ischemic infarcts of the bilateral cerebral and cerebellar hemispheres as well as left isaac. TTE/JOSIAS was negative PFO at OSH. On 9/13/23 he had worsening in mental status and new inability to move RLE, head CT showed evolution of the existing CVA with new development of petechial hemorrhage. DAPT was held for 24 hours per Neurology recommendations and repeat CT head that evening showed worsening effacement of the right quadrigeminal cistern with suspected increasing upward supratentorial pressure. He was then transferred to [facility] for Neurosurgery evaluation on 9/15/23. He was started on hypertonic saline 9/15/23 through 9/20/23 for cerebral edema. He had fevers with negative cultures but had worsening secretions and labored breathing so was started on Zosyn on 9/16/23. Transferred to the hospitalist service on 9/22/23. Pt demonstrated poor oral intake with elevated sodium levels; therefore, PEG tube was recommended & placed. Palliative and hospice are following. Currently comfort measures only with plan to d/c home with hospice vs LTC with hospice.    This patient's problems and plans were partially entered by my partner and updated as appropriate by me 11/11/23.     Assessment/plan:     Multiple bilateral posterior circulation strokes with hemorrhagic conversion  -- suspect atheroembolic but cannot rule out cardioembolic given multiple vascular territories  --Plavix monotherapy, high intensity statin  --Recommended MCOT at d/c but now plan is for comfort measures  --stroke followed  -PT/OT consulted  --awaiting placement   --Home with hospice vs LTC with hospice     Agitation, improved  Encephalopathy, improving  --Donepezil 10 mg nightly, melatonin 10 mg nightly, mirtazapine 30 mg nightly, Restoril increased to 30mg nightly  - Seroquel 200 mg BID  -Continue phenobarbitol scheduled and prn  --Continue as needed Geodon     Dysphagia  Hypernatremia -  resolved  - PEG tube placed 9/29, Dr Carrillo   --SLP recommends mechanical ground with honey thick liquids but not eating  --Risk of pulling out peg tube, abd binder to be in place at all times.   -- Daytime tube feed was discontinued apparently and started on modified diet by mouth and nocturnal tube feeds.  In rounds today, noted patient not taking in almost anything by mouth.  Likely back on 24/7 tube feed.  Awaiting further recommendations from dietitian.   -- Social work to discuss further with family.     HTN  --Continue Metoprolol 25 mg BID, stable     GERD  --Continue PPI     COVID-19-resolved  --Overall asymptomatic and on room air  --No infiltrates seen and low procal  --Did not receive any treatment   --Off Isolation 9/30/2023      Leukocytosis-resolved  --Procalcitionin low at 0.05  --CXR and UA negative  --Blood cultures negative     Elevated LFT's, mild  --Possibly secondary to statin?  --Negative acute hepatitis panel  --repeat AST/ALT improved        Expected Discharge Location and Transportation: SNF pending placement.  Expected Discharge   Expected Discharge Date: 11/13/2023; Expected Discharge Time:       DVT prophylaxis:  No DVT prophylaxis order currently exists.     AM-PAC 6 Clicks Score (PT): 10 (11/10/23 2000)    CODE STATUS:   Code Status and Medical Interventions:   Ordered at: 11/10/23 5994     Level Of Support Discussed With:    Next of Kin (If No Surrogate)     Code Status (Patient has no pulse and is not breathing):    No CPR (Do Not Attempt to Resuscitate)     Medical Interventions (Patient has pulse or is breathing):    Comfort Measures       Maximiliano Landry, ROLANDO  11/11/23

## 2023-11-11 NOTE — PLAN OF CARE
Goal Outcome Evaluation:  Plan of Care Reviewed With: patient, spouse        Progress: declining  Outcome Evaluation: restless, small improvement seen with geodon injection, bm x3, 0% po intake, bolus feedings tolerated, wife at bedside

## 2023-11-11 NOTE — PLAN OF CARE
Goal Outcome Evaluation:  Plan of Care Reviewed With: patient        Progress: declining  Outcome Evaluation: Slept for a couple of hours. Awake most of the night restless, trying to take off abdominal binder, putting legs over siderails;pinching and squeezing staff's hand. Haldol and Geodon given as ordered but pt remains restless. Bolus feeding given. Tolerated well. No N/V/D.  Good skincare and pericare rendered p each incontinence. Needs anticipated and attended

## 2023-11-12 LAB — GLUCOSE BLDC GLUCOMTR-MCNC: 132 MG/DL (ref 70–130)

## 2023-11-12 PROCEDURE — 82948 REAGENT STRIP/BLOOD GLUCOSE: CPT

## 2023-11-12 PROCEDURE — 99232 SBSQ HOSP IP/OBS MODERATE 35: CPT | Performed by: NURSE PRACTITIONER

## 2023-11-12 RX ADMIN — MINERAL OIL: 1000 LIQUID ORAL at 21:10

## 2023-11-12 RX ADMIN — RISPERIDONE 0.5 MG: 1 SOLUTION ORAL at 08:22

## 2023-11-12 RX ADMIN — HYDROCODONE BITARTRATE AND ACETAMINOPHEN 1 TABLET: 5; 325 TABLET ORAL at 17:52

## 2023-11-12 RX ADMIN — ATORVASTATIN CALCIUM 80 MG: 40 TABLET, FILM COATED ORAL at 21:10

## 2023-11-12 RX ADMIN — MINERAL OIL: 1000 LIQUID ORAL at 08:21

## 2023-11-12 RX ADMIN — Medication 30 ML: at 08:23

## 2023-11-12 RX ADMIN — TEMAZEPAM 30 MG: 15 CAPSULE ORAL at 21:09

## 2023-11-12 RX ADMIN — MINERAL OIL: 1000 LIQUID ORAL at 17:52

## 2023-11-12 RX ADMIN — FLUTICASONE PROPIONATE 2 SPRAY: 50 SPRAY, METERED NASAL at 08:24

## 2023-11-12 RX ADMIN — DONEPEZIL HYDROCHLORIDE 10 MG: 10 TABLET, FILM COATED ORAL at 18:00

## 2023-11-12 RX ADMIN — MICONAZOLE NITRATE 1 APPLICATION: 20 CREAM TOPICAL at 08:21

## 2023-11-12 RX ADMIN — MICONAZOLE NITRATE 1 APPLICATION: 20 CREAM TOPICAL at 21:11

## 2023-11-12 RX ADMIN — VALPROIC ACID 250 MG: 250 SOLUTION ORAL at 05:01

## 2023-11-12 RX ADMIN — PHENOBARBITAL 81 MG: 64.8 TABLET ORAL at 14:18

## 2023-11-12 RX ADMIN — VALPROIC ACID 250 MG: 250 SOLUTION ORAL at 14:18

## 2023-11-12 RX ADMIN — MINERAL OIL: 1000 LIQUID ORAL at 12:19

## 2023-11-12 RX ADMIN — RISPERIDONE 0.5 MG: 1 SOLUTION ORAL at 21:09

## 2023-11-12 RX ADMIN — LIDOCAINE 1 PATCH: 4 PATCH TOPICAL at 08:21

## 2023-11-12 RX ADMIN — PHENOBARBITAL 81 MG: 64.8 TABLET ORAL at 05:01

## 2023-11-12 RX ADMIN — HYDROCODONE BITARTRATE AND ACETAMINOPHEN 1 TABLET: 5; 325 TABLET ORAL at 00:03

## 2023-11-12 RX ADMIN — HYDROCODONE BITARTRATE AND ACETAMINOPHEN 1 TABLET: 5; 325 TABLET ORAL at 22:45

## 2023-11-12 RX ADMIN — MIRTAZAPINE 30 MG: 15 TABLET, FILM COATED ORAL at 18:00

## 2023-11-12 RX ADMIN — DICLOFENAC SODIUM 2 G: 9.3 GEL TOPICAL at 08:24

## 2023-11-12 RX ADMIN — VALPROIC ACID 250 MG: 250 SOLUTION ORAL at 21:09

## 2023-11-12 RX ADMIN — PHENOBARBITAL 81 MG: 64.8 TABLET ORAL at 21:10

## 2023-11-12 RX ADMIN — LANSOPRAZOLE 15 MG: 15 TABLET, ORALLY DISINTEGRATING ORAL at 05:03

## 2023-11-12 RX ADMIN — HYDROCODONE BITARTRATE AND ACETAMINOPHEN 1 TABLET: 5; 325 TABLET ORAL at 05:02

## 2023-11-12 RX ADMIN — HYDROCODONE BITARTRATE AND ACETAMINOPHEN 1 TABLET: 5; 325 TABLET ORAL at 10:54

## 2023-11-12 RX ADMIN — DICLOFENAC SODIUM 2 G: 9.3 GEL TOPICAL at 21:10

## 2023-11-12 RX ADMIN — CLOPIDOGREL BISULFATE 75 MG: 75 TABLET ORAL at 08:21

## 2023-11-12 RX ADMIN — METOPROLOL TARTRATE 25 MG: 25 TABLET, FILM COATED ORAL at 21:09

## 2023-11-12 RX ADMIN — METOPROLOL TARTRATE 25 MG: 25 TABLET, FILM COATED ORAL at 08:21

## 2023-11-12 NOTE — PLAN OF CARE
Goal Outcome Evaluation:           Progress: declining  Outcome Evaluation: Pt remained restless throughout shift. Tearful at times. Pt was able to interact and throw a ball with RN this AM. Tolerating TF boluses. Spouse updated at bedside.

## 2023-11-12 NOTE — PLAN OF CARE
Goal Outcome Evaluation:  Plan of Care Reviewed With: patient        Progress: no change  Outcome Evaluation: Restless. oriented to self only. Tolerating TF. Refusing PO intake. Pt able to sleep this night.

## 2023-11-12 NOTE — PROGRESS NOTES
[facility] Medicine Services  PROGRESS NOTE    Patient Name: Kenneth Wen  : 1950  MRN: [id number]    Date of Admission: 9/15/2023  Primary Care Physician: Susan Powers APRN    Subjective   Subjective     CC:  Follow-up CVA    HPI:  Patient seen resting in bed with eyes closed in no apparent distress.  Nursing notes reviewed.  Patient intermittently agitated overnight requiring redirection.  Patient remains confused.  No sitter at bedside.      Objective   Objective     Vital Signs:   Temp:  [98.2 °F (36.8 °C)] 98.2 °F (36.8 °C)  Heart Rate:  [] 110  Resp:  [18-20] 20  BP: (130-131)/(80-95) 131/80     Physical Exam:  Constitutional: No acute distress, resting with eyes closed, chronically ill-appearing  HENT: NCAT, mucous membranes dry  Respiratory: Grossly clear, respiratory effort normal on room air  Cardiovascular: RRR, no murmurs, cap refill brisk   Gastrointestinal: Positive bowel sounds, soft, PEG tube with abdominal binder in place  Musculoskeletal: No BLE edema   Psychiatric: Flat affect, does not follow commands  Neurologic: Confused, moves all extremities but weak, currently nonverbal  Skin: warm, dry, no visible rash     Results Reviewed:  LAB RESULTS:      Lab 23  0437   WBC 7.17   HEMOGLOBIN 13.1   HEMATOCRIT 40.3   PLATELETS 333   NEUTROS ABS 3.89   IMMATURE GRANS (ABS) 0.02   LYMPHS ABS 1.48   MONOS ABS 0.67   EOS ABS 1.02*   MCV 96.4         Lab 23  0437   SODIUM 141   POTASSIUM 4.4   CHLORIDE 101   CO2 27.0   ANION GAP 13.0   BUN 22   CREATININE 0.72*   EGFR 97.1   GLUCOSE 116*   CALCIUM 9.6                         Brief Urine Lab Results  (Last result in the past 365 days)        Color   Clarity   Blood   Leuk Est   Nitrite   Protein   CREAT   Urine HCG        10/30/23 1636 Yellow   Clear   Negative   Negative   Negative   Negative                   Microbiology Results Abnormal       Procedure Component Value - Date/Time    Blood Culture -  Blood, Arm, Right [[id number]]  (Normal) Collected: 09/15/23 0212    Lab Status: Final result Specimen: Blood from Arm, Right Updated: 09/20/23 0230     Blood Culture No growth at 5 days    Blood Culture - Blood, Arm, Left [[id number]]  (Normal) Collected: 09/15/23 0212    Lab Status: Final result Specimen: Blood from Arm, Left Updated: 09/20/23 0230     Blood Culture No growth at 5 days            No radiology results from the last 24 hrs        Current medications:  Scheduled Meds:atorvastatin, 80 mg, Per PEG Tube, Nightly  clopidogrel, 75 mg, Per PEG Tube, Daily  Diclofenac Sodium, 2 g, Topical, BID  donepezil, 10 mg, Per PEG Tube, Nightly  fluticasone, 2 spray, Each Nare, Daily  HYDROcodone-acetaminophen, 1 tablet, Per PEG Tube, Q6H  lansoprazole, 15 mg, Per PEG Tube, Q AM  Lidocaine, 1 patch, Transdermal, Q24H  metoprolol tartrate, 25 mg, Per PEG Tube, Q12H  miconazole, 1 application , Topical, Q12H  mirtazapine, 30 mg, Per PEG Tube, Nightly  palliative care oral rinse, , Mouth/Throat, 4x Daily  PHENobarbital, 81 mg, Per PEG Tube, Q8H  ProSource No Carb, 30 mL, Per G Tube, Daily  risperiDONE, 0.5 mg, Per PEG Tube, Q12H  temazepam, 30 mg, Per PEG Tube, Nightly  Valproic Acid, 250 mg, Per PEG Tube, Q8H      Continuous Infusions:   PRN Meds:.  haloperidol    ibuprofen    ondansetron    PHENobarbital    ziprasidone    Assessment & Plan   Assessment & Plan     Active Hospital Problems    Diagnosis  POA    **Hemorrhagic CVA [I61.9]  Yes    Oropharyngeal dysphagia [R13.12]  Yes    Multiple bilateral CVAs [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        Brief Hospital Course to date:  Kenneth Wen is a 73 y.o. male  with hx of HTN, HLD, T2DM, and  GERD who presented to OSH with multiple acute ischemic infarcts of the bilateral cerebral and cerebellar hemispheres as well as left isaac. TTE/JOSIAS was negative PFO at OSH. On 9/13/23 he had worsening in mental status and new inability to move RLE, head CT showed evolution of the existing CVA with new development of petechial hemorrhage. DAPT was held for 24 hours per Neurology recommendations and repeat CT head that evening showed worsening effacement of the right quadrigeminal cistern with suspected increasing upward supratentorial pressure. He was then transferred to [facility] for Neurosurgery evaluation on 9/15/23. He was started on hypertonic saline 9/15/23 through 9/20/23 for cerebral edema. He had fevers with negative cultures but had worsening secretions and labored breathing so was started on Zosyn on 9/16/23. Transferred to the hospitalist service on 9/22/23. Pt demonstrated poor oral intake with elevated sodium levels; therefore, PEG tube was recommended & placed. Palliative and hospice are following. Currently comfort measures only with plan to d/c home with hospice vs LTC with hospice.     This patient's problems and plans were partially entered by my partner and updated as appropriate by me 11/12/23.     Assessment/plan:     Multiple bilateral posterior circulation strokes with hemorrhagic conversion  -- suspect atheroembolic but cannot rule out cardioembolic given multiple vascular territories  --Plavix monotherapy, high intensity statin  --Recommended MCOT at d/c but now plan is for comfort measures  --stroke followed  -PT/OT consulted  --awaiting placement   --Home with hospice vs LTC with hospice     Agitation, improved  Encephalopathy, improving  --Donepezil 10 mg nightly, melatonin 10 mg nightly, mirtazapine 30 mg nightly, Restoril increased to 30mg nightly  - Seroquel 200 mg BID  -Continue phenobarbitol scheduled and prn  --Continue as needed Geodon     Dysphagia  Hypernatremia - resolved  - PEG tube  placed 9/29, Dr Carrillo   --SLP recommends mechanical ground with honey thick liquids but not eating  --Risk of pulling out peg tube, abd binder to be in place at all times.   -- Continue bolus TF, modified diet     HTN  --Continue Metoprolol 25 mg BID, stable     GERD  --Continue PPI     COVID-19-resolved  --Overall asymptomatic and on room air  --No infiltrates seen and low procal  --Did not receive any treatment   --Off Isolation 9/30/2023      Leukocytosis-resolved  --Procalcitionin low at 0.05  --CXR and UA negative  --Blood cultures negative     Elevated LFT's, mild  --Possibly secondary to statin?  --Negative acute hepatitis panel  --repeat AST/ALT improved     Expected Discharge Location and Transportation: SNF pending placement.  Expected Discharge   Expected Discharge Date: 11/13/2023; Expected Discharge Time:       DVT prophylaxis:  No DVT prophylaxis order currently exists.     AM-PAC 6 Clicks Score (PT): 7 (11/11/23 2000)    CODE STATUS:   Code Status and Medical Interventions:   Ordered at: 11/10/23 0146     Level Of Support Discussed With:    Next of Kin (If No Surrogate)     Code Status (Patient has no pulse and is not breathing):    No CPR (Do Not Attempt to Resuscitate)     Medical Interventions (Patient has pulse or is breathing):    Comfort Measures       Maximiliano Landry, APRN  11/12/23

## 2023-11-13 PROCEDURE — 25010000002 ZIPRASIDONE MESYLATE PER 10 MG: Performed by: PSYCHIATRY & NEUROLOGY

## 2023-11-13 PROCEDURE — 99232 SBSQ HOSP IP/OBS MODERATE 35: CPT | Performed by: INTERNAL MEDICINE

## 2023-11-13 PROCEDURE — 97530 THERAPEUTIC ACTIVITIES: CPT

## 2023-11-13 RX ADMIN — MINERAL OIL: 1000 LIQUID ORAL at 18:24

## 2023-11-13 RX ADMIN — HYDROCODONE BITARTRATE AND ACETAMINOPHEN 1 TABLET: 5; 325 TABLET ORAL at 11:14

## 2023-11-13 RX ADMIN — ATORVASTATIN CALCIUM 80 MG: 40 TABLET, FILM COATED ORAL at 22:15

## 2023-11-13 RX ADMIN — METOPROLOL TARTRATE 25 MG: 25 TABLET, FILM COATED ORAL at 22:16

## 2023-11-13 RX ADMIN — MINERAL OIL: 1000 LIQUID ORAL at 09:18

## 2023-11-13 RX ADMIN — VALPROIC ACID 250 MG: 250 SOLUTION ORAL at 22:14

## 2023-11-13 RX ADMIN — ZIPRASIDONE MESYLATE 20 MG: 20 INJECTION, POWDER, LYOPHILIZED, FOR SOLUTION INTRAMUSCULAR at 06:43

## 2023-11-13 RX ADMIN — PHENOBARBITAL 81 MG: 64.8 TABLET ORAL at 15:58

## 2023-11-13 RX ADMIN — FLUTICASONE PROPIONATE 2 SPRAY: 50 SPRAY, METERED NASAL at 09:18

## 2023-11-13 RX ADMIN — VALPROIC ACID 250 MG: 250 SOLUTION ORAL at 13:46

## 2023-11-13 RX ADMIN — TEMAZEPAM 30 MG: 15 CAPSULE ORAL at 22:14

## 2023-11-13 RX ADMIN — RISPERIDONE 0.5 MG: 1 SOLUTION ORAL at 22:17

## 2023-11-13 RX ADMIN — MICONAZOLE NITRATE 1 APPLICATION: 20 CREAM TOPICAL at 09:19

## 2023-11-13 RX ADMIN — DICLOFENAC SODIUM 2 G: 9.3 GEL TOPICAL at 09:18

## 2023-11-13 RX ADMIN — HYDROCODONE BITARTRATE AND ACETAMINOPHEN 1 TABLET: 5; 325 TABLET ORAL at 05:12

## 2023-11-13 RX ADMIN — HALOPERIDOL 5 MG: 5 TABLET ORAL at 14:21

## 2023-11-13 RX ADMIN — LANSOPRAZOLE 15 MG: 15 TABLET, ORALLY DISINTEGRATING ORAL at 05:12

## 2023-11-13 RX ADMIN — VALPROIC ACID 250 MG: 250 SOLUTION ORAL at 05:12

## 2023-11-13 RX ADMIN — DONEPEZIL HYDROCHLORIDE 10 MG: 10 TABLET, FILM COATED ORAL at 18:09

## 2023-11-13 RX ADMIN — HYDROCODONE BITARTRATE AND ACETAMINOPHEN 1 TABLET: 5; 325 TABLET ORAL at 23:23

## 2023-11-13 RX ADMIN — MIRTAZAPINE 30 MG: 15 TABLET, FILM COATED ORAL at 18:09

## 2023-11-13 RX ADMIN — LIDOCAINE 1 PATCH: 4 PATCH TOPICAL at 09:15

## 2023-11-13 RX ADMIN — RISPERIDONE 0.5 MG: 1 SOLUTION ORAL at 09:16

## 2023-11-13 RX ADMIN — DICLOFENAC SODIUM 2 G: 9.3 GEL TOPICAL at 22:37

## 2023-11-13 RX ADMIN — Medication 30 ML: at 13:24

## 2023-11-13 RX ADMIN — MICONAZOLE NITRATE 1 APPLICATION: 20 CREAM TOPICAL at 22:39

## 2023-11-13 RX ADMIN — PHENOBARBITAL 81 MG: 64.8 TABLET ORAL at 22:17

## 2023-11-13 RX ADMIN — PHENOBARBITAL 81 MG: 64.8 TABLET ORAL at 05:11

## 2023-11-13 RX ADMIN — MINERAL OIL: 1000 LIQUID ORAL at 22:35

## 2023-11-13 RX ADMIN — HYDROCODONE BITARTRATE AND ACETAMINOPHEN 1 TABLET: 5; 325 TABLET ORAL at 18:09

## 2023-11-13 RX ADMIN — METOPROLOL TARTRATE 25 MG: 25 TABLET, FILM COATED ORAL at 09:17

## 2023-11-13 RX ADMIN — CLOPIDOGREL BISULFATE 75 MG: 75 TABLET ORAL at 09:17

## 2023-11-13 RX ADMIN — PHENOBARBITAL 81 MG: 64.8 TABLET ORAL at 13:24

## 2023-11-13 RX ADMIN — MINERAL OIL: 1000 LIQUID ORAL at 11:14

## 2023-11-13 NOTE — PLAN OF CARE
Goal Outcome Evaluation:  Plan of Care Reviewed With: patient, spouse        Progress: declining  Outcome Evaluation: OT recert completed. Pt's progress toward ADL goals limited by weakness, impaired balance, and cognitive deficits. Pt engaged in weight shifting activity at EOB, Carter for sitting balance, following simple commands inconsistently and smiling. Goals revised to reflect functional status. Continue per OT POC.      Anticipated Discharge Disposition (OT): skilled nursing facility

## 2023-11-13 NOTE — PLAN OF CARE
-Pt continues to be restless when awake. Makes multiple attempts to get out of the bed. Yells and moans. Prn haldol and phenobarbital given.   -2 incontinent urine occ  -No BM  -Oral care done frequently  -Wife at bedside, attentive to pt     Problem: Skin Injury Risk Increased  Goal: Skin Health and Integrity  Outcome: Ongoing, Not Progressing  Intervention: Optimize Skin Protection  Recent Flowsheet Documentation  Taken 11/13/2023 1800 by Ady Reich RN  Pressure Reduction Techniques:   frequent weight shift encouraged   heels elevated off bed   positioned off wounds   pressure points protected   weight shift assistance provided  Head of Bed (HOB) Positioning: HOB at 30 degrees  Pressure Reduction Devices:   specialty bed utilized   pressure-redistributing mattress utilized   positioning supports utilized   heel offloading device utilized   alternating pressure pump (ADD)  Skin Protection:   adhesive use limited   incontinence pads utilized   protective footwear used   skin sealant/moisture barrier applied   skin-to-device areas padded   skin-to-skin areas padded   transparent dressing maintained   tubing/devices free from skin contact  Taken 11/13/2023 1600 by Ady Reich RN  Pressure Reduction Techniques:   frequent weight shift encouraged   heels elevated off bed   positioned off wounds   pressure points protected   weight shift assistance provided  Head of Bed (HOB) Positioning: HOB at 30 degrees  Pressure Reduction Devices:   specialty bed utilized   heel offloading device utilized   positioning supports utilized   pressure-redistributing mattress utilized  Skin Protection:   adhesive use limited   incontinence pads utilized   protective footwear used   skin sealant/moisture barrier applied   skin-to-device areas padded   skin-to-skin areas padded   transparent dressing maintained   tubing/devices free from skin contact  Taken 11/13/2023 1400 by Ady Reich RN  Pressure Reduction  Techniques:   frequent weight shift encouraged   heels elevated off bed   pressure points protected   weight shift assistance provided   positioned off wounds  Head of Bed (HOB) Positioning: HOB at 30 degrees  Pressure Reduction Devices:   specialty bed utilized   pressure-redistributing mattress utilized   positioning supports utilized   heel offloading device utilized  Skin Protection:   adhesive use limited   incontinence pads utilized   protective footwear used   skin-to-device areas padded   transparent dressing maintained   skin-to-skin areas padded   tubing/devices free from skin contact  Taken 11/13/2023 1200 by Ady Reich RN  Pressure Reduction Techniques:   frequent weight shift encouraged   heels elevated off bed   positioned off wounds   pressure points protected   weight shift assistance provided  Head of Bed (HOB) Positioning: HOB elevated  Pressure Reduction Devices:   specialty bed utilized   pressure-redistributing mattress utilized   positioning supports utilized   heel offloading device utilized  Skin Protection:   adhesive use limited   incontinence pads utilized   protective footwear used   skin sealant/moisture barrier applied   skin-to-device areas padded   skin-to-skin areas padded   transparent dressing maintained   tubing/devices free from skin contact  Taken 11/13/2023 1000 by Ady Reich RN  Pressure Reduction Techniques:   frequent weight shift encouraged   heels elevated off bed   positioned off wounds   pressure points protected   weight shift assistance provided  Head of Bed (HOB) Positioning: HOB at 30 degrees  Pressure Reduction Devices:   specialty bed utilized   pressure-redistributing mattress utilized   positioning supports utilized   heel offloading device utilized  Skin Protection:   adhesive use limited   incontinence pads utilized   protective footwear used   skin sealant/moisture barrier applied   skin-to-device areas padded   skin-to-skin areas padded    transparent dressing maintained   tubing/devices free from skin contact  Taken 11/13/2023 0800 by Ady Reich RN  Pressure Reduction Techniques:   frequent weight shift encouraged   heels elevated off bed   positioned off wounds   pressure points protected   weight shift assistance provided  Head of Bed (HOB) Positioning: HOB elevated  Pressure Reduction Devices:   heel offloading device utilized   specialty bed utilized   positioning supports utilized  Skin Protection:   adhesive use limited   incontinence pads utilized   protective footwear used   skin sealant/moisture barrier applied   skin-to-device areas padded   skin-to-skin areas padded   transparent dressing maintained   tubing/devices free from skin contact  Goal: Skin Health and Integrity  Outcome: Ongoing, Not Progressing  Intervention: Optimize Skin Protection  Recent Flowsheet Documentation  Taken 11/13/2023 1800 by Ady Reich RN  Pressure Reduction Techniques:   frequent weight shift encouraged   heels elevated off bed   positioned off wounds   pressure points protected   weight shift assistance provided  Head of Bed (HOB) Positioning: HOB at 30 degrees  Pressure Reduction Devices:   specialty bed utilized   pressure-redistributing mattress utilized   positioning supports utilized   heel offloading device utilized   alternating pressure pump (ADD)  Skin Protection:   adhesive use limited   incontinence pads utilized   protective footwear used   skin sealant/moisture barrier applied   skin-to-device areas padded   skin-to-skin areas padded   transparent dressing maintained   tubing/devices free from skin contact  Taken 11/13/2023 1600 by Ady Reich RN  Pressure Reduction Techniques:   frequent weight shift encouraged   heels elevated off bed   positioned off wounds   pressure points protected   weight shift assistance provided  Head of Bed (HOB) Positioning: HOB at 30 degrees  Pressure Reduction Devices:   specialty bed  utilized   heel offloading device utilized   positioning supports utilized   pressure-redistributing mattress utilized  Skin Protection:   adhesive use limited   incontinence pads utilized   protective footwear used   skin sealant/moisture barrier applied   skin-to-device areas padded   skin-to-skin areas padded   transparent dressing maintained   tubing/devices free from skin contact  Taken 11/13/2023 1400 by Ady Reich RN  Pressure Reduction Techniques:   frequent weight shift encouraged   heels elevated off bed   pressure points protected   weight shift assistance provided   positioned off wounds  Head of Bed (HOB) Positioning: HOB at 30 degrees  Pressure Reduction Devices:   specialty bed utilized   pressure-redistributing mattress utilized   positioning supports utilized   heel offloading device utilized  Skin Protection:   adhesive use limited   incontinence pads utilized   protective footwear used   skin-to-device areas padded   transparent dressing maintained   skin-to-skin areas padded   tubing/devices free from skin contact  Taken 11/13/2023 1200 by Ady Reich RN  Pressure Reduction Techniques:   frequent weight shift encouraged   heels elevated off bed   positioned off wounds   pressure points protected   weight shift assistance provided  Head of Bed (HOB) Positioning: [facility] elevated  Pressure Reduction Devices:   specialty bed utilized   pressure-redistributing mattress utilized   positioning supports utilized   heel offloading device utilized  Skin Protection:   adhesive use limited   incontinence pads utilized   protective footwear used   skin sealant/moisture barrier applied   skin-to-device areas padded   skin-to-skin areas padded   transparent dressing maintained   tubing/devices free from skin contact  Taken 11/13/2023 1000 by Ady Reich RN  Pressure Reduction Techniques:   frequent weight shift encouraged   heels elevated off bed   positioned off wounds   pressure  points protected   weight shift assistance provided  Head of Bed (HOB) Positioning: HOB at 30 degrees  Pressure Reduction Devices:   specialty bed utilized   pressure-redistributing mattress utilized   positioning supports utilized   heel offloading device utilized  Skin Protection:   adhesive use limited   incontinence pads utilized   protective footwear used   skin sealant/moisture barrier applied   skin-to-device areas padded   skin-to-skin areas padded   transparent dressing maintained   tubing/devices free from skin contact  Taken 11/13/2023 0800 by Ady Reich RN  Pressure Reduction Techniques:   frequent weight shift encouraged   heels elevated off bed   positioned off wounds   pressure points protected   weight shift assistance provided  Head of Bed (HOB) Positioning: HOB elevated  Pressure Reduction Devices:   heel offloading device utilized   specialty bed utilized   positioning supports utilized  Skin Protection:   adhesive use limited   incontinence pads utilized   protective footwear used   skin sealant/moisture barrier applied   skin-to-device areas padded   skin-to-skin areas padded   transparent dressing maintained   tubing/devices free from skin contact     Problem: Adult Inpatient Plan of Care  Goal: Plan of Care Review  Outcome: Ongoing, Not Progressing  Goal: Patient-Specific Goal (Individualized)  Outcome: Ongoing, Not Progressing  Goal: Absence of Hospital-Acquired Illness or Injury  Outcome: Ongoing, Not Progressing  Intervention: Identify and Manage Fall Risk  Recent Flowsheet Documentation  Taken 11/13/2023 1800 by Ady Reich, RN  Safety Promotion/Fall Prevention:   activity supervised   assistive device/personal items within reach   clutter free environment maintained   fall prevention program maintained   lighting adjusted   muscle strengthening facilitated   nonskid shoes/slippers when out of bed  Taken 11/13/2023 1600 by Ady Reich, RN  Safety Promotion/Fall  Prevention:   toileting scheduled   safety round/check completed  Taken 11/13/2023 1400 by Ady Reich, RN  Safety Promotion/Fall Prevention:   assistive device/personal items within reach   activity supervised   clutter free environment maintained   fall prevention program maintained   lighting adjusted   muscle strengthening facilitated   nonskid shoes/slippers when out of bed   room organization consistent   safety round/check completed   toileting scheduled  Taken 11/13/2023 1200 by Ady Reich, RN  Safety Promotion/Fall Prevention:   activity supervised   assistive device/personal items within reach   clutter free environment maintained   fall prevention program maintained   lighting adjusted   muscle strengthening facilitated   nonskid shoes/slippers when out of bed   room organization consistent   safety round/check completed   toileting scheduled  Taken 11/13/2023 1000 by Ady Reich RN  Safety Promotion/Fall Prevention:   activity supervised   assistive device/personal items within reach   clutter free environment maintained   fall prevention program maintained   lighting adjusted   muscle strengthening facilitated   nonskid shoes/slippers when out of bed   room organization consistent   safety round/check completed   toileting scheduled  Taken 11/13/2023 0800 by Ady Reich, RN  Safety Promotion/Fall Prevention:   activity supervised   clutter free environment maintained   assistive device/personal items within reach   fall prevention program maintained   lighting adjusted   muscle strengthening facilitated   nonskid shoes/slippers when out of bed   room organization consistent   safety round/check completed   toileting scheduled  Intervention: Prevent Skin Injury  Recent Flowsheet Documentation  Taken 11/13/2023 1800 by Ady Reich, RN  Body Position: position changed independently  Skin Protection:   adhesive use limited   incontinence pads utilized   protective  footwear used   skin sealant/moisture barrier applied   skin-to-device areas padded   skin-to-skin areas padded   transparent dressing maintained   tubing/devices free from skin contact  Taken 11/13/2023 1600 by Ady Reich RN  Body Position: position changed independently  Skin Protection:   adhesive use limited   incontinence pads utilized   protective footwear used   skin sealant/moisture barrier applied   skin-to-device areas padded   skin-to-skin areas padded   transparent dressing maintained   tubing/devices free from skin contact  Taken 11/13/2023 1400 by Ady Reich RN  Body Position: position changed independently  Skin Protection:   adhesive use limited   incontinence pads utilized   protective footwear used   skin-to-device areas padded   transparent dressing maintained   skin-to-skin areas padded   tubing/devices free from skin contact  Taken 11/13/2023 1200 by Ady Reich RN  Body Position: position changed independently  Skin Protection:   adhesive use limited   incontinence pads utilized   protective footwear used   skin sealant/moisture barrier applied   skin-to-device areas padded   skin-to-skin areas padded   transparent dressing maintained   tubing/devices free from skin contact  Taken 11/13/2023 1000 by Ady Reich RN  Body Position: position changed independently  Skin Protection:   adhesive use limited   incontinence pads utilized   protective footwear used   skin sealant/moisture barrier applied   skin-to-device areas padded   skin-to-skin areas padded   transparent dressing maintained   tubing/devices free from skin contact  Taken 11/13/2023 0800 by Ady Reich RN  Body Position: position changed independently  Skin Protection:   adhesive use limited   incontinence pads utilized   protective footwear used   skin sealant/moisture barrier applied   skin-to-device areas padded   skin-to-skin areas padded   transparent dressing maintained    tubing/devices free from skin contact  Intervention: Prevent and Manage VTE (Venous Thromboembolism) Risk  Recent Flowsheet Documentation  Taken 11/13/2023 1800 by Ady Reich RN  Activity Management: activity minimized  Taken 11/13/2023 1600 by Ady Reich RN  Activity Management: activity minimized  Taken 11/13/2023 1400 by Ady Reich RN  Activity Management: activity minimized  Taken 11/13/2023 1200 by Ady Reich, RN  Activity Management: activity minimized  Taken 11/13/2023 1000 by Ady Reich, RN  Activity Management: activity minimized  Taken 11/13/2023 0800 by Ady Reich RN  Activity Management: activity minimized  VTE Prevention/Management:   bilateral   sequential compression devices off   patient refused intervention  Intervention: Prevent Infection  Recent Flowsheet Documentation  Taken 11/13/2023 1000 by Ady Reich RN  Infection Prevention: environmental surveillance performed  Taken 11/13/2023 0800 by Ady Reich RN  Infection Prevention: environmental surveillance performed  Goal: Optimal Comfort and Wellbeing  Outcome: Ongoing, Not Progressing  Intervention: Monitor Pain and Promote Comfort  Recent Flowsheet Documentation  Taken 11/13/2023 1800 by Ady Reich RN  Pain Management Interventions:   pillow support provided   position adjusted   pain management plan reviewed with patient/caregiver  Taken 11/13/2023 1600 by Ady Reich RN  Pain Management Interventions:   pillow support provided   position adjusted   quiet environment facilitated  Taken 11/13/2023 1400 by Ady Reich RN  Pain Management Interventions:   pillow support provided   position adjusted   quiet environment facilitated  Taken 11/13/2023 1200 by Ady Reich RN  Pain Management Interventions:   pillow support provided   position adjusted  Taken 11/13/2023 1000 by Ady Reich RN  Pain Management Interventions:   quiet  environment facilitated   position adjusted   pillow support provided  Taken 11/13/2023 0800 by Ady Recih RN  Pain Management Interventions:   quiet environment facilitated   position adjusted   pillow support provided  Intervention: Provide Person-Centered Care  Recent Flowsheet Documentation  Taken 11/13/2023 1800 by Ady Reich RN  Trust Relationship/Rapport: care explained  Taken 11/13/2023 1600 by Ady Reich RN  Trust Relationship/Rapport: care explained  Taken 11/13/2023 1400 by Ady Reich RN  Trust Relationship/Rapport: care explained  Taken 11/13/2023 1200 by Ady Reich RN  Trust Relationship/Rapport: care explained  Goal: Readiness for Transition of Care  Outcome: Ongoing, Not Progressing     Problem: Pain Acute  Goal: Acceptable Pain Control and Functional Ability  Outcome: Ongoing, Not Progressing  Intervention: Prevent or Manage Pain  Recent Flowsheet Documentation  Taken 11/13/2023 1800 by Ady Reich RN  Sensory Stimulation Regulation:   lighting decreased   care clustered   music on  Medication Review/Management: medications reviewed  Taken 11/13/2023 1600 by Ady Reich RN  Sensory Stimulation Regulation:   care clustered   lighting decreased   music on  Medication Review/Management: medications reviewed  Taken 11/13/2023 1400 by Ady Reich RN  Sensory Stimulation Regulation:   care clustered   lighting decreased   music on  Medication Review/Management: medications reviewed  Taken 11/13/2023 1200 by Ady Reich RN  Sensory Stimulation Regulation:   care clustered   lighting decreased   music on  Medication Review/Management: medications reviewed  Taken 11/13/2023 1000 by Ady Reich RN  Sensory Stimulation Regulation:   care clustered   music on   lighting decreased  Complementary Therapy: music therapy provided  Medication Review/Management: medications reviewed  Taken 11/13/2023 0900 by Damir  Yadira-Di, RN  Medication Review/Management: medications reviewed  Taken 11/13/2023 0800 by Ady Reich RN  Sensory Stimulation Regulation:   care clustered   lighting decreased   music on  Complementary Therapy: music therapy provided  Sleep/Rest Enhancement:   awakenings minimized   family presence promoted   music provided   noise level reduced   room darkened  Medication Review/Management: medications reviewed  Intervention: Develop Pain Management Plan  Recent Flowsheet Documentation  Taken 11/13/2023 1800 by Ady Reich RN  Pain Management Interventions:   pillow support provided   position adjusted   pain management plan reviewed with patient/caregiver  Taken 11/13/2023 1600 by Ady Reich RN  Pain Management Interventions:   pillow support provided   position adjusted   quiet environment facilitated  Taken 11/13/2023 1400 by Ady Reich RN  Pain Management Interventions:   pillow support provided   position adjusted   quiet environment facilitated  Taken 11/13/2023 1200 by Ady Reich RN  Pain Management Interventions:   pillow support provided   position adjusted  Taken 11/13/2023 1000 by Ady Reich RN  Pain Management Interventions:   quiet environment facilitated   position adjusted   pillow support provided  Taken 11/13/2023 0800 by Ady Reich RN  Pain Management Interventions:   quiet environment facilitated   position adjusted   pillow support provided  Intervention: Optimize Psychosocial Wellbeing  Recent Flowsheet Documentation  Taken 11/13/2023 1800 by Ady Reich RN  Supportive Measures: active listening utilized  Diversional Activities: music  Taken 11/13/2023 1600 by Ady Reich RN  Supportive Measures: active listening utilized  Diversional Activities: music  Taken 11/13/2023 1400 by Ady Reich RN  Supportive Measures: active listening utilized  Taken 11/13/2023 1200 by Ady Reich  RN  Supportive Measures: active listening utilized  Taken 11/13/2023 1000 by Ady Reich RN  Supportive Measures: active listening utilized  Diversional Activities: music  Taken 11/13/2023 0800 by Ady Reich RN  Supportive Measures: active listening utilized  Diversional Activities: music     Problem: Fall Injury Risk  Goal: Absence of Fall and Fall-Related Injury  Outcome: Ongoing, Not Progressing  Intervention: Identify and Manage Contributors  Recent Flowsheet Documentation  Taken 11/13/2023 1800 by Ady Reich RN  Medication Review/Management: medications reviewed  Taken 11/13/2023 1600 by Ady Reich RN  Medication Review/Management: medications reviewed  Taken 11/13/2023 1400 by Ady Reich RN  Medication Review/Management: medications reviewed  Taken 11/13/2023 1200 by Ady Reich RN  Medication Review/Management: medications reviewed  Taken 11/13/2023 1000 by Ady Reich RN  Medication Review/Management: medications reviewed  Taken 11/13/2023 0900 by Ady Reich RN  Medication Review/Management: medications reviewed  Taken 11/13/2023 0800 by Ady Reich RN  Medication Review/Management: medications reviewed  Intervention: Promote Injury-Free Environment  Recent Flowsheet Documentation  Taken 11/13/2023 1800 by Ady Reich RN  Safety Promotion/Fall Prevention:   activity supervised   assistive device/personal items within reach   clutter free environment maintained   fall prevention program maintained   lighting adjusted   muscle strengthening facilitated   nonskid shoes/slippers when out of bed  Taken 11/13/2023 1600 by Ady Reich RN  Safety Promotion/Fall Prevention:   toileting scheduled   safety round/check completed  Taken 11/13/2023 1400 by Ady Reich RN  Safety Promotion/Fall Prevention:   assistive device/personal items within reach   activity supervised   clutter free environment  maintained   fall prevention program maintained   lighting adjusted   muscle strengthening facilitated   nonskid shoes/slippers when out of bed   room organization consistent   safety round/check completed   toileting scheduled  Taken 11/13/2023 1200 by Ady Reich RN  Safety Promotion/Fall Prevention:   activity supervised   assistive device/personal items within reach   clutter free environment maintained   fall prevention program maintained   lighting adjusted   muscle strengthening facilitated   nonskid shoes/slippers when out of bed   room organization consistent   safety round/check completed   toileting scheduled  Taken 11/13/2023 1000 by Ady Reich RN  Safety Promotion/Fall Prevention:   activity supervised   assistive device/personal items within reach   clutter free environment maintained   fall prevention program maintained   lighting adjusted   muscle strengthening facilitated   nonskid shoes/slippers when out of bed   room organization consistent   safety round/check completed   toileting scheduled  Taken 11/13/2023 0800 by Ady Reich RN  Safety Promotion/Fall Prevention:   activity supervised   clutter free environment maintained   assistive device/personal items within reach   fall prevention program maintained   lighting adjusted   muscle strengthening facilitated   nonskid shoes/slippers when out of bed   room organization consistent   safety round/check completed   toileting scheduled     Problem: Adjustment to Illness (Stroke, Hemorrhagic)  Goal: Optimal Coping  Outcome: Ongoing, Not Progressing  Intervention: Support Psychosocial Response to Stroke  Recent Flowsheet Documentation  Taken 11/13/2023 1800 by Ady Reich RN  Supportive Measures: active listening utilized  Family/Support System Care: support provided  Taken 11/13/2023 1600 by Ady Reich RN  Supportive Measures: active listening utilized  Family/Support System Care: support  provided  Taken 11/13/2023 1400 by Ady Reich RN  Supportive Measures: active listening utilized  Family/Support System Care: support provided  Taken 11/13/2023 1200 by Ady Reich RN  Supportive Measures: active listening utilized  Family/Support System Care: support provided  Taken 11/13/2023 1000 by Ady Reich RN  Supportive Measures: active listening utilized  Family/Support System Care: support provided  Taken 11/13/2023 0800 by Ady Reich RN  Supportive Measures: active listening utilized  Family/Support System Care: support provided     Problem: Bowel Elimination Impaired (Stroke, Hemorrhagic)  Goal: Effective Bowel Elimination  Outcome: Ongoing, Not Progressing     Problem: Cerebral Tissue Perfusion (Stroke, Hemorrhagic)  Goal: Optimal Cerebral Tissue Perfusion  Outcome: Ongoing, Not Progressing  Intervention: Protect and Optimize Cerebral Perfusion  Recent Flowsheet Documentation  Taken 11/13/2023 1800 by Ady Reich RN  Sensory Stimulation Regulation:   lighting decreased   care clustered   music on  Taken 11/13/2023 1600 by Ady Reich RN  Sensory Stimulation Regulation:   care clustered   lighting decreased   music on  Taken 11/13/2023 1400 by Ady Reich RN  Sensory Stimulation Regulation:   care clustered   lighting decreased   music on  Taken 11/13/2023 1200 by Ady Reich RN  Sensory Stimulation Regulation:   care clustered   lighting decreased   music on  Taken 11/13/2023 1000 by Ady Reich RN  Sensory Stimulation Regulation:   care clustered   music on   lighting decreased  Taken 11/13/2023 0800 by Ady Reich RN  Fluid/Electrolyte Management: fluids provided  Sensory Stimulation Regulation:   care clustered   lighting decreased   music on     Problem: Cognitive Impairment (Stroke, Hemorrhagic)  Goal: Optimal Cognitive Function  Outcome: Ongoing, Not Progressing  Intervention: Optimize Cognitive  Function  Recent Flowsheet Documentation  Taken 11/13/2023 1800 by Ady Reich RN  Sensory Stimulation Regulation:   lighting decreased   care clustered   music on  Reorientation Measures: reorientation provided  Environment Familiarity/Consistency: daily routine followed  Taken 11/13/2023 1600 by Ady Reich RN  Sensory Stimulation Regulation:   care clustered   lighting decreased   music on  Reorientation Measures: reorientation provided  Environment Familiarity/Consistency: daily routine followed  Taken 11/13/2023 1400 by Ady Reich RN  Sensory Stimulation Regulation:   care clustered   lighting decreased   music on  Reorientation Measures: reorientation provided  Environment Familiarity/Consistency: daily routine followed  Taken 11/13/2023 1200 by Ady Reich RN  Sensory Stimulation Regulation:   care clustered   lighting decreased   music on  Reorientation Measures: reorientation provided  Environment Familiarity/Consistency: daily routine followed  Taken 11/13/2023 1000 by Ady Reich RN  Sensory Stimulation Regulation:   care clustered   music on   lighting decreased  Reorientation Measures: reorientation provided  Environment Familiarity/Consistency: daily routine followed  Taken 11/13/2023 0800 by Ady Reich RN  Sensory Stimulation Regulation:   care clustered   lighting decreased   music on  Reorientation Measures: reorientation provided  Environment Familiarity/Consistency: daily routine followed     Problem: Communication Impairment (Stroke, Hemorrhagic)  Goal: Effective Communication Skills  Outcome: Ongoing, Not Progressing  Intervention: Optimize Communication Skills  Recent Flowsheet Documentation  Taken 11/13/2023 1800 by Ady Reich RN  Communication Enhancement Strategies: extra time allowed for response  Taken 11/13/2023 1600 by Ady Reich RN  Communication Enhancement Strategies: extra time allowed for response  Taken  11/13/2023 1400 by Ady Reich RN  Communication Enhancement Strategies: extra time allowed for response  Taken 11/13/2023 1200 by Ady Reich RN  Communication Enhancement Strategies:   extra time allowed for response   one-step directions provided  Taken 11/13/2023 1000 by Ady Reich RN  Communication Enhancement Strategies:   extra time allowed for response   one-step directions provided  Taken 11/13/2023 0800 by Ady Reich RN  Communication Enhancement Strategies:   extra time allowed for response   one-step directions provided     Problem: Functional Ability Impaired (Stroke, Hemorrhagic)  Goal: Optimal Functional Ability  Outcome: Ongoing, Not Progressing  Intervention: Optimize Functional Ability  Recent Flowsheet Documentation  Taken 11/13/2023 1800 by Ady Reich RN  Activity Management: activity minimized  Taken 11/13/2023 1600 by Ady Reich RN  Activity Management: activity minimized  Taken 11/13/2023 1400 by Ady Reich RN  Activity Management: activity minimized  Taken 11/13/2023 1200 by Ady Reich RN  Activity Management: activity minimized  Taken 11/13/2023 1000 by Ady Reich RN  Activity Management: activity minimized  Taken 11/13/2023 0800 by Ady Reich RN  Activity Management: activity minimized     Problem: Pain (Stroke, Hemorrhagic)  Goal: Acceptable Pain Control  Outcome: Ongoing, Not Progressing  Intervention: Monitor and Manage Pain  Recent Flowsheet Documentation  Taken 11/13/2023 1800 by Ady Reich RN  Pain Management Interventions:   pillow support provided   position adjusted   pain management plan reviewed with patient/caregiver  Taken 11/13/2023 1600 by Ady Reich RN  Pain Management Interventions:   pillow support provided   position adjusted   quiet environment facilitated  Taken 11/13/2023 1400 by Ady Reich RN  Pain Management Interventions:   pillow support  provided   position adjusted   quiet environment facilitated  Taken 11/13/2023 1200 by Ady Reich RN  Pain Management Interventions:   pillow support provided   position adjusted  Taken 11/13/2023 1000 by Ady Reich RN  Pain Management Interventions:   quiet environment facilitated   position adjusted   pillow support provided  Complementary Therapy: music therapy provided  Taken 11/13/2023 0800 by Ady Reich RN  Pain Management Interventions:   quiet environment facilitated   position adjusted   pillow support provided  Complementary Therapy: music therapy provided     Problem: Respiratory Compromise (Stroke, Hemorrhagic)  Goal: Effective Oxygenation and Ventilation  Outcome: Ongoing, Not Progressing  Intervention: Optimize Oxygenation and Ventilation  Recent Flowsheet Documentation  Taken 11/13/2023 1800 by Ady Reich RN  Head of Bed (HOB) Positioning: HOB at 30 degrees  Taken 11/13/2023 1600 by Ady Reich RN  Head of Bed (HOB) Positioning: HOB at 30 degrees  Taken 11/13/2023 1400 by Ady Reich RN  Head of Bed (HOB) Positioning: HOB at 30 degrees  Taken 11/13/2023 1200 by Ady Reich RN  Head of Bed (HOB) Positioning: HOB elevated  Taken 11/13/2023 1000 by Ady Reich RN  Head of Bed (HOB) Positioning: HOB at 30 degrees  Taken 11/13/2023 0800 by Ady Reich RN  Head of Bed (HOB) Positioning: HOB elevated     Problem: Sensorimotor Impairment (Stroke, Hemorrhagic)  Goal: Improved Sensorimotor Function  Outcome: Ongoing, Not Progressing  Intervention: Optimize Range of Motion, Motor Control and Function  Recent Flowsheet Documentation  Taken 11/13/2023 1800 by Ady Reich RN  Positioning/Transfer Devices:   pillows   in use  Taken 11/13/2023 1600 by Ady Reich RN  Positioning/Transfer Devices:   pillows   in use  Taken 11/13/2023 1400 by Ady Reich RN  Positioning/Transfer Devices:   pillows   in  use  Taken 11/13/2023 1200 by Ady Reich RN  Positioning/Transfer Devices:   pillows   in use  Taken 11/13/2023 1000 by Ady Reich RN  Positioning/Transfer Devices:   pillows   in use  Taken 11/13/2023 0800 by Ady Reich RN  Positioning/Transfer Devices:   pillows   in use  Intervention: Optimize Sensory and Perceptual Ability  Recent Flowsheet Documentation  Taken 11/13/2023 1800 by Ady Reich RN  Pressure Reduction Techniques:   frequent weight shift encouraged   heels elevated off bed   positioned off wounds   pressure points protected   weight shift assistance provided  Pressure Reduction Devices:   specialty bed utilized   pressure-redistributing mattress utilized   positioning supports utilized   heel offloading device utilized   alternating pressure pump (ADD)  Taken 11/13/2023 1600 by Ady Reich RN  Pressure Reduction Techniques:   frequent weight shift encouraged   heels elevated off bed   positioned off wounds   pressure points protected   weight shift assistance provided  Pressure Reduction Devices:   specialty bed utilized   heel offloading device utilized   positioning supports utilized   pressure-redistributing mattress utilized  Taken 11/13/2023 1400 by Ady Reich RN  Pressure Reduction Techniques:   frequent weight shift encouraged   heels elevated off bed   pressure points protected   weight shift assistance provided   positioned off wounds  Pressure Reduction Devices:   specialty bed utilized   pressure-redistributing mattress utilized   positioning supports utilized   heel offloading device utilized  Taken 11/13/2023 1200 by Ady Reich RN  Pressure Reduction Techniques:   frequent weight shift encouraged   heels elevated off bed   positioned off wounds   pressure points protected   weight shift assistance provided  Pressure Reduction Devices:   specialty bed utilized   pressure-redistributing mattress utilized   positioning  supports utilized   heel offloading device utilized  Taken 11/13/2023 1000 by Ady Reich RN  Pressure Reduction Techniques:   frequent weight shift encouraged   heels elevated off bed   positioned off wounds   pressure points protected   weight shift assistance provided  Pressure Reduction Devices:   specialty bed utilized   pressure-redistributing mattress utilized   positioning supports utilized   heel offloading device utilized  Taken 11/13/2023 0800 by Ady Reich RN  Pressure Reduction Techniques:   frequent weight shift encouraged   heels elevated off bed   positioned off wounds   pressure points protected   weight shift assistance provided  Pressure Reduction Devices:   heel offloading device utilized   specialty bed utilized   positioning supports utilized     Problem: Swallowing Impairment (Stroke, Hemorrhagic)  Goal: Optimal Eating and Swallowing Without Aspiration  Outcome: Ongoing, Not Progressing  Intervention: Optimize Eating and Swallowing  Recent Flowsheet Documentation  Taken 11/13/2023 1000 by Ady Reich RN  Aspiration Precautions: upright posture maintained  Taken 11/13/2023 0900 by Ady Reich RN  Aspiration Precautions:   upright posture maintained   awake/alert before oral intake  Taken 11/13/2023 0800 by Ady Reich RN  Aspiration Precautions: upright posture maintained  Feeding/Eating Techniques:   feeding assistance provided   oral mucosa moistened     Problem: Urinary Elimination Impaired (Stroke, Hemorrhagic)  Goal: Effective Urinary Elimination  Outcome: Ongoing, Not Progressing  Intervention: Promote Effective Bladder Elimination  Recent Flowsheet Documentation  Taken 11/13/2023 0800 by Ady Reich RN  Urinary Elimination Promotion: absorbent pad/diaper use encouraged     Problem: Diabetes Comorbidity  Goal: Blood Glucose Level Within Targeted Range  Outcome: Ongoing, Not Progressing     Problem: Heart Failure Comorbidity  Goal:  Maintenance of Heart Failure Symptom Control  Outcome: Ongoing, Not Progressing  Intervention: Maintain Heart Failure-Management  Recent Flowsheet Documentation  Taken 11/13/2023 1800 by Ady Reich RN  Medication Review/Management: medications reviewed  Taken 11/13/2023 1600 by Ady Reich RN  Medication Review/Management: medications reviewed  Taken 11/13/2023 1400 by Ady Reich RN  Medication Review/Management: medications reviewed  Taken 11/13/2023 1200 by Ady Reich RN  Medication Review/Management: medications reviewed  Taken 11/13/2023 1000 by Ady Reich RN  Medication Review/Management: medications reviewed  Taken 11/13/2023 0900 by Ady Reich RN  Medication Review/Management: medications reviewed  Taken 11/13/2023 0800 by Ady Reich RN  Medication Review/Management: medications reviewed     Problem: Hypertension Comorbidity  Goal: Blood Pressure in Desired Range  Outcome: Ongoing, Not Progressing  Intervention: Maintain Blood Pressure Management  Recent Flowsheet Documentation  Taken 11/13/2023 1800 by Ady Reich RN  Medication Review/Management: medications reviewed  Taken 11/13/2023 1600 by Ady Reich RN  Medication Review/Management: medications reviewed  Taken 11/13/2023 1400 by Ady Reich RN  Medication Review/Management: medications reviewed  Taken 11/13/2023 1200 by Ady Reich RN  Medication Review/Management: medications reviewed  Taken 11/13/2023 1000 by Ady Reich RN  Medication Review/Management: medications reviewed  Taken 11/13/2023 0900 by Ady Reich RN  Medication Review/Management: medications reviewed  Taken 11/13/2023 0800 by Ady Reich RN  Medication Review/Management: medications reviewed     Problem: Nutrition Impaired (Sepsis/Septic Shock)  Goal: Optimal Nutrition Intake  Outcome: Ongoing, Not Progressing     Problem: Palliative Care  Goal: Enhanced  Quality of Life  Outcome: Ongoing, Not Progressing  Intervention: Maximize Comfort  Recent Flowsheet Documentation  Taken 11/13/2023 1800 by Ady Reich RN  Pain Management Interventions:   pillow support provided   position adjusted   pain management plan reviewed with patient/caregiver  Oral Care:   teeth brushed - suction toothbrush   lip/mouth moisturizer applied  Taken 11/13/2023 1600 by Ady Reich RN  Pain Management Interventions:   pillow support provided   position adjusted   quiet environment facilitated  Taken 11/13/2023 1400 by Ady Reich RN  Pain Management Interventions:   pillow support provided   position adjusted   quiet environment facilitated  Taken 11/13/2023 1200 by Ady Reich RN  Pain Management Interventions:   pillow support provided   position adjusted  Taken 11/13/2023 1000 by Ady Reich RN  Pain Management Interventions:   quiet environment facilitated   position adjusted   pillow support provided  Complementary Therapy: music therapy provided  Oral Care:   teeth brushed - suction toothbrush   swabbed with antiseptic solution   lip/mouth moisturizer applied  Taken 11/13/2023 0800 by Ady Reich RN  Pain Management Interventions:   quiet environment facilitated   position adjusted   pillow support provided  Complementary Therapy: music therapy provided  Intervention: Optimize Function  Recent Flowsheet Documentation  Taken 11/13/2023 1800 by Ady Reich RN  Sensory Stimulation Regulation:   lighting decreased   care clustered   music on  Taken 11/13/2023 1600 by Ady Reich RN  Sensory Stimulation Regulation:   care clustered   lighting decreased   music on  Taken 11/13/2023 1400 by Ady Reich RN  Sensory Stimulation Regulation:   care clustered   lighting decreased   music on  Taken 11/13/2023 1200 by Ady Reich RN  Sensory Stimulation Regulation:   care clustered   lighting decreased   music  on  Taken 11/13/2023 1000 by Ady Reich RN  Sensory Stimulation Regulation:   care clustered   music on   lighting decreased  Taken 11/13/2023 0800 by Ady Reich RN  Sensory Stimulation Regulation:   care clustered   lighting decreased   music on  Fatigue Management: activity schedule adjusted  Sleep/Rest Enhancement:   awakenings minimized   family presence promoted   music provided   noise level reduced   room darkened  Intervention: Optimize Psychosocial Wellbeing  Recent Flowsheet Documentation  Taken 11/13/2023 1800 by Ady Reich RN  Supportive Measures: active listening utilized  Grieving Process Facilitation: family/significant other support facilitated  Family/Support System Care: support provided  Taken 11/13/2023 1600 by Ady Reich RN  Supportive Measures: active listening utilized  Grieving Process Facilitation: family/significant other support facilitated  Family/Support System Care: support provided  Taken 11/13/2023 1400 by Ady Reich RN  Supportive Measures: active listening utilized  Grieving Process Facilitation: family/significant other support facilitated  Family/Support System Care: support provided  Taken 11/13/2023 1200 by Ady Reich RN  Supportive Measures: active listening utilized  Grieving Process Facilitation: family/significant other support facilitated  Family/Support System Care: support provided  Taken 11/13/2023 1000 by Ady Reich RN  Supportive Measures: active listening utilized  Grieving Process Facilitation: family/significant other support facilitated  Family/Support System Care: support provided  Taken 11/13/2023 0800 by Ady Reich RN  Supportive Measures: active listening utilized  Grieving Process Facilitation: family/significant other support facilitated  Family/Support System Care: support provided     Problem: Restraint, Nonviolent  Goal: Absence of Harm or Injury  Outcome: Ongoing, Not  Progressing  Intervention: Implement Least Restrictive Safety Strategies  Recent Flowsheet Documentation  Taken 11/13/2023 1800 by Ady Reich RN  Diversional Activities: music  Taken 11/13/2023 1600 by Ady Reich RN  Diversional Activities: music  Taken 11/13/2023 1000 by Ady Reich RN  Medical Device Protection:   tubing secured   torso covered  Diversional Activities: music  Taken 11/13/2023 0800 by Ady Reich RN  Medical Device Protection:   tubing secured   torso covered  Diversional Activities: music  Intervention: Protect Dignity, Rights, and Personal Wellbeing  Recent Flowsheet Documentation  Taken 11/13/2023 1800 by Ady Reich RN  Trust Relationship/Rapport: care explained  Taken 11/13/2023 1600 by Ady Reich RN  Trust Relationship/Rapport: care explained  Taken 11/13/2023 1400 by Ady Reich RN  Trust Relationship/Rapport: care explained  Taken 11/13/2023 1200 by Ady Reich RN  Trust Relationship/Rapport: care explained  Intervention: Protect Skin and Joint Integrity  Recent Flowsheet Documentation  Taken 11/13/2023 1800 by Ady Reich RN  Body Position: position changed independently  Taken 11/13/2023 1600 by Ady Riech RN  Body Position: position changed independently  Taken 11/13/2023 1400 by Ady Reich RN  Body Position: position changed independently  Taken 11/13/2023 1200 by Ady Reich RN  Body Position: position changed independently  Taken 11/13/2023 1000 by Ady Reich RN  Body Position: position changed independently  Taken 11/13/2023 0800 by Ady Reich RN  Body Position: position changed independently   Goal Outcome Evaluation:

## 2023-11-13 NOTE — PROGRESS NOTES
"Palliative Care Daily Progress Note     C/C: Patient with continued agitation and restless.     S: Medical record reviewed. Follow up visit for [facility] in the context of complex medical decision making. Events noted. Patient with  sitter at bedside, out of restraints, but continues agitation and restlessness. RN and wife reports patient has been less agitated over weekend, sleeping longer periods of time at night. Continues on scheduled Hydrocodone 5-325mg [id number], denies pain at visit. Continues on Phenorbarb 81 mg q 8 hours, Risperidone 0.5mg BID, Restoril 30mg nightly, Depakene 250mg q 8 hours Patient with two doses [place] 20mg IM, three doses Phenobarb 81mg PRN and scheduled in addition. One dose Geodon 20mg IV in the last 24 hours. No doses prn Haldol or prn Phenobarb since 11/11.  Patient with more garbled speech since Friday, he is allowing personal care.     ROS:  ROS limited by AMS.     O: Code Status:   Code Status and Medical Interventions:   Ordered at: 11/10/23 1455     Level Of Support Discussed With:    Next of Kin (If No Surrogate)     Code Status (Patient has no pulse and is not breathing):    No CPR (Do Not Attempt to Resuscitate)     Medical Interventions (Patient has pulse or is breathing):    Comfort Measures      Advanced Directives: Advance Directive Status: Patient does not have advance directive   Goals of Care: Ongoing.   Palliative Performance Scale Score: 40%     /85 (BP Location: Left arm, Patient Position: Lying)   Pulse 94   Temp 97.7 °F (36.5 °C) (Temporal)   Resp 20   Ht 175.3 cm (69\")   Wt 72.8 kg (160 lb 7.9 oz)   SpO2 92%   BMI 23.70 kg/m²     Intake/Output Summary (Last 24 hours) at 11/13/2023 1352  Last data filed at 11/13/2023 1200  Gross per 24 hour   Intake 1735 ml   Output --   Net 1735 ml       PE:  General Appearance:    Patient laying in bed, restless, frequent repositioning, awake, drowsy, A/C ill appearing,    HEENT:    NC/AT, EOMI, anicteric, MMM, face calm " "  Neck:   supple, trachea midline, no JVD   Lungs:     CTA bilat, diminished in bases; respirations regular, even and unlabored; RR 18-20 on exam    Heart:    RRR, normal S1 and S2, no M/R/G, HR 94   Abdomen:     Normal bowel sounds, soft, non-tender, non-distended, abd binder in place covering PEG   G/U:   Deferred   MSK/Extremities:   Wasting, no edema   Pulses:   Pulses palpable and equal bilaterally   Skin:   Warm, dry   Neurologic:   Oriented to self, drowsy   Psych:   Restless, frequent repositioning,      Meds: Reviewed and changes noted    Labs:                         Invalid input(s): \"LABALBU\", \"PROT\"      Imaging Results (Last 72 Hours)       ** No results found for the last 72 hours. **                  Diagnostics: Reviewed    A:   Hemorrhagic CVA    Dyslipidemia    Multiple bilateral CVAs    HFpEF    T2DM (type 2 diabetes mellitus)    HTN (hypertension)    GERD (gastroesophageal reflux disease)    ICH (intracerebral hemorrhage)    Oropharyngeal dysphagia     73 y.o. male with hemorrhagic CVA, HFpEF, HTN.   S/S:   Pain -s/p stroke and arthritis, MSK, low back  -continue Hydrocodone 5-325mg PO q6 hours  -scheduled Lidocaine patch to low back  -Voltaren gel  to bilateral knees  -continue Palliative oral rinse     2. Dysphagia -SLP with diet modifications  -PEG in place  -patient with TF, minimal PO intake     3. Debility -PT/OT following     4. Agitation -requiring sitter  -discontinued Seroquel 200mg BID  -discontinued Melatonin 10mg nightly  -continue Risperidone 0.5mg per PEG BID   -Valproic acid 250mg per PEG TID  -Phenobarb 81mg per PEG q 8 hours,   -Phenobarb to 81mg per PEG q 8 hours prn  -Haldol 5mg per PEG [id number] prn agitation  -continue Geodon 20mg IM BID prn  -comfort measures    5. Dry MM -continue oral Palliative rinse     6. GOC -DNR/DNI/comfort measures -per discussion with wife  -continued full treatment  11/9: long discussion with wife regarding patient's condition and continued " agitation, reviewed Neurology's prognosis, reviewed options for a more comfort focused plan of care in hospital and at discharge, reviewed hospice services in home which would require 24/7 caregivers, plan to contact  regarding potential for patient to go to [facility] with hospice. Wife has not made any decisions but would like  to reach out to her if [facility] is able to take patient as LTC with hospice.   11/10: Wife requesting comfort focused plan of care with continuation of tube feeding. Reviewed use of medications for agitation. No further labs, testing. Goal is for patient's comfort. Goal is to discharge either home with hospice or facility with hospice. Medications adjusted as above.  11/11: Patient continues to be restless, continuing to adjust medications and dosages. Haldol increased. No signs of thrush but mouth appears dry. Palliative oral rinse continued.   11/13: Patient restless but allowing personal care and allowing RN to complete oral care. Wife concerned regarding patient's speech is more garbled. Reviewed medications at this time and patient's decreased agitation, and sleeping at night. Wife in agreement with current medications and goal for comfort focused plan of care.     P: Follow up visit for symptoms. Goal for comfort focused plan of care. Plan for home or LTC with hospice.     Palliative Care Team will continue to follow patient. Please do not hesitate to contact us regarding further sx mgmt or GOC needs.  Dianelys Arriaga, APRN  11/13/2023  Time spent: 25 minutes

## 2023-11-13 NOTE — PLAN OF CARE
Goal Outcome Evaluation:  Plan of Care Reviewed With: patient, spouse        Progress: declining  Outcome Evaluation: Pt able to participate well with PT this date during EOB activities and therex, followed many commands with cues and repetition, smiling and engaging with therapists. Pt t/f sit <> stand with maxA x2. Pt continues to be limited by cognition, weakness, impaired balance, and impaired motor control. Pt will benefit from continued IPPT services to promote quality of life and to decrease caregiver burden, however will decrease to 3x/wk in lieu of comfort measures. Family goal is for pt to d/c to facility with hospice or home with hospice.      Anticipated Discharge Disposition (PT): extended care facility

## 2023-11-13 NOTE — PROGRESS NOTES
[facility] Medicine Services  PROGRESS NOTE    Patient Name: Kenneth Wen  : 1950  MRN: [id number]    Date of Admission: 9/15/2023  Primary Care Physician: Susan Powers APRN    Subjective   Subjective     CC:  Agitation    HPI:  No acute events but remains restless. Wife at bedside and concerned that she hasn't been able to understand him over the weekend. Palliative reviewed that it may be a decline      Objective   Objective     Vital Signs:   Temp:  [97.4 °F (36.3 °C)-97.7 °F (36.5 °C)] 97.7 °F (36.5 °C)  Heart Rate:  [81-94] 94  Resp:  [18-20] 20  BP: (119-137)/(73-85) 137/85     Physical Exam:  Constitutional: restless   HENT: NCAT, mucous membranes moist  Respiratory: coarse; normal effort  Cardiovascular: RRR, no murmurs, rubs, or gallops  Gastrointestinal: Positive bowel sounds, soft, nontender, nondistended  Musculoskeletal: No bilateral ankle edema  Psychiatric: restless  Neurologic:  strength symmetric in all extremities, Cranial Nerves grossly intact to confrontation, speech somewhat garbled   Skin: No rashes      Results Reviewed:  LAB RESULTS:                              Brief Urine Lab Results  (Last result in the past 365 days)        Color   Clarity   Blood   Leuk Est   Nitrite   Protein   CREAT   Urine HCG        10/30/23 1636 Yellow   Clear   Negative   Negative   Negative   Negative                   Microbiology Results Abnormal       Procedure Component Value - Date/Time    Blood Culture - Blood, Arm, Right [[id number]]  (Normal) Collected: 09/15/23 0212    Lab Status: Final result Specimen: Blood from Arm, Right Updated: 23     Blood Culture No growth at 5 days    Blood Culture - Blood, Arm, Left [[id number]]  (Normal) Collected: 09/15/23 0212    Lab Status: Final result Specimen: Blood from Arm, Left Updated: 23     Blood Culture No growth at 5 days            No radiology results from the last 24 hrs        Current  medications:  Scheduled Meds:atorvastatin, 80 mg, Per PEG Tube, Nightly  clopidogrel, 75 mg, Per PEG Tube, Daily  Diclofenac Sodium, 2 g, Topical, BID  donepezil, 10 mg, Per PEG Tube, Nightly  fluticasone, 2 spray, Each Nare, Daily  HYDROcodone-acetaminophen, 1 tablet, Per PEG Tube, Q6H  lansoprazole, 15 mg, Per PEG Tube, Q AM  Lidocaine, 1 patch, Transdermal, Q24H  metoprolol tartrate, 25 mg, Per PEG Tube, Q12H  miconazole, 1 application , Topical, Q12H  mirtazapine, 30 mg, Per PEG Tube, Nightly  palliative care oral rinse, , Mouth/Throat, 4x Daily  PHENobarbital, 81 mg, Per PEG Tube, Q8H  ProSource No Carb, 30 mL, Per G Tube, Daily  risperiDONE, 0.5 mg, Per PEG Tube, Q12H  temazepam, 30 mg, Per PEG Tube, Nightly  Valproic Acid, 250 mg, Per PEG Tube, Q8H      Continuous Infusions:   PRN Meds:.  haloperidol    ibuprofen    ondansetron    PHENobarbital    ziprasidone    Assessment & Plan   Assessment & Plan     Active Hospital Problems    Diagnosis  POA    **Hemorrhagic CVA [I61.9]  Yes    Oropharyngeal dysphagia [R13.12]  Yes    Multiple bilateral CVAs [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        Brief Hospital Course to date:  Kenneth Wen is a 73 y.o. male  with hx of HTN, HLD, T2DM, and GERD who presented to OSH with multiple acute ischemic infarcts of the bilateral cerebral and cerebellar hemispheres as well as left isaac. TTE/JOSIAS was negative PFO at OSH. On 9/13/23 he had worsening in mental status and new inability to move RLE, head CT showed evolution of the existing CVA with new development of petechial hemorrhage. DAPT was held for 24 hours per Neurology recommendations and repeat CT head that evening showed worsening effacement of  the right quadrigeminal cistern with suspected increasing upward supratentorial pressure. He was then transferred to [facility] for Neurosurgery evaluation on 9/15/23. He was started on hypertonic saline 9/15/23 through 9/20/23 for cerebral edema. He had fevers with negative cultures but had worsening secretions and labored breathing so was started on Zosyn on 9/16/23. Transferred to the hospitalist service on 9/22/23. Pt demonstrated poor oral intake with elevated sodium levels; therefore, PEG tube was recommended & placed. Palliative and hospice are following. Currently comfort measures only with plan to d/c home with hospice vs LTC with hospice.     This patient's problems and plans were partially entered by my partner and updated as appropriate by me 11/12/23.     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 Changed to risperidone and phenobarbital with some help in PRN use.   --Continue as needed Geodon     Dysphagia  Hypernatremia - resolved  - PEG tube placed 9/29, Dr Carrillo   --SLP recommends mechanical ground with honey thick liquids but not eating  --Risk of pulling out peg tube, abd binder to be in place at all times.   -- Continue bolus TF, modified diet     HTN  --Continue Metoprolol 25 mg BID, stable     GERD  --Continue PPI     COVID-19-resolved  --Overall asymptomatic and on room air  --No infiltrates seen and low  procal  --Did not receive any treatment   --Off Isolation 9/30/2023      Leukocytosis-resolved  --Procalcitionin low at 0.05  --CXR and UA negative  --Blood cultures negative     Elevated LFT's, mild  --Possibly secondary to statin?  --Negative acute hepatitis panel  --repeat AST/ALT improved    Expected Discharge Location and Transportation: LT with hospice  Expected Discharge   Expected Discharge Date: 11/13/2023; Expected Discharge Time:      DVT prophylaxis:  No DVT prophylaxis order currently exists.     AM-PAC 6 Clicks Score (PT): 7 (11/12/23 2000)    CODE STATUS:   Code Status and Medical Interventions:   Ordered at: 11/10/23 9785     Level Of Support Discussed With:    Next of Kin (If No Surrogate)     Code Status (Patient has no pulse and is not breathing):    No CPR (Do Not Attempt to Resuscitate)     Medical Interventions (Patient has pulse or is breathing):    Comfort Measures       Araceli Venegas, DO  11/13/23

## 2023-11-13 NOTE — THERAPY PROGRESS REPORT/RE-CERT
Patient Name: Kenneth Wen  : 1950    MRN: [id number]                              Today's Date: 2023       Admit Date: 9/15/2023    Visit Dx:     ICD-10-CM ICD-9-CM   1. Hemorrhagic CVA  I61.9 431   2. Aphasia  R47.01 784.3   3. Dysarthria  R47.1 784.51   4. Oropharyngeal dysphagia  R13.12 787.22     Patient Active Problem List   Diagnosis    Precordial pain    Elevated BP without diagnosis of hypertension    Dyslipidemia    Hyperglycemia    Multiple bilateral CVAs    Hemorrhagic CVA    HFpEF    T2DM (type 2 diabetes mellitus)    HTN (hypertension)    GERD (gastroesophageal reflux disease)    ICH (intracerebral hemorrhage)    Oropharyngeal dysphagia     Past Medical History:   Diagnosis Date    Acid reflux     Cancer     Skin    Diabetes mellitus     Prediabetes    GERD (gastroesophageal reflux disease) 09/15/2023    HTN (hypertension) 09/15/2023    Kidney disease     T2DM (type 2 diabetes mellitus) 09/15/2023     Past Surgical History:   Procedure Laterality Date    APPENDECTOMY      ENDOSCOPY W/ PEG TUBE PLACEMENT N/A 2023    Procedure: ESOPHAGOGASTRODUODENOSCOPY WITH PERCUTANEOUS ENDOSCOPIC GASTROSTOMY TUBE INSERTION;  Surgeon: Haseeb Carrillo MD;  Location: [facility] ENDOSCOPY;  Service: General;  Laterality: N/A;    HEMORRHOIDECTOMY      NASAL POLYP SURGERY        General Information       Row Name 23 1415          Physical Therapy Time and Intention    Document Type progress note/recertification  -SD     Mode of Treatment physical therapy  -SD       Row Name 23 1415          General Information    Existing Precautions/Restrictions fall;other (see comments)  PEG with abdominal binder  -SD       Row Name 23 1415          Cognition    Orientation Status (Cognition) oriented to;person  -SD       Row Name 23 1415          Safety Issues, Functional Mobility    Safety Issues Affecting Function (Mobility) ability to follow commands;awareness of need for  assistance;friction/shear risk;impulsivity;insight into deficits/self-awareness;judgment;sequencing abilities;safety precautions follow-through/compliance;safety precaution awareness;problem-solving  -SD     Impairments Affecting Function (Mobility) balance;cognition;coordination;endurance/activity tolerance;grasp;motor control;motor planning;muscle tone abnormal;pain;visual/perceptual;sensation/sensory awareness;range of motion (ROM);postural/trunk control  -SD     Comment, Safety Issues/Impairments (Mobility) Pt alert, following some commands  -SD               User Key  (r) = Recorded By, (t) = Taken By, (c) = Cosigned By      Initials Name Provider Type    Chantel Toledo, PT Physical Therapist                   Mobility       Row Name 11/13/23 1518          Bed Mobility    Bed Mobility supine-sit;scooting/bridging;sit-supine  -SD     Scooting/Bridging [place] (Bed Mobility) dependent (less than 25% patient effort);2 person assist;verbal cues  -SD     Supine-Sit [place] (Bed Mobility) maximum assist (25% patient effort);2 person assist;nonverbal cues (demo/gesture);verbal cues  -SD     Sit-Supine [place] (Bed Mobility) maximum assist (25% patient effort);2 person assist;nonverbal cues (demo/gesture);verbal cues  -SD     Assistive Device (Bed Mobility) bed rails;head of bed elevated;draw sheet  -SD     Comment, (Bed Mobility) Pt restless upon entering, wife present and supportive  -SD       Row Name 11/13/23 1518          Transfers    Comment, (Transfers) Pt t/f sit <> stand from EOB with maxA x2 with gait belt. Standing caden limited by weakness.  -SD       Row Name 11/13/23 1518          Sit-Stand Transfer    Sit-Stand [place] (Transfers) maximum assist (25% patient effort);2 person assist;verbal cues  -SD     Assistive Device (Sit-Stand Transfers) other (see comments)  -SD       Row Name 11/13/23 1518          Gait/Stairs (Locomotion)    [place] Level (Gait) not tested  -SD                User Key  (r) = Recorded By, (t) = Taken By, (c) = Cosigned By      Initials Name Provider Type    Chantel Toledo PT Physical Therapist                   Obj/Interventions       Row Name 11/13/23 1520          Motor Skills    Therapeutic Exercise hip;knee;ankle  -SD       Row Name 11/13/23 1520          Hip (Therapeutic Exercise)    Hip AAROM (Therapeutic Exercise) bilateral;flexion;aBduction;aDduction;external rotation;internal rotation;supine;10 repetitions  -SD       Row Name 11/13/23 1520          Knee (Therapeutic Exercise)    Knee AAROM (Therapeutic Exercise) bilateral;flexion;extension;supine;10 repetitions  -SD       Row Name 11/13/23 1520          Ankle (Therapeutic Exercise)    Ankle (Therapeutic Exercise) AROM (active range of motion)  -SD     Ankle AROM (Therapeutic Exercise) bilateral;dorsiflexion;plantarflexion;supine;10 repetitions  -SD       Row Name 11/13/23 1520          Balance    Balance Assessment sitting static balance;standing static balance  -SD     Static Sitting Balance minimal assist;1-person assist  -SD     Dynamic Sitting Balance moderate assist;1-person assist  -SD     Position, Sitting Balance unsupported;sitting edge of bed  -SD     Static Standing Balance maximum assist;2-person assist  -SD     Position/Device Used, Standing Balance supported  -SD               User Key  (r) = Recorded By, (t) = Taken By, (c) = Cosigned By      Initials Name Provider Type    Chantel Toledo PT Physical Therapist                   Goals/Plan       Row Name 11/13/23 1523          Bed Mobility Goal 1 (PT)    Activity/Assistive Device (Bed Mobility Goal 1, PT) sit to supine/supine to sit  -SD     [place] Level/Cues Needed (Bed Mobility Goal 1, PT) maximum assist (25-49% patient effort)  -SD     Time Frame (Bed Mobility Goal 1, PT) long term goal (LTG);10 days  -SD     Progress/Outcomes (Bed Mobility Goal 1, PT) goal revised this date  -SD       Row Name 11/13/23 1524           Transfer Goal 1 (PT)    Activity/Assistive Device (Transfer Goal 1, PT) sit-to-stand/stand-to-sit;bed-to-chair/chair-to-bed  -SD     [place] Level/Cues Needed (Transfer Goal 1, PT) maximum assist (25-49% patient effort)  -SD     Time Frame (Transfer Goal 1, PT) long term goal (LTG);10 days  -SD     Progress/Outcome (Transfer Goal 1, PT) goal revised this date  -SD       Row Name 11/13/23 1523          Gait Training Goal 1 (PT)    Activity/Assistive Device (Gait Training Goal 1, PT) gait (walking locomotion);assistive device use;walker, rolling  -SD     [place] Level (Gait Training Goal 1, PT) maximum assist (25-49% patient effort)  -SD     Distance (Gait Training Goal 1, PT) 10  -SD     Time Frame (Gait Training Goal 1, PT) long term goal (LTG);10 days  -SD     Progress/Outcome (Gait Training Goal 1, PT) goal no longer appropriate;medical status inhibiting progress  -SD       Row Name 11/13/23 1523          Problem Specific Goal 1 (PT)    Problem Specific Goal 1 (PT) Pt will sit unsupported at EOB/EOC for 5 minutes with SBA for improved trunk control to facilitate independence with transfers.  -SD     Time Frame (Problem Specific Goal 1, PT) 2 weeks  -SD     Progress/Outcome (Problem Specific Goal 1, PT) goal ongoing  -SD               User Key  (r) = Recorded By, (t) = Taken By, (c) = Cosigned By      Initials Name Provider Type    Chantel Toledo, PT Physical Therapist                   Clinical Impression       Row Name 11/13/23 1523          Pain    Pretreatment Pain Rating 0/10 - no pain  -SD     Posttreatment Pain Rating 0/10 - no pain  -SD       Row Name 11/13/23 1523          Plan of Care Review    Plan of Care Reviewed With patient;spouse  -SD     Progress declining  -SD     Outcome Evaluation Pt able to participate well with PT this date during EOB activities and therex, followed many commands with cues and repetition, smiling and engaging with therapists. Pt t/f sit <> stand with maxA  x2. Pt continues to be limited by cognition, weakness, impaired balance, and impaired motor control. Pt will benefit from continued IPPT services to promote quality of life and to decrease caregiver burden, however will decrease to 3x/wk in lieu of comfort measures. Family goal is for pt to d/c to facility with hospice or home with hospice.  -SD       Row Name 11/13/23 1523          Therapy Assessment/Plan (PT)    Rehab Potential (PT) fair, will monitor progress closely  -SD     Therapy Frequency (PT) 3 times/wk  -SD     Predicted Duration of Therapy Intervention (PT) 2wks  -SD       Row Name 11/13/23 1523          Positioning and Restraints    Pre-Treatment Position in bed  -SD     Post Treatment Position bed  -SD     In Bed notified nsg;fowlers;call light within reach;encouraged to call for assist;exit alarm on;with family/caregiver  -SD               User Key  (r) = Recorded By, (t) = Taken By, (c) = Cosigned By      Initials Name Provider Type    Chantel Toledo, PT Physical Therapist                   Outcome Measures       Row Name 11/13/23 152          How much help from another person do you currently need...    Turning from your back to your side while in flat bed without using bedrails? 2  -SD     Moving from lying on back to sitting on the side of a flat bed without bedrails? 2  -SD     Moving to and from a bed to a chair (including a wheelchair)? 2  -SD     Standing up from a chair using your arms (e.g., wheelchair, bedside chair)? 2  -SD     Climbing 3-5 steps with a railing? 1  -SD     To walk in hospital room? 1  -SD     AM-PAC 6 Clicks Score (PT) 10  -SD     Highest level of mobility 4 --> Transferred to chair/commode  -SD       Row Name 11/13/23 1521          Modified [place] Scale    Modified [place] Scale 5 - Severe disability.  Bedridden, incontinent, and requiring constant nursing care and attention.  -SD       Row Name 11/13/23 1527          Functional Assessment    Outcome Measure  Options AM-PAC 6 Clicks Basic Mobility (PT)  -SD               User Key  (r) = Recorded By, (t) = Taken By, (c) = Cosigned By      Initials Name Provider Type    Chantel Toledo PT Physical Therapist                                 Physical Therapy Education       Title: PT OT SLP Therapies (In Progress)       Topic: Physical Therapy (In Progress)       Point: Mobility training (In Progress)       Learning Progress Summary             Patient Acceptance, E, NR by SD at 11/13/2023 1532    Nonacceptance, E, NR,NL by CH at 11/11/2023 0926    Acceptance, E,TB, NR by AY at 11/7/2023 1456    Acceptance, E, NR by ML at 11/2/2023 1608    Acceptance, E, VU,NR by ML at 10/31/2023 1317    Acceptance, E, NR by KP at 10/26/2023 0256    Acceptance, E, NR by ML at 10/23/2023 1353    Acceptance, E,TB, NR by AY at 10/20/2023 1308    Acceptance, E, NR by KG at 10/15/2023 1401    Acceptance, E, NR,VU by KR at 10/12/2023 1453    Acceptance, E,D, NR by AB at 10/11/2023 1547    Acceptance, E, NR by CM at 10/10/2023 1517    Acceptance, E, NR,VU by KR at 10/6/2023 1550    Acceptance, E, NR by SD at 10/5/2023 1437    Acceptance, E, VU by CD at 10/2/2023 1502    Comment: SEE FLOWSHEET    Acceptance, E, NR by KR at 9/25/2023 1324    Acceptance, E, NR by KG1 at 9/18/2023 1403    Acceptance, E,TB, NR by AY at 9/15/2023 1254   Family Acceptance, E,TB, NR by AY at 11/7/2023 1456    Acceptance, E, VU,NR by ML at 10/31/2023 1317    Acceptance, E, NR by KP at 10/26/2023 0256    Acceptance, E, NR by ML at 10/23/2023 1353    Acceptance, E, NR,VU by KR at 10/12/2023 1453    Acceptance, E, NR,VU by KR at 10/6/2023 1550    Acceptance, E, NR by SD at 10/5/2023 1437   Significant Other Acceptance, E, NR by SD at 11/13/2023 1532    Acceptance, E, NR by CM at 10/10/2023 1517                         Point: Home exercise program (In Progress)       Learning Progress Summary             Patient Acceptance, E, NR by SD at 11/13/2023 1532     Nonacceptance, E, NR,NL by CH at 11/11/2023 0926    Acceptance, E,TB, NR by AY at 11/7/2023 1456    Acceptance, E, NR by KP at 10/26/2023 0256    Acceptance, E, NR by ML at 10/23/2023 1353    Acceptance, E, NR by KG at 10/15/2023 1401    Acceptance, E,D, NR by AB at 10/11/2023 1547    Acceptance, E, NR by CM at 10/10/2023 1517    Acceptance, E, NR by SD at 10/5/2023 1437    Acceptance, E, VU by CD at 10/2/2023 1502    Comment: SEE FLOWSHEET    Acceptance, E, NR by KG1 at 9/18/2023 1403   Family Acceptance, E,TB, NR by AY at 11/7/2023 1456    Acceptance, E, NR by KP at 10/26/2023 0256    Acceptance, E, NR by ML at 10/23/2023 1353    Acceptance, E, NR by SD at 10/5/2023 1437   Significant Other Acceptance, E, NR by SD at 11/13/2023 1532    Acceptance, E, NR by CM at 10/10/2023 1517                         Point: Body mechanics (In Progress)       Learning Progress Summary             Patient Acceptance, E, NR by SD at 11/13/2023 1532    Nonacceptance, E, NR,NL by CH at 11/11/2023 0926    Acceptance, E,TB, NR by AY at 11/7/2023 1456    Acceptance, E, NR by ML at 11/2/2023 1608    Acceptance, E, VU,NR by ML at 10/31/2023 1317    Acceptance, E, NR by KP at 10/26/2023 0256    Acceptance, E,TB, NR by AY at 10/20/2023 1308    Acceptance, E, NR by KG at 10/15/2023 1401    Acceptance, E, NR,VU by KR at 10/12/2023 1453    Acceptance, E,D, NR by AB at 10/11/2023 1547    Acceptance, E, NR by CM at 10/10/2023 1517    Acceptance, E, NR,VU by KR at 10/6/2023 1550    Acceptance, E, NR by SD at 10/5/2023 1437    Acceptance, E, VU by CD at 10/2/2023 1502    Comment: SEE FLOWSHEET    Acceptance, E, NR by KR at 9/25/2023 1324    Acceptance, E, NR by KG1 at 9/18/2023 1403    Acceptance, E,TB, NR by AY at 9/15/2023 1254   Family Acceptance, E,TB, NR by AY at 11/7/2023 1456    Acceptance, E, VU,NR by ML at 10/31/2023 1317    Acceptance, E, NR by KP at 10/26/2023 0256    Acceptance, E, NR,VU by KR at 10/12/2023 1453    Acceptance, E, NR,VU  by KR at 10/6/2023 1550    Acceptance, E, NR by SD at 10/5/2023 1437   Significant Other Acceptance, E, NR by SD at 11/13/2023 1532    Acceptance, E, NR by CM at 10/10/2023 1517                         Point: Precautions (In Progress)       Learning Progress Summary             Patient Acceptance, E, NR by SD at 11/13/2023 1532    Nonacceptance, E, NR,NL by CH at 11/11/2023 0926    Acceptance, E,TB, NR by AY at 11/7/2023 1456    Acceptance, E, NR by ML at 11/2/2023 1608    Acceptance, E, VU,NR by ML at 10/31/2023 1317    Acceptance, E, NR by KP at 10/26/2023 0256    Acceptance, E, NR by ML at 10/23/2023 1353    Acceptance, E,TB, NR by AY at 10/20/2023 1308    Acceptance, E, NR by KG at 10/15/2023 1401    Acceptance, E, NR,VU by KR at 10/12/2023 1453    Acceptance, E,D, NR by AB at 10/11/2023 1547    Acceptance, E, NR by CM at 10/10/2023 1517    Acceptance, E, NR,VU by KR at 10/6/2023 1550    Acceptance, E, NR by SD at 10/5/2023 1437    Acceptance, E, VU by CD at 10/2/2023 1502    Comment: SEE FLOWSHEET    Acceptance, E, NR by KR at 9/25/2023 1324    Acceptance, E, NR by KG1 at 9/18/2023 1403    Acceptance, E,TB, NR by AY at 9/15/2023 1254   Family Acceptance, E,TB, NR by AY at 11/7/2023 1456    Acceptance, E, VU,NR by ML at 10/31/2023 1317    Acceptance, E, NR by KP at 10/26/2023 0256    Acceptance, E, NR by ML at 10/23/2023 1353    Acceptance, E, NR,VU by KR at 10/12/2023 1453    Acceptance, E, NR,VU by KR at 10/6/2023 1550    Acceptance, E, NR by SD at 10/5/2023 1437   Significant Other Acceptance, E, NR by SD at 11/13/2023 1532    Acceptance, E, NR by CM at 10/10/2023 1517                                         User Key       Initials Effective Dates Name Provider Type Discipline    CD 02/03/23 -  Claudia Duarte, PT Physical Therapist PT    SD 03/13/23 -  Chantel Scott, PT Physical Therapist PT     06/16/21 -  Ivis Lau, RN Registered Nurse Nurse     06/16/21 -  Anamaria Watt RN  Registered Nurse Nurse    KG1 05/22/20 -  Mackenzie Mckay, PT Physical Therapist PT    KG 01/04/23 -  Monik Solorio Physical Therapist PT    AY 11/10/20 -  Aleshia Armstrong, PT Physical Therapist PT    ML 04/22/21 -  Laura Rangel Physical Therapist PT    AB 09/22/22 -  Aleshia Keita, PT Physical Therapist PT    CM 09/22/22 -  Yanira Shields, PT Physical Therapist PT    KR 12/30/22 -  Raissa Soria, PT Physical Therapist PT                  PT Recommendation and Plan     Plan of Care Reviewed With: patient, spouse  Progress: declining  Outcome Evaluation: Pt able to participate well with PT this date during EOB activities and therex, followed many commands with cues and repetition, smiling and engaging with therapists. Pt t/f sit <> stand with maxA x2. Pt continues to be limited by cognition, weakness, impaired balance, and impaired motor control. Pt will benefit from continued IPPT services to promote quality of life and to decrease caregiver burden, however will decrease to 3x/wk in lieu of comfort measures. Family goal is for pt to d/c to facility with hospice or home with hospice.     Time Calculation:         PT Charges       Row Name 11/13/23 1532             Time Calculation    Start Time 1415  -SD      PT Received On 11/13/23  -SD      PT Goal Re-Cert Due Date 11/23/23  -SD         Time Calculation- PT    Total Timed Code Minutes- PT 16 minute(s)  -SD         Timed Charges    10168 - PT Therapeutic Activity Minutes 16  -SD         Total Minutes    Timed Charges Total Minutes 16  -SD       Total Minutes 16  -SD                User Key  (r) = Recorded By, (t) = Taken By, (c) = Cosigned By      Initials Name Provider Type    SD Chantel Scott, PIPER Physical Therapist                  Therapy Charges for Today       Code Description Service Date Service Provider Modifiers Qty    [id number] HC PT THERAPEUTIC ACT EA 15 MIN 11/13/2023 Chantel Scott PT GP 1            PT G-Codes  Outcome  Measure Options: AM-PAC 6 Clicks Basic Mobility (PT)  AM-PAC 6 Clicks Score (PT): 10  AM-PAC 6 Clicks Score (OT): 7  Modified [place] Scale: 5 - Severe disability.  Bedridden, incontinent, and requiring constant nursing care and attention.  PT Discharge Summary  Anticipated Discharge Disposition (PT): extended care facility    Chantel Scott, PT  11/13/2023

## 2023-11-13 NOTE — SIGNIFICANT NOTE
11/13/23 1022   SLP Deferred Reason   SLP Deferred Reason Unable to treat, medical status change  (Pt not appropriate for treatment, not alert enough to participate. Spoke with RN and wife. Will cont to monitor.)

## 2023-11-13 NOTE — THERAPY PROGRESS REPORT/RE-CERT
Patient Name: Kenneth Wen  : 1950    MRN: [id number]                              Today's Date: 2023       Admit Date: 9/15/2023    Visit Dx:     ICD-10-CM ICD-9-CM   1. Hemorrhagic CVA  I61.9 431   2. Aphasia  R47.01 784.3   3. Dysarthria  R47.1 784.51   4. Oropharyngeal dysphagia  R13.12 787.22     Patient Active Problem List   Diagnosis    Precordial pain    Elevated BP without diagnosis of hypertension    Dyslipidemia    Hyperglycemia    Multiple bilateral CVAs    Hemorrhagic CVA    HFpEF    T2DM (type 2 diabetes mellitus)    HTN (hypertension)    GERD (gastroesophageal reflux disease)    ICH (intracerebral hemorrhage)    Oropharyngeal dysphagia     Past Medical History:   Diagnosis Date    Acid reflux     Cancer     Skin    Diabetes mellitus     Prediabetes    GERD (gastroesophageal reflux disease) 09/15/2023    HTN (hypertension) 09/15/2023    Kidney disease     T2DM (type 2 diabetes mellitus) 09/15/2023     Past Surgical History:   Procedure Laterality Date    APPENDECTOMY      ENDOSCOPY W/ PEG TUBE PLACEMENT N/A 2023    Procedure: ESOPHAGOGASTRODUODENOSCOPY WITH PERCUTANEOUS ENDOSCOPIC GASTROSTOMY TUBE INSERTION;  Surgeon: Haseeb Carrillo MD;  Location: [facility] ENDOSCOPY;  Service: General;  Laterality: N/A;    HEMORRHOIDECTOMY      NASAL POLYP SURGERY        General Information       Row Name 23 1538          OT Time and Intention    Document Type progress note/recertification  -DAYA     Mode of Treatment occupational therapy  -DAYA       Row Name 23 1538          General Information    Patient Profile Reviewed yes  -DAYA     Existing Precautions/Restrictions fall;other (see comments)  PEG, abdominal binder, RUE splint, visual deficit; R side weakness  -DAYA     Barriers to Rehab medically complex;cognitive status;visual deficit  -DAYA       Row Name 23 1538          Cognition    Orientation Status (Cognition) oriented to;person  -DAYA       Row Name 23 1538           Safety Issues, Functional Mobility    Safety Issues Affecting Function (Mobility) ability to follow commands;awareness of need for assistance;friction/shear risk;insight into deficits/self-awareness;judgment;problem-solving;safety precaution awareness;safety precautions follow-through/compliance;sequencing abilities  -DAYA     Impairments Affecting Function (Mobility) balance;cognition;coordination;endurance/activity tolerance;grasp;motor control;motor planning;muscle tone abnormal;postural/trunk control;range of motion (ROM);strength;visual/perceptual;sensation/sensory awareness  -DAYA     Cognitive Impairments, Mobility Safety/Performance attention;awareness, need for assistance;insight into deficits/self-awareness;judgment;problem-solving/reasoning;safety precaution awareness;safety precaution follow-through;sequencing abilities  -DAYA     Comment, Safety Issues/Impairments (Mobility) Pt responded to simple commands, remained alert throughout treatment  -DAYA               User Key  (r) = Recorded By, (t) = Taken By, (c) = Cosigned By      Initials Name Provider Type    Ofelia Todd OT Occupational Therapist                     Mobility/ADL's       Row Name 11/13/23 1542          Bed Mobility    Bed Mobility supine-sit;sit-supine;scooting/bridging  -DAYA     Scooting/Bridging [place] (Bed Mobility) dependent (less than 25% patient effort);2 person assist  -DAYA     Supine-Sit [place] (Bed Mobility) maximum assist (25% patient effort);2 person assist;verbal cues;nonverbal cues (demo/gesture)  -DAYA     Sit-Supine [place] (Bed Mobility) 2 person assist;verbal cues;nonverbal cues (demo/gesture)  -       Row Name 11/13/23 1542          Transfers    Transfers sit-stand transfer  -DAYA     Comment, (Transfers) STS attempt from EOB with patient unable to bear weight through RLE  -DAYA       Row Name 11/13/23 1542          Sit-Stand Transfer    Sit-Stand [place] (Transfers) maximum assist (25% patient effort);2  person assist;verbal cues;nonverbal cues (demo/gesture)  -DAYA     Comment, (Sit-Stand Transfer) BUE support  -       Row Name 11/13/23 1542          Activities of Daily Living    BADL Assessment/Intervention grooming;lower body dressing  -       Row Name 11/13/23 1542          Lower Body Dressing Assessment/Training    [place] Level (Lower Body Dressing) don;socks;dependent (less than 25% patient effort)  -DAYA     Position (Lower Body Dressing) supine  -DAYA       Row Name 11/13/23 1542          Grooming Assessment/Training    [place] Level (Grooming) wash face, hands;dependent (less than 25% patient effort)  -DAYA     Position (Grooming) supine  -DAYA               User Key  (r) = Recorded By, (t) = Taken By, (c) = Cosigned By      Initials Name Provider Type    Ofelia Todd OT Occupational Therapist                   Obj/Interventions       Row Name 11/13/23 1544          Shoulder (Therapeutic Exercise)    Shoulder (Therapeutic Exercise) AAROM (active assistive range of motion);other (see comments)  Pt unable to tolerate PROM to RUE, becoming slightly agitated indicating increased pain  -DAYA     Shoulder AAROM (Therapeutic Exercise) left;flexion;10 repetitions  -       Row Name 11/13/23 1544          Balance    Balance Assessment sitting static balance  -DAYA     Static Sitting Balance minimal assist;1-person assist;verbal cues;non-verbal cues (demo/gesture)  -DAYA     Position, Sitting Balance unsupported;sitting edge of bed  -DAYA     Balance Interventions sitting;core stability exercise;weight shifting activity  -DAYA     Comment, Balance Pt participated in weight shifting activity seated EOB with ModA, able to follow simple commands inconsistently, maintain eyes open with encouragement.  -DAYA               User Key  (r) = Recorded By, (t) = Taken By, (c) = Cosigned By      Initials Name Provider Type    Ofelia Todd OT Occupational Therapist                   Goals/Plan       Row Name 11/13/23 4997           Bed Mobility Goal 1 (OT)    [place] Level/Cues Needed (Bed Mobility Goal 1, OT) moderate assist (50-74% patient effort)  -DAYA     Progress/Outcomes (Bed Mobility Goal 1, OT) goal revised this date;new goal  -DAYA       Row Name 11/13/23 1556          Transfer Goal 1 (OT)    [place] Level/Cues Needed (Transfer Goal 1, OT) maximum assist (25-49% patient effort)  -DAYA     Progress/Outcome (Transfer Goal 1, OT) goal revised this date;new goal  -DAYA       Row Name 11/13/23 1556          Grooming Goal 1 (OT)    [place] (Grooming Goal 1, OT) moderate assist (50-74% patient effort)  -DAYA     Progress/Outcome (Grooming Goal 1, OT) goal revised this date;new goal  -DAYA       Row Name 11/13/23 1556          ROM Goal 1 (OT)    Progress/Outcome (ROM Goal 1, OT) goal ongoing  -DAYA               User Key  (r) = Recorded By, (t) = Taken By, (c) = Cosigned By      Initials Name Provider Type    Ofelia Todd, LATIA Occupational Therapist                   Clinical Impression       Row Name 11/13/23 8879          Pain Scale: FACES Pre/Post-Treatment    Pain: FACES Scale, Pretreatment 0-->no hurt  -DAYA     Posttreatment Pain Rating 2-->hurts little bit  -DAYA     Pain Location - Side/Orientation Right  -DAYA     Pain Location upper  -DAYA     Pain Location - extremity  -DAYA     Pre/Posttreatment Pain Comment RN aware and managing  -DAYA       Row Name 11/13/23 5890          Plan of Care Review    Plan of Care Reviewed With patient;spouse  -DAYA     Progress declining  -DAYA     Outcome Evaluation OT recert completed. Pt's progress toward ADL goals limited by weakness, impaired balance, and cognitive deficits. Pt engaged in weight shifting activity at EOB, [place] for sitting balance, following simple commands inconsistently and smiling. Goals revised to reflect functional status. Continue per OT POC.  -DAYA       Row Name 11/13/23 1549          Therapy Plan Review/Discharge Plan (OT)    Anticipated Discharge Disposition (OT) skilled nursing  facility  -DAYA       Row Name 11/13/23 1549          Vital Signs    O2 Delivery Pre Treatment room air  -DAYA     O2 Delivery Intra Treatment room air  -DAYA     O2 Delivery Post Treatment room air  -DAYA     Pre Patient Position Supine  -DAYA     Intra Patient Position Standing  -DAYA     Post Patient Position Supine  -DAYA       Row Name 11/13/23 1549          Positioning and Restraints    Pre-Treatment Position in bed  -DAYA     Post Treatment Position bed  -DAYA     In Bed notified nsg;fowlers;call light within reach;encouraged to call for assist;exit alarm on;with family/caregiver;side rails up x3  -DAYA               User Key  (r) = Recorded By, (t) = Taken By, (c) = Cosigned By      Initials Name Provider Type    Ofelia Todd OT Occupational Therapist                   Outcome Measures       Row Name 11/13/23 6248          How much help from another is currently needed...    Putting on and taking off regular lower body clothing? 1  -DAYA     Bathing (including washing, rinsing, and drying) 1  -DAYA     Toileting (which includes using toilet bed pan or urinal) 1  -DAYA     Putting on and taking off regular upper body clothing 1  -DAYA     Taking care of personal grooming (such as brushing teeth) 1  -DAYA     Eating meals 1  -DAYA     AM-PAC 6 Clicks Score (OT) 6  -DAYA       Row Name 11/13/23 1521          How much help from another person do you currently need...    Turning from your back to your side while in flat bed without using bedrails? 2  -SD     Moving from lying on back to sitting on the side of a flat bed without bedrails? 2  -SD     Moving to and from a bed to a chair (including a wheelchair)? 2  -SD     Standing up from a chair using your arms (e.g., wheelchair, bedside chair)? 2  -SD     Climbing 3-5 steps with a railing? 1  -SD     To walk in hospital room? 1  -SD     AM-PAC 6 Clicks Score (PT) 10  -SD     Highest level of mobility 4 --> Transferred to chair/commode  -SD       Row Name 11/13/23 1525          Modified  Victoria Scale    Modified Rae Scale 5 - Severe disability.  Bedridden, incontinent, and requiring constant nursing care and attention.  -SD       Row Name 11/13/23 1558 11/13/23 1525       Functional Assessment    Outcome Measure Options AM-PAC 6 Clicks Daily Activity (OT)  -DAYA AM-PAC 6 Clicks Basic Mobility (PT)  -SD              User Key  (r) = Recorded By, (t) = Taken By, (c) = Cosigned By      Initials Name Provider Type    Chantel Toledo, PT Physical Therapist    Ofelia Todd, OT Occupational Therapist                    Occupational Therapy Education       Title: PT OT SLP Therapies (In Progress)       Topic: Occupational Therapy (In Progress)       Point: ADL training (In Progress)       Description:   Instruct learner(s) on proper safety adaptation and remediation techniques during self care or transfers.   Instruct in proper use of assistive devices.                  Learning Progress Summary             Patient Acceptance, E, NR by DAYA at 11/13/2023 1558    Comment: OT POC    Nonacceptance, E, NR,NL by  at 11/11/2023 0926    Acceptance, E, NR by  at 11/7/2023 1508    Acceptance, E, NR,NL by MR at 11/2/2023 1511    Acceptance, E, NR,NL by  at 10/31/2023 1352    Acceptance, E, NR by  at 10/26/2023 0256    Acceptance, E, NR by  at 10/25/2023 1051    Acceptance, E, NR by  at 10/23/2023 1335    Acceptance, E, NR by JR at 10/19/2023 0939    Acceptance, E,D, NR by JANY at 10/18/2023 0830    Acceptance, E, NR by MR at 10/12/2023 1508    Acceptance, E, NR by JVINICIUS at 10/9/2023 1533    Acceptance, E, NR by MR at 10/6/2023 1549    Acceptance, E,D, NR,NL by  at 10/4/2023 0948    Acceptance, E, NR by  at 9/28/2023 1556    Acceptance, E, NR by  at 9/25/2023 1045    Acceptance, E, NR by ERIN at 9/20/2023 1420    Acceptance, E, NR by  at 9/18/2023 1532    Acceptance, E, NR by  at 9/15/2023 1531   Family Acceptance, E, NR by DAYA at 11/13/2023 1558    Comment: OT POC    Acceptance, E, NR,NL by   at 10/31/2023 1352    Acceptance, E, NR by  at 10/26/2023 0256    Acceptance, E,D, NR by JY at 10/18/2023 0830    Acceptance, E, NR by MR at 10/12/2023 1508    Acceptance, E, NR by J at 10/9/2023 1533    Acceptance, E, NR by MR at 10/6/2023 1549    Acceptance, E, NR by MR at 9/28/2023 1556                         Point: Home exercise program (In Progress)       Description:   Instruct learner(s) on appropriate technique for monitoring, assisting and/or progressing therapeutic exercises/activities.                  Learning Progress Summary             Patient Acceptance, E, NR by DAYA at 11/13/2023 1558    Comment: OT POC    Nonacceptance, E, NR,NL by  at 11/11/2023 0926    Acceptance, E, NR by JR at 11/9/2023 1015    Acceptance, E, NR by MC at 11/7/2023 1508    Acceptance, E, NR,NL by MR at 11/2/2023 1511    Acceptance, E, NR by KP at 10/26/2023 0256    Acceptance, E, NR by MC at 10/25/2023 1051    Acceptance, E, NR by JR at 10/19/2023 0939    Acceptance, E,D, NR by ORAL at 10/18/2023 0830    Acceptance, E, NR by MR at 10/12/2023 1508    Acceptance, E, NR by MR at 10/6/2023 1549    Acceptance, E,D, NR,NL by  at 10/4/2023 0948    Acceptance, E, NR by MR at 9/28/2023 1556    Acceptance, E, NR by JR at 9/25/2023 1045    Acceptance, E, NR by 1 at 9/20/2023 1420    Acceptance, E, NR by MC at 9/18/2023 1532    Acceptance, E, NR by MC at 9/15/2023 1531   Family Acceptance, E, NR by DAYA at 11/13/2023 1558    Comment: OT POC    Acceptance, E, NR by KP at 10/26/2023 0256    Acceptance, E,D, NR by JANY at 10/18/2023 0830    Acceptance, E, NR by MR at 10/12/2023 1508    Acceptance, E, NR by MR at 10/6/2023 1549    Acceptance, E, NR by MR at 9/28/2023 1556                         Point: Precautions (In Progress)       Description:   Instruct learner(s) on prescribed precautions during self-care and functional transfers.                  Learning Progress Summary             Patient Nonacceptance, E, NR,NL by  at  11/11/2023 0926    Acceptance, E, NR by MC at 11/7/2023 1508    Acceptance, E, NR,NL by MR at 11/2/2023 1511    Acceptance, E, NR,NL by  at 10/31/2023 1352    Acceptance, E, NR by KP at 10/26/2023 0256    Acceptance, E, NR by MC at 10/25/2023 1051    Acceptance, E, NR by MC at 10/23/2023 1335    Acceptance, E,D, NR by JY at 10/18/2023 0830    Acceptance, E, NR by MR at 10/12/2023 1508    Acceptance, E, NR by JG at 10/9/2023 1533    Acceptance, E, NR by MR at 10/6/2023 1549    Acceptance, E,D, NR,NL by  at 10/4/2023 0948    Acceptance, E, NR by MR at 9/28/2023 1556    Acceptance, E, NR by CS1 at 9/20/2023 1420    Acceptance, E, NR by MC at 9/18/2023 1532    Acceptance, E, NR by MC at 9/15/2023 1531   Family Acceptance, E, NR,NL by  at 10/31/2023 1352    Acceptance, E, NR by  at 10/26/2023 0256    Acceptance, E,D, NR by JY at 10/18/2023 0830    Acceptance, E, NR by MR at 10/12/2023 1508    Acceptance, E, NR by JG at 10/9/2023 1533    Acceptance, E, NR by MR at 10/6/2023 1549    Acceptance, E, NR by MR at 9/28/2023 1556                         Point: Body mechanics (In Progress)       Description:   Instruct learner(s) on proper positioning and spine alignment during self-care, functional mobility activities and/or exercises.                  Learning Progress Summary             Patient Nonacceptance, E, NR,NL by  at 11/11/2023 0926    Acceptance, E, NR by MC at 11/7/2023 1508    Acceptance, E, NR,NL by MR at 11/2/2023 1511    Acceptance, E, NR by KP at 10/26/2023 0256    Acceptance, E, NR by MC at 10/25/2023 1051    Acceptance, E, NR by MC at 10/23/2023 1335    Acceptance, E,D, NR by JY at 10/18/2023 0830    Acceptance, E, NR by MR at 10/12/2023 1508    Acceptance, E, NR by JG at 10/9/2023 1533    Acceptance, E, NR by MR at 10/6/2023 1549    Acceptance, E,D, NR,NL by CS at 10/4/2023 0948    Acceptance, E, NR by MR at 9/28/2023 1556    Acceptance, E, NR by CS1 at 9/20/2023 1420    Acceptance, E, NR by MC at  9/18/2023 1532    Acceptance, E, NR by  at 9/15/2023 1531   Family Acceptance, E, NR by  at 10/26/2023 0256    Acceptance, E,D, NR by JY at 10/18/2023 0830    Acceptance, E, NR by MR at 10/12/2023 1508    Acceptance, E, NR by JG at 10/9/2023 1533    Acceptance, E, NR by MR at 10/6/2023 1549    Acceptance, E, NR by MR at 9/28/2023 1556                                         User Key       Initials Effective Dates Name Provider Type Discipline     02/03/23 -  Jess Carrillo, OT Occupational Therapist OT     06/16/21 -  Ivis Lau, RN Registered Nurse Nurse     06/16/21 -  Anamaria Watt, RN Registered Nurse Nurse    [facility] 09/02/21 -  Carmen Carr, OT Occupational Therapist OT    JY 06/16/21 -  Ofelia Armstrong, OT Occupational Therapist OT     06/16/21 -  Cara Ledesma, OT Occupational Therapist OT    DAYA 06/16/21 -  Ofelia Urrutia, OT Occupational Therapist OT     06/16/21 -  Jim Judge, OT Occupational Therapist OT     10/14/22 -  Jenny Rivera, OT Occupational Therapist OT    MR 09/22/22 -  Meera Doyle, OT Occupational Therapist OT     08/30/23 -  Power Herman OT Student OT Student OT                  OT Recommendation and Plan     Plan of Care Review  Plan of Care Reviewed With: patient, spouse  Progress: declining  Outcome Evaluation: OT recert completed. Pt's progress toward ADL goals limited by weakness, impaired balance, and cognitive deficits. Pt engaged in weight shifting activity at EOB, [place] for sitting balance, following simple commands inconsistently and smiling. Goals revised to reflect functional status. Continue per OT POC.     Time Calculation:         Time Calculation- OT       Row Name 11/13/23 1405             Time Calculation- OT    OT Start Time 1405  -DAYA      OT Received On 11/13/23  -DAYA      OT Goal Re-Cert Due Date 11/23/23  -DAYA         Timed Charges    39632 - OT Therapeutic Activity Minutes 25  -DAYA         Total Minutes    Timed Charges Total  Minutes 25  -DAYA       Total Minutes 25  -DAYA                User Key  (r) = Recorded By, (t) = Taken By, (c) = Cosigned By      Initials Name Provider Type    Ofelia Todd OT Occupational Therapist                  Therapy Charges for Today       Code Description Service Date Service Provider Modifiers Qty    [id number]  OT THERAPEUTIC ACT EA 15 MIN 11/13/2023 Ofelia Urrutia OT GO 2                 Ofelia Urrutia OT  11/13/2023

## 2023-11-13 NOTE — PLAN OF CARE
Goal Outcome Evaluation:                      - Patient on room air  -patient rested through most of the shift  -Patient became agitated in last hour of shift and has been attempting to kick and grab staff  -requiring frequent redirection  -Unmeasured urine occurrence x 3  -VSS

## 2023-11-13 NOTE — CASE MANAGEMENT/SOCIAL WORK
Continued Stay Note  [facility]     Patient Name: Kenneth Wen  MRN: [id number]  Today's Date: 11/13/2023    Admit Date: 9/15/2023    Plan: Home with hospice vs LTC with hospice.   Discharge Plan       Row Name 11/13/23 1412       Plan    Plan Onging    Plan Comments SW’er met with patient and sitter at bedside. Patient remains free of restraints. Patient continues to be restlessness and agitated. Patient is continuing with phenobarbital and prn. Patient was given [place]. SW’er will continue to follow and make referrals as appropriate. Palliative continues to follow.  Plan is ongoing                   Discharge Codes    No documentation.                 Expected Discharge Date and Time       Expected Discharge Date Expected Discharge Time    Nov 13, 2023               JOEL Navarro (Kay)

## 2023-11-14 PROCEDURE — 25010000002 MORPHINE PER 10 MG

## 2023-11-14 PROCEDURE — 99232 SBSQ HOSP IP/OBS MODERATE 35: CPT | Performed by: INTERNAL MEDICINE

## 2023-11-14 RX ORDER — MORPHINE SULFATE 2 MG/ML
2 INJECTION, SOLUTION INTRAMUSCULAR; INTRAVENOUS EVERY 4 HOURS PRN
Status: DISCONTINUED | OUTPATIENT
Start: 2023-11-14 | End: 2023-11-16 | Stop reason: HOSPADM

## 2023-11-14 RX ADMIN — MICONAZOLE NITRATE 1 APPLICATION: 20 CREAM TOPICAL at 20:51

## 2023-11-14 RX ADMIN — LANSOPRAZOLE 15 MG: 15 TABLET, ORALLY DISINTEGRATING ORAL at 06:19

## 2023-11-14 RX ADMIN — TEMAZEPAM 30 MG: 15 CAPSULE ORAL at 20:50

## 2023-11-14 RX ADMIN — RISPERIDONE 0.5 MG: 1 SOLUTION ORAL at 21:02

## 2023-11-14 RX ADMIN — CLOPIDOGREL BISULFATE 75 MG: 75 TABLET ORAL at 09:19

## 2023-11-14 RX ADMIN — METOPROLOL TARTRATE 25 MG: 25 TABLET, FILM COATED ORAL at 20:49

## 2023-11-14 RX ADMIN — DICLOFENAC SODIUM 2 G: 9.3 GEL TOPICAL at 20:51

## 2023-11-14 RX ADMIN — MINERAL OIL: 1000 LIQUID ORAL at 20:51

## 2023-11-14 RX ADMIN — METOPROLOL TARTRATE 25 MG: 25 TABLET, FILM COATED ORAL at 09:19

## 2023-11-14 RX ADMIN — DONEPEZIL HYDROCHLORIDE 10 MG: 10 TABLET, FILM COATED ORAL at 18:10

## 2023-11-14 RX ADMIN — HYDROCODONE BITARTRATE AND ACETAMINOPHEN 1 TABLET: 5; 325 TABLET ORAL at 22:57

## 2023-11-14 RX ADMIN — FLUTICASONE PROPIONATE 2 SPRAY: 50 SPRAY, METERED NASAL at 09:19

## 2023-11-14 RX ADMIN — PHENOBARBITAL 81 MG: 64.8 TABLET ORAL at 14:33

## 2023-11-14 RX ADMIN — HYDROCODONE BITARTRATE AND ACETAMINOPHEN 1 TABLET: 5; 325 TABLET ORAL at 11:48

## 2023-11-14 RX ADMIN — LIDOCAINE 1 PATCH: 4 PATCH TOPICAL at 09:19

## 2023-11-14 RX ADMIN — MINERAL OIL: 1000 LIQUID ORAL at 11:49

## 2023-11-14 RX ADMIN — MIRTAZAPINE 30 MG: 15 TABLET, FILM COATED ORAL at 18:10

## 2023-11-14 RX ADMIN — HYDROCODONE BITARTRATE AND ACETAMINOPHEN 1 TABLET: 5; 325 TABLET ORAL at 18:10

## 2023-11-14 RX ADMIN — Medication 30 ML: at 09:20

## 2023-11-14 RX ADMIN — VALPROIC ACID 250 MG: 250 SOLUTION ORAL at 21:10

## 2023-11-14 RX ADMIN — VALPROIC ACID 250 MG: 250 SOLUTION ORAL at 06:19

## 2023-11-14 RX ADMIN — MINERAL OIL: 1000 LIQUID ORAL at 08:13

## 2023-11-14 RX ADMIN — DICLOFENAC SODIUM 2 G: 9.3 GEL TOPICAL at 09:18

## 2023-11-14 RX ADMIN — MORPHINE SULFATE 2 MG: 2 INJECTION, SOLUTION INTRAMUSCULAR; INTRAVENOUS at 15:40

## 2023-11-14 RX ADMIN — VALPROIC ACID 250 MG: 250 SOLUTION ORAL at 14:34

## 2023-11-14 RX ADMIN — HYDROCODONE BITARTRATE AND ACETAMINOPHEN 1 TABLET: 5; 325 TABLET ORAL at 06:19

## 2023-11-14 RX ADMIN — PHENOBARBITAL 81 MG: 64.8 TABLET ORAL at 21:00

## 2023-11-14 RX ADMIN — PHENOBARBITAL 81 MG: 64.8 TABLET ORAL at 06:19

## 2023-11-14 RX ADMIN — MICONAZOLE NITRATE 1 APPLICATION: 20 CREAM TOPICAL at 09:19

## 2023-11-14 RX ADMIN — RISPERIDONE 0.5 MG: 1 SOLUTION ORAL at 09:19

## 2023-11-14 NOTE — PROGRESS NOTES
Clinical Nutrition   Nutrition Support Assessment  Reason for Visit: Follow-up protocol, EN/PO    Patient Name: Kenneth Wen  YOB: 1950  MRN: [id number]  Date of Encounter: 11/13/23 23:20 EST  Admission date: 9/15/2023    Pt cont not eating. 0% intake of 12 meals and 10% of one meal documented since 11/6.   Cont on bolus feedings.  Isosource 1.5 5 containers/da Total volume 1250 ml,   1 carton (250mL) 5x/d - ~q3hrs between 6a and 9p   Provide Prosource 1x daily   Water Flush: 75mL before and after each bolus     Note supply issue: If Isosource not available: Fibersource  ml 6x/da 40 ml Water before and after each bolus. Q 3 hr bt 6A and 9P     Nutrition Assessment   Admission Diagnosis:  ICH (intracerebral hemorrhage) [I61.9]      Problem List:    Hemorrhagic CVA    Dyslipidemia    Multiple bilateral CVAs    HFpEF    T2DM (type 2 diabetes mellitus)    HTN (hypertension)    GERD (gastroesophageal reflux disease)    ICH (intracerebral hemorrhage)    Oropharyngeal dysphagia        PMH:  He  has a past medical history of Acid reflux, Cancer, Diabetes mellitus, GERD (gastroesophageal reflux disease) (09/15/2023), HTN (hypertension) (09/15/2023), Kidney disease, and T2DM (type 2 diabetes mellitus) (09/15/2023).    PSH: He  has a past surgical history that includes Appendectomy; Nasal polyp surgery; Hemorrhoid surgery; and Esophagogastroduodenoscopy w/ PEG (N/A, 9/29/2023).      Applicable Nutrition Concerns:   Skin:  Oral:  GI: PEG (9/29)      Applicable Interval History:   9/16 3% Hypertonic saline initiated  9/15 FEES:  SLP Swallowing Diagnosis: mod-severe, oral dysphagia, severe, pharyngeal dysphagia (09/15/23 1331)  SLP Diet Recommendation: NPO, temporary alternate methods of nutrition/hydration, other (see comments) (okay for 2-3 ice chips per hour as tolerated)   9/23-SLP Diet Recommendation: puree, honey thick liquids.  9/29- PEG tube placed. Consult for tube feeding  assessment.   10/4: [facility] [place] - SLP Diet Recommendation: puree, honey thick liquids, no mixed consistencies   10/16: SLP Diet Recommendation: puree, honey thick liquids, no mixed consistencies (10/16/23 0930)  Recommended Precautions and Strategies: upright posture during/after eating, small bites of food and sips of liquid, no straw, alternate between small bites of food and sips of liquid, general aspiration precautions, 1:1 supervision  Recommended Diagnostics: reassess via [facility] ([facility] [place]) (10/17)     Reported/Observed/Food/Nutrition Related History:     11/13  Pt not eating. Bolus feeding regimen in place.    11/6  Continues tolerating EN. MD ordered to hold EN X 24 hr on 11/5, pt continued not eating, took a total of 3 bites that day. RD asked RN to restart EN.     10/30  Per RN taking little p.o but tolerating bolus EN well. Last 3 days avg goal delivery.    10/20  RN at bedside states pt didn't eat any of breakfast. Pt asking for fresh fruit but does not want it pureed. Tolerating bolus feeds per RN.     10/16  Pt tolerating bolus feeds per RN. Continues with minimal PO acceptance. Pt continues to require restraints due to pulling at PEG tube. Documented BM x2 in past 24hrs.      10/13  Pt continues to have poor PO intake regardless of nocturnal feeds and addition of Remeron. Currently requiring restraints again. Per spouse pt with difficult night and did not wish to wake for lunch. Discussed option to change to bolus feeds and meet 100% of needs and allow for PO intake for comfort only. Spouse agreeable.     10/9  Spouse wanting to cont nocturnal feeding to be able to try p.o feeding. Seemed improved from yesterday. Will change hrs of delivery to 16.    10/8  Pt with meal refusal x2 on 10/7. With refusal, nocturnal feeds inappropriate to meet estimated needs. Will continue calorie count through today and re-evaluate Monday if need to return to continuous feeds. Palliative continues following for GOC.      10/7  Pt with  noted improved PO yesterday however remains inadequate to d/c nocturnal feeds at this time. Will continue calorie count for 2 more days.      10/6  Pt continues with inconsistent intake. Continues to require restraints to prevent pulling PEG regardless of abdominal binder. Spouse present at all meals to assist. Observed increased acceptance of lunch however was not alert enough for breakfast.     10/4  Pt with improved PO at lunch today however per spouse did not eat at breakfast. Suspect due to working with therapies at breakfast time. Tolerated nocturnal regimen. MBS completed - SLP recs puree, honey thick liquids, no mixed consistencies.      10/3  Tolerating feeds at goal. Pt with ~25% at meals being fed by spouse - ? If able to improve PO intake with transition to nocturnal feeds. Discussed with spouse, agreeable to try.     10/1   EN ordered yesterday, started via PEG tube.  @ goal rate this morning and being tolerated. Patient also with order for a diet.  Able to reach patient wife over the phone to get a better sense of patient intake. Patient wife reports she was present for dinner meal yesterday and patient only ate bites of applesauce.  Overall not much intake. Reports patient has been a little sleepy past couple of days and no showing that much interest in eating.  We discussed current EN Rx.  We felt it would be best to keep EN continuous vs nocturnal at this time.  Can switch to nocturnal if the events intake improves.     9/30  Consult for tube feeding assessment.  Overall issues with sodium levels, fluid intake and confusion. Was getting IVF, however pulled out IV.  Wife asked for a PEG tube placement on 9/28.      Patient in COVID isolation, RD not able to visit in person.  No diet this morning (was made NPO for PEG tube placement). Discussed with RN re-order previous order. RD will start tube feeding.       9/25  Spoke w/RN who reports pt pulled out NGT Friday evening. RN states pt eating >/=75% of  "trays. Textures downgraded 9/23 to pureed.     9/22  Pt on diet though ate only sausage and some juice this am. RN states pt pocketing food still, had been happening in ICU per previous RD note. Discussed w/SLP.     9/18 RN reports pt on 3% saline therapy Na+ goal 145-155, Na+ w/I range, pt from OSH sent here d/t hemorrhagic conversion continues to have R weakness, confusion, tolerating TF. Uncertain if pt should have water flushes, these were dc'd after discussion w/ MD.     9/15  Per RN unclear if will start EN or if pt may progress to caden diet at this time.  No wt hx available today.     Anthropometrics     Admission Height 175.3 cm (69\") Documented at 09/15/2023 0122   Admission Weight 79.2 kg (174 lb 9.7 oz) Documented at 09/15/2023 0122     Height: Height: 175.3 cm (69\")  Last Filed Weight: Weight: 72.8 kg (160 lb 7.9 oz) (09/21/23 0500)  Method: Weight Method: Bed scale  BMI: BMI (Calculated): 23.7  BMI classification: Overweight: 25.0-29.9kg/m2      9/15/2023 9/17/2023   Weight     Weight (kg) 79.2 kg  82.5 kg    Weight (lbs) 174 lb 9.7 oz  181 lb 14.1 oz    Weight Method Bed scale  Bed scale        UBW: Per  lbs on 9/14/23  Note 172 lbs in 2018  Weight change: uncertain at this time    Labs    Labs Reviewed: Yes                     Invalid input(s): \"PLAT\"        Results from last 7 days   Lab Units 11/12/23  1214 11/10/23  0550 11/09/23  2322 11/09/23  1757 11/09/23  0543 11/09/23  0024   GLUCOSE mg/dL 132* 174* 102 117 115 132*     Lab Results   Lab Value Date/Time    HGBA1C 6.40 (H) 09/15/2023 0212               Medications    Medications Reviewed: Yes  Pertinent:  prevacid, remeron, phenobarbital, risperdal,depakene  Infusion:   PRN:     Needs Assessment   Date: 9/30  **CBW similar to last used for calculation    Height used:Height: 175.3 cm (69\")  Weights used: 160lb/72.7kg      Estimated Calorie needs: ~ 1900 Kcal/day  Method:  Kcals/KG 25= 1818  Method:  [facility] [place] 1.3= 1923    Estimated " Protein needs: ~ 95g PRO/day  Method: 1.3g/Kg= 95    Estimated Fluid needs: ~ ml/day   Per clinical status    Current Nutrition Prescription      PO: Diet: Regular/House Diet; No Straw, Feeding Assistance - Nursing, No Mixed Consistencies; Texture: Pureed (NDD 1); Fluid Consistency: Honey Thick   Oral Nutrition Supplement: Magic cup daily   Intake: 8 DAYS 0% x 12 meals 10% x 1 meal documented    EN: IsoSource 1.5  Total Cartons: 5  Bolus Regimen: 1 carton (250mL) 5x/d - ~q3hrs between 6a and 9p  Water Flush: 75mL before and after each bolus  Modular: Prosource -no carb 1/day  Route: PEG  Tube: Unknown     At goal over: bolus feeds     Rx will supply:   Goal Volume 1250 mL/day       Flush Volume 750 mL/day       Energy 1935 Kcal/day 102 % Est Need   Protein 100 g/day 105 % Est Need   Fiber 19 g/day       Water in   mL       Total Water 1700 mL       Meet DRI Yes            --------------------------------------------------------------------------  Product/Rate verified at bedside: No  Infusing Rate at time of visit: bolus feeds     Average Delivery from [facility] Days:   Volume 1125 mL/day 90  % Goal Vol.   Flush Volume 916 mL/day     Energy 1748 Kcal/day 92 % Est Need   Protein 91 g/day 96 % Est Need   Fiber 17 g/day     Water in   mL     Total Water 1030 mL     Meet DRI Yes      Note 13 day avg per documentation 924 ml EN/da = 74% goal volume, 636 ml Flush/da 81% goal volume - accuracy of documentation unk.     Intake/Ouptut 24 hrs (01 - 07)   I&O's Reviewed: Yes     Intake & Output (last day)          0701   0700    P.O. 0    Other 525    NG/GT 1120    Total Intake(mL/kg) 1645 (22.6)    Net +1645         Urine Unmeasured Occurrence 3 x        Last stool recorded x 4 on     Nutrition Diagnosis   Date: 9/15 Updated: , , 10/9, 10/13, 10/30, ,   Problem Inadequate oral intake    Etiology stroke   Signs/Symptoms NPO w ice chips per SLP, confusion, +EN   Status: resolved  EN in place (on diet but not taking)    Date:  9/18 Updated:  Problem Swallowing difficulty/chewing difficulty   Etiology Bilateral strokes   Signs/Symptoms Oral-pharyngeal dysphagia per SLP FEES eval   Status: ongoing    Goal:   General: Nutrition support treatment  PO: Increase intake as feasilbe   EN/PN: Maintain EN      Nutrition Intervention      Follow treatment progress, Care plan reviewed rec prepared if EN product unavailable:     If Isosource not available: Fibersource  ml 6x/da 40 ml Water before and after each bolus. Q 3 hr bt 6A and 9P to supply w 1 Prosource/da:  1500 ml for 1860 Kcal 98% est need, 96 g % est need, 23 g Fiber 1215 ml Water in EN 1695 total ml Free Water    onitoring/Evaluation:   Per protocol, I&O, PO intake, Supplement intake, Pertinent labs, EN delivery/tolerance, Weight, Symptoms, Swallow function      Bernarda Adhikari RD  Time Spent: 35 min

## 2023-11-14 NOTE — SIGNIFICANT NOTE
11/14/23 1540   SLP Deferred Reason   SLP Deferred Reason Routine  (Pt not appropriate for tx this date. Will f/u and continue to attempt.)

## 2023-11-14 NOTE — PLAN OF CARE
Goal Outcome Evaluation:  Plan of Care Reviewed With: spouse        Progress: no change  Outcome Evaluation: Pt has his leg up in the air but more relaxed that last week. Sleeping better. congestion at times. nursing reports episodes of crying and emotional lability at times.  Continue prn phenobarb.  Dianelys Arriaga and Eladio plan to talk with wife again today regarding goals.  Palliative IDT: Rn, ELADIO, MD ROLANDO,   After hours# [phone]

## 2023-11-14 NOTE — PROGRESS NOTES
"Palliative Care Daily Progress Note     C/C: Patient with continued agitation and restless.     S: Medical record reviewed. Follow up visit for C in the context of complex medical decision making. Events noted. Patient with  sitter at bedside, out of restraints, but continued restlessness. RN and wife reports patient, sleeping longer periods of time at night, awakens around 5am and restless. Continues on scheduled Hydrocodone 5-325mg [id number]. Continues on Phenorbarb 81 mg q 8 hours, Risperidone 0.5mg BID, Restoril 30mg nightly, Depakene 250mg q 8 hours. Patient with one doses Phenobarb 81mg PRN and scheduled in addition. One dose Geodon 20mg IV in the last 24 hours. One dose Haldol.  Patient with crying and facial grimacing, wife agreeable to placement of Subcutaneous line with prn Morphine prn pain. Patient continues with no PO intake, continues with bolus feedings. Patient with increased congestion and difficulty managing oral secretions, frequent suctioning per RN and wife.     ROS:  ROS limited by AMS.     O: Code Status:   Code Status and Medical Interventions:   Ordered at: 11/10/23 1455     Level Of Support Discussed With:    Next of Kin (If No Surrogate)     Code Status (Patient has no pulse and is not breathing):    No CPR (Do Not Attempt to Resuscitate)     Medical Interventions (Patient has pulse or is breathing):    Comfort Measures      Advanced Directives: Advance Directive Status: Patient does not have advance directive   Goals of Care: Ongoing.   Palliative Performance Scale Score: 40%     /75 (BP Location: Right arm, Patient Position: Lying)   Pulse 105   Temp 98.3 °F (36.8 °C) (Temporal)   Resp 20   Ht 175.3 cm (69\")   Wt 72.8 kg (160 lb 7.9 oz)   SpO2 96%   BMI 23.70 kg/m²     Intake/Output Summary (Last 24 hours) at 11/14/2023 1214  Last data filed at 11/13/2023 2000  Gross per 24 hour   Intake 1260 ml   Output --   Net 1260 ml       PE:  General Appearance:    Patient laying in " "bed, restless, frequent repositioning, awake, drowsy, A/C ill appearing,    HEENT:    NC/AT, EOMI, anicteric, MMM, facial grimacing   Neck:   supple, trachea midline, no JVD   Lungs:     Increased oral secretions, diminished in bases; respirations regular, even and unlabored; RR 18-20 on exam    Heart:    RRR, normal S1 and S2, no M/R/G, HR 94   Abdomen:     Normal bowel sounds, soft, non-tender, non-distended, abd binder in place covering PEG   G/U:   Deferred   MSK/Extremities:   Wasting, no edema   Pulses:   Pulses palpable and equal bilaterally   Skin:   Warm, dry   Neurologic:   Drowsy,    Psych:   Restless, crying, moaning, frequent repositioning,      Meds: Reviewed and changes noted    Labs:                         Invalid input(s): \"LABALBU\", \"PROT\"      Imaging Results (Last 72 Hours)       ** No results found for the last 72 hours. **                  Diagnostics: Reviewed    A:   Hemorrhagic CVA    Dyslipidemia    Multiple bilateral CVAs    HFpEF    T2DM (type 2 diabetes mellitus)    HTN (hypertension)    GERD (gastroesophageal reflux disease)    ICH (intracerebral hemorrhage)    Oropharyngeal dysphagia     73 y.o. male with hemorrhagic CVA, HFpEF, HTN.   S/S:   Pain -s/p stroke and arthritis, MSK, low back  -continue Hydrocodone 5-325mg PO q6 hours  -scheduled Lidocaine patch to low back  -Voltaren gel  to bilateral knees  -continue Palliative oral rinse  -started subcutaneous line for Morphine SC q2 hours prn pain     2. Dysphagia -SLP with diet modifications  -PEG in place  -patient with TF, no PO intake     3. Debility -PT/OT following     4. Agitation -requiring sitter  -discontinued Seroquel 200mg BID  -discontinued Melatonin 10mg nightly  -continue Risperidone 0.5mg per PEG BID   -Valproic acid 250mg per PEG TID  -Phenobarb 81mg per PEG q 8 hours,   -Phenobarb to 81mg per PEG q 8 hours prn  -Haldol 5mg per PEG e7pizon prn agitation  -continue Geodon 20mg IM BID prn  -comfort measures    5. Dry MM " -continue oral Palliative rinse     6. GOC -DNR/DNI/comfort measures -per discussion with wife  -continued full treatment  11/9: long discussion with wife regarding patient's condition and continued agitation, reviewed Neurology's prognosis, reviewed options for a more comfort focused plan of care in hospital and at discharge, reviewed hospice services in home which would require 24/7 caregivers, plan to contact  regarding potential for patient to go to [facility] with hospice. Wife has not made any decisions but would like CM to reach out to her if [place] is able to take patient as LTC with hospice.   11/10: Wife requesting comfort focused plan of care with continuation of tube feeding. Reviewed use of medications for agitation. No further labs, testing. Goal is for patient's comfort. Goal is to discharge either home with hospice or facility with hospice. Medications adjusted as above.  11/11: Patient continues to be restless, continuing to adjust medications and dosages. Haldol increased. No signs of thrush but mouth appears dry. Palliative oral rinse continued.   11/13: Patient restless but allowing personal care and allowing RN to complete oral care. Wife concerned regarding patient's speech is more garbled. Reviewed medications at this time and patient's decreased agitation, and sleeping at night. Wife in agreement with current medications and goal for comfort focused plan of care.   11/14: Visit x2 to room. Patient restless and crying with facial grimacing when awake. Discussed patient's symptoms and wife agreeable to starting subcutaneous Morphine as needed for signs of pain. Concern patient with increased oral secretions and congestion, may not be tolerating bolus feeding. Patient requiring frequent suctioning which he tolerates but is increasing his pain. Encouraged wife to contact family, especially son, regarding patient's decline as he appears to be declining towards end of life. Plan to meet  with wife tomorrow to discuss stopping TF due to increased burden at this point.     P: Follow up visit for symptoms. Goal for comfort focused plan of care. Plan for home or LTC with hospice. Patient declining, subcutaneous Morphine added for pain. Plan to meet with wife tomorrow to discuss stopping TF due to increased burden at this point.   Palliative Care Team will continue to follow patient. Please do not hesitate to contact us regarding further sx mgmt or GOC needs.  Dianelys Arriaga, APRN  11/14/2023  Time spent: 60 minutes

## 2023-11-14 NOTE — PLAN OF CARE
Goal Outcome Evaluation:  Plan of Care Reviewed With: patient        Progress: no change  Outcome Evaluation: Pt slept majority of night. Pt had small BM tonight. Right hand/arm remains in splint. Pt tolerating bolus tube feeds. No PRN meds given up to this point. Sitter remains at bedside. POC on going.

## 2023-11-14 NOTE — PROGRESS NOTES
[facility] Medicine Services  PROGRESS NOTE    Patient Name: Kenneth Wen  : 1950  MRN: [id number]    Date of Admission: 9/15/2023  Primary Care Physician: Susan Powers APRN    Subjective   Subjective     CC:  AMS     HPI:  No acute events. Remains restless. Unable to decipher what he is saying. Spoke to wife.       Objective   Objective     Vital Signs:   Temp:  [98.3 °F (36.8 °C)-99.2 °F (37.3 °C)] 98.3 °F (36.8 °C)  Heart Rate:  [] 105  Resp:  [16-20] 20  BP: (115-126)/(71-75) 115/75     Physical Exam:  Constitutional: restless   HENT: NCAT, mucous membranes moist  Respiratory: Clear to auscultation bilaterally, respiratory effort normal   Cardiovascular: RRR, no murmurs, rubs, or gallops  Gastrointestinal: Positive bowel sounds, soft, nontender, nondistended  Musculoskeletal: No bilateral ankle edema  Psychiatric: restless, tearful   Neurologic: moving all ext   Skin: No rashes      Results Reviewed:  LAB RESULTS:                              Brief Urine Lab Results  (Last result in the past 365 days)        Color   Clarity   Blood   Leuk Est   Nitrite   Protein   CREAT   Urine HCG        10/30/23 1636 Yellow   Clear   Negative   Negative   Negative   Negative                   Microbiology Results Abnormal       Procedure Component Value - Date/Time    Blood Culture - Blood, Arm, Right [[id number]]  (Normal) Collected: 09/15/23 0212    Lab Status: Final result Specimen: Blood from Arm, Right Updated: 23     Blood Culture No growth at 5 days    Blood Culture - Blood, Arm, Left [[id number]]  (Normal) Collected: 09/15/23 0212    Lab Status: Final result Specimen: Blood from Arm, Left Updated: 23     Blood Culture No growth at 5 days            No radiology results from the last 24 hrs        Current medications:  Scheduled Meds:atorvastatin, 80 mg, Per PEG Tube, Nightly  clopidogrel, 75 mg, Per PEG Tube, Daily  Diclofenac Sodium, 2 g, Topical,  BID  donepezil, 10 mg, Per PEG Tube, Nightly  fluticasone, 2 spray, Each Nare, Daily  HYDROcodone-acetaminophen, 1 tablet, Per PEG Tube, Q6H  lansoprazole, 15 mg, Per PEG Tube, Q AM  Lidocaine, 1 patch, Transdermal, Q24H  metoprolol tartrate, 25 mg, Per PEG Tube, Q12H  miconazole, 1 application , Topical, Q12H  mirtazapine, 30 mg, Per PEG Tube, Nightly  palliative care oral rinse, , Mouth/Throat, 4x Daily  PHENobarbital, 81 mg, Per PEG Tube, Q8H  ProSource No Carb, 30 mL, Per G Tube, Daily  risperiDONE, 0.5 mg, Per PEG Tube, Q12H  temazepam, 30 mg, Per PEG Tube, Nightly  Valproic Acid, 250 mg, Per PEG Tube, Q8H      Continuous Infusions:   PRN Meds:.  haloperidol    ibuprofen    ondansetron    PHENobarbital    ziprasidone    Assessment & Plan   Assessment & Plan     Active Hospital Problems    Diagnosis  POA    **Hemorrhagic CVA [I61.9]  Yes    Oropharyngeal dysphagia [R13.12]  Yes    Multiple bilateral CVAs [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        Brief Hospital Course to date:  Kenneth Wen is a 73 y.o. male  with hx of HTN, HLD, T2DM, and GERD who presented to OSH with multiple acute ischemic infarcts of the bilateral cerebral and cerebellar hemispheres as well as left isaac. TTE/JOSIAS was negative PFO at OSH. On 9/13/23 he had worsening in mental status and new inability to move RLE, head CT showed evolution of the existing CVA with new development of petechial hemorrhage. DAPT was held for 24 hours per Neurology recommendations and repeat CT head that evening showed worsening effacement of the right quadrigeminal cistern with suspected increasing upward supratentorial pressure. He was then transferred to [facility] for Neurosurgery  evaluation on 9/15/23. He was started on hypertonic saline 9/15/23 through 9/20/23 for cerebral edema. He had fevers with negative cultures but had worsening secretions and labored breathing so was started on Zosyn on 9/16/23. Transferred to the hospitalist service on 9/22/23. Pt demonstrated poor oral intake with elevated sodium levels; therefore, PEG tube was recommended & placed. Palliative and hospice are following. Currently comfort measures only with plan to d/c home with hospice vs LTC with hospice.      Assessment/plan:     Multiple bilateral posterior circulation strokes with hemorrhagic conversion  -- suspect atheroembolic but cannot rule out cardioembolic given multiple vascular territories  --Recommended MCOT at d/c but now plan is for comfort measures  --stroke followed  -- Continue plavix monotherapy, high intensity statin  -PT/OT consulted initially   --Home with hospice vs LTC with hospice     Agitation, improved  Encephalopathy, improving  --Donepezil 10 mg nightly, mirtazapine 30 mg nightly, Restoril increased to 30mg nightly  - he was initially on seroquel but still with significant agitation. Changed to risperidone and phenobarbital with some help in PRN use.   --Continue as needed Geodon  - Continue sitter     Dysphagia  Hypernatremia - resolved  - PEG tube placed 9/29, Dr Carrillo   --SLP recommends mechanical ground with honey thick liquids but not eating  --Risk of pulling out peg tube, abd binder to be in place at all times.   -- Continue bolus TF, modified diet  - Palliative has discussed with wife -- she would like to continue tube feeding      HTN  --Continue Metoprolol 25 mg BID, stable     GERD  --Continue PPI     COVID-19-resolved  -- Was asymptomatic   --Did not receive any treatment   --Off Isolation 9/30/2023      Leukocytosis-resolved  --Procalcitionin low at 0.05  --CXR and UA negative  --Blood cultures negative     Elevated LFT's, mild  --Possibly secondary to statin?  --Negative  acute hepatitis panel  --repeat AST/ALT improved     Expected Discharge Location and Transportation: facility with hospice   Expected Discharge   Expected Discharge Date: 11/17/2023; Expected Discharge Time:      DVT prophylaxis:  No DVT prophylaxis order currently exists.     AM-PAC 6 Clicks Score (PT): 8 (11/13/23 2000)    CODE STATUS:   Code Status and Medical Interventions:   Ordered at: 11/10/23 1793     Level Of Support Discussed With:    Next of Kin (If No Surrogate)     Code Status (Patient has no pulse and is not breathing):    No CPR (Do Not Attempt to Resuscitate)     Medical Interventions (Patient has pulse or is breathing):    Comfort Measures       Araceli Venegas DO  11/14/23

## 2023-11-14 NOTE — PLAN OF CARE
Goal Outcome Evaluation:  Plan of Care Reviewed With: patient           Outcome Evaluation: vss. intermittent crying/moaning episodes, appears to sleep much easier/less restless with morphine sq.  tolerating bolus feeds, no po food intake.  wife at bs.

## 2023-11-15 PROCEDURE — 99231 SBSQ HOSP IP/OBS SF/LOW 25: CPT | Performed by: INTERNAL MEDICINE

## 2023-11-15 PROCEDURE — 25010000002 MORPHINE PER 10 MG

## 2023-11-15 RX ORDER — LORAZEPAM 2 MG/ML
0.5 INJECTION INTRAMUSCULAR EVERY 4 HOURS PRN
Status: DISCONTINUED | OUTPATIENT
Start: 2023-11-15 | End: 2023-11-16 | Stop reason: HOSPADM

## 2023-11-15 RX ADMIN — MICONAZOLE NITRATE 1 APPLICATION: 20 CREAM TOPICAL at 09:00

## 2023-11-15 RX ADMIN — DICLOFENAC SODIUM 2 G: 9.3 GEL TOPICAL at 09:00

## 2023-11-15 RX ADMIN — HYDROCODONE BITARTRATE AND ACETAMINOPHEN 1 TABLET: 5; 325 TABLET ORAL at 22:34

## 2023-11-15 RX ADMIN — RISPERIDONE 0.5 MG: 1 SOLUTION ORAL at 22:08

## 2023-11-15 RX ADMIN — METOPROLOL TARTRATE 25 MG: 25 TABLET, FILM COATED ORAL at 22:08

## 2023-11-15 RX ADMIN — VALPROIC ACID 250 MG: 250 SOLUTION ORAL at 22:08

## 2023-11-15 RX ADMIN — HYDROCODONE BITARTRATE AND ACETAMINOPHEN 1 TABLET: 5; 325 TABLET ORAL at 18:26

## 2023-11-15 RX ADMIN — METOPROLOL TARTRATE 25 MG: 25 TABLET, FILM COATED ORAL at 09:00

## 2023-11-15 RX ADMIN — MINERAL OIL: 1000 LIQUID ORAL at 18:26

## 2023-11-15 RX ADMIN — DICLOFENAC SODIUM 2 G: 9.3 GEL TOPICAL at 22:11

## 2023-11-15 RX ADMIN — LIDOCAINE 1 PATCH: 4 PATCH TOPICAL at 09:00

## 2023-11-15 RX ADMIN — DONEPEZIL HYDROCHLORIDE 10 MG: 10 TABLET, FILM COATED ORAL at 18:26

## 2023-11-15 RX ADMIN — MORPHINE SULFATE 2 MG: 2 INJECTION, SOLUTION INTRAMUSCULAR; INTRAVENOUS at 00:37

## 2023-11-15 RX ADMIN — PHENOBARBITAL 81 MG: 64.8 TABLET ORAL at 22:08

## 2023-11-15 RX ADMIN — FLUTICASONE PROPIONATE 2 SPRAY: 50 SPRAY, METERED NASAL at 10:25

## 2023-11-15 RX ADMIN — PHENOBARBITAL 81 MG: 64.8 TABLET ORAL at 15:31

## 2023-11-15 RX ADMIN — VALPROIC ACID 250 MG: 250 SOLUTION ORAL at 05:11

## 2023-11-15 RX ADMIN — MIRTAZAPINE 30 MG: 15 TABLET, FILM COATED ORAL at 18:26

## 2023-11-15 RX ADMIN — CLOPIDOGREL BISULFATE 75 MG: 75 TABLET ORAL at 09:00

## 2023-11-15 RX ADMIN — PHENOBARBITAL 81 MG: 64.8 TABLET ORAL at 05:06

## 2023-11-15 RX ADMIN — HYDROCODONE BITARTRATE AND ACETAMINOPHEN 1 TABLET: 5; 325 TABLET ORAL at 10:34

## 2023-11-15 RX ADMIN — MORPHINE SULFATE 2 MG: 2 INJECTION, SOLUTION INTRAMUSCULAR; INTRAVENOUS at 15:40

## 2023-11-15 RX ADMIN — RISPERIDONE 0.5 MG: 1 SOLUTION ORAL at 09:00

## 2023-11-15 RX ADMIN — MINERAL OIL: 1000 LIQUID ORAL at 22:11

## 2023-11-15 RX ADMIN — MICONAZOLE NITRATE 1 APPLICATION: 20 CREAM TOPICAL at 22:10

## 2023-11-15 RX ADMIN — VALPROIC ACID 250 MG: 250 SOLUTION ORAL at 15:31

## 2023-11-15 RX ADMIN — HYDROCODONE BITARTRATE AND ACETAMINOPHEN 1 TABLET: 5; 325 TABLET ORAL at 05:05

## 2023-11-15 RX ADMIN — TEMAZEPAM 30 MG: 15 CAPSULE ORAL at 22:07

## 2023-11-15 RX ADMIN — MORPHINE SULFATE 2 MG: 2 INJECTION, SOLUTION INTRAMUSCULAR; INTRAVENOUS at 06:49

## 2023-11-15 RX ADMIN — LANSOPRAZOLE 15 MG: 15 TABLET, ORALLY DISINTEGRATING ORAL at 05:05

## 2023-11-15 RX ADMIN — Medication 30 ML: at 09:00

## 2023-11-15 NOTE — PROGRESS NOTES
Continued Stay Note  [facility]     Patient Name: Kenneth Wen  MRN: [id number]  Today's Date: 11/15/2023    Admit Date: 9/15/2023    Plan: Inpatient hospice to reach out to spouse 11/16   Discharge Plan       Row Name 11/15/23 4978       Plan    Plan Inpatient hospice to reach out to spouse 11/16    Plan Comments Hospice consult received. Per discussion with Dianelys MARSHALL with palliative, inpatient hospice team will reach out to family tomorrow. If can be of assist prior to this, please call ext [phone].                   Discharge Codes    No documentation.                 Expected Discharge Date and Time       Expected Discharge Date Expected Discharge Time    Nov 17, 2023               Chantel Das RN

## 2023-11-15 NOTE — PROGRESS NOTES
"Palliative Care Daily Progress Note     C/C: Patient resting with intermittent periods of crying.     S: Medical record reviewed. Follow up visit for GOC in the context of complex medical decision making. Events noted. Patient with increased periods of sleeping followed by crying/moaning when awake. He is with increased secretions, suctioned. Continues on scheduled Hydrocodone 5-325mg [id number]. Continues on Phenorbarb 81 mg q 8 hours, Risperidone 0.5mg BID, Restoril 30mg nightly, Depakene 250mg q 8 hours. Patient with three doses Morphine 2mg subcutaneous which appears to relieve pain when given. Patient with increased congestion and difficulty managing oral secretions.     ROS:  ROS limited by AMS.     O: Code Status:   Code Status and Medical Interventions:   Ordered at: 11/10/23 1455     Level Of Support Discussed With:    Next of Kin (If No Surrogate)     Code Status (Patient has no pulse and is not breathing):    No CPR (Do Not Attempt to Resuscitate)     Medical Interventions (Patient has pulse or is breathing):    Comfort Measures      Advanced Directives: Advance Directive Status: Patient does not have advance directive   Goals of Care: Ongoing.   Palliative Performance Scale Score: 20%     /71 (BP Location: Left arm, Patient Position: Lying)   Pulse 118   Temp 98.5 °F (36.9 °C) (Temporal)   Resp 16   Ht 175.3 cm (69\")   Wt 72.8 kg (160 lb 7.9 oz)   SpO2 92%   BMI 23.70 kg/m²     Intake/Output Summary (Last 24 hours) at 11/15/2023 1540  Last data filed at 11/15/2023 1452  Gross per 24 hour   Intake 860 ml   Output --   Net 860 ml       PE:  General Appearance:    Patient laying in bed, restless, frequent repositioning, awake, drowsy, A/C ill appearing,    HEENT:    NC/AT, EOMI, anicteric, MMM, facial grimacing   Neck:   supple, trachea midline, no JVD   Lungs:     Increased oral secretions, rhonchi to BUL's, diminished in bases; respirations regular, even and unlabored; RR 18-20 on exam    Heart: " "   RRR, normal S1 and S2, no M/R/G, HR 94   Abdomen:     Normal bowel sounds, soft, non-tender, non-distended, abd binder in place covering PEG   G/U:   Deferred   MSK/Extremities:   Wasting, no edema   Pulses:   Pulses palpable and equal bilaterally   Skin:   Warm, dry   Neurologic:   Drowsy,    Psych:   Restless, crying, moaning, frequent repositioning,      Meds: Reviewed and changes noted    Labs:                         Invalid input(s): \"LABALBU\", \"PROT\"      Imaging Results (Last 72 Hours)       ** No results found for the last 72 hours. **                  Diagnostics: Reviewed    A:   Hemorrhagic CVA    Dyslipidemia    Multiple bilateral CVAs    HFpEF    T2DM (type 2 diabetes mellitus)    HTN (hypertension)    GERD (gastroesophageal reflux disease)    ICH (intracerebral hemorrhage)    Oropharyngeal dysphagia     73 y.o. male with hemorrhagic CVA, HFpEF, HTN.   S/S:   Pain -s/p stroke and arthritis, MSK, low back  -continue Hydrocodone 5-325mg PO q6 hours  -scheduled Lidocaine patch to low back  -Voltaren gel  to bilateral knees  -continue Palliative oral rinse  -started subcutaneous line for Morphine SC q2 hours prn pain     2. Dysphagia -stop tubefeed  -oral care     3. Debility -PT/OT following     4. Agitation -requiring sitter  -discontinued Seroquel 200mg BID  -discontinued Melatonin 10mg nightly  -continue Risperidone 0.5mg per PEG BID   -Valproic acid 250mg per PEG TID  -Phenobarb 81mg per PEG q 8 hours,   -Phenobarb to 81mg per PEG q 8 hours prn  -discontinued Haldol 5mg per PEG [id number] prn agitation  -discontinue Geodon 20mg IM BID prn  -started Lorazepam 0.5mg IV/SC q 4 hours prn agitation  -comfort measures    5. Dry MM -continue oral Palliative rinse     6. GOC -DNR/DNI/comfort measures -per discussion with wife  -continued full treatment  11/9: long discussion with wife regarding patient's condition and continued agitation, reviewed Neurology's prognosis, reviewed options for a more comfort " focused plan of care in hospital and at discharge, reviewed hospice services in home which would require 24/7 caregivers, plan to contact  regarding potential for patient to go to [facility] with hospice. Wife has not made any decisions but would like CM to reach out to her if [place] is able to take patient as LTC with hospice.   11/10: Wife requesting comfort focused plan of care with continuation of tube feeding. Reviewed use of medications for agitation. No further labs, testing. Goal is for patient's comfort. Goal is to discharge either home with hospice or facility with hospice. Medications adjusted as above.  11/11: Patient continues to be restless, continuing to adjust medications and dosages. Haldol increased. No signs of thrush but mouth appears dry. Palliative oral rinse continued.   11/13: Patient restless but allowing personal care and allowing RN to complete oral care. Wife concerned regarding patient's speech is more garbled. Reviewed medications at this time and patient's decreased agitation, and sleeping at night. Wife in agreement with current medications and goal for comfort focused plan of care.   11/14: Visit x2 to room. Patient restless and crying with facial grimacing when awake. Discussed patient's symptoms and wife agreeable to starting subcutaneous Morphine as needed for signs of pain. Concern patient with increased oral secretions and congestion, may not be tolerating bolus feeding. Patient requiring frequent suctioning which he tolerates but is increasing his pain. Encouraged wife to contact family, especially son, regarding patient's decline as he appears to be declining towards end of life. Plan to meet with wife tomorrow to discuss stopping TF due to increased burden at this point.   11/15: Met with wife at bedside, Dr. Aliya Proctor and JOEL Hayes also in attendance. Discussed difficult news that patient is dying and the tubefeed is adding to his discomfort with  increased secretions. Wife in agreement with stopping tubefeed. Emotional support provided to wife who was appropriately tearful. Inpatient hospice to follow up tomorrow.     P: Follow up visit for symptoms. Goal for comfort focused plan of care. Patient declining, subcutaneous Morphine effective for pain, added Lorazepam 0.5mg IV q 4 hours prn. Tubefeed stopped as increasing patient's secretions at end of life. Inpatient hospice to follow up tomorrow.    Palliative Care Team will continue to follow patient. Please do not hesitate to contact us regarding further sx mgmt or GOC needs.  Dianelys Arriaga, APRN  11/15/2023  Time spent: 35 minutes

## 2023-11-15 NOTE — PLAN OF CARE
"  Problem: Palliative Care  Goal: Enhanced Quality of Life  Intervention: Promote Advance Care Planning  Recent Flowsheet Documentation  Taken 11/15/2023 1540 by Meera Miller \"Hayley\" TIMO MALDONADO  Life Transition/Adjustment: (TF stopped, inpatient hospice to see 11/16) end-of-life care initiated  Intervention: Optimize Psychosocial Wellbeing  Recent Flowsheet Documentation  Taken 11/15/2023 1540 by Meera Miller \"Hayley\" TIMO MALDONADO  Supportive Measures: (d/w patient's wife at bedside with Palliative MD, NP, and LCSW present for conference)   active listening utilized   counseling provided   self-care encouraged   verbalization of feelings encouraged  Family/Support System Care:   support provided   self-care encouraged   caregiver stress acknowledged  Palliative family conference at bedside with patient's wife, MINNIE Proctor MD, DARIUS MARSHALL, and ROZINA Miller LCSW.  Patient resting calmly at times and others crying out/agitated.  Patient with audible congestion, appears flushed, NP suctioning secretions - appears to be TF.  Discussed with wife that patient has progressed in decline and TF is now burdening as he is unable to tolerate.  Wife tearful, acknowledges she knows he is dying but also hopeful he would \"wake up\" & recover.  Helped facilitate wife self-care, moved to recliner beside patient, encouraged hydration and nutrition, offered to contact family for support.  Inpatient hospice team to check with wife tomorrow morning.  Patient requiring injectable medications for comfort (agitation, pain, dyspnea).  Palliative Care continues to follow for support and assist with EOL symptom management and plan of care.     "

## 2023-11-15 NOTE — PLAN OF CARE
Goal Outcome Evaluation:  Plan of Care Reviewed With: patient        Progress: no change  Outcome Evaluation: Pt rested well throughout the shift. SQ morphine and relaxation techniques used to combat persistent restlessness. Pt left side persistently restless and pt calls out and moans at times. VSS on RA. No other concerns noted at this time.

## 2023-11-15 NOTE — PROGRESS NOTES
[facility] Medicine Services  PROGRESS NOTE    Patient Name: Kenneth Wen  : 1950  MRN: [id number]    Date of Admission: 9/15/2023  Primary Care Physician: Susan Powers APRN    Subjective   Subjective     CC:  AMS     HPI:  Resting in bed in no acute distress, asleep but arousable.  Looks comfortable not as restless as before.      Objective   Objective     Vital Signs:   Temp:  [98.5 °F (36.9 °C)-99.4 °F (37.4 °C)] 98.5 °F (36.9 °C)  Heart Rate:  [108-118] 118  Resp:  [16-18] 16  BP: (123-148)/(71-78) 123/71     Physical Exam:  Constitutional: Asleep, arousable  HENT: NCAT, mucous membranes moist  Respiratory: Clear to auscultation bilaterally, respiratory effort normal   Cardiovascular: RRR, no murmurs, rubs, or gallops  Gastrointestinal: Positive bowel sounds, soft, nontender, nondistended  Musculoskeletal: No bilateral ankle edema  Psychiatric: Unable to assess  Neurologic: moving all ext   Skin: No rashes      Results Reviewed:  LAB RESULTS:                              Brief Urine Lab Results  (Last result in the past 365 days)        Color   Clarity   Blood   Leuk Est   Nitrite   Protein   CREAT   Urine HCG        10/30/23 1636 Yellow   Clear   Negative   Negative   Negative   Negative                   Microbiology Results Abnormal       Procedure Component Value - Date/Time    Blood Culture - Blood, Arm, Right [[id number]]  (Normal) Collected: 09/15/23 0212    Lab Status: Final result Specimen: Blood from Arm, Right Updated: 23     Blood Culture No growth at 5 days    Blood Culture - Blood, Arm, Left [[id number]]  (Normal) Collected: 09/15/23 0212    Lab Status: Final result Specimen: Blood from Arm, Left Updated: 23     Blood Culture No growth at 5 days            No radiology results from the last 24 hrs        Current medications:  Scheduled Meds:clopidogrel, 75 mg, Per PEG Tube, Daily  Diclofenac Sodium, 2 g, Topical, BID  donepezil, 10 mg,  Per PEG Tube, Nightly  fluticasone, 2 spray, Each Nare, Daily  HYDROcodone-acetaminophen, 1 tablet, Per PEG Tube, Q6H  lansoprazole, 15 mg, Per PEG Tube, Q AM  Lidocaine, 1 patch, Transdermal, Q24H  metoprolol tartrate, 25 mg, Per PEG Tube, Q12H  miconazole, 1 application , Topical, Q12H  mirtazapine, 30 mg, Per PEG Tube, Nightly  palliative care oral rinse, , Mouth/Throat, 4x Daily  PHENobarbital, 81 mg, Per PEG Tube, Q8H  ProSource No Carb, 30 mL, Per G Tube, Daily  risperiDONE, 0.5 mg, Per PEG Tube, Q12H  temazepam, 30 mg, Per PEG Tube, Nightly  Valproic Acid, 250 mg, Per PEG Tube, Q8H      Continuous Infusions:   PRN Meds:.  haloperidol    ibuprofen    Morphine    ondansetron    PHENobarbital    ziprasidone    Assessment & Plan   Assessment & Plan     Active Hospital Problems    Diagnosis  POA    **Hemorrhagic CVA [I61.9]  Yes    Oropharyngeal dysphagia [R13.12]  Yes    Multiple bilateral CVAs [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        Brief Hospital Course to date:  Kenneth Wen is a 73 y.o. male  with hx of HTN, HLD, T2DM, and GERD who presented to OSH with multiple acute ischemic infarcts of the bilateral cerebral and cerebellar hemispheres as well as left isaac. TTE/JOSIAS was negative PFO at OSH. On 9/13/23 he had worsening in mental status and new inability to move RLE, head CT showed evolution of the existing CVA with new development of petechial hemorrhage. DAPT was held for 24 hours per Neurology recommendations and repeat CT head that evening showed worsening effacement of the right quadrigeminal cistern with suspected increasing upward supratentorial pressure. He was then transferred to [facility] for Neurosurgery evaluation on  9/15/23. He was started on hypertonic saline 9/15/23 through 9/20/23 for cerebral edema. He had fevers with negative cultures but had worsening secretions and labored breathing so was started on Zosyn on 9/16/23. Transferred to the hospitalist service on 9/22/23. Pt demonstrated poor oral intake with elevated sodium levels; therefore, PEG tube was recommended & placed. Palliative and hospice are following. Currently comfort measures only with plan to d/c home with hospice vs LTC with hospice.      Assessment/plan:     Multiple bilateral posterior circulation strokes with hemorrhagic conversion  -- suspect atheroembolic but cannot rule out cardioembolic given multiple vascular territories  --Recommended MCOT at d/c but now plan is for comfort measures  --stroke followed  -- Continue plavix monotherapy, high intensity statin  -PT/OT consulted initially   --Home with hospice vs LTC with hospice     Agitation, improved  Encephalopathy, improving  --Donepezil 10 mg nightly, mirtazapine 30 mg nightly, Restoril increased to 30mg nightly  - he was initially on seroquel but still with significant agitation. Changed to risperidone and phenobarbital with some help in PRN use.   --Continue as needed Geodon  - Continue sitter     Dysphagia  Hypernatremia - resolved  - PEG tube placed 9/29, Dr Carrillo   --SLP recommends mechanical ground with honey thick liquids but not eating  --Risk of pulling out peg tube, abd binder to be in place at all times.   -- Continue bolus TF, modified diet  - Palliative has discussed with wife -- she would like to continue tube feeding      HTN  --Continue Metoprolol 25 mg BID, stable     GERD  --Continue PPI     COVID-19-resolved  -- Was asymptomatic   --Did not receive any treatment   --Off Isolation 9/30/2023      Leukocytosis-resolved  --Procalcitionin low at 0.05  --CXR and UA negative  --Blood cultures negative     Elevated LFT's, mild  --Possibly secondary to statin?  --Negative acute hepatitis  panel  --repeat AST/ALT improved     Expected Discharge Location and Transportation: facility with hospice   Expected Discharge   Expected Discharge Date: 11/17/2023; Expected Discharge Time:      DVT prophylaxis:  No DVT prophylaxis order currently exists.     AM-PAC 6 Clicks Score (PT): 8 (11/14/23 2000)    CODE STATUS:   Code Status and Medical Interventions:   Ordered at: 11/10/23 1612     Level Of Support Discussed With:    Next of Kin (If No Surrogate)     Code Status (Patient has no pulse and is not breathing):    No CPR (Do Not Attempt to Resuscitate)     Medical Interventions (Patient has pulse or is breathing):    Comfort Measures       Bruce Du MD  11/15/23

## 2023-11-16 VITALS
SYSTOLIC BLOOD PRESSURE: 101 MMHG | TEMPERATURE: 99 F | OXYGEN SATURATION: 95 % | HEART RATE: 112 BPM | HEIGHT: 69 IN | WEIGHT: 160.5 LBS | DIASTOLIC BLOOD PRESSURE: 60 MMHG | RESPIRATION RATE: 17 BRPM | BODY MASS INDEX: 23.77 KG/M2

## 2023-11-16 PROCEDURE — 25010000002 LORAZEPAM PER 2 MG

## 2023-11-16 PROCEDURE — 99238 HOSP IP/OBS DSCHRG MGMT 30/<: CPT | Performed by: INTERNAL MEDICINE

## 2023-11-16 PROCEDURE — 25010000002 MORPHINE PER 10 MG

## 2023-11-16 RX ORDER — FLUTICASONE PROPIONATE 50 MCG
2 SPRAY, SUSPENSION (ML) NASAL DAILY
Status: CANCELLED | OUTPATIENT
Start: 2023-11-17

## 2023-11-16 RX ORDER — LIDOCAINE 4 G/G
1 PATCH TOPICAL
Status: CANCELLED | OUTPATIENT
Start: 2023-11-17

## 2023-11-16 RX ORDER — AMINO ACIDS/PROTEIN HYDROLYS 11G-40/45
30 LIQUID IN PACKET (ML) ORAL DAILY
Status: CANCELLED | OUTPATIENT
Start: 2023-11-17

## 2023-11-16 RX ORDER — CLOPIDOGREL BISULFATE 75 MG/1
75 TABLET ORAL DAILY
Status: CANCELLED | OUTPATIENT
Start: 2023-11-17

## 2023-11-16 RX ORDER — MIRTAZAPINE 15 MG/1
30 TABLET, FILM COATED ORAL NIGHTLY
Status: CANCELLED | OUTPATIENT
Start: 2023-11-16

## 2023-11-16 RX ORDER — LORAZEPAM 2 MG/ML
0.5 INJECTION INTRAMUSCULAR EVERY 4 HOURS PRN
Status: CANCELLED | OUTPATIENT
Start: 2023-11-16 | End: 2023-11-25

## 2023-11-16 RX ORDER — IPRATROPIUM BROMIDE AND ALBUTEROL SULFATE 2.5; .5 MG/3ML; MG/3ML
3 SOLUTION RESPIRATORY (INHALATION) EVERY 4 HOURS PRN
Status: DISCONTINUED | OUTPATIENT
Start: 2023-11-16 | End: 2023-11-16 | Stop reason: HOSPADM

## 2023-11-16 RX ORDER — GLYCOPYRROLATE 0.2 MG/ML
0.4 INJECTION INTRAMUSCULAR; INTRAVENOUS EVERY 4 HOURS PRN
Status: DISCONTINUED | OUTPATIENT
Start: 2023-11-16 | End: 2023-11-16 | Stop reason: HOSPADM

## 2023-11-16 RX ORDER — TEMAZEPAM 15 MG/1
30 CAPSULE ORAL NIGHTLY
Status: CANCELLED | OUTPATIENT
Start: 2023-11-16

## 2023-11-16 RX ORDER — ONDANSETRON 2 MG/ML
4 INJECTION INTRAMUSCULAR; INTRAVENOUS EVERY 6 HOURS PRN
Status: CANCELLED | OUTPATIENT
Start: 2023-11-16

## 2023-11-16 RX ORDER — DONEPEZIL HYDROCHLORIDE 10 MG/1
10 TABLET, FILM COATED ORAL NIGHTLY
Status: CANCELLED | OUTPATIENT
Start: 2023-11-16

## 2023-11-16 RX ORDER — IPRATROPIUM BROMIDE AND ALBUTEROL SULFATE 2.5; .5 MG/3ML; MG/3ML
3 SOLUTION RESPIRATORY (INHALATION) EVERY 4 HOURS PRN
Status: CANCELLED | OUTPATIENT
Start: 2023-11-16

## 2023-11-16 RX ORDER — GLYCOPYRROLATE 0.2 MG/ML
0.4 INJECTION INTRAMUSCULAR; INTRAVENOUS EVERY 4 HOURS PRN
Status: CANCELLED | OUTPATIENT
Start: 2023-11-16

## 2023-11-16 RX ORDER — MORPHINE SULFATE 2 MG/ML
2 INJECTION, SOLUTION INTRAMUSCULAR; INTRAVENOUS EVERY 4 HOURS PRN
Status: CANCELLED | OUTPATIENT
Start: 2023-11-16 | End: 2023-11-21

## 2023-11-16 RX ORDER — RISPERIDONE 1 MG/ML
0.5 SOLUTION ORAL EVERY 12 HOURS SCHEDULED
Status: CANCELLED | OUTPATIENT
Start: 2023-11-16

## 2023-11-16 RX ORDER — HYDROCODONE BITARTRATE AND ACETAMINOPHEN 5; 325 MG/1; MG/1
1 TABLET ORAL EVERY 6 HOURS
Status: CANCELLED | OUTPATIENT
Start: 2023-11-16 | End: 2023-11-18

## 2023-11-16 RX ORDER — VALPROIC ACID 250 MG/5ML
250 SOLUTION ORAL EVERY 8 HOURS SCHEDULED
Status: CANCELLED | OUTPATIENT
Start: 2023-11-16

## 2023-11-16 RX ADMIN — LORAZEPAM 0.5 MG: 2 INJECTION, SOLUTION INTRAMUSCULAR; INTRAVENOUS at 09:28

## 2023-11-16 RX ADMIN — MORPHINE SULFATE 2 MG: 2 INJECTION, SOLUTION INTRAMUSCULAR; INTRAVENOUS at 08:37

## 2023-11-16 RX ADMIN — PHENOBARBITAL 81 MG: 64.8 TABLET ORAL at 06:46

## 2023-11-16 RX ADMIN — DICLOFENAC SODIUM 2 G: 9.3 GEL TOPICAL at 08:37

## 2023-11-16 RX ADMIN — PHENOBARBITAL 81 MG: 64.8 TABLET ORAL at 03:40

## 2023-11-16 RX ADMIN — METOPROLOL TARTRATE 25 MG: 25 TABLET, FILM COATED ORAL at 08:35

## 2023-11-16 RX ADMIN — HYDROCODONE BITARTRATE AND ACETAMINOPHEN 1 TABLET: 5; 325 TABLET ORAL at 06:45

## 2023-11-16 RX ADMIN — LORAZEPAM 0.5 MG: 2 INJECTION, SOLUTION INTRAMUSCULAR; INTRAVENOUS at 01:45

## 2023-11-16 RX ADMIN — RISPERIDONE 0.5 MG: 1 SOLUTION ORAL at 08:47

## 2023-11-16 RX ADMIN — MICONAZOLE NITRATE 1 APPLICATION: 20 CREAM TOPICAL at 08:36

## 2023-11-16 RX ADMIN — VALPROIC ACID 250 MG: 250 SOLUTION ORAL at 06:45

## 2023-11-16 RX ADMIN — MINERAL OIL: 1000 LIQUID ORAL at 08:37

## 2023-11-16 RX ADMIN — LANSOPRAZOLE 15 MG: 15 TABLET, ORALLY DISINTEGRATING ORAL at 06:47

## 2023-11-16 RX ADMIN — CLOPIDOGREL BISULFATE 75 MG: 75 TABLET ORAL at 08:35

## 2023-11-16 NOTE — PROGRESS NOTES
Continued Stay Note  [facility]     Patient Name: Kenneth Wen  MRN: [id number]  Today's Date: 11/16/2023    Admit Date: 11/16/2023    Plan: Inpatient hospice   Discharge Plan       Row Name 11/16/23 1619       Plan    Plan Inpatient hospice    Plan Comments Hospice consult placed per team. Chart reviewed. Hospice RN, BARBARA Wu, and  Dania Bourne met with patient's wife in conference room. Hospice services including POC/GOC, DNR/DNI were discussed. Wife opted for hospice care/comfort measures only at this time. Admission approved by Cherelle Petty NP. BARBARA Wu completed hospice admission paperwork with patient's wife, Reny Wen. Pt will be admitted to hospice services with a terminal dx of Multiple bilateral CVAs  with anticipated unmanaged symptoms of pain, dyspnea, and anxiety. If hospice can be of any assistance please call [phone].    Final Discharge Disposition Code 51 - hospice medical facility      Row Name 11/16/23 1430       Plan    Final Discharge Disposition Code 51 - hospice medical facility                   Discharge Codes    No documentation.                       Juliet Doan

## 2023-11-16 NOTE — CASE MANAGEMENT/SOCIAL WORK
Continued Stay Note  [facility]     Patient Name: Kenneth Wen  MRN: [id number]  Today's Date: 11/16/2023    Admit Date: 11/16/2023    Plan: Inpatient hospice   Discharge Plan       Row Name 11/16/23 1813       Plan    Final Discharge Disposition Code 51 - hospice medical facility      Row Name 11/16/23 1539       Plan    Plan Inpatient hospice    Plan Comments sad    Final Discharge Disposition Code 51 - hospice medical facility                   Discharge Codes    No documentation.                       JOEL Mena

## 2023-11-16 NOTE — DISCHARGE SUMMARY
[facility] Medicine Services  DISCHARGE TO INPATIENT HOSPICE    Patient Name: Kenneth Wen  : 1950  MRN: [id number]    Date of Admission: 9/15/2023  Date of Discharge:  23  Primary Care Physician: Susan Powers APRN    Consults       Date and Time Order Name Status Description    10/19/2023 12:34 PM Inpatient Palliative Care MD Consult Completed     10/9/2023 10:50 AM Inpatient Neurology Consult General Completed     10/4/2023 11:48 AM Inpatient Palliative Care MD Consult Completed     2023  1:31 PM Inpatient General Surgery Consult Completed     9/15/2023 12:41 AM Inpatient Neurosurgery Consult Completed           Hospital Course     Presenting Problem:   ICH (intracerebral hemorrhage) [I61.9]    Active Hospital Problems    Diagnosis  POA    **Hemorrhagic CVA [I61.9]  Yes    Oropharyngeal dysphagia [R13.12]  Yes    Multiple bilateral CVAs [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          Hospital Course:  Kenneth Wen is a 73 y.o. male  with hx of HTN, HLD, T2DM, and GERD who presented to OSH with multiple acute ischemic infarcts of the bilateral cerebral and cerebellar hemispheres as well as left isaac. TTE/JOSIAS was negative PFO at OSH. On 23 he had worsening in mental status and new inability to move RLE, head CT showed evolution of the existing CVA with new development of petechial hemorrhage. DAPT was held for 24 hours per Neurology recommendations and repeat CT head that evening showed worsening effacement of the right quadrigeminal cistern with suspected increasing upward supratentorial pressure. He was then transferred to [facility] for Neurosurgery evaluation on 9/15/23. He was started on  hypertonic saline 9/15/23 through 9/20/23 for cerebral edema. He had fevers with negative cultures but had worsening secretions and labored breathing so was started on Zosyn on 9/16/23. Transferred to the hospitalist service on 9/22/23. Pt demonstrated poor oral intake with elevated sodium levels; therefore, PEG tube was recommended & placed. Palliative and hospice are following. Currently comfort measures only with plan to d/c home with hospice vs LTC with hospice.   Hospice medicine discussed the case with patient's wife today and decision was made to admit the patient to inpatient hospice.     Assessment/plan:     Multiple bilateral posterior circulation strokes with hemorrhagic conversion  -- suspect atheroembolic but cannot rule out cardioembolic given multiple vascular territories  --Recommended MCOT at d/c but now plan is for comfort measures  --stroke followed  -- Continue plavix monotherapy, high intensity statin  --DC to inpatient hospice     Agitation, improved  Encephalopathy, improving  --Donepezil 10 mg nightly, mirtazapine 30 mg nightly, Restoril increased to 30mg nightly  - he was initially on seroquel but still with significant agitation. Changed to risperidone and phenobarbital with some help in PRN use.   --Continue as needed Geodon       Dysphagia  Hypernatremia - resolved  - PEG tube placed 9/29, Dr Carrillo   --SLP recommends mechanical ground with honey thick liquids but not eating  --Risk of pulling out peg tube, abd binder to be in place at all times.   -- Continue bolus TF, modified diet  - Palliative has discussed with wife -- she would like to continue tube feeding      HTN  --Continue Metoprolol 25 mg BID, stable     GERD  --Continue PPI     COVID-19-resolved  -- Was asymptomatic   --Did not receive any treatment   --Off Isolation 9/30/2023      Leukocytosis-resolved  --Procalcitionin low at 0.05  --CXR and UA negative  --Blood cultures negative     Elevated LFT's, mild  --Possibly  secondary to statin?  --Negative acute hepatitis panel  --repeat AST/ALT improved    Day of Discharge     HPI:   Resting in bed and asleep and comfortable.  Earlier this morning was mildly agitated.  Patient's wife is also present at the bedside.    ROS:  Unable to obtain    Vital Signs:   Temp:  [98.8 °F (37.1 °C)-99 °F (37.2 °C)] 99 °F (37.2 °C)  Heart Rate:  [] 112  Resp:  [16-17] 17  BP: (101-115)/(60-71) 101/60     Physical Exam:  Constitutional: Asleep, looks comfortable.  Patient's wife is present at bedside.  HENT: NCAT, mucous membranes moist  Respiratory: Clear to auscultation bilaterally, respiratory effort normal   Cardiovascular: RRR, no murmurs, rubs, or gallops  Gastrointestinal: Positive bowel sounds, soft, nontender, nondistended  Musculoskeletal: No bilateral ankle edema  Psychiatric: Unable to assesst   Skin: No rashes    Discharge Details     Discharge Disposition:  Transfer care to inpatient Hospice at [facility]    Time Spent on Discharge:  25 minutes    Bruce Du MD  11/16/23

## 2023-11-16 NOTE — PLAN OF CARE
Problem: Pain Acute  Goal: Acceptable Pain Control and Functional Ability  Intervention: Optimize Psychosocial Wellbeing  Recent Flowsheet Documentation  Taken 11/16/2023 1557 by Raquel Garza, RN  Supportive Measures:   active listening utilized   verbalization of feelings encouraged   positive reinforcement provided     Problem: Adjustment to Illness (Stroke, Hemorrhagic)  Goal: Optimal Coping  Intervention: Support Psychosocial Response to Stroke  Recent Flowsheet Documentation  Taken 11/16/2023 1557 by Raquel Garza RN  Supportive Measures:   active listening utilized   verbalization of feelings encouraged   positive reinforcement provided  Family/Support System Care:   caregiver stress acknowledged   self-care encouraged   support provided   involvement promoted   presence promoted   Goal Outcome Evaluation:  Plan of Care Reviewed With: spouse        Progress: declining  Outcome Evaluation: Nursing reports pt is restless and calling out, crying.  Pt is very congested.  Prn robinul and ativan, plus neb given. Pt more relaxed at time of visit. Pt's wife at bedside asking about whether medicines contributed to pt's congestion.  Discussed that tube feeding is not tolerated at the end of life and potential for aspiration when stomach gets too full.  came to meet with wife and support her.  Plans to admit to inpt hospice.

## 2023-11-16 NOTE — H&P
Hospice History and Physical     Patient Name:  Kenneth Wen   : 1950   Sex: male    Patient Care Team:  Susan Powers APRN as PCP - General (Family Medicine)    Code Status: Comfort measures     Subjective   Hospital Course:  Kenneth Wen is a 73 y.o. male  with hx of HTN, HLD, T2DM, and GERD who presented to OSH with multiple acute ischemic infarcts of the bilateral cerebral and cerebellar hemispheres as well as left isaac. TTE/JOSIAS was negative PFO at OSH. On 23 he had worsening in mental status and new inability to move RLE, head CT showed evolution of the existing CVA with new development of petechial hemorrhage. DAPT was held for 24 hours per Neurology recommendations and repeat CT head that evening showed worsening effacement of the right quadrigeminal cistern with suspected increasing upward supratentorial pressure. He was then transferred to [facility] for Neurosurgery evaluation on 9/15/23. He was started on hypertonic saline 9/15/23 through 23 for cerebral edema. He had fevers with negative cultures but had worsening secretions and labored breathing so was started on Zosyn on 23. Transferred to the hospitalist service on 23. Pt demonstrated poor oral intake with elevated sodium levels; therefore, PEG tube was recommended & placed. Palliative and hospice are following. Currently comfort measures only with plan to d/c home with hospice vs LTC with hospice.     Patient admitted to inpatient hospice service for management of terminal restlessness, anxiety, pain and terminal secretions that require injectable medications to manage.     Review of Systems  Review of Systems   Unable to perform ROS: Acuity of condition       History  Past Medical History:   Diagnosis Date    Acid reflux     Cancer     Skin    Diabetes mellitus     Prediabetes    GERD (gastroesophageal reflux disease) 09/15/2023    HTN (hypertension) 09/15/2023    Kidney disease     T2DM (type 2 diabetes mellitus)  09/15/2023     Past Surgical History:   Procedure Laterality Date    APPENDECTOMY      ENDOSCOPY W/ PEG TUBE PLACEMENT N/A 9/29/2023    Procedure: ESOPHAGOGASTRODUODENOSCOPY WITH PERCUTANEOUS ENDOSCOPIC GASTROSTOMY TUBE INSERTION;  Surgeon: Haseeb aCrrillo MD;  Location: [facility] ENDOSCOPY;  Service: General;  Laterality: N/A;    HEMORRHOIDECTOMY      NASAL POLYP SURGERY       Current Facility-Administered Medications   Medication Dose Route Frequency Provider Last Rate Last Admin    Diclofenac Sodium (VOLTAREN) 1 % gel 2 g  2 g Topical BID Lou Petty APRN        glycopyrrolate (ROBINUL) injection 0.2 mg  0.2 mg Intravenous Q4H PRN Lou Petty APRN        haloperidol lactate (HALDOL) injection 2 mg  2 mg Intravenous Q4H PRN Lou Petty APRN        [START ON 11/17/2023] Lidocaine 4 % 1 patch  1 patch Transdermal Q24H Lou Petty APRN        miconazole (MICOTIN) 2 % cream 1 application   1 application  Topical Q12H Lou Petty APRN        midazolam (VERSED) injection 0.5 mg  0.5 mg Intravenous Once Lou Petty APRN        morphine injection 2 mg  2 mg Intravenous Q6H Lou Petty APRN        morphine injection 2 mg  2 mg Intravenous Q1H PRN Lou Petty APRN        ondansetron (ZOFRAN) injection 4 mg  4 mg Intravenous Q6H PRN Lou Petty APRN        PHENobarbital injection 130 mg  130 mg Intravenous Q6H PRN Lou Petty APRN        PHENobarbital injection 65 mg  65 mg Intravenous BID Lou Petty APRN         Facility-Administered Medications Ordered in Other Encounters   Medication Dose Route Frequency Provider Last Rate Last Admin    lactated ringers infusion   Intravenous Continuous PRN Gisselle Mabry CRNA   Stopped at 09/29/23 8623          glycopyrrolate    haloperidol lactate    Morphine    ondansetron    PHENobarbital  Allergies   Allergen Reactions    Cefdinir Unknown - Low Severity     Family History   Problem Relation Age of  Onset    No Known Problems Mother     Heart attack Father     No Known Problems Sister     No Known Problems Brother     No Known Problems Sister     No Known Problems Brother      Social History     Socioeconomic History    Marital status:    Tobacco Use    Smoking status: Former     Types: Cigarettes     Quit date: 10/4/1982     Years since quittin.1    Smokeless tobacco: Never   Vaping Use    Vaping Use: Never used   Substance and Sexual Activity    Alcohol use: No    Drug use: No    Sexual activity: Defer       Objective     Vital Signs  Temp:  [98.8 °F (37.1 °C)-99 °F (37.2 °C)] 99 °F (37.2 °C)  Heart Rate:  [] 112  Resp:  [16-17] 17  BP: (101-115)/(60-71) 101/60    PPS: Palliative Performance Scale score as of 2023, 22:20 EST is 20% based on the following measures:   Ambulation: Totally bed bound  Activity and Evidence of Disease: Unable to do any work, extensive evidence of disease  Self-Care: Total care  Intake:Minimal sips  LOC: Full, drowsy or confusion     Physical Exam:  Physical Exam  Vitals and nursing note reviewed. Exam conducted with a chaperone present.   Constitutional:       General: He is in acute distress.      Appearance: He is ill-appearing.   HENT:      Mouth/Throat:      Mouth: Mucous membranes are dry.      Pharynx: Oropharyngeal exudate present.   Cardiovascular:      Rate and Rhythm: Normal rate.   Pulmonary:      Effort: Pulmonary effort is normal.   Abdominal:      General: Bowel sounds are normal. There is no distension.      Tenderness: There is no abdominal tenderness. There is no guarding.   Musculoskeletal:         General: No swelling.   Skin:     General: Skin is warm.   Psychiatric:         Mood and Affect: Mood is anxious.         Behavior: Behavior is agitated.         Cognition and Memory: Cognition is impaired. Memory is impaired.         Results Reviewed:  LAB RESULTS:                              Brief Urine Lab Results  (Last result in the past  365 days)        Color   Clarity   Blood   Leuk Est   Nitrite   Protein   CREAT   Urine HCG        10/30/23 1636 Yellow   Clear   Negative   Negative   Negative   Negative                   Microbiology Results Abnormal       None              Multiple bilateral CVAs      Assessment & Plan     Assessment/Plan:   -Admit to inpatient hospice service 11/16/2023 with CVA (cerebral vascular accident) [I63.9].     -Coordination of care with nursing staff and BCN IDT.     -Pain: Morphine 2 mg IV scheduled q 6 hours scheduled, and morphine 2mg IV q 1 hr PRN     -Dyspnea:  Morphine as above, oxygen via NC PRN     -Nausea/Vomiting: Zofran 4 mg scheduled q 6 hr PRN       -Anxiety/Agitation: Lorazepam 0.5 mg q 4 hr PRN; haldol 2 mg q 4 hr PRN; phenobarbital 65 mg IV BID scheduled and phenobarbital 130 mg q 6 hrs PRN      -Bowel/bladder: bisacodyl supp q day PRN     -Nutrition: NPO      -ADLs: total care     -Terminal fever: tylenol supp 650 mg q 6 hr PRN. tordal 15 mg IV q 6 hr PRN     -Terminal secretions:  May start PRN robinul/scop patch if needed     -Patient comfort: palliative oral rinse PRN, liquifilm PRN      Goals of Care: achieve comfortable death, educate and support family through this process.         Total Visit Time: 50 min   Face to Face Time: 30 min   Total time spent includes time reviewing chart, face-to-face time, counseling patient/family/caregiver, ordering medications/tests/procedures, communicating with other health care professionals, documenting clinical information in the electronic health record, and coordination of care with facility staff and BCN IDT.      Justification for care:  Patient meets criteria for acute in-patient care with required nursing assessment and interventions for symptoms with IV medications.      JERI ROMANO, MSN, APRN  [facility]  Hospice and Palliative Care Nurse Practitioner  11/16/23  14:41 EST

## 2023-11-16 NOTE — PLAN OF CARE
Goal Outcome Evaluation:  Plan of Care Reviewed With: spouse, sibling        Progress: improving  Outcome Evaluation: Pt has rested well through shift with few times of agitation and calling out. Bolus feeds have been stopped this time and comfort measures initiated. Pt has brief in place. Family is at bedside. Continue POC and report as needed.

## 2023-11-17 NOTE — PLAN OF CARE
Goal Outcome Evaluation:      Pt rested well throughout the shift. PRN morphine and robinul given x1 in addition to scheduled medications. 1 large UOP. No additional concerns noted at this time.

## 2023-11-17 NOTE — PROGRESS NOTES
"Hospice Progress Note    Patient Name: Kenneth Wen   : 1950  Gender: male    Code Status: comfort measures    Date of Admission: 2023    Subjective:  Kenneth Wen is a 73 y.o. male admitted to inpatient hospice 2023 with diagnosis of CVA (cerebral vascular accident) [I63.9]  for symptom management.     Spouse at bedside.  Pt restless this am.  Legs thrown over bedrails. Pt responds \"No\" when asked if he is having pain.  No acute events overnight.  He continues to have periods of anxiety/agitation that requires PRN injectable medications.     - Scheduled:  Phenobarbital 65 mg BID   Morphine 2 mg q 6 hrs     - PRNs:  Robinul 0. 4 mg x 1   Morphine 2 mg x 3   Haldol 2 mg x 2   Toradol 15 mg x 1       - Intake/Output  Intake & Output (last 3 days)          0701  11/15 0700 11/15 07 07 07 07 07 07    P.O.   0     Total Intake   0     Net   0             Urine Unmeasured Occurrence   2 x 1 x               ROS:  Review of Systems   Unable to perform ROS: Acuity of condition       Reviewed current scheduled and prn medications for route, type, dose and frequency.       acetaminophen **OR** acetaminophen **OR** acetaminophen    bisacodyl    glycopyrrolate    haloperidol lactate    ketorolac    Morphine    ondansetron    palliative care oral rinse    PHENobarbital    polyvinyl alcohol    Scopolamine    Objective:   There were no vitals taken for this visit.     PPS: Palliative Performance Scale score as of 2023, 22:13 EST is 20% based on the following measures:   Ambulation: Totally bed bound  Activity and Evidence of Disease: Unable to do any work, extensive evidence of disease  Self-Care: Total care  Intake:Minimal sips  LOC: Full, drowsy or confusion     Physical Exam:  Physical Exam  Vitals and nursing note reviewed. Exam conducted with a chaperone present.   HENT:      Head: Normocephalic.      Mouth/Throat:      Mouth: Mucous membranes " are dry.      Pharynx: Oropharyngeal exudate present.   Cardiovascular:      Rate and Rhythm: Normal rate.             Multiple bilateral CVAs      Assessment/Plan:   Kenneth Wen is a 73 y.o. male admitted to inpatient hospice 11/16/2023 with diagnosis of CVA (cerebral vascular accident) [I63.9]  for symptom management.     Symptoms:  Terminal restlessness   Pain   Anxiety   Terminal secretions     Discharge Disposition: EOLC vs SNF     - No medication adjustments required today.   -Continue frequent skilled nursing assessments to ensure pt comfort and medication efficacy.   -Emotional support and education provided to spouse at bedside.     Total Visit Time: 20 min   Face to Face Time: 10 min     Justification for care:  Patient meets criteria for acute in-patient care due to need for frequent skilled nursing assessments to determine patient comfort and medication effectiveness at end of life.  Frequent adjustments to medications and interventions for symptom management, including injectable medications.      Lou Petty, MSN, APRN  [facility]  Hospice and Palliative Care Nurse Practitioner  11/17/23  13:27 EST

## 2023-11-18 NOTE — PROGRESS NOTES
"Palliative Care Progress Note    Date of Admission: 11/16/2023    Subjective:  agitation with minimal verbal. No direction.   Current Code Status       Date Active Code Status Order ID Comments User Context       11/16/2023 1527 No CPR (Do Not Attempt to Resuscitate) [id number]  Lou Petty, ROLANDO Inpatient        Question Answer    Code Status (Patient has no pulse and is not breathing) No CPR (Do Not Attempt to Resuscitate)    Medical Interventions (Patient has pulse or is breathing) Comfort Measures                  Current Facility-Administered Medications on File Prior to Encounter   Medication Dose Route Frequency Provider Last Rate Last Admin    lactated ringers infusion   Intravenous Continuous PRN Gisselle Mabry, CRNA   Stopped at 09/29/23 6374     Current Outpatient Medications on File Prior to Encounter   Medication Sig Dispense Refill    aspirin 81 MG tablet Take 81 mg by mouth Daily.      fexofenadine-pseudoephedrine (ALLEGRA-D)  MG per 12 hr tablet Take 1 tablet by mouth 2 (Two) Times a Day.      lansoprazole (PREVACID) 15 MG capsule Take 15 mg by mouth Daily.      QNASL 80 MCG/ACT aerosol solution   0    RA ALLERGY RELIEF 180 MG tablet Take 180 mg by mouth Daily.  0          acetaminophen **OR** acetaminophen **OR** acetaminophen    bisacodyl    glycopyrrolate    haloperidol lactate    ketorolac    Morphine    ondansetron    palliative care oral rinse    PHENobarbital    polyvinyl alcohol    Scopolamine    Objective: There were no vitals taken for this visit.   No intake or output data in the 24 hours ending 11/18/23 1017  Physical Exam:    Heart tachy, abd soft , ext no sz, no edema            Invalid input(s): \"LABALBU\", \"PROT\"    Impression: Plan: Severe agitation: Continue phenobarb and haldol. Needs GIP for above. No po intake and likley decline. Settles with some TLC so encourage with family( none present now.)   Time: 20 minutes      Yeison Murray MD  11/18/23  10:17 EST     "

## 2023-11-18 NOTE — PROGRESS NOTES
Continued Stay Note  [facility]     Patient Name: Kenneth Wen  MRN: [id number]  Today's Date: 11/18/2023    Admit Date: 11/16/2023    Plan: Inpatient hospice   Discharge Plan       Row Name 11/18/23 1007       Plan    Plan Inpatient hospice    Plan Comments Patient is a 73 year old male admitted with bilateral CVA. PPS 10%. No family present at bedside during assessment. Patient agitated, moving lower extremities. Tearful. Distracted by voice. Breathing unlabored. Multiple PRN medications utilized in the past 24 hours for agitation. [facility] nurse to administer comfort meds following assessment. Patient continues to require inpatient hospice services for skilled nursing assessment, medication titration, and injectable medication administration for palliation of symptoms. Discharge is dependent on survival of this hospitalization and becoming appropriate for a lower level of care.                   Discharge Codes    No documentation.                       Juliet Doan

## 2023-11-18 NOTE — PROGRESS NOTES
Continued Stay Note  [facility]     Patient Name: Kenneth Wen  MRN: [id number]  Today's Date: 11/17/2023    Admit Date: 11/16/2023    Plan: Inpatient hospice   Discharge Plan       Row Name 11/17/23 2113       Plan    Plan Inpatient hospice    Plan Comments CP is a 74yo  male. 10% pps. Terminal diagnosis Multiple bilateral cva's. Chart reviewed, visit to beside, assessment completed. Patient with restlessness, grimace, furrowed brow. He is yelling out. Left leg thrown over the bed rail. Nonverbal indicators of discomfort present. Rn reassured patient of safety and care. Requested Alexa RN [facility] medicate with prn morphine, toradol, haldol. Patient continues to require inpatient hospice services due to necessity of injectable medications for palliation of symptoms requiring frequent skilled nursing assessment and intervention. Current level of care is unable to be provided in an alternate setting. Updated VSarah Petty APRN. Revisit before RN left unit finds patient resting with relaxed face, jaw, body posture, respirations. Occastional movement of feet while resting in bed.    Final Discharge Disposition Code 51 - hospice medical facility                   Discharge Codes    No documentation.                       Liset Manjarrez RN

## 2023-11-18 NOTE — PLAN OF CARE
Goal Outcome Evaluation:      Occasionally restless. Repositioned often. PRN Toradol x1, morphine x1, phenobarb x1, haldol x2. Currently resting comfortably with eyes closed. Respirations even and unlabored. Wife updated at bedside. Continue comfort measures.

## 2023-11-19 NOTE — PROGRESS NOTES
"Palliative Care Progress Note    Date of Admission: 11/16/2023    Subjective:  no response , randomly moves left leg at times. No family at bedside, wife been present daily  Current Code Status       Date Active Code Status Order ID Comments User Context       11/16/2023 1527 No CPR (Do Not Attempt to Resuscitate) [id number]  Lou Petty, [facility]OL[facility]O Inpatient        Question Answer    Code Status (Patient has no pulse and is not breathing) No CPR (Do Not Attempt to Resuscitate)    Medical Interventions (Patient has pulse or is breathing) Comfort Measures                  Current Facility-Administered Medications on File Prior to Encounter   Medication Dose Route Frequency Provider Last Rate Last Admin    lactated ringers infusion   Intravenous Continuous PRN Gisselle Mabry, CRNA   Stopped at 09/29/23 9985     Current Outpatient Medications on File Prior to Encounter   Medication Sig Dispense Refill    aspirin 81 MG tablet Take 81 mg by mouth Daily.      fexofenadine-pseudoephedrine (ALLEGRA-D)  MG per 12 hr tablet Take 1 tablet by mouth 2 (Two) Times a Day.      lansoprazole (PREVACID) 15 MG capsule Take 15 mg by mouth Daily.      QNASL 80 MCG/ACT aerosol solution   0    RA ALLERGY RELIEF 180 MG tablet Take 180 mg by mouth Daily.  0          acetaminophen **OR** acetaminophen **OR** acetaminophen    bisacodyl    glycopyrrolate    haloperidol lactate    ketorolac    Morphine    ondansetron    palliative care oral rinse    PHENobarbital    polyvinyl alcohol    Scopolamine    Objective: There were no vitals taken for this visit.   No intake or output data in the 24 hours ending 11/19/23 0948  Physical Exam:    No response but boats of agitation, lungs: diminshed, BS none, no myoclonic.             Invalid input(s): \"LABALBU\", \"PROT\"    Impression: Plan: Agitated delirium: continue phenobarbitol. . Less thrashing about. Moans at time.   Time: 20 minutes      Yeison Murray MD  11/19/23  09:48 EST     "

## 2023-11-19 NOTE — PROGRESS NOTES
Continued Stay Note  [facility]     Patient Name: Kenneth Wen  MRN: [id number]  Today's Date: 11/19/2023    Admit Date: 11/16/2023    Plan: Inpatient hospice   Discharge Plan       Row Name 11/19/23 0957       Plan    Plan Inpatient hospice    Plan Comments Patient is a 73 year old male admitted with CVA. PPS 10%. No family at bedside at time of admission. Patient with eyes open, but unable to interact in a meaningful way. Moving left leg and arm. Soothed by voice. Breathing even and unlabored. Face and musculature relaxed. Patient required 1 PRN dose of phenobarbitol and 1 PRN dose of Morphine in the past 24 hours.Patient continues to require inpatient hospice services for skilled nursing assessment, medication titration, and injectable medication administration for palliation of symptoms. Discharge is dependent on survival of this hospitalization and becoming appropriate for a lower level of care.                   Discharge Codes    No documentation.                       Juliet Doan

## 2023-11-19 NOTE — PLAN OF CARE
Goal Outcome Evaluation:      Pt occasionally restless. Scheduled medications given. No PRNs required. Pt currently resting with eyes closed. Respirations even and unlabored. Comfort measures continue.

## 2023-11-20 NOTE — PLAN OF CARE
Goal Outcome Evaluation:  Plan of Care Reviewed With: spouse        Progress: declining  Outcome Evaluation: pt rested most of shift, occasional calling out, comfort measures continued

## 2023-11-20 NOTE — PROGRESS NOTES
Continued Stay Note  [facility]     Patient Name: Kenneth Wen  MRN: [id number]  Today's Date: 11/20/2023    Admit Date: 11/16/2023    Plan: Inpatient hospice   Discharge Plan       Row Name 11/20/23 1307       Plan    Plan Inpatient hospice    Plan Comments CP is a 74yo  male with a terminal diagnosis of Multiple malou CVA's. Chart reviewed, visit to bedside, assessment completed. Patient's spouse is present, loving, and supportive at bedside. Patient with relaxed face, jaw, body posture, respirations. Nonverbal indicators of comfort present. Center of knees cool today. PRN’s: morphine x2, phenobarbital x 1, dulcolax x 1, haldol x 1. Successful symptom palliation reported. Education regarding assessment, condition, changes, eol signs/symptoms, eol communication, food/water, medications, what to possibly expect from this point forward, decline, being awake and comfortable, self care performed. Rn offered ongoing support and open communication. Noted spouse with verbalization of understanding. She relays that the patient's son is to come to visit today. Patient continues to require inpatient hospice services due to necessity of injectable medications for palliation of symptoms requiring frequent skilled nursing assessment and intervention. Current level of care is unable to be provided in an alternate setting. Care coordinated with [facility]. Julianna MARSHALL.    Final Discharge Disposition Code 51 - hospice medical facility                   Discharge Codes    No documentation.                       Liset Manjarrez RN

## 2023-11-20 NOTE — PROGRESS NOTES
Hospice Progress Note    Patient Name: Kenneth Wen   : 1950  Gender: male    Code Status: comfort measures    Date of Admission: 2023    Subjective:  Kenneth Wen is a 73 y.o. male admitted to inpatient hospice 2023 with diagnosis of CVA (cerebral vascular accident) [I63.9]  for symptom management.     Spouse at bedside.  Over the weekend pt having increase in restlessness, multiple PRN's of haldol and phenobarbital needed to manage symptoms.  Discussed with spouse increasing scheduled dose of phenobarbital today, if no change in symptoms pt will require an infusion.  She is agreeable.  Pt remains NPO, minimally responsive to assessment.  Son who has been estranged from the patient plans to visit today.     - Scheduled:  Phenobarbital 65 mg TID  Morphine 2 mg q 6 hrs     - PRNs:  Morphine 2 mg x 2  Haldol 2 mg x 1   Phenobarbital 130 mg x 1   Dulcolax suppository x 1        - Intake/Output  Intake & Output (last 3 days)          07 07 07 07 07 07 07 0700    P.O.        Total Intake        Net                Urine Unmeasured Occurrence 3 x 2 x 3 x 2 x               ROS:  Review of Systems   Unable to perform ROS: Acuity of condition       Reviewed current scheduled and prn medications for route, type, dose and frequency.       acetaminophen **OR** acetaminophen **OR** acetaminophen    bisacodyl    glycopyrrolate    haloperidol lactate    ketorolac    Morphine    ondansetron    palliative care oral rinse    PHENobarbital    polyvinyl alcohol    Scopolamine    Objective:   There were no vitals taken for this visit.     PPS: Palliative Performance Scale score as of 2023, 14:45 EST is 20% based on the following measures:   Ambulation: Totally bed bound  Activity and Evidence of Disease: Unable to do any work, extensive evidence of disease  Self-Care: Total care  Intake:Minimal sips  LOC: Full, drowsy or confusion      Physical Exam:  Physical Exam  Vitals and nursing note reviewed. Exam conducted with a chaperone present.   HENT:      Head: Normocephalic.      Mouth/Throat:      Mouth: Mucous membranes are dry.      Pharynx: Oropharyngeal exudate present.   Cardiovascular:      Rate and Rhythm: Normal rate.   Pulmonary:      Effort: Pulmonary effort is normal. No respiratory distress.      Breath sounds: No wheezing, rhonchi or rales.   Abdominal:      General: Bowel sounds are normal. There is distension.      Tenderness: There is no abdominal tenderness. There is no guarding.      Comments: Abdominal binder in place, protecting PEG tube.    Musculoskeletal:         General: No swelling.      Right lower leg: No edema.      Left lower leg: No edema.   Skin:     General: Skin is warm.      Coloration: Skin is pale.             Multiple bilateral CVAs      Assessment/Plan:   Kenneth Wen is a 73 y.o. male admitted to inpatient hospice 11/16/2023 with diagnosis of CVA (cerebral vascular accident) [I63.9]  for symptom management.     Symptoms:  Terminal restlessness   Pain   Anxiety   Terminal secretions     Discharge Disposition: EOLC vs SNF     - Increase phenobarbital to 130 mg TID   -Continue frequent skilled nursing assessments to ensure pt comfort.    -Emotional support and education given to spouse at bedside.      Total Visit Time: 20 min   Face to Face Time: 10 min     Justification for care:  Patient meets criteria for acute in-patient care due to need for frequent skilled nursing assessments to determine patient comfort and medication effectiveness at end of life.  Frequent adjustments to medications and interventions for symptom management, including injectable medications.      Lou Petty, MSN, APRN  [facility]  Hospice and Palliative Care Nurse Practitioner  11/20/23  14:45 EST

## 2023-11-21 NOTE — PLAN OF CARE
Goal Outcome Evaluation:Comfort maintained this shift.  Has needed PRN dosing of pain, anxiety and nerve medications throughout this shift. Wife remains at bedside. Tolerating low air loss mattress.

## 2023-11-21 NOTE — PLAN OF CARE
Goal Outcome Evaluation:  Plan of Care Reviewed With: family        Progress: declining  Outcome Evaluation: pt rested well, less responsive this shift, appears very comfortable. comfort measures continued

## 2023-11-21 NOTE — PROGRESS NOTES
"Hospice Progress Note    Patient Name: Kenneth Wen   : 1950  Gender: male    Code Status: comfort measures    Date of Admission: 2023    Subjective:  Kenneth Wen is a 73 y.o. male admitted to inpatient hospice 2023 with diagnosis of CVA (cerebral vascular accident) [I63.9]  for symptom management.     Spouse at bedside.  Restlessness improved with phenobarbital dose increase.  Wife reports some increase in pain overnight, PRN's helped with pain.  He has not required and PRN's this am.  We discussed increase in pain medication, she asked to wait and see how he does overnight, as he \"is the most comfortable he has been right now\".      - Scheduled:  Phenobarbital 130 mg TID  Morphine 2 mg q 6 hrs     - PRNs:  Robinul 0.4 mg x 1  Phenobarbital 130 mg x 1       - Intake/Output  Intake & Output (last 3 days)          07 07 07 07 07 07 07 07    P.O.   0     Total Intake   0     Net   0             Urine Unmeasured Occurrence 2 x 3 x 5 x                ROS:  Review of Systems   Unable to perform ROS: Acuity of condition       Reviewed current scheduled and prn medications for route, type, dose and frequency.       acetaminophen **OR** acetaminophen **OR** acetaminophen    bisacodyl    glycopyrrolate    haloperidol lactate    ketorolac    Morphine    ondansetron    palliative care oral rinse    PHENobarbital    polyvinyl alcohol    Scopolamine    Objective:   There were no vitals taken for this visit.     PPS: Palliative Performance Scale score as of 2023, 11:48 EST is 20% based on the following measures:   Ambulation: Totally bed bound  Activity and Evidence of Disease: Unable to do any work, extensive evidence of disease  Self-Care: Total care  Intake:Minimal sips  LOC: Full, drowsy or confusion     Physical Exam:  Physical Exam  Vitals and nursing note reviewed. Exam conducted with a chaperone present.   HENT:      " Head: Normocephalic.      Mouth/Throat:      Mouth: Mucous membranes are dry.      Pharynx: Oropharyngeal exudate present.   Cardiovascular:      Rate and Rhythm: Normal rate.   Pulmonary:      Effort: Pulmonary effort is normal. No respiratory distress.      Breath sounds: No wheezing, rhonchi or rales.   Abdominal:      General: Bowel sounds are normal. There is distension.      Tenderness: There is no abdominal tenderness. There is no guarding.      Comments: Abdominal binder in place, protecting PEG tube.    Musculoskeletal:         General: No swelling.      Right lower leg: No edema.      Left lower leg: No edema.   Skin:     General: Skin is warm.      Coloration: Skin is pale.             Multiple bilateral CVAs      Assessment/Plan:   Kenneth Wen is a 73 y.o. male admitted to inpatient hospice 11/16/2023 with diagnosis of CVA (cerebral vascular accident) [I63.9]  for symptom management.     Symptoms:  Terminal restlessness   Pain   Anxiety   Terminal secretions     Discharge Disposition: EOLC vs SNF     -No medication changes today.   -Continue frequent skilled nursing assessments to ensure pt comfort.    -Emotional support and education given to spouse at bedside.      Total Visit Time: 20 min   Face to Face Time: 10 min     Justification for care:  Patient meets criteria for acute in-patient care due to need for frequent skilled nursing assessments to determine patient comfort and medication effectiveness at end of life.  Frequent adjustments to medications and interventions for symptom management, including injectable medications.      Lou Petty, MSN, APRN  [facility]  Hospice and Palliative Care Nurse Practitioner  11/21/23  11:48 EST

## 2023-11-21 NOTE — PROGRESS NOTES
Continued Stay Note  [facility]     Patient Name: Kenneth Wen  MRN: [id number]  Today's Date: 11/21/2023    Admit Date: 11/16/2023    Plan: Inpatient hospice   Discharge Plan       Row Name 11/21/23 1743       Plan    Plan Inpatient hospice    Plan Comments CP is a 72yo  male with a terminal diagnosis of multiple bilateral cva's. Chart reviewed, visit to bedside x2, assess completed. First visit with hospice chaplain, second visit noted Spouse is present, loving, and supportive. Patient is without eye opening, nonverbal, no purposeful movement. He is with relaxed face, jaw, body posture, and respirations. Warm to the touch. Rn and spouse performed thorough oral care today. Rn reassured patient that he is safe and cared for. Discussed with spouse assess, condition, decline, medications, comfort, signs of decline, and self care. She verbalized understanding. Patient continues to require injectable medications for symptom palliation necessitating skilled nursing assessment, intervention, and reevaluation. His current level of care is unable to be provided in an alternate setting. Provided spouse with support, open communication, continued availability. PRN's robinul x 1, phenobarbital x 1. Updated VSarah Petty APRN.    Final Discharge Disposition Code 51 - hospice medical facility                   Discharge Codes    No documentation.                 Expected Discharge Date and Time       Expected Discharge Date Expected Discharge Time    Nov 16, 2023               Liset Manjarrez RN

## 2023-11-22 NOTE — PROGRESS NOTES
Continued Stay Note  [facility]     Patient Name: Kenneth Wen  MRN: [id number]  Today's Date: 11/22/2023    Admit Date: 11/16/2023    Plan: Inpatient hospice   Discharge Plan       Row Name 11/22/23 1508       Plan    Plan Inpatient hospice    Plan Comments Patient resting peacefully with eyes closed. Face, jaw, and body relaxed and breathing even and unlabored. Unresponsive to voice and touch during assessment. Patient required the following prns in the past 24 hours: Phenobarbital 130mg x1, Morphine 2mg x5, Lasix 20mg x1, Robinul 0.4mg x1, Haldol 2mg x3, Toradol 15mg x1. New orders today: scheduled oral rinse, scheduled Morphine increased from 2mg q4h to 4mg q4h, prn Morphine increased to 4mg, Toradol prn renewed, Lidocaine patch and Voltaren gel discontinued. He continues to require inpatient hospice for skilled nursing assessment, medication titration, and injectable medication administration.                    Discharge Codes    No documentation.                 Expected Discharge Date and Time       Expected Discharge Date Expected Discharge Time    Nov 16, 2023               Chantel Das RN

## 2023-11-22 NOTE — PLAN OF CARE
Goal Outcome Evaluation:              Outcome Evaluation: Oriented to person only. Around 0130 pt became restless with a frequent congested cough causing visible discomfort. PRNs exhausted. Medications adjusted. PRN lasix added. PRN lasix given x1, Robinul x1, additional scope patch added, morphine x3, haldol given x1, and phenobarb given x1. Pt now resting comfortably. Continue comfort measures.

## 2023-11-22 NOTE — PLAN OF CARE
Goal Outcome Evaluation:Thermoregulation has been worsening this shift. He is becoming hot to touch.  RN got Toradol IV ordered late morning, 2 doses given thus far.  Good results from the first dose, awaiting results of second dose given @1800. Wife has been at the bedside most of the day.  Continue to monitor.

## 2023-11-22 NOTE — PROGRESS NOTES
Hospice Progress Note    Patient Name: Kenneth Wen   : 1950  Gender: male    Code Status: comfort measures    Date of Admission: 2023    Subjective:  73 y.o. male  with a hospice diagnosis of multiple bilateral CVA. Admitted to [facility] hospice on 2023 for symptom management of pain, anxiety and dyspnea.  Follow up visit today completed for needs assessment and medication efficacy.  Appropriate PPE donned and doffed per infectious disease protocols.  Medical record again reviewed and unchanged from H&P.  Exam complete with patient's daughter and spouse present.  Discussed exam findings and periods of pain and agitation over night with the patient's wife. Nursing reports need for multiple PRNs. PRN medication in previous 24 hours: ketorolac 15 mg IV x 1, furosemide 20 mg IV x1, glycopyrrolate 0.4 mg IV x 1, haloperidol 2 mg IV x 3, morphine 2 mg IV x 5, palliative care oral rinse x 1, phenobarbital 130 mg IV x 1 and a scop patch. POC reviewed with bedside RN.  Nurse and family express no additional needs or concerns at this time.   - Scheduled:  - changed morphine to 4 mg IV q 4 hours  -d/c Voltaren gel and lidocaine patch    -PRN:  - changed PRN morphine to 4 mg q 1 hour PRN IV  -d/c PO tylenol options   -reordered PRN Toradol   - Intake/Output  Intake & Output (last 3 days)          0701   0700  0701   0700  0701   0700  0701   0700    P.O.  0  0    Total Intake  0  0    Net  0  0            Urine Unmeasured Occurrence 3 x 5 x 2 x                ROS:  Review of Systems   Unable to perform ROS: Patient unresponsive       Reviewed current scheduled and prn medications for route, type, dose and frequency.       [DISCONTINUED] acetaminophen **OR** [DISCONTINUED] acetaminophen **OR** acetaminophen    bisacodyl    furosemide    glycopyrrolate    haloperidol lactate    ketorolac    Morphine    ondansetron    palliative care oral rinse     PHENobarbital    polyvinyl alcohol    Scopolamine    Objective:   There were no vitals taken for this visit.     PPS: Palliative Performance Scale score as of 11/22/2023, 13:46 EST is 10% based on the following measures:   Ambulation: Totally bed bound  Activity and Evidence of Disease: Unable to do any work, extensive evidence of disease  Self-Care: Total care  Intake:  Mouth care only  LOC: Drowsy or coma     Physical Exam:  Physical Exam  Constitutional:       Appearance: He is ill-appearing.      Comments: Unresponsive    HENT:      Head: Normocephalic and atraumatic.      Mouth/Throat:      Mouth: Mucous membranes are moist.      Pharynx: Oropharynx is clear.   Eyes:      Comments: Eyes closed    Cardiovascular:      Rate and Rhythm: Normal rate and regular rhythm.      Pulses: Normal pulses.      Heart sounds: Normal heart sounds.   Pulmonary:      Effort: Pulmonary effort is normal.      Breath sounds: Normal breath sounds.      Comments: Clear, diminished in bases   Abdominal:      General: Abdomen is flat.      Palpations: Abdomen is soft.      Comments: Hypoactive BS   Musculoskeletal:      Right lower leg: Edema present.      Left lower leg: Edema present.   Skin:     General: Skin is dry.      Comments: Skin hot   Neurological:      Comments: Unresponsive    Psychiatric:      Comments: calm             Multiple bilateral CVAs      Assessment/Plan:   Kenneth Wen is a 73 y.o. male admitted to inpatient hospice 11/16/2023 with diagnosis of CVA (cerebral vascular accident)  for symptom management.      Symptoms:  Terminal restlessness   Pain   Anxiety   Terminal secretions      Discharge Disposition: EOLC vs SNF      -med changes as above   -Continue frequent skilled nursing assessments to ensure pt comfort.    -Emotional support and education given to spouse at bedside.       Total Visit Time: 20 min   Face to Face Time: 10 min       Justification for care:  Patient meets criteria for acute in-patient  care with required nursing assessment and interventions for symptoms with IV medications.  Frequent adjustments to medications and interventions for symptom management, including injectable medications.     [facility]  Hospice Nurse practitioner   11/22/23  13:40 EST

## 2023-11-23 NOTE — DISCHARGE SUMMARY
Date of Admission: 11/16/2023   Date and Time of Death: 11/23/2023 at 2:59 AM    Hospital Course:  Kenneth Wen is a 73 y.o. male admitted to inpatient hospice with diagnosis of CVA (cerebral vascular accident) [I63.9]  for symptom management. Medications were available throughout admission for symptom management and comfort. Patient continued to decline after admission as expected ultimately to their death on 11/23/2023 at 2:59 AM. Patient was pronounced dead by Clinical House Supervisor. Spiritual and psychosocial support were available to patient and family throughout admission. Patient's remains were released per [facility] protocol.       Lou Petty, MSN, APRN, ACHPN  [facility] Navigators  Hospice and Palliative Care Nurse Practitioner  11/23/23  11:15 EST

## 2023-11-23 NOTE — SIGNIFICANT NOTE
Exam confirms with auscultation zero audible heart tones and zero audible respirations. Mr Kenneth Wen was pronounced dead at 0259.  MD notified by patient's RN.     Mel De Guzman RN  Clinical House Supervisor    11/23/2023 6852 EST

## (undated) DEVICE — ADAPT CLN LUM OLYMP AIR/H20

## (undated) DEVICE — DRESSING,OPTIFOAM,NON-ADHESIVE,4X4: Brand: MEDLINE

## (undated) DEVICE — Device: Brand: DEFENDO AIR/WATER/SUCTION AND BIOPSY VALVE

## (undated) DEVICE — CONTN GRAD MEAS TRIANG 32OZ BLK

## (undated) DEVICE — SOLIDIFIER LIQ PREMISORB 1500CC

## (undated) DEVICE — SOL IRR H2O BTL 1000ML STRL

## (undated) DEVICE — KT PEG ENDOVIVE ENFIT STD PULL 20F 1P/U

## (undated) DEVICE — THE BITE BLOCK MAXI, LATEX FREE STRAP IS USED TO PROTECT THE ENDOSCOPE INSERTION TUBE FROM BEING BITTEN BY THE PATIENT.

## (undated) DEVICE — NDL HYPO ECLPS SFTY 25G 1 1/2IN

## (undated) DEVICE — INTRO ACCSR BLNT TP

## (undated) DEVICE — HYBRID CO2 TUBING/CAP SET FOR OLYMPUS® SCOPES & CO2 SOURCE: Brand: ERBE

## (undated) DEVICE — TUBING, SUCTION, 1/4" X 10', STRAIGHT: Brand: MEDLINE

## (undated) DEVICE — Device: Brand: AIR/WATER CHANNEL CLEANING ADAPTER

## (undated) DEVICE — LUBE JELLY FOIL PACK 1.4 OZ: Brand: MEDLINE INDUSTRIES, INC.

## (undated) DEVICE — DRAINBAG,ANTI-REFLUX TOWER,L/F,2000ML,LL: Brand: MEDLINE

## (undated) DEVICE — SYS DRAIN ENTRL FARRELL ENFIT 250ML

## (undated) DEVICE — SYRINGE, LUER LOCK, 5ML: Brand: MEDLINE

## (undated) DEVICE — KT ORCA ORCAPOD DISP STRL

## (undated) DEVICE — SYR LUERLOK 50ML

## (undated) DEVICE — SAFELINER SUCTION CANISTER 1000CC: Brand: DEROYAL

## (undated) DEVICE — LIMB HOLDER, WRIST/ANKLE: Brand: DEROYAL

## (undated) DEVICE — "MH-443 SUCTION VALVE F/EVIS140 EVIS160": Brand: SUCTION VALVE

## (undated) DEVICE — FIRST STEP BEDSIDE ADD WATER KIT - RESEALABLE STAND-UP POUCH, ENDOSCOPIC CLEANING PAD - 1 POUCH: Brand: FIRST STEP BEDSIDE ADD WATER KIT - RESEALABLE STAND-UP POUCH, ENDOSCOPIC CLEANIN